# Patient Record
Sex: MALE | Race: WHITE | NOT HISPANIC OR LATINO | Employment: OTHER | URBAN - METROPOLITAN AREA
[De-identification: names, ages, dates, MRNs, and addresses within clinical notes are randomized per-mention and may not be internally consistent; named-entity substitution may affect disease eponyms.]

---

## 2017-01-09 RX ORDER — PRAVASTATIN SODIUM 40 MG/1
TABLET ORAL
Qty: 90 TABLET | Refills: 2 | Status: SHIPPED | OUTPATIENT
Start: 2017-01-09 | End: 2017-10-06 | Stop reason: SDUPTHER

## 2017-01-10 NOTE — TELEPHONE ENCOUNTER
----- Message from Rosemarie Rodriguez sent at 1/10/2017 10:53 AM CST -----  quinapril refill needed..[phone]     [facility] [employer] [id number] [place] [address] [place] - [address] [facility]  [address]  [facility] [address]  Phone: [phone] Fax: [phone]

## 2017-01-11 RX ORDER — QUINAPRIL 40 MG/1
40 TABLET ORAL DAILY
Qty: 90 TABLET | Refills: 2 | Status: SHIPPED | OUTPATIENT
Start: 2017-01-11 | End: 2017-10-05 | Stop reason: SDUPTHER

## 2017-01-30 DIAGNOSIS — M17.10 ARTHRITIS OF KNEE: ICD-10-CM

## 2017-02-01 RX ORDER — TRAMADOL HYDROCHLORIDE 50 MG/1
TABLET ORAL
Qty: 30 TABLET | Refills: 0 | Status: SHIPPED | OUTPATIENT
Start: 2017-02-01 | End: 2017-02-06 | Stop reason: SDUPTHER

## 2017-02-02 ENCOUNTER — TELEPHONE (OUTPATIENT)
Dept: INTERNAL MEDICINE | Facility: CLINIC | Age: 72
End: 2017-02-02

## 2017-02-02 DIAGNOSIS — M17.10 ARTHRITIS OF KNEE: ICD-10-CM

## 2017-02-02 NOTE — TELEPHONE ENCOUNTER
----- Message from Dayna Ross sent at 2/2/2017  3:39 PM CST -----  Contact: Pt  Pt is requesting to have a refill for Tramadol. Pt also wants to increase the strength of it. Pt uses.    [facility] [employer] [address] [place] [address] [address] [facility]  [address]  [facility] [address]  Phone: [phone] Fax: [phone]    Pls call pt back at [phone].

## 2017-02-02 NOTE — TELEPHONE ENCOUNTER
Pt requesting an increase and refill of Tramadol due to the arthritis in his knee. Pt states Dr. Davis recommend trying increase if medication was ineffective for pian . Pt was informed pending  approval prescription will be sent to pharmacy pt verbalized understanding.

## 2017-02-06 RX ORDER — TRAMADOL HYDROCHLORIDE 50 MG/1
50 TABLET ORAL EVERY 12 HOURS PRN
Qty: 60 TABLET | Refills: 0 | Status: SHIPPED | OUTPATIENT
Start: 2017-02-06 | End: 2017-05-18 | Stop reason: SDUPTHER

## 2017-03-06 ENCOUNTER — LAB VISIT (OUTPATIENT)
Dept: LAB | Facility: HOSPITAL | Age: 72
End: 2017-03-06
Attending: INTERNAL MEDICINE
Payer: MEDICARE

## 2017-03-06 DIAGNOSIS — E78.5 HYPERLIPIDEMIA LDL GOAL <100: ICD-10-CM

## 2017-03-06 DIAGNOSIS — I10 ESSENTIAL HYPERTENSION: ICD-10-CM

## 2017-03-06 LAB
ALBUMIN SERPL BCP-MCNC: 3.6 G/DL
ALP SERPL-CCNC: 74 U/L
ALT SERPL W/O P-5'-P-CCNC: 30 U/L
ANION GAP SERPL CALC-SCNC: 11 MMOL/L
AST SERPL-CCNC: 23 U/L
BILIRUB SERPL-MCNC: 0.6 MG/DL
BUN SERPL-MCNC: 21 MG/DL
CALCIUM SERPL-MCNC: 9.5 MG/DL
CHLORIDE SERPL-SCNC: 101 MMOL/L
CHOLEST/HDLC SERPL: 2.9 {RATIO}
CO2 SERPL-SCNC: 27 MMOL/L
CREAT SERPL-MCNC: 1.1 MG/DL
EST. GFR  (AFRICAN AMERICAN): >60 ML/MIN/1.73 M^2
EST. GFR  (NON AFRICAN AMERICAN): >60 ML/MIN/1.73 M^2
GLUCOSE SERPL-MCNC: 183 MG/DL
HDL/CHOLESTEROL RATIO: 34.7 %
HDLC SERPL-MCNC: 124 MG/DL
HDLC SERPL-MCNC: 43 MG/DL
LDLC SERPL CALC-MCNC: 52.2 MG/DL
NONHDLC SERPL-MCNC: 81 MG/DL
POTASSIUM SERPL-SCNC: 4.1 MMOL/L
PROT SERPL-MCNC: 7.8 G/DL
SODIUM SERPL-SCNC: 139 MMOL/L
TRIGL SERPL-MCNC: 144 MG/DL

## 2017-03-06 PROCEDURE — 80061 LIPID PANEL: CPT

## 2017-03-06 PROCEDURE — 80053 COMPREHEN METABOLIC PANEL: CPT

## 2017-03-06 PROCEDURE — 83036 HEMOGLOBIN GLYCOSYLATED A1C: CPT

## 2017-03-06 PROCEDURE — 36415 COLL VENOUS BLD VENIPUNCTURE: CPT

## 2017-03-07 LAB
ESTIMATED AVG GLUCOSE: 249 MG/DL
HBA1C MFR BLD HPLC: 10.3 %

## 2017-03-08 ENCOUNTER — OFFICE VISIT (OUTPATIENT)
Dept: INTERNAL MEDICINE | Facility: CLINIC | Age: 72
End: 2017-03-08
Payer: MEDICARE

## 2017-03-08 ENCOUNTER — LAB VISIT (OUTPATIENT)
Dept: LAB | Facility: HOSPITAL | Age: 72
End: 2017-03-08
Attending: FAMILY MEDICINE
Payer: MEDICARE

## 2017-03-08 VITALS
SYSTOLIC BLOOD PRESSURE: 100 MMHG | WEIGHT: 196.19 LBS | TEMPERATURE: 97 F | DIASTOLIC BLOOD PRESSURE: 56 MMHG | BODY MASS INDEX: 29.73 KG/M2 | HEIGHT: 68 IN | HEART RATE: 70 BPM | OXYGEN SATURATION: 97 %

## 2017-03-08 DIAGNOSIS — Z13.9 SCREENING: ICD-10-CM

## 2017-03-08 DIAGNOSIS — I48.92 ATRIAL FLUTTER WITH RAPID VENTRICULAR RESPONSE: ICD-10-CM

## 2017-03-08 DIAGNOSIS — K21.9 GASTROESOPHAGEAL REFLUX DISEASE, ESOPHAGITIS PRESENCE NOT SPECIFIED: ICD-10-CM

## 2017-03-08 DIAGNOSIS — Z23 IMMUNIZATION DUE: ICD-10-CM

## 2017-03-08 DIAGNOSIS — I10 ESSENTIAL HYPERTENSION: ICD-10-CM

## 2017-03-08 PROCEDURE — 99214 OFFICE O/P EST MOD 30 MIN: CPT | Mod: S$PBB,,, | Performed by: FAMILY MEDICINE

## 2017-03-08 PROCEDURE — 36415 COLL VENOUS BLD VENIPUNCTURE: CPT

## 2017-03-08 PROCEDURE — 86803 HEPATITIS C AB TEST: CPT

## 2017-03-08 PROCEDURE — 99999 PR PBB SHADOW E&M-EST. PATIENT-LVL III: CPT | Mod: PBBFAC,,, | Performed by: FAMILY MEDICINE

## 2017-03-08 RX ORDER — OMEPRAZOLE 20 MG/1
20 CAPSULE, DELAYED RELEASE ORAL DAILY
Qty: 90 CAPSULE | Refills: 0 | Status: SHIPPED | OUTPATIENT
Start: 2017-03-08 | End: 2017-07-06 | Stop reason: SDUPTHER

## 2017-03-08 RX ORDER — DILTIAZEM HYDROCHLORIDE 360 MG/1
360 CAPSULE, EXTENDED RELEASE ORAL DAILY
Qty: 90 CAPSULE | Refills: 0 | Status: SHIPPED | OUTPATIENT
Start: 2017-03-08 | End: 2017-07-06 | Stop reason: SDUPTHER

## 2017-03-08 NOTE — MR AVS SNAPSHOT
[facility]  [address] [facility]  [place] [address]  Phone: [phone]  Fax: [phone]                  Nico Garza Jr.   3/8/2017 9:20 AM   Office Visit    Description:  Male : 1945   Provider:  Keshia Guzman MD   Department:  [facility] - Internal Medicine           Reason for Visit     3 mth f/u     PVC?//eye Dr?     Rev labs           Diagnoses this Visit        Comments    Immunization due    -  Primary     Screening         Essential hypertension         Type 2 diabetes, uncontrolled, with neuropathy         Gastroesophageal reflux disease, esophagitis presence not specified         Atrial flutter with rapid ventricular response                To Do List           Future Appointments        Provider Department Dept Phone    3/8/2017 11:30 AM LAB, SAME DAY [facility] [phone]      Goals (5 Years of Data)     None      Follow-Up and Disposition     Return in about 3 months (around 2017).       These Medications        Disp Refills Start End    diltiaZEM (CARDIZEM CD) 360 MG 24 hr capsule 90 capsule 0 3/8/2017 3/8/2018    Take 1 capsule (360 mg total) by mouth once daily. - Oral    Pharmacy: [facility] [address] [place] [address] [address] [facility] Ph #: [phone]       apixaban (ELIQUIS) 5 mg Tab 60 tablet 0 3/8/2017     Take 1 tablet (5 mg total) by mouth 2 (two) times daily. - Oral    Pharmacy: [facility] [address] [place] [address] [place] - [address] [facility] Ph #: [phone]       omeprazole (PRILOSEC) 20 MG capsule 90 capsule 0 3/8/2017     Take 1 capsule (20 mg total) by mouth once daily. - Oral    Pharmacy: [facility] [address] [place] [address], [place] - [address] [facility] Ph #: [phone]       insulin detemir (LEVEMIR FLEXTOUCH) 100 unit/mL (3 mL) SubQ InPn pen 4 Box 0  "3/8/2017 6/6/2017    Inject 60 Units into the skin every evening. - Subcutaneous    Pharmacy: [facility] [address] [place] [address]  [address] [facility] #: [phone]       Notes to Pharmacy: IDDM 250.82  Insurance or pt choice      [facility] On Call     [facility] On Call Nurse Care Line - 24/7 Assistance  Registered nurses in the [facility] On Call Center provide clinical advisement, health education, appointment booking, and other advisory services.  Call for this free service at [phone].             Medications           CHANGE how you are taking these medications     Start Taking Instead of    apixaban (ELIQUIS) 5 mg Tab ELIQUIS 5 mg Tab    Dosage:  Take 1 tablet (5 mg total) by mouth 2 (two) times daily. Dosage:  TAKE ONE TABLET BY MOUTH TWICE DAILY    Reason for Change:  Reorder            Verify that the below list of medications is an accurate representation of the medications you are currently taking.  If none reported, the list may be blank. If incorrect, please contact your healthcare provider. Carry this list with you in case of emergency.           Current Medications     apixaban (ELIQUIS) 5 mg Tab Take 1 tablet (5 mg total) by mouth 2 (two) times daily.    b complex vitamins tablet Take 1 tablet by mouth once daily.    diltiaZEM (CARDIZEM CD) 360 MG 24 hr capsule Take 1 capsule (360 mg total) by mouth once daily.    gabapentin (NEURONTIN) 300 MG capsule Take 1 capsule (300 mg total) by mouth 2 (two) times daily.    insulin detemir (LEVEMIR FLEXTOUCH) 100 unit/mL (3 mL) SubQ InPn pen Inject 60 Units into the skin every evening.    insulin lispro (HUMALOG KWIKPEN) 100 unit/mL InPn pen Inject 17 Units into the skin 3 (three) times daily before meals.    insulin needles, disposable, 32 x 1/4 " Ndle Insulin injections four times per day.    multivitamin capsule Take 1 capsule by mouth once daily.    NEEDLES, DISPOSABLE (DISPOSABLE NEEDLES MISC) Inject " "1 each into the skin 3 (three) times daily.    omeprazole (PRILOSEC) 20 MG capsule Take 1 capsule (20 mg total) by mouth once daily.    pravastatin (PRAVACHOL) 40 MG tablet TAKE 1 TABLET DAILY    quinapril (ACCUPRIL) 40 MG tablet Take 1 tablet (40 mg total) by mouth once daily.    tramadol (ULTRAM) 50 mg tablet Take 1 tablet (50 mg total) by mouth every 12 (twelve) hours as needed for Pain.           Clinical Reference Information           Your Vitals Were     BP Pulse Temp Height Weight SpO2    100/56 (BP Location: Left arm, Patient Position: Sitting, BP Method: Manual) 70 97.3 °F (36.3 °C) (Tympanic) 5' 8" (1.727 m) 89 kg (196 lb 3.4 oz) 97%    BMI                29.83 kg/m2          Blood Pressure          Most Recent Value    BP  (!)  100/56      Allergies as of 3/8/2017     No Known Allergies      Immunizations Administered on Date of Encounter - 3/8/2017     Name Date Dose VIS Date Route    Pneumococcal Polysaccharide - 23 Valent  Incomplete 0.5 mL 4/24/2015 Intramuscular      Orders Placed During Today's Visit      Normal Orders This Visit    Pneumococcal Polysaccharide Vaccine (23 Valent) (SQ/IM)     Future Labs/Procedures Expected by Expires    Hepatitis C antibody  3/8/2017 5/7/2018      [facility] Sign-Up     Activating your [facility] account is as easy as 1-2-3!     1) Visit [email or web address], select Sign Up Now, enter this activation code and your date of birth, then select Next.  [email or web address]  Expires: 4/22/2017 10:16 AM      2) Create a username and password to use when you visit [facility] in the future and select a security question in case you lose your password and select Next.    3) Enter your e-mail address and click Sign Up!    Additional Information  If you have questions, please e-mail [email or web address] or call [phone] to talk to our [facility] staff. Remember, [facility] is NOT to be used for urgent needs. For medical emergencies, dial 911.         Language Assistance Services     " ATTENTION: Language assistance services are available, free of charge. Please call [phone].      ATENCIÓN: Si habla lydia, tiene a hernandez disposición servicios gratuitos de asistencia lingüística. Llame al [phone].     CHÚ Ý: N?u b?n nói Ti?ng Vi?t, có các d?ch v? h? tr? ngôn ng? mi?n phí dành cho b?n. G?i s? [phone].         [facility] complies with applicable Federal civil rights laws and does not discriminate on the basis of race, color, national origin, age, disability, or sex.
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
## 2017-03-09 LAB — HCV AB SERPL QL IA: NEGATIVE

## 2017-03-10 ENCOUNTER — TELEPHONE (OUTPATIENT)
Dept: INTERNAL MEDICINE | Facility: CLINIC | Age: 72
End: 2017-03-10

## 2017-03-17 ENCOUNTER — TELEPHONE (OUTPATIENT)
Dept: INTERNAL MEDICINE | Facility: CLINIC | Age: 72
End: 2017-03-17

## 2017-04-17 DIAGNOSIS — I48.92 ATRIAL FLUTTER WITH RAPID VENTRICULAR RESPONSE: ICD-10-CM

## 2017-04-17 NOTE — TELEPHONE ENCOUNTER
----- Message from Rosemarie Rodriguez sent at 4/17/2017  8:12 AM CDT -----  [facility][phone]    [facility] [employer] [address] - [place] - [address] [facility] [address]  [address]  [address]  [facility] [place] [address]  Phone: [phone] Fax: [phone]

## 2017-04-18 DIAGNOSIS — I48.92 ATRIAL FLUTTER WITH RAPID VENTRICULAR RESPONSE: ICD-10-CM

## 2017-04-18 RX ORDER — APIXABAN 5 MG/1
TABLET, FILM COATED ORAL
Qty: 60 TABLET | Refills: 0 | OUTPATIENT
Start: 2017-04-18

## 2017-05-18 DIAGNOSIS — M17.10 ARTHRITIS OF KNEE: ICD-10-CM

## 2017-05-18 RX ORDER — TRAMADOL HYDROCHLORIDE 50 MG/1
50 TABLET ORAL EVERY 12 HOURS PRN
Qty: 60 TABLET | Refills: 0 | Status: SHIPPED | OUTPATIENT
Start: 2017-05-18 | End: 2017-07-06 | Stop reason: SDUPTHER

## 2017-05-18 NOTE — TELEPHONE ENCOUNTER
----- Message from Almita Gonzalez sent at 5/18/2017  2:57 PM CDT -----  Contact: pt  Patient needs a refill on Tramadol 50mg, insulin called into [facility] pharmacy at [place].  Please call patient at [phone], if you have any questions. Thanks!

## 2017-05-23 NOTE — TELEPHONE ENCOUNTER
----- Message from Jolie Hunter sent at 5/23/2017  3:21 PM CDT -----  Contact: pt  Pt is calling nurse regarding a refill RX Humalog . Pt stated that pt has a 1/2 pen left. Pt call back [phone] to be advised thanks    [facility] [employer] [address] [facility]  [address]  [facility] [address]  Phone: [phone] Fax: [phone]

## 2017-05-24 RX ORDER — INSULIN LISPRO 100 [IU]/ML
17 INJECTION, SOLUTION INTRAVENOUS; SUBCUTANEOUS
Qty: 40.5 ML | Refills: 1 | Status: SHIPPED | OUTPATIENT
Start: 2017-05-24 | End: 2017-05-26 | Stop reason: SDUPTHER

## 2017-05-26 RX ORDER — INSULIN LISPRO 100 [IU]/ML
17 INJECTION, SOLUTION INTRAVENOUS; SUBCUTANEOUS
Qty: 40.5 ML | Refills: 0 | Status: SHIPPED | OUTPATIENT
Start: 2017-05-26 | End: 2017-07-26 | Stop reason: SDUPTHER

## 2017-05-26 NOTE — TELEPHONE ENCOUNTER
----- Message from Rosy Jaison sent at 5/22/2017  2:49 PM CDT -----  Contact: self [phone]  States that he needs refill on humalog. Pt uses     [employer] [address] - [facility] [address] [facility] [address] [facility]  [address]  [facility] [address]  Phone: [phone] Fax: [phone]    Please call back at [phone]//thank you acc

## 2017-05-30 ENCOUNTER — PATIENT OUTREACH (OUTPATIENT)
Dept: ADMINISTRATIVE | Facility: HOSPITAL | Age: 72
End: 2017-05-30

## 2017-05-30 ENCOUNTER — TELEPHONE (OUTPATIENT)
Dept: INTERNAL MEDICINE | Facility: CLINIC | Age: 72
End: 2017-05-30

## 2017-05-30 NOTE — LETTER
May 30, 2017    [facility]  [address]  [facility] [address]  Phone: [phone] May 30, 2017     Patient: Nico Garza    YOB: 1945   Date of Visit: 5/30/2017         To Whom It May Concern:    Please send most recent copy of Diabetic Eye Exam that states   Positive or Negative Retinopathy.    If you have any questions or concerns, please don't hesitate to call.     Sincerely,  MD Varsha Putnam LPN Care Coordination   [facility]  Phone [phone] ext [phone],  Fax [phone]

## 2017-06-05 ENCOUNTER — LAB VISIT (OUTPATIENT)
Dept: LAB | Facility: HOSPITAL | Age: 72
End: 2017-06-05
Attending: INTERNAL MEDICINE
Payer: MEDICARE

## 2017-06-05 PROCEDURE — 36415 COLL VENOUS BLD VENIPUNCTURE: CPT

## 2017-06-05 PROCEDURE — 83036 HEMOGLOBIN GLYCOSYLATED A1C: CPT

## 2017-06-06 LAB
ESTIMATED AVG GLUCOSE: 255 MG/DL
HBA1C MFR BLD HPLC: 10.5 %

## 2017-06-08 ENCOUNTER — LAB VISIT (OUTPATIENT)
Dept: LAB | Facility: HOSPITAL | Age: 72
End: 2017-06-08
Attending: INTERNAL MEDICINE
Payer: MEDICARE

## 2017-06-08 ENCOUNTER — OFFICE VISIT (OUTPATIENT)
Dept: INTERNAL MEDICINE | Facility: CLINIC | Age: 72
End: 2017-06-08
Payer: MEDICARE

## 2017-06-08 VITALS
OXYGEN SATURATION: 97 % | DIASTOLIC BLOOD PRESSURE: 68 MMHG | HEART RATE: 67 BPM | WEIGHT: 189.81 LBS | HEIGHT: 68 IN | BODY MASS INDEX: 28.77 KG/M2 | TEMPERATURE: 98 F | SYSTOLIC BLOOD PRESSURE: 124 MMHG

## 2017-06-08 DIAGNOSIS — M79.18 MUSCULOSKELETAL PAIN: ICD-10-CM

## 2017-06-08 DIAGNOSIS — I10 ESSENTIAL HYPERTENSION: Chronic | ICD-10-CM

## 2017-06-08 DIAGNOSIS — Z12.11 COLON CANCER SCREENING: ICD-10-CM

## 2017-06-08 DIAGNOSIS — M79.10 MYALGIA: ICD-10-CM

## 2017-06-08 DIAGNOSIS — E78.5 HYPERLIPIDEMIA LDL GOAL <70: Chronic | ICD-10-CM

## 2017-06-08 LAB
ANION GAP SERPL CALC-SCNC: 14 MMOL/L
BUN SERPL-MCNC: 21 MG/DL
CALCIUM SERPL-MCNC: 9.8 MG/DL
CHLORIDE SERPL-SCNC: 101 MMOL/L
CK SERPL-CCNC: 105 U/L
CO2 SERPL-SCNC: 23 MMOL/L
CREAT SERPL-MCNC: 1.2 MG/DL
ERYTHROCYTE [SEDIMENTATION RATE] IN BLOOD BY WESTERGREN METHOD: 9 MM/HR
EST. GFR  (AFRICAN AMERICAN): >60 ML/MIN/1.73 M^2
EST. GFR  (NON AFRICAN AMERICAN): >60 ML/MIN/1.73 M^2
GLUCOSE SERPL-MCNC: 374 MG/DL
POTASSIUM SERPL-SCNC: 4.3 MMOL/L
SODIUM SERPL-SCNC: 138 MMOL/L

## 2017-06-08 PROCEDURE — 85651 RBC SED RATE NONAUTOMATED: CPT

## 2017-06-08 PROCEDURE — 1159F MED LIST DOCD IN RCRD: CPT | Mod: ,,, | Performed by: INTERNAL MEDICINE

## 2017-06-08 PROCEDURE — 99999 PR PBB SHADOW E&M-EST. PATIENT-LVL IV: CPT | Mod: PBBFAC,,, | Performed by: INTERNAL MEDICINE

## 2017-06-08 PROCEDURE — 82550 ASSAY OF CK (CPK): CPT

## 2017-06-08 PROCEDURE — 36415 COLL VENOUS BLD VENIPUNCTURE: CPT

## 2017-06-08 PROCEDURE — 99214 OFFICE O/P EST MOD 30 MIN: CPT | Mod: S$PBB,,, | Performed by: INTERNAL MEDICINE

## 2017-06-08 PROCEDURE — 80048 BASIC METABOLIC PNL TOTAL CA: CPT

## 2017-06-08 PROCEDURE — 4010F ACE/ARB THERAPY RXD/TAKEN: CPT | Mod: ,,, | Performed by: INTERNAL MEDICINE

## 2017-06-08 PROCEDURE — 1125F AMNT PAIN NOTED PAIN PRSNT: CPT | Mod: ,,, | Performed by: INTERNAL MEDICINE

## 2017-06-08 PROCEDURE — 3046F HEMOGLOBIN A1C LEVEL >9.0%: CPT | Mod: ,,, | Performed by: INTERNAL MEDICINE

## 2017-06-08 RX ORDER — DULOXETIN HYDROCHLORIDE 30 MG/1
30 CAPSULE, DELAYED RELEASE ORAL DAILY
Qty: 30 CAPSULE | Refills: 1 | Status: SHIPPED | OUTPATIENT
Start: 2017-06-08 | End: 2017-07-06

## 2017-06-08 RX ORDER — INSULIN DETEMIR 100 [IU]/ML
60 INJECTION, SOLUTION SUBCUTANEOUS NIGHTLY
Qty: 60 ML | Refills: 0 | OUTPATIENT
Start: 2017-06-08 | End: 2017-09-06

## 2017-06-08 NOTE — PATIENT INSTRUCTIONS
Duloxetine delayed-release capsules  What is this medicine?  DULOXETINE (doo LOX e teen) is used to treat depression, anxiety, and different types of chronic pain.  How should I use this medicine?  Take this medicine by mouth with a glass of water. Follow the directions on the prescription label. Do not cut, crush or chew this medicine. You can take this medicine with or without food. Take your medicine at regular intervals. Do not take your medicine more often than directed. Do not stop taking this medicine suddenly except upon the advice of your doctor. Stopping this medicine too quickly may cause serious side effects or your condition may worsen.  A special MedGuide will be given to you by the pharmacist with each prescription and refill. Be sure to read this information carefully each time.  Talk to your pediatrician regarding the use of this medicine in children. While this drug may be prescribed for children as young as 7 years of age for selected conditions, precautions do apply.  What side effects may I notice from receiving this medicine?  Side effects that you should report to your doctor or health care professional as soon as possible:  · allergic reactions like skin rash, itching or hives, swelling of the face, lips, or tongue  · changes in blood pressure  · confusion  · dark urine  · dizziness  · fast talking and excited feelings or actions that are out of control  · fast, irregular heartbeat  · fever  · general ill feeling or flu-like symptoms  · hallucination, loss of contact with reality  · light-colored stools  · loss of balance or coordination  · redness, blistering, peeling or loosening of the skin, including inside the mouth  · right upper belly pain  · seizures  · suicidal thoughts or other mood changes  · trouble concentrating  · trouble passing urine or change in the amount of urine  · unusual bleeding or bruising  · unusually weak or tired  · yellowing of the eyes or skin  Side effects that  usually do not require medical attention (report to your doctor or health care professional if they continue or are bothersome):  · blurred vision  · change in appetite  · change in sex drive or performance  · headache  · increased sweating  · nausea  What may interact with this medicine?  Do not take this medicine with any of the following medications:  · certain diet drugs like dexfenfluramine, fenfluramine  · desvenlafaxine  · linezolid  · MAOIs like Azilect, Carbex, Eldepryl, Marplan, Nardil, and Parnate  · methylene blue (intravenous)  · milnacipran  · thioridazine  · venlafaxine  This medicine may also interact with the following medications:  · alcohol  · aspirin and aspirin-like medicines  · certain antibiotics like ciprofloxacin and enoxacin  · certain medicines for blood pressure, heart disease, irregular heart beat  · certain medicines for depression, anxiety, or psychotic disturbances  · certain medicines for migraine headache like almotriptan, eletriptan, frovatriptan, naratriptan, rizatriptan, sumatriptan, zolmitriptan  · certain medicines that treat or prevent blood clots like warfarin, enoxaparin, and dalteparin  · cimetidine  · fentanyl  · lithium  · NSAIDS, medicines for pain and inflammation, like ibuprofen or naproxen  · phentermine  · procarbazine  · sibutramine  · Bill's wort  · theophylline  · tramadol  · tryptophan  What if I miss a dose?  If you miss a dose, take it as soon as you can. If it is almost time for your next dose, take only that dose. Do not take double or extra doses.  Where should I keep my medicine?  Keep out of the reach of children.  Store at room temperature between 20 and 25 degrees C (68 to 77 degrees F). Throw away any unused medicine after the expiration date.  What should I tell my health care provider before I take this medicine?  They need to know if you have any of these conditions:  · bipolar disorder or a family history of bipolar  disorder  · glaucoma  · kidney disease  · liver disease  · suicidal thoughts or a previous suicide attempt  · taken medicines called MAOIs like Carbex, Eldepryl, Marplan, Nardil, and Parnate within 14 days  · an unusual reaction to duloxetine, other medicines, foods, dyes, or preservatives  · pregnant or trying to get pregnant  · breast-feeding  What should I watch for while using this medicine?  Tell your doctor if your symptoms do not get better or if they get worse. Visit your doctor or health care professional for regular checks on your progress. Because it may take several weeks to see the full effects of this medicine, it is important to continue your treatment as prescribed by your doctor.  Patients and their families should watch out for new or worsening thoughts of suicide or depression. Also watch out for sudden changes in feelings such as feeling anxious, agitated, panicky, irritable, hostile, aggressive, impulsive, severely restless, overly excited and hyperactive, or not being able to sleep. If this happens, especially at the beginning of treatment or after a change in dose, call your health care professional.  You may get drowsy or dizzy. Do not drive, use machinery, or do anything that needs mental alertness until you know how this medicine affects you. Do not stand or sit up quickly, especially if you are an older patient. This reduces the risk of dizzy or fainting spells. Alcohol may interfere with the effect of this medicine. Avoid alcoholic drinks.  This medicine can cause an increase in blood pressure. This medicine can also cause a sudden drop in your blood pressure, which may make you feel faint and increase the chance of a fall. These effects are most common when you first start the medicine or when the dose is increased, or during use of other medicines that can cause a sudden drop in blood pressure. Check with your doctor for instructions on monitoring your blood pressure while taking this  medicine.  Your mouth may get dry. Chewing sugarless gum or sucking hard candy, and drinking plenty of water may help. Contact your doctor if the problem does not go away or is severe.  Date Last Reviewed:   NOTE:This sheet is a summary. It may not cover all possible information. If you have questions about this medicine, talk to your doctor, pharmacist, or health care provider. Copyright© 2016 [employer]

## 2017-06-09 NOTE — PROGRESS NOTES
Subjective:       Patient ID: Nico Garza Jr. is a 71 y.o. male.    Chief Complaint: 3 month lab review    Nico Garza Jr.  71 y.o. White male    Patient presents with:  3 month lab review    HPI: Here today to follow up on chronic conditions.  Diabetes--uncontrolled. He reports compliance with Humalog and Levemir. He has not seen a diabetes specialist.                       HGBA1C                   10.5 (H)            06/05/2017            Neuropathy--he states symptoms are not controlled on gabapentin and tramadol.   HTN--stable on current management.   HLD--compliant with pravastatin.                 CHOL                     124                 03/06/2017                 HDL                      43                  03/06/2017                 LDLCALC                  52.2 (L)            03/06/2017                 TRIG                     144                 03/06/2017                 He has muscle cramps, mostly at night.   He has back and knee pain. Tramadol helps but only temporarily.     Past Medical History:  Atrial fibrillation and flutter      Comment: Dr. Bello ([facility]  COPD (chronic obstructive pulmonary disease)  Diabetes mellitus  Hyperlipidemia  Hypertension  Lung disease  Pancreatitis    Current Outpatient Prescriptions on File Prior to Visit:  apixaban (ELIQUIS) 5 mg Tab, Take 1 tablet (5 mg total) by mouth 2 (two) times daily., Disp: 60 tablet, Rfl: 3  b complex vitamins tablet, Take 1 tablet by mouth once daily., Disp: , Rfl:    diltiaZEM (CARDIZEM CD) 360 MG 24 hr capsule, Take 1 capsule (360 mg total) by mouth once daily., Disp: 90 capsule, Rfl: 0  gabapentin (NEURONTIN) 300 MG capsule, Take 1 capsule (300 mg total) by mouth 2 (two) times daily., Disp: 60 capsule, Rfl: 3  insulin detemir (LEVEMIR FLEXTOUCH) 100 unit/mL (3 mL) SubQ InPn pen, Inject 60 Units into the skin every evening., Disp: 4 Box, Rfl: 0  insulin lispro (HUMALOG KWIKPEN) 100 unit/mL InPn pen, Inject  "17 Units into the skin 3 (three) times daily before meals., Disp: 40.5 mL, Rfl: 0  multivitamin capsule, Take 1 capsule by mouth once daily., Disp: , Rfl:   omeprazole (PRILOSEC) 20 MG capsule, Take 1 capsule (20 mg total) by mouth once daily., Disp: 90 capsule, Rfl: 0  pravastatin (PRAVACHOL) 40 MG tablet, TAKE 1 TABLET DAILY, Disp: 90 tablet, Rfl: 2  quinapril (ACCUPRIL) 40 MG tablet, Take 1 tablet (40 mg total) by mouth once daily., Disp: 90 tablet, Rfl: 2  tramadol (ULTRAM) 50 mg tablet, Take 1 tablet (50 mg total) by mouth every 12 (twelve) hours as needed for Pain., Disp: 60 tablet, Rfl: 0  insulin needles, disposable, 32 x 1/4 " Ndle, Insulin injections four times per day., Disp: 400 each, Rfl: 3  NEEDLES, DISPOSABLE (DISPOSABLE NEEDLES MISC), Inject 1 each into the skin 3 (three) times daily., Disp: , Rfl:     Allergies:  Review of patient's allergies indicates:  No Known Allergies            Review of Systems   Constitutional: Negative for fever and unexpected weight change.   Respiratory: Negative for shortness of breath.    Cardiovascular: Negative for chest pain and leg swelling.   Gastrointestinal: Negative for abdominal pain, constipation and diarrhea.   Genitourinary: Negative for dysuria.   Musculoskeletal: Positive for arthralgias, back pain and myalgias.   Neurological: Positive for numbness. Negative for dizziness and headaches.       Objective:      Physical Exam   Constitutional: He is oriented to person, place, and time. He appears well-developed and well-nourished. No distress.   Eyes: No scleral icterus.   Cardiovascular: Normal rate, regular rhythm and normal heart sounds.    Pulmonary/Chest: Effort normal and breath sounds normal. No respiratory distress.   Abdominal: Soft. Bowel sounds are normal.   Neurological: He is alert and oriented to person, place, and time.   Skin: Skin is warm and dry.   Psychiatric: He has a normal mood and affect.   Vitals reviewed.      Assessment:       1. " Type 2 diabetes, uncontrolled, with neuropathy    2. Essential hypertension    3. Hyperlipidemia LDL goal <70    4. Myalgia    5. Musculoskeletal pain    6. Colon cancer screening        Plan:       Nico was seen today for 3 month lab review.    Diagnoses and all orders for this visit:    Type 2 diabetes, uncontrolled, with neuropathy  -     duloxetine (CYMBALTA) 30 MG capsule; Take 1 capsule (30 mg total) by mouth once daily. Discussed medication risks and benefits.   -     Ambulatory consult to Diabetic Education  -     Recommend diabetic eye exam     Essential hypertension  -     Continue current management    Hyperlipidemia LDL goal <70  -     Continue current management    Myalgia  -     Basic metabolic panel; Future  -     CK; Future  -     Sedimentation rate, manual; Future  -     duloxetine (CYMBALTA) 30 MG capsule; Take 1 capsule (30 mg total) by mouth once daily.    Musculoskeletal pain  -     duloxetine (CYMBALTA) 30 MG capsule; Take 1 capsule (30 mg total) by mouth once daily.    Colon cancer screening  -     Case request GI: COLONOSCOPY    F/U in 4 weeks for neuropathy

## 2017-06-16 NOTE — TELEPHONE ENCOUNTER
----- Message from Xin Juan sent at 6/16/2017 11:55 AM CDT -----  Contact: Patient   Refill request for Rx Levemir, Please call him at [phone].      [facility] [employer] [address] [facility] [address] [facility]  [address]  [facility] [address]  Phone: [phone] Fax: [phone]    Thanks  td

## 2017-06-23 ENCOUNTER — OFFICE VISIT (OUTPATIENT)
Dept: DIABETES | Facility: CLINIC | Age: 72
End: 2017-06-23
Payer: MEDICARE

## 2017-06-23 ENCOUNTER — LAB VISIT (OUTPATIENT)
Dept: LAB | Facility: HOSPITAL | Age: 72
End: 2017-06-23
Attending: NURSE PRACTITIONER
Payer: MEDICARE

## 2017-06-23 VITALS
WEIGHT: 188.06 LBS | HEIGHT: 69 IN | DIASTOLIC BLOOD PRESSURE: 62 MMHG | SYSTOLIC BLOOD PRESSURE: 110 MMHG | BODY MASS INDEX: 27.85 KG/M2

## 2017-06-23 DIAGNOSIS — I10 ESSENTIAL HYPERTENSION: Chronic | ICD-10-CM

## 2017-06-23 DIAGNOSIS — E78.5 HYPERLIPIDEMIA LDL GOAL <70: Chronic | ICD-10-CM

## 2017-06-23 LAB
CREAT UR-MCNC: 391 MG/DL
GLUCOSE SERPL-MCNC: 238 MG/DL (ref 70–110)
MICROALBUMIN UR DL<=1MG/L-MCNC: 67 UG/ML
MICROALBUMIN/CREATININE RATIO: 17.1 UG/MG

## 2017-06-23 PROCEDURE — 3046F HEMOGLOBIN A1C LEVEL >9.0%: CPT | Mod: ,,, | Performed by: NURSE PRACTITIONER

## 2017-06-23 PROCEDURE — 99214 OFFICE O/P EST MOD 30 MIN: CPT | Mod: S$PBB,,, | Performed by: NURSE PRACTITIONER

## 2017-06-23 PROCEDURE — 4010F ACE/ARB THERAPY RXD/TAKEN: CPT | Mod: ,,, | Performed by: NURSE PRACTITIONER

## 2017-06-23 PROCEDURE — 82570 ASSAY OF URINE CREATININE: CPT

## 2017-06-23 PROCEDURE — 99999 PR PBB SHADOW E&M-EST. PATIENT-LVL IV: CPT | Mod: PBBFAC,,, | Performed by: NURSE PRACTITIONER

## 2017-06-23 PROCEDURE — 1125F AMNT PAIN NOTED PAIN PRSNT: CPT | Mod: ,,, | Performed by: NURSE PRACTITIONER

## 2017-06-23 PROCEDURE — 1159F MED LIST DOCD IN RCRD: CPT | Mod: ,,, | Performed by: NURSE PRACTITIONER

## 2017-06-23 NOTE — LETTER
June 26, 2017      Tiffany Davis DO  [address] [place] [address] [address] Breezy MONTANEZ [id number]           [facility]  [address] [place] [address] [address]  [place] [address]  Phone: [phone]  Fax: [phone]          Patient: Nico Garza Jr.   MR Number: [id number]   YOB: 1945   Date of Visit: 6/23/2017       Dear Dr. Tiffany Davis:    Thank you for referring Nico Garza to me for evaluation. Attached you will find relevant portions of my assessment and plan of care.    If you have questions, please do not hesitate to call me. I look forward to following Nico Garza along with you.    Sincerely,    Gabrielle Vee, JESSENIA, CDE    Enclosure  CC:  No Recipients    If you would like to receive this communication electronically, please contact [email or web address] or [phone] to request more information on [employer] Link access.    For providers and/or their staff who would like to refer a patient to [facility], please contact us through our one-stop-shop provider referral line, [facility] , at [phone].    If you feel you have received this communication in error or would no longer like to receive these types of communications, please e-mail [email or web address]

## 2017-06-23 NOTE — PROGRESS NOTES
"PCP: Tiffany Davis DO    Subjective:     Chief Complaint: Diabetes, establish [facility]    HISTORY OF PRESENT ILLNESS: 71 year old  male presenting to establish care for diabetes.  Patient has had Type II diabetes for at least 20 years and has the following complications: peripheral neuropathy.  He also has a history of atrial flutter on Eliquis.  He  has attended diabetes education in the past.  Blood glucose testing is performed once a day.  No meter or readings today.  Per recall, fasting  - 187.  He denies any recent hospital admissions, emergency room visits, or hypoglycemia.     Height: 5' 8.5" (174 cm)  //  Weight: 85.3 kg (188 lb 0.8 oz), Body mass index is 28.18 kg/m².  Home Blood Glucose reading this AM: 187 mg/dl fasting  His blood sugar in clinic today is: 238 mg/dl at 1:24 pm     Lab Results   Component Value Date    POCGLU 238 (A) 06/23/2017     Labs Reviewed. ADA recommends A1C of less than 7 %. His most recent A1C is:     Lab Results   Component Value Date    HGBA1C 10.5 (H) 06/05/2017      DM MEDICATIONS:   Levemir 60 units at bedtime  Humalog 17 units TID ac    Review of Systems   Constitutional: Negative for appetite change, diaphoresis, fatigue and unexpected weight change.   HENT: Negative for congestion, hearing loss, rhinorrhea, sneezing and sore throat.    Eyes: Negative for visual disturbance.   Respiratory: Negative for cough, shortness of breath and wheezing.    Cardiovascular: Negative for leg swelling.   Gastrointestinal: Negative for abdominal pain, constipation, diarrhea, nausea and vomiting.   Endocrine: Negative for cold intolerance, heat intolerance, polydipsia, polyphagia and polyuria.   Genitourinary: Negative for difficulty urinating, dysuria and urgency.   Skin: Negative for color change, pallor and wound.   Neurological: Positive for numbness. Negative for dizziness, seizures, syncope and headaches.   Psychiatric/Behavioral: Negative for confusion and decreased " concentration. The patient is not nervous/anxious.        STANDARDS OF CARE:  Current Ophthalmologist / Optometrist: Referral placed.  Appointment to be scheduled.    Current Podiatrist: None.   Recommend regular exams and denies gums bleeding.    ACTIVITY LEVEL: Rarely Active  EXERCISE: none  MEAL PLANNING: Patient reports number of meals per day to be 3 and number of snacks per day to be 2. Patient is encouraged to consume 45 - 60 grams of carbohydrates in each meal, and 1800 k / lyric per day.  Per dietary recall, patient is not limiting carbohydrates, saturated fats and sodium.     BLOOD GLUCOSE TESTING: self- monitoring  SOCIAL HISTORY: . Lives alone.  Former smoker.    Objective:      Physical Exam   Constitutional: He is oriented to person, place, and time. He appears well-developed and well-nourished.   HENT:   Head: Normocephalic and atraumatic.   Eyes: EOM are normal. Pupils are equal, round, and reactive to light.   Neck: Normal range of motion. No tracheal deviation present.   Cardiovascular: Normal rate, regular rhythm and intact distal pulses.  Exam reveals no friction rub.    No murmur heard.  Pulses:       Dorsalis pedis pulses are 2+ on the right side, and 2+ on the left side.   Pulmonary/Chest: Effort normal and breath sounds normal. He has no wheezes.   Abdominal: Soft. Bowel sounds are normal. He exhibits no distension. There is no tenderness.   Musculoskeletal: Normal range of motion. He exhibits no edema or deformity.   Feet:   Right Foot:   Protective Sensation: 6 sites tested. 6 sites sensed.   Skin Integrity: Negative for ulcer, blister, skin breakdown, erythema, warmth, callus or dry skin.   Left Foot:   Protective Sensation: 6 sites tested. 6 sites sensed.   Skin Integrity: Negative for ulcer, blister, skin breakdown, erythema, warmth, callus or dry skin.   Neurological: He is alert and oriented to person, place, and time.   Skin: Skin is warm and dry. No rash noted.   Psychiatric: He  has a normal mood and affect. His behavior is normal. Judgment and thought content normal.       Assessment / Plan:     1.) Type 2 diabetes, uncontrolled, with neuropathy ( on gabapentin )  Comments:  - Patient is on a basal-bolus regimen, but does not have BG readings today.  Will do a CGM next week to assess BG patterns and make adjustments to insulin at that time.  Indications for CGMS: Elevated A1C, Postprandial hyperglycemia, Unexplained blood glucose excursions and Annual Screening.   Orders:  -     POCT glucose  -     GLUCOSE MONITORING CONTINUOUS MIN 72 HOURS; Future  -     Microalbumin/creatinine urine ratio; Future; Expected date: 06/23/2017  -     Ambulatory referral to Optometry    2.) Essential hypertension - continue Accupril, Cardizem CD    3.) Hyperlipidemia LDL goal <70 - continue Pravastatin    Additional Plan Details:    1.) Patient was instructed to monitor blood glucose 2 - 3 x daily, fasting and ac dinner or at bedtime. Discussed ADA goal for fasting blood sugar, 80 - 130 mg/dL; pp blood sugars below 180 mg/dl. Also, discussed prevention of hypoglycemia and the need to adjust goals to higher levels if persistent hypoglycemia.  Reminded to bring BG records or meter to each visit for review.  2.) Reviewed pathophysiology of diabetes, complications related to the disease, importance of annual dilated eye exam and daily foot examination.  3.) We discussed the ADA recommendations: Hemoglobin A1c below 7.0 %. All patients with diabetes should be on statins unless contraindicated.  ACE or ARB therapy if not contraindicated.    4.) Meal planning teaching: Carbohydrate definition - one serving is 15 gms. Carbohydrate spacing - carbohydrates should be spaced into approximately 3 meals with 2 snacks ( of one carbohydrate ) between meals or at bedtime. Increase vegetable intake to 2 or more cups of vegetables per day as well as 2 fruit servings. Recommended low saturated fat, low sodium diet to aid in  control of hypertension and cholesterol.  5.) Discussed activity, benefits, methods, and precautions. Recommended patient start or continue some form of exercise and increase as tolerated to 30 minutes per day to facilitate weight loss and aid in control of BGs.  6.) Return to clinic in 1 month for follow up. He was explained the above plan and given opportunity to ask questions. Advised to call clinic with any further questions or concerns.    Gabrielle Vee, PRAKASHC, CDE    A total of 60 minutes was spent in face to face time, of which over 50 % was spent in counseling patient on disease process, complications, treatment, and side effects of medications.

## 2017-06-26 ENCOUNTER — TELEPHONE (OUTPATIENT)
Dept: GASTROENTEROLOGY | Facility: CLINIC | Age: 72
End: 2017-06-26

## 2017-06-30 ENCOUNTER — CLINICAL SUPPORT (OUTPATIENT)
Dept: DIABETES | Facility: CLINIC | Age: 72
End: 2017-06-30
Payer: MEDICARE

## 2017-07-03 NOTE — PROGRESS NOTES
Patient came into clinic to receive sensor placement for Rkylin CGMS, however due to issues with CGM sensor patient will have to be rescheduled at another time. Will contact [employer] rep to assist with troubleshooting equipment.

## 2017-07-06 ENCOUNTER — OFFICE VISIT (OUTPATIENT)
Dept: OPHTHALMOLOGY | Facility: CLINIC | Age: 72
End: 2017-07-06
Payer: MEDICARE

## 2017-07-06 ENCOUNTER — OFFICE VISIT (OUTPATIENT)
Dept: INTERNAL MEDICINE | Facility: CLINIC | Age: 72
End: 2017-07-06
Payer: MEDICARE

## 2017-07-06 VITALS
SYSTOLIC BLOOD PRESSURE: 122 MMHG | BODY MASS INDEX: 27.92 KG/M2 | HEIGHT: 69 IN | RESPIRATION RATE: 16 BRPM | WEIGHT: 188.5 LBS | HEART RATE: 72 BPM | DIASTOLIC BLOOD PRESSURE: 70 MMHG | TEMPERATURE: 96 F

## 2017-07-06 DIAGNOSIS — K21.9 GASTROESOPHAGEAL REFLUX DISEASE, ESOPHAGITIS PRESENCE NOT SPECIFIED: ICD-10-CM

## 2017-07-06 DIAGNOSIS — M17.10 ARTHRITIS OF KNEE: Primary | ICD-10-CM

## 2017-07-06 DIAGNOSIS — M47.9 ARTHRITIS OF BACK: ICD-10-CM

## 2017-07-06 DIAGNOSIS — E11.9 DIABETES MELLITUS TYPE 2 WITHOUT RETINOPATHY: Primary | ICD-10-CM

## 2017-07-06 DIAGNOSIS — I10 ESSENTIAL HYPERTENSION: ICD-10-CM

## 2017-07-06 DIAGNOSIS — H52.4 BILATERAL PRESBYOPIA: ICD-10-CM

## 2017-07-06 PROCEDURE — 99214 OFFICE O/P EST MOD 30 MIN: CPT | Mod: S$PBB,,, | Performed by: INTERNAL MEDICINE

## 2017-07-06 PROCEDURE — 99999 PR PBB SHADOW E&M-EST. PATIENT-LVL I: CPT | Mod: PBBFAC,,, | Performed by: OPTOMETRIST

## 2017-07-06 PROCEDURE — 3046F HEMOGLOBIN A1C LEVEL >9.0%: CPT | Mod: ,,, | Performed by: INTERNAL MEDICINE

## 2017-07-06 PROCEDURE — 92004 COMPRE OPH EXAM NEW PT 1/>: CPT | Mod: S$PBB,,, | Performed by: OPTOMETRIST

## 2017-07-06 PROCEDURE — 92015 DETERMINE REFRACTIVE STATE: CPT | Mod: ,,, | Performed by: OPTOMETRIST

## 2017-07-06 PROCEDURE — 4010F ACE/ARB THERAPY RXD/TAKEN: CPT | Mod: ,,, | Performed by: INTERNAL MEDICINE

## 2017-07-06 PROCEDURE — 99211 OFF/OP EST MAY X REQ PHY/QHP: CPT | Mod: PBBFAC | Performed by: OPTOMETRIST

## 2017-07-06 PROCEDURE — 1159F MED LIST DOCD IN RCRD: CPT | Mod: ,,, | Performed by: INTERNAL MEDICINE

## 2017-07-06 PROCEDURE — 1125F AMNT PAIN NOTED PAIN PRSNT: CPT | Mod: ,,, | Performed by: INTERNAL MEDICINE

## 2017-07-06 PROCEDURE — 99999 PR PBB SHADOW E&M-EST. PATIENT-LVL III: CPT | Mod: PBBFAC,,, | Performed by: INTERNAL MEDICINE

## 2017-07-06 RX ORDER — TRAMADOL HYDROCHLORIDE 50 MG/1
100 TABLET ORAL DAILY PRN
Qty: 60 TABLET | Refills: 3 | Status: SHIPPED | OUTPATIENT
Start: 2017-07-06 | End: 2018-03-02 | Stop reason: SDUPTHER

## 2017-07-06 RX ORDER — OMEPRAZOLE 20 MG/1
20 CAPSULE, DELAYED RELEASE ORAL DAILY
Qty: 90 CAPSULE | Refills: 1 | Status: SHIPPED | OUTPATIENT
Start: 2017-07-06 | End: 2018-01-02 | Stop reason: SDUPTHER

## 2017-07-06 RX ORDER — DILTIAZEM HYDROCHLORIDE 360 MG/1
360 CAPSULE, EXTENDED RELEASE ORAL DAILY
Qty: 90 CAPSULE | Refills: 3 | Status: SHIPPED | OUTPATIENT
Start: 2017-07-06 | End: 2018-07-12 | Stop reason: SDUPTHER

## 2017-07-06 RX ORDER — GABAPENTIN 300 MG/1
300 CAPSULE ORAL 2 TIMES DAILY
Qty: 180 CAPSULE | Refills: 1 | Status: SHIPPED | OUTPATIENT
Start: 2017-07-06 | End: 2017-10-05 | Stop reason: SDUPTHER

## 2017-07-06 NOTE — PROGRESS NOTES
HPI      IDDM exam. Decrease near visual acuity hard to read small print on the   television screen. New patient last eye exam 3 years. Update glasses RX.   Patient was told he has cataracts in both eyes.     Last edited by Hannah Muller on 7/6/2017 10:59 AM. (History)            Assessment /Plan     For exam results, see Encounter Report.    Diabetes mellitus type 2 without retinopathy    Nuclear cataract, bilateral    Bilateral presbyopia      No Background Diabetic Retinopathy    Significant cataracts OU, pt defers the cataract evaluation consult.    Dispense Final Rx for glasses.  RTC 1 year

## 2017-07-06 NOTE — PROGRESS NOTES
"Nico Josetee Fernando.  71 y.o. White male    Chief Complaint   Patient presents with    Follow-up     HPI: Presents to the clinic for a one month follow up. He was started on Cymbalta 30 mg for pain. He states it has not helped his chronic knee or back pain. He is not interested in seeing orthopedics because he states the last time he was told he needed surgery. He takes tramadol but states it only lasts a couple of hours.   Neuropathy--stable on gabapentin.   Diabetes--he has seen diabetes management and is due to follow up later this month.   HTN--stable. He needs a refill on diltiazem.  GERD--he needs a refill on omeprazole.     Past Medical History:   Diagnosis Date    Arthritis     Atrial fibrillation and flutter     Dr. Bello ([facility]    COPD (chronic obstructive pulmonary disease)     Diabetes mellitus     DM (diabetes mellitus) 1996     am 07/06/2017 Insulin x 3 years.    Hyperlipidemia     Hypertension     Lung disease     Pancreatitis        Current Outpatient Prescriptions on File Prior to Visit   Medication Sig Dispense Refill    apixaban (ELIQUIS) 5 mg Tab Take 1 tablet (5 mg total) by mouth 2 (two) times daily. 60 tablet 3    b complex vitamins tablet Take 1 tablet by mouth once daily.      insulin detemir (LEVEMIR FLEXTOUCH) 100 unit/mL (3 mL) SubQ InPn pen Inject 60 Units into the skin every evening. 4 Box 0    insulin lispro (HUMALOG KWIKPEN) 100 unit/mL InPn pen Inject 17 Units into the skin 3 (three) times daily before meals. 40.5 mL 0    insulin needles, disposable, 32 x 1/4 " Ndle Insulin injections four times per day. 400 each 3    multivitamin capsule Take 1 capsule by mouth once daily.      NEEDLES, DISPOSABLE (DISPOSABLE NEEDLES MISC) Inject 1 each into the skin 3 (three) times daily.      pravastatin (PRAVACHOL) 40 MG tablet TAKE 1 TABLET DAILY 90 tablet 2    quinapril (ACCUPRIL) 40 MG tablet Take 1 tablet (40 mg total) by mouth once daily. 90 " tablet 2    diltiaZEM (CARDIZEM CD) 360 MG 24 hr capsule Take 1 capsule (360 mg total) by mouth once daily. 90 capsule 0    duloxetine (CYMBALTA) 30 MG capsule Take 1 capsule (30 mg total) by mouth once daily. 30 capsule 1    gabapentin (NEURONTIN) 300 MG capsule Take 1 capsule (300 mg total) by mouth 2 (two) times daily. 60 capsule 3    omeprazole (PRILOSEC) 20 MG capsule Take 1 capsule (20 mg total) by mouth once daily. 90 capsule 0    tramadol (ULTRAM) 50 mg tablet Take 1 tablet (50 mg total) by mouth every 12 (twelve) hours as needed for Pain. 60 tablet 0     No current facility-administered medications on file prior to visit.        Allergies:  Review of patient's allergies indicates:  No Known Allergies    PHYSICAL EXAM:  VITAL SIGNS: Reviewed  GENERAL: Alert and oriented, no acute distress  HEART: Regular rate and rhythm  LUNGS: Respirations unlabored    ASSESSMENT/PLAN:  Nico was seen today for follow-up.    Diagnoses and all orders for this visit:    Arthritis of knee  -     Change tramadol (ULTRAM) 50 mg tablet; Take 2 tablets (100 mg total) by mouth daily as needed for Pain.    Arthritis of back  -     Change tramadol (ULTRAM) 50 mg tablet; Take 2 tablets (100 mg total) by mouth daily as needed for Pain.    Type 2 diabetes, uncontrolled, with neuropathy  -     Continue gabapentin (NEURONTIN) 300 MG capsule; Take 1 capsule (300 mg total) by mouth 2 (two) times daily.    Essential hypertension  -     Refill diltiaZEM (CARDIZEM CD) 360 MG 24 hr capsule; Take 1 capsule (360 mg total) by mouth once daily.    Gastroesophageal reflux disease, esophagitis presence not specified  -     Refill omeprazole (PRILOSEC) 20 MG capsule; Take 1 capsule (20 mg total) by mouth once daily.    Labs and f/u in 3 months

## 2017-07-06 NOTE — LETTER
July 6, 2017      Gabrielle Vee, JESSENIA, CDE  [address] [facility] [address]           [facility]  [address] [place] [address]  Phone: [phone]  Fax: [phone]          Patient: Nico Garza Jr.   MR Number: [id number]   YOB: 1945   Date of Visit: 7/6/2017       Dear Gabrielle Vee:    Thank you for referring Nico Garza to me for evaluation. Attached you will find relevant portions of my assessment and plan of care.    If you have questions, please do not hesitate to call me. I look forward to following Nico Garza along with you.    Sincerely,    Usman Nuno, OD    Enclosure  CC:  No Recipients    If you would like to receive this communication electronically, please contact [email or web address] or [phone] to request more information on [facility] Link access.    For providers and/or their staff who would like to refer a patient to [facility], please contact us through our one-stop-shop provider referral line, [facility] , at [phone].    If you feel you have received this communication in error or would no longer like to receive these types of communications, please e-mail [email or web address]

## 2017-07-25 ENCOUNTER — CLINICAL SUPPORT (OUTPATIENT)
Dept: DIABETES | Facility: CLINIC | Age: 72
End: 2017-07-25
Payer: MEDICARE

## 2017-07-25 PROCEDURE — 95250 CONT GLUC MNTR PHYS/QHP EQP: CPT | Mod: PBBFAC | Performed by: NURSE PRACTITIONER

## 2017-07-25 NOTE — PROGRESS NOTES
Patient is here in clinic today for placement of continuous glucose monitoring system (CGMS) Freestyle [employer]nor. Site on back of patient's left upper arm was cleaned and sensor was placed and started. No further questions from patient.   Explained to patient that this is a blind procedure and that he will not actually see his BG in real time. Instructed pt he could shower and informed him of need to check blood sugars as prescribed. Patient was also informed to avoid all imaging procedures and acetaminophen during his procedure. Voiced understanding of all instructions given. Patient will come back on 8/1 to have sensor downloaded.

## 2017-07-27 RX ORDER — INSULIN LISPRO 100 [IU]/ML
INJECTION, SOLUTION INTRAVENOUS; SUBCUTANEOUS
Qty: 30 ML | Refills: 0 | Status: SHIPPED | OUTPATIENT
Start: 2017-07-27 | End: 2017-12-09 | Stop reason: SDUPTHER

## 2017-08-01 ENCOUNTER — OFFICE VISIT (OUTPATIENT)
Dept: DIABETES | Facility: CLINIC | Age: 72
End: 2017-08-01
Payer: MEDICARE

## 2017-08-01 VITALS
HEIGHT: 69 IN | SYSTOLIC BLOOD PRESSURE: 122 MMHG | DIASTOLIC BLOOD PRESSURE: 78 MMHG | BODY MASS INDEX: 27.92 KG/M2 | WEIGHT: 188.5 LBS

## 2017-08-01 DIAGNOSIS — I10 ESSENTIAL HYPERTENSION: Chronic | ICD-10-CM

## 2017-08-01 DIAGNOSIS — E78.5 HYPERLIPIDEMIA LDL GOAL <70: Chronic | ICD-10-CM

## 2017-08-01 LAB — GLUCOSE SERPL-MCNC: 109 MG/DL (ref 70–110)

## 2017-08-01 PROCEDURE — 82948 REAGENT STRIP/BLOOD GLUCOSE: CPT | Mod: PBBFAC | Performed by: NURSE PRACTITIONER

## 2017-08-01 PROCEDURE — 3046F HEMOGLOBIN A1C LEVEL >9.0%: CPT | Mod: ,,, | Performed by: NURSE PRACTITIONER

## 2017-08-01 PROCEDURE — 4010F ACE/ARB THERAPY RXD/TAKEN: CPT | Mod: ,,, | Performed by: NURSE PRACTITIONER

## 2017-08-01 PROCEDURE — 99214 OFFICE O/P EST MOD 30 MIN: CPT | Mod: S$PBB,,, | Performed by: NURSE PRACTITIONER

## 2017-08-01 PROCEDURE — 99213 OFFICE O/P EST LOW 20 MIN: CPT | Mod: PBBFAC | Performed by: NURSE PRACTITIONER

## 2017-08-01 PROCEDURE — 95251 CONT GLUC MNTR ANALYSIS I&R: CPT | Mod: ,,, | Performed by: NURSE PRACTITIONER

## 2017-08-01 PROCEDURE — 1126F AMNT PAIN NOTED NONE PRSNT: CPT | Mod: ,,, | Performed by: NURSE PRACTITIONER

## 2017-08-01 PROCEDURE — 99999 PR PBB SHADOW E&M-EST. PATIENT-LVL III: CPT | Mod: PBBFAC,,, | Performed by: NURSE PRACTITIONER

## 2017-08-01 PROCEDURE — 1159F MED LIST DOCD IN RCRD: CPT | Mod: ,,, | Performed by: NURSE PRACTITIONER

## 2017-08-01 NOTE — PROGRESS NOTES
"PCP: Tiffany Davis DO    Subjective:     Chief Complaint: Diabetes, review CGM    HISTORY OF PRESENT ILLNESS: 71 year old  male presenting for follow up of diabetes.  Patient has had Type II diabetes for at least 20 years and has the following complications: peripheral neuropathy.  He also has a history of atrial flutter on Eliquis.  He  has attended diabetes education in the past.  He is here today for CGM removal and review of report.  He denies any recent hospital admissions, emergency room visits, or hypoglycemia.     The patient CGM was downloaded and was reviewed.  The report was technically satisfactory.  He forgot his food intake diary or exercise diary.  Patient average glucose was 165 mg/dL, Sensor usage was 8 of 8 days.  He was in time above range (TAR) 49 % of the time, in time in range (TIR) 40% of the time, and in time below range (TBR) 11 % of the time.  The target range for this patient was 80 - 150 mg/dL.      Overall, he has a pattern of afternoon hyperglycemia.  Usually between noon - 12 MN, BG are persistently > 150 sometimes as high as 350.  He is usually within rang from MN - 12 noon.  He did have one day, July 26, where he was hypo most of the day from MN - 7 am and 5 pm - 9 pm.  A total of 5 low events with average duration of 135 minutes.    Height: 5' 8.5" (174 cm)  //  Weight: 85.5 kg (188 lb 7.9 oz), Body mass index is 28.24 kg/m².  Home Blood Glucose reading this AM: Not Taken  His blood sugar in clinic today is:      Lab Results   Component Value Date    POCGLU 109 08/01/2017     Labs Reviewed. ADA recommends A1C of less than 7 %. His most recent A1C is:     Lab Results   Component Value Date    HGBA1C 10.5 (H) 06/05/2017      DM MEDICATIONS:   Levemir 60 units at bedtime  Humalog 17 units TID ac    Review of Systems   Constitutional: Negative for appetite change, diaphoresis, fatigue and unexpected weight change.   HENT: Negative for congestion, hearing loss, rhinorrhea, " sneezing and sore throat.    Eyes: Negative for visual disturbance.   Respiratory: Negative for cough, shortness of breath and wheezing.    Cardiovascular: Negative for leg swelling.   Gastrointestinal: Negative for abdominal pain, constipation, diarrhea, nausea and vomiting.   Endocrine: Negative for cold intolerance, heat intolerance, polydipsia, polyphagia and polyuria.   Genitourinary: Negative for difficulty urinating, dysuria and urgency.   Skin: Negative for color change, pallor and wound.   Neurological: Positive for numbness. Negative for dizziness, seizures, syncope and headaches.   Psychiatric/Behavioral: Negative for confusion and decreased concentration. The patient is not nervous/anxious.        STANDARDS OF CARE:  Current Ophthalmologist / Optometrist: Dr. Nuno, Last exam 7 / 2017  Current Podiatrist: None.   Recommend regular exams and denies gums bleeding.    ACTIVITY LEVEL: Rarely Active  EXERCISE: none  MEAL PLANNING: Patient reports number of meals per day to be 3 and number of snacks per day to be 2. Patient is encouraged to consume 45 - 60 grams of carbohydrates in each meal, and 1800 k / lyric per day.  Per dietary recall, patient is not limiting carbohydrates, saturated fats and sodium.     BLOOD GLUCOSE TESTING: Self- monitoring  SOCIAL HISTORY: . Lives alone.  Former smoker.    Objective:      Physical Exam   Constitutional: He is oriented to person, place, and time. He appears well-developed and well-nourished.   HENT:   Head: Normocephalic and atraumatic.   Eyes: EOM are normal. Pupils are equal, round, and reactive to light.   Neck: Normal range of motion. No tracheal deviation present.   Cardiovascular: Normal rate, regular rhythm and intact distal pulses.  Exam reveals no friction rub.    No murmur heard.  Pulmonary/Chest: Effort normal and breath sounds normal. He has no wheezes.   Abdominal: Soft. Bowel sounds are normal. He exhibits no distension. There is no tenderness.    Musculoskeletal: Normal range of motion. He exhibits no edema or deformity.   Neurological: He is alert and oriented to person, place, and time.   Skin: Skin is warm and dry. No rash noted.   Psychiatric: He has a normal mood and affect. His behavior is normal. Judgment and thought content normal.       Assessment / Plan:     1.) Type 2 diabetes, uncontrolled, with neuropathy  Comments:  - Decrease Levemir to 55 units at bedtime. Continue Humalog 17 units before breakfast and lunch, but increase to 20 units before dinner.  He voiced understanding.    Orders:  -     POCT glucose    2.) Hyperlipidemia LDL goal <70 - continue Pravastatin    3.) Essential hypertension - continue Accupril, Cardizem CD    Additional Plan Details:    1.) Patient was instructed to monitor blood glucose 2 - 3 x daily, fasting and ac dinner or at bedtime. Discussed ADA goal for fasting blood sugar, 80 - 130 mg/dL; pp blood sugars below 180 mg/dl. Also, discussed prevention of hypoglycemia and the need to adjust goals to higher levels if persistent hypoglycemia.  Reminded to bring BG records or meter to each visit for review.  2.) Reviewed pathophysiology of diabetes, complications related to the disease, importance of annual dilated eye exam and daily foot examination.  3.) We discussed the ADA recommendations: Hemoglobin A1c below 7.0 %. All patients with diabetes should be on statins unless contraindicated.  ACE or ARB therapy if not contraindicated.    4.) Meal planning teaching: Carbohydrate definition - one serving is 15 gms. Carbohydrate spacing - carbohydrates should be spaced into approximately 3 meals with 2 snacks ( of one carbohydrate ) between meals or at bedtime. Increase vegetable intake to 2 or more cups of vegetables per day as well as 2 fruit servings. Recommended low saturated fat, low sodium diet to aid in control of hypertension and cholesterol.  5.) Discussed activity, benefits, methods, and precautions. Recommended  patient start or continue some form of exercise and increase as tolerated to 30 minutes per day to facilitate weight loss and aid in control of BGs.  6.) Return to clinic in 2 months for follow up. He was explained the above plan and given opportunity to ask questions. Advised to call clinic with any further questions or concerns.    Gabrielle Vee, JULIETTE-C, CDE    A total of 30 minutes was spent in face to face time, of which over 50 % was spent in counseling patient on disease process, complications, treatment, and side effects of medications.

## 2017-08-07 DIAGNOSIS — M79.18 MUSCULOSKELETAL PAIN: ICD-10-CM

## 2017-08-07 DIAGNOSIS — M79.10 MYALGIA: ICD-10-CM

## 2017-08-07 RX ORDER — DULOXETIN HYDROCHLORIDE 30 MG/1
CAPSULE, DELAYED RELEASE ORAL
Qty: 30 CAPSULE | Refills: 3 | Status: ON HOLD | OUTPATIENT
Start: 2017-08-07 | End: 2018-08-31 | Stop reason: CLARIF

## 2017-08-15 DIAGNOSIS — I48.92 ATRIAL FLUTTER WITH RAPID VENTRICULAR RESPONSE: ICD-10-CM

## 2017-08-17 DIAGNOSIS — I48.92 ATRIAL FLUTTER WITH RAPID VENTRICULAR RESPONSE: ICD-10-CM

## 2017-08-17 RX ORDER — APIXABAN 5 MG/1
TABLET, FILM COATED ORAL
Qty: 60 TABLET | Refills: 6 | Status: SHIPPED | OUTPATIENT
Start: 2017-08-17 | End: 2017-08-17 | Stop reason: SDUPTHER

## 2017-08-17 NOTE — TELEPHONE ENCOUNTER
----- Message from Edin Sebastian sent at 8/17/2017 12:05 PM CDT -----  Contact: Pt   ..1. What is the name of the medication you are requesting? eliquis  2. What is the dose? 5 mgs  3. How do you take the medication? Orally, topically, etc? orally  4. How often do you take this medication?twice a day   5. Do you need a 30 day or 90 day supply? 30 day   6. How many refills are you requesting? 1  7. What is your preferred pharmacy and location of the pharmacy?     ..  [facility] [address] [place] [address] [address] [facility]  [address]  [facility] [address]  Phone: [phone] Fax: [phone]      8. Who can we contact with further questions? Pt at..[phone] please notify when sent pt has only one pill left

## 2017-10-03 ENCOUNTER — OFFICE VISIT (OUTPATIENT)
Dept: DIABETES | Facility: CLINIC | Age: 72
End: 2017-10-03
Payer: MEDICARE

## 2017-10-03 ENCOUNTER — LAB VISIT (OUTPATIENT)
Dept: LAB | Facility: HOSPITAL | Age: 72
End: 2017-10-03
Attending: INTERNAL MEDICINE
Payer: MEDICARE

## 2017-10-03 VITALS
BODY MASS INDEX: 29.12 KG/M2 | WEIGHT: 196.63 LBS | DIASTOLIC BLOOD PRESSURE: 56 MMHG | SYSTOLIC BLOOD PRESSURE: 114 MMHG | HEIGHT: 69 IN

## 2017-10-03 DIAGNOSIS — I10 ESSENTIAL HYPERTENSION: Chronic | ICD-10-CM

## 2017-10-03 DIAGNOSIS — I10 ESSENTIAL HYPERTENSION: ICD-10-CM

## 2017-10-03 DIAGNOSIS — E78.5 HYPERLIPIDEMIA LDL GOAL <70: Chronic | ICD-10-CM

## 2017-10-03 DIAGNOSIS — E78.5 HYPERLIPIDEMIA LDL GOAL <100: ICD-10-CM

## 2017-10-03 LAB
ALBUMIN SERPL BCP-MCNC: 3.4 G/DL
ALP SERPL-CCNC: 77 U/L
ALT SERPL W/O P-5'-P-CCNC: 20 U/L
ANION GAP SERPL CALC-SCNC: 9 MMOL/L
AST SERPL-CCNC: 18 U/L
BILIRUB SERPL-MCNC: 0.4 MG/DL
BUN SERPL-MCNC: 22 MG/DL
CALCIUM SERPL-MCNC: 9.1 MG/DL
CHLORIDE SERPL-SCNC: 104 MMOL/L
CHOLEST SERPL-MCNC: 142 MG/DL
CHOLEST/HDLC SERPL: 2.5 {RATIO}
CO2 SERPL-SCNC: 29 MMOL/L
CREAT SERPL-MCNC: 1 MG/DL
EST. GFR  (AFRICAN AMERICAN): >60 ML/MIN/1.73 M^2
EST. GFR  (NON AFRICAN AMERICAN): >60 ML/MIN/1.73 M^2
ESTIMATED AVG GLUCOSE: 171 MG/DL
GLUCOSE SERPL-MCNC: 165 MG/DL
GLUCOSE SERPL-MCNC: 305 MG/DL (ref 70–110)
HBA1C MFR BLD HPLC: 7.6 %
HDLC SERPL-MCNC: 57 MG/DL
HDLC SERPL: 40.1 %
LDLC SERPL CALC-MCNC: 66.2 MG/DL
NONHDLC SERPL-MCNC: 85 MG/DL
POTASSIUM SERPL-SCNC: 4.1 MMOL/L
PROT SERPL-MCNC: 7.5 G/DL
SODIUM SERPL-SCNC: 142 MMOL/L
TRIGL SERPL-MCNC: 94 MG/DL

## 2017-10-03 PROCEDURE — 36415 COLL VENOUS BLD VENIPUNCTURE: CPT

## 2017-10-03 PROCEDURE — 82948 REAGENT STRIP/BLOOD GLUCOSE: CPT | Mod: PBBFAC | Performed by: NURSE PRACTITIONER

## 2017-10-03 PROCEDURE — 99213 OFFICE O/P EST LOW 20 MIN: CPT | Mod: PBBFAC | Performed by: NURSE PRACTITIONER

## 2017-10-03 PROCEDURE — 80061 LIPID PANEL: CPT

## 2017-10-03 PROCEDURE — 83036 HEMOGLOBIN GLYCOSYLATED A1C: CPT

## 2017-10-03 PROCEDURE — 99214 OFFICE O/P EST MOD 30 MIN: CPT | Mod: S$PBB,,, | Performed by: NURSE PRACTITIONER

## 2017-10-03 PROCEDURE — 80053 COMPREHEN METABOLIC PANEL: CPT

## 2017-10-03 PROCEDURE — 99999 PR PBB SHADOW E&M-EST. PATIENT-LVL III: CPT | Mod: PBBFAC,,, | Performed by: NURSE PRACTITIONER

## 2017-10-03 RX ORDER — INSULIN DETEMIR 100 [IU]/ML
60 INJECTION, SOLUTION SUBCUTANEOUS NIGHTLY
Qty: 60 ML | Refills: 0 | OUTPATIENT
Start: 2017-10-03 | End: 2018-01-01

## 2017-10-03 RX ORDER — INSULIN DEGLUDEC 200 U/ML
50 INJECTION, SOLUTION SUBCUTANEOUS DAILY
Qty: 9 ML | Refills: 3 | Status: SHIPPED | OUTPATIENT
Start: 2017-10-03 | End: 2018-02-19 | Stop reason: SDUPTHER

## 2017-10-03 NOTE — PROGRESS NOTES
"PCP: Tiffany Davis DO    Subjective:     Chief Complaint: Diabetes     HISTORY OF PRESENT ILLNESS: 71 year old  male presenting for follow up of diabetes.  Patient has had Type II diabetes for at least 20 years and has the following complications: peripheral neuropathy.  He also has a history of a flutter on Eliquis.  He  has attended diabetes education in the past.  He denies any recent hospital admissions, emergency room visits, or hypoglycemia.      Per records, fasting  - 185; ac lunch 91 - 156, < 205, 230 >; ac dinner 86 - 225.     Height: 5' 8.5" (174 cm)  //  Weight: 89.2 kg (196 lb 10.4 oz), Body mass index is 29.47 kg/m².  Home Blood Glucose reading this AM: Not Taken  His blood sugar in clinic today is: 305 mg/dl at 1:32 pm ( he just finished eating Ludi )    Labs Reviewed. ADA recommends A1C of less than 7 %. His most recent A1C is:     Lab Results   Component Value Date    HGBA1C 10.5 (H) 06/05/2017      DM MEDICATIONS:   Levemir 50 units at bedtime  Humalog 17 / 17 / 20 units TID ac    Review of Systems   Constitutional: Negative for appetite change, diaphoresis, fatigue and unexpected weight change.   HENT: Negative for congestion, hearing loss, rhinorrhea, sneezing and sore throat.    Eyes: Negative for visual disturbance.   Respiratory: Negative for cough, shortness of breath and wheezing.    Cardiovascular: Negative for leg swelling.   Gastrointestinal: Negative for abdominal pain, constipation, diarrhea, nausea and vomiting.   Endocrine: Negative for cold intolerance, heat intolerance, polydipsia, polyphagia and polyuria.   Genitourinary: Negative for difficulty urinating, dysuria and urgency.   Skin: Negative for color change, pallor and wound.   Neurological: Positive for numbness. Negative for dizziness, seizures, syncope and headaches.   Psychiatric/Behavioral: Negative for confusion and decreased concentration. The patient is not nervous/anxious.        STANDARDS OF " CARE:  Current Ophthalmologist / Optometrist: Dr. Nuno, Last exam 7 / 2017  Current Podiatrist: None.   Recommend regular exams and denies gums bleeding.    ACTIVITY LEVEL: Rarely Active  EXERCISE: None  MEAL PLANNING: Patient reports number of meals per day to be 3 and number of snacks per day to be 2. Patient is encouraged to consume 45 - 60 grams of carbohydrates in each meal, and 1800 k / lyric per day.  Per dietary recall, patient is not limiting carbohydrates, saturated fats and sodium.     BLOOD GLUCOSE TESTING: Self- monitoring  SOCIAL HISTORY: . Lives alone.  Former smoker.    Objective:      Physical Exam   Constitutional: He is oriented to person, place, and time. He appears well-developed and well-nourished.   HENT:   Head: Normocephalic and atraumatic.   Eyes: EOM are normal. Pupils are equal, round, and reactive to light.   Neck: Normal range of motion. No tracheal deviation present.   Cardiovascular: Normal rate, regular rhythm and intact distal pulses.  Exam reveals no friction rub.    No murmur heard.  Pulses:       Dorsalis pedis pulses are 2+ on the right side, and 2+ on the left side.   Pulmonary/Chest: Effort normal and breath sounds normal. He has no wheezes.   Abdominal: Soft. Bowel sounds are normal. He exhibits no distension. There is no tenderness.   Musculoskeletal: Normal range of motion. He exhibits no edema or deformity.   Feet:   Right Foot:   Protective Sensation: 6 sites tested. 6 sites sensed.   Skin Integrity: Negative for ulcer, blister, skin breakdown, erythema, warmth, callus or dry skin.   Left Foot:   Protective Sensation: 6 sites tested. 3 sites sensed.   Skin Integrity: Negative for ulcer, blister, skin breakdown, erythema, warmth, callus or dry skin.   Neurological: He is alert and oriented to person, place, and time.   Skin: Skin is warm and dry. No rash noted.   Psychiatric: He has a normal mood and affect. His behavior is normal. Judgment and thought content  normal.       Assessment / Plan:     1.) Type 2 diabetes, uncontrolled, with neuropathy ( on gabapentin )  Comments:  - We are going to try a more concentrated basal insulin.  Discussed MOA, onset, side effects, dosage of Tresiba.   Start Tresiba 50 units at bedtime.  Stop Levemir. Continue Humalog 17  units before breakfast and lunch, but increase to 20 units before dinner.  In 3 weeks, patient will return with BG readings.  If not at goal, will consider adding a GLP-1.  Labs are pending.    Orders:  -     POCT glucose    2.) Hyperlipidemia LDL goal <70 - continue Pravastatin    3.) Essential hypertension - continue Accupril, Cardizem CD    Additional Plan Details:    1.) Patient was instructed to monitor blood glucose 2 - 3 x daily, fasting and ac dinner or at bedtime. Discussed ADA goal for fasting blood sugar, 80 - 130 mg/dL; pp blood sugars below 180 mg/dl. Also, discussed prevention of hypoglycemia and the need to adjust goals to higher levels if persistent hypoglycemia.  Reminded to bring BG records or meter to each visit for review.  2.) Reviewed pathophysiology of diabetes, complications related to the disease, importance of annual dilated eye exam and daily foot examination.  3.) We discussed the ADA recommendations: Hemoglobin A1c below 7.0 %. All patients with diabetes should be on statins unless contraindicated.  ACE or ARB therapy if not contraindicated.    4.) Meal planning teaching: Carbohydrate definition - one serving is 15 gms. Carbohydrate spacing - carbohydrates should be spaced into approximately 3 meals with 2 snacks ( of one carbohydrate ) between meals or at bedtime. Increase vegetable intake to 2 or more cups of vegetables per day as well as 2 fruit servings. Recommended low saturated fat, low sodium diet to aid in control of hypertension and cholesterol.  5.) Discussed activity, benefits, methods, and precautions. Recommended patient start or continue some form of exercise and increase as  tolerated to 30 minutes per day to facilitate weight loss and aid in control of BGs.  6.) Return to clinic in 3 weeks for follow up. He was explained the above plan and given opportunity to ask questions. Advised to call clinic with any further questions or concerns.    Gabrielle Vee, JULIETTE-C, CDE    A total of 30 minutes was spent in face to face time, of which over 50 % was spent in counseling patient on disease process, complications, treatment, and side effects of medications.

## 2017-10-03 NOTE — PATIENT INSTRUCTIONS
Stop Levemir.  Start Tresiba 50 units at bedtime.    Continue Humalog 17 bfast / 17 units lunch  / 20 units before dinner

## 2017-10-05 ENCOUNTER — OFFICE VISIT (OUTPATIENT)
Dept: INTERNAL MEDICINE | Facility: CLINIC | Age: 72
End: 2017-10-05
Payer: MEDICARE

## 2017-10-05 ENCOUNTER — INITIAL CONSULT (OUTPATIENT)
Dept: CARDIOLOGY | Facility: CLINIC | Age: 72
End: 2017-10-05
Payer: MEDICARE

## 2017-10-05 ENCOUNTER — OFFICE VISIT (OUTPATIENT)
Dept: CARDIOLOGY | Facility: CLINIC | Age: 72
End: 2017-10-05
Payer: MEDICARE

## 2017-10-05 ENCOUNTER — HOSPITAL ENCOUNTER (OUTPATIENT)
Dept: RADIOLOGY | Facility: HOSPITAL | Age: 72
Discharge: HOME | End: 2017-10-05
Attending: INTERNAL MEDICINE
Payer: MEDICARE

## 2017-10-05 VITALS
HEART RATE: 68 BPM | WEIGHT: 196.19 LBS | HEART RATE: 68 BPM | HEIGHT: 68 IN | BODY MASS INDEX: 29.73 KG/M2 | DIASTOLIC BLOOD PRESSURE: 68 MMHG | SYSTOLIC BLOOD PRESSURE: 130 MMHG | DIASTOLIC BLOOD PRESSURE: 68 MMHG | SYSTOLIC BLOOD PRESSURE: 130 MMHG | HEIGHT: 68 IN | WEIGHT: 196.19 LBS | BODY MASS INDEX: 29.73 KG/M2

## 2017-10-05 VITALS
OXYGEN SATURATION: 94 % | SYSTOLIC BLOOD PRESSURE: 118 MMHG | HEART RATE: 68 BPM | BODY MASS INDEX: 29.57 KG/M2 | WEIGHT: 195.13 LBS | TEMPERATURE: 96 F | DIASTOLIC BLOOD PRESSURE: 80 MMHG | HEIGHT: 68 IN

## 2017-10-05 DIAGNOSIS — I10 HYPERTENSION GOAL BP (BLOOD PRESSURE) < 130/80: Chronic | ICD-10-CM

## 2017-10-05 DIAGNOSIS — I48.92 ATRIAL FLUTTER WITH RAPID VENTRICULAR RESPONSE: Primary | ICD-10-CM

## 2017-10-05 DIAGNOSIS — L98.9 LESION OF SKIN OF SCALP: ICD-10-CM

## 2017-10-05 DIAGNOSIS — E78.5 HYPERLIPIDEMIA LDL GOAL <70: Chronic | ICD-10-CM

## 2017-10-05 DIAGNOSIS — J43.9 PULMONARY EMPHYSEMA, UNSPECIFIED EMPHYSEMA TYPE: Chronic | ICD-10-CM

## 2017-10-05 DIAGNOSIS — Z87.891 HISTORY OF CIGARETTE SMOKING: Chronic | ICD-10-CM

## 2017-10-05 DIAGNOSIS — R07.81 RIB PAIN ON LEFT SIDE: ICD-10-CM

## 2017-10-05 DIAGNOSIS — I48.92 ATRIAL FIBRILLATION AND FLUTTER: ICD-10-CM

## 2017-10-05 DIAGNOSIS — I48.91 ATRIAL FIBRILLATION AND FLUTTER: ICD-10-CM

## 2017-10-05 DIAGNOSIS — R93.89 ABNORMAL CXR: ICD-10-CM

## 2017-10-05 DIAGNOSIS — I48.0 PAROXYSMAL ATRIAL FIBRILLATION: ICD-10-CM

## 2017-10-05 DIAGNOSIS — Z79.01 CHRONIC ANTICOAGULATION: ICD-10-CM

## 2017-10-05 PROCEDURE — 93010 ELECTROCARDIOGRAM REPORT: CPT | Mod: S$PBB,,, | Performed by: INTERNAL MEDICINE

## 2017-10-05 PROCEDURE — 99214 OFFICE O/P EST MOD 30 MIN: CPT | Mod: S$PBB,,, | Performed by: INTERNAL MEDICINE

## 2017-10-05 PROCEDURE — 99999 PR PBB SHADOW E&M-EST. PATIENT-LVL III: CPT | Mod: PBBFAC,,, | Performed by: INTERNAL MEDICINE

## 2017-10-05 PROCEDURE — 99213 OFFICE O/P EST LOW 20 MIN: CPT | Mod: PBBFAC,25,27 | Performed by: INTERNAL MEDICINE

## 2017-10-05 PROCEDURE — 71101 X-RAY EXAM UNILAT RIBS/CHEST: CPT | Mod: 26,,, | Performed by: RADIOLOGY

## 2017-10-05 PROCEDURE — 99214 OFFICE O/P EST MOD 30 MIN: CPT | Mod: PBBFAC,25 | Performed by: INTERNAL MEDICINE

## 2017-10-05 PROCEDURE — 71101 X-RAY EXAM UNILAT RIBS/CHEST: CPT | Mod: TC

## 2017-10-05 PROCEDURE — 93005 ELECTROCARDIOGRAM TRACING: CPT | Mod: PBBFAC | Performed by: INTERNAL MEDICINE

## 2017-10-05 PROCEDURE — 99999 PR PBB SHADOW E&M-EST. PATIENT-LVL IV: CPT | Mod: PBBFAC,,, | Performed by: INTERNAL MEDICINE

## 2017-10-05 PROCEDURE — 99205 OFFICE O/P NEW HI 60 MIN: CPT | Mod: S$PBB,,, | Performed by: INTERNAL MEDICINE

## 2017-10-05 RX ORDER — GABAPENTIN 300 MG/1
600 CAPSULE ORAL 2 TIMES DAILY
Qty: 360 CAPSULE | Refills: 1 | Status: SHIPPED | OUTPATIENT
Start: 2017-10-05 | End: 2018-03-28 | Stop reason: SDUPTHER

## 2017-10-05 RX ORDER — QUINAPRIL 40 MG/1
40 TABLET ORAL DAILY
Qty: 90 TABLET | Refills: 3 | Status: SHIPPED | OUTPATIENT
Start: 2017-10-05 | End: 2018-10-06 | Stop reason: SDUPTHER

## 2017-10-05 NOTE — MEDICAL/APP STUDENT
Subjective:       Patient ID: Nico Garza Jr. is a 71 y.o. male.    Chief Complaint: Follow-up    Patient is a 72 y/o male presenting for follow-up of his chronic conditions and recent left rib injury.   Diabetes: Pt is seen by diabetes educator, recently medication was changed from Levemir to Tresiba 50 units at bedtime, he will still continue Humalog before meals. Pt denies polyuria, polydipsia, and vision changes. He still complains about neuropathy in his hands and feet, currently he takes 600 Gabapentin once daily. Pts A1C decreased from 10.5 (4 months ago) to 7.6 today.   HTN: stable on diltiazem and quinapril. Needs refill on quinapril.   GERD: stable on omeprazole   Rib injury: Pt reports he fell into a trash can 2 weeks ago and injured his left last rib. He ranks his pain as a 6/10. Pain is constant and worse when he takes in a deep breath. It is difficult for him to sleep at night due to the pain. Pt takes Tramadol for arthritis and he says that it helps some with the pain. Pt denies chest pain and shortness of breath.       Review of Systems   Constitutional: Negative for chills, fatigue, fever and unexpected weight change.   Respiratory: Negative for cough, chest tightness, shortness of breath and wheezing.    Cardiovascular: Negative for chest pain, palpitations and leg swelling.   Musculoskeletal: Positive for arthralgias. Negative for joint swelling.        Rib pain   Skin: Negative for color change.   Neurological: Negative for dizziness, weakness and headaches.       Objective:      Physical Exam   Constitutional:   Elderly male in NAD   Cardiovascular: Normal heart sounds.    Pulmonary/Chest: Effort normal and breath sounds normal. No respiratory distress. He has no wheezes.   Tenderness to mid axillary line 12th rib   Musculoskeletal: Normal range of motion. He exhibits tenderness. He exhibits no edema or deformity.       Assessment:       1. Type 2 diabetes, uncontrolled, with neuropathy     2. Hypertension goal BP (blood pressure) < 130/80    3. Hyperlipidemia LDL goal <70        Plan:         Nico was seen today for follow-up.    Diagnoses and all orders for this visit:    1. Type 2 diabetes, uncontrolled, with neuropathy  -     gabapentin (NEURONTIN) 300 MG capsule; Take 2 capsules (600 mg total) by mouth 2 (two) times daily.    2. Hypertension goal BP (blood pressure) < 130/80  -     quinapril (ACCUPRIL) 40 MG tablet; Take 1 tablet (40 mg total) by mouth once daily.  - continue with diltiazem    3. Hyperlipidemia LDL goal <70    4. Rib pain on left side  -     X-Ray Ribs 3 Views Left; Future    5. Lesion of skin of scalp  -     Ambulatory consult to Dermatology    6. Atrial fibrillation and flutter  -     Ambulatory consult to Cardiology    7. Chronic anticoagulation

## 2017-10-05 NOTE — PROGRESS NOTES
Subjective:       Patient ID: Nico Garza Jr. is a 71 y.o. male.    Chief Complaint: Follow-up    Nico Garza Jr.  71 y.o. White male    Patient presents with:  Follow-up    HPI: Here today to follow up on chronic conditions.  Diabetes--improved. He is under the care of diabetes management. His medications were recently changed but he has not started Tresiba yet.                    HGBA1C                   7.6 (H)             10/03/2017            Neuropathy--uncontrolled. He is taking gabapentin BID (one at 2pm and one at bedtime). He denies side effects.   HTN--stable on quinapril and diltiazem. He needs a refill on quinapril.   HLD--compliant with pravastatin.                     CHOL                     142                 10/03/2017                 HDL                      57                  10/03/2017                 LDLCALC                  66.2                10/03/2017                 TRIG                     94                  10/03/2017            He fell into a trash can 2 weeks ago and hurt his left side. He takes tramadol for arthritis but it has not helped his side pain. He denies shortness of breath or a cough.   He has flakes from his scalp and saw an outside dermatologist. He was prescribed shampoo after he failed a trial of OTC dandruff shampoo. He now has bumps in his scalp and continues to have flaking. He would like to see another dermatologist.   He has a history of atrial fib/flutter. He was seeing Dr. Bello but has not seen him in the past 2 years. It was advised that he have an ablation but it was not done. He is on diltiazem and anticoagulated on apixaban. He would like to see a cardiologist here.         Past Medical History:  Arthritis  Atrial fibrillation and flutter      Comment: Dr. Bello ([facility]  COPD (chronic obstructive pulmonary disease)  Diabetes mellitus  Hyperlipidemia  Hypertension  Lung disease  Neuropathy, diabetic  Pancreatitis    Current  "Outpatient Prescriptions on File Prior to Visit:  apixaban (ELIQUIS) 5 mg Tab, TAKE 1 TABLET(5 MG) BY MOUTH TWICE DAILY, Disp: 60 tablet, Rfl: 6  b complex vitamins tablet, Take 1 tablet by mouth once daily., Disp: , Rfl:    diltiaZEM (CARDIZEM CD) 360 MG 24 hr capsule, Take 1 capsule (360 mg total) by mouth once daily., Disp: 90 capsule, Rfl: 3  duloxetine (CYMBALTA) 30 MG capsule, TAKE 1 CAPSULE(30 MG) BY MOUTH EVERY DAY, Disp: 30 capsule, Rfl: 3  HUMALOG KWIKPEN 100 unit/mL InPn pen, INJECT 17 UNITS INTO THE SKIN THREE TIMES DAILY BEFORE MEALS, Disp: 30 mL, Rfl: 0  insulin degludec (TRESIBA FLEXTOUCH U-200) 200 unit/mL (3 mL) InPn, Inject 50 Units into the skin once daily., Disp: 9 mL, Rfl: 3  insulin needles, disposable, 32 x 1/4 " Ndle, Insulin injections four times per day., Disp: 400 each, Rfl: 3  multivitamin capsule, Take 1 capsule by mouth once daily., Disp: , Rfl:   omeprazole (PRILOSEC) 20 MG capsule, Take 1 capsule (20 mg total) by mouth once daily., Disp: 90 capsule, Rfl: 1  pravastatin (PRAVACHOL) 40 MG tablet, TAKE 1 TABLET DAILY, Disp: 90 tablet, Rfl: 2  tramadol (ULTRAM) 50 mg tablet, Take 2 tablets (100 mg total) by mouth daily as needed for Pain., Disp: 60 tablet, Rfl: 3  gabapentin (NEURONTIN) 300 MG capsule, Take 1 capsule (300 mg total) by mouth 2 (two) times daily., Disp: 180 capsule, Rfl: 1  quinapril (ACCUPRIL) 40 MG tablet, Take 1 tablet (40 mg total) by mouth once daily., Disp: 90 tablet, Rfl: 2  NEEDLES, DISPOSABLE (DISPOSABLE NEEDLES MISC), Inject 1 each into the skin 3 (three) times daily., Disp: , Rfl:     Allergies:  Review of patient's allergies indicates:  No Known Allergies          Review of Systems   Constitutional: Negative for fever and unexpected weight change.   Respiratory: Negative for shortness of breath.    Cardiovascular: Negative for chest pain and leg swelling.   Gastrointestinal: Negative for abdominal pain, blood in stool, constipation and diarrhea. "   Musculoskeletal: Positive for arthralgias.   Skin: Positive for rash.   Neurological: Positive for numbness. Negative for dizziness and headaches.       Objective:      Physical Exam   Constitutional: He is oriented to person, place, and time. He appears well-developed and well-nourished. No distress.   Eyes: No scleral icterus.   Cardiovascular: Normal rate, regular rhythm and normal heart sounds.    Pulmonary/Chest: Effort normal and breath sounds normal. No respiratory distress. He has no wheezes. He has no rales. He exhibits tenderness (left lower rib border).   Abdominal: Soft. Bowel sounds are normal.   Musculoskeletal: He exhibits no edema.   Neurological: He is alert and oriented to person, place, and time.   Skin: Skin is warm and dry. There is erythema (scalp with flaking).   Psychiatric: He has a normal mood and affect.   Vitals reviewed.      Assessment:       1. Type 2 diabetes, uncontrolled, with neuropathy    2. Hypertension goal BP (blood pressure) < 130/80    3. Hyperlipidemia LDL goal <70    4. Rib pain on left side    5. Lesion of skin of scalp    6. Atrial fibrillation and flutter    7. Chronic anticoagulation        Plan:       Nico was seen today for follow-up.    Diagnoses and all orders for this visit:    Type 2 diabetes, uncontrolled, with neuropathy  -     Continue diabetes management  -     Change gabapentin (NEURONTIN) 300 MG capsule; Take 2 capsules (600 mg total) by mouth 2 (two) times daily.    Hypertension goal BP (blood pressure) < 130/80  -     Refill quinapril (ACCUPRIL) 40 MG tablet; Take 1 tablet (40 mg total) by mouth once daily.  -     Continue diltiazem     Hyperlipidemia LDL goal <70  -     Continue pravastatin    Rib pain on left side  -     X-Ray Ribs 3 Views Left; Future    Lesion of skin of scalp  -     Ambulatory consult to Dermatology    Atrial fibrillation and flutter  -     Ambulatory consult to Cardiology    Chronic anticoagulation    Needs colonoscopy, but  needs clearance from cardiology to hold apixaban.    Recommend flu and shingles vaccine--referred to outpatient pharmacy.     Labs and f/u in 3 months

## 2017-10-05 NOTE — LETTER
October 5, 2017      Tiffany Davis DO  [address] [place] [address] [address] [place] [id number]           [facility]  [address] [place] [address] [place] [address]  Phone: [phone]  Fax: [phone]          Patient: Nico Garza Jr.   MR Number: [id number]   YOB: 1945   Date of Visit: 10/5/2017       Dear Dr. Tiffany Davis:    Thank you for referring Nico Garza to me for evaluation. Attached you will find relevant portions of my assessment and plan of care.    If you have questions, please do not hesitate to call me. I look forward to following Nico Garza along with you.    Sincerely,    Kaylen Cid MD    Enclosure  CC:  No Recipients    If you would like to receive this communication electronically, please contact [email or web address] or [phone] to request more information on [employer] Link access.    For providers and/or their staff who would like to refer a patient to [facility], please contact us through our one-stop-shop provider referral line, [facility] , at [phone].    If you feel you have received this communication in error or would no longer like to receive these types of communications, please e-mail [email or web address]

## 2017-10-05 NOTE — PROGRESS NOTES
Subjective:   Patient ID:  Nico Garza Jr. is a 71 y.o. male who presents for follow up of Atrial Flutter and Atrial Fibrillation      HPI  A 72 yo male with obesity diabetes htn hlp copd aflutter fib at least 3 years old has been seeing [facility] for that  He had 2 cardioversion w/o success. He has non obs cad and abnormal cardiolite. He ahs no symptoms of chest pain or shortenss of breath he feels tired has low energy has no syncope near syncope. Has  No thyroid disorder he has suggestion of sleep apnea not been investigated.he denies chf symptoms no angina no tia claudication. Has neuropathy from diabetes .he has multiple complaints of pleuritic pain and rib fracture in addition to arthritis hip and knee pain.he needs to have colonoscopy he is taking eliquis regularily   Past Medical History:   Diagnosis Date    Arthritis     Atrial fibrillation and flutter     Dr. Bello ([facility]    Atrial flutter     COPD (chronic obstructive pulmonary disease)     Diabetes mellitus     Hyperlipidemia     Hypertension     Lung disease     Neuropathy, diabetic     Pancreatitis        Past Surgical History:   Procedure Laterality Date    BICEPS TENDON REPAIR      CARDIOVERSION      CHOLECYSTECTOMY      PATELLA SURGERY      ROTATOR CUFF REPAIR         Social History   Substance Use Topics    Smoking status: Former Smoker     Packs/day: 0.50     Types: Cigarettes     Quit date: 3/8/2015    Smokeless tobacco: Former User     Types: Snuff     Quit date: 10/7/1995    Alcohol use No       Family History   Problem Relation Age of Onset    Heart failure Brother     Heart attack Brother     Diabetes Mother     Hypertension Mother     Stroke Mother     Hypertension Father     Stroke Father     Diabetes Sister     Cancer Maternal Aunt     Diabetes Sister     Diabetes Sister     Stroke Paternal Grandmother     Stroke Paternal Grandfather        Current Outpatient Prescriptions   Medication Sig  "   apixaban (ELIQUIS) 5 mg Tab TAKE 1 TABLET(5 MG) BY MOUTH TWICE DAILY    b complex vitamins tablet Take 1 tablet by mouth once daily.    diltiaZEM (CARDIZEM CD) 360 MG 24 hr capsule Take 1 capsule (360 mg total) by mouth once daily.    duloxetine (CYMBALTA) 30 MG capsule TAKE 1 CAPSULE(30 MG) BY MOUTH EVERY DAY    gabapentin (NEURONTIN) 300 MG capsule Take 2 capsules (600 mg total) by mouth 2 (two) times daily.    HUMALOG KWIKPEN 100 unit/mL InPn pen INJECT 17 UNITS INTO THE SKIN THREE TIMES DAILY BEFORE MEALS    insulin degludec (TRESIBA FLEXTOUCH U-200) 200 unit/mL (3 mL) InPn Inject 50 Units into the skin once daily.    multivitamin capsule Take 1 capsule by mouth once daily.    omeprazole (PRILOSEC) 20 MG capsule Take 1 capsule (20 mg total) by mouth once daily.    pravastatin (PRAVACHOL) 40 MG tablet TAKE 1 TABLET DAILY    quinapril (ACCUPRIL) 40 MG tablet Take 1 tablet (40 mg total) by mouth once daily.    tramadol (ULTRAM) 50 mg tablet Take 2 tablets (100 mg total) by mouth daily as needed for Pain.    insulin needles, disposable, 32 x 1/4 " Ndle Insulin injections four times per day.    NEEDLES, DISPOSABLE (DISPOSABLE NEEDLES MISC) Inject 1 each into the skin 3 (three) times daily.     No current facility-administered medications for this visit.      Current Outpatient Prescriptions on File Prior to Visit   Medication Sig    apixaban (ELIQUIS) 5 mg Tab TAKE 1 TABLET(5 MG) BY MOUTH TWICE DAILY    b complex vitamins tablet Take 1 tablet by mouth once daily.    diltiaZEM (CARDIZEM CD) 360 MG 24 hr capsule Take 1 capsule (360 mg total) by mouth once daily.    duloxetine (CYMBALTA) 30 MG capsule TAKE 1 CAPSULE(30 MG) BY MOUTH EVERY DAY    gabapentin (NEURONTIN) 300 MG capsule Take 2 capsules (600 mg total) by mouth 2 (two) times daily.    HUMALOG KWIKPEN 100 unit/mL InPn pen INJECT 17 UNITS INTO THE SKIN THREE TIMES DAILY BEFORE MEALS    insulin degludec (TRESIBA FLEXTOUCH U-200) 200 " "unit/mL (3 mL) InPn Inject 50 Units into the skin once daily.    multivitamin capsule Take 1 capsule by mouth once daily.    omeprazole (PRILOSEC) 20 MG capsule Take 1 capsule (20 mg total) by mouth once daily.    pravastatin (PRAVACHOL) 40 MG tablet TAKE 1 TABLET DAILY    quinapril (ACCUPRIL) 40 MG tablet Take 1 tablet (40 mg total) by mouth once daily.    tramadol (ULTRAM) 50 mg tablet Take 2 tablets (100 mg total) by mouth daily as needed for Pain.    insulin needles, disposable, 32 x 1/4 " Ndle Insulin injections four times per day.    NEEDLES, DISPOSABLE (DISPOSABLE NEEDLES MISC) Inject 1 each into the skin 3 (three) times daily.    [DISCONTINUED] gabapentin (NEURONTIN) 300 MG capsule Take 1 capsule (300 mg total) by mouth 2 (two) times daily.    [DISCONTINUED] quinapril (ACCUPRIL) 40 MG tablet Take 1 tablet (40 mg total) by mouth once daily.     No current facility-administered medications on file prior to visit.      Review of patient's allergies indicates:  No Known Allergies  Review of Systems   Constitution: Positive for weakness and malaise/fatigue.   Eyes: Negative for blurred vision.   Cardiovascular: Positive for dyspnea on exertion. Negative for chest pain, claudication, cyanosis, irregular heartbeat, leg swelling, near-syncope, orthopnea, palpitations and paroxysmal nocturnal dyspnea.   Respiratory: Positive for shortness of breath and snoring. Negative for cough and hemoptysis.    Hematologic/Lymphatic: Negative for bleeding problem. Does not bruise/bleed easily.   Skin: Negative for dry skin and itching.   Musculoskeletal: Positive for arthritis, back pain and joint pain. Negative for falls, muscle weakness and myalgias.   Gastrointestinal: Negative for abdominal pain, diarrhea, heartburn, hematemesis, hematochezia and melena.   Genitourinary: Negative for flank pain and hematuria.   Neurological: Negative for dizziness, focal weakness, headaches, light-headedness, numbness, paresthesias " "and seizures.   Psychiatric/Behavioral: Negative for altered mental status and memory loss. The patient is not nervous/anxious.    Allergic/Immunologic: Negative for hives.       Objective:   Physical Exam   Constitutional: He is oriented to person, place, and time. He appears well-developed and well-nourished. No distress.   HENT:   Head: Normocephalic and atraumatic.   Eyes: EOM are normal. Pupils are equal, round, and reactive to light. Right eye exhibits no discharge. Left eye exhibits no discharge.   Neck: Neck supple. No JVD present. No thyromegaly present.   Cardiovascular: Normal rate, regular rhythm, normal heart sounds and intact distal pulses.  Exam reveals no gallop and no friction rub.    No murmur heard.  Pulmonary/Chest: Effort normal and breath sounds normal. No respiratory distress. He has no wheezes. He has no rales. He exhibits no tenderness.   Abdominal: Soft. Bowel sounds are normal. He exhibits no distension. There is no tenderness.   Obese abdomen   Musculoskeletal: Normal range of motion. He exhibits no edema.   Neurological: He is alert and oriented to person, place, and time. No cranial nerve deficit.   Skin: Skin is warm and dry. No rash noted. He is not diaphoretic. No erythema.   Psychiatric: He has a normal mood and affect. His behavior is normal.   Nursing note and vitals reviewed.    Vitals:    10/05/17 1005   BP: 130/68   Pulse: 68   Weight: 89 kg (196 lb 3.4 oz)   Height: 5' 8" (1.727 m)     Lab Results   Component Value Date    CHOL 142 10/03/2017    CHOL 124 03/06/2017    CHOL 142 11/30/2016     Lab Results   Component Value Date    HDL 57 10/03/2017    HDL 43 03/06/2017    HDL 45 11/30/2016     Lab Results   Component Value Date    LDLCALC 66.2 10/03/2017    LDLCALC 52.2 (L) 03/06/2017    LDLCALC 60.6 (L) 11/30/2016     Lab Results   Component Value Date    TRIG 94 10/03/2017    TRIG 144 03/06/2017    TRIG 182 (H) 11/30/2016     Lab Results   Component Value Date    CHOLHDL 40.1 " 10/03/2017    CHOLHDL 34.7 03/06/2017    CHOLHDL 31.7 11/30/2016       Chemistry        Component Value Date/Time     10/03/2017 0910    K 4.1 10/03/2017 0910     10/03/2017 0910    CO2 29 10/03/2017 0910    BUN 22 10/03/2017 0910    CREATININE 1.0 10/03/2017 0910     (H) 10/03/2017 0910        Component Value Date/Time    CALCIUM 9.1 10/03/2017 0910    ALKPHOS 77 10/03/2017 0910    AST 18 10/03/2017 0910    ALT 20 10/03/2017 0910    BILITOT 0.4 10/03/2017 0910    ESTGFRAFRICA >60.0 10/03/2017 0910    EGFRNONAA >60.0 10/03/2017 0910          No results found for: TSH  Lab Results   Component Value Date    INR 3.9 (H) 03/13/2015    INR 2.5 (H) 03/12/2015    INR 1.4 (H) 03/11/2015     Lab Results   Component Value Date    WBC 5.38 03/11/2015    HGB 13.9 (L) 03/11/2015    HCT 41.9 03/11/2015    MCV 82 03/11/2015     03/11/2015     BMP  Sodium   Date Value Ref Range Status   10/03/2017 142 136 - 145 mmol/L Final     Potassium   Date Value Ref Range Status   10/03/2017 4.1 3.5 - 5.1 mmol/L Final     Chloride   Date Value Ref Range Status   10/03/2017 104 95 - 110 mmol/L Final     CO2   Date Value Ref Range Status   10/03/2017 29 23 - 29 mmol/L Final     BUN, Bld   Date Value Ref Range Status   10/03/2017 22 8 - 23 mg/dL Final     Creatinine   Date Value Ref Range Status   10/03/2017 1.0 0.5 - 1.4 mg/dL Final     Calcium   Date Value Ref Range Status   10/03/2017 9.1 8.7 - 10.5 mg/dL Final     Anion Gap   Date Value Ref Range Status   10/03/2017 9 8 - 16 mmol/L Final     eGFR if    Date Value Ref Range Status   10/03/2017 >60.0 >60 mL/min/1.73 m^2 Final     eGFR if non    Date Value Ref Range Status   10/03/2017 >60.0 >60 mL/min/1.73 m^2 Final     Comment:     Calculation used to obtain the estimated glomerular filtration  rate (eGFR) is the CKD-EPI equation. Since race is unknown   in our information system, the eGFR values for   -American and  Non--American patients are given   for each creatinine result.       Estimated Creatinine Clearance: 73.4 mL/min (based on SCr of 1 mg/dL).  Lab Results   Component Value Date    HGBA1C 7.6 (H) 10/03/2017       Assessment:     1. Atrial flutter with rapid ventricular response    2. Pulmonary emphysema, unspecified emphysema type    3. History of cigarette smoking    4. Hypertension goal BP (blood pressure) < 130/80    5. Hyperlipidemia LDL goal <70    6. Type 2 diabetes, uncontrolled, with neuropathy    7. Abnormal CXR      [facility] DR BRICEÑO. HE WILL BENEFIT FROM ABLATION FOR FLUTTER AND PROBABLY MANAGE HIS AFIB WITH MEDICAL THERAPY.  LIPIDS ON TARGET  COPD NEEDS REEVALUATION  DIABETES NEEDING BETETR CONTROL HE WAS COUNSELED.  Plan:   PFT   ECHO   TSH   EP REFERRAL DR BRICEÑO

## 2017-10-05 NOTE — LETTER
October 5, 2017      Kaylen Cid MD  [address]  [place] [address]           [facility]  [id number] [place] [address] , [address]  [place] [address]  Phone: [phone]  Fax: [phone]          Patient: Nico Garza Jr.   MR Number: [id number]   YOB: 1945   Date of Visit: 10/5/2017       Dear Dr. Kaylen Cid:    Thank you for referring Nico Garza to me for evaluation. Attached you will find relevant portions of my assessment and plan of care.    If you have questions, please do not hesitate to call me. I look forward to following Nico Garza along with you.    Sincerely,    Jaret Freire MD    Enclosure  CC:  No Recipients    If you would like to receive this communication electronically, please contact [email or web address] or [phone] to request more information on [employer] Link access.    For providers and/or their staff who would like to refer a patient to [facility], please contact us through our one-stop-shop provider referral line, [facility], at [phone].    If you feel you have received this communication in error or would no longer like to receive these types of communications, please e-mail [email or web address]

## 2017-10-05 NOTE — PROGRESS NOTES
Subjective:    Patient ID:  Nico Garza Jr. is a 71 y.o. male who presents for evaluation of Atrial Flutter      71 yoM AFL, AF, severe COPD here for arrhythmia management. He saw Dr Garcia for AF/AFL management after 2 failed CVs and ablation was offered 2015. He opted not to pursue ablation at that time. He has been on flecainide in the past which was stopped due to ineffectiveness. He has been on eliquis 5 mg bid for CVA prophylaxis. He has chronic BENITO, possibly due to COPD, as well as fatigue. Last PFTs 2 years ago, followed by Dr Ibrahim. He had non-obstructive CAD on [facility] 2 years ago. All ECGs in the system since 2015 show AFL with the exception of a single AF ECG. No history of TIA/CVA, CHF, CAD, PVD. He no longer smokes. Most recent echo showed normal LV function 3/15.     Dr Cid    3/19/15 echo:  CONCLUSIONS     1 - Biatrial enlargement.     2 - Concentric remodeling.     3 - Normal left ventricular systolic function (EF 60-65%).     4 - Normal left ventricular diastolic function.     5 - Normal right ventricular systolic function .     6 - Trivial mitral regurgitation.     7 - Mild tricuspid regurgitation.     8 - Left pleural effusion.     Past Medical History:  No date: Arthritis  No date: Atrial fibrillation and flutter      Comment: Dr. Bello ([facility]  No date: Atrial flutter  No date: COPD (chronic obstructive pulmonary disease)  No date: Diabetes mellitus  No date: Hyperlipidemia  No date: Hypertension  No date: Lung disease  No date: Neuropathy, diabetic  No date: Pancreatitis    Past Surgical History:  No date: BICEPS TENDON REPAIR  No date: CARDIOVERSION  No date: CHOLECYSTECTOMY  No date: PATELLA SURGERY  No date: ROTATOR CUFF REPAIR    Social History    Marital status:              Spouse name:                       Years of education:                 Number of children: 2             Occupational History    None on file    Social History Main Topics    Smoking  status: Former Smoker                                                                Packs/day: 0.50      Years: 0.00           Types: Cigarettes       Quit date: 3/8/2015    Smokeless tobacco: Former User                          Types: Snuff       Quit date: 10/7/1995    Alcohol use: No              Drug use: No              Sexual activity: Not on file          Other Topics            Concern    None on file    Social History Narrative    None on file    Review of patient's family history indicates:    Heart failure                  Brother                   Heart attack                   Brother                   Diabetes                       Mother                    Hypertension                   Mother                    Stroke                         Mother                    Hypertension                   Father                    Stroke                         Father                    Diabetes                       Sister                    Cancer                         Maternal Aunt             Diabetes                       Sister                    Diabetes                       Sister                    Stroke                         Paternal Grandmother      Stroke                         Paternal Grandfather            Review of Systems   Constitution: Positive for malaise/fatigue.   HENT: Negative.    Eyes: Negative.    Cardiovascular: Positive for irregular heartbeat and palpitations. Negative for chest pain, dyspnea on exertion, leg swelling, near-syncope and syncope.   Respiratory: Positive for shortness of breath.    Endocrine: Negative.    Hematologic/Lymphatic: Negative.    Skin: Negative.    Musculoskeletal: Negative.    Gastrointestinal: Negative.    Genitourinary: Negative.    Neurological: Negative.  Negative for dizziness and light-headedness.   Psychiatric/Behavioral: Negative.    Allergic/Immunologic: Negative.         Objective:    Physical Exam   Constitutional: He is oriented to  person, place, and time. He appears well-developed and well-nourished. No distress.   HENT:   Head: Normocephalic and atraumatic.   Eyes: EOM are normal. Pupils are equal, round, and reactive to light.   Neck: Normal range of motion. No JVD present. No thyromegaly present.   Cardiovascular: Normal rate, regular rhythm, S1 normal, S2 normal and normal heart sounds.  PMI is not displaced.  Exam reveals no gallop and no friction rub.    No murmur heard.  Pulmonary/Chest: Effort normal and breath sounds normal. No respiratory distress. He has no wheezes. He has no rales.   Abdominal: Soft. Bowel sounds are normal. He exhibits no distension. There is no tenderness. There is no rebound and no guarding.   Musculoskeletal: Normal range of motion. He exhibits no edema or tenderness.   Neurological: He is alert and oriented to person, place, and time. No cranial nerve deficit.   Skin: Skin is warm and dry. No rash noted. No erythema.   Psychiatric: He has a normal mood and affect. His behavior is normal. Judgment and thought content normal.   Vitals reviewed.    ECG: AFL with controlled V rate        Assessment:       1. Atrial flutter with rapid ventricular response    2. Paroxysmal atrial fibrillation    3. Pulmonary emphysema, unspecified emphysema type    4. Type 2 diabetes, uncontrolled, with neuropathy         Plan:         71 yoM with AF as well as AFL here for arrhythmia management. I discussed AF and AFL as well as their basic pathophysiology, including health implications and treatment options with the patient. Specifically, I addressed the need for CVA prophylaxis as well as the goal to reduce symptomatic arrhythmic episodes by pharmacologic and/or procedural methods. He is on eliquis for CVA prophylaxis. He has symptomatic AFL as well as AF. I offered AF and AFL ablations given his history of drug refractory arrhythmias. Will reassess lung and cardiac function with PFTs and echo. If eligible, I would offer  combination PVI & CTI for definitive therapy. Limiting factors would include poor lung function. Will await studies and formulate plan accordingly.             Addendum: PFTs performed. Patient deemed high risk for pulmonary complications. Will offer CTI ablation alone and defer PVI at this time.

## 2017-10-06 ENCOUNTER — TELEPHONE (OUTPATIENT)
Dept: CARDIOLOGY | Facility: CLINIC | Age: 72
End: 2017-10-06

## 2017-10-06 RX ORDER — PRAVASTATIN SODIUM 40 MG/1
TABLET ORAL
Qty: 90 TABLET | Refills: 3 | Status: SHIPPED | OUTPATIENT
Start: 2017-10-06 | End: 2018-09-30 | Stop reason: SDUPTHER

## 2017-10-16 ENCOUNTER — TELEPHONE (OUTPATIENT)
Dept: INTERNAL MEDICINE | Facility: CLINIC | Age: 72
End: 2017-10-16

## 2017-10-16 ENCOUNTER — HOSPITAL ENCOUNTER (OUTPATIENT)
Dept: RADIOLOGY | Facility: HOSPITAL | Age: 72
Discharge: HOME | End: 2017-10-16
Attending: PHYSICIAN ASSISTANT
Payer: MEDICARE

## 2017-10-16 ENCOUNTER — OFFICE VISIT (OUTPATIENT)
Dept: INTERNAL MEDICINE | Facility: CLINIC | Age: 72
End: 2017-10-16
Payer: MEDICARE

## 2017-10-16 VITALS
HEART RATE: 69 BPM | BODY MASS INDEX: 29.97 KG/M2 | SYSTOLIC BLOOD PRESSURE: 110 MMHG | OXYGEN SATURATION: 99 % | DIASTOLIC BLOOD PRESSURE: 62 MMHG | HEIGHT: 68 IN | TEMPERATURE: 97 F | WEIGHT: 197.75 LBS

## 2017-10-16 DIAGNOSIS — R07.81 RIB PAIN ON LEFT SIDE: ICD-10-CM

## 2017-10-16 DIAGNOSIS — J43.9 PULMONARY EMPHYSEMA, UNSPECIFIED EMPHYSEMA TYPE: Chronic | ICD-10-CM

## 2017-10-16 DIAGNOSIS — R07.81 RIB PAIN ON LEFT SIDE: Primary | ICD-10-CM

## 2017-10-16 PROCEDURE — 99215 OFFICE O/P EST HI 40 MIN: CPT | Mod: PBBFAC,25 | Performed by: PHYSICIAN ASSISTANT

## 2017-10-16 PROCEDURE — 99213 OFFICE O/P EST LOW 20 MIN: CPT | Mod: S$PBB,,, | Performed by: PHYSICIAN ASSISTANT

## 2017-10-16 PROCEDURE — 71100 X-RAY EXAM RIBS UNI 2 VIEWS: CPT | Mod: TC

## 2017-10-16 PROCEDURE — 99999 PR PBB SHADOW E&M-EST. PATIENT-LVL V: CPT | Mod: PBBFAC,,, | Performed by: PHYSICIAN ASSISTANT

## 2017-10-16 PROCEDURE — 71020 XR CHEST PA AND LATERAL: CPT | Mod: 26,,, | Performed by: RADIOLOGY

## 2017-10-16 PROCEDURE — 71100 X-RAY EXAM RIBS UNI 2 VIEWS: CPT | Mod: 26,,, | Performed by: RADIOLOGY

## 2017-10-16 PROCEDURE — 71020 XR CHEST PA AND LATERAL: CPT | Mod: TC

## 2017-10-16 NOTE — TELEPHONE ENCOUNTER
----- Message from Krista Tucker sent at 10/16/2017  9:14 AM CDT -----  was told to call if he wasn't feeling better. Please call back at [phone]. thanks

## 2017-10-16 NOTE — MEDICAL/APP STUDENT
Subjective:       Patient ID: Nico Garza Jr. is a 71 y.o. male.    Chief Complaint: Follow-up (Still In Pain)    Pt is a 72 y/o male presenting for f/u of left mid-axillary rib pain. Pt reports pain has not gotten worse, but is an 8/10. Pain is worse with exertion and deep breathing. He also reports shortness of breath with exertion. Pt takes tramadol for arthritis and states this mildly helps with the pain. Pain does not radiate. Pt denies chest pain, weakness, numbness and paresthesia. Pt denies any skin discoloration or bruising over the tender area.     Pts chest x-ray on 10/5/17 was negative for rib fractures, but showed mild subsegmental atelectasis in periphery of left mid lung vs. scarring. Atelectasis may not be a new finding, CT scan in 2015 was consistent with mild atelectasis of the lingula. Pt has a past medical hx of COPD. He saw Dr. Ibrahim last in 2015. He has scheduled PFT with him in one month.       Review of Systems   Constitutional: Negative for chills, diaphoresis, fatigue and fever.   Respiratory: Positive for shortness of breath. Negative for cough, chest tightness and wheezing.    Cardiovascular: Negative for chest pain and palpitations.   Gastrointestinal: Negative for abdominal pain.   Musculoskeletal:        Left lower rib pain, mid-axillary    Skin: Negative for color change and wound.   Neurological: Negative for weakness and numbness.       Objective:      Physical Exam   Constitutional:   Elderly male in NAD   Cardiovascular: Normal rate and normal heart sounds.    Pulmonary/Chest: Effort normal and breath sounds normal. No respiratory distress. He has no wheezes.   Musculoskeletal: Normal range of motion. He exhibits no edema or deformity.        Right shoulder: He exhibits tenderness, bony tenderness and pain. He exhibits normal range of motion and no swelling.       Assessment:       1. Rib pain on left side    2. Pulmonary emphysema, unspecified emphysema type        Plan:          Rib pain on left side  -     X-Ray Chest PA And Lateral; Future; Expected date: 10/16/2017  -     X-Ray Ribs 2 View Left; Future; Expected date: 10/16/2017    Pulmonary emphysema, unspecified emphysema type  -     X-Ray Chest PA And Lateral; Future; Expected date: 10/16/2017  -     X-Ray Ribs 2 View Left; Future; Expected date: 10/16/2017  -     Ambulatory consult to Pulmonology

## 2017-10-16 NOTE — TELEPHONE ENCOUNTER
----- Message from Mohinder Gonzalez III, PA-C sent at 10/16/2017 12:27 PM CDT -----  Rib fracture seen on the rib x-ray. Continue with the current plan for now.

## 2017-10-16 NOTE — PROGRESS NOTES
Patient ID: Nico Garza Jr. is a 71 y.o. male.     Chief Complaint: Follow-up (Still In Pain)     Pt is a 72 y/o male presenting for f/u of left mid-axillary rib pain. Pt reports pain has not gotten worse, but is an 8/10. Pain is worse with exertion and deep breathing. He also reports shortness of breath with exertion. Pt takes tramadol for arthritis and states this mildly helps with the pain. Pain does not radiate. Pt denies chest pain, weakness, numbness and paresthesia. Pt denies any skin discoloration or bruising over the tender area.      Pts chest x-ray on 10/5/17 was negative for rib fractures, but showed mild subsegmental atelectasis in periphery of left mid lung vs. scarring. Atelectasis may not be a new finding, CT scan in 2015 was consistent with mild atelectasis of the lingula. Pt has a past medical hx of COPD. He saw Dr. Ibrahim last in 2015. He has scheduled PFT with him in one month.      Past Medical History:   Diagnosis Date    Arthritis     Atrial fibrillation and flutter     Dr. Bello ([facility]    Atrial flutter     COPD (chronic obstructive pulmonary disease)     Diabetes mellitus     Hyperlipidemia     Hypertension     Lung disease     Neuropathy, diabetic     Pancreatitis        Past Surgical History:   Procedure Laterality Date    BICEPS TENDON REPAIR      CARDIOVERSION      CHOLECYSTECTOMY      PATELLA SURGERY      ROTATOR CUFF REPAIR         Family History   Problem Relation Age of Onset    Heart failure Brother     Heart attack Brother     Diabetes Mother     Hypertension Mother     Stroke Mother     Hypertension Father     Stroke Father     Diabetes Sister     Cancer Maternal Aunt     Diabetes Sister     Diabetes Sister     Stroke Paternal Grandmother     Stroke Paternal Grandfather        Social History     Social History    Marital status:      Spouse name: N/A    Number of children: 2    Years of education: N/A     Occupational  "History    Not on file.     Social History Main Topics    Smoking status: Former Smoker     Packs/day: 0.50     Types: Cigarettes     Quit date: 3/8/2015    Smokeless tobacco: Former User     Types: Snuff     Quit date: 10/7/1995    Alcohol use No    Drug use: No    Sexual activity: Not on file     Other Topics Concern    Not on file     Social History Narrative    No narrative on file       Review of patient's allergies indicates:  No Known Allergies      Current Outpatient Prescriptions:     apixaban (ELIQUIS) 5 mg Tab, TAKE 1 TABLET(5 MG) BY MOUTH TWICE DAILY, Disp: 60 tablet, Rfl: 6    b complex vitamins tablet, Take 1 tablet by mouth once daily., Disp: , Rfl:     diltiaZEM (CARDIZEM CD) 360 MG 24 hr capsule, Take 1 capsule (360 mg total) by mouth once daily., Disp: 90 capsule, Rfl: 3    duloxetine (CYMBALTA) 30 MG capsule, TAKE 1 CAPSULE(30 MG) BY MOUTH EVERY DAY, Disp: 30 capsule, Rfl: 3    gabapentin (NEURONTIN) 300 MG capsule, Take 2 capsules (600 mg total) by mouth 2 (two) times daily., Disp: 360 capsule, Rfl: 1    HUMALOG KWIKPEN 100 unit/mL InPn pen, INJECT 17 UNITS INTO THE SKIN THREE TIMES DAILY BEFORE MEALS, Disp: 30 mL, Rfl: 0    insulin degludec (TRESIBA FLEXTOUCH U-200) 200 unit/mL (3 mL) InPn, Inject 50 Units into the skin once daily., Disp: 9 mL, Rfl: 3    insulin needles, disposable, 32 x 1/4 " Ndle, Insulin injections four times per day., Disp: 400 each, Rfl: 3    multivitamin capsule, Take 1 capsule by mouth once daily., Disp: , Rfl:     NEEDLES, DISPOSABLE (DISPOSABLE NEEDLES MISC), Inject 1 each into the skin 3 (three) times daily., Disp: , Rfl:     omeprazole (PRILOSEC) 20 MG capsule, Take 1 capsule (20 mg total) by mouth once daily., Disp: 90 capsule, Rfl: 1    pravastatin (PRAVACHOL) 40 MG tablet, TAKE 1 TABLET DAILY, Disp: 90 tablet, Rfl: 3    quinapril (ACCUPRIL) 40 MG tablet, Take 1 tablet (40 mg total) by mouth once daily., Disp: 90 tablet, Rfl: 3    tramadol " "(ULTRAM) 50 mg tablet, Take 2 tablets (100 mg total) by mouth daily as needed for Pain., Disp: 60 tablet, Rfl: 3    /62 (BP Location: Left arm, Patient Position: Sitting, BP Method: Medium (Manual))   Pulse 69   Temp 97 °F (36.1 °C) (Tympanic)   Ht 5' 7.99" (1.727 m)   Wt 89.7 kg (197 lb 12 oz)   SpO2 99%   BMI 30.08 kg/m²   Review of Systems   Constitutional: Negative for chills, diaphoresis, fatigue and fever.   Respiratory: Positive for shortness of breath. Negative for cough, chest tightness and wheezing.    Cardiovascular: Negative for chest pain and palpitations.   Gastrointestinal: Negative for abdominal pain.   Musculoskeletal:        Left lower rib pain, mid-axillary    Skin: Negative for color change and wound.   Neurological: Negative for weakness and numbness.       Objective:      Physical Exam   Constitutional:   Elderly male in NAD   Cardiovascular: Normal rate and normal heart sounds.    Pulmonary/Chest: Effort normal and breath sounds normal. No respiratory distress. He has no wheezes.   Musculoskeletal: Normal range of motion. He exhibits no edema or deformity.        Right shoulder: He exhibits tenderness, bony tenderness and pain. He exhibits normal range of motion and no swelling.       Assessment:       1. Rib pain on left side    2. Pulmonary emphysema, unspecified emphysema type        Plan:         Rib pain on left side  -     X-Ray Chest PA And Lateral; Future; Expected date: 10/16/2017  -     X-Ray Ribs 2 View Left; Future; Expected date: 10/16/2017     Pulmonary emphysema, unspecified emphysema type  -     X-Ray Chest PA And Lateral; Future; Expected date: 10/16/2017  -     X-Ray Ribs 2 View Left; Future; Expected date: 10/16/2017  -     Ambulatory consult to Pulmonology          "

## 2017-10-16 NOTE — TELEPHONE ENCOUNTER
Called and spoke with pt. informed pt that Dr. Davis is out for the week and she will not be back until the 25th. Pt stated that his left side was still hurting and Dr. Davis advise him to call if he was not feeling better. Offered pt another provider and told him if he was feeling that bad that he should go to the Er. Pt stated that he does not feel like he is dying and he said that he will call back and make an appt with the .

## 2017-10-16 NOTE — TELEPHONE ENCOUNTER
----- Message from Brisa Davis sent at 10/16/2017  9:39 AM CDT -----  Contact: PT   PT request nurse call back per their previous conversation. .[phone] (home)

## 2017-10-16 NOTE — PATIENT INSTRUCTIONS
Continue with Rest and use warm moist heat as needed. Follow up if not better in 2 weeks. Suggest to go to the ER if you become much worse.

## 2017-10-16 NOTE — TELEPHONE ENCOUNTER
Patient returned call and results and recommendations read to patient with verbalized understanding.

## 2017-10-24 ENCOUNTER — OFFICE VISIT (OUTPATIENT)
Dept: DIABETES | Facility: CLINIC | Age: 72
End: 2017-10-24
Payer: MEDICARE

## 2017-10-24 VITALS
DIASTOLIC BLOOD PRESSURE: 64 MMHG | HEIGHT: 69 IN | SYSTOLIC BLOOD PRESSURE: 124 MMHG | BODY MASS INDEX: 29.42 KG/M2 | WEIGHT: 198.63 LBS

## 2017-10-24 DIAGNOSIS — E78.5 HYPERLIPIDEMIA LDL GOAL <70: Chronic | ICD-10-CM

## 2017-10-24 DIAGNOSIS — I10 HYPERTENSION GOAL BP (BLOOD PRESSURE) < 130/80: Chronic | ICD-10-CM

## 2017-10-24 LAB — GLUCOSE SERPL-MCNC: 85 MG/DL (ref 70–110)

## 2017-10-24 PROCEDURE — 99213 OFFICE O/P EST LOW 20 MIN: CPT | Mod: PBBFAC | Performed by: NURSE PRACTITIONER

## 2017-10-24 PROCEDURE — 99214 OFFICE O/P EST MOD 30 MIN: CPT | Mod: S$PBB,,, | Performed by: NURSE PRACTITIONER

## 2017-10-24 PROCEDURE — 99999 PR PBB SHADOW E&M-EST. PATIENT-LVL III: CPT | Mod: PBBFAC,,, | Performed by: NURSE PRACTITIONER

## 2017-10-24 PROCEDURE — 82948 REAGENT STRIP/BLOOD GLUCOSE: CPT | Mod: PBBFAC | Performed by: NURSE PRACTITIONER

## 2017-10-24 NOTE — PROGRESS NOTES
"PCP: Tiffany Davis DO    Subjective:     Chief Complaint: Diabetes     HISTORY OF PRESENT ILLNESS: 71 year old  male presenting for follow up of diabetes.  Patient has had Type II diabetes for at least 20 years and has the following complications: peripheral neuropathy.  He also has a history of a flutter / a-fib on Eliquis.  He is possibly having an atrial fib ablation in the future.  He  has attended diabetes education in the past.  He denies any recent hospital admissions, emergency room visits, or hypoglycemia.          Height: 5' 8.5" (174 cm)  //  Weight: 90.1 kg (198 lb 10.2 oz), Body mass index is 29.76 kg/m².  Home Blood Glucose reading this AM: 124 mg/dl fasting  His blood sugar in clinic today is: 85 mg/dl at 12:55 pm     Labs Reviewed. ADA recommends A1C of less than 7 %. His most recent A1C is:     Lab Results   Component Value Date    HGBA1C 7.6 (H) 10/03/2017      DM MEDICATIONS:   Tresiba 50 units at bedtime  Humalog 17 / 17 / 20 units TID ac    Review of Systems   Constitutional: Negative for appetite change, diaphoresis, fatigue and unexpected weight change.   HENT: Negative for congestion, hearing loss, rhinorrhea, sneezing and sore throat.    Eyes: Negative for visual disturbance.   Respiratory: Negative for cough, shortness of breath and wheezing.    Cardiovascular: Negative for leg swelling.   Gastrointestinal: Negative for abdominal pain, constipation, diarrhea, nausea and vomiting.   Endocrine: Negative for cold intolerance, heat intolerance, polydipsia, polyphagia and polyuria.   Genitourinary: Negative for difficulty urinating, dysuria and urgency.   Skin: Negative for color change, pallor and wound.   Neurological: Positive for numbness. Negative for dizziness, seizures, syncope and headaches.   Psychiatric/Behavioral: Negative for confusion and decreased concentration. The patient is not nervous/anxious.        STANDARDS OF CARE:  Current Ophthalmologist / Optometrist: Dr." Nurys, Last exam 7 / 2017  Current Podiatrist: None.   Recommend regular exams and denies gums bleeding.    ACTIVITY LEVEL: Rarely Active  EXERCISE: None  MEAL PLANNING: Patient reports number of meals per day to be 3 and number of snacks per day to be 2. Patient is encouraged to consume 45 - 60 grams of carbohydrates in each meal, and 1800 k / lyric per day.  Per dietary recall, patient is not limiting carbohydrates, saturated fats and sodium.     BLOOD GLUCOSE TESTING: Self- monitoring  SOCIAL HISTORY: . Lives alone.  Former smoker.    Objective:      Physical Exam   Constitutional: He is oriented to person, place, and time. He appears well-developed and well-nourished.   HENT:   Head: Normocephalic and atraumatic.   Eyes: EOM are normal. Pupils are equal, round, and reactive to light.   Neck: Normal range of motion. No tracheal deviation present.   Cardiovascular: Normal rate, regular rhythm and intact distal pulses.  Exam reveals no friction rub.    No murmur heard.  Pulses:       Dorsalis pedis pulses are 2+ on the right side, and 2+ on the left side.   Pulmonary/Chest: Effort normal and breath sounds normal. He has no wheezes.   Abdominal: Soft. Bowel sounds are normal. He exhibits no distension. There is no tenderness.   Musculoskeletal: Normal range of motion. He exhibits no edema or deformity.   Feet:   Right Foot:   Protective Sensation: 6 sites tested. 6 sites sensed.   Skin Integrity: Negative for ulcer, blister, skin breakdown, erythema, warmth, callus or dry skin.   Left Foot:   Protective Sensation: 6 sites tested. 3 sites sensed.   Skin Integrity: Negative for ulcer, blister, skin breakdown, erythema, warmth, callus or dry skin.   Neurological: He is alert and oriented to person, place, and time.   Skin: Skin is warm and dry. No rash noted.   Psychiatric: He has a normal mood and affect. His behavior is normal. Judgment and thought content normal.       Assessment / Plan:     1.) Type 2  diabetes, uncontrolled, with neuropathy ( on gabapentin )  Comments:  - Continue Tresiba 50 units at bedtime.  Continue Humalog 17 ( bfast ) / 17 ( lunch ) / 20 ( dinner ) units.  This regimen appears to controlling blood sugars so will continue for now.  His A1C has improved to 7.6 %.  For his age and co morbidities, an A1C  <  8 % is acceptable to prevent persistent hypoglycemia.     Orders:  -     POCT glucose    2.) Hyperlipidemia LDL goal <70 - continue Pravastatin    3.) Essential hypertension - continue Accupril, Cardizem CD    Additional Plan Details:    1.) Patient was instructed to monitor blood glucose 2 - 3 x daily, fasting and ac dinner or at bedtime. Discussed ADA goal for fasting blood sugar, 80 - 130 mg/dL; pp blood sugars below 180 mg/dl. Also, discussed prevention of hypoglycemia and the need to adjust goals to higher levels if persistent hypoglycemia.  Reminded to bring BG records or meter to each visit for review.  2.) Reviewed pathophysiology of diabetes, complications related to the disease, importance of annual dilated eye exam and daily foot examination.  3.) We discussed the ADA recommendations: Hemoglobin A1c below 7.0 %. All patients with diabetes should be on statins unless contraindicated.  ACE or ARB therapy if not contraindicated.    4.) Meal planning teaching: Carbohydrate definition - one serving is 15 gms. Carbohydrate spacing - carbohydrates should be spaced into approximately 3 meals with 2 snacks ( of one carbohydrate ) between meals or at bedtime. Increase vegetable intake to 2 or more cups of vegetables per day as well as 2 fruit servings. Recommended low saturated fat, low sodium diet to aid in control of hypertension and cholesterol.  5.) Discussed activity, benefits, methods, and precautions. Recommended patient start or continue some form of exercise and increase as tolerated to 30 minutes per day to facilitate weight loss and aid in control of BGs.  6.) Return to clinic in  3 months for follow up. He was explained the above plan and given opportunity to ask questions. Advised to call clinic with any further questions or concerns.    Gabrielle Vee, PRAKASHC, CDE    A total of 30 minutes was spent in face to face time, of which over 50 % was spent in counseling patient on disease process, complications, treatment, and side effects of medications.

## 2017-11-08 ENCOUNTER — PROCEDURE VISIT (OUTPATIENT)
Dept: PULMONOLOGY | Facility: CLINIC | Age: 72
End: 2017-11-08
Payer: MEDICARE

## 2017-11-08 ENCOUNTER — CLINICAL SUPPORT (OUTPATIENT)
Dept: CARDIOLOGY | Facility: CLINIC | Age: 72
End: 2017-11-08
Attending: INTERNAL MEDICINE
Payer: MEDICARE

## 2017-11-08 DIAGNOSIS — J43.9 PULMONARY EMPHYSEMA, UNSPECIFIED EMPHYSEMA TYPE: Chronic | ICD-10-CM

## 2017-11-08 LAB
DIASTOLIC DYSFUNCTION: NO
RETIRED EF AND QEF - SEE NOTES: 55 (ref 55–65)

## 2017-11-08 PROCEDURE — 94729 DIFFUSING CAPACITY: CPT | Mod: PBBFAC

## 2017-11-08 PROCEDURE — 93306 TTE W/DOPPLER COMPLETE: CPT | Mod: PBBFAC | Performed by: INTERNAL MEDICINE

## 2017-11-08 PROCEDURE — 94060 EVALUATION OF WHEEZING: CPT | Mod: PBBFAC

## 2017-11-08 PROCEDURE — 94726 PLETHYSMOGRAPHY LUNG VOLUMES: CPT | Mod: PBBFAC

## 2017-11-08 PROCEDURE — 94726 PLETHYSMOGRAPHY LUNG VOLUMES: CPT | Mod: 26,S$PBB,, | Performed by: INTERNAL MEDICINE

## 2017-11-08 PROCEDURE — 94729 DIFFUSING CAPACITY: CPT | Mod: 26,S$PBB,, | Performed by: INTERNAL MEDICINE

## 2017-11-08 PROCEDURE — 94060 EVALUATION OF WHEEZING: CPT | Mod: 26,S$PBB,, | Performed by: INTERNAL MEDICINE

## 2017-11-09 LAB
POST FEF 25 75: 0.96 L/S (ref 1.52–3.05)
POST FET 100: 14.12 S
POST FEV1 FVC: 65 %
POST FEV1: 2.18 L (ref 2.65–3.41)
POST FIF 50: 3.17 L/S
POST FVC: 3.36 L (ref 3.69–4.59)
POST PEF: 5.06 L/S (ref 6.82–9.03)
PRE DLCO: 13.24 ML/MMHG/MIN (ref 17.72–26.01)
PRE ERV: 0.92 L
PRE FEF 25 75: 1.05 L/S (ref 1.52–3.05)
PRE FET 100: 9.26 S
PRE FEV1 FVC: 66 %
PRE FEV1: 2.07 L (ref 2.65–3.41)
PRE FIF 50: 4.11 L/S
PRE FRC PL: 3.32 L (ref 2.7–3.91)
PRE FVC: 3.13 L (ref 3.69–4.59)
PRE KROGHS K: 2.76 1/MIN
PRE PEF: 5.96 L/S (ref 6.82–9.03)
PRE RV: 2.4 L (ref 2.06–2.85)
PRE SVC: 3.34 L
PRE TLC: 5.74 L (ref 5.64–6.64)
PREDICTED DLCO: 21.86 ML/MMHG/MIN (ref 17.72–26.01)
PREDICTED FEV1 FVC: 73.4 % (ref 68.56–78.24)
PREDICTED FEV1: 3.03 L (ref 2.65–3.41)
PREDICTED FRC N2: 3.31 L (ref 2.7–3.91)
PREDICTED FRC PL: 3.31 L (ref 2.7–3.91)
PREDICTED FVC: 4.14 L (ref 3.69–4.59)
PREDICTED RV: 2.46 L (ref 2.06–2.85)
PREDICTED SVC: 3.79 L
PREDICTED TLC: 6.14 L (ref 5.64–6.64)
PROVOCATION PROTOCOL: ABNORMAL

## 2017-11-10 ENCOUNTER — IMMUNIZATION (OUTPATIENT)
Dept: INTERNAL MEDICINE | Facility: CLINIC | Age: 72
End: 2017-11-10
Payer: MEDICARE

## 2017-11-10 ENCOUNTER — OFFICE VISIT (OUTPATIENT)
Dept: PULMONOLOGY | Facility: CLINIC | Age: 72
End: 2017-11-10
Payer: MEDICARE

## 2017-11-10 VITALS
OXYGEN SATURATION: 95 % | SYSTOLIC BLOOD PRESSURE: 132 MMHG | DIASTOLIC BLOOD PRESSURE: 60 MMHG | HEIGHT: 68 IN | RESPIRATION RATE: 17 BRPM | HEART RATE: 67 BPM | WEIGHT: 198.06 LBS | BODY MASS INDEX: 30.02 KG/M2

## 2017-11-10 DIAGNOSIS — J43.2 CENTRILOBULAR EMPHYSEMA: Primary | Chronic | ICD-10-CM

## 2017-11-10 DIAGNOSIS — Z01.811 PREOPERATIVE RESPIRATORY EXAMINATION: ICD-10-CM

## 2017-11-10 PROCEDURE — 99213 OFFICE O/P EST LOW 20 MIN: CPT | Mod: PBBFAC | Performed by: INTERNAL MEDICINE

## 2017-11-10 PROCEDURE — 99212 OFFICE O/P EST SF 10 MIN: CPT | Mod: PBBFAC,27,25

## 2017-11-10 PROCEDURE — 99215 OFFICE O/P EST HI 40 MIN: CPT | Mod: 25,S$PBB,, | Performed by: INTERNAL MEDICINE

## 2017-11-10 PROCEDURE — G0008 ADMIN INFLUENZA VIRUS VAC: HCPCS | Mod: PBBFAC

## 2017-11-10 PROCEDURE — 99999 PR PBB SHADOW E&M-EST. PATIENT-LVL III: CPT | Mod: PBBFAC,,, | Performed by: INTERNAL MEDICINE

## 2017-11-10 PROCEDURE — 99999 PR PBB SHADOW E&M-EST. PATIENT-LVL II: CPT | Mod: PBBFAC,,,

## 2017-11-10 NOTE — ASSESSMENT & PLAN NOTE
COPD ROS: taking medications as instructed, no medication side effects noted, no significant ongoing wheezing or shortness of breath, using bronchodilator MDI less than twice a week.   New concerns: None.   Exam: appears well, vitals normal, no respiratory distress, acyanotic, normal RR, chest clear, no wheezing, crepitations, rhonchi, normal symmetric air entry.   Assessment:  COPD stable.   Plan: Not currently requiring any inhaler therapy. CXR in 6 months.

## 2017-11-10 NOTE — LETTER
November 10, 2017      Mohinder Gonzalez III, PA-C  [address] [place] [address] [address]ree[address] [id number]           [facility]  [address] [place] [address] [address] [id number]  Phone: [phone]  Fax: [phone]          Patient: Nico Garza Jr.   MR Number: [id number]   YOB: 1945   Date of Visit: 11/10/2017       Dear Mohinder Gonzalez III:    Thank you for referring Nico Garza to me for evaluation. Attached you will find relevant portions of my assessment and plan of care.    If you have questions, please do not hesitate to call me. I look forward to following Nico Garza along with you.    Sincerely,    Swapnil Ibrahim MD    Enclosure  CC:  No Recipients    If you would like to receive this communication electronically, please contact [email or web address] or [phone] to request more information on [employer] access.    For providers and/or their staff who would like to refer a patient to [facility], please contact us through our one-stop-shop provider referral line, [facility], at [phone].    If you feel you have received this communication in error or would no longer like to receive these types of communications, please e-mail [email or web address]

## 2017-11-10 NOTE — PATIENT INSTRUCTIONS
Lung Anatomy  Your lungs take air in to give your body oxygen, which the body needs to work. Your lungs, like all the tissues in your body, are made up of billions of tiny specialized cells. Old lung cells die and are replaced by new, identical lung cells. This natural process helps ensure healthy lungs.    Date Last Reviewed: 11/1/2016  © 8971-1654 [employer]. [address] [place] [address], [place] [address]. All rights reserved. This information is not intended as a substitute for professional medical care. Always follow your healthcare professional's instructions.

## 2017-11-10 NOTE — ASSESSMENT & PLAN NOTE
He is currently undergoing preoperative evaluation for  AFIB ABLATION    SURGEON: Dr. Turner    ANESTHESIA: general    DURATION: <  1 hour.     PRE OPERATIVE PULMONARY RISK ASSESSMENT:     Patient is a high risk for perioperative pulmonary complications due to age, general anesthesia, history of COPD.    Risks and possible pulmonary complications of surgery include risk of respiratory failure requiring mechanical ventilation, post operative pneumonia, post operative atelectasis, deep venous thrombosis, pulmonary embolism and death.     Based on my assessment , it is okay to proceed with procedure PROVIDED RISK IS ACCEPTABLE TO BOTH THE PATIENT AND THE SURGEON PERFORMING THE SURGERY.    RECOMMENDATIONS FOR RISK MITIGATION:     1. Preoperative pulmonary workup:  none.  2. Change in medication regimen before surgery: none, continue medication regimen including morning of surgery, with sip of water.  3. Prophylaxis for cardiac events with perioperative beta-blockers: should be considered, specific regimen per anesthesia.  4. Invasive hemodynamic monitoring perioperatively: should be considered.  5. Deep vein thrombosis prophylaxis postoperatively: regimen to be chosen by surgical team.  6. Surveillance for postoperative MI with ECG immediately postoperatively and on postoperative days 1 and 2 AND troponin levels 24 hours postoperatively and on day 4 or hospital discharge (whichever comes first): should be considered.  7. Begin patient education regarding lung-expansion maneuvers like deep breathing and incentive spirometry.   9. Limit duration of surgery to less than 3 hr if possible.  9. Use spinal or epidural anesthesia if possible.  10. Use deep-breathing exercises or incentive spirometry.  11. Assure perioperative thromboprophylaxis and adequate post operative analgesia.

## 2017-11-10 NOTE — PROGRESS NOTES
Subjective:      Patient ID: Nico Garza Jr. is a 71 y.o. male.    Patient Active Problem List   Diagnosis    COPD (chronic obstructive pulmonary disease) with emphysema    Atrial flutter with rapid ventricular response    History of cigarette smoking    Hypertension goal BP (blood pressure) < 130/80    Hyperlipidemia LDL goal <70    Type 2 diabetes, uncontrolled, with neuropathy    Paroxysmal atrial fibrillation    Preoperative respiratory examination     Problem list has been reviewed.    Chief Complaint: COPD    HPI    Group Spirometric Classification Exacerbations / year mMRC CAT   Group B: Low risk; more symptoms  GOLD 1- 2  < = 1 >= 2 >=10     FEV1 = 2.07L (68.5 % predicted)      Here for follow up for  COPD. The patient does not currently have symptoms / an exacerbation. He states that he is at his respiratory baseline and denies any new onset specific pulmonary complaints. Specifically he denies cough sputum, hemoptysis,  pain with breathing, wheezing, asthma. He is not currently requiring any inhalers. A full  review of systems, past , family  and social histories was performed except as mentioned in the note above, these are non contributory to the main issues discussed today.      COPD QUESTIONNAIRE  How often do you cough?: A little of the time  How often do you have phlegm (mucus) in your chest?: Some of the time  How often does your chest feel tight?: Never  When you walk up a hill or one flight of stairs, how often are you breathless?: A little of the time  How often are you limited doing any activities at home?: A little of the time  How often are you confident leaving the house despite your lung condition?: All of the time  How often do you sleep soundly?: Some of the time  How often do you have energy?: Almost never  Total score: 15     Immunization status reviewed and up to date.       Previous Report Reviewed: lab reports, office notes and radiology reports     The following portions  of the patient's history were reviewed and updated as appropriate: He  has a past medical history of Arthritis; Atrial fibrillation and flutter; Atrial flutter; COPD (chronic obstructive pulmonary disease); Diabetes mellitus; Hyperlipidemia; Hypertension; Lung disease; Neuropathy, diabetic; and Pancreatitis.  He  has a past surgical history that includes Cholecystectomy; Biceps tendon repair; Patella surgery; Rotator cuff repair; and Cardioversion.  His family history includes Cancer in his maternal aunt; Diabetes in his mother, sister, sister, and sister; Heart attack in his brother; Heart failure in his brother; Hypertension in his father and mother; Stroke in his father, mother, paternal grandfather, and paternal grandmother.  He  reports that he quit smoking about 2 years ago. His smoking use included Cigarettes. He smoked 0.50 packs per day. He quit smokeless tobacco use about 22 years ago. His smokeless tobacco use included Snuff. He reports that he does not drink alcohol or use drugs.  He has a current medication list which includes the following prescription(s): apixaban, b complex vitamins, diltiazem, duloxetine, gabapentin, humalog kwikpen, insulin degludec, pen needle, diabetic, multivitamin, needles, disposable, omeprazole, pravastatin, quinapril, and tramadol.  He has No Known Allergies..    Review of Systems   Constitutional: Positive for fatigue. Negative for fever and chills.   HENT: Positive for sinus pressure and congestion. Negative for nosebleeds.    Eyes: Negative for itching.   Respiratory: Negative for cough, shortness of breath, wheezing and dyspnea on extertion.    Cardiovascular: Negative for chest pain and palpitations.   Genitourinary: Negative for difficulty urinating and hematuria.   Endocrine: Negative for cold intolerance and heat intolerance.    Musculoskeletal: Negative for arthralgias and back pain.   Skin: Positive for rash.   Gastrointestinal: Positive for acid reflux. Negative  "for nausea, vomiting and abdominal pain.   Neurological: Positive for light-headedness. Negative for dizziness and headaches.   Hematological: No excessive bruising.   Psychiatric/Behavioral: The patient is not nervous/anxious.       Objective:   /60   Pulse 67   Resp 17   Ht 5' 8" (1.727 m)   Wt 89.8 kg (198 lb 1.3 oz)   SpO2 95%   BMI 30.12 kg/m²   Body mass index is 30.12 kg/m².    Physical Exam   Constitutional: He is oriented to person, place, and time. He appears well-developed and well-nourished.   HENT:   Head: Normocephalic and atraumatic.   Eyes: EOM are normal. Pupils are equal, round, and reactive to light.   Neck: Normal range of motion. No tracheal deviation present.   Cardiovascular: Normal rate, regular rhythm and intact distal pulses.  Exam reveals no friction rub.    No murmur heard.  Pulmonary/Chest: Effort normal and breath sounds normal. He has no wheezes. He has no rales.   Abdominal: Soft. Bowel sounds are normal. He exhibits no distension. There is no tenderness.   Musculoskeletal: Normal range of motion. He exhibits no edema or deformity.   Neurological: He is alert and oriented to person, place, and time.   Skin: Skin is warm and dry. No rash noted.   Psychiatric: He has a normal mood and affect. His behavior is normal.       Personal Diagnostic Review  Pulmonary function tests: FEV1: 2.07  (68 % predicted), FVC:  3.13 (76 % predicted), FEV1/FVC:  66, T.74 (94 % predicted), RV/TLVC: 42 (102 % predicted), DLCO: 13.2 (61 % predicted): Mild obstruction. Normal lung volumes. Diffusion capacity is mildly reduced.     Assessment:     1. Centrilobular emphysema Chronic   2. Preoperative respiratory examination Temporary     Outpatient Encounter Prescriptions as of 11/10/2017   Medication Sig Dispense Refill    apixaban (ELIQUIS) 5 mg Tab TAKE 1 TABLET(5 MG) BY MOUTH TWICE DAILY 60 tablet 6    b complex vitamins tablet Take 1 tablet by mouth once daily.      diltiaZEM (CARDIZEM " "CD) 360 MG 24 hr capsule Take 1 capsule (360 mg total) by mouth once daily. 90 capsule 3    duloxetine (CYMBALTA) 30 MG capsule TAKE 1 CAPSULE(30 MG) BY MOUTH EVERY DAY 30 capsule 3    gabapentin (NEURONTIN) 300 MG capsule Take 2 capsules (600 mg total) by mouth 2 (two) times daily. 360 capsule 1    HUMALOG KWIKPEN 100 unit/mL InPn pen INJECT 17 UNITS INTO THE SKIN THREE TIMES DAILY BEFORE MEALS 30 mL 0    insulin degludec (TRESIBA FLEXTOUCH U-200) 200 unit/mL (3 mL) InPn Inject 50 Units into the skin once daily. 9 mL 3    insulin needles, disposable, 32 x 1/4 " Ndle Insulin injections four times per day. 400 each 3    multivitamin capsule Take 1 capsule by mouth once daily.      NEEDLES, DISPOSABLE (DISPOSABLE NEEDLES MISC) Inject 1 each into the skin 3 (three) times daily.      omeprazole (PRILOSEC) 20 MG capsule Take 1 capsule (20 mg total) by mouth once daily. 90 capsule 1    pravastatin (PRAVACHOL) 40 MG tablet TAKE 1 TABLET DAILY 90 tablet 3    quinapril (ACCUPRIL) 40 MG tablet Take 1 tablet (40 mg total) by mouth once daily. 90 tablet 3    tramadol (ULTRAM) 50 mg tablet Take 2 tablets (100 mg total) by mouth daily as needed for Pain. 60 tablet 3     No facility-administered encounter medications on file as of 11/10/2017.      Orders Placed This Encounter   Procedures    X-Ray Chest PA And Lateral     Standing Status:   Future     Standing Expiration Date:   12/21/2018     Plan:     Discussed diagnosis, its evaluation, treatment and usual course. All questions answered.    COPD (chronic obstructive pulmonary disease) with emphysema  COPD ROS: taking medications as instructed, no medication side effects noted, no significant ongoing wheezing or shortness of breath, using bronchodilator MDI less than twice a week.   New concerns: None.   Exam: appears well, vitals normal, no respiratory distress, acyanotic, normal RR, chest clear, no wheezing, crepitations, rhonchi, normal symmetric air entry. "   Assessment:  COPD stable.   Plan: Not currently requiring any inhaler therapy. CXR in 6 months.     Preoperative respiratory examination  He is currently undergoing preoperative evaluation for  AFIB ABLATION    SURGEON: Dr. Turner    ANESTHESIA: general    DURATION: <  1 hour.     PRE OPERATIVE PULMONARY RISK ASSESSMENT:     Patient is a high risk for perioperative pulmonary complications due to age, general anesthesia, history of COPD.    Risks and possible pulmonary complications of surgery include risk of respiratory failure requiring mechanical ventilation, post operative pneumonia, post operative atelectasis, deep venous thrombosis, pulmonary embolism and death.     Based on my assessment , it is okay to proceed with procedure PROVIDED RISK IS ACCEPTABLE TO BOTH THE PATIENT AND THE SURGEON PERFORMING THE SURGERY.    RECOMMENDATIONS FOR RISK MITIGATION:     1. Preoperative pulmonary workup:  none.  2. Change in medication regimen before surgery: none, continue medication regimen including morning of surgery, with sip of water.  3. Prophylaxis for cardiac events with perioperative beta-blockers: should be considered, specific regimen per anesthesia.  4. Invasive hemodynamic monitoring perioperatively: should be considered.  5. Deep vein thrombosis prophylaxis postoperatively: regimen to be chosen by surgical team.  6. Surveillance for postoperative MI with ECG immediately postoperatively and on postoperative days 1 and 2 AND troponin levels 24 hours postoperatively and on day 4 or hospital discharge (whichever comes first): should be considered.  7. Begin patient education regarding lung-expansion maneuvers like deep breathing and incentive spirometry.   9. Limit duration of surgery to less than 3 hr if possible.  9. Use spinal or epidural anesthesia if possible.  10. Use deep-breathing exercises or incentive spirometry.  11. Assure perioperative thromboprophylaxis and adequate post operative analgesia.        TIME SPENT WITH PATIENT: Time spent: 40 minutes in face to face  discussion concerning diagnosis, prognosis, review of lab and test results, benefits of treatment as well as management of disease, counseling of patient and coordination of care between various health  care providers . Greater than half the time spent was used for coordination of care and counseling of patient.            Return in about 6 months (around 5/10/2018) for Emphysema.

## 2017-11-21 ENCOUNTER — TELEPHONE (OUTPATIENT)
Dept: ELECTROPHYSIOLOGY | Facility: CLINIC | Age: 72
End: 2017-11-21

## 2017-11-21 DIAGNOSIS — I49.9 CARDIAC ARRHYTHMIA, UNSPECIFIED CARDIAC ARRHYTHMIA TYPE: ICD-10-CM

## 2017-11-21 DIAGNOSIS — I48.92 ATRIAL FLUTTER WITH RAPID VENTRICULAR RESPONSE: Primary | ICD-10-CM

## 2017-11-21 NOTE — TELEPHONE ENCOUNTER
ABLATION EDUCATION CHECKLIST    11/30/17  PRE - PROCEDURE LABS HAVE BEEN ORDERED FOR YOU @ [facility]  BE SURE TO ARRIVE AT YOUR SCHEDULED TIME FOR THIS LAB WORK!  (YOU DO NOT HAVE TO FAST FOR THIS LABWORK!!!!)    12/4/17 @ 8 AM  Report to Cardiology Waiting Room on 3rd floor of the Hospital    (Do not report to clinic)  Directions for Reporting to Cardiology Waiting Area in the Hospital  If you park in the Parking Garage:  Take elevators to the 2nd floor  Walk up ramp and turn right by Gold Elevators  Take elevator to the 3rd floor  Upon exiting the elevator, turn away from the clinic areas  Walk long urban around to front of hospital to area with windows overlooking [facility]  Check in at Reception Desk  OR  If family is dropping you off:  Have them drop you off at the front of the Hospital  (Near the ER, where all the flags are hung).  Take the E elevators to the 3rd floor.  Check in at the Reception Desk in the waiting room.    Do not eat or drink anything after: 12 mn on the night before your procedure    Medications:   Hold your blood thinner: Eliquis (Apixaban) on the evening before and the morning of your procedure (LAST DOSE: AM of 12/3/17)  DO NOT TAKE any diabetes medications: HUMALOG AND TRESIBA on the morning of your procedure  You may take your other usual morning medications with a sip of water    You will be spending the night after your procedure  You will need someone to drive you home the day after your procedure.    Your pain during your procedure will be managed by the anesthesia team.     THE ABOVE INSTRUCTIONS WERE GIVEN TO THE PATIENT VERBALLY AND THEY VERBALIZED UNDERSTANDING.  THEY DO NOT REQUIRE ANY SPECIAL NEEDS AND DO NOT HAVE ANY LEARNING BARRIERS.    Any need to reschedule or cancel procedures, or any questions regarding your procedures should be addressed directly with the Arrhythmia Department Nurses at the following phone number: [phone]

## 2017-11-22 ENCOUNTER — TELEPHONE (OUTPATIENT)
Dept: UROLOGY | Facility: CLINIC | Age: 72
End: 2017-11-22

## 2017-11-22 NOTE — TELEPHONE ENCOUNTER
"Patient states he has been urinating blood for the past few days and would like to see Dr. Chavez today or Friday. Informed patient that Dr. Chavez has finished clinic for today and is off Friday. Offered patient next available appointment on Tuesday 11/28/17 at 4PM. Patient states "I guess I will survive until then". Advised patient to go to ER if he becomes febrile or weak. Patient states understanding. Appointment scheduled with Dr. Chavez on 11/28/17.  "

## 2017-11-22 NOTE — TELEPHONE ENCOUNTER
----- Message from Violeta Aguilar sent at 11/22/2017  2:43 PM CST -----  Contact: Patients daughter, Belia  Ms Belia states her father has blood in his urine and needs to be seen as soon as possible, but there are no openings until January, please call her back at [phone]

## 2017-11-27 ENCOUNTER — TELEPHONE (OUTPATIENT)
Dept: INTERNAL MEDICINE | Facility: CLINIC | Age: 72
End: 2017-11-27

## 2017-11-27 NOTE — TELEPHONE ENCOUNTER
----- Message from Rosemarie Rodriguez sent at 11/27/2017  8:53 AM CST -----  Contact: daughter  pls r/s 12/5 to 1/2 (needs order extended)

## 2017-11-28 ENCOUNTER — OFFICE VISIT (OUTPATIENT)
Dept: UROLOGY | Facility: CLINIC | Age: 72
End: 2017-11-28
Payer: MEDICARE

## 2017-11-28 VITALS — BODY MASS INDEX: 30.11 KG/M2 | WEIGHT: 198 LBS

## 2017-11-28 DIAGNOSIS — R31.9 HEMATURIA, UNSPECIFIED TYPE: Primary | ICD-10-CM

## 2017-11-28 DIAGNOSIS — N32.81 OAB (OVERACTIVE BLADDER): ICD-10-CM

## 2017-11-28 LAB
BACTERIA #/AREA URNS AUTO: ABNORMAL /HPF
BILIRUB SERPL-MCNC: NORMAL MG/DL
BLOOD URINE, POC: NORMAL
COLOR, POC UA: NORMAL
GLUCOSE UR QL STRIP: NORMAL
HYALINE CASTS UR QL AUTO: 24 /LPF
KETONES UR QL STRIP: NORMAL
LEUKOCYTE ESTERASE URINE, POC: NORMAL
MICROSCOPIC COMMENT: ABNORMAL
NITRITE, POC UA: POSITIVE
PH, POC UA: 5
POC RESIDUAL URINE VOLUME: 13 ML (ref 0–100)
PROTEIN, POC: 30
RBC #/AREA URNS AUTO: 17 /HPF (ref 0–4)
SPECIFIC GRAVITY, POC UA: 1.02
UROBILINOGEN, POC UA: NORMAL
WBC #/AREA URNS AUTO: 24 /HPF (ref 0–5)

## 2017-11-28 PROCEDURE — 99212 OFFICE O/P EST SF 10 MIN: CPT | Mod: PBBFAC | Performed by: UROLOGY

## 2017-11-28 PROCEDURE — 81001 URINALYSIS AUTO W/SCOPE: CPT

## 2017-11-28 PROCEDURE — 87088 URINE BACTERIA CULTURE: CPT

## 2017-11-28 PROCEDURE — 87086 URINE CULTURE/COLONY COUNT: CPT

## 2017-11-28 PROCEDURE — 87186 SC STD MICRODIL/AGAR DIL: CPT

## 2017-11-28 PROCEDURE — 87077 CULTURE AEROBIC IDENTIFY: CPT

## 2017-11-28 PROCEDURE — 81002 URINALYSIS NONAUTO W/O SCOPE: CPT | Mod: PBBFAC | Performed by: UROLOGY

## 2017-11-28 PROCEDURE — 51798 US URINE CAPACITY MEASURE: CPT | Mod: PBBFAC | Performed by: UROLOGY

## 2017-11-28 PROCEDURE — 99214 OFFICE O/P EST MOD 30 MIN: CPT | Mod: S$PBB,,, | Performed by: UROLOGY

## 2017-11-28 PROCEDURE — 99999 PR PBB SHADOW E&M-EST. PATIENT-LVL II: CPT | Mod: PBBFAC,,, | Performed by: UROLOGY

## 2017-11-28 RX ORDER — INSULIN DETEMIR 100 [IU]/ML
INJECTION, SOLUTION SUBCUTANEOUS
Refills: 0 | COMMUNITY
Start: 2017-08-28 | End: 2018-01-08 | Stop reason: ALTCHOICE

## 2017-11-28 NOTE — PROGRESS NOTES
Chief Complaint: Right flank pain, incontinence, hematuria    HPI:   11/28/17: 70 yo man is having right flank pain, weak stream, gross hematuria, urgency with dribbling on way to toilet.  Keeping urinal next to bed.  Not a good stream lately.  Last time he had a good stream was only a week or so ago.  No other abd/pelvic pain and no exac/rel factors.  Prior urolithiasis a very long time ago he passed it.  Is scheduled for cardioversion Monday.    11/18/15: Took the vesicare and found it expensive but it reduced nocturia to 2 from 4. New dx of afib.  11/12/14: Pt having nocturia getting up 3-4 times.  Held caffeine and the discomfort is now gone.  -UCx.  US and KUB clear of urolithiasis.   10/10/14: 69 yo man having pain at the end of his penis with a little burn with urination. Weak stream, does not empty all the way.  Nocturia x4.  Was told by a urologist in the past his meatus was small.  No hematuria.  Urolithiasis about 8 years ago no followup since then.  No abd/pelvic pain and no exac/rel factors. Not constipated - has diarrhea today.  2 cups coffee in AM then some in the afternoon.  Got very dizzy when he tool flomax in the past.     Allergies:  Patient has no known allergies.    Medications:  has a current medication list which includes the following prescription(s): apixaban, b complex vitamins, diltiazem, duloxetine, gabapentin, humalog kwikpen, insulin degludec, pen needle, diabetic, levemir flextouch, multivitamin, needles, disposable, omeprazole, pravastatin, quinapril, and tramadol.    Review of Systems:  General: No fever, chills, fatigability, or weight loss.  Skin: No rashes, itching, or changes in color or texture of skin.  Chest: Denies BENITO, cyanosis, wheezing, cough, and sputum production.  Abdomen: Appetite fine. No weight loss. Denies diarrhea, abdominal pain, hematemesis, or blood in stool.  Musculoskeletal: No joint stiffness or swelling. Denies back pain.  : As above.  All other review of  systems negative.    PMH:   has a past medical history of Arthritis; Atrial fibrillation and flutter; Atrial flutter; COPD (chronic obstructive pulmonary disease); Diabetes mellitus; Hyperlipidemia; Hypertension; Lung disease; Neuropathy, diabetic; and Pancreatitis.    PSH:   has a past surgical history that includes Cholecystectomy; Biceps tendon repair; Patella surgery; Rotator cuff repair; and Cardioversion.    FamHx: family history includes Cancer in his maternal aunt; Diabetes in his mother, sister, sister, and sister; Heart attack in his brother; Heart failure in his brother; Hypertension in his father and mother; Stroke in his father, mother, paternal grandfather, and paternal grandmother.    SocHx:  reports that he quit smoking about 2 years ago. His smoking use included Cigarettes. He smoked 0.50 packs per day. He quit smokeless tobacco use about 22 years ago. His smokeless tobacco use included Snuff. He reports that he does not drink alcohol or use drugs.      Physical Exam:  There were no vitals filed for this visit.  General: A&Ox3, no apparent distress, no deformities  Neck: No masses, normal thyroid  Lungs: normal inspiration, no use of accessory muscles  Heart: normal pulse, no arrhythmias  Abdomen: Soft, NT, ND, no masses, no hernias, no hepatosplenomegaly  Lymphatic: Neck and groin nodes negative  Skin: The skin is warm and dry. No jaundice.  Ext: No c/c/e.  : Test desc maria elena, no abnormalities of epididymus. Penis normal, with normal penile and scrotal skin. Meatus normal. Normal rectal tone, no hemorrhoids. Prost 20 gm no nodules or masses appreciated. SV not palpable. Perineum and anus normal.    Labs/Studies:   Urinalysis performed in clinic, summary: UA normal exc ++leuk, +nit, 30 prot  Bladder Scan performed in office: PVR 13 ml.  PSA    11/15: 0.83    Impression/Plan:   1. Likely UTI, and uncertain etiology of hematuria.  UA/UCx, cipro empirically, and CT Urogram/RTC Cysto.   2. Not sure how  this impacts doing his heart ablation; advised to discuss with his cardiologist.

## 2017-11-29 ENCOUNTER — TELEPHONE (OUTPATIENT)
Dept: UROLOGY | Facility: CLINIC | Age: 72
End: 2017-11-29

## 2017-11-30 ENCOUNTER — LAB VISIT (OUTPATIENT)
Dept: LAB | Facility: HOSPITAL | Age: 72
End: 2017-11-30
Attending: INTERNAL MEDICINE
Payer: MEDICARE

## 2017-11-30 DIAGNOSIS — I49.9 CARDIAC ARRHYTHMIA, UNSPECIFIED CARDIAC ARRHYTHMIA TYPE: ICD-10-CM

## 2017-11-30 DIAGNOSIS — I48.92 ATRIAL FLUTTER WITH RAPID VENTRICULAR RESPONSE: ICD-10-CM

## 2017-11-30 LAB
ANION GAP SERPL CALC-SCNC: 10 MMOL/L
BASOPHILS # BLD AUTO: 0.05 K/UL
BASOPHILS NFR BLD: 0.6 %
BUN SERPL-MCNC: 24 MG/DL
CALCIUM SERPL-MCNC: 9.1 MG/DL
CHLORIDE SERPL-SCNC: 104 MMOL/L
CO2 SERPL-SCNC: 28 MMOL/L
CREAT SERPL-MCNC: 1.1 MG/DL
DIFFERENTIAL METHOD: ABNORMAL
EOSINOPHIL # BLD AUTO: 0.2 K/UL
EOSINOPHIL NFR BLD: 2.5 %
ERYTHROCYTE [DISTWIDTH] IN BLOOD BY AUTOMATED COUNT: 13.3 %
EST. GFR  (AFRICAN AMERICAN): >60 ML/MIN/1.73 M^2
EST. GFR  (NON AFRICAN AMERICAN): >60 ML/MIN/1.73 M^2
GLUCOSE SERPL-MCNC: 149 MG/DL
HCT VFR BLD AUTO: 45.5 %
HGB BLD-MCNC: 14.2 G/DL
IMM GRANULOCYTES # BLD AUTO: 0.03 K/UL
IMM GRANULOCYTES NFR BLD AUTO: 0.4 %
LYMPHOCYTES # BLD AUTO: 2.1 K/UL
LYMPHOCYTES NFR BLD: 25.5 %
MCH RBC QN AUTO: 27.8 PG
MCHC RBC AUTO-ENTMCNC: 31.2 G/DL
MCV RBC AUTO: 89 FL
MONOCYTES # BLD AUTO: 0.6 K/UL
MONOCYTES NFR BLD: 7.7 %
NEUTROPHILS # BLD AUTO: 5.1 K/UL
NEUTROPHILS NFR BLD: 63.3 %
NRBC BLD-RTO: 0 /100 WBC
PLATELET # BLD AUTO: 392 K/UL
PMV BLD AUTO: 10.3 FL
POTASSIUM SERPL-SCNC: 4.3 MMOL/L
RBC # BLD AUTO: 5.11 M/UL
SODIUM SERPL-SCNC: 142 MMOL/L
WBC # BLD AUTO: 8.05 K/UL

## 2017-11-30 PROCEDURE — 85610 PROTHROMBIN TIME: CPT

## 2017-11-30 PROCEDURE — 85025 COMPLETE CBC W/AUTO DIFF WBC: CPT

## 2017-11-30 PROCEDURE — 85730 THROMBOPLASTIN TIME PARTIAL: CPT

## 2017-11-30 PROCEDURE — 80048 BASIC METABOLIC PNL TOTAL CA: CPT

## 2017-11-30 PROCEDURE — 36415 COLL VENOUS BLD VENIPUNCTURE: CPT

## 2017-12-01 ENCOUNTER — TELEPHONE (OUTPATIENT)
Dept: CARDIOLOGY | Facility: CLINIC | Age: 72
End: 2017-12-01

## 2017-12-01 LAB
APTT BLDCRRT: 26.5 SEC
BACTERIA UR CULT: NORMAL
INR PPP: 1.1
PROTHROMBIN TIME: 11.8 SEC

## 2017-12-01 NOTE — TELEPHONE ENCOUNTER
Patient called about DILLAN scheduled on 12/4/17. Pre-procedural questions asked.  Denies swallowing issues and esophageal issues. Denies previous sedation/anesthesia problems. States does not snore or have sleep apnea.  Denies recent trauma/surgery/radiation therapy to head/neck/airway. States is able to move neck without difficulty. DILLAN described to patient. Instructed NPO past midnight and to have a designated  to drive patient home after the procedures due to sedation being given. Instructed to report to   [facility] at 0800 am.  Verbalizes understanding. Questions answered.

## 2017-12-04 ENCOUNTER — HOSPITAL ENCOUNTER (OUTPATIENT)
Facility: HOSPITAL | Age: 72
Discharge: HOME | End: 2017-12-05
Attending: INTERNAL MEDICINE | Admitting: INTERNAL MEDICINE
Payer: MEDICARE

## 2017-12-04 ENCOUNTER — ANESTHESIA (OUTPATIENT)
Dept: MEDSURG UNIT | Facility: HOSPITAL | Age: 72
End: 2017-12-04
Payer: MEDICARE

## 2017-12-04 ENCOUNTER — HOSPITAL ENCOUNTER (OUTPATIENT)
Dept: CARDIOLOGY | Facility: CLINIC | Age: 72
Discharge: HOME | End: 2017-12-04
Attending: INTERNAL MEDICINE | Admitting: INTERNAL MEDICINE
Payer: MEDICARE

## 2017-12-04 ENCOUNTER — ANESTHESIA EVENT (OUTPATIENT)
Dept: MEDSURG UNIT | Facility: HOSPITAL | Age: 72
End: 2017-12-04
Payer: MEDICARE

## 2017-12-04 ENCOUNTER — SURGERY (OUTPATIENT)
Age: 72
End: 2017-12-04

## 2017-12-04 DIAGNOSIS — J43.9 PULMONARY EMPHYSEMA: ICD-10-CM

## 2017-12-04 DIAGNOSIS — I48.92 ATRIAL FLUTTER WITH RAPID VENTRICULAR RESPONSE: Primary | ICD-10-CM

## 2017-12-04 DIAGNOSIS — I48.92 ATRIAL FLUTTER: ICD-10-CM

## 2017-12-04 DIAGNOSIS — I48.0 PAROXYSMAL ATRIAL FIBRILLATION: ICD-10-CM

## 2017-12-04 LAB
POCT GLUCOSE: 107 MG/DL (ref 70–110)
POCT GLUCOSE: 123 MG/DL (ref 70–110)
POCT GLUCOSE: 143 MG/DL (ref 70–110)
POCT GLUCOSE: 183 MG/DL (ref 70–110)

## 2017-12-04 PROCEDURE — 93312 ECHO TRANSESOPHAGEAL: CPT | Mod: 26,,, | Performed by: INTERNAL MEDICINE

## 2017-12-04 PROCEDURE — 63600175 PHARM REV CODE 636 W HCPCS: Performed by: REGISTERED NURSE

## 2017-12-04 PROCEDURE — C1733 CATH, EP, OTHR THAN COOL-TIP: HCPCS

## 2017-12-04 PROCEDURE — 93005 ELECTROCARDIOGRAM TRACING: CPT

## 2017-12-04 PROCEDURE — 37000008 HC ANESTHESIA 1ST 15 MINUTES: Performed by: INTERNAL MEDICINE

## 2017-12-04 PROCEDURE — 93355 ECHO TRANSESOPHAGEAL (TEE): CPT

## 2017-12-04 PROCEDURE — 25000003 PHARM REV CODE 250: Performed by: REGISTERED NURSE

## 2017-12-04 PROCEDURE — 93613 INTRACARDIAC EPHYS 3D MAPG: CPT | Mod: ,,, | Performed by: INTERNAL MEDICINE

## 2017-12-04 PROCEDURE — C1732 CATH, EP, DIAG/ABL, 3D/VECT: HCPCS

## 2017-12-04 PROCEDURE — D9220A PRA ANESTHESIA: Mod: ANES,,, | Performed by: ANESTHESIOLOGY

## 2017-12-04 PROCEDURE — 25000003 PHARM REV CODE 250

## 2017-12-04 PROCEDURE — 25000003 PHARM REV CODE 250: Performed by: INTERNAL MEDICINE

## 2017-12-04 PROCEDURE — 93621 COMP EP EVL L PAC&REC C SINS: CPT

## 2017-12-04 PROCEDURE — A4216 STERILE WATER/SALINE, 10 ML: HCPCS | Performed by: REGISTERED NURSE

## 2017-12-04 PROCEDURE — 93621 COMP EP EVL L PAC&REC C SINS: CPT | Mod: 26,,, | Performed by: INTERNAL MEDICINE

## 2017-12-04 PROCEDURE — 37000009 HC ANESTHESIA EA ADD 15 MINS: Performed by: INTERNAL MEDICINE

## 2017-12-04 PROCEDURE — 93325 DOPPLER ECHO COLOR FLOW MAPG: CPT | Mod: 26,,, | Performed by: INTERNAL MEDICINE

## 2017-12-04 PROCEDURE — 93653 COMPRE EP EVAL TX SVT: CPT | Mod: ,,, | Performed by: INTERNAL MEDICINE

## 2017-12-04 PROCEDURE — 93010 ELECTROCARDIOGRAM REPORT: CPT | Mod: ,,, | Performed by: INTERNAL MEDICINE

## 2017-12-04 PROCEDURE — 93320 DOPPLER ECHO COMPLETE: CPT | Mod: 26,,, | Performed by: INTERNAL MEDICINE

## 2017-12-04 PROCEDURE — 82962 GLUCOSE BLOOD TEST: CPT | Mod: 91

## 2017-12-04 PROCEDURE — 82962 GLUCOSE BLOOD TEST: CPT | Performed by: INTERNAL MEDICINE

## 2017-12-04 PROCEDURE — D9220A PRA ANESTHESIA: Mod: CRNA,,, | Performed by: REGISTERED NURSE

## 2017-12-04 RX ORDER — PANTOPRAZOLE SODIUM 40 MG/1
40 TABLET, DELAYED RELEASE ORAL DAILY
Status: DISCONTINUED | OUTPATIENT
Start: 2017-12-05 | End: 2017-12-05 | Stop reason: HOSPADM

## 2017-12-04 RX ORDER — PRAVASTATIN SODIUM 40 MG/1
40 TABLET ORAL DAILY
Status: DISCONTINUED | OUTPATIENT
Start: 2017-12-05 | End: 2017-12-04

## 2017-12-04 RX ORDER — DILTIAZEM HYDROCHLORIDE 180 MG/1
360 CAPSULE, COATED, EXTENDED RELEASE ORAL DAILY
Status: DISCONTINUED | OUTPATIENT
Start: 2017-12-05 | End: 2017-12-05 | Stop reason: HOSPADM

## 2017-12-04 RX ORDER — IBUPROFEN 200 MG
24 TABLET ORAL
Status: DISCONTINUED | OUTPATIENT
Start: 2017-12-04 | End: 2017-12-05 | Stop reason: HOSPADM

## 2017-12-04 RX ORDER — INSULIN ASPART 100 [IU]/ML
1-10 INJECTION, SOLUTION INTRAVENOUS; SUBCUTANEOUS
Status: DISCONTINUED | OUTPATIENT
Start: 2017-12-04 | End: 2017-12-05 | Stop reason: HOSPADM

## 2017-12-04 RX ORDER — INSULIN ASPART 100 [IU]/ML
10 INJECTION, SOLUTION INTRAVENOUS; SUBCUTANEOUS
Status: DISCONTINUED | OUTPATIENT
Start: 2017-12-04 | End: 2017-12-05 | Stop reason: HOSPADM

## 2017-12-04 RX ORDER — TRAMADOL HYDROCHLORIDE 50 MG/1
50 TABLET ORAL EVERY 6 HOURS PRN
Status: DISCONTINUED | OUTPATIENT
Start: 2017-12-04 | End: 2017-12-05 | Stop reason: HOSPADM

## 2017-12-04 RX ORDER — PRAVASTATIN SODIUM 40 MG/1
40 TABLET ORAL NIGHTLY
Status: DISCONTINUED | OUTPATIENT
Start: 2017-12-04 | End: 2017-12-05 | Stop reason: HOSPADM

## 2017-12-04 RX ORDER — SODIUM CHLORIDE 0.9 % (FLUSH) 0.9 %
3 SYRINGE (ML) INJECTION
Status: DISCONTINUED | OUTPATIENT
Start: 2017-12-04 | End: 2017-12-05 | Stop reason: HOSPADM

## 2017-12-04 RX ORDER — LIDOCAINE HYDROCHLORIDE 40 MG/ML
INJECTION, SOLUTION RETROBULBAR
Status: DISCONTINUED | OUTPATIENT
Start: 2017-12-04 | End: 2017-12-04

## 2017-12-04 RX ORDER — MIDAZOLAM HYDROCHLORIDE 1 MG/ML
INJECTION, SOLUTION INTRAMUSCULAR; INTRAVENOUS
Status: DISCONTINUED | OUTPATIENT
Start: 2017-12-04 | End: 2017-12-04

## 2017-12-04 RX ORDER — QUINAPRIL 40 MG/1
40 TABLET ORAL NIGHTLY
Status: DISCONTINUED | OUTPATIENT
Start: 2017-12-04 | End: 2017-12-05 | Stop reason: HOSPADM

## 2017-12-04 RX ORDER — FENTANYL CITRATE 50 UG/ML
INJECTION, SOLUTION INTRAMUSCULAR; INTRAVENOUS
Status: DISCONTINUED | OUTPATIENT
Start: 2017-12-04 | End: 2017-12-04

## 2017-12-04 RX ORDER — DULOXETIN HYDROCHLORIDE 30 MG/1
30 CAPSULE, DELAYED RELEASE ORAL DAILY
Status: DISCONTINUED | OUTPATIENT
Start: 2017-12-05 | End: 2017-12-05 | Stop reason: HOSPADM

## 2017-12-04 RX ORDER — GABAPENTIN 300 MG/1
600 CAPSULE ORAL 2 TIMES DAILY
Status: DISCONTINUED | OUTPATIENT
Start: 2017-12-04 | End: 2017-12-05 | Stop reason: HOSPADM

## 2017-12-04 RX ORDER — GLUCAGON 1 MG
1 KIT INJECTION
Status: DISCONTINUED | OUTPATIENT
Start: 2017-12-04 | End: 2017-12-05 | Stop reason: HOSPADM

## 2017-12-04 RX ORDER — SODIUM CHLORIDE 9 MG/ML
INJECTION, SOLUTION INTRAVENOUS CONTINUOUS PRN
Status: DISCONTINUED | OUTPATIENT
Start: 2017-12-04 | End: 2017-12-04

## 2017-12-04 RX ORDER — IBUPROFEN 200 MG
16 TABLET ORAL
Status: DISCONTINUED | OUTPATIENT
Start: 2017-12-04 | End: 2017-12-05 | Stop reason: HOSPADM

## 2017-12-04 RX ORDER — KETAMINE HYDROCHLORIDE 100 MG/ML
INJECTION, SOLUTION INTRAMUSCULAR; INTRAVENOUS
Status: DISCONTINUED | OUTPATIENT
Start: 2017-12-04 | End: 2017-12-04

## 2017-12-04 RX ADMIN — EPHEDRINE SULFATE 5 MG: 50 INJECTION, SOLUTION INTRAMUSCULAR; INTRAVENOUS; SUBCUTANEOUS at 11:12

## 2017-12-04 RX ADMIN — QUINAPRIL 40 MG: 40 TABLET ORAL at 09:12

## 2017-12-04 RX ADMIN — GABAPENTIN 600 MG: 300 CAPSULE ORAL at 09:12

## 2017-12-04 RX ADMIN — TRAMADOL HYDROCHLORIDE 50 MG: 50 TABLET, FILM COATED ORAL at 09:12

## 2017-12-04 RX ADMIN — DEXMEDETOMIDINE HYDROCHLORIDE 0.5 MCG/KG/HR: 100 INJECTION, SOLUTION, CONCENTRATE INTRAVENOUS at 11:12

## 2017-12-04 RX ADMIN — LIDOCAINE HYDROCHLORIDE 5 ML: 40 INJECTION, SOLUTION RETROBULBAR; TOPICAL at 11:12

## 2017-12-04 RX ADMIN — FENTANYL CITRATE 50 MCG: 50 INJECTION, SOLUTION INTRAMUSCULAR; INTRAVENOUS at 11:12

## 2017-12-04 RX ADMIN — APIXABAN 5 MG: 2.5 TABLET, FILM COATED ORAL at 09:12

## 2017-12-04 RX ADMIN — KETAMINE HYDROCHLORIDE 20 MG: 100 INJECTION, SOLUTION, CONCENTRATE INTRAMUSCULAR; INTRAVENOUS at 12:12

## 2017-12-04 RX ADMIN — MIDAZOLAM 2 MG: 1 INJECTION INTRAMUSCULAR; INTRAVENOUS at 12:12

## 2017-12-04 RX ADMIN — FENTANYL CITRATE 25 MCG: 50 INJECTION, SOLUTION INTRAMUSCULAR; INTRAVENOUS at 11:12

## 2017-12-04 RX ADMIN — EPHEDRINE SULFATE 5 MG: 50 INJECTION, SOLUTION INTRAMUSCULAR; INTRAVENOUS; SUBCUTANEOUS at 12:12

## 2017-12-04 RX ADMIN — SODIUM CHLORIDE: 0.9 INJECTION, SOLUTION INTRAVENOUS at 11:12

## 2017-12-04 RX ADMIN — MIDAZOLAM 2 MG: 1 INJECTION INTRAMUSCULAR; INTRAVENOUS at 11:12

## 2017-12-04 RX ADMIN — KETAMINE HYDROCHLORIDE 20 MG: 100 INJECTION, SOLUTION, CONCENTRATE INTRAMUSCULAR; INTRAVENOUS at 11:12

## 2017-12-04 NOTE — PLAN OF CARE
Problem: Patient Care Overview  Goal: Plan of Care Review  Outcome: Ongoing (interventions implemented as appropriate)  Received report from IBIS Felder. Patient s/p RFA, AAOx3. VSS, no c/o pain or discomfort at this time, resp even and unlabored.  Right groin gauze/tegaderm dressing is CDI. Pulses 2+.  No active bleeding. No hematoma noted. Post procedure protocol reviewed with patient and patient's family. Understanding verbalized. Family members at bedside. Nurse call bell within reach. Will continue to monitor per post procedure protocol.

## 2017-12-04 NOTE — OP NOTE
"    Post EP Procedure Note  Referring Physician: Jaret Freire MD  Procedure: ABLATION (N/A), TRANSESOPHAGEAL ECHOCARDIOGRAM (DILLAN) (N/A)       Access: R CFV x 3    See full report for further details    Intervention:   Successful CTI RFA with bi-directional block  Closure device: Manual pressure    Post Cath Exam:   /68 (BP Location: Left arm, Patient Position: Lying)   Pulse 62   Temp 97.4 °F (36.3 °C) (Oral)   Resp 20   Ht 5' 8.5" (1.74 m)   Wt 90.7 kg (200 lb)   SpO2 (!) 94%   BMI 29.97 kg/m²   No unusual pain, hematoma, thrill or bruit at vascular access site.  Distal pulse present without signs of ischemia.    Recommendations:   - Routine post-cath care  - Bed rest x 4 hours, groin checks every 30 minutes for 4 hrs  - Restart home dose of Eliquis this PM      Signed:  Issa Lopes MD, MPH  PGY-IV  Cardiovascular Disease Fellow        "

## 2017-12-04 NOTE — HPI
71 y.o male with AF/AFL, non-obstructive CAD, severe obstructive lung disease that presents today for CTI RFA.    Last took Eliquis in AM of 12/3. Offers no complaints today. Reports compliance with Eliquis. EKG shows typical AFL with variable block.

## 2017-12-04 NOTE — NURSING TRANSFER
Nursing Transfer Note      12/4/2017     Transfer To: Room 319     Transfer via stretcher    Transfer with 2L O2 to nasal cannula    Transported by RN     Medicines sent: Insulin pens delivered     Chart send with patient: Yes    Notified: daughter    Patient reassessed at: 1615    Upon arrival to floor: cardiac monitor applied, call bell in reach and bed in lowest position

## 2017-12-04 NOTE — ANESTHESIA PREPROCEDURE EVALUATION
12/04/2017  Nico Garza Jr. is a 71 y.o., male.    Anesthesia Evaluation    I have reviewed the Patient Summary Reports.     I have reviewed the Medications.     Review of Systems  Anesthesia Hx:  History of prior surgery of interest to airway management or planning:  Denies Personal Hx of Anesthesia complications.   Social:  Former Smoker    Cardiovascular:   Hypertension Dysrhythmias atrial fibrillation hyperlipidemia    Pulmonary:   COPD    Musculoskeletal:   Arthritis     Neurological:   Peripheral Neuropathy    Endocrine:   Diabetes        Physical Exam  General:  Well nourished    Airway/Jaw/Neck:  Airway Findings: Mouth Opening: Normal Tongue: Normal  General Airway Assessment: Adult  Mallampati: III  Improves to II with phonation.  TM Distance: Normal, at least 6 cm  Jaw/Neck Findings:  Neck ROM: Normal ROM      Dental:  Dental Findings:    Chest/Lungs:  Chest/Lungs Findings: Normal Respiratory Rate, Expiratory Wheezes, Mild     Heart/Vascular:  Heart Findings: Rate: Normal        Mental Status:  Mental Status Findings:  Alert and Oriented         Anesthesia Plan  Type of Anesthesia, risks & benefits discussed:  Anesthesia Type:  MAC  Patient's Preference: Mod to Deep propofol sedation (not General)  Intra-op Monitoring Plan: standard ASA monitors  Intra-op Monitoring Plan Comments:   Post Op Pain Control Plan: IV/PO Opioids PRN  Post Op Pain Control Plan Comments:   Induction:   IV  Beta Blocker:  Patient is not currently on a Beta-Blocker (No further documentation required).       Informed Consent: Patient understands risks and agrees with Anesthesia plan.  Questions answered. Anesthesia consent signed with patient.  ASA Score: 3     Day of Surgery Review of History & Physical:    H&P update referred to the surgeon.     Anesthesia Plan Notes: Breathing comfortably this AM.  NPO confirmed.  No  history of anesthesia problems.   Plan propofol based moderate to deep sedation (not general).          Ready For Surgery From Anesthesia Perspective.

## 2017-12-04 NOTE — PROGRESS NOTES
Patient admitted to recovery see [facility] for complete assessment pacu bcg's maintained safety measures verified patient instructed on pain scale. ekg done and in chart also called patient's daughter and updated on patient location with the permission of patient.

## 2017-12-04 NOTE — ASSESSMENT & PLAN NOTE
Plan  - DILLAN to r/o JORDIN thrombus  - Proceed with CTI RFA if DILLAN shows no evidence of thrombus  - Consider re-initiating Flec following restoration of NSR

## 2017-12-04 NOTE — PROGRESS NOTES
Dr. Freire at bedside. Speaking with patient. Made aware of SBP range: 82-89. Verbalized that he's okay with SBP in 80s. Pt denies pain. Reviewed home insulin regimen. Pt verbalized that he takes detemir at night and wants to take it then. Message sent to pharmacy to change dosing to 10pm tonight. Tolerated turkey sandwich. Will continue to monitor.

## 2017-12-04 NOTE — TRANSFER OF CARE
"Anesthesia Transfer of Care Note    Patient: Nico Garza Jr.    Procedure(s) Performed: Procedure(s) (LRB):  ABLATION (N/A)  TRANSESOPHAGEAL ECHOCARDIOGRAM (DILLAN) (N/A)    Patient location: PACU    Anesthesia Type: general    Transport from OR: Transported from OR on 2-3 L/min O2 by NC with adequate spontaneous ventilation    Post pain: adequate analgesia    Post assessment: no apparent anesthetic complications    Post vital signs: stable    Level of consciousness: sedated    Nausea/Vomiting: no nausea/vomiting    Complications: none    Transfer of care protocol was followed      Last vitals:   Visit Vitals  /68 (BP Location: Left arm, Patient Position: Lying)   Pulse 62   Temp 36.3 °C (97.4 °F) (Oral)   Resp 20   Ht 5' 8.5" (1.74 m)   Wt 90.7 kg (200 lb)   SpO2 (!) 94%   BMI 29.97 kg/m²     "

## 2017-12-04 NOTE — ANESTHESIA POSTPROCEDURE EVALUATION
"Anesthesia Post Evaluation    Patient: Nico Garza Jr.    Procedure(s) Performed: Procedure(s) (LRB):  ABLATION (N/A)  TRANSESOPHAGEAL ECHOCARDIOGRAM (DILLAN) (N/A)    Final Anesthesia Type: general  Patient location during evaluation: PACU  Patient participation: Yes- Able to Participate  Level of consciousness: awake and alert  Post-procedure vital signs: reviewed and stable  Pain management: adequate  Airway patency: patent  PONV status at discharge: No PONV  Anesthetic complications: no      Cardiovascular status: blood pressure returned to baseline  Respiratory status: unassisted  Hydration status: euvolemic  Follow-up not needed.        Visit Vitals  BP (!) 92/53   Pulse 66   Temp 36.3 °C (97.4 °F)   Resp 18   Ht 5' 8.5" (1.74 m)   Wt 90.7 kg (200 lb)   SpO2 95%   BMI 29.97 kg/m²       Pain/Arnaldo Score: Pain Assessment Performed: Yes (12/4/2017  4:15 PM)  Presence of Pain: denies (12/4/2017  4:15 PM)  Arnaldo Score: 9 (12/4/2017  4:00 PM)      "

## 2017-12-04 NOTE — SUBJECTIVE & OBJECTIVE
"Past Medical History:   Diagnosis Date    Arthritis     Atrial fibrillation and flutter     Dr. Bello ([facility]    Atrial flutter     COPD (chronic obstructive pulmonary disease)     Diabetes mellitus     Hyperlipidemia     Hypertension     Lung disease     Neuropathy, diabetic     Pancreatitis        Past Surgical History:   Procedure Laterality Date    BICEPS TENDON REPAIR      CARDIOVERSION      CHOLECYSTECTOMY      PATELLA SURGERY      ROTATOR CUFF REPAIR         Review of patient's allergies indicates:  No Known Allergies    No current facility-administered medications on file prior to encounter.      Current Outpatient Prescriptions on File Prior to Encounter   Medication Sig    apixaban (ELIQUIS) 5 mg Tab TAKE 1 TABLET(5 MG) BY MOUTH TWICE DAILY    b complex vitamins tablet Take 1 tablet by mouth once daily.    diltiaZEM (CARDIZEM CD) 360 MG 24 hr capsule Take 1 capsule (360 mg total) by mouth once daily.    duloxetine (CYMBALTA) 30 MG capsule TAKE 1 CAPSULE(30 MG) BY MOUTH EVERY DAY    gabapentin (NEURONTIN) 300 MG capsule Take 2 capsules (600 mg total) by mouth 2 (two) times daily.    insulin degludec (TRESIBA FLEXTOUCH U-200) 200 unit/mL (3 mL) InPn Inject 50 Units into the skin once daily.    multivitamin capsule Take 1 capsule by mouth once daily.    omeprazole (PRILOSEC) 20 MG capsule Take 1 capsule (20 mg total) by mouth once daily.    pravastatin (PRAVACHOL) 40 MG tablet TAKE 1 TABLET DAILY (Patient taking differently: TAKE 1 TABLET HS)    quinapril (ACCUPRIL) 40 MG tablet Take 1 tablet (40 mg total) by mouth once daily. (Patient taking differently: Take 40 mg by mouth nightly. )    HUMALOG KWIKPEN 100 unit/mL InPn pen INJECT 17 UNITS INTO THE SKIN THREE TIMES DAILY BEFORE MEALS    insulin needles, disposable, 32 x 1/4 " Ndle Insulin injections four times per day.    NEEDLES, DISPOSABLE (DISPOSABLE NEEDLES MISC) Inject 1 each into the skin 3 (three) times " daily.    tramadol (ULTRAM) 50 mg tablet Take 2 tablets (100 mg total) by mouth daily as needed for Pain.     Family History     Problem Relation (Age of Onset)    Cancer Maternal Aunt    Diabetes Mother, Sister, Sister, Sister    Heart attack Brother    Heart failure Brother    Hypertension Mother, Father    Stroke Mother, Father, Paternal Grandmother, Paternal Grandfather        Social History Main Topics    Smoking status: Former Smoker     Packs/day: 0.50     Types: Cigarettes     Quit date: 3/8/2015    Smokeless tobacco: Former User     Types: Snuff     Quit date: 10/7/1995    Alcohol use No    Drug use: No    Sexual activity: Not on file     Review of Systems   Constitution: Negative for chills and fever.   HENT: Negative for hoarse voice and sore throat.    Cardiovascular: Negative for chest pain, claudication, cyanosis, dyspnea on exertion, irregular heartbeat, leg swelling, near-syncope, orthopnea, palpitations, paroxysmal nocturnal dyspnea and syncope.   Respiratory: Negative for cough, hemoptysis and shortness of breath.    Musculoskeletal: Negative for back pain, joint pain and joint swelling.   Gastrointestinal: Negative for abdominal pain, constipation, diarrhea, hematochezia, melena, nausea and vomiting.   Genitourinary: Negative for dysuria, hematuria and incomplete emptying.   Neurological: Negative for dizziness, headaches and light-headedness.   Psychiatric/Behavioral: Negative for altered mental status, depression and suicidal ideas. The patient is not nervous/anxious.      Objective:     Vital Signs (Most Recent):  Temp: 97.4 °F (36.3 °C) (12/04/17 0830)  Pulse: 62 (12/04/17 0830)  Resp: 20 (12/04/17 0830)  BP: 138/68 (12/04/17 0830)  SpO2: (!) 94 % (12/04/17 0830) Vital Signs (24h Range):  Temp:  [97.4 °F (36.3 °C)] 97.4 °F (36.3 °C)  Pulse:  [62] 62  Resp:  [20] 20  SpO2:  [94 %] 94 %  BP: (133-138)/(63-68) 138/68     Weight: 90.7 kg (200 lb)  Body mass index is 29.97 kg/m².    SpO2: (!)  94 %  O2 Device (Oxygen Therapy): room air    Physical Exam   Constitutional: He is oriented to person, place, and time. He appears well-developed and well-nourished. No distress.   HENT:   Head: Normocephalic and atraumatic.   Mouth/Throat: Oropharynx is clear and moist. No oropharyngeal exudate.   Eyes: Conjunctivae and EOM are normal. Pupils are equal, round, and reactive to light. Right eye exhibits no discharge. Left eye exhibits no discharge. No scleral icterus.   Neck: Normal range of motion. Neck supple. No JVD present. No tracheal deviation present. No thyromegaly present.   Cardiovascular: Normal rate, regular rhythm, normal heart sounds and intact distal pulses.  Exam reveals no gallop and no friction rub.    No murmur heard.  Pulmonary/Chest: Effort normal and breath sounds normal. No respiratory distress. He has no wheezes (End expiratory). He has no rales.   Abdominal: Soft. Bowel sounds are normal. He exhibits no distension and no mass. There is no tenderness. There is no rebound and no guarding.   Musculoskeletal: Normal range of motion.   Lymphadenopathy:     He has no cervical adenopathy.   Neurological: He is alert and oriented to person, place, and time. No cranial nerve deficit.   Skin: Skin is warm and dry. He is not diaphoretic.   Psychiatric: He has a normal mood and affect. His behavior is normal. Judgment and thought content normal.   Nursing note and vitals reviewed.      Significant Labs: CMP: No results for input(s): NA, K, CL, CO2, GLU, BUN, CREATININE, CALCIUM, PROT, ALBUMIN, BILITOT, ALKPHOS, AST, ALT, ANIONGAP, ESTGFRAFRICA, EGFRNONAA in the last 48 hours., CBC: No results for input(s): WBC, HGB, HCT, PLT in the last 48 hours. and INR: No results for input(s): INR, PROTIME in the last 48 hours.    Significant Imaging: ECG as above

## 2017-12-04 NOTE — PROGRESS NOTES
Notified dr. ordonez of patient b/p and no symptoms noted per patient dr. ordonez stated to continue to monitor.

## 2017-12-04 NOTE — H&P
[facility]  Cardiac Electrophysiology  History and Physical     Admission Date: 12/4/2017  Code Status: Prior   Attending Provider: Jaret Freire MD   Principal Problem:<principal problem not specified>    Subjective:     Chief Complaint:  AFL RFA     HPI:  71 y.o male with AF/AFL, non-obstructive CAD, severe obstructive lung disease that presents today for CTI RFA.    Last took Eliquis in AM of 12/3. Offers no complaints today. Reports compliance with Eliquis. EKG shows typical AFL with variable block.     Past Medical History:   Diagnosis Date    Arthritis     Atrial fibrillation and flutter     Dr. Bello ([facility]    Atrial flutter     COPD (chronic obstructive pulmonary disease)     Diabetes mellitus     Hyperlipidemia     Hypertension     Lung disease     Neuropathy, diabetic     Pancreatitis        Past Surgical History:   Procedure Laterality Date    BICEPS TENDON REPAIR      CARDIOVERSION      CHOLECYSTECTOMY      PATELLA SURGERY      ROTATOR CUFF REPAIR         Review of patient's allergies indicates:  No Known Allergies    No current facility-administered medications on file prior to encounter.      Current Outpatient Prescriptions on File Prior to Encounter   Medication Sig    apixaban (ELIQUIS) 5 mg Tab TAKE 1 TABLET(5 MG) BY MOUTH TWICE DAILY    b complex vitamins tablet Take 1 tablet by mouth once daily.    diltiaZEM (CARDIZEM CD) 360 MG 24 hr capsule Take 1 capsule (360 mg total) by mouth once daily.    duloxetine (CYMBALTA) 30 MG capsule TAKE 1 CAPSULE(30 MG) BY MOUTH EVERY DAY    gabapentin (NEURONTIN) 300 MG capsule Take 2 capsules (600 mg total) by mouth 2 (two) times daily.    insulin degludec (TRESIBA FLEXTOUCH U-200) 200 unit/mL (3 mL) InPn Inject 50 Units into the skin once daily.    multivitamin capsule Take 1 capsule by mouth once daily.    omeprazole (PRILOSEC) 20 MG capsule Take 1 capsule (20 mg total) by mouth once daily.  "   pravastatin (PRAVACHOL) 40 MG tablet TAKE 1 TABLET DAILY (Patient taking differently: TAKE 1 TABLET HS)    quinapril (ACCUPRIL) 40 MG tablet Take 1 tablet (40 mg total) by mouth once daily. (Patient taking differently: Take 40 mg by mouth nightly. )    HUMALOG KWIKPEN 100 unit/mL InPn pen INJECT 17 UNITS INTO THE SKIN THREE TIMES DAILY BEFORE MEALS    insulin needles, disposable, 32 x 1/4 " Ndle Insulin injections four times per day.    NEEDLES, DISPOSABLE (DISPOSABLE NEEDLES MISC) Inject 1 each into the skin 3 (three) times daily.    tramadol (ULTRAM) 50 mg tablet Take 2 tablets (100 mg total) by mouth daily as needed for Pain.     Family History     Problem Relation (Age of Onset)    Cancer Maternal Aunt    Diabetes Mother, Sister, Sister, Sister    Heart attack Brother    Heart failure Brother    Hypertension Mother, Father    Stroke Mother, Father, Paternal Grandmother, Paternal Grandfather        Social History Main Topics    Smoking status: Former Smoker     Packs/day: 0.50     Types: Cigarettes     Quit date: 3/8/2015    Smokeless tobacco: Former User     Types: Snuff     Quit date: 10/7/1995    Alcohol use No    Drug use: No    Sexual activity: Not on file     Review of Systems   Constitution: Negative for chills and fever.   HENT: Negative for hoarse voice and sore throat.    Cardiovascular: Negative for chest pain, claudication, cyanosis, dyspnea on exertion, irregular heartbeat, leg swelling, near-syncope, orthopnea, palpitations, paroxysmal nocturnal dyspnea and syncope.   Respiratory: Negative for cough, hemoptysis and shortness of breath.    Musculoskeletal: Negative for back pain, joint pain and joint swelling.   Gastrointestinal: Negative for abdominal pain, constipation, diarrhea, hematochezia, melena, nausea and vomiting.   Genitourinary: Negative for dysuria, hematuria and incomplete emptying.   Neurological: Negative for dizziness, headaches and light-headedness. "   Psychiatric/Behavioral: Negative for altered mental status, depression and suicidal ideas. The patient is not nervous/anxious.      Objective:     Vital Signs (Most Recent):  Temp: 97.4 °F (36.3 °C) (12/04/17 0830)  Pulse: 62 (12/04/17 0830)  Resp: 20 (12/04/17 0830)  BP: 138/68 (12/04/17 0830)  SpO2: (!) 94 % (12/04/17 0830) Vital Signs (24h Range):  Temp:  [97.4 °F (36.3 °C)] 97.4 °F (36.3 °C)  Pulse:  [62] 62  Resp:  [20] 20  SpO2:  [94 %] 94 %  BP: (133-138)/(63-68) 138/68     Weight: 90.7 kg (200 lb)  Body mass index is 29.97 kg/m².    SpO2: (!) 94 %  O2 Device (Oxygen Therapy): room air    Physical Exam   Constitutional: He is oriented to person, place, and time. He appears well-developed and well-nourished. No distress.   HENT:   Head: Normocephalic and atraumatic.   Mouth/Throat: Oropharynx is clear and moist. No oropharyngeal exudate.   Eyes: Conjunctivae and EOM are normal. Pupils are equal, round, and reactive to light. Right eye exhibits no discharge. Left eye exhibits no discharge. No scleral icterus.   Neck: Normal range of motion. Neck supple. No JVD present. No tracheal deviation present. No thyromegaly present.   Cardiovascular: Normal rate, regular rhythm, normal heart sounds and intact distal pulses.  Exam reveals no gallop and no friction rub.    No murmur heard.  Pulmonary/Chest: Effort normal and breath sounds normal. No respiratory distress. He has no wheezes (End expiratory). He has no rales.   Abdominal: Soft. Bowel sounds are normal. He exhibits no distension and no mass. There is no tenderness. There is no rebound and no guarding.   Musculoskeletal: Normal range of motion.   Lymphadenopathy:     He has no cervical adenopathy.   Neurological: He is alert and oriented to person, place, and time. No cranial nerve deficit.   Skin: Skin is warm and dry. He is not diaphoretic.   Psychiatric: He has a normal mood and affect. His behavior is normal. Judgment and thought content normal.   Nursing  note and vitals reviewed.      Significant Labs: CMP: No results for input(s): NA, K, CL, CO2, GLU, BUN, CREATININE, CALCIUM, PROT, ALBUMIN, BILITOT, ALKPHOS, AST, ALT, ANIONGAP, ESTGFRAFRICA, EGFRNONAA in the last 48 hours., CBC: No results for input(s): WBC, HGB, HCT, PLT in the last 48 hours. and INR: No results for input(s): INR, PROTIME in the last 48 hours.    Significant Imaging: ECG as above    Assessment and Plan:     Atrial flutter    Plan  - DILLAN to r/o JORDIN thrombus  - Proceed with CTI RFA if DILLAN shows no evidence of thrombus  - Consider re-initiating Flec following restoration of NSR            Issa Lopes MD  Cardiac Electrophysiology  [facility]

## 2017-12-04 NOTE — PROGRESS NOTES
TRANSESOPHAGEAL ECHOCARDIOGRAPHY   PRE-PROCEDURE NOTE    12/04/2017    HPI:     Nico Garza Jr. is a 71 y.o.  With AFL, AF, severe COPD had 2 failed DCCVs in 2015. He opted not to pursue ablation at that time. He has been on flecainide in the past which was stopped due to ineffectiveness. And now reconsidered for RFA.    Dysphagia or odynophagia:  no  Liver Disease, esophageal disease, or known varices:  no  Upper GI Bleeding: no  Snoring:  yes  Sleep Apnea:  Not diagnosed (recommended to have a sleep study done by multiple physicians)   Prior neck surgery or radiation:  no  Able to move neck in all directions:  yes  History of anesthetic difficulties:  no  Family history of anesthetic difficulties:  no  Last oral intake:  6 PM last night    Mallampati Class:  3  ASA Score:  2      Meds:     Scheduled Meds:  No current facility-administered medications on file prior to encounter.      Current Outpatient Prescriptions on File Prior to Encounter   Medication Sig Dispense Refill    apixaban (ELIQUIS) 5 mg Tab TAKE 1 TABLET(5 MG) BY MOUTH TWICE DAILY 60 tablet 6    b complex vitamins tablet Take 1 tablet by mouth once daily.      diltiaZEM (CARDIZEM CD) 360 MG 24 hr capsule Take 1 capsule (360 mg total) by mouth once daily. 90 capsule 3    duloxetine (CYMBALTA) 30 MG capsule TAKE 1 CAPSULE(30 MG) BY MOUTH EVERY DAY 30 capsule 3    gabapentin (NEURONTIN) 300 MG capsule Take 2 capsules (600 mg total) by mouth 2 (two) times daily. 360 capsule 1    insulin degludec (TRESIBA FLEXTOUCH U-200) 200 unit/mL (3 mL) InPn Inject 50 Units into the skin once daily. 9 mL 3    multivitamin capsule Take 1 capsule by mouth once daily.      omeprazole (PRILOSEC) 20 MG capsule Take 1 capsule (20 mg total) by mouth once daily. 90 capsule 1    pravastatin (PRAVACHOL) 40 MG tablet TAKE 1 TABLET DAILY (Patient taking differently: TAKE 1 TABLET HS) 90 tablet 3    quinapril (ACCUPRIL) 40 MG tablet Take 1 tablet (40 mg total) by  "mouth once daily. (Patient taking differently: Take 40 mg by mouth nightly. ) 90 tablet 3    HUMALOG KWIKPEN 100 unit/mL InPn pen INJECT 17 UNITS INTO THE SKIN THREE TIMES DAILY BEFORE MEALS 30 mL 0    insulin needles, disposable, 32 x 1/4 " Ndle Insulin injections four times per day. 400 each 3    NEEDLES, DISPOSABLE (DISPOSABLE NEEDLES MISC) Inject 1 each into the skin 3 (three) times daily.      tramadol (ULTRAM) 50 mg tablet Take 2 tablets (100 mg total) by mouth daily as needed for Pain. 60 tablet 3     PRN Meds:  Continuous Infusions:    Physical Exam:     Vitals:  Temp:  [97.4 °F (36.3 °C)]   Pulse:  [62]   Resp:  [20]   BP: (133-138)/(63-68)   SpO2:  [94 %]        Constitutional:  NAD, conversant  HEENT:  Sclera anicteric, Uvula midline, EOMI, OP clear  Neck:   No JVD, moves to all direction without any limitations  CV:   irregular, no murmurs / rubs / gallops, normal S1/S2  Pulm:   CTAB, no wheezes, rales, or ronchi  GI:   Abdomen soft, NTND, +BS  Extremities:  No LE edema, warm with palpable pulses  Neuro:  AAOX3, no focal motor deficits      Labs:     Recent Results (from the past 336 hour(s))   CBC auto differential    Collection Time: 17 11:20 AM   Result Value Ref Range    WBC 8.05 3.90 - 12.70 K/uL    Hemoglobin 14.2 14.0 - 18.0 g/dL    Hematocrit 45.5 40.0 - 54.0 %    Platelets 392 (H) 150 - 350 K/uL       Recent Results (from the past 336 hour(s))   Basic metabolic panel    Collection Time: 17 11:20 AM   Result Value Ref Range    Sodium 142 136 - 145 mmol/L    Potassium 4.3 3.5 - 5.1 mmol/L    Chloride 104 95 - 110 mmol/L    CO2 28 23 - 29 mmol/L    BUN, Bld 24 (H) 8 - 23 mg/dL    Creatinine 1.1 0.5 - 1.4 mg/dL    Calcium 9.1 8.7 - 10.5 mg/dL    Anion Gap 10 8 - 16 mmol/L       Estimated Creatinine Clearance: 68 mL/min (based on SCr of 1.1 mg/dL).    INR: 1.1 on 17    Imagin17 TTE     CONCLUSIONS     1 - Mild left atrial enlargement.     2 - Concentric remodeling.     3 " - No wall motion abnormalities.     4 - Normal left ventricular systolic function (EF 55-60%).     5 - Normal left ventricular diastolic function.     6 - Normal right ventricular systolic function .     EKG: AFL with variable block  Date: 12/4/17      Assessment & Plan:       PLAN:  1. DILLAN for evaluation of LA/JORDIN thrombus    -The risks, benefits & alternatives of the procedure were explained to the patient.    -The risks of transesophageal echo include but are not limited to:  Dental trauma, esophageal trauma/perforation, bleeding, laryngospasm/brochospasm, aspiration, sore throat/hoarseness, & dislodgement of the endotracheal tube/nasogastric tube (where applicable).    -The risks of moderate sedation include hypotension, respiratory depression, arrhythmias, bronchospasm, & death.    -Informed consent was obtained & the patient is agreeable to proceed with the procedure.    I will discuss with the attending physician. Attending addendum is to follow.    Signed:  Brandon Smith MD  Cardiology Fellow,  Pager: [phone]  12/4/2017 8:39 AM    Attending Addendum:

## 2017-12-05 VITALS
TEMPERATURE: 97 F | WEIGHT: 200 LBS | HEART RATE: 79 BPM | SYSTOLIC BLOOD PRESSURE: 134 MMHG | HEIGHT: 69 IN | RESPIRATION RATE: 18 BRPM | DIASTOLIC BLOOD PRESSURE: 64 MMHG | OXYGEN SATURATION: 94 % | BODY MASS INDEX: 29.62 KG/M2

## 2017-12-05 LAB
AORTIC ATHEROMA: YES
MITRAL VALVE MOBILITY: NORMAL
MITRAL VALVE REGURGITATION: ABNORMAL
POCT GLUCOSE: 113 MG/DL (ref 70–110)

## 2017-12-05 PROCEDURE — 93005 ELECTROCARDIOGRAM TRACING: CPT

## 2017-12-05 PROCEDURE — 25000003 PHARM REV CODE 250: Performed by: INTERNAL MEDICINE

## 2017-12-05 PROCEDURE — 82962 GLUCOSE BLOOD TEST: CPT

## 2017-12-05 PROCEDURE — 93010 ELECTROCARDIOGRAM REPORT: CPT | Mod: ,,, | Performed by: INTERNAL MEDICINE

## 2017-12-05 RX ORDER — FLECAINIDE ACETATE 100 MG/1
100 TABLET ORAL EVERY 12 HOURS
Qty: 60 TABLET | Refills: 11 | Status: SHIPPED | OUTPATIENT
Start: 2017-12-05 | End: 2018-12-05

## 2017-12-05 RX ADMIN — TRAMADOL HYDROCHLORIDE 50 MG: 50 TABLET, FILM COATED ORAL at 04:12

## 2017-12-05 NOTE — SUBJECTIVE & OBJECTIVE
Interval History: No acute events overnight. Patient did well following procedure, remained in NSR on tele.     Review of Systems   Constitution: Negative for chills and fever.   HENT: Negative for hoarse voice and sore throat.    Cardiovascular: Negative for chest pain, claudication, cyanosis, dyspnea on exertion, irregular heartbeat, leg swelling, near-syncope, orthopnea, palpitations, paroxysmal nocturnal dyspnea and syncope.   Respiratory: Negative for cough, hemoptysis and shortness of breath.    Musculoskeletal: Negative for back pain, joint pain and joint swelling.   Gastrointestinal: Negative for abdominal pain, constipation, diarrhea, hematochezia, melena, nausea and vomiting.   Genitourinary: Negative for dysuria, hematuria and incomplete emptying.   Neurological: Negative for dizziness, headaches and light-headedness.   Psychiatric/Behavioral: Negative for altered mental status, depression and suicidal ideas. The patient is not nervous/anxious.      Objective:     Vital Signs (Most Recent):  Temp: 98.2 °F (36.8 °C) (12/05/17 0440)  Pulse: 79 (12/05/17 0700)  Resp: 18 (12/05/17 0440)  BP: (!) 142/69 (12/05/17 0440)  SpO2: 98 % (12/05/17 0440) Vital Signs (24h Range):  Temp:  [97.4 °F (36.3 °C)-98.2 °F (36.8 °C)] 98.2 °F (36.8 °C)  Pulse:  [60-83] 79  Resp:  [14-21] 18  SpO2:  [90 %-98 %] 98 %  BP: ()/(50-69) 142/69     Weight: 90.7 kg (200 lb)  Body mass index is 29.97 kg/m².     SpO2: 98 %  O2 Device (Oxygen Therapy): room air    Physical Exam   Constitutional: He is oriented to person, place, and time. He appears well-developed and well-nourished. No distress.   HENT:   Head: Normocephalic and atraumatic.   Mouth/Throat: Oropharynx is clear and moist. No oropharyngeal exudate.   Eyes: Conjunctivae and EOM are normal. Pupils are equal, round, and reactive to light. Right eye exhibits no discharge. Left eye exhibits no discharge. No scleral icterus.   Neck: Normal range of motion. Neck supple. No JVD  present. No tracheal deviation present. No thyromegaly present.   Cardiovascular: Normal rate, regular rhythm, normal heart sounds and intact distal pulses.  Exam reveals no gallop and no friction rub.    No murmur heard.  Pulmonary/Chest: Effort normal and breath sounds normal. No respiratory distress. He has no wheezes. He has no rales.   Abdominal: Soft. Bowel sounds are normal. He exhibits no distension and no mass. There is no tenderness. There is no rebound and no guarding.   Musculoskeletal: Normal range of motion.   Lymphadenopathy:     He has no cervical adenopathy.   Neurological: He is alert and oriented to person, place, and time. No cranial nerve deficit.   Skin: Skin is warm and dry. He is not diaphoretic.   R CFV site c/d/i. No hematoma, no erythema     Psychiatric: He has a normal mood and affect. His behavior is normal. Judgment and thought content normal.   Nursing note and vitals reviewed.      Significant Labs: CMP: No results for input(s): NA, K, CL, CO2, GLU, BUN, CREATININE, CALCIUM, PROT, ALBUMIN, BILITOT, ALKPHOS, AST, ALT, ANIONGAP, ESTGFRAFRICA, EGFRNONAA in the last 48 hours., CBC: No results for input(s): WBC, HGB, HCT, PLT in the last 48 hours. and INR: No results for input(s): INR, PROTIME in the last 48 hours.    Significant Imaging: EKG: NSR with 1st degree AVB and NSR overnight

## 2017-12-05 NOTE — DISCHARGE INSTRUCTIONS
1. Do not strain or lift anything greater than 5 lb for 1 week.   2. Do not drive or operate any dangerous machinery for 24 hours.   4. After 24 hours, a shower is allowed.   5. Clean the area with mild soap and water.   6. Once the skin has healed (1 week), bathing in a tub or swimming is allowed.   7. Inspect the groin site daily and report to the physician any signs of infection at the site: redness, pain, fever >100.4, unusual pain at the   access site or affected extremity, unusual swelling at the access site, or any yellow, white or green drainage.  Call 911 if you have:   Bleeding from the puncture site that you cannot stop by doing the following:   Relax and lie down right away. Keep your leg flat and apply firm pressure to the site using your fingers and a gauze pad. Keep the pressure on for 10 minutes. Continue this until the bleeding stops. This may take awhile. When bleeding stops, cover the site with a sterile bandage and keep your leg still as much as possible.    Can discontinue Eliquis in 2 weeks (12/19)

## 2017-12-05 NOTE — DISCHARGE SUMMARY
[facility]  Cardiac Electrophysiology  Discharge Summary      Patient Name: Nico Garza Jr.  MRN: [id number]  Admission Date: 12/4/2017  Hospital Length of Stay: 0 days  Discharge Date and Time:  12/05/2017 8:14 AM  Attending Physician: Jaret Freire MD    Discharging Provider: Issa Lopes MD  Primary Care Physician: Tiffany Davis DO    HPI:   71 y.o male with AF/AFL, non-obstructive CAD, severe obstructive lung disease that presents today for CTI RFA.    Last took Eliquis in AM of 12/3. Offers no complaints today. Reports compliance with Eliquis. EKG shows typical AFL with variable block.     Procedure(s) (LRB):  ABLATION (N/A)  TRANSESOPHAGEAL ECHOCARDIOGRAM (DILLAN) (N/A)     Indwelling Lines/Drains at time of discharge:  Lines/Drains/Airways          No matching active lines, drains, or airways          Hospital Course:  Patient underwent RFA for CW typical AFL which was successful. Patient did well and was dischaged on 12/5. Will initiate Flecainide 100 mg BID to maintain NSR for AF. Can D/C Eliquis in 2 weeks due to hematuria and F/U with his urologist. Once source of hematuria is determined, can decide on resuming AC at that time.     Consults:     Significant Diagnostic Studies: ECG: NSR with 1st degree AVB    Pending Diagnostic Studies:     None          Final Active Diagnoses:    Diagnosis Date Noted POA    PRINCIPAL PROBLEM:  Atrial flutter [I48.92] 12/04/2017 Yes      Problems Resolved During this Admission:    Diagnosis Date Noted Date Resolved POA     No new Assessment & Plan notes have been filed under this hospital service since the last note was generated.  Service: Arrhythmia      Discharged Condition: good    Disposition: Home or Self Care    Follow Up:  Follow-up Information     Jaret Freire MD In 4 weeks.    Specialties:  Electrophysiology, Cardiovascular Disease  Contact information:  [address] [place] [address] [id number] [phone]                  Patient Instructions:     Diet general     Activity as tolerated     Call MD for:  temperature >100.4     Call MD for:  persistent nausea and vomiting or diarrhea     Call MD for:  severe uncontrolled pain     Call MD for:  redness, tenderness, or signs of infection (pain, swelling, redness, odor or green/yellow discharge around incision site)     Call MD for:  difficulty breathing or increased cough     Call MD for:  severe persistent headache     Call MD for:  worsening rash     Call MD for:  persistent dizziness, light-headedness, or visual disturbances     Call MD for:  increased confusion or weakness       Medications:  Reconciled Home Medications:   Current Discharge Medication List      START taking these medications    Details   flecainide (TAMBOCOR) 100 MG Tab Take 1 tablet (100 mg total) by mouth every 12 (twelve) hours.  Qty: 60 tablet, Refills: 11         CONTINUE these medications which have CHANGED    Details   apixaban (ELIQUIS) 5 mg Tab TAKE 1 TABLET(5 MG) BY MOUTH TWICE DAILY  Qty: 60 tablet, Refills: 6    Associated Diagnoses: Atrial flutter with rapid ventricular response         CONTINUE these medications which have NOT CHANGED    Details   b complex vitamins tablet Take 1 tablet by mouth once daily.      diltiaZEM (CARDIZEM CD) 360 MG 24 hr capsule Take 1 capsule (360 mg total) by mouth once daily.  Qty: 90 capsule, Refills: 3    Associated Diagnoses: Essential hypertension      duloxetine (CYMBALTA) 30 MG capsule TAKE 1 CAPSULE(30 MG) BY MOUTH EVERY DAY  Qty: 30 capsule, Refills: 3    Associated Diagnoses: Type 2 diabetes, uncontrolled, with neuropathy; Myalgia; Musculoskeletal pain      gabapentin (NEURONTIN) 300 MG capsule Take 2 capsules (600 mg total) by mouth 2 (two) times daily.  Qty: 360 capsule, Refills: 1    Associated Diagnoses: Type 2 diabetes, uncontrolled, with neuropathy      insulin degludec (TRESIBA FLEXTOUCH U-200) 200 unit/mL (3 mL) InPn Inject 50 Units into the skin once  "daily.  Qty: 9 mL, Refills: 3      multivitamin capsule Take 1 capsule by mouth once daily.      omeprazole (PRILOSEC) 20 MG capsule Take 1 capsule (20 mg total) by mouth once daily.  Qty: 90 capsule, Refills: 1    Associated Diagnoses: Gastroesophageal reflux disease, esophagitis presence not specified      pravastatin (PRAVACHOL) 40 MG tablet TAKE 1 TABLET DAILY  Qty: 90 tablet, Refills: 3      quinapril (ACCUPRIL) 40 MG tablet Take 1 tablet (40 mg total) by mouth once daily.  Qty: 90 tablet, Refills: 3    Associated Diagnoses: Hypertension goal BP (blood pressure) < 130/80      HUMALOG KWIKPEN 100 unit/mL InPn pen INJECT 17 UNITS INTO THE SKIN THREE TIMES DAILY BEFORE MEALS  Qty: 30 mL, Refills: 0    Associated Diagnoses: Type 2 diabetes, uncontrolled, with neuropathy      insulin needles, disposable, 32 x 1/4 " Ndle Insulin injections four times per day.  Qty: 400 each, Refills: 3    Comments: IDDM 250.82  Insurance or pt choice  Associated Diagnoses: Type II diabetes mellitus, uncontrolled      LEVEMIR FLEXTOUCH 100 unit/mL (3 mL) InPn pen INJECT 60 UNITS INTO THE SKIN QPM  Refills: 0      NEEDLES, DISPOSABLE (DISPOSABLE NEEDLES MISC) Inject 1 each into the skin 3 (three) times daily.      tramadol (ULTRAM) 50 mg tablet Take 2 tablets (100 mg total) by mouth daily as needed for Pain.  Qty: 60 tablet, Refills: 3    Associated Diagnoses: Arthritis of knee; Arthritis of back             Time spent on the discharge of patient: 15 minutes    Issa Lopes MD  Cardiac Electrophysiology  [facility]  "

## 2017-12-05 NOTE — ASSESSMENT & PLAN NOTE
-Successful CTI RFA for CW CTI dependant AFL  - Continue Eliquis for minimum of 2 weeks, continue Cardizem  - Will consider initiating Flecainide to maintain NSR for AF  - D/C home today

## 2017-12-05 NOTE — NURSING
Notified Dr Man of patient's complaints of chronic back pain and mid chest pain which gets worse when he takes a deep breath.  New orders rec'd.

## 2017-12-05 NOTE — HOSPITAL COURSE
Patient underwent RFA for CW typical AFL which was successful. Patient did well and was dischaged on 12/5. Will initiate Flecainide 100 mg BID to maintain NSR for AF. Can D/C Eliquis in 2 weeks due to hematuria and F/U with his urologist. Once source of hematuria is determined, can decide on resuming AC at that time.

## 2017-12-05 NOTE — PLAN OF CARE
Patient discharged per MD orders. Instructions given on medications, wound care, activity, signs of infection, when to call MD, and follow up appointments. Pt verbalized understanding. Telemetry and PIV removed. Patient and family used transport off unit.

## 2017-12-05 NOTE — PROGRESS NOTES
[facility]  Cardiac Electrophysiology  Progress Note    Admission Date: 12/4/2017  Code Status: Prior   Attending Physician: Jaret Freire MD   Expected Discharge Date:   Principal Problem:<principal problem not specified>    Subjective:     Interval History: No acute events overnight. Patient did well following procedure, remained in NSR on tele.     Review of Systems   Constitution: Negative for chills and fever.   HENT: Negative for hoarse voice and sore throat.    Cardiovascular: Negative for chest pain, claudication, cyanosis, dyspnea on exertion, irregular heartbeat, leg swelling, near-syncope, orthopnea, palpitations, paroxysmal nocturnal dyspnea and syncope.   Respiratory: Negative for cough, hemoptysis and shortness of breath.    Musculoskeletal: Negative for back pain, joint pain and joint swelling.   Gastrointestinal: Negative for abdominal pain, constipation, diarrhea, hematochezia, melena, nausea and vomiting.   Genitourinary: Negative for dysuria, hematuria and incomplete emptying.   Neurological: Negative for dizziness, headaches and light-headedness.   Psychiatric/Behavioral: Negative for altered mental status, depression and suicidal ideas. The patient is not nervous/anxious.      Objective:     Vital Signs (Most Recent):  Temp: 98.2 °F (36.8 °C) (12/05/17 0440)  Pulse: 79 (12/05/17 0700)  Resp: 18 (12/05/17 0440)  BP: (!) 142/69 (12/05/17 0440)  SpO2: 98 % (12/05/17 0440) Vital Signs (24h Range):  Temp:  [97.4 °F (36.3 °C)-98.2 °F (36.8 °C)] 98.2 °F (36.8 °C)  Pulse:  [60-83] 79  Resp:  [14-21] 18  SpO2:  [90 %-98 %] 98 %  BP: ()/(50-69) 142/69     Weight: 90.7 kg (200 lb)  Body mass index is 29.97 kg/m².     SpO2: 98 %  O2 Device (Oxygen Therapy): room air    Physical Exam   Constitutional: He is oriented to person, place, and time. He appears well-developed and well-nourished. No distress.   HENT:   Head: Normocephalic and atraumatic.   Mouth/Throat: Oropharynx is  clear and moist. No oropharyngeal exudate.   Eyes: Conjunctivae and EOM are normal. Pupils are equal, round, and reactive to light. Right eye exhibits no discharge. Left eye exhibits no discharge. No scleral icterus.   Neck: Normal range of motion. Neck supple. No JVD present. No tracheal deviation present. No thyromegaly present.   Cardiovascular: Normal rate, regular rhythm, normal heart sounds and intact distal pulses.  Exam reveals no gallop and no friction rub.    No murmur heard.  Pulmonary/Chest: Effort normal and breath sounds normal. No respiratory distress. He has no wheezes. He has no rales.   Abdominal: Soft. Bowel sounds are normal. He exhibits no distension and no mass. There is no tenderness. There is no rebound and no guarding.   Musculoskeletal: Normal range of motion.   Lymphadenopathy:     He has no cervical adenopathy.   Neurological: He is alert and oriented to person, place, and time. No cranial nerve deficit.   Skin: Skin is warm and dry. He is not diaphoretic.   R CFV site c/d/i. No hematoma, no erythema     Psychiatric: He has a normal mood and affect. His behavior is normal. Judgment and thought content normal.   Nursing note and vitals reviewed.      Significant Labs: CMP: No results for input(s): NA, K, CL, CO2, GLU, BUN, CREATININE, CALCIUM, PROT, ALBUMIN, BILITOT, ALKPHOS, AST, ALT, ANIONGAP, ESTGFRAFRICA, EGFRNONAA in the last 48 hours., CBC: No results for input(s): WBC, HGB, HCT, PLT in the last 48 hours. and INR: No results for input(s): INR, PROTIME in the last 48 hours.    Significant Imaging: EKG: NSR with 1st degree AVB and NSR overnight    Assessment and Plan:     Atrial flutter    -Successful CTI RFA for CW CTI dependant AFL  - Continue Eliquis for minimum of 2 weeks, continue Cardizem  - Will consider initiating Flecainide to maintain NSR for AF  - D/C home today            Issa Lopes MD  Cardiac Electrophysiology  [facility]

## 2017-12-09 RX ORDER — INSULIN LISPRO 100 [IU]/ML
INJECTION, SOLUTION INTRAVENOUS; SUBCUTANEOUS
Qty: 45 ML | Refills: 0 | Status: SHIPPED | OUTPATIENT
Start: 2017-12-09 | End: 2017-12-11 | Stop reason: SDUPTHER

## 2017-12-12 RX ORDER — INSULIN LISPRO 100 [IU]/ML
INJECTION, SOLUTION INTRAVENOUS; SUBCUTANEOUS
Qty: 45 ML | Refills: 0 | Status: SHIPPED | OUTPATIENT
Start: 2017-12-12

## 2017-12-15 ENCOUNTER — HOSPITAL ENCOUNTER (OUTPATIENT)
Dept: RADIOLOGY | Facility: HOSPITAL | Age: 72
Discharge: HOME | End: 2017-12-15
Attending: UROLOGY
Payer: MEDICARE

## 2017-12-15 DIAGNOSIS — N32.81 OAB (OVERACTIVE BLADDER): ICD-10-CM

## 2017-12-15 DIAGNOSIS — R31.9 HEMATURIA, UNSPECIFIED TYPE: ICD-10-CM

## 2017-12-15 PROCEDURE — 25500020 PHARM REV CODE 255: Performed by: UROLOGY

## 2017-12-15 PROCEDURE — 74178 CT ABD&PLV WO CNTR FLWD CNTR: CPT | Mod: TC

## 2017-12-15 RX ADMIN — IOHEXOL 75 ML: 350 INJECTION, SOLUTION INTRAVENOUS at 09:12

## 2017-12-22 ENCOUNTER — TELEPHONE (OUTPATIENT)
Dept: UROLOGY | Facility: CLINIC | Age: 72
End: 2017-12-22

## 2017-12-22 NOTE — TELEPHONE ENCOUNTER
Patient states he missed his cystoscopy with Dr. Chavez. Procedure rescheduled. Patient also complains of burning with urination, frequency, and gross hematuria. Patient had urine culture performed at last office visit on 11/28/17 which was positive for Enterococcus Faecalis. UTI was not treated. Do you want patient to have repeat urine culture. Please advise.

## 2017-12-22 NOTE — TELEPHONE ENCOUNTER
----- Message from Evelin Bobby sent at 12/22/2017 12:01 PM CST -----  Contact: PT  States he needs to reschedule his procedure. Please call pt at [phone]. Thank you

## 2017-12-27 ENCOUNTER — LAB VISIT (OUTPATIENT)
Dept: LAB | Facility: HOSPITAL | Age: 72
End: 2017-12-27
Attending: UROLOGY
Payer: MEDICARE

## 2017-12-27 ENCOUNTER — TELEPHONE (OUTPATIENT)
Dept: UROLOGY | Facility: CLINIC | Age: 72
End: 2017-12-27

## 2017-12-27 DIAGNOSIS — N39.0 RECURRENT UTI: Primary | ICD-10-CM

## 2017-12-27 DIAGNOSIS — N32.81 OAB (OVERACTIVE BLADDER): ICD-10-CM

## 2017-12-27 DIAGNOSIS — N39.0 RECURRENT UTI: ICD-10-CM

## 2017-12-27 PROCEDURE — 87086 URINE CULTURE/COLONY COUNT: CPT

## 2017-12-27 NOTE — TELEPHONE ENCOUNTER
----- Message from Violeta Aguilar sent at 12/27/2017 12:16 PM CST -----  Contact: Patient  Patient is returning a call, please call them back at [phone]. Thank you

## 2017-12-27 NOTE — TELEPHONE ENCOUNTER
Informed patient that Dr. Chavez would like him to repeat urine culture. Patient states understanding. Appointment scheduled.

## 2017-12-29 LAB — BACTERIA UR CULT: NO GROWTH

## 2017-12-31 DIAGNOSIS — K21.9 GASTROESOPHAGEAL REFLUX DISEASE, ESOPHAGITIS PRESENCE NOT SPECIFIED: ICD-10-CM

## 2017-12-31 RX ORDER — OMEPRAZOLE 20 MG/1
CAPSULE, DELAYED RELEASE ORAL
Qty: 90 CAPSULE | Refills: 0 | Status: SHIPPED | OUTPATIENT
Start: 2017-12-31 | End: 2018-04-01 | Stop reason: SDUPTHER

## 2018-01-02 ENCOUNTER — LAB VISIT (OUTPATIENT)
Dept: LAB | Facility: HOSPITAL | Age: 73
End: 2018-01-02
Attending: INTERNAL MEDICINE
Payer: MEDICARE

## 2018-01-02 DIAGNOSIS — I10 ESSENTIAL HYPERTENSION: ICD-10-CM

## 2018-01-02 DIAGNOSIS — K21.9 GASTROESOPHAGEAL REFLUX DISEASE, ESOPHAGITIS PRESENCE NOT SPECIFIED: ICD-10-CM

## 2018-01-02 DIAGNOSIS — E78.5 HYPERLIPIDEMIA LDL GOAL <100: ICD-10-CM

## 2018-01-02 LAB
ALBUMIN SERPL BCP-MCNC: 3.3 G/DL
ALP SERPL-CCNC: 97 U/L
ALT SERPL W/O P-5'-P-CCNC: 59 U/L
ANION GAP SERPL CALC-SCNC: 11 MMOL/L
AST SERPL-CCNC: 24 U/L
BILIRUB SERPL-MCNC: 0.4 MG/DL
BUN SERPL-MCNC: 16 MG/DL
CALCIUM SERPL-MCNC: 9 MG/DL
CHLORIDE SERPL-SCNC: 102 MMOL/L
CHOLEST SERPL-MCNC: 115 MG/DL
CHOLEST/HDLC SERPL: 2.1 {RATIO}
CO2 SERPL-SCNC: 28 MMOL/L
CREAT SERPL-MCNC: 0.9 MG/DL
EST. GFR  (AFRICAN AMERICAN): >60 ML/MIN/1.73 M^2
EST. GFR  (NON AFRICAN AMERICAN): >60 ML/MIN/1.73 M^2
ESTIMATED AVG GLUCOSE: 169 MG/DL
GLUCOSE SERPL-MCNC: 145 MG/DL
HBA1C MFR BLD HPLC: 7.5 %
HDLC SERPL-MCNC: 54 MG/DL
HDLC SERPL: 47 %
LDLC SERPL CALC-MCNC: 41 MG/DL
NONHDLC SERPL-MCNC: 61 MG/DL
POTASSIUM SERPL-SCNC: 3.9 MMOL/L
PROT SERPL-MCNC: 7.7 G/DL
SODIUM SERPL-SCNC: 141 MMOL/L
TRIGL SERPL-MCNC: 100 MG/DL

## 2018-01-02 PROCEDURE — 80053 COMPREHEN METABOLIC PANEL: CPT

## 2018-01-02 PROCEDURE — 80061 LIPID PANEL: CPT

## 2018-01-02 PROCEDURE — 83036 HEMOGLOBIN GLYCOSYLATED A1C: CPT

## 2018-01-02 PROCEDURE — 36415 COLL VENOUS BLD VENIPUNCTURE: CPT

## 2018-01-02 RX ORDER — OMEPRAZOLE 20 MG/1
CAPSULE, DELAYED RELEASE ORAL
Qty: 90 CAPSULE | Refills: 0 | Status: SHIPPED | OUTPATIENT
Start: 2018-01-02 | End: 2018-01-08 | Stop reason: SDUPTHER

## 2018-01-08 ENCOUNTER — OFFICE VISIT (OUTPATIENT)
Dept: INTERNAL MEDICINE | Facility: CLINIC | Age: 73
End: 2018-01-08
Payer: MEDICARE

## 2018-01-08 VITALS
WEIGHT: 201.94 LBS | SYSTOLIC BLOOD PRESSURE: 110 MMHG | HEIGHT: 69 IN | DIASTOLIC BLOOD PRESSURE: 60 MMHG | TEMPERATURE: 96 F | HEART RATE: 75 BPM | OXYGEN SATURATION: 96 % | BODY MASS INDEX: 29.91 KG/M2

## 2018-01-08 DIAGNOSIS — E78.5 HYPERLIPIDEMIA LDL GOAL <70: Chronic | ICD-10-CM

## 2018-01-08 DIAGNOSIS — E66.9 OBESITY (BMI 30.0-34.9): ICD-10-CM

## 2018-01-08 DIAGNOSIS — I10 HYPERTENSION GOAL BP (BLOOD PRESSURE) < 130/80: Chronic | ICD-10-CM

## 2018-01-08 LAB — GLUCOSE SERPL-MCNC: 87 MG/DL (ref 70–110)

## 2018-01-08 PROCEDURE — 99213 OFFICE O/P EST LOW 20 MIN: CPT | Mod: PBBFAC | Performed by: INTERNAL MEDICINE

## 2018-01-08 PROCEDURE — 82948 REAGENT STRIP/BLOOD GLUCOSE: CPT | Mod: PBBFAC | Performed by: INTERNAL MEDICINE

## 2018-01-08 PROCEDURE — 99999 PR PBB SHADOW E&M-EST. PATIENT-LVL III: CPT | Mod: PBBFAC,,, | Performed by: INTERNAL MEDICINE

## 2018-01-08 PROCEDURE — 99214 OFFICE O/P EST MOD 30 MIN: CPT | Mod: S$PBB,,, | Performed by: INTERNAL MEDICINE

## 2018-01-08 RX ORDER — CHOLECALCIFEROL (VITAMIN D3) 25 MCG
1000 TABLET ORAL DAILY
COMMUNITY

## 2018-01-11 NOTE — PROGRESS NOTES
Subjective:       Patient ID: Nico Garza Jr. is a 72 y.o. male.    Chief Complaint: Follow-up    Nico Garza Jr.  72 y.o. White male    Patient presents with:  Follow-up    HPI: Here today to follow up on chronic conditions.  Diabetes--uncontrolled. He reports compliance with medication. He is being managed by diabetes management. He has an upcoming appointment.                      HGBA1C                   7.5 (H)             01/02/2018            Neuropathy--stable on gabapentin.   HTN--stable on diltiazem and quinapril. He denies symptoms.   HLD--compliant with pravastatin.                      CHOL                     115 (L)             01/02/2018                 HDL                      54                  01/02/2018                 LDLCALC                  41.0 (L)            01/02/2018                 TRIG                     100                 01/02/2018                  Past Medical History:  Arthritis  Atrial fibrillation and flutter  Atrial flutter  COPD (chronic obstructive pulmonary disease)  Diabetes mellitus  Hyperlipidemia  Hypertension  Lung disease  Neuropathy, diabetic  Pancreatitis    Current Outpatient Prescriptions on File Prior to Visit:  b complex vitamins tablet, Take 1 tablet by mouth once daily., Disp: , Rfl:    diltiaZEM (CARDIZEM CD) 360 MG 24 hr capsule, Take 1 capsule (360 mg total) by mouth once daily., Disp: 90 capsule, Rfl: 3  duloxetine (CYMBALTA) 30 MG capsule, TAKE 1 CAPSULE(30 MG) BY MOUTH EVERY DAY, Disp: 30 capsule, Rfl: 3  flecainide (TAMBOCOR) 100 MG Tab, Take 1 tablet (100 mg total) by mouth every 12 (twelve) hours., Disp: 60 tablet, Rfl: 11  gabapentin (NEURONTIN) 300 MG capsule, Take 2 capsules (600 mg total) by mouth 2 (two) times daily., Disp: 360 capsule, Rfl: 1  insulin degludec (TRESIBA FLEXTOUCH U-200) 200 unit/mL (3 mL) InPn, Inject 50 Units into the skin once daily., Disp: 9 mL, Rfl: 3  insulin lispro (HUMALOG KWIKPEN) 100 unit/mL InPn pen, ADMINISTER  "17 UNITS UNDER THE SKIN THREE TIMES DAILY BEFORE MEALS, Disp: 45 mL, Rfl: 0  insulin needles, disposable, 32 x 1/4 " Ndle, Insulin injections four times per day., Disp: 400 each, Rfl: 3  multivitamin capsule, Take 1 capsule by mouth once daily., Disp: , Rfl:   NEEDLES, DISPOSABLE (DISPOSABLE NEEDLES MISC), Inject 1 each into the skin 3 (three) times daily., Disp: , Rfl:   omeprazole (PRILOSEC) 20 MG capsule, TAKE 1 CAPSULE( 20 MG TOTAL) BY MOUTH ONCE DAILY, Disp: 90 capsule, Rfl: 0  pravastatin (PRAVACHOL) 40 MG tablet, TAKE 1 TABLET DAILY (Patient taking differently: TAKE 1 TABLET HS), Disp: 90 tablet, Rfl: 3  quinapril (ACCUPRIL) 40 MG tablet, Take 1 tablet (40 mg total) by mouth once daily. (Patient taking differently: Take 40 mg by mouth nightly. ), Disp: 90 tablet, Rfl: 3  tramadol (ULTRAM) 50 mg tablet, Take 2 tablets (100 mg total) by mouth daily as needed for Pain., Disp: 60 tablet, Rfl: 3    Allergies:  Review of patient's allergies indicates:  No Known Allergies        Review of Systems   Constitutional: Negative for fever and unexpected weight change.   Respiratory: Negative for shortness of breath.    Cardiovascular: Negative for chest pain.   Gastrointestinal: Negative for abdominal pain.   Genitourinary: Negative for difficulty urinating.   Musculoskeletal: Negative for gait problem.   Neurological: Positive for numbness. Negative for dizziness and headaches.       Objective:      Physical Exam   Constitutional: He is oriented to person, place, and time. He appears well-developed and well-nourished. No distress.   Eyes: No scleral icterus.   Cardiovascular: Normal rate, regular rhythm and normal heart sounds.    Pulmonary/Chest: Effort normal and breath sounds normal. No respiratory distress.   Abdominal: Soft. Bowel sounds are normal.   Neurological: He is alert and oriented to person, place, and time.   Skin: Skin is warm and dry.   Psychiatric: He has a normal mood and affect.   Vitals reviewed.    "   Assessment:       1. Type 2 diabetes, uncontrolled, with neuropathy    2. Hypertension goal BP (blood pressure) < 130/80    3. Hyperlipidemia LDL goal <70    4. Obesity (BMI 30.0-34.9)        Plan:       Nico was seen today for follow-up.    Diagnoses and all orders for this visit:    Type 2 diabetes, uncontrolled, with neuropathy  -     POCT glucose--normal  -     Recommend f/u with diabetes management  -     Continue gabapentin for neuropathy     Hypertension goal BP (blood pressure) < 130/80  -     Continue current management    Hyperlipidemia LDL goal <70  -     Continue current management    Obesity (BMI 30.0-34.9)  -     Lifestyle modifications discussed     Labs and F/U in 3 months and as needed.

## 2018-01-12 ENCOUNTER — PATIENT OUTREACH (OUTPATIENT)
Dept: ADMINISTRATIVE | Facility: HOSPITAL | Age: 73
End: 2018-01-12

## 2018-01-24 ENCOUNTER — OFFICE VISIT (OUTPATIENT)
Dept: UROLOGY | Facility: CLINIC | Age: 73
End: 2018-01-24
Payer: MEDICARE

## 2018-01-24 VITALS
HEIGHT: 68 IN | HEART RATE: 75 BPM | WEIGHT: 201.25 LBS | DIASTOLIC BLOOD PRESSURE: 58 MMHG | SYSTOLIC BLOOD PRESSURE: 112 MMHG | BODY MASS INDEX: 30.5 KG/M2

## 2018-01-24 DIAGNOSIS — N40.0 BENIGN PROSTATIC HYPERPLASIA, UNSPECIFIED WHETHER LOWER URINARY TRACT SYMPTOMS PRESENT: Primary | ICD-10-CM

## 2018-01-24 DIAGNOSIS — I48.92 ATRIAL FLUTTER, UNSPECIFIED TYPE: Primary | ICD-10-CM

## 2018-01-24 DIAGNOSIS — N35.9 URETHRAL STRICTURE, UNSPECIFIED STRICTURE TYPE: ICD-10-CM

## 2018-01-24 LAB
BILIRUB SERPL-MCNC: NORMAL MG/DL
BLOOD URINE, POC: NORMAL
COLOR, POC UA: YELLOW
GLUCOSE UR QL STRIP: NORMAL
KETONES UR QL STRIP: NORMAL
LEUKOCYTE ESTERASE URINE, POC: NORMAL
NITRITE, POC UA: NORMAL
PH, POC UA: 5
POC RESIDUAL URINE VOLUME: 32 ML (ref 0–100)
PROTEIN, POC: 100
SPECIFIC GRAVITY, POC UA: 1.02
UROBILINOGEN, POC UA: NORMAL

## 2018-01-24 PROCEDURE — 99999 PR PBB SHADOW E&M-EST. PATIENT-LVL II: CPT | Mod: PBBFAC,,, | Performed by: UROLOGY

## 2018-01-24 PROCEDURE — 99499 UNLISTED E&M SERVICE: CPT | Mod: S$PBB,,, | Performed by: UROLOGY

## 2018-01-24 PROCEDURE — 51798 US URINE CAPACITY MEASURE: CPT | Mod: PBBFAC | Performed by: UROLOGY

## 2018-01-24 PROCEDURE — 99212 OFFICE O/P EST SF 10 MIN: CPT | Mod: PBBFAC,25 | Performed by: UROLOGY

## 2018-01-24 PROCEDURE — 81002 URINALYSIS NONAUTO W/O SCOPE: CPT | Mod: PBBFAC | Performed by: UROLOGY

## 2018-01-24 PROCEDURE — 52000 CYSTOURETHROSCOPY: CPT | Mod: PBBFAC | Performed by: UROLOGY

## 2018-01-24 PROCEDURE — 52000 CYSTOURETHROSCOPY: CPT | Mod: S$PBB,,, | Performed by: UROLOGY

## 2018-01-24 PROCEDURE — 51741 ELECTRO-UROFLOWMETRY FIRST: CPT | Mod: 26,51,S$PBB, | Performed by: UROLOGY

## 2018-01-24 RX ORDER — TAMSULOSIN HYDROCHLORIDE 0.4 MG/1
0.4 CAPSULE ORAL DAILY
Qty: 30 CAPSULE | Refills: 11 | Status: SHIPPED | OUTPATIENT
Start: 2018-01-24 | End: 2018-02-15

## 2018-01-24 NOTE — PROGRESS NOTES
Chief Complaint: Right flank pain, incontinence, hematuria    HPI:   1/24/18: CT Urogram shows no significant upper tract findings; small stones/cysts of no concern. Thickened/assymetric bladder.  UTI found and treated. Cysto shows a slight stricture of fossa navicularis gently widened, along with some mildly obstructive BPH.  Uroflow shows a slow flow rate.  11/28/17: 72 yo man is having right flank pain, weak stream, gross hematuria, urgency with dribbling on way to toilet.  Keeping urinal next to bed.  Not a good stream lately.  Last time he had a good stream was only a week or so ago.  No other abd/pelvic pain and no exac/rel factors.  Prior urolithiasis a very long time ago he passed it.  Is scheduled for cardioversion Monday.    11/18/15: Took the vesicare and found it expensive but it reduced nocturia to 2 from 4. New dx of afib.  11/12/14: Pt having nocturia getting up 3-4 times.  Held caffeine and the discomfort is now gone.  -UCx.  US and KUB clear of urolithiasis.   10/10/14: 69 yo man having pain at the end of his penis with a little burn with urination. Weak stream, does not empty all the way.  Nocturia x4.  Was told by a urologist in the past his meatus was small.  No hematuria.  Urolithiasis about 8 years ago no followup since then.  No abd/pelvic pain and no exac/rel factors. Not constipated - has diarrhea today.  2 cups coffee in AM then some in the afternoon.  Got very dizzy when he tool flomax in the past.     Allergies:  Patient has no known allergies.    Medications:  has a current medication list which includes the following prescription(s): b complex vitamins, diltiazem, duloxetine, flecainide, gabapentin, insulin degludec, insulin lispro, pen needle, diabetic, multivitamin, needles, disposable, omeprazole, pravastatin, quinapril, tramadol, and vitamin d.    Review of Systems:  General: No fever, chills, fatigability, or weight loss.  Skin: No rashes, itching, or changes in color or texture of  skin.  Chest: Denies BENITO, cyanosis, wheezing, cough, and sputum production.  Abdomen: Appetite fine. No weight loss. Denies diarrhea, abdominal pain, hematemesis, or blood in stool.  Musculoskeletal: No joint stiffness or swelling. Denies back pain.  : As above.  All other review of systems negative.    PMH:   has a past medical history of Arthritis; Atrial fibrillation and flutter; Atrial flutter; COPD (chronic obstructive pulmonary disease); Diabetes mellitus; Hyperlipidemia; Hypertension; Lung disease; Neuropathy, diabetic; and Pancreatitis.    PSH:   has a past surgical history that includes Cholecystectomy; Biceps tendon repair; Patella surgery; Rotator cuff repair; and Cardioversion.    FamHx: family history includes Cancer in his maternal aunt; Diabetes in his mother, sister, sister, and sister; Heart attack in his brother; Heart failure in his brother; Hypertension in his father and mother; Stroke in his father, mother, paternal grandfather, and paternal grandmother.    SocHx:  reports that he quit smoking about 2 years ago. His smoking use included Cigarettes. He smoked 0.50 packs per day. He quit smokeless tobacco use about 22 years ago. His smokeless tobacco use included Snuff. He reports that he does not drink alcohol or use drugs.      Physical Exam:  Vitals:    01/24/18 1305   BP: (!) 112/58   Pulse: 75     General: A&Ox3, no apparent distress, no deformities  Neck: No masses, normal thyroid  Lungs: normal inspiration, no use of accessory muscles  Heart: normal pulse, no arrhythmias  Abdomen: Soft, NT, ND  Skin: The skin is warm and dry. No jaundice.  Ext: No c/c/e.  :   11/17: Test desc maria elena, no abnormalities of epididymus. Penis normal, with normal penile and scrotal skin. Meatus normal. Normal rectal tone, no hemorrhoids. Prost 20 gm no nodules or masses appreciated. SV not palpable. Perineum and anus normal.    Labs/Studies:   Urinalysis performed in clinic, summary:     11/17: UA normal exc  ++leuk, +nit, 30 prot  Bladder Scan performed in office:     11/17: PVR 13 ml.  PSA    11/15: 0.83  Calibrated Uroflow:    1/24/18: voided 188 ml with Qmax 7 ml/sec and prolonged emptying    Procedure: Diagnostic Cystoscopy    Procedure in Detail: After proper consents were obtained, the patient was prepped and draped in normal sterile fashion for diagnostic cystoscopy. 5 ml of lidocaine jelly was instilled in the urethra. The flexible cystoscope was then introduced into the urethra, but would not pass the fossa navicularis.  A stat was used to gently stretch the urethra and the scope was advanced into the bladder under direct vision. The urethral mucosa appeared normal, and no strictures were noted. The sphincter appeared to be normal, and the veru montanum was unremarkable. The prostatic mucosa and the lateral lobes of the prostate were midly opposed. The bladder neck was normal. Inspection of the interior of the bladder was then carried out. The trigone was unremarkable, with no mucosal lesions. The ureteral orifices were normal in position and configuration. Systematic inspection of the mucosa of the bladder it was then carried out, rotating the cystoscope so that all areas of the left and right lateral walls, the dome of the bladder, and the posterior wall were all visualized. The cystoscope was then advanced further into the bladder, and maximum deflection of the scope was performed so that the bladder neck could be inspected. No mucosal lesions were noted there. The cystoscope was then removed, and the procedure terminated.     Findings: stricture of fossa navicularis dilated, mild BPH    Impression/Plan:   1. Mild stricture dilated, PVR normal.  Uroflow shows reduced stream.  Flomax and RTC 1 mo

## 2018-01-26 ENCOUNTER — OFFICE VISIT (OUTPATIENT)
Dept: DIABETES | Facility: CLINIC | Age: 73
End: 2018-01-26
Payer: MEDICARE

## 2018-01-26 VITALS
SYSTOLIC BLOOD PRESSURE: 108 MMHG | BODY MASS INDEX: 30.78 KG/M2 | WEIGHT: 203.06 LBS | DIASTOLIC BLOOD PRESSURE: 74 MMHG | HEIGHT: 68 IN

## 2018-01-26 DIAGNOSIS — E78.5 HYPERLIPIDEMIA LDL GOAL <70: Chronic | ICD-10-CM

## 2018-01-26 DIAGNOSIS — I10 HYPERTENSION GOAL BP (BLOOD PRESSURE) < 130/80: Chronic | ICD-10-CM

## 2018-01-26 LAB — GLUCOSE SERPL-MCNC: 201 MG/DL (ref 70–110)

## 2018-01-26 PROCEDURE — 99214 OFFICE O/P EST MOD 30 MIN: CPT | Mod: PBBFAC | Performed by: NURSE PRACTITIONER

## 2018-01-26 PROCEDURE — 99999 PR PBB SHADOW E&M-EST. PATIENT-LVL IV: CPT | Mod: PBBFAC,,, | Performed by: NURSE PRACTITIONER

## 2018-01-26 PROCEDURE — 82948 REAGENT STRIP/BLOOD GLUCOSE: CPT | Mod: PBBFAC | Performed by: NURSE PRACTITIONER

## 2018-01-26 PROCEDURE — 99214 OFFICE O/P EST MOD 30 MIN: CPT | Mod: S$PBB,,, | Performed by: NURSE PRACTITIONER

## 2018-01-26 NOTE — PROGRESS NOTES
"PCP: Tiffany Davis DO    Subjective:     Chief Complaint: Type II Diabetes     HISTORY OF PRESENT ILLNESS: 72 year old  male presenting for follow up of diabetes.  Patient has had Type II diabetes for at least 20 years and has the following complications: peripheral neuropathy.  He also has a history of a flutter / a-fib on Eliquis.  He had an a fib ablation last month.  He  has attended diabetes education in the past.       Per records, fasting BG, 90, 122; ac lunch 104, 121, 210; ac dinner 139, 181, 247, 228.  He denies any recent hospital admissions, emergency room visits, or hypoglycemia.           Height: 5' 7.5" (171.5 cm)  //  Weight: 92.1 kg (203 lb 0.7 oz), Body mass index is 31.33 kg/m².  Home Blood Glucose reading this AM: 120 mg/dl fasting  His blood sugar in clinic today is: 201 mg/dl at 1:04 pm     Labs Reviewed. ADA recommends A1C of less than 7 %. His most recent A1C is:     Lab Results   Component Value Date    HGBA1C 7.5 (H) 01/02/2018      DM MEDICATIONS:   Tresiba 50 units at bedtime  Humalog 17 / 17 / 20 units TID ac    Review of Systems   Constitutional: Negative for appetite change, diaphoresis, fatigue and unexpected weight change.   HENT: Negative for congestion, hearing loss, rhinorrhea, sneezing and sore throat.    Eyes: Negative for visual disturbance.   Respiratory: Negative for cough, shortness of breath and wheezing.    Cardiovascular: Negative for leg swelling.   Gastrointestinal: Negative for abdominal pain, constipation, diarrhea, nausea and vomiting.   Endocrine: Negative for cold intolerance, heat intolerance, polydipsia, polyphagia and polyuria.   Genitourinary: Negative for difficulty urinating, dysuria and urgency.   Skin: Negative for color change, pallor and wound.   Neurological: Positive for numbness. Negative for dizziness, seizures, syncope and headaches.   Psychiatric/Behavioral: Negative for confusion and decreased concentration. The patient is not " nervous/anxious.        STANDARDS OF CARE:  Current Ophthalmologist / Optometrist: Dr. Nuno, Last exam 7 / 2017  Current Podiatrist: None.   Recommend regular exams and denies gums bleeding.    ACTIVITY LEVEL: Rarely Active  EXERCISE: None  MEAL PLANNING: Patient reports number of meals per day to be 3 and number of snacks per day to be 2. Patient is encouraged to consume 45 - 60 grams of carbohydrates in each meal, and 1800 k / lyric per day.  Per dietary recall, patient is not limiting carbohydrates, saturated fats and sodium.     BLOOD GLUCOSE TESTING: Self- monitoring  SOCIAL HISTORY: . Lives alone.  Former smoker.    Objective:      Physical Exam   Constitutional: He is oriented to person, place, and time. He appears well-developed and well-nourished.   HENT:   Head: Normocephalic and atraumatic.   Eyes: EOM are normal. Pupils are equal, round, and reactive to light.   Neck: Normal range of motion. No tracheal deviation present.   Cardiovascular: Normal rate, regular rhythm and intact distal pulses.  Exam reveals no friction rub.    No murmur heard.  Pulses:       Dorsalis pedis pulses are 2+ on the right side, and 2+ on the left side.   Pulmonary/Chest: Effort normal and breath sounds normal. He has no wheezes.   Abdominal: Soft. Bowel sounds are normal. He exhibits no distension. There is no tenderness.   Musculoskeletal: Normal range of motion. He exhibits no edema or deformity.   Feet:   Right Foot:   Protective Sensation: 6 sites tested. 6 sites sensed.   Skin Integrity: Negative for ulcer, blister, skin breakdown, erythema, warmth, callus or dry skin.   Left Foot:   Protective Sensation: 6 sites tested. 3 sites sensed.   Skin Integrity: Negative for ulcer, blister, skin breakdown, erythema, warmth, callus or dry skin.   Neurological: He is alert and oriented to person, place, and time.   Skin: Skin is warm and dry. No rash noted.   Psychiatric: He has a normal mood and affect. His behavior is  normal. Judgment and thought content normal.       Assessment / Plan:     1.) Type 2 diabetes, uncontrolled, with neuropathy ( on gabapentin )  Comments:  - Continue Tresiba 50 units at bedtime.  Blood sugars before dinner are elevated, so will adjust Humalog as the following:  Continue Humalog 17 ( bfast ) / increase 20 ( lunch ) / 20 ( dinner ) units.  His A1C has improved to  7.5 %. For his age and co morbidities, an A1C  < 8 % is acceptable to prevent persistent hypoglycemia.     Orders:  -     POCT glucose  - Future Labs scheduled ( in April ): C peptide, FUNMILAYO, insulin anitbody    2.) Hyperlipidemia LDL goal <70 - continue Pravastatin    3.) Essential hypertension - continue Accupril, Cardizem CD    Additional Plan Details:    1.) Patient was instructed to monitor blood glucose 2 - 3 x daily, fasting and ac dinner or at bedtime. Discussed ADA goal for fasting blood sugar, 80 - 130 mg/dL; pp blood sugars below 180 mg/dl. Also, discussed prevention of hypoglycemia and the need to adjust goals to higher levels if persistent hypoglycemia.  Reminded to bring BG records or meter to each visit for review.  2.) Reviewed pathophysiology of diabetes, complications related to the disease, importance of annual dilated eye exam and daily foot examination.  3.) We discussed the ADA recommendations: Hemoglobin A1c below 7.0 %. All patients with diabetes should be on statins unless contraindicated.  ACE or ARB therapy if not contraindicated.    4.) Meal planning teaching: Carbohydrate definition - one serving is 15 gms. Carbohydrate spacing - carbohydrates should be spaced into approximately 3 meals with 2 snacks ( of one carbohydrate ) between meals or at bedtime. Increase vegetable intake to 2 or more cups of vegetables per day as well as 2 fruit servings. Recommended low saturated fat, low sodium diet to aid in control of hypertension and cholesterol.  5.) Discussed activity, benefits, methods, and precautions. Recommended  patient start or continue some form of exercise and increase as tolerated to 30 minutes per day to facilitate weight loss and aid in control of BGs.  6.) Return to clinic in 3 months for follow up. He was explained the above plan and given opportunity to ask questions. Advised to call clinic with any further questions or concerns.    Gabrielle Vee, JULIETTE-C, CDE    A total of 30 minutes was spent in face to face time, of which over 50 % was spent in counseling patient on disease process, complications, treatment, and side effects of medications.

## 2018-02-15 ENCOUNTER — OFFICE VISIT (OUTPATIENT)
Dept: CARDIOLOGY | Facility: CLINIC | Age: 73
End: 2018-02-15
Payer: MEDICARE

## 2018-02-15 ENCOUNTER — CLINICAL SUPPORT (OUTPATIENT)
Dept: CARDIOLOGY | Facility: CLINIC | Age: 73
End: 2018-02-15
Payer: MEDICARE

## 2018-02-15 VITALS
HEART RATE: 72 BPM | BODY MASS INDEX: 31.04 KG/M2 | HEIGHT: 68 IN | DIASTOLIC BLOOD PRESSURE: 60 MMHG | WEIGHT: 204.81 LBS | SYSTOLIC BLOOD PRESSURE: 104 MMHG

## 2018-02-15 DIAGNOSIS — I48.0 PAROXYSMAL ATRIAL FIBRILLATION: ICD-10-CM

## 2018-02-15 DIAGNOSIS — I10 HYPERTENSION GOAL BP (BLOOD PRESSURE) < 130/80: Chronic | ICD-10-CM

## 2018-02-15 DIAGNOSIS — I48.92 ATRIAL FLUTTER, UNSPECIFIED TYPE: ICD-10-CM

## 2018-02-15 DIAGNOSIS — I48.3 TYPICAL ATRIAL FLUTTER: Primary | ICD-10-CM

## 2018-02-15 PROCEDURE — 93005 ELECTROCARDIOGRAM TRACING: CPT | Mod: PBBFAC | Performed by: INTERNAL MEDICINE

## 2018-02-15 PROCEDURE — 93010 ELECTROCARDIOGRAM REPORT: CPT | Mod: S$PBB,,, | Performed by: INTERNAL MEDICINE

## 2018-02-15 PROCEDURE — 99212 OFFICE O/P EST SF 10 MIN: CPT | Mod: PBBFAC | Performed by: INTERNAL MEDICINE

## 2018-02-15 PROCEDURE — 1159F MED LIST DOCD IN RCRD: CPT | Mod: ,,, | Performed by: INTERNAL MEDICINE

## 2018-02-15 PROCEDURE — 99999 PR PBB SHADOW E&M-EST. PATIENT-LVL II: CPT | Mod: PBBFAC,,, | Performed by: INTERNAL MEDICINE

## 2018-02-15 PROCEDURE — 99214 OFFICE O/P EST MOD 30 MIN: CPT | Mod: S$PBB,,, | Performed by: INTERNAL MEDICINE

## 2018-02-15 NOTE — PROGRESS NOTES
Subjective:    Patient ID:  Nico Garza Jr. is a 72 y.o. male who presents for follow-up of Atrial Flutter      72 yoM AFL, AF, severe COPD here for arrhythmia management. He saw Dr Garcia for AF/AFL management after 2 failed CVs and ablation was offered 2015. He opted not to pursue ablation at that time. He has been on flecainide in the past which was stopped due to ineffectiveness. He has been on eliquis 5 mg bid for CVA prophylaxis. He has chronic BENITO, possibly due to COPD, as well as fatigue. Last PFTs 2 years ago, followed by Dr Ibrahim. He had non-obstructive CAD on [facility] 2 years ago. All ECGs in the system since 2015 show AFL with the exception of a single AF ECG. No history of TIA/CVA, CHF, CAD, PVD. He no longer smokes. Most recent echo showed normal LV function 3/15.     Interval history: He underwent AFL ablation 12/4/17 without complication. He was started on flecainide 100 mg bid. He takes eliquis 5 mg bid. He has had no recurrence of arrhythmia. He does not feel a major change in his overall symptoms.     Dr Cid  3/19/15 echo:  CONCLUSIONS     1 - Biatrial enlargement.     2 - Concentric remodeling.     3 - Normal left ventricular systolic function (EF 60-65%).     4 - Normal left ventricular diastolic function.     5 - Normal right ventricular systolic function .     6 - Trivial mitral regurgitation.     7 - Mild tricuspid regurgitation.     8 - Left pleural effusion.     Past Medical History:  No date: Arthritis  No date: Atrial fibrillation and flutter  No date: Atrial flutter  No date: COPD (chronic obstructive pulmonary disease)  No date: Diabetes mellitus  No date: Hyperlipidemia  No date: Hypertension  No date: Lung disease  No date: Neuropathy, diabetic  No date: Pancreatitis    Past Surgical History:  No date: BICEPS TENDON REPAIR  No date: CARDIOVERSION  No date: CHOLECYSTECTOMY  No date: PATELLA SURGERY  No date: RADIOFREQUENCY ABLATION  No date: ROTATOR CUFF REPAIR    Social History     Marital status:              Spouse name:                       Years of education:                 Number of children: 2             Occupational History    None on file    Social History Main Topics    Smoking status: Former Smoker                                                                Packs/day: 0.50      Years: 0.00           Types: Cigarettes       Quit date: 3/8/2015    Smokeless tobacco: Former User                          Types: Snuff       Quit date: 10/7/1995    Alcohol use: No              Drug use: No              Sexual activity: Not on file          Other Topics            Concern    None on file    Social History Narrative    None on file    Review of patient's family history indicates:    Heart failure                  Brother                   Heart attack                   Brother                   Diabetes                       Mother                    Hypertension                   Mother                    Stroke                         Mother                    Hypertension                   Father                    Stroke                         Father                    Diabetes                       Sister                    Cancer                         Maternal Aunt             Diabetes                       Sister                    Diabetes                       Sister                    Stroke                         Paternal Grandmother      Stroke                         Paternal Grandfather            Review of Systems   Constitution: Positive for malaise/fatigue.   HENT: Negative.    Eyes: Negative.    Cardiovascular: Negative for chest pain, dyspnea on exertion, irregular heartbeat, leg swelling, near-syncope, palpitations and syncope.   Respiratory: Positive for shortness of breath.    Endocrine: Negative.    Hematologic/Lymphatic: Negative.    Skin: Negative.    Musculoskeletal: Negative.    Gastrointestinal: Negative.    Genitourinary: Negative.     Neurological: Negative.  Negative for dizziness and light-headedness.   Psychiatric/Behavioral: Negative.    Allergic/Immunologic: Negative.         Objective:    Physical Exam   Constitutional: He is oriented to person, place, and time. He appears well-developed and well-nourished. No distress.   HENT:   Head: Normocephalic and atraumatic.   Eyes: EOM are normal. Pupils are equal, round, and reactive to light.   Neck: Normal range of motion. No JVD present. No thyromegaly present.   Cardiovascular: Normal rate, regular rhythm, S1 normal, S2 normal and normal heart sounds.  PMI is not displaced.  Exam reveals no gallop and no friction rub.    No murmur heard.  Pulmonary/Chest: Effort normal and breath sounds normal. No respiratory distress. He has no wheezes. He has no rales.   Abdominal: Soft. Bowel sounds are normal. He exhibits no distension. There is no tenderness. There is no rebound and no guarding.   Musculoskeletal: Normal range of motion. He exhibits no edema or tenderness.   Neurological: He is alert and oriented to person, place, and time. No cranial nerve deficit.   Skin: Skin is warm and dry. No rash noted. No erythema.   Psychiatric: He has a normal mood and affect. His behavior is normal. Judgment and thought content normal.   Vitals reviewed.    ECG: NSR  ms nl QRS, QTc        Assessment:       1. Typical atrial flutter    2. Hypertension goal BP (blood pressure) < 130/80    3. Paroxysmal atrial fibrillation    4. Type 2 diabetes, uncontrolled, with neuropathy         Plan:       72 yoM AFL s/p RFA, AF here for post ablation visit. He has maintained normal rhythm by symptoms as well as by ECG. He remains on flecainide and eliquis for pAF. Will continue this therapy. RTC 6m

## 2018-02-19 RX ORDER — INSULIN DEGLUDEC 200 U/ML
50 INJECTION, SOLUTION SUBCUTANEOUS DAILY
Qty: 9 ML | Refills: 3 | Status: SHIPPED | OUTPATIENT
Start: 2018-02-19 | End: 2018-07-17 | Stop reason: SDUPTHER

## 2018-02-26 ENCOUNTER — OFFICE VISIT (OUTPATIENT)
Dept: UROLOGY | Facility: CLINIC | Age: 73
End: 2018-02-26
Payer: MEDICARE

## 2018-02-26 VITALS
HEIGHT: 68 IN | WEIGHT: 201.25 LBS | BODY MASS INDEX: 30.5 KG/M2 | HEART RATE: 75 BPM | DIASTOLIC BLOOD PRESSURE: 56 MMHG | SYSTOLIC BLOOD PRESSURE: 115 MMHG

## 2018-02-26 DIAGNOSIS — N35.9 URETHRAL STRICTURE, UNSPECIFIED STRICTURE TYPE: Primary | ICD-10-CM

## 2018-02-26 DIAGNOSIS — M47.9 ARTHRITIS OF BACK: ICD-10-CM

## 2018-02-26 DIAGNOSIS — M17.10 ARTHRITIS OF KNEE: ICD-10-CM

## 2018-02-26 LAB
BILIRUB SERPL-MCNC: NORMAL MG/DL
BLOOD URINE, POC: NORMAL
COLOR, POC UA: YELLOW
GLUCOSE UR QL STRIP: NORMAL
KETONES UR QL STRIP: NORMAL
LEUKOCYTE ESTERASE URINE, POC: NORMAL
NITRITE, POC UA: NORMAL
PH, POC UA: 5
PROTEIN, POC: NORMAL
SPECIFIC GRAVITY, POC UA: 1.02
UROBILINOGEN, POC UA: NORMAL

## 2018-02-26 PROCEDURE — 99214 OFFICE O/P EST MOD 30 MIN: CPT | Mod: S$PBB,,, | Performed by: UROLOGY

## 2018-02-26 PROCEDURE — 81002 URINALYSIS NONAUTO W/O SCOPE: CPT | Mod: PBBFAC | Performed by: UROLOGY

## 2018-02-26 PROCEDURE — 99999 PR PBB SHADOW E&M-EST. PATIENT-LVL II: CPT | Mod: PBBFAC,,, | Performed by: UROLOGY

## 2018-02-26 PROCEDURE — 99212 OFFICE O/P EST SF 10 MIN: CPT | Mod: PBBFAC | Performed by: UROLOGY

## 2018-02-26 PROCEDURE — 1159F MED LIST DOCD IN RCRD: CPT | Mod: ,,, | Performed by: UROLOGY

## 2018-02-26 PROCEDURE — 1125F AMNT PAIN NOTED PAIN PRSNT: CPT | Mod: ,,, | Performed by: UROLOGY

## 2018-02-26 NOTE — TELEPHONE ENCOUNTER
----- Message from Virginia Ugalde sent at 2/26/2018 10:27 AM CST -----  Contact: pt  1. What is the name of the medication you are requesting? Tramadol  2. What is the dose? 50 mg  3. How do you take the medication? Orally, topically, etc? mouth  4. How often do you take this medication? Two tablets by mouth once daily   5. Do you need a 30 day or 90 day supply? 60  6. How many refills are you requesting? 2  7. What is your preferred pharmacy and location of the pharmacy? [facility] in [place]  8. Who can we contact with further questions? [phone] (opqq)

## 2018-02-26 NOTE — PROGRESS NOTES
Chief Complaint: Right flank pain, incontinence, hematuria    HPI:   2/26/18: Flomax made him dizzy when he got up in the morning and couldn't tolerate it.  Can't say that it helped him urinate better.  Discussed things in detail again.  1/24/18: CT Urogram shows no significant upper tract findings; small stones/cysts of no concern. Thickened/assymetric bladder.  UTI found and treated. Cysto shows a slight stricture of fossa navicularis gently widened, along with some mildly obstructive BPH.  Uroflow shows a slow flow rate.  11/28/17: 72 yo man is having right flank pain, weak stream, gross hematuria, urgency with dribbling on way to toilet.  Keeping urinal next to bed.  Not a good stream lately.  Last time he had a good stream was only a week or so ago.  No other abd/pelvic pain and no exac/rel factors.  Prior urolithiasis a very long time ago he passed it.  Is scheduled for cardioversion Monday.    11/18/15: Took the vesicare and found it expensive but it reduced nocturia to 2 from 4. New dx of afib.  11/12/14: Pt having nocturia getting up 3-4 times.  Held caffeine and the discomfort is now gone.  -UCx.  US and KUB clear of urolithiasis.   10/10/14: 67 yo man having pain at the end of his penis with a little burn with urination. Weak stream, does not empty all the way.  Nocturia x4.  Was told by a urologist in the past his meatus was small.  No hematuria.  Urolithiasis about 8 years ago no followup since then.  No abd/pelvic pain and no exac/rel factors. Not constipated - has diarrhea today.  2 cups coffee in AM then some in the afternoon.  Got very dizzy when he tool flomax in the past.     Allergies:  Flomax [tamsulosin]    Medications:  has a current medication list which includes the following prescription(s): b complex vitamins, diltiazem, duloxetine, flecainide, gabapentin, insulin degludec, insulin lispro, pen needle, diabetic, multivitamin, needles, disposable, omeprazole, pravastatin, quinapril,  tramadol, and vitamin d.    Review of Systems:  General: No fever, chills, fatigability, or weight loss.  Skin: No rashes, itching, or changes in color or texture of skin.  Chest: Denies BENITO, cyanosis, wheezing, cough, and sputum production.  Abdomen: Appetite fine. No weight loss. Denies diarrhea, abdominal pain, hematemesis, or blood in stool.  Musculoskeletal: No joint stiffness or swelling. Denies back pain.  : As above.  All other review of systems negative.    PMH:   has a past medical history of Arthritis; Atrial fibrillation and flutter; Atrial flutter; COPD (chronic obstructive pulmonary disease); Diabetes mellitus; Hyperlipidemia; Hypertension; Lung disease; Neuropathy, diabetic; and Pancreatitis.    PSH:   has a past surgical history that includes Cholecystectomy; Biceps tendon repair; Patella surgery; Rotator cuff repair; Cardioversion; and Radiofrequency ablation.    FamHx: family history includes Cancer in his maternal aunt; Diabetes in his mother, sister, sister, and sister; Heart attack in his brother; Heart failure in his brother; Hypertension in his father and mother; Stroke in his father, mother, paternal grandfather, and paternal grandmother.    SocHx:  reports that he quit smoking about 2 years ago. His smoking use included Cigarettes. He smoked 0.50 packs per day. He quit smokeless tobacco use about 22 years ago. His smokeless tobacco use included Snuff. He reports that he does not drink alcohol or use drugs.      Physical Exam:  Vitals:    02/26/18 1301   BP: (!) 115/56   Pulse: 75     General: A&Ox3, no apparent distress, no deformities  Neck: No masses, normal thyroid  Lungs: normal inspiration, no use of accessory muscles  Heart: normal pulse, no arrhythmias  Abdomen: Soft, NT, ND  Skin: The skin is warm and dry. No jaundice.  Ext: No c/c/e.  :   11/17: Test desc maria elena, no abnormalities of epididymus. Penis normal, with normal penile and scrotal skin. Meatus normal. Normal rectal tone, no  hemorrhoids. Prost 20 gm no nodules or masses appreciated. SV not palpable. Perineum and anus normal.    Labs/Studies:   Urinalysis performed in clinic, summary:     11/17: UA normal exc ++leuk, +nit, 30 prot  Bladder Scan performed in office:     11/17: PVR 13 ml.  PSA    11/15: 0.83  Calibrated Uroflow:    1/24/18: voided 188 ml with Qmax 7 ml/sec and prolonged emptying    Impression/Plan:   1. Mild stricture dilated, PVR normal.  Uroflow shows reduced stream.  Flomax not tolerated well.  He is okay with how he is emptying now.  Recheck in 6 mo.

## 2018-03-01 ENCOUNTER — TELEPHONE (OUTPATIENT)
Dept: INTERNAL MEDICINE | Facility: CLINIC | Age: 73
End: 2018-03-01

## 2018-03-01 NOTE — TELEPHONE ENCOUNTER
----- Message from Rosy Leonmillerruth sent at 3/1/2018 12:14 PM CST -----  Contact: self [phone]  States that he is calling to check status on refill request for tramadol 50mg. Pt uses     [facility] [address] [place] [address] [address] [facility]  [address]  [facility] ([place], [place]) [address]  Phone: [phone] Fax: [phone]    Please call back at [phone]//thank you acc

## 2018-03-02 DIAGNOSIS — M17.10 ARTHRITIS OF KNEE: ICD-10-CM

## 2018-03-02 DIAGNOSIS — M47.9 ARTHRITIS OF BACK: ICD-10-CM

## 2018-03-02 NOTE — TELEPHONE ENCOUNTER
Called and notified patient that a refill request was sent to dr. Davis and someone will contact him once medication is approved    Patient verbalized understanding

## 2018-03-02 NOTE — TELEPHONE ENCOUNTER
----- Message from Zoila Lares sent at 3/2/2018  2:18 PM CST -----  Contact: self [phone]  Would like to consult with nurse regarding medication, pharmacy states that have not received medication.    Please call back at [phone].  Md Aramis

## 2018-03-03 RX ORDER — TRAMADOL HYDROCHLORIDE 50 MG/1
TABLET ORAL
Qty: 60 TABLET | Refills: 2 | Status: SHIPPED | OUTPATIENT
Start: 2018-03-03 | End: 2018-07-05 | Stop reason: SDUPTHER

## 2018-03-03 RX ORDER — TRAMADOL HYDROCHLORIDE 50 MG/1
100 TABLET ORAL DAILY PRN
Qty: 60 TABLET | Refills: 3 | OUTPATIENT
Start: 2018-03-03

## 2018-03-03 NOTE — TELEPHONE ENCOUNTER
[facility] has been reviewed. There are no irregularities noted.   Tramadol refilled.

## 2018-03-29 RX ORDER — GABAPENTIN 300 MG/1
CAPSULE ORAL
Qty: 360 CAPSULE | Refills: 0 | Status: SHIPPED | OUTPATIENT
Start: 2018-03-29 | End: 2018-09-14 | Stop reason: SDUPTHER

## 2018-04-01 DIAGNOSIS — K21.9 GASTROESOPHAGEAL REFLUX DISEASE, ESOPHAGITIS PRESENCE NOT SPECIFIED: ICD-10-CM

## 2018-04-02 RX ORDER — OMEPRAZOLE 20 MG/1
CAPSULE, DELAYED RELEASE ORAL
Qty: 90 CAPSULE | Refills: 0 | Status: SHIPPED | OUTPATIENT
Start: 2018-04-02 | End: 2018-07-03 | Stop reason: SDUPTHER

## 2018-04-09 ENCOUNTER — LAB VISIT (OUTPATIENT)
Dept: LAB | Facility: HOSPITAL | Age: 73
End: 2018-04-09
Attending: INTERNAL MEDICINE
Payer: MEDICARE

## 2018-04-09 DIAGNOSIS — E78.5 HYPERLIPIDEMIA LDL GOAL <100: ICD-10-CM

## 2018-04-09 DIAGNOSIS — I10 ESSENTIAL HYPERTENSION: ICD-10-CM

## 2018-04-09 LAB
ALBUMIN SERPL BCP-MCNC: 3.8 G/DL
ALP SERPL-CCNC: 71 U/L
ALT SERPL W/O P-5'-P-CCNC: 20 U/L
ANION GAP SERPL CALC-SCNC: 13 MMOL/L
AST SERPL-CCNC: 16 U/L
BILIRUB SERPL-MCNC: 0.4 MG/DL
BUN SERPL-MCNC: 21 MG/DL
C PEPTIDE SERPL-MCNC: 0.74 NG/ML
CALCIUM SERPL-MCNC: 9.9 MG/DL
CHLORIDE SERPL-SCNC: 103 MMOL/L
CHOLEST SERPL-MCNC: 112 MG/DL
CHOLEST/HDLC SERPL: 2.5 {RATIO}
CO2 SERPL-SCNC: 27 MMOL/L
CREAT SERPL-MCNC: 0.9 MG/DL
EST. GFR  (AFRICAN AMERICAN): >60 ML/MIN/1.73 M^2
EST. GFR  (NON AFRICAN AMERICAN): >60 ML/MIN/1.73 M^2
ESTIMATED AVG GLUCOSE: 192 MG/DL
GLUCOSE SERPL-MCNC: 116 MG/DL
HBA1C MFR BLD HPLC: 8.3 %
HDLC SERPL-MCNC: 44 MG/DL
HDLC SERPL: 39.3 %
LDLC SERPL CALC-MCNC: 45.8 MG/DL
NONHDLC SERPL-MCNC: 68 MG/DL
POTASSIUM SERPL-SCNC: 4.1 MMOL/L
PROT SERPL-MCNC: 7.9 G/DL
SODIUM SERPL-SCNC: 143 MMOL/L
TRIGL SERPL-MCNC: 111 MG/DL

## 2018-04-09 PROCEDURE — 80061 LIPID PANEL: CPT

## 2018-04-09 PROCEDURE — 84681 ASSAY OF C-PEPTIDE: CPT

## 2018-04-09 PROCEDURE — 80053 COMPREHEN METABOLIC PANEL: CPT

## 2018-04-09 PROCEDURE — 83036 HEMOGLOBIN GLYCOSYLATED A1C: CPT

## 2018-04-12 ENCOUNTER — OFFICE VISIT (OUTPATIENT)
Dept: INTERNAL MEDICINE | Facility: CLINIC | Age: 73
End: 2018-04-12
Payer: MEDICARE

## 2018-04-12 VITALS
BODY MASS INDEX: 29.19 KG/M2 | SYSTOLIC BLOOD PRESSURE: 120 MMHG | DIASTOLIC BLOOD PRESSURE: 52 MMHG | TEMPERATURE: 96 F | HEART RATE: 86 BPM | OXYGEN SATURATION: 98 % | WEIGHT: 189.13 LBS

## 2018-04-12 DIAGNOSIS — E78.5 HYPERLIPIDEMIA LDL GOAL <70: Chronic | ICD-10-CM

## 2018-04-12 DIAGNOSIS — M54.41 ACUTE RIGHT-SIDED LOW BACK PAIN WITH RIGHT-SIDED SCIATICA: ICD-10-CM

## 2018-04-12 DIAGNOSIS — Z79.01 CHRONIC ANTICOAGULATION: ICD-10-CM

## 2018-04-12 DIAGNOSIS — I10 HYPERTENSION GOAL BP (BLOOD PRESSURE) < 130/80: Primary | Chronic | ICD-10-CM

## 2018-04-12 DIAGNOSIS — J44.9 CHRONIC OBSTRUCTIVE PULMONARY DISEASE, UNSPECIFIED COPD TYPE: ICD-10-CM

## 2018-04-12 LAB
GAD65 AB SER-SCNC: 0 NMOL/L
INSULIN AB SER-SCNC: 0 NMOL/L (ref 0–0.02)

## 2018-04-12 PROCEDURE — 99214 OFFICE O/P EST MOD 30 MIN: CPT | Mod: S$PBB,,, | Performed by: INTERNAL MEDICINE

## 2018-04-12 PROCEDURE — 99999 PR PBB SHADOW E&M-EST. PATIENT-LVL III: CPT | Mod: PBBFAC,,, | Performed by: INTERNAL MEDICINE

## 2018-04-12 PROCEDURE — 99213 OFFICE O/P EST LOW 20 MIN: CPT | Mod: PBBFAC | Performed by: INTERNAL MEDICINE

## 2018-04-12 RX ORDER — CYCLOBENZAPRINE HCL 5 MG
5 TABLET ORAL 3 TIMES DAILY PRN
Qty: 30 TABLET | Refills: 0 | Status: SHIPPED | OUTPATIENT
Start: 2018-04-12 | End: 2018-04-22

## 2018-04-12 NOTE — PROGRESS NOTES
Subjective:       Patient ID: Nico Garza Jr. is a 72 y.o. male.    Chief Complaint: Follow-up    Nico Garza Jr.  72 y.o. White male    Patient presents with:  Follow-up    HPI: Here today to follow up on chronic conditions.  HTN--stable on quinapril and diltiazem. He denies symptoms.   HLD--compliant with pravastatin.                CHOL                     112 (L)             04/09/2018                 HDL                      44                  04/09/2018                 LDLCALC                  45.8 (L)            04/09/2018                 TRIG                     111                 04/09/2018            Diabetes--uncontrolled. He has an upcoming appointment with diabetes management.                   HGBA1C                   8.3 (H)             04/09/2018            Neuropathy--stable on gabapentin.   COPD--stable. He denies recent flares. He is managed by pulmonology.   He is having right lower back pain. He was doing a lot of yard work 3 days ago prior to onset. He denies radiation of pain. He denies weakness in his extremities. He has chronic low back pain but this pain is different. He is taking tramadol and using a heating pad but it is not helping.    He is on chronic anticoagulation (apixiban). He cannot take NSAID's.     Past Medical History:  Arthritis  Atrial fibrillation and flutter  Atrial flutter  COPD (chronic obstructive pulmonary disease)  Diabetes mellitus  Hyperlipidemia  Hypertension  Lung disease  Neuropathy, diabetic  Pancreatitis    Current Outpatient Prescriptions on File Prior to Visit:  b complex vitamins tablet, Take 1 tablet by mouth once daily., Disp: , Rfl:    diltiaZEM (CARDIZEM CD) 360 MG 24 hr capsule, Take 1 capsule (360 mg total) by mouth once daily., Disp: 90 capsule, Rfl: 3  duloxetine (CYMBALTA) 30 MG capsule, TAKE 1 CAPSULE(30 MG) BY MOUTH EVERY DAY, Disp: 30 capsule, Rfl: 3  flecainide (TAMBOCOR) 100 MG Tab, Take 1 tablet (100 mg total) by mouth every 12  "(twelve) hours., Disp: 60 tablet, Rfl: 11  gabapentin (NEURONTIN) 300 MG capsule, TAKE 2 CAPSULES BY MOUTH TWICE DAILY, Disp: 360 capsule, Rfl: 0  insulin degludec (TRESIBA FLEXTOUCH U-200) 200 unit/mL (3 mL) InPn, Inject 50 Units into the skin once daily., Disp: 9 mL, Rfl: 3  insulin lispro (HUMALOG KWIKPEN) 100 unit/mL InPn pen, ADMINISTER 17 UNITS UNDER THE SKIN THREE TIMES DAILY BEFORE MEALS (Patient taking differently: ADMINISTER 17 UNITS UNDER THE SKIN IN MORNING AND 20 UNITS NOON AND NIGHT), Disp: 45 mL, Rfl: 0  insulin needles, disposable, 32 x 1/4 " Ndle, Insulin injections four times per day., Disp: 400 each, Rfl: 3  multivitamin capsule, Take 1 capsule by mouth once daily., Disp: , Rfl:   NEEDLES, DISPOSABLE (DISPOSABLE NEEDLES MISC), Inject 1 each into the skin 3 (three) times daily., Disp: , Rfl:   omeprazole (PRILOSEC) 20 MG capsule, TAKE 1 CAPSULE( 20 MG TOTAL) BY MOUTH ONCE DAILY, Disp: 90 capsule, Rfl: 0  pravastatin (PRAVACHOL) 40 MG tablet, TAKE 1 TABLET DAILY (Patient taking differently: TAKE 1 TABLET HS), Disp: 90 tablet, Rfl: 3  quinapril (ACCUPRIL) 40 MG tablet, Take 1 tablet (40 mg total) by mouth once daily. (Patient taking differently: Take 40 mg by mouth nightly. ), Disp: 90 tablet, Rfl: 3  traMADol (ULTRAM) 50 mg tablet, TAKE 2 TABLETS BY MOUTH ONCE DAILY AS NEEDED FOR PAIN, Disp: 60 tablet, Rfl: 2  vitamin D 1000 units Tab, Take 1,000 Units by mouth once daily., Disp: , Rfl:     Allergies:  Review of patient's allergies indicates:   -- Flomax (tamsulosin)     --  Dizzy            Review of Systems   Constitutional: Negative for fever and unexpected weight change.   Respiratory: Negative for shortness of breath.    Cardiovascular: Negative for chest pain.   Gastrointestinal: Negative for abdominal pain.   Genitourinary: Negative for difficulty urinating.   Musculoskeletal: Positive for back pain.   Neurological: Negative for dizziness and headaches.       Objective:      Physical Exam "   Constitutional: He is oriented to person, place, and time. He appears well-developed and well-nourished. No distress.   Cardiovascular: Normal rate, regular rhythm and normal heart sounds.    Pulmonary/Chest: Effort normal and breath sounds normal. No respiratory distress.   Abdominal: Soft. Bowel sounds are normal.   Musculoskeletal:        Lumbar back: He exhibits decreased range of motion, tenderness and pain. He exhibits no bony tenderness, no swelling, no edema, no deformity and no laceration.   Neurological: He is alert and oriented to person, place, and time.   Skin: Skin is warm and dry.   Psychiatric: He has a normal mood and affect.   Vitals reviewed.      Assessment:       1. Hypertension goal BP (blood pressure) < 130/80    2. Hyperlipidemia LDL goal <70    3. Type 2 diabetes, uncontrolled, with neuropathy    4. Chronic obstructive pulmonary disease, unspecified COPD type    5. Acute right-sided low back pain with right-sided sciatica    6. Chronic anticoagulation        Plan:       Nico was seen today for follow-up.    Diagnoses and all orders for this visit:    Hypertension goal BP (blood pressure) < 130/80  -     Continue current management    Hyperlipidemia LDL goal <70  -     Continue pravastatin    Type 2 diabetes, uncontrolled, with neuropathy  -     F/U with diabetes management    Chronic obstructive pulmonary disease, unspecified COPD type  -     F/U with pulmonology    Acute right-sided low back pain with right-sided sciatica  -     cyclobenzaprine (FLEXERIL) 5 MG tablet; Take 1 tablet (5 mg total) by mouth 3 (three) times daily as needed for Muscle spasms.  -     Recommend continued use of heating pad  -     Handout provided     Chronic anticoagulation    Labs and F/U in 3 months and as needed.

## 2018-04-12 NOTE — PATIENT INSTRUCTIONS
Back Pain (Acute or Chronic)    Back pain is one of the most common problems. The good news is that most people feel better in 1 to 2 weeks, and most of the rest in 1 to 2 months. Most people can remain active.  People experience and describe pain differently; not everyone is the same.  · The pain can be sharp, stabbing, shooting, aching, cramping or burning.  · Movement, standing, bending, lifting, sitting, or walking may worsen pain.  · It can be localized to one spot or area, or it can be more generalized.  · It can spread or radiate upwards, to the front, or go down your arms or legs (sciatica).  · It can cause muscle spasm.  Most of the time, mechanical problems with the muscles or spine cause the pain. Mechanical problems are usually caused by an injury to the muscles or ligaments. While illness can cause back pain, it is usually not caused by a serious illness. Mechanical problems include:   · Physical activity such as sports, exercise, work, or normal activity  · Overexertion, lifting, pushing, pulling incorrectly or too aggressively  · Sudden twisting, bending, or stretching from an accident, or accidental movement  · Poor posture  · Stretching or moving wrong, without noticing pain at the time  · Poor coordination, lack of regular exercise (check with your doctor about this)  · Spinal disc disease or arthritis  · Stress  Pain can also be related to pregnancy, or illness like appendicitis, bladder or kidney infections, pelvic infections, and many other things.  Acute back pain usually gets better in 1 to 2 weeks. Back pain related to disk disease, arthritis in the spinal joints or spinal stenosis (narrowing of the spinal canal) can become chronic and last for months or years.  Unless you had a physical injury (for example, a car accident or fall) X-rays are usually not needed for the initial evaluation of back pain. If pain continues and does not respond to medical treatment, X-rays and other tests may be  needed.  Home care  Try these home care recommendations:  · When in bed, try to find a position of comfort. A firm mattress is best. Try lying flat on your back with pillows under your knees. You can also try lying on your side with your knees bent up towards your chest and a pillow between your knees.  · At first, do not try to stretch out the sore spots. If there is a strain, it is not like the good soreness you get after exercising without an injury. In this case, stretching may make it worse.  · Avoid prolong sitting, long car rides, or travel. This puts more stress on the lower back than standing or walking.  · During the first 24 to 72 hours after an acute injury or flare up of chronic back pain, apply an ice pack to the painful area for 20 minutes and then remove it for 20 minutes. Do this over a period of 60 to 90 minutes or several times a day. This will reduce swelling and pain. Wrap the ice pack in a thin towel or plastic to protect your skin.  · You can start with ice, then switch to heat. Heat (hot shower, hot bath, or heating pad) reduces pain and works well for muscle spasms. Heat can be applied to the painful area for 20 minutes then remove it for 20 minutes. Do this over a period of 60 to 90 minutes or several times a day. Do not sleep on a heating pad. It can lead to skin burns or tissue damage.  · You can alternate ice and heat therapy. Talk with your doctor about the best treatment for your back pain.  · Therapeutic massage can help relax the back muscles without stretching them.  · Be aware of safe lifting methods and do not lift anything without stretching first.  Medicines  Talk to your doctor before using medicine, especially if you have other medical problems or are taking other medicines.  · You may use over-the-counter medicine as directed on the bottle to control pain, unless another pain medicine was prescribed. If you have chronic conditions like diabetes, liver or kidney disease,  stomach ulcers, or gastrointestinal bleeding, or are taking blood thinners, talk to your doctor before taking any medicine.  · Be careful if you are given a prescription medicines, narcotics, or medicine for muscle spasms. They can cause drowsiness, affect your coordination, reflexes, and judgement. Do not drive or operate heavy machinery.  Follow-up care  Follow up with your healthcare provider, or as advised.   A radiologist will review any X-rays that were taken. Your provide will notify you of any new findings that may affect your care.  Call 911  Call emergency services if any of the following occur:  · Trouble breathing  · Confusion  · Very drowsy or trouble awakening  · Fainting or loss of consciousness  · Rapid or very slow heart rate  · Loss of bowel or bladder control  When to seek medical advice  Call your healthcare provider right away if any of these occur:   · Pain becomes worse or spreads to your legs  · Weakness or numbness in one or both legs  · Numbness in the groin or genital area  Date Last Reviewed: 7/1/2016  © 5988-3843 [employer], [employer]. [address] [place] [address], [place] [address]. All rights reserved. This information is not intended as a substitute for professional medical care. Always follow your healthcare professional's instructions.

## 2018-04-27 ENCOUNTER — OFFICE VISIT (OUTPATIENT)
Dept: DIABETES | Facility: CLINIC | Age: 73
End: 2018-04-27
Payer: MEDICARE

## 2018-04-27 VITALS
SYSTOLIC BLOOD PRESSURE: 110 MMHG | DIASTOLIC BLOOD PRESSURE: 60 MMHG | HEIGHT: 69 IN | BODY MASS INDEX: 29.22 KG/M2 | WEIGHT: 197.31 LBS

## 2018-04-27 DIAGNOSIS — E78.5 HYPERLIPIDEMIA LDL GOAL <70: Chronic | ICD-10-CM

## 2018-04-27 DIAGNOSIS — I10 HYPERTENSION GOAL BP (BLOOD PRESSURE) < 130/80: Chronic | ICD-10-CM

## 2018-04-27 DIAGNOSIS — E10.40 TYPE 1 DIABETES MELLITUS WITH DIABETIC NEUROPATHY: Primary | ICD-10-CM

## 2018-04-27 PROCEDURE — 99999 PR PBB SHADOW E&M-EST. PATIENT-LVL III: CPT | Mod: PBBFAC,,, | Performed by: NURSE PRACTITIONER

## 2018-04-27 PROCEDURE — 99213 OFFICE O/P EST LOW 20 MIN: CPT | Mod: PBBFAC | Performed by: NURSE PRACTITIONER

## 2018-04-27 PROCEDURE — 99214 OFFICE O/P EST MOD 30 MIN: CPT | Mod: S$PBB,,, | Performed by: NURSE PRACTITIONER

## 2018-04-27 RX ORDER — INSULIN PUMP SYRINGE, 3 ML
EACH MISCELLANEOUS
Qty: 1 EACH | Refills: 0 | Status: SHIPPED | OUTPATIENT
Start: 2018-04-27

## 2018-04-27 NOTE — PROGRESS NOTES
"PCP: Tiffany Davis DO    Subjective:     Chief Complaint: Type I Diabetes     HISTORY OF PRESENT ILLNESS: 72 year old  male presenting for follow up of diabetes.  Patient has had diabetes for at least 20 years and has the following complications: peripheral neuropathy. Recent labs confirm that patient has Type I ( C peptide 0.74, no antibodies ). He also has a history of a flutter / a-fib on Eliquis.  He had an a fib ablation in December 2017.  He has attended diabetes education in the past.       He denies any recent hospital admissions, emergency room visits, or hypoglycemia.        Height: 5' 8.5" (174 cm)  //  Weight: 89.5 kg (197 lb 5 oz), Body mass index is 29.56 kg/m².  Home Blood Glucose reading this AM: Not Taken  His blood sugar in clinic today is: 122 mg/dl at 12:39 pm     Labs Reviewed. ADA recommends A1C of less than 7 %. His most recent A1C is:     Lab Results   Component Value Date    HGBA1C 8.3 (H) 04/09/2018      DM MEDICATIONS:   Tresiba 50 units at bedtime  Humalog 17 / 17 / 20 units TID ac    Review of Systems   Constitutional: Negative for appetite change, diaphoresis, fatigue and unexpected weight change.   HENT: Negative for congestion, hearing loss, rhinorrhea, sneezing and sore throat.    Eyes: Negative for visual disturbance.   Respiratory: Negative for cough, shortness of breath and wheezing.    Cardiovascular: Negative for leg swelling.   Gastrointestinal: Negative for abdominal pain, constipation, diarrhea, nausea and vomiting.   Endocrine: Negative for cold intolerance, heat intolerance, polydipsia, polyphagia and polyuria.   Genitourinary: Negative for difficulty urinating, dysuria and urgency.   Skin: Negative for color change, pallor and wound.   Neurological: Positive for numbness. Negative for dizziness, seizures, syncope and headaches.   Psychiatric/Behavioral: Negative for confusion and decreased concentration. The patient is not nervous/anxious.        STANDARDS OF " CARE:  Current Ophthalmologist / Optometrist: Dr. Nuno, Last exam 7 / 2017  Current Podiatrist: None.   Recommend regular exams and denies gums bleeding.    ACTIVITY LEVEL: Rarely Active  EXERCISE: None  MEAL PLANNING: Patient reports number of meals per day to be 3 and number of snacks per day to be 2. Patient is encouraged to consume 45 - 60 grams of carbohydrates in each meal, and 1800 k / lyric per day.  Per dietary recall, patient is not limiting carbohydrates, saturated fats and sodium.     BLOOD GLUCOSE TESTING: Self- monitoring  SOCIAL HISTORY: . Lives alone.  Former smoker.    Objective:      Physical Exam   Constitutional: He is oriented to person, place, and time. He appears well-developed and well-nourished.   HENT:   Head: Normocephalic and atraumatic.   Eyes: EOM are normal. Pupils are equal, round, and reactive to light.   Neck: Normal range of motion. No tracheal deviation present.   Cardiovascular: Normal rate, regular rhythm and intact distal pulses.  Exam reveals no friction rub.    No murmur heard.  Pulmonary/Chest: Effort normal and breath sounds normal. He has no wheezes.   Abdominal: Soft. Bowel sounds are normal. He exhibits no distension. There is no tenderness.   Musculoskeletal: Normal range of motion. He exhibits no edema or deformity.   Neurological: He is alert and oriented to person, place, and time.   Skin: Skin is warm and dry. No rash noted.   Psychiatric: He has a normal mood and affect. His behavior is normal. Judgment and thought content normal.       Assessment / Plan:     1.) Type 1 diabetes, uncontrolled, with neuropathy ( on gabapentin )  Comments:  - Increase Tresiba 55 units at bedtime.  Blood sugars before dinner are elevated, so will adjust Humalog as the following:  Continue Humalog 17 ( bfast ) / increase 25 ( lunch ) / increase  25 ( dinner ) units.  A1C has increased to 8.3 %.  For his age and co morbidities, an A1C  < 8 % is acceptable to prevent persistent  hypoglycemia.     - For quite some time, patient has not been monitoring blood sugars because he is no longer receiving meter, test supplies from [facility].  We attempted to establish supplies with  [employer] but medicare is not contracted with them.  Advised patient to call insurance company to see what medical supply company they contract with.  Otherwise, will send meter and  testing supplies to local pharmacy.  He voiced understanding.    Orders:  -     POCT glucose    2.) Hyperlipidemia LDL goal <70 - continue Pravastatin    3.) Essential hypertension - continue Accupril, Cardizem CD    Additional Plan Details:    1.) Patient was instructed to monitor blood glucose 2 x daily, fasting and ac dinner or at bedtime. Discussed ADA goal for fasting blood sugar, 80 - 130 mg/dL; pp blood sugars below 180 mg/dl. Also, discussed prevention of hypoglycemia and the need to adjust goals to higher levels if persistent hypoglycemia.  Reminded to bring BG records or meter to each visit for review.  2.) Reviewed pathophysiology of diabetes, complications related to the disease, importance of annual dilated eye exam and daily foot examination.  3.) We discussed the ADA recommendations: Hemoglobin A1c below 7.0 %. All patients with diabetes should be on statins unless contraindicated.  ACE or ARB therapy if not contraindicated.    4.) Meal planning teaching: Carbohydrate definition - one serving is 15 gms. Carbohydrate spacing - carbohydrates should be spaced into approximately 3 meals with 2 snacks ( of one carbohydrate ) between meals or at bedtime. Increase vegetable intake to 2 or more cups of vegetables per day as well as 2 fruit servings. Recommended low saturated fat, low sodium diet to aid in control of hypertension and cholesterol.  5.) Discussed activity, benefits, methods, and precautions. Recommended patient start or continue some form of exercise and increase as tolerated to 30 minutes per day to facilitate weight loss  and aid in control of BGs.  6.) Return to clinic in 3 months for follow up. He was explained the above plan and given opportunity to ask questions. Advised to call clinic with any further questions or concerns.    Gabrielle Vee, PRAKASHC, CDE    A total of 30 minutes was spent in face to face time, of which over 50 % was spent in counseling patient on disease process, complications, treatment, and side effects of medications.

## 2018-05-10 ENCOUNTER — HOSPITAL ENCOUNTER (OUTPATIENT)
Dept: RADIOLOGY | Facility: HOSPITAL | Age: 73
Discharge: HOME | End: 2018-05-10
Attending: INTERNAL MEDICINE
Payer: MEDICARE

## 2018-05-10 ENCOUNTER — OFFICE VISIT (OUTPATIENT)
Dept: PULMONOLOGY | Facility: CLINIC | Age: 73
End: 2018-05-10
Payer: MEDICARE

## 2018-05-10 VITALS
SYSTOLIC BLOOD PRESSURE: 100 MMHG | DIASTOLIC BLOOD PRESSURE: 62 MMHG | OXYGEN SATURATION: 93 % | HEART RATE: 91 BPM | RESPIRATION RATE: 18 BRPM | HEIGHT: 69 IN | BODY MASS INDEX: 29.57 KG/M2 | WEIGHT: 199.63 LBS

## 2018-05-10 DIAGNOSIS — J43.2 CENTRILOBULAR EMPHYSEMA: Chronic | ICD-10-CM

## 2018-05-10 DIAGNOSIS — J43.2 CENTRILOBULAR EMPHYSEMA: Primary | Chronic | ICD-10-CM

## 2018-05-10 PROBLEM — Z01.811 PREOPERATIVE RESPIRATORY EXAMINATION: Status: RESOLVED | Noted: 2017-11-10 | Resolved: 2018-05-10

## 2018-05-10 PROCEDURE — 99999 PR PBB SHADOW E&M-EST. PATIENT-LVL III: CPT | Mod: PBBFAC,,, | Performed by: INTERNAL MEDICINE

## 2018-05-10 PROCEDURE — 71046 X-RAY EXAM CHEST 2 VIEWS: CPT | Mod: 26,,, | Performed by: RADIOLOGY

## 2018-05-10 PROCEDURE — 99214 OFFICE O/P EST MOD 30 MIN: CPT | Mod: S$PBB,,, | Performed by: INTERNAL MEDICINE

## 2018-05-10 PROCEDURE — 71046 X-RAY EXAM CHEST 2 VIEWS: CPT | Mod: TC

## 2018-05-10 PROCEDURE — 99213 OFFICE O/P EST LOW 20 MIN: CPT | Mod: PBBFAC,25 | Performed by: INTERNAL MEDICINE

## 2018-05-10 RX ORDER — TAMSULOSIN HYDROCHLORIDE 0.4 MG/1
0.4 CAPSULE ORAL NIGHTLY
Refills: 11 | COMMUNITY
Start: 2018-04-21 | End: 2018-08-27

## 2018-05-10 RX ORDER — APIXABAN 5 MG/1
5 TABLET, FILM COATED ORAL 2 TIMES DAILY
Refills: 6 | COMMUNITY
Start: 2018-04-27 | End: 2018-08-27 | Stop reason: SDUPTHER

## 2018-05-10 NOTE — PATIENT INSTRUCTIONS
Umeclidinium; Vilanterol inhalation powder  What is this medicine?  UMECLIDINIUM; VILANTEROL (ue MEK li DIN ee um; vye DAVIE ter ol) inhalation is a combination of two medicines that decrease inflammation and help to open up the airways of your lungs. It is for chronic obstructive pulmonary disease (COPD), including chronic bronchitis or emphysema. Do NOT use for asthma or an acute asthma attack. Do NOT use for a COPD attack.  How should I use this medicine?  This medicine is inhaled through the mouth. It is used once per day. Follow the directions on the prescription label. Do not use a spacer device with this inhaler. Take your medicine at regular intervals. Do not take your medicine more often than directed. Do not stop taking except on your doctor's advice. Make sure that you are using your inhaler correctly. Ask you doctor or health care provider if you have any questions.  A special MedGuide will be given to you by the pharmacist with each prescription and refill. Be sure to read this information carefully each time.  Talk to your pediatrician regarding the use of this medicine in children. Special care may be needed.  What side effects may I notice from receiving this medicine?  Side effects that you should report to your doctor or health care professional as soon as possible:  · allergic reactions like skin rash or hives, swelling of the face, lips, or tongue  · breathing problems right after inhaling your medicine  · changes in vision  · chest pain  · fast, irregular heartbeat  · feeling faint or lightheaded, falls  · fever or chills  · nausea, vomiting  · trouble passing urine or change in the amount of urine  Side effects that usually do not require medical attention (Report these to your doctor or health care professional if they continue or are bothersome.):  · constipation  · cough  · diarrhea  · headache  · muscle cramps  · nervousness  · sore throat  · tremor  What may interact with this  medicine?  Do not take this medicine with any of the following medications:  · cisapride  · dofetilide  · dronedarone  · MAOIs like Carbex, Eldepryl, Marplan, Nardil, and Parnate  · pimozide  · thioridazine  · ziprasidone  This medicine may also interact with the following medications:  · antihistamines for allergy  · antiviral medicines for HIV or AIDS  · atropine  · beta-blockers like metoprolol and propranolol  · certain medicines for bladder problems like oxybutynin, tolterodine  · certain medicines for depression, anxiety, or psychotic disturbances  · certain medicines for Parkinson's disease like benztropine, trihexyphenidyl  · certain medicines for stomach problems like dicyclomine, hyoscyamine  · certain medicines for travel sickness like scopolamine  · diuretics  · ipratropium  · medicines for colds  · medicines for fungal infections like ketoconazole and itraconazole  · other medicines for breathing problems  · other medicines that prolong the QT interval (cause an abnormal heart rhythm)  · tiotropium  What if I miss a dose?  If you miss a dose, use it as soon as you can. If it is almost time for your next dose, use only that dose and continue with your regular schedule. Do not use double or extra doses.  Where should I keep my medicine?  Keep out of the reach of children.  Store at room temperature between 15 and 30 degrees C (59 and 86 degrees F). Store in a dry place away from direct heat or sunlight. Throw away 6 weeks after you remove the inhaler from the foil tray, or after the dose indicator reads 0, whichever comes first. Throw away any unopened packages after the expiration date.  What should I tell my health care provider before I take this medicine?  They need to know if you have any of these conditions:  · bladder problems or difficulty passing urine  · diabetes  · glaucoma  · heart disease or irregular heartbeat  · high blood pressure  · kidney disease  · pheochromocytoma  · prostate  disease  · seizures  · thyroid disease  · an unusual or allergic reaction to umeclidinium, vilanterol, lactose, milk proteins, other medicines, foods, dyes, or preservatives  · pregnant or trying to get pregnant  · breast-feeding  What should I watch for while using this medicine?  Visit your doctor or health care professional for regular checkups. Tell your doctor or health care professional if your symptoms do not get better.  If your symptoms get worse or if you need your short-acting inhalers more often, call your doctor right away. Do not use this medicine more than once every 24 hours.  NOTE:This sheet is a summary. It may not cover all possible information. If you have questions about this medicine, talk to your doctor, pharmacist, or health care provider. Copyright© 2017 [employer]

## 2018-05-10 NOTE — PROGRESS NOTES
Subjective:      Patient ID: Nico Garza Jr. is a 72 y.o. male.  Patient Active Problem List   Diagnosis    COPD (chronic obstructive pulmonary disease) with emphysema    Atrial flutter with rapid ventricular response    History of cigarette smoking    Hypertension goal BP (blood pressure) < 130/80    Hyperlipidemia LDL goal <70    Paroxysmal atrial fibrillation    Atrial flutter    Type 1 diabetes mellitus with diabetic neuropathy     Problem list has been reviewed.    Chief Complaint: COPD      Group Spirometric Classification Exacerbations / year mMRC CAT   Group B: Low risk; more symptoms  GOLD 1- 2  < = 1 >= 2 >=10     FEV1 => 2.07 L ( 68 % predicted)         HPI      Patients reports NYHA II dyspnea    The patient does not have currently have symptoms / an exacerbation.       No recent change in breathing.       COPD QUESTIONNAIRE  How often do you cough?: (P) Some of the time  How often do you have phlegm (mucus) in your chest?: (P) A little of the time  How often does your chest feel tight?: (P) Almost never  When you walk up a hill or one flight of stairs, how often are you breathless?: (P) Almost never  How often are you limited doing any activities at home?: (P) Almost never  How often are you confident leaving the house despite your lung condition?: (P) All of the time  How often do you sleep soundly?: (P) Almost never  How often do you have energy?: (P) Almost never  Total score: (P) 16       He is currently not on any inhaler.     A full  review of systems, past , family  and social histories was performed except as mentioned in the note above, these are non contributory to the main issues discussed today.     Previous Report Reviewed: office notes and radiology reports     The following portions of the patient's history were reviewed and updated as appropriate: He  has a past medical history of Arthritis; Atrial fibrillation and flutter; Atrial flutter; COPD (chronic obstructive pulmonary  disease); Diabetes mellitus; Hyperlipidemia; Hypertension; Lung disease; Neuropathy, diabetic; and Pancreatitis.  He  has a past surgical history that includes Cholecystectomy; Biceps tendon repair; Patella surgery; Rotator cuff repair; Cardioversion; and Radiofrequency ablation.  His family history includes Cancer in his maternal aunt; Diabetes in his mother, sister, sister, and sister; Heart attack in his brother; Heart failure in his brother; Hypertension in his father and mother; Stroke in his father, mother, paternal grandfather, and paternal grandmother.  He  reports that he quit smoking about 3 years ago. His smoking use included Cigarettes. He smoked 0.50 packs per day. He quit smokeless tobacco use about 22 years ago. His smokeless tobacco use included Snuff. He reports that he does not drink alcohol or use drugs.  He has a current medication list which includes the following prescription(s): b complex vitamins, blood sugar diagnostic, blood-glucose meter, diltiazem, duloxetine, eliquis, flecainide, gabapentin, insulin degludec, insulin lispro, pen needle, diabetic, multivitamin, needles, disposable, omeprazole, pravastatin, quinapril, tamsulosin, tramadol, vitamin d, and umeclidinium-vilanterol.  He is allergic to flomax [tamsulosin]..    Review of Systems   Constitutional: Positive for fatigue. Negative for fever and chills.   HENT: Positive for sinus pressure and congestion. Negative for nosebleeds.    Eyes: Negative for itching.   Respiratory: Negative for cough, shortness of breath, wheezing and dyspnea on extertion.    Cardiovascular: Negative for chest pain and palpitations.   Genitourinary: Negative for difficulty urinating and hematuria.   Endocrine: Negative for cold intolerance and heat intolerance.    Musculoskeletal: Negative for arthralgias and back pain.   Skin: Positive for rash.   Gastrointestinal: Positive for acid reflux. Negative for nausea, vomiting and abdominal pain.   Neurological:  "Positive for light-headedness. Negative for dizziness and headaches.   Hematological: No excessive bruising.   Psychiatric/Behavioral: The patient is not nervous/anxious.         Objective:   /62   Pulse 91   Resp 18   Ht 5' 8.5" (1.74 m)   Wt 90.5 kg (199 lb 10 oz)   SpO2 (!) 93%   BMI 29.91 kg/m²   Body mass index is 29.91 kg/m².    Physical Exam   Constitutional: He is oriented to person, place, and time. He appears well-developed and well-nourished.   HENT:   Head: Normocephalic and atraumatic.   Eyes: EOM are normal. Pupils are equal, round, and reactive to light.   Neck: Normal range of motion. No tracheal deviation present.   Cardiovascular: Normal rate, regular rhythm and intact distal pulses.  Exam reveals no friction rub.    No murmur heard.  Pulmonary/Chest: Effort normal and breath sounds normal. He has no wheezes. He has no rales.   Abdominal: Soft. Bowel sounds are normal. He exhibits no distension. There is no tenderness.   Musculoskeletal: Normal range of motion. He exhibits no edema or deformity.   Neurological: He is alert and oriented to person, place, and time.   Skin: Skin is warm and dry. No rash noted.   Psychiatric: He has a normal mood and affect. His behavior is normal.       Personal Diagnostic Review  Chest x-ray: The lungs are clear and free of infiltrate.  No pleural effusion or pneumothorax. The heart is not enlarged. There is some stable minimal scarring noted within the left midlung zone.  Chronic increased peribronchial markings noted throughout the chest.    Assessment / plan:         Discussed diagnosis, its evaluation, treatment and usual course. All questions answered.      Problem List Items Addressed This Visit        Pulmonary    COPD (chronic obstructive pulmonary disease) with emphysema - Primary (Chronic)    Current Assessment & Plan     COPD ROS: taking medications as instructed, no medication side effects noted, no significant ongoing wheezing or shortness of " breath, using bronchodilator MDI less than twice a week.   New concerns: None.   Exam: appears well, vitals normal, no respiratory distress, acyanotic, normal RR, chest clear, no wheezing, crepitations, rhonchi, normal symmetric air entry.   Assessment:  COPD stable.   Plan: Start ANORO. Orders in EMR. PFT in 6 months.            Relevant Medications    umeclidinium-vilanterol (ANORO ELLIPTA) 62.5-25 mcg/actuation DsDv    Other Relevant Orders    Complete PFT with bronchodilator          TIME SPENT WITH PATIENT: Time spent: 35 minutes in face to face  discussion concerning diagnosis, prognosis, review of lab and test results, benefits of treatment as well as management of disease, counseling of patient and coordination of care between various health  care providers . Greater than half the time spent was used for coordination of care and counseling of patient.     Follow-up in about 6 months (around 11/10/2018) for COPD.

## 2018-05-10 NOTE — ASSESSMENT & PLAN NOTE
COPD ROS: taking medications as instructed, no medication side effects noted, no significant ongoing wheezing or shortness of breath, using bronchodilator MDI less than twice a week.   New concerns: None.   Exam: appears well, vitals normal, no respiratory distress, acyanotic, normal RR, chest clear, no wheezing, crepitations, rhonchi, normal symmetric air entry.   Assessment:  COPD stable.   Plan: Start ANORO. Orders in EMR. PFT in 6 months.

## 2018-05-22 ENCOUNTER — TELEPHONE (OUTPATIENT)
Dept: INTERNAL MEDICINE | Facility: CLINIC | Age: 73
End: 2018-05-22

## 2018-05-22 NOTE — TELEPHONE ENCOUNTER
----- Message from Shagufta Liu sent at 5/22/2018  9:28 AM CDT -----  Contact: self/[phone]  Would like to consult with nurse regarding a referral for pain management, please call back at [phone]. Thanks/ar

## 2018-06-08 ENCOUNTER — HOSPITAL ENCOUNTER (EMERGENCY)
Facility: HOSPITAL | Age: 73
Discharge: HOME | End: 2018-06-08
Attending: EMERGENCY MEDICINE
Payer: MEDICARE

## 2018-06-08 VITALS
RESPIRATION RATE: 24 BRPM | SYSTOLIC BLOOD PRESSURE: 125 MMHG | BODY MASS INDEX: 30.41 KG/M2 | HEIGHT: 68 IN | HEART RATE: 76 BPM | OXYGEN SATURATION: 97 % | TEMPERATURE: 98 F | WEIGHT: 200.63 LBS | DIASTOLIC BLOOD PRESSURE: 58 MMHG

## 2018-06-08 DIAGNOSIS — R07.9 CHEST PAIN: ICD-10-CM

## 2018-06-08 LAB
ALBUMIN SERPL BCP-MCNC: 3.6 G/DL
ALP SERPL-CCNC: 79 U/L
ALT SERPL W/O P-5'-P-CCNC: 19 U/L
ANION GAP SERPL CALC-SCNC: 14 MMOL/L
AST SERPL-CCNC: 15 U/L
BASOPHILS # BLD AUTO: 0.01 K/UL
BASOPHILS NFR BLD: 0.1 %
BILIRUB SERPL-MCNC: 0.4 MG/DL
BNP SERPL-MCNC: 32 PG/ML
BUN SERPL-MCNC: 22 MG/DL
CALCIUM SERPL-MCNC: 9.8 MG/DL
CHLORIDE SERPL-SCNC: 105 MMOL/L
CO2 SERPL-SCNC: 24 MMOL/L
CREAT SERPL-MCNC: 1.1 MG/DL
DIFFERENTIAL METHOD: ABNORMAL
EOSINOPHIL # BLD AUTO: 0.2 K/UL
EOSINOPHIL NFR BLD: 1.4 %
ERYTHROCYTE [DISTWIDTH] IN BLOOD BY AUTOMATED COUNT: 14.4 %
EST. GFR  (AFRICAN AMERICAN): >60 ML/MIN/1.73 M^2
EST. GFR  (NON AFRICAN AMERICAN): >60 ML/MIN/1.73 M^2
GLUCOSE SERPL-MCNC: 151 MG/DL
HCT VFR BLD AUTO: 42.4 %
HGB BLD-MCNC: 14.1 G/DL
LYMPHOCYTES # BLD AUTO: 1.8 K/UL
LYMPHOCYTES NFR BLD: 15.4 %
MCH RBC QN AUTO: 29.8 PG
MCHC RBC AUTO-ENTMCNC: 33.3 G/DL
MCV RBC AUTO: 90 FL
MONOCYTES # BLD AUTO: 0.8 K/UL
MONOCYTES NFR BLD: 6.9 %
NEUTROPHILS # BLD AUTO: 9 K/UL
NEUTROPHILS NFR BLD: 76.2 %
PLATELET # BLD AUTO: 278 K/UL
PMV BLD AUTO: 9.5 FL
POTASSIUM SERPL-SCNC: 3.9 MMOL/L
PROT SERPL-MCNC: 7.7 G/DL
RBC # BLD AUTO: 4.73 M/UL
SODIUM SERPL-SCNC: 143 MMOL/L
TROPONIN I SERPL DL<=0.01 NG/ML-MCNC: <0.006 NG/ML
TROPONIN I SERPL DL<=0.01 NG/ML-MCNC: <0.006 NG/ML
WBC # BLD AUTO: 11.82 K/UL

## 2018-06-08 PROCEDURE — 99284 EMERGENCY DEPT VISIT MOD MDM: CPT | Mod: 25

## 2018-06-08 PROCEDURE — 25000003 PHARM REV CODE 250: Performed by: EMERGENCY MEDICINE

## 2018-06-08 PROCEDURE — 85025 COMPLETE CBC W/AUTO DIFF WBC: CPT

## 2018-06-08 PROCEDURE — 84484 ASSAY OF TROPONIN QUANT: CPT

## 2018-06-08 PROCEDURE — 63600175 PHARM REV CODE 636 W HCPCS: Performed by: EMERGENCY MEDICINE

## 2018-06-08 PROCEDURE — 36415 COLL VENOUS BLD VENIPUNCTURE: CPT

## 2018-06-08 PROCEDURE — 96374 THER/PROPH/DIAG INJ IV PUSH: CPT

## 2018-06-08 PROCEDURE — 80053 COMPREHEN METABOLIC PANEL: CPT

## 2018-06-08 PROCEDURE — 83880 ASSAY OF NATRIURETIC PEPTIDE: CPT

## 2018-06-08 PROCEDURE — 93010 ELECTROCARDIOGRAM REPORT: CPT | Mod: ,,, | Performed by: INTERNAL MEDICINE

## 2018-06-08 PROCEDURE — 93005 ELECTROCARDIOGRAM TRACING: CPT

## 2018-06-08 RX ORDER — NITROGLYCERIN 0.4 MG/1
0.4 TABLET SUBLINGUAL
Status: COMPLETED | OUTPATIENT
Start: 2018-06-08 | End: 2018-06-08

## 2018-06-08 RX ORDER — ASPIRIN 325 MG
325 TABLET ORAL
Status: COMPLETED | OUTPATIENT
Start: 2018-06-08 | End: 2018-06-08

## 2018-06-08 RX ORDER — ACETAMINOPHEN 10 MG/ML
1000 INJECTION, SOLUTION INTRAVENOUS ONCE
Status: COMPLETED | OUTPATIENT
Start: 2018-06-08 | End: 2018-06-08

## 2018-06-08 RX ADMIN — NITROGLYCERIN 0.4 MG: 0.4 TABLET SUBLINGUAL at 02:06

## 2018-06-08 RX ADMIN — ASPIRIN 325 MG ORAL TABLET 325 MG: 325 PILL ORAL at 01:06

## 2018-06-08 RX ADMIN — ACETAMINOPHEN 1000 MG: 10 INJECTION, SOLUTION INTRAVENOUS at 02:06

## 2018-06-08 NOTE — ED NOTES
Pt updated on his plan of care. Pt AOOX3, lying on hospital stretcher. NADN. Call light within reach. Will continue to monitor pt.

## 2018-06-08 NOTE — ED PROVIDER NOTES
"SCRIBE #1 NOTE: I, Nimisha Guerrero, am scribing for, and in the presence of, Kee Ford MD. I have scribed the entire note.      History      Chief Complaint   Patient presents with    Chest Pain     Midsternal, starting this PM.       Review of patient's allergies indicates:   Allergen Reactions    Flomax [tamsulosin]      Dizzy          HPI   HPI    6/8/2018, 1:40 AM   History obtained from the patient      History of Present Illness: Nico Garza Jr. is a 72 y.o. male patient with PMHx of Afib, DM, and HTN who presents to the Emergency Department for midsteral CP which onset gradually at 9pm. Patient states pain onset while getting ready for bed. Patient states he has been remodeling his house. Symptoms are constant and moderate in severity. The patient describes the sxs as "pressure". Patient rates pain 8/10 in severity. No mitigating factors reported. Sxs exacerbated when lying on left side. Associated sxs include R shoulder pain. Patient denies any SOB, diaphoresis, palpitations, extremity weakness/numbness, leg swelling, dizziness, cough, N/V, recent travel, long car rides, and all other sxs at this time. PSHx includes ablation. Patient reports being on Eliquis and Flecainide for afib. No further complaints or concerns at this time.       Arrival mode: Personal vehicle      PCP: Tiffany Davis DO       Past Medical History:  Past Medical History:   Diagnosis Date    Arthritis     Atrial fibrillation and flutter     Atrial flutter     COPD (chronic obstructive pulmonary disease)     Diabetes mellitus     Hyperlipidemia     Hypertension     Lung disease     Neuropathy, diabetic     Pancreatitis        Past Surgical History:  Past Surgical History:   Procedure Laterality Date    BICEPS TENDON REPAIR      CARDIOVERSION      CHOLECYSTECTOMY      PATELLA SURGERY      RADIOFREQUENCY ABLATION      ROTATOR CUFF REPAIR           Family History:  Family History   Problem Relation Age of Onset    Heart " failure Brother     Heart attack Brother     Diabetes Mother     Hypertension Mother     Stroke Mother     Hypertension Father     Stroke Father     Diabetes Sister     Cancer Maternal Aunt     Diabetes Sister     Diabetes Sister     Stroke Paternal Grandmother     Stroke Paternal Grandfather        Social History:  Social History     Social History Main Topics    Smoking status: Former Smoker     Packs/day: 0.50     Types: Cigarettes     Quit date: 3/8/2015    Smokeless tobacco: Former User     Types: Snuff     Quit date: 10/7/1995    Alcohol use No    Drug use: No    Sexual activity: Unknown       ROS   Review of Systems   Constitutional: Negative for diaphoresis and fever.   HENT: Negative for sore throat.    Respiratory: Negative for cough and shortness of breath.    Cardiovascular: Positive for chest pain (midsternal). Negative for palpitations and leg swelling.   Gastrointestinal: Negative for nausea and vomiting.   Genitourinary: Negative for dysuria.   Musculoskeletal: Negative for back pain.   Skin: Negative for rash.   Neurological: Negative for dizziness, weakness and numbness.   Hematological: Does not bruise/bleed easily.   All other systems reviewed and are negative.      Physical Exam      Initial Vitals [06/08/18 0121]   BP Pulse Resp Temp SpO2   (!) 170/82 85 20 97.8 °F (36.6 °C) (!) 94 %      MAP       111.33          Physical Exam  Nursing Notes and Vital Signs Reviewed.  Constitutional: Patient is in no acute distress. Well-developed and well-nourished.  Head: Atraumatic. Normocephalic.  Eyes: PERRL. EOM intact. Conjunctivae are not pale. No scleral icterus.  ENT: Mucous membranes are moist. Oropharynx is clear and symmetric.    Neck: Supple. Full ROM. No lymphadenopathy.  Cardiovascular: Regular rate. Regular rhythm. No murmurs, rubs, or gallops. Distal pulses are 2+ and symmetric.  Pulmonary/Chest: No respiratory distress. Clear to auscultation bilaterally. No wheezing or  "rales.  Abdominal: Soft and non-distended.  There is no tenderness.  No rebound, guarding, or rigidity. Good bowel sounds.  Genitourinary: No CVA tenderness  Musculoskeletal: Moves all extremities. No obvious deformities. No edema. No calf tenderness.  Skin: Warm and dry.  Neurological:  Alert, awake, and appropriate.  Normal speech.  No acute focal neurological deficits are appreciated.  Psychiatric: Normal affect. Good eye contact. Appropriate in content.    ED Course    Procedures  ED Vital Signs:  Vitals:    06/08/18 0121 06/08/18 0147 06/08/18 0332 06/08/18 0401   BP: (!) 170/82 135/67 (!) 141/71 135/67   Pulse: 85 74 71 69   Resp: 20 (!) 25 (!) 26 20   Temp: 97.8 °F (36.6 °C)      TempSrc: Oral      SpO2: (!) 94% (!) 93% 100% (!) 94%   Weight: 91 kg (200 lb 9.9 oz)      Height: 5' 8" (1.727 m)       06/08/18 0431 06/08/18 0546   BP: (!) 144/66 (!) 125/58   Pulse: 80 76   Resp: (!) 25 (!) 24   Temp:     TempSrc:     SpO2: 98% 97%   Weight:     Height:         Abnormal Lab Results:  Labs Reviewed   CBC W/ AUTO DIFFERENTIAL - Abnormal; Notable for the following:        Result Value    Gran # (ANC) 9.0 (*)     Gran% 76.2 (*)     Lymph% 15.4 (*)     All other components within normal limits   COMPREHENSIVE METABOLIC PANEL - Abnormal; Notable for the following:     Glucose 151 (*)     All other components within normal limits   TROPONIN I   TROPONIN I   B-TYPE NATRIURETIC PEPTIDE        All Lab Results:  Results for orders placed or performed during the hospital encounter of 06/08/18   CBC auto differential   Result Value Ref Range    WBC 11.82 3.90 - 12.70 K/uL    RBC 4.73 4.60 - 6.20 M/uL    Hemoglobin 14.1 14.0 - 18.0 g/dL    Hematocrit 42.4 40.0 - 54.0 %    MCV 90 82 - 98 fL    MCH 29.8 27.0 - 31.0 pg    MCHC 33.3 32.0 - 36.0 g/dL    RDW 14.4 11.5 - 14.5 %    Platelets 278 150 - 350 K/uL    MPV 9.5 9.2 - 12.9 fL    Gran # (ANC) 9.0 (H) 1.8 - 7.7 K/uL    Lymph # 1.8 1.0 - 4.8 K/uL    Mono # 0.8 0.3 - 1.0 K/uL    " Eos # 0.2 0.0 - 0.5 K/uL    Baso # 0.01 0.00 - 0.20 K/uL    Gran% 76.2 (H) 38.0 - 73.0 %    Lymph% 15.4 (L) 18.0 - 48.0 %    Mono% 6.9 4.0 - 15.0 %    Eosinophil% 1.4 0.0 - 8.0 %    Basophil% 0.1 0.0 - 1.9 %    Differential Method Automated    Comprehensive metabolic panel   Result Value Ref Range    Sodium 143 136 - 145 mmol/L    Potassium 3.9 3.5 - 5.1 mmol/L    Chloride 105 95 - 110 mmol/L    CO2 24 23 - 29 mmol/L    Glucose 151 (H) 70 - 110 mg/dL    BUN, Bld 22 8 - 23 mg/dL    Creatinine 1.1 0.5 - 1.4 mg/dL    Calcium 9.8 8.7 - 10.5 mg/dL    Total Protein 7.7 6.0 - 8.4 g/dL    Albumin 3.6 3.5 - 5.2 g/dL    Total Bilirubin 0.4 0.1 - 1.0 mg/dL    Alkaline Phosphatase 79 55 - 135 U/L    AST 15 10 - 40 U/L    ALT 19 10 - 44 U/L    Anion Gap 14 8 - 16 mmol/L    eGFR if African American >60 >60 mL/min/1.73 m^2    eGFR if non African American >60 >60 mL/min/1.73 m^2   Troponin I #1   Result Value Ref Range    Troponin I <0.006 0.000 - 0.026 ng/mL   Troponin I #2   Result Value Ref Range    Troponin I <0.006 0.000 - 0.026 ng/mL   B-Type natriuretic peptide (BNP)   Result Value Ref Range    BNP 32 0 - 99 pg/mL       Imaging Results:  Imaging Results          X-Ray Chest AP Portable (Final result)  Result time 06/08/18 07:39:12    Final result by Jos Brito MD (06/08/18 07:39:12)                 Impression:      No acute process seen.      Electronically signed by: Jos Brito MD  Date:    06/08/2018  Time:    07:39             Narrative:    EXAMINATION:  XR CHEST AP PORTABLE    CLINICAL HISTORY:  Chest Pain;    FINDINGS:  Single view of the chest.    Cardiac silhouette is normal.  The lungs demonstrate no evidence of active disease.  No evidence of pleural effusion or pneumothorax.  Bones appear intact.                               Ordered, reviewed, and independently interpreted by the ED provider.  Study: X-ray Chest  Findings: NAF    The EKG was ordered, reviewed, and independently interpreted by the ED  provider.  Interpretation time: 0130  Rate: 80 BPM  Rhythm: Sinus rhythm with 1st degree AV block  Interpretation: RBBB. Abnormal ECG. No STEMI.    The EKG was ordered, reviewed, and independently interpreted by the ED provider.  Interpretation time: 0228  Rate: 73 BPM  Rhythm: Sinus rhythm with 1st degree AV block  Interpretation: RBBB. Abnormal ECG. No STEMI.         The Emergency Provider reviewed the vital signs and test results, which are outlined above.    ED Discussion     2:25 AM: Patient called to the nurse's station stating CP has returned.    5:59 AM: Reassessed pt at this time. Discussed with pt all pertinent ED information and results. Discussed pt dx and plan of tx. Gave pt all f/u and return to the ED instructions. All questions and concerns were addressed at this time. Pt expresses understanding of information and instructions, and is comfortable with plan to discharge. Pt is stable for discharge.    I discussed with patient and/or family/caretaker that evaluation in the ED does not suggest any emergent or life threatening medical conditions requiring immediate intervention beyond what was provided in the ED, and I believe patient is safe for discharge.  Regardless, an unremarkable evaluation in the ED does not preclude the development or presence of a serious of life threatening condition. As such, patient was instructed to return immediately for any worsening or change in current symptoms.      ED Medication(s):  Medications   aspirin tablet 325 mg (325 mg Oral Given 6/8/18 0148)   nitroGLYCERIN SL tablet 0.4 mg (0.4 mg Sublingual Given 6/8/18 0230)   acetaminophen (10 mg/mL) injection 1,000 mg (0 mg Intravenous Stopped 6/8/18 0311)       Discharge Medication List as of 6/8/2018  6:06 AM          Follow-up Information     Tiffany Davis DO In 1 day.    Specialty:  Internal Medicine  Contact information:  [address] [place] [address] DR Breezy MONTANEZ [id number] [phone]             [facility]  - [place].    Specialty:  Emergency Medicine  Why:  As needed, If symptoms worsen  Contact information:  [address] [facility] [address] [phone]                   Medical Decision Making    Medical Decision Making:   Clinical Tests:   Lab Tests: Ordered and Reviewed  Radiological Study: Ordered and Reviewed  Medical Tests: Ordered and Reviewed           Scribe Attestation:   Scribe #1: I performed the above scribed service and the documentation accurately describes the services I performed. I attest to the accuracy of the note.    Attending:   Physician Attestation Statement for Scribe #1: I, Kee Ford MD, personally performed the services described in this documentation, as scribed by Nimisha Guerrero, in my presence, and it is both accurate and complete.          Clinical Impression       ICD-10-CM ICD-9-CM   1. Chest pain R07.9 786.50       Disposition:   Disposition: Discharged  Condition: Stable         Kee Ford MD  06/09/18 1924

## 2018-06-29 ENCOUNTER — PATIENT OUTREACH (OUTPATIENT)
Dept: ADMINISTRATIVE | Facility: HOSPITAL | Age: 73
End: 2018-06-29

## 2018-07-03 DIAGNOSIS — K21.9 GASTROESOPHAGEAL REFLUX DISEASE, ESOPHAGITIS PRESENCE NOT SPECIFIED: ICD-10-CM

## 2018-07-03 RX ORDER — OMEPRAZOLE 20 MG/1
CAPSULE, DELAYED RELEASE ORAL
Qty: 90 CAPSULE | Refills: 0 | Status: SHIPPED | OUTPATIENT
Start: 2018-07-03 | End: 2018-07-07 | Stop reason: SDUPTHER

## 2018-07-03 NOTE — TELEPHONE ENCOUNTER
----- Message from Violeta Gaytan sent at 7/3/2018  8:20 AM CDT -----  Contact: Patient  1. What is the name of the medication you are requesting? Omeprozole  2. What is the dose? 20mg  3. How do you take the medication? Orally, topically, etc? orally  4. How often do you take this medication? Once daily  5. Do you need a 30 day or 90 day supply? 90  6. How many refills are you requesting?   7. What is your preferred pharmacy and location of the pharmacy? [facility]  8. Who can we contact with further questions? Patient    1. What is the name of the medication you are requesting? Tramadol  2. What is the dose? 50mg  3. How do you take the medication? Orally, topically, etc? orally  4. How often do you take this medication? Twice daily  5. Do you need a 30 day or 90 day supply?   6. How many refills are you requesting?   7. What is your preferred pharmacy and location of the pharmacy? [facility]  8. Who can we contact with further questions? Patient

## 2018-07-05 DIAGNOSIS — M17.10 ARTHRITIS OF KNEE: ICD-10-CM

## 2018-07-05 DIAGNOSIS — M47.9 ARTHRITIS OF BACK: ICD-10-CM

## 2018-07-06 DIAGNOSIS — K21.9 GASTROESOPHAGEAL REFLUX DISEASE, ESOPHAGITIS PRESENCE NOT SPECIFIED: ICD-10-CM

## 2018-07-06 RX ORDER — TRAMADOL HYDROCHLORIDE 50 MG/1
TABLET ORAL
Qty: 60 TABLET | Refills: 3 | Status: SHIPPED | OUTPATIENT
Start: 2018-07-06 | End: 2018-10-23

## 2018-07-06 NOTE — TELEPHONE ENCOUNTER
The [facility] has been reviewed. There are no irregularities noted.   Tramadol refilled.

## 2018-07-07 RX ORDER — OMEPRAZOLE 20 MG/1
CAPSULE, DELAYED RELEASE ORAL
Qty: 90 CAPSULE | Refills: 2 | Status: SHIPPED | OUTPATIENT
Start: 2018-07-07 | End: 2018-09-14 | Stop reason: SDUPTHER

## 2018-07-12 ENCOUNTER — OFFICE VISIT (OUTPATIENT)
Dept: OPHTHALMOLOGY | Facility: CLINIC | Age: 73
End: 2018-07-12
Payer: MEDICARE

## 2018-07-12 ENCOUNTER — LAB VISIT (OUTPATIENT)
Dept: LAB | Facility: HOSPITAL | Age: 73
End: 2018-07-12
Payer: MEDICARE

## 2018-07-12 DIAGNOSIS — H25.13 CATARACT, NUCLEAR SCLEROTIC SENILE, BILATERAL: ICD-10-CM

## 2018-07-12 DIAGNOSIS — H52.4 BILATERAL PRESBYOPIA: ICD-10-CM

## 2018-07-12 DIAGNOSIS — E78.5 HYPERLIPIDEMIA LDL GOAL <100: ICD-10-CM

## 2018-07-12 DIAGNOSIS — I10 ESSENTIAL HYPERTENSION: ICD-10-CM

## 2018-07-12 DIAGNOSIS — E11.9 DIABETES MELLITUS TYPE 2 WITHOUT RETINOPATHY: Primary | ICD-10-CM

## 2018-07-12 LAB
ALBUMIN SERPL BCP-MCNC: 3.7 G/DL
ALP SERPL-CCNC: 77 U/L
ALT SERPL W/O P-5'-P-CCNC: 21 U/L
ANION GAP SERPL CALC-SCNC: 12 MMOL/L
AST SERPL-CCNC: 17 U/L
BILIRUB SERPL-MCNC: 0.4 MG/DL
BUN SERPL-MCNC: 23 MG/DL
CALCIUM SERPL-MCNC: 9.6 MG/DL
CHLORIDE SERPL-SCNC: 102 MMOL/L
CHOLEST SERPL-MCNC: 98 MG/DL
CHOLEST/HDLC SERPL: 1.9 {RATIO}
CO2 SERPL-SCNC: 29 MMOL/L
CREAT SERPL-MCNC: 0.9 MG/DL
EST. GFR  (AFRICAN AMERICAN): >60 ML/MIN/1.73 M^2
EST. GFR  (NON AFRICAN AMERICAN): >60 ML/MIN/1.73 M^2
ESTIMATED AVG GLUCOSE: 194 MG/DL
GLUCOSE SERPL-MCNC: 99 MG/DL
HBA1C MFR BLD HPLC: 8.4 %
HDLC SERPL-MCNC: 52 MG/DL
HDLC SERPL: 53.1 %
LDLC SERPL CALC-MCNC: 27.2 MG/DL
NONHDLC SERPL-MCNC: 46 MG/DL
POTASSIUM SERPL-SCNC: 4 MMOL/L
PROT SERPL-MCNC: 8 G/DL
SODIUM SERPL-SCNC: 143 MMOL/L
TRIGL SERPL-MCNC: 94 MG/DL

## 2018-07-12 PROCEDURE — 80061 LIPID PANEL: CPT

## 2018-07-12 PROCEDURE — 99212 OFFICE O/P EST SF 10 MIN: CPT | Mod: PBBFAC | Performed by: OPTOMETRIST

## 2018-07-12 PROCEDURE — 92014 COMPRE OPH EXAM EST PT 1/>: CPT | Mod: S$PBB,,, | Performed by: OPTOMETRIST

## 2018-07-12 PROCEDURE — 99999 PR PBB SHADOW E&M-EST. PATIENT-LVL II: CPT | Mod: PBBFAC,,, | Performed by: OPTOMETRIST

## 2018-07-12 PROCEDURE — 36415 COLL VENOUS BLD VENIPUNCTURE: CPT

## 2018-07-12 PROCEDURE — 83036 HEMOGLOBIN GLYCOSYLATED A1C: CPT

## 2018-07-12 PROCEDURE — 92015 DETERMINE REFRACTIVE STATE: CPT | Mod: ,,, | Performed by: OPTOMETRIST

## 2018-07-12 PROCEDURE — 80053 COMPREHEN METABOLIC PANEL: CPT

## 2018-07-12 RX ORDER — DILTIAZEM HYDROCHLORIDE 360 MG/1
CAPSULE, EXTENDED RELEASE ORAL
Qty: 90 CAPSULE | Refills: 0 | Status: ON HOLD | OUTPATIENT
Start: 2018-07-12 | End: 2018-10-29 | Stop reason: SDUPTHER

## 2018-07-12 NOTE — PATIENT INSTRUCTIONS
Diabetes mellitus type 2 without retinopathy    Cataract, nuclear sclerotic senile, bilateral    Bilateral presbyopia      No Background Diabetic Retinopathy    Significant cataracts OU, pt defers the cataract evaluation consult.    Dispense Final Rx for glasses.  RTC 1 year, sooner if decreased vision  Discussed above and answered questions.

## 2018-07-12 NOTE — PROGRESS NOTES
HPI     NIDDM exam.  Hard to see small print on the television screen with glasses.  Last eye exam 07/06/2017 TRF.  Update glasses RX.    Last edited by Hannah Muller on 7/12/2018 10:12 AM. (History)            Assessment /Plan     For exam results, see Encounter Report.    Diabetes mellitus type 2 without retinopathy    Cataract, nuclear sclerotic senile, bilateral    Bilateral presbyopia      No Background Diabetic Retinopathy    Significant cataracts OU, pt defers the cataract evaluation consult.    Dispense Final Rx for glasses.  RTC 1 year, sooner if decreased vision  Discussed above and answered questions.

## 2018-07-13 ENCOUNTER — OFFICE VISIT (OUTPATIENT)
Dept: INTERNAL MEDICINE | Facility: CLINIC | Age: 73
End: 2018-07-13
Payer: MEDICARE

## 2018-07-13 VITALS
SYSTOLIC BLOOD PRESSURE: 120 MMHG | HEIGHT: 68 IN | BODY MASS INDEX: 29.44 KG/M2 | TEMPERATURE: 98 F | DIASTOLIC BLOOD PRESSURE: 64 MMHG | OXYGEN SATURATION: 93 % | WEIGHT: 194.25 LBS | HEART RATE: 78 BPM

## 2018-07-13 DIAGNOSIS — J44.9 CHRONIC OBSTRUCTIVE PULMONARY DISEASE, UNSPECIFIED COPD TYPE: ICD-10-CM

## 2018-07-13 DIAGNOSIS — I10 HYPERTENSION GOAL BP (BLOOD PRESSURE) < 130/80: Primary | ICD-10-CM

## 2018-07-13 DIAGNOSIS — I48.0 PAROXYSMAL ATRIAL FIBRILLATION: ICD-10-CM

## 2018-07-13 DIAGNOSIS — M25.50 ARTHRALGIA OF MULTIPLE SITES: ICD-10-CM

## 2018-07-13 PROCEDURE — 99999 PR PBB SHADOW E&M-EST. PATIENT-LVL III: CPT | Mod: PBBFAC,,, | Performed by: INTERNAL MEDICINE

## 2018-07-13 PROCEDURE — 99213 OFFICE O/P EST LOW 20 MIN: CPT | Mod: PBBFAC | Performed by: INTERNAL MEDICINE

## 2018-07-13 PROCEDURE — 99214 OFFICE O/P EST MOD 30 MIN: CPT | Mod: S$PBB,,, | Performed by: INTERNAL MEDICINE

## 2018-07-13 NOTE — PROGRESS NOTES
Subjective:       Patient ID: Nico Garza Jr. is a 72 y.o. male.    Chief Complaint: Follow-up    Nico Garza Jr.  72 y.o. White male    Patient presents with:  Follow-up    HPI: Here today to follow up on chronic conditions.  HTN--stable on quinapril and diltiazem.                       LDLCALC                  27.2 (L)            07/12/2018            Diabetes--uncontrolled. He has not followed up with diabetes management and does not have an appointment.                     HGBA1C                   8.4 (H)             07/12/2018            Neuropathy--stable on gabapentin.   COPD--he has occasional shortness of breath. He is taking Anoro. He is managed by pulmonology.   Atrial fibrillation--paroxsymal. He is managed by cardiology. Recent EKG showed SR.   Joint pain--multiple joints. Primarily in the knees and back. He is taking tramadol but continues to have symptoms. He is considering pain management.  He will talk with his daughter and let me know. He has seen them in the past and had back injections.   He states he had a colonoscopy at an outside center approximately 6 years ago. The results are not in his records.     Past Medical History:  Arthritis  Atrial fibrillation and flutter  Atrial flutter  COPD (chronic obstructive pulmonary disease)  Diabetes mellitus  Hyperlipidemia  Hypertension  Lung disease  Neuropathy, diabetic  Pancreatitis    Current Outpatient Prescriptions on File Prior to Visit:  b complex vitamins tablet, Take 1 tablet by mouth once daily., Disp: , Rfl:    blood sugar diagnostic Strp, 1 each by Misc.(Non-Drug; Combo Route) route 3 (three) times daily. Dispense preferred on insurance, Disp: 100 strip, Rfl: 11  blood-glucose meter kit, Use as instructed - preferred on insurance, Disp: 1 each, Rfl: 0  diltiaZEM (CARDIZEM CD) 360 MG 24 hr capsule, TAKE 1 CAPSULE(360 MG) BY MOUTH EVERY DAY, Disp: 90 capsule, Rfl: 0  duloxetine (CYMBALTA) 30 MG capsule, TAKE 1 CAPSULE(30 MG) BY MOUTH  "EVERY DAY, Disp: 30 capsule, Rfl: 3  ELIQUIS 5 mg Tab, 5 mg 2 (two) times daily. , Disp: , Rfl: 6  flecainide (TAMBOCOR) 100 MG Tab, Take 1 tablet (100 mg total) by mouth every 12 (twelve) hours., Disp: 60 tablet, Rfl: 11  gabapentin (NEURONTIN) 300 MG capsule, TAKE 2 CAPSULES BY MOUTH TWICE DAILY, Disp: 360 capsule, Rfl: 0  insulin lispro (HUMALOG KWIKPEN) 100 unit/mL InPn pen, ADMINISTER 17 UNITS UNDER THE SKIN THREE TIMES DAILY BEFORE MEALS (Patient taking differently: ADMINISTER 17 UNITS UNDER THE SKIN IN MORNING AND 25 UNITS NOON AND NIGHT), Disp: 45 mL, Rfl: 0  insulin needles, disposable, 32 x 1/4 " Ndle, Insulin injections four times per day., Disp: 400 each, Rfl: 3  multivitamin capsule, Take 1 capsule by mouth once daily., Disp: , Rfl:   NEEDLES, DISPOSABLE (DISPOSABLE NEEDLES MISC), Inject 1 each into the skin 3 (three) times daily., Disp: , Rfl:   omeprazole (PRILOSEC) 20 MG capsule, TAKE 1 CAPSULE BY MOUTH EVERY DAY, Disp: 90 capsule, Rfl: 2  pravastatin (PRAVACHOL) 40 MG tablet, TAKE 1 TABLET DAILY (Patient taking differently: TAKE 1 TABLET HS), Disp: 90 tablet, Rfl: 3  quinapril (ACCUPRIL) 40 MG tablet, Take 1 tablet (40 mg total) by mouth once daily. (Patient taking differently: Take 40 mg by mouth nightly. ), Disp: 90 tablet, Rfl: 3  traMADol (ULTRAM) 50 mg tablet, TAKE 2 TABLETS BY MOUTH ONCE DAILY AS NEEDED FOR PAIN, Disp: 60 tablet, Rfl: 3  vitamin D 1000 units Tab, Take 1,000 Units by mouth once daily., Disp: , Rfl:   insulin degludec (TRESIBA FLEXTOUCH U-200) 200 unit/mL (3 mL) InPn, Inject 50 Units into the skin once daily., Disp: 9 mL, Rfl: 3  tamsulosin (FLOMAX) 0.4 mg Cp24, 0.4 mg every evening. , Disp: , Rfl: 11  umeclidinium-vilanterol (ANORO ELLIPTA) 62.5-25 mcg/actuation DsDv, Inhale 1 puff into the lungs once daily., Disp: 30 each, Rfl: 11    Allergies:  Review of patient's allergies indicates:   -- Flomax (tamsulosin)     --  Dizzy          Review of Systems   Constitutional: Negative " for fever and unexpected weight change.   Respiratory: Positive for shortness of breath (on occasion). Negative for cough.    Cardiovascular: Negative for chest pain.   Gastrointestinal: Negative for abdominal pain, blood in stool, constipation and diarrhea.   Genitourinary: Negative for difficulty urinating.   Musculoskeletal: Positive for arthralgias and back pain. Negative for gait problem.   Neurological: Negative for dizziness, numbness and headaches.       Objective:      Physical Exam   Constitutional: He is oriented to person, place, and time. He appears well-developed and well-nourished. No distress.   Eyes: No scleral icterus.   Cardiovascular: Normal rate, regular rhythm and normal heart sounds.    Pulmonary/Chest: Effort normal. No respiratory distress. He has decreased breath sounds. He has no wheezes. He has no rales.   Abdominal: Soft. Bowel sounds are normal.   Neurological: He is alert and oriented to person, place, and time.   Skin: Skin is warm and dry.   Psychiatric: He has a normal mood and affect.   Vitals reviewed.      Assessment:       1. Hypertension goal BP (blood pressure) < 130/80    2. Type 2 diabetes, uncontrolled, with neuropathy    3. Chronic obstructive pulmonary disease, unspecified COPD type    4. Paroxysmal atrial fibrillation    5. Arthralgia of multiple sites        Plan:       Nico was seen today for follow-up.    Diagnoses and all orders for this visit:    Hypertension goal BP (blood pressure) < 130/80  -     Continue current management    Type 2 diabetes, uncontrolled, with neuropathy  -     Continue current management  -     Lifestyle modifications discussed  -     F/U with diabetes management    Chronic obstructive pulmonary disease, unspecified COPD type  -     F/U with pulmonology    Paroxysmal atrial fibrillation  -     F/U with cardiology    Arthralgia of multiple sites  -     Continue tramadol for now    Obtain colonoscopy report.     Labs and F/U with Mohinder  DAVIDA Gonzalez in 3 months.

## 2018-07-17 RX ORDER — INSULIN DEGLUDEC 200 U/ML
50 INJECTION, SOLUTION SUBCUTANEOUS DAILY
Qty: 9 ML | Refills: 3 | Status: SHIPPED | OUTPATIENT
Start: 2018-07-17

## 2018-07-25 ENCOUNTER — PATIENT OUTREACH (OUTPATIENT)
Dept: ADMINISTRATIVE | Facility: HOSPITAL | Age: 73
End: 2018-07-25

## 2018-07-30 ENCOUNTER — TELEPHONE (OUTPATIENT)
Dept: INTERNAL MEDICINE | Facility: CLINIC | Age: 73
End: 2018-07-30

## 2018-07-30 DIAGNOSIS — Z12.11 COLON CANCER SCREENING: Primary | ICD-10-CM

## 2018-07-30 NOTE — TELEPHONE ENCOUNTER
I notified patient he is overdue for a colonoscopy, Dr. Davis stated have you discuss getting of your Eliquis with his cardiologist and she also stated if it's okay she will order the test. Patient stated he just got a tooth pulled, the dentist told him to leave it up to the doctor who will do the surgery and yes you can order the test.

## 2018-07-30 NOTE — TELEPHONE ENCOUNTER
I don't see the orders on the above patient. I need orders for Colonoscopy so I can schedule his appointment. Thank you!!

## 2018-07-31 ENCOUNTER — DOCUMENTATION ONLY (OUTPATIENT)
Dept: ENDOSCOPY | Facility: HOSPITAL | Age: 73
End: 2018-07-31

## 2018-07-31 NOTE — TELEPHONE ENCOUNTER
7/31/2018 Pt's last two HgbA1c's elevated.   Pt states he does not check CBG's daily.  GI office visit scheduled 8/13/2018 for consultation prior to colonoscopy due to uncontrolled CBG's.

## 2018-08-08 ENCOUNTER — DOCUMENTATION ONLY (OUTPATIENT)
Dept: ENDOSCOPY | Facility: HOSPITAL | Age: 73
End: 2018-08-08

## 2018-08-13 ENCOUNTER — TELEPHONE (OUTPATIENT)
Dept: GASTROENTEROLOGY | Facility: CLINIC | Age: 73
End: 2018-08-13

## 2018-08-13 ENCOUNTER — INITIAL CONSULT (OUTPATIENT)
Dept: GASTROENTEROLOGY | Facility: CLINIC | Age: 73
End: 2018-08-13
Payer: MEDICARE

## 2018-08-13 VITALS
HEIGHT: 71 IN | HEART RATE: 76 BPM | WEIGHT: 205.5 LBS | DIASTOLIC BLOOD PRESSURE: 62 MMHG | SYSTOLIC BLOOD PRESSURE: 120 MMHG | BODY MASS INDEX: 28.77 KG/M2

## 2018-08-13 DIAGNOSIS — Z12.11 COLON CANCER SCREENING: ICD-10-CM

## 2018-08-13 DIAGNOSIS — K59.04 CHRONIC IDIOPATHIC CONSTIPATION: Primary | ICD-10-CM

## 2018-08-13 DIAGNOSIS — Z87.11 HISTORY OF GASTRIC ULCER: ICD-10-CM

## 2018-08-13 PROCEDURE — 99999 PR PBB SHADOW E&M-EST. PATIENT-LVL IV: CPT | Mod: PBBFAC,,, | Performed by: NURSE PRACTITIONER

## 2018-08-13 PROCEDURE — 99214 OFFICE O/P EST MOD 30 MIN: CPT | Mod: PBBFAC | Performed by: NURSE PRACTITIONER

## 2018-08-13 PROCEDURE — 99214 OFFICE O/P EST MOD 30 MIN: CPT | Mod: S$PBB,,, | Performed by: NURSE PRACTITIONER

## 2018-08-13 RX ORDER — SODIUM, POTASSIUM,MAG SULFATES 17.5-3.13G
SOLUTION, RECONSTITUTED, ORAL ORAL
Qty: 354 ML | Refills: 0 | Status: SHIPPED | OUTPATIENT
Start: 2018-08-13 | End: 2018-09-21

## 2018-08-13 NOTE — PROGRESS NOTES
Clinic Consult:  [facility] Consultation Note    Reason for Consult:  The primary encounter diagnosis was Chronic idiopathic constipation. Diagnoses of Colon cancer screening and History of gastric ulcer were also pertinent to this visit.    PCP: Tiffany Davis   No address on file    HPI:  This is a 72 y.o. male here for evaluation of the above. He is due for repeat colonoscopy for screening purposes. He was scheduled a follow up visit because of uncontrolled blood sugars. Reviewed past EGD and colonoscopy reports scanned in Epic. EGD and colonoscopy in 2011. Indication for colonoscopy was abnormal CT scan. Colonoscopy was unrevealing. Was told to repeat in 5 years. Discussed with patient history. He does not think he has had polyps before but is unsure. No family history of colon cancer. EGD showed multiple gastric ulcers. There was no follow up EGD to check for healing. He is on pantoprazole 20 mg daily and is asymptomatic. He has chronic constipation. Does not take anything for it. No other GI complaints. No abdominal pain, hematochezia, melena, nausea, vomiting, or weight loss.      Review of Systems   Gastrointestinal: Negative for abdominal pain, blood in stool, constipation, diarrhea, heartburn, melena, nausea and vomiting.       Medical History:  has a past medical history of Arthritis, Atrial fibrillation and flutter, Atrial flutter, COPD (chronic obstructive pulmonary disease), DM (diabetes mellitus) (1996), Hyperlipidemia, Hypertension, Lung disease, Neuropathy, diabetic, and Pancreatitis.    Surgical History:  has a past surgical history that includes Cholecystectomy; Biceps tendon repair; Patella surgery; Rotator cuff repair; Cardioversion; Radiofrequency ablation; ABLATION (N/A, 12/4/2017); and TRANSESOPHAGEAL ECHOCARDIOGRAM (DILLAN) (N/A, 12/4/2017).    Family History: family history includes Cancer in his maternal aunt; Cataracts in his sister; Diabetes in his mother, sister, sister, and  "sister; Heart attack in his brother; Heart failure in his brother; Hypertension in his father and mother; Stroke in his father, mother, paternal grandfather, and paternal grandmother..     Social History:  reports that he quit smoking about 3 years ago. His smoking use included cigarettes. He smoked 0.50 packs per day. He quit smokeless tobacco use about 22 years ago. His smokeless tobacco use included snuff. He reports that he does not drink alcohol or use drugs.    Allergies: Reviewed    Home Medications:   Current Outpatient Medications on File Prior to Visit   Medication Sig Dispense Refill    b complex vitamins tablet Take 1 tablet by mouth once daily.      blood sugar diagnostic Strp 1 each by Misc.(Non-Drug; Combo Route) route 3 (three) times daily. Dispense preferred on insurance 100 strip 11    blood-glucose meter kit Use as instructed - preferred on insurance 1 each 0    diltiaZEM (CARDIZEM CD) 360 MG 24 hr capsule TAKE 1 CAPSULE(360 MG) BY MOUTH EVERY DAY 90 capsule 0    duloxetine (CYMBALTA) 30 MG capsule TAKE 1 CAPSULE(30 MG) BY MOUTH EVERY DAY 30 capsule 3    ELIQUIS 5 mg Tab 5 mg 2 (two) times daily.   6    flecainide (TAMBOCOR) 100 MG Tab Take 1 tablet (100 mg total) by mouth every 12 (twelve) hours. 60 tablet 11    gabapentin (NEURONTIN) 300 MG capsule TAKE 2 CAPSULES BY MOUTH TWICE DAILY 360 capsule 0    insulin degludec (TRESIBA FLEXTOUCH U-200) 200 unit/mL (3 mL) InPn Inject 50 Units into the skin once daily. 9 mL 3    insulin lispro (HUMALOG KWIKPEN) 100 unit/mL InPn pen ADMINISTER 17 UNITS UNDER THE SKIN THREE TIMES DAILY BEFORE MEALS (Patient taking differently: ADMINISTER 17 UNITS UNDER THE SKIN IN MORNING AND 25 UNITS NOON AND NIGHT) 45 mL 0    insulin needles, disposable, 32 x 1/4 " Ndle Insulin injections four times per day. 400 each 3    multivitamin capsule Take 1 capsule by mouth once daily.      NEEDLES, DISPOSABLE (DISPOSABLE NEEDLES MISC) Inject 1 each into the skin 3 " "(three) times daily.      omeprazole (PRILOSEC) 20 MG capsule TAKE 1 CAPSULE BY MOUTH EVERY DAY 90 capsule 2    pravastatin (PRAVACHOL) 40 MG tablet TAKE 1 TABLET DAILY (Patient taking differently: TAKE 1 TABLET HS) 90 tablet 3    quinapril (ACCUPRIL) 40 MG tablet Take 1 tablet (40 mg total) by mouth once daily. (Patient taking differently: Take 40 mg by mouth nightly. ) 90 tablet 3    traMADol (ULTRAM) 50 mg tablet TAKE 2 TABLETS BY MOUTH ONCE DAILY AS NEEDED FOR PAIN 60 tablet 3    vitamin D 1000 units Tab Take 1,000 Units by mouth once daily.      tamsulosin (FLOMAX) 0.4 mg Cp24 0.4 mg every evening.   11    umeclidinium-vilanterol (ANORO ELLIPTA) 62.5-25 mcg/actuation DsDv Inhale 1 puff into the lungs once daily. 30 each 11     No current facility-administered medications on file prior to visit.        Physical Exam:  /62   Pulse 76   Ht 5' 11" (1.803 m)   Wt 93.2 kg (205 lb 7.5 oz)   BMI 28.66 kg/m²   Body mass index is 28.66 kg/m².  Physical Exam   Constitutional: He is oriented to person, place, and time and well-developed, well-nourished, and in no distress. No distress.   HENT:   Head: Normocephalic.   Eyes: Conjunctivae are normal. Pupils are equal, round, and reactive to light.   Cardiovascular: Normal rate, regular rhythm and normal heart sounds.   Pulmonary/Chest: Effort normal and breath sounds normal. No respiratory distress.   Abdominal: Soft. Bowel sounds are normal. He exhibits no distension. There is no tenderness.   Neurological: He is alert and oriented to person, place, and time. No cranial nerve deficit.   Skin: Skin is warm and dry. No rash noted.   Psychiatric: Mood and affect normal.       Labs: Pertinent labs reviewed.    Assessment:  1. Chronic idiopathic constipation    2. Colon cancer screening    3. History of gastric ulcer         Recommendations:  Chronic idiopathic constipation  - start Miralax daily    Colon cancer screening   - per endoscopy report scanned into " chart, patient due for repeat screening colonoscopy  - on Eliquis. Needs clearance from cardiologist to hold medication.     History of gastric ulcer  - needs repeat EGD following multiple gastric ulcers as there is a relationship between gastric ulcers and gastric malignancies. Will do at the same time  - no current symptoms    -     Case request GI: ESOPHAGOGASTRODUODENOSCOPY (EGD)    Other orders  -     SUPREP BOWEL PREP KIT 17.5-3.13-1.6 gram SolR; Use as directed  Dispense: 354 mL; Refill: 0    Follow up to be determined after procedure.    Thank you so much for allowing me to participate in the care of GUSTAVO Watts Jr.

## 2018-08-14 NOTE — TELEPHONE ENCOUNTER
Pt advised to stop Eliquis 2 days prior to procedure per Dr. Jaret Freire. Pt verbalized understanding.

## 2018-08-27 ENCOUNTER — OFFICE VISIT (OUTPATIENT)
Dept: UROLOGY | Facility: CLINIC | Age: 73
End: 2018-08-27
Payer: MEDICARE

## 2018-08-27 ENCOUNTER — LAB VISIT (OUTPATIENT)
Dept: LAB | Facility: HOSPITAL | Age: 73
End: 2018-08-27
Attending: UROLOGY
Payer: MEDICARE

## 2018-08-27 VITALS
HEIGHT: 69 IN | DIASTOLIC BLOOD PRESSURE: 54 MMHG | BODY MASS INDEX: 30.44 KG/M2 | HEART RATE: 99 BPM | WEIGHT: 205.5 LBS | SYSTOLIC BLOOD PRESSURE: 102 MMHG

## 2018-08-27 DIAGNOSIS — N35.9 URETHRAL STRICTURE, UNSPECIFIED STRICTURE TYPE: ICD-10-CM

## 2018-08-27 DIAGNOSIS — Z12.5 PROSTATE CANCER SCREENING: ICD-10-CM

## 2018-08-27 DIAGNOSIS — I48.92 ATRIAL FLUTTER WITH RAPID VENTRICULAR RESPONSE: ICD-10-CM

## 2018-08-27 DIAGNOSIS — N35.9 URETHRAL STRICTURE, UNSPECIFIED STRICTURE TYPE: Primary | ICD-10-CM

## 2018-08-27 LAB
COMPLEXED PSA SERPL-MCNC: 1.1 NG/ML
POC RESIDUAL URINE VOLUME: 122 ML (ref 0–100)

## 2018-08-27 PROCEDURE — 99213 OFFICE O/P EST LOW 20 MIN: CPT | Mod: PBBFAC | Performed by: UROLOGY

## 2018-08-27 PROCEDURE — 36415 COLL VENOUS BLD VENIPUNCTURE: CPT

## 2018-08-27 PROCEDURE — 51798 US URINE CAPACITY MEASURE: CPT | Mod: PBBFAC | Performed by: UROLOGY

## 2018-08-27 PROCEDURE — 84153 ASSAY OF PSA TOTAL: CPT

## 2018-08-27 PROCEDURE — 99214 OFFICE O/P EST MOD 30 MIN: CPT | Mod: S$PBB,,, | Performed by: UROLOGY

## 2018-08-27 PROCEDURE — 99999 PR PBB SHADOW E&M-EST. PATIENT-LVL III: CPT | Mod: PBBFAC,,, | Performed by: UROLOGY

## 2018-08-27 RX ORDER — APIXABAN 5 MG/1
TABLET, FILM COATED ORAL
Qty: 60 TABLET | Refills: 6 | Status: SHIPPED | OUTPATIENT
Start: 2018-08-27

## 2018-08-27 NOTE — PROGRESS NOTES
As per Dr. Chavez's request, I scheduled an appt with internal medicine this afternoon for pts c/o chest pain.  Pt refused; instructed pt to go to ER if chest pains continued.

## 2018-08-27 NOTE — PROGRESS NOTES
Chief Complaint: Right flank pain, incontinence, hematuria    HPI:   8/27/18: Stream okay he says.  Not taking the flomax, dizzy no matter when he takes it.  Having chest pain today and hypotensive.  2/26/18: Flomax made him dizzy when he got up in the morning and couldn't tolerate it.  Can't say that it helped him urinate better.  Discussed things in detail again.  1/24/18: CT Urogram shows no significant upper tract findings; small stones/cysts of no concern. Thickened/assymetric bladder.  UTI found and treated. Cysto shows a slight stricture of fossa navicularis gently widened, along with some mildly obstructive BPH.  Uroflow shows a slow flow rate.  11/28/17: 70 yo man is having right flank pain, weak stream, gross hematuria, urgency with dribbling on way to toilet.  Keeping urinal next to bed.  Not a good stream lately.  Last time he had a good stream was only a week or so ago.  No other abd/pelvic pain and no exac/rel factors.  Prior urolithiasis a very long time ago he passed it.  Is scheduled for cardioversion Monday.    11/18/15: Took the vesicare and found it expensive but it reduced nocturia to 2 from 4. New dx of afib.  11/12/14: Pt having nocturia getting up 3-4 times.  Held caffeine and the discomfort is now gone.  -UCx.  US and KUB clear of urolithiasis.   10/10/14: 67 yo man having pain at the end of his penis with a little burn with urination. Weak stream, does not empty all the way.  Nocturia x4.  Was told by a urologist in the past his meatus was small.  No hematuria.  Urolithiasis about 8 years ago no followup since then.  No abd/pelvic pain and no exac/rel factors. Not constipated - has diarrhea today.  2 cups coffee in AM then some in the afternoon.  Got very dizzy when he tool flomax in the past.     Allergies:  Flomax [tamsulosin]    Medications:  has a current medication list which includes the following prescription(s): b complex vitamins, blood sugar diagnostic, blood-glucose meter,  diltiazem, duloxetine, eliquis, flecainide, gabapentin, insulin degludec, insulin lispro, pen needle, diabetic, multivitamin, needles, disposable, omeprazole, pravastatin, quinapril, suprep bowel prep kit, tamsulosin, tramadol, umeclidinium-vilanterol, and vitamin d.    Review of Systems:  General: No fever, chills, fatigability, or weight loss.  Skin: No rashes, itching, or changes in color or texture of skin.  Chest: Denies BENITO, cyanosis, wheezing, cough, and sputum production.  Abdomen: Appetite fine. No weight loss. Denies diarrhea, abdominal pain, hematemesis, or blood in stool.  Musculoskeletal: No joint stiffness or swelling. Denies back pain.  : As above.  All other review of systems negative.    PMH:   has a past medical history of Arthritis, Atrial fibrillation and flutter, Atrial flutter, COPD (chronic obstructive pulmonary disease), DM (diabetes mellitus) (1996), Hyperlipidemia, Hypertension, Lung disease, Neuropathy, diabetic, and Pancreatitis.    PSH:   has a past surgical history that includes Cholecystectomy; Biceps tendon repair; Patella surgery; Rotator cuff repair; Cardioversion; Radiofrequency ablation; ABLATION (N/A, 12/4/2017); and TRANSESOPHAGEAL ECHOCARDIOGRAM (DILLAN) (N/A, 12/4/2017).    FamHx: family history includes Cancer in his maternal aunt; Cataracts in his sister; Diabetes in his mother, sister, sister, and sister; Heart attack in his brother; Heart failure in his brother; Hypertension in his father and mother; Stroke in his father, mother, paternal grandfather, and paternal grandmother.    SocHx:  reports that he quit smoking about 3 years ago. His smoking use included cigarettes. He smoked 0.50 packs per day. He quit smokeless tobacco use about 22 years ago. His smokeless tobacco use included snuff. He reports that he does not drink alcohol or use drugs.      Physical Exam:  Vitals:    08/27/18 1134   BP: (!) 102/54   Pulse: 99     General: A&Ox3, no apparent distress, no  deformities  Neck: No masses, normal thyroid  Lungs: normal inspiration, no use of accessory muscles  Heart: normal pulse, no arrhythmias  Abdomen: Soft, NT, ND  Skin: The skin is warm and dry. No jaundice.  Ext: No c/c/e.  :   11/17: Test desc maria elena, no abnormalities of epididymus. Penis normal, with normal penile and scrotal skin. Meatus normal. Normal rectal tone, no hemorrhoids. Prost 20 gm no nodules or masses appreciated. SV not palpable. Perineum and anus normal.    Labs/Studies:   Urinalysis performed in clinic, summary:     11/17: UA normal exc ++leuk, +nit, 30 prot  Bladder Scan performed in office:     11/17: PVR 13 ml.  PSA    11/15: 0.83  Calibrated Uroflow:    1/24/18: voided 188 ml with Qmax 7 ml/sec and prolonged emptying    Impression/Plan:   1. Mild stricture dilated last year, PVR normal.  Uroflow shows reduced stream. He is okay with how he is emptying now.  Recheck in 12 mo.  2. PSA today and in a year  3. To PCP, urgent care or ER for chest pain.  He declines despite my firm recommendation.

## 2018-08-30 ENCOUNTER — HOSPITAL ENCOUNTER (INPATIENT)
Facility: HOSPITAL | Age: 73
LOS: 3 days | Discharge: HOME HEALTH | DRG: 180 | End: 2018-09-03
Attending: EMERGENCY MEDICINE | Admitting: EMERGENCY MEDICINE
Payer: MEDICARE

## 2018-08-30 DIAGNOSIS — R91.8 MASS OF LOWER LOBE OF LEFT LUNG: ICD-10-CM

## 2018-08-30 DIAGNOSIS — C34.92 METASTATIC LUNG CANCER (METASTASIS FROM LUNG TO OTHER SITE), LEFT: ICD-10-CM

## 2018-08-30 DIAGNOSIS — R91.8 MASS OF LEFT LUNG: ICD-10-CM

## 2018-08-30 DIAGNOSIS — J43.1 PANLOBULAR EMPHYSEMA: Chronic | ICD-10-CM

## 2018-08-30 DIAGNOSIS — R07.9 CHEST PAIN: ICD-10-CM

## 2018-08-30 DIAGNOSIS — J43.9 PULMONARY EMPHYSEMA, UNSPECIFIED EMPHYSEMA TYPE: Primary | Chronic | ICD-10-CM

## 2018-08-30 DIAGNOSIS — J98.6 DIAPHRAGMATIC PARALYSIS: ICD-10-CM

## 2018-08-30 DIAGNOSIS — R09.02 HYPOXIA: ICD-10-CM

## 2018-08-30 DIAGNOSIS — R59.0 MEDIASTINAL LYMPHADENOPATHY: ICD-10-CM

## 2018-08-30 LAB
ALBUMIN SERPL BCP-MCNC: 3.2 G/DL
ALP SERPL-CCNC: 114 U/L
ALT SERPL W/O P-5'-P-CCNC: 18 U/L
AMYLASE SERPL-CCNC: 24 U/L
ANION GAP SERPL CALC-SCNC: 15 MMOL/L
APTT BLDCRRT: 30.3 SEC
AST SERPL-CCNC: 26 U/L
BASOPHILS # BLD AUTO: 0.01 K/UL
BASOPHILS NFR BLD: 0.1 %
BILIRUB SERPL-MCNC: 0.4 MG/DL
BNP SERPL-MCNC: 22 PG/ML
BUN SERPL-MCNC: 19 MG/DL
CALCIUM SERPL-MCNC: 9.5 MG/DL
CHLORIDE SERPL-SCNC: 100 MMOL/L
CO2 SERPL-SCNC: 25 MMOL/L
CREAT SERPL-MCNC: 1 MG/DL
D DIMER PPP IA.FEU-MCNC: 0.58 MG/L FEU
DIFFERENTIAL METHOD: ABNORMAL
EOSINOPHIL # BLD AUTO: 0.1 K/UL
EOSINOPHIL NFR BLD: 0.6 %
ERYTHROCYTE [DISTWIDTH] IN BLOOD BY AUTOMATED COUNT: 13.6 %
EST. GFR  (AFRICAN AMERICAN): >60 ML/MIN/1.73 M^2
EST. GFR  (NON AFRICAN AMERICAN): >60 ML/MIN/1.73 M^2
GLUCOSE SERPL-MCNC: 178 MG/DL
HCT VFR BLD AUTO: 43.2 %
HGB BLD-MCNC: 14.5 G/DL
INR PPP: 1.1
LIPASE SERPL-CCNC: 16 U/L
LYMPHOCYTES # BLD AUTO: 1.1 K/UL
LYMPHOCYTES NFR BLD: 11.8 %
MCH RBC QN AUTO: 29.2 PG
MCHC RBC AUTO-ENTMCNC: 33.6 G/DL
MCV RBC AUTO: 87 FL
MONOCYTES # BLD AUTO: 0.7 K/UL
MONOCYTES NFR BLD: 7.3 %
NEUTROPHILS # BLD AUTO: 7.3 K/UL
NEUTROPHILS NFR BLD: 80.2 %
PLATELET # BLD AUTO: 301 K/UL
PMV BLD AUTO: 9.3 FL
POTASSIUM SERPL-SCNC: 4.2 MMOL/L
PROT SERPL-MCNC: 8.2 G/DL
PROTHROMBIN TIME: 11.5 SEC
RBC # BLD AUTO: 4.96 M/UL
SODIUM SERPL-SCNC: 140 MMOL/L
TROPONIN I SERPL DL<=0.01 NG/ML-MCNC: <0.006 NG/ML
WBC # BLD AUTO: 9.05 K/UL

## 2018-08-30 PROCEDURE — 80053 COMPREHEN METABOLIC PANEL: CPT

## 2018-08-30 PROCEDURE — 85379 FIBRIN DEGRADATION QUANT: CPT

## 2018-08-30 PROCEDURE — 85025 COMPLETE CBC W/AUTO DIFF WBC: CPT

## 2018-08-30 PROCEDURE — 83880 ASSAY OF NATRIURETIC PEPTIDE: CPT

## 2018-08-30 PROCEDURE — 25500020 PHARM REV CODE 255: Performed by: EMERGENCY MEDICINE

## 2018-08-30 PROCEDURE — 85610 PROTHROMBIN TIME: CPT

## 2018-08-30 PROCEDURE — 99285 EMERGENCY DEPT VISIT HI MDM: CPT | Mod: 25

## 2018-08-30 PROCEDURE — 25000003 PHARM REV CODE 250: Performed by: EMERGENCY MEDICINE

## 2018-08-30 PROCEDURE — 82150 ASSAY OF AMYLASE: CPT

## 2018-08-30 PROCEDURE — 84484 ASSAY OF TROPONIN QUANT: CPT

## 2018-08-30 PROCEDURE — 83690 ASSAY OF LIPASE: CPT

## 2018-08-30 PROCEDURE — 63600175 PHARM REV CODE 636 W HCPCS: Performed by: EMERGENCY MEDICINE

## 2018-08-30 PROCEDURE — 85730 THROMBOPLASTIN TIME PARTIAL: CPT

## 2018-08-30 PROCEDURE — 96374 THER/PROPH/DIAG INJ IV PUSH: CPT

## 2018-08-30 PROCEDURE — 93010 ELECTROCARDIOGRAM REPORT: CPT | Mod: ,,, | Performed by: INTERNAL MEDICINE

## 2018-08-30 RX ORDER — ASPIRIN 325 MG
325 TABLET ORAL
Status: DISCONTINUED | OUTPATIENT
Start: 2018-08-30 | End: 2018-08-31

## 2018-08-30 RX ORDER — FUROSEMIDE 10 MG/ML
60 INJECTION INTRAMUSCULAR; INTRAVENOUS
Status: COMPLETED | OUTPATIENT
Start: 2018-08-30 | End: 2018-08-30

## 2018-08-30 RX ORDER — FAMOTIDINE 20 MG/1
20 TABLET, FILM COATED ORAL
Status: COMPLETED | OUTPATIENT
Start: 2018-08-30 | End: 2018-08-30

## 2018-08-30 RX ADMIN — NITROGLYCERIN 0.5 INCH: 20 OINTMENT TOPICAL at 09:08

## 2018-08-30 RX ADMIN — IOHEXOL 100 ML: 350 INJECTION, SOLUTION INTRAVENOUS at 11:08

## 2018-08-30 RX ADMIN — FUROSEMIDE 60 MG: 10 INJECTION, SOLUTION INTRAMUSCULAR; INTRAVENOUS at 09:08

## 2018-08-30 RX ADMIN — FAMOTIDINE 20 MG: 20 TABLET ORAL at 09:08

## 2018-08-30 RX ADMIN — DICYCLOMINE HYDROCHLORIDE 50 ML: 10 SOLUTION ORAL at 09:08

## 2018-08-31 PROBLEM — J96.01 ACUTE RESPIRATORY FAILURE WITH HYPOXIA: Status: ACTIVE | Noted: 2018-08-31

## 2018-08-31 PROBLEM — R91.8 MASS OF LEFT LUNG: Status: ACTIVE | Noted: 2018-08-31

## 2018-08-31 PROBLEM — R09.02 HYPOXEMIA: Status: ACTIVE | Noted: 2018-08-31

## 2018-08-31 PROBLEM — R91.8 MASS OF LOWER LOBE OF LEFT LUNG: Status: ACTIVE | Noted: 2018-08-31

## 2018-08-31 PROBLEM — K59.00 CONSTIPATION: Status: ACTIVE | Noted: 2018-08-31

## 2018-08-31 PROBLEM — J98.6 ELEVATED DIAPHRAGM: Status: ACTIVE | Noted: 2018-08-31

## 2018-08-31 PROBLEM — C34.92 METASTATIC LUNG CANCER (METASTASIS FROM LUNG TO OTHER SITE), LEFT: Status: ACTIVE | Noted: 2018-08-31

## 2018-08-31 PROBLEM — E44.0 MODERATE MALNUTRITION: Status: ACTIVE | Noted: 2018-08-31

## 2018-08-31 LAB
ALBUMIN SERPL BCP-MCNC: 3.2 G/DL
ALP SERPL-CCNC: 117 U/L
ALT SERPL W/O P-5'-P-CCNC: 16 U/L
ANION GAP SERPL CALC-SCNC: 16 MMOL/L
AST SERPL-CCNC: 17 U/L
BASOPHILS # BLD AUTO: 0.01 K/UL
BASOPHILS NFR BLD: 0.1 %
BILIRUB SERPL-MCNC: 0.4 MG/DL
BUN SERPL-MCNC: 20 MG/DL
CALCIUM SERPL-MCNC: 9.7 MG/DL
CHLORIDE SERPL-SCNC: 99 MMOL/L
CO2 SERPL-SCNC: 25 MMOL/L
CREAT SERPL-MCNC: 1 MG/DL
DIFFERENTIAL METHOD: ABNORMAL
EOSINOPHIL # BLD AUTO: 0 K/UL
EOSINOPHIL NFR BLD: 0.5 %
ERYTHROCYTE [DISTWIDTH] IN BLOOD BY AUTOMATED COUNT: 13.7 %
EST. GFR  (AFRICAN AMERICAN): >60 ML/MIN/1.73 M^2
EST. GFR  (NON AFRICAN AMERICAN): >60 ML/MIN/1.73 M^2
GLUCOSE SERPL-MCNC: 177 MG/DL
HCT VFR BLD AUTO: 43.4 %
HGB BLD-MCNC: 14.5 G/DL
LYMPHOCYTES # BLD AUTO: 1.1 K/UL
LYMPHOCYTES NFR BLD: 13.4 %
MCH RBC QN AUTO: 29.1 PG
MCHC RBC AUTO-ENTMCNC: 33.4 G/DL
MCV RBC AUTO: 87 FL
MONOCYTES # BLD AUTO: 0.7 K/UL
MONOCYTES NFR BLD: 7.9 %
NEUTROPHILS # BLD AUTO: 6.6 K/UL
NEUTROPHILS NFR BLD: 78.1 %
PLATELET # BLD AUTO: 307 K/UL
PMV BLD AUTO: 9.3 FL
POCT GLUCOSE: 165 MG/DL (ref 70–110)
POCT GLUCOSE: 182 MG/DL (ref 70–110)
POCT GLUCOSE: 186 MG/DL (ref 70–110)
POCT GLUCOSE: 207 MG/DL (ref 70–110)
POTASSIUM SERPL-SCNC: 3.8 MMOL/L
PROT SERPL-MCNC: 7.9 G/DL
RBC # BLD AUTO: 4.98 M/UL
SODIUM SERPL-SCNC: 140 MMOL/L
TROPONIN I SERPL DL<=0.01 NG/ML-MCNC: <0.006 NG/ML
WBC # BLD AUTO: 8.46 K/UL

## 2018-08-31 PROCEDURE — 25000242 PHARM REV CODE 250 ALT 637 W/ HCPCS: Performed by: INTERNAL MEDICINE

## 2018-08-31 PROCEDURE — 25000003 PHARM REV CODE 250: Performed by: EMERGENCY MEDICINE

## 2018-08-31 PROCEDURE — 96375 TX/PRO/DX INJ NEW DRUG ADDON: CPT

## 2018-08-31 PROCEDURE — 97802 MEDICAL NUTRITION INDIV IN: CPT

## 2018-08-31 PROCEDURE — 96361 HYDRATE IV INFUSION ADD-ON: CPT

## 2018-08-31 PROCEDURE — 80053 COMPREHEN METABOLIC PANEL: CPT

## 2018-08-31 PROCEDURE — 27000221 HC OXYGEN, UP TO 24 HOURS

## 2018-08-31 PROCEDURE — 25000003 PHARM REV CODE 250: Performed by: NURSE PRACTITIONER

## 2018-08-31 PROCEDURE — 94640 AIRWAY INHALATION TREATMENT: CPT

## 2018-08-31 PROCEDURE — 85025 COMPLETE CBC W/AUTO DIFF WBC: CPT

## 2018-08-31 PROCEDURE — 84484 ASSAY OF TROPONIN QUANT: CPT | Mod: 91

## 2018-08-31 PROCEDURE — 99223 1ST HOSP IP/OBS HIGH 75: CPT | Mod: ,,, | Performed by: INTERNAL MEDICINE

## 2018-08-31 PROCEDURE — 21400001 HC TELEMETRY ROOM

## 2018-08-31 PROCEDURE — 36415 COLL VENOUS BLD VENIPUNCTURE: CPT

## 2018-08-31 PROCEDURE — 25000003 PHARM REV CODE 250: Performed by: INTERNAL MEDICINE

## 2018-08-31 PROCEDURE — 63600175 PHARM REV CODE 636 W HCPCS: Performed by: INTERNAL MEDICINE

## 2018-08-31 PROCEDURE — 96372 THER/PROPH/DIAG INJ SC/IM: CPT

## 2018-08-31 RX ORDER — QUINAPRIL 20 MG/1
40 TABLET ORAL DAILY
Status: DISCONTINUED | OUTPATIENT
Start: 2018-08-31 | End: 2018-09-01

## 2018-08-31 RX ORDER — DILTIAZEM HYDROCHLORIDE 180 MG/1
360 CAPSULE, COATED, EXTENDED RELEASE ORAL DAILY
Status: DISCONTINUED | OUTPATIENT
Start: 2018-08-31 | End: 2018-09-03 | Stop reason: HOSPADM

## 2018-08-31 RX ORDER — GLUCAGON 1 MG
1 KIT INJECTION
Status: DISCONTINUED | OUTPATIENT
Start: 2018-08-31 | End: 2018-09-03 | Stop reason: HOSPADM

## 2018-08-31 RX ORDER — INSULIN ASPART 100 [IU]/ML
0-5 INJECTION, SOLUTION INTRAVENOUS; SUBCUTANEOUS EVERY 6 HOURS PRN
Status: DISCONTINUED | OUTPATIENT
Start: 2018-08-31 | End: 2018-09-03 | Stop reason: HOSPADM

## 2018-08-31 RX ORDER — FAMOTIDINE 20 MG/1
20 TABLET, FILM COATED ORAL 2 TIMES DAILY
Status: DISCONTINUED | OUTPATIENT
Start: 2018-08-31 | End: 2018-08-31

## 2018-08-31 RX ORDER — IPRATROPIUM BROMIDE AND ALBUTEROL SULFATE 2.5; .5 MG/3ML; MG/3ML
3 SOLUTION RESPIRATORY (INHALATION) EVERY 4 HOURS PRN
Status: DISCONTINUED | OUTPATIENT
Start: 2018-08-31 | End: 2018-08-31

## 2018-08-31 RX ORDER — MORPHINE SULFATE 4 MG/ML
4 INJECTION, SOLUTION INTRAMUSCULAR; INTRAVENOUS EVERY 4 HOURS PRN
Status: DISCONTINUED | OUTPATIENT
Start: 2018-08-31 | End: 2018-09-03 | Stop reason: HOSPADM

## 2018-08-31 RX ORDER — MAG HYDROX/ALUMINUM HYD/SIMETH 200-200-20
30 SUSPENSION, ORAL (FINAL DOSE FORM) ORAL EVERY 6 HOURS PRN
Status: DISCONTINUED | OUTPATIENT
Start: 2018-08-31 | End: 2018-09-03 | Stop reason: HOSPADM

## 2018-08-31 RX ORDER — HYDROCODONE BITARTRATE AND ACETAMINOPHEN 5; 325 MG/1; MG/1
1 TABLET ORAL EVERY 4 HOURS PRN
Status: DISCONTINUED | OUTPATIENT
Start: 2018-08-31 | End: 2018-09-03 | Stop reason: HOSPADM

## 2018-08-31 RX ORDER — ZOLPIDEM TARTRATE 5 MG/1
5 TABLET ORAL NIGHTLY PRN
Status: DISCONTINUED | OUTPATIENT
Start: 2018-08-31 | End: 2018-09-03 | Stop reason: HOSPADM

## 2018-08-31 RX ORDER — PANTOPRAZOLE SODIUM 40 MG/1
40 TABLET, DELAYED RELEASE ORAL DAILY
Status: DISCONTINUED | OUTPATIENT
Start: 2018-08-31 | End: 2018-09-03 | Stop reason: HOSPADM

## 2018-08-31 RX ORDER — PRAVASTATIN SODIUM 20 MG/1
40 TABLET ORAL DAILY
Status: DISCONTINUED | OUTPATIENT
Start: 2018-08-31 | End: 2018-09-01

## 2018-08-31 RX ORDER — SODIUM CHLORIDE 9 MG/ML
INJECTION, SOLUTION INTRAVENOUS CONTINUOUS
Status: DISCONTINUED | OUTPATIENT
Start: 2018-08-31 | End: 2018-08-31

## 2018-08-31 RX ORDER — ACETAMINOPHEN 325 MG/1
650 TABLET ORAL EVERY 6 HOURS PRN
Status: DISCONTINUED | OUTPATIENT
Start: 2018-08-31 | End: 2018-09-03 | Stop reason: HOSPADM

## 2018-08-31 RX ORDER — GUAIFENESIN 100 MG/5ML
200 SOLUTION ORAL EVERY 4 HOURS PRN
Status: DISCONTINUED | OUTPATIENT
Start: 2018-08-31 | End: 2018-09-03 | Stop reason: HOSPADM

## 2018-08-31 RX ORDER — SYRING-NEEDL,DISP,INSUL,0.3 ML 29 G X1/2"
296 SYRINGE, EMPTY DISPOSABLE MISCELLANEOUS ONCE
Status: COMPLETED | OUTPATIENT
Start: 2018-08-31 | End: 2018-08-31

## 2018-08-31 RX ORDER — INSULIN ASPART 100 [IU]/ML
15 INJECTION, SOLUTION INTRAVENOUS; SUBCUTANEOUS 2 TIMES DAILY WITH MEALS
Status: DISCONTINUED | OUTPATIENT
Start: 2018-08-31 | End: 2018-09-01

## 2018-08-31 RX ORDER — IPRATROPIUM BROMIDE AND ALBUTEROL SULFATE 2.5; .5 MG/3ML; MG/3ML
3 SOLUTION RESPIRATORY (INHALATION) EVERY 6 HOURS
Status: DISCONTINUED | OUTPATIENT
Start: 2018-08-31 | End: 2018-09-03 | Stop reason: HOSPADM

## 2018-08-31 RX ADMIN — HYDROCODONE BITARTRATE AND ACETAMINOPHEN 1 TABLET: 5; 325 TABLET ORAL at 03:08

## 2018-08-31 RX ADMIN — PRAVASTATIN SODIUM 40 MG: 20 TABLET ORAL at 09:08

## 2018-08-31 RX ADMIN — HYDROCODONE BITARTRATE AND ACETAMINOPHEN 1 TABLET: 5; 325 TABLET ORAL at 11:08

## 2018-08-31 RX ADMIN — METHYLPREDNISOLONE SODIUM SUCCINATE 80 MG: 40 INJECTION, POWDER, FOR SOLUTION INTRAMUSCULAR; INTRAVENOUS at 09:08

## 2018-08-31 RX ADMIN — DILTIAZEM HYDROCHLORIDE 360 MG: 180 CAPSULE, COATED, EXTENDED RELEASE ORAL at 09:08

## 2018-08-31 RX ADMIN — QUINAPRIL 40 MG: 20 TABLET ORAL at 09:08

## 2018-08-31 RX ADMIN — INSULIN ASPART 15 UNITS: 100 INJECTION, SOLUTION INTRAVENOUS; SUBCUTANEOUS at 04:08

## 2018-08-31 RX ADMIN — HYDROCODONE BITARTRATE AND ACETAMINOPHEN 1 TABLET: 5; 325 TABLET ORAL at 06:08

## 2018-08-31 RX ADMIN — SODIUM CHLORIDE: 0.9 INJECTION, SOLUTION INTRAVENOUS at 02:08

## 2018-08-31 RX ADMIN — HYDROCODONE BITARTRATE AND ACETAMINOPHEN 1 TABLET: 5; 325 TABLET ORAL at 02:08

## 2018-08-31 RX ADMIN — IPRATROPIUM BROMIDE AND ALBUTEROL SULFATE 3 ML: .5; 3 SOLUTION RESPIRATORY (INHALATION) at 07:08

## 2018-08-31 RX ADMIN — PANTOPRAZOLE SODIUM 40 MG: 40 TABLET, DELAYED RELEASE ORAL at 09:08

## 2018-08-31 RX ADMIN — Medication 296 ML: at 01:08

## 2018-08-31 NOTE — HPI
Mr. Garza is a 72-year-old  male with PMH significant for COPD, atrial fibrillation on Eliquis, insulin-dependent diabetes, hypertension, hyperlipidemia, presents to the ED complaining of pleuritic type chest discomfort associated with shortness of breath for the past 2-3 days.  In addition he also reports unintentional weight loss of 15 lb over the last one month, decreased appetite.  Initial chest x-ray shows no evidence of infiltrates, but elevated left diaphragm.  Patient was persistently hypoxemic in the ED with oxygen saturations dropping into the 80s without supplemental oxygen.  Patient does not use oxygen at home.  D-dimer was mildly elevated at 0.5.  Hence CTA chest was done, came back negative for pulmonary embolism.  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  In addition there is a tiny nodule on the left adrenal gland, and lytic lesion on the right L1 vertebral body.  Suspected metastatic lung carcinoma.  Patient admitted as observation basis for hypoxemia, and Pulmonary consult for possible bronchoscopy guided lung mass biopsy.

## 2018-08-31 NOTE — ASSESSMENT & PLAN NOTE
New diagnosis of metastatic left lung mass, likely primary.  Consult Pulmonary for possible bronchoscopy guided lung mass biopsy.  Unintentional weight loss of 15 lb over one month, with decreased appetite.  Patient is a former smoker.  Hold Eliquis for now.  Will need Oncology follow-up eventually.  Continue supplemental oxygen to maintain saturations greater than 90%.

## 2018-08-31 NOTE — ASSESSMENT & PLAN NOTE
New diagnosis of metastatic left lung mass, likely primary  Discussed care with IR who recommended bronchoscopy.  Await pulmonology evaluation   Hold Eliquis   Consult heme/Onc- likely aggressive cancer such as SCLC  \Continue supplemental oxygen to maintain saturations greater than 90%.

## 2018-08-31 NOTE — HOSPITAL COURSE
The pt presented to ED with SOB, pleuritic chest pain, and back pain was placed in observation for acute hypoxemic respiratory failure, large left lung mass close to the mediastinum with extensive mediastinal lymphadenopathy, and lytic lesions in the spine. Eliquis for Afib was held. Last dose yesterday at 10 am  Discussed care with Dr Frye with IR- He recommended bronchoscopy for biopsy   Pt reports mild improvement in SOB- remains BENITO  9/1  Plan for bronchoscopy tomorrow . Patient denies of any new complains . Plan for mri of head . Cut down steroid added long acting home dose insulin   9/2  Patient had bronchoscopy done today after procedure patient hypoxic on vent mask . High oxygen requirements will monitor over night inpatient . Resume eliquis for afib  9/3  Patient was seen and examined today . Patient is requiring 3 litres at rest but with activity he requiring 4 to 5 to keep goal 88 to 90. Patient has severe copd ,left diaphragm paralysis ,highly suspected metastatic lung cancer waiting on pathology results . Continue with steroid taper,nebulizer and home oxygen was arranged and also home health arranged .

## 2018-08-31 NOTE — ASSESSMENT & PLAN NOTE
Left lower lung mass with extensive mediastinal lymphadenopathy, left adrenal nodule, and L1 lytic lesion consistent with metastatic lung cancer.  Pulmonary is planning bronchoscopy on Sunday for tissue diagnosis.  Treatment will be determined by tissue diagnosis and final staging.  He will need a PET scan and MRI of the brain as an outpatient

## 2018-08-31 NOTE — ASSESSMENT & PLAN NOTE
Continue bronchodilators as needed.  Patient currently not in any respiratory distress.  Patient followed by Dr. Ibrahim in the pulmonary clinic as outpatient.

## 2018-08-31 NOTE — SUBJECTIVE & OBJECTIVE
"Past Medical History:   Diagnosis Date    Arthritis     Atrial fibrillation and flutter     Atrial flutter     COPD (chronic obstructive pulmonary disease)     DM (diabetes mellitus) 1996    Hyperlipidemia     Hypertension     Lung disease     Neuropathy, diabetic     Pancreatitis        Past Surgical History:   Procedure Laterality Date    BICEPS TENDON REPAIR      CARDIOVERSION      CHOLECYSTECTOMY      PATELLA SURGERY      RADIOFREQUENCY ABLATION      ROTATOR CUFF REPAIR         Review of patient's allergies indicates:   Allergen Reactions    Flomax [tamsulosin]      Dizzy         No current facility-administered medications on file prior to encounter.      Current Outpatient Medications on File Prior to Encounter   Medication Sig    b complex vitamins tablet Take 1 tablet by mouth once daily.    blood sugar diagnostic Strp 1 each by Misc.(Non-Drug; Combo Route) route 3 (three) times daily. Dispense preferred on insurance    blood-glucose meter kit Use as instructed - preferred on insurance    diltiaZEM (CARDIZEM CD) 360 MG 24 hr capsule TAKE 1 CAPSULE(360 MG) BY MOUTH EVERY DAY    ELIQUIS 5 mg Tab TAKE 1 TABLET(5 MG) BY MOUTH TWICE DAILY    flecainide (TAMBOCOR) 100 MG Tab Take 1 tablet (100 mg total) by mouth every 12 (twelve) hours.    gabapentin (NEURONTIN) 300 MG capsule TAKE 2 CAPSULES BY MOUTH TWICE DAILY    insulin degludec (TRESIBA FLEXTOUCH U-200) 200 unit/mL (3 mL) InPn Inject 50 Units into the skin once daily.    insulin lispro (HUMALOG KWIKPEN) 100 unit/mL InPn pen ADMINISTER 17 UNITS UNDER THE SKIN THREE TIMES DAILY BEFORE MEALS (Patient taking differently: ADMINISTER 17 UNITS UNDER THE SKIN IN MORNING AND 25 UNITS NOON AND NIGHT)    insulin needles, disposable, 32 x 1/4 " Ndle Insulin injections four times per day.    multivitamin capsule Take 1 capsule by mouth once daily.    NEEDLES, DISPOSABLE (DISPOSABLE NEEDLES MISC) Inject 1 each into the skin 3 (three) times " daily.    omeprazole (PRILOSEC) 20 MG capsule TAKE 1 CAPSULE BY MOUTH EVERY DAY    pravastatin (PRAVACHOL) 40 MG tablet TAKE 1 TABLET DAILY (Patient taking differently: TAKE 1 TABLET HS)    quinapril (ACCUPRIL) 40 MG tablet Take 1 tablet (40 mg total) by mouth once daily. (Patient taking differently: Take 40 mg by mouth nightly. )    traMADol (ULTRAM) 50 mg tablet TAKE 2 TABLETS BY MOUTH ONCE DAILY AS NEEDED FOR PAIN    vitamin D 1000 units Tab Take 1,000 Units by mouth once daily.    SUPREP BOWEL PREP KIT 17.5-3.13-1.6 gram SolR Use as directed    [DISCONTINUED] duloxetine (CYMBALTA) 30 MG capsule TAKE 1 CAPSULE(30 MG) BY MOUTH EVERY DAY    [DISCONTINUED] umeclidinium-vilanterol (ANORO ELLIPTA) 62.5-25 mcg/actuation DsDv Inhale 1 puff into the lungs once daily.     Family History     Problem Relation (Age of Onset)    Cancer Maternal Aunt    Cataracts Sister    Diabetes Mother, Sister, Sister, Sister    Heart attack Brother    Heart failure Brother    Hypertension Mother, Father    Stroke Mother, Father, Paternal Grandmother, Paternal Grandfather        Tobacco Use    Smoking status: Former Smoker     Packs/day: 0.50     Types: Cigarettes     Last attempt to quit: 3/8/2015     Years since quitting: 3.4    Smokeless tobacco: Former User     Types: Snuff     Quit date: 10/7/1995   Substance and Sexual Activity    Alcohol use: No     Alcohol/week: 0.0 oz    Drug use: No    Sexual activity: Not on file     Review of Systems   Constitutional: Positive for appetite change and unexpected weight change (15 lb weight loss unintentionally within one month). Negative for chills, fatigue and fever.   HENT: Negative for congestion, nosebleeds and sore throat.    Eyes: Negative for visual disturbance.   Respiratory: Positive for shortness of breath. Negative for cough and wheezing.    Cardiovascular: Positive for chest pain (Left-sided pleuritic chest pain). Negative for palpitations.   Gastrointestinal: Negative  for abdominal pain, diarrhea and nausea.   Endocrine: Positive for polyuria.   Genitourinary: Negative for dysuria, flank pain and hematuria.   Musculoskeletal: Negative for back pain and neck pain.   Skin: Negative for color change and rash.   Allergic/Immunologic: Negative for immunocompromised state.   Neurological: Negative for dizziness, tremors, numbness and headaches.   Hematological: Does not bruise/bleed easily.   Psychiatric/Behavioral: Negative for confusion and sleep disturbance. The patient is not nervous/anxious.    All other systems reviewed and are negative.    Objective:     Vital Signs (Most Recent):  Temp: 98.4 °F (36.9 °C) (08/31/18 0435)  Pulse: 82 (08/31/18 0521)  Resp: 18 (08/31/18 0435)  BP: 134/77 (08/31/18 0435)  SpO2: (!) 92 % (08/31/18 0435) Vital Signs (24h Range):  Temp:  [97.6 °F (36.4 °C)-98.4 °F (36.9 °C)] 98.4 °F (36.9 °C)  Pulse:  [82-97] 82  Resp:  [16-22] 18  SpO2:  [88 %-94 %] 92 %  BP: (116-134)/(64-77) 134/77     Weight: 84.5 kg (186 lb 6.4 oz)  Body mass index is 28.34 kg/m².    Physical Exam   Constitutional: He is oriented to person, place, and time. No distress.   HENT:   Head: Normocephalic and atraumatic.   Eyes: Conjunctivae and EOM are normal. No scleral icterus.   Neck: Normal range of motion. Neck supple. No tracheal deviation present.   Cardiovascular: Normal rate, regular rhythm and intact distal pulses.   No murmur heard.  Pulmonary/Chest: Effort normal. No accessory muscle usage. No tachypnea. No respiratory distress. He has wheezes (Mild). He has rhonchi.   Abdominal: Soft. Bowel sounds are normal. He exhibits no mass. There is no tenderness.   Musculoskeletal: Normal range of motion. He exhibits no edema or tenderness.   Neurological: He is alert and oriented to person, place, and time. No cranial nerve deficit. Coordination normal.   Skin: Skin is warm. Capillary refill takes 2 to 3 seconds. He is not diaphoretic. No erythema.   Psychiatric: He has a normal  mood and affect. His behavior is normal. Thought content normal.   Nursing note and vitals reviewed.        CRANIAL NERVES     CN III, IV, VI   Extraocular motions are normal.        Significant Labs:   ABGs: No results for input(s): PH, PCO2, HCO3, POCSATURATED, BE, TOTALHB, COHB, METHB, O2HB, POCFIO2 in the last 48 hours.  BMP:   Recent Labs   Lab  08/31/18   0452   GLU  177*   NA  140   K  3.8   CL  99   CO2  25   BUN  20   CREATININE  1.0   CALCIUM  9.7     CBC:   Recent Labs   Lab  08/30/18 2111 08/31/18 0452   WBC  9.05  8.46   HGB  14.5  14.5   HCT  43.2  43.4   PLT  301  307     CMP:   Recent Labs   Lab  08/30/18 2111 08/31/18 0452   NA  140  140   K  4.2  3.8   CL  100  99   CO2  25  25   GLU  178*  177*   BUN  19  20   CREATININE  1.0  1.0   CALCIUM  9.5  9.7   PROT  8.2  7.9   ALBUMIN  3.2*  3.2*   BILITOT  0.4  0.4   ALKPHOS  114  117   AST  26  17   ALT  18  16   ANIONGAP  15  16   EGFRNONAA  >60  >60     Lactic Acid: No results for input(s): LACTATE in the last 48 hours.  Troponin:   Recent Labs   Lab  08/30/18 2111 08/31/18   0154   TROPONINI  <0.006  <0.006     Urine Studies: No results for input(s): COLORU, APPEARANCEUA, PHUR, SPECGRAV, PROTEINUA, GLUCUA, KETONESU, BILIRUBINUA, OCCULTUA, NITRITE, UROBILINOGEN, LEUKOCYTESUR, RBCUA, WBCUA, BACTERIA, SQUAMEPITHEL, HYALINECASTS in the last 48 hours.    Invalid input(s): WRIGHTSUR  All pertinent labs within the past 24 hours have been reviewed.    Significant Imaging: I have reviewed and interpreted all pertinent imaging results/findings within the past 24 hours.     Imaging Results          CTA Chest Non-Coronary - PE Study (Final result)  Result time 08/30/18 23:55:17    Final result by Mohinder Davis MD (08/30/18 23:55:17)                 Impression:      No evidence of pulmonary embolus.    Extensive mediastinal lymphadenopathy with a large dominant mass on the left aspect of the mediastinum.  Elevation left hemidiaphragm which may be  from diaphragmatic paralysis secondary to the adenopathy.  Compressive atelectasis at the left lung base.    Tiny nodule left adrenal gland.  Lytic lesion right L1 vertebral body.  Metastatic disease suspected - consider CT PET for further evaluation.  The appearance of this central left lung infiltrating adenopathy is suspicious for a central small cell lung cancer.    All CT scans at this facility are performed  using dose modulation techniques as appropriate to performed exam including the following:  automated exposure control; adjustment of mA and/or kV according to the patients size (this includes techniques or standardized protocols for targeted exams where dose is matched to indication/reason for exam: i.e. extremities or head);  iterative reconstruction technique.      Electronically signed by: Mohinder Davis MD  Date:    08/30/2018  Time:    23:55             Narrative:    EXAMINATION:  CTA CHEST NON CORONARY    CLINICAL HISTORY:  Chest pain, acute, PE suspected, intermed prob, positive D-dimer;    TECHNIQUE:  CT angiogram through the chest following IV contrast administration.  Multiplanar MIP reformations performed.    COMPARISON:  Earlier chest x-ray    FINDINGS  Heart: Significant coronary arterial calcifications.  Lipomatosis hypertrophy inter atrial septum.  No pericardial effusions.    Mediastinum: Extensive mediastinal adenopathy.  There is a large dominant area of infiltrating adenopathy around the central left perihilar lung measuring 6.8 by 3.4 cm which infiltrates and abuts the distal left mainstem bronchus and left upper lobe bronchus.  Mass-effect upon superior left pulmonary vein with partial encasement.  Additional enlarged lymph nodes throughout the mediastinum though not as large with a representative aortopulmonary window lymph node measuring 2.1 x 1.4 cm.    Vasculature: No acute pulmonary emboli.  Mild aortic atherosclerosis.    Lungs: There is elevation of the left hemidiaphragm with  left lung base compressive atelectasis.  Significant emphysematous changes in the lungs.  No pleural effusions.    Esophagus: Unremarkable.    Upper Abdomen: Normal right adrenal.  Minimal nodularity the left adrenal gland measuring 12 mm.  Multiple small renal cyst    Bones: There there is a large lytic lesion involving the right L1 vertebral body..  Moderate degenerative change.                               X-Ray Chest PA And Lateral (Final result)  Result time 08/30/18 21:50:34    Final result by Jos Brito MD (08/30/18 21:50:34)                 Impression:      See above      Electronically signed by: Jos Brito MD  Date:    08/30/2018  Time:    21:50             Narrative:    EXAMINATION:  XR CHEST PA AND LATERAL    CLINICAL HISTORY:  Chest pain, unspecified    COMPARISON:  06/08/2018    FINDINGS:  Cardiac silhouette is enlarged.  Aorta demonstrates atherosclerotic disease. Elevation left hemidiaphragm with left lower lobe atelectasis or airspace disease.  Chronic interstitial changes are seen within the right lung base.  No pneumothorax..  Trace left pleural effusion is suspected..  Bones demonstrate mild spondylosis.                                I have independently reviewed and interpreted the EKG.     I have independently reviewed all pertinent labs within the past 24 hours.    I have independently reviewed, visualized and interpreted all pertinent imaging results within the past 24 hours and discussed the findings with the ED physician, Dr. Henriquez.

## 2018-08-31 NOTE — HOSPITAL COURSE
8/31/2018 Breathing is improved  9/1 breathing improved  9/2 bronchoscopy with transbronchial biopsy, post procedure hypoxemia

## 2018-08-31 NOTE — HPI
72-year-old gentleman with significant history for smoking/COPD, AFib on Eliquis, diabetes mellitus, hypertension, dyslipidemia, who presented to the emergency room with progressive chest pain/shortness of breath.  He underwent CTA to evaluate for pulmonary embolism which demonstrated large left lung mass with extensive mediastinal lymphadenopathy and left adrenal nodule and lytic lesion of L1.    Pulmonary has been consulted and plans to proceed with bronchoscopy on Sunday 09/02/2018.

## 2018-08-31 NOTE — SUBJECTIVE & OBJECTIVE
"    Review of Systems   Constitutional: Positive for appetite change and unexpected weight change. Negative for chills, diaphoresis, fatigue and fever.   HENT: Negative for congestion, nosebleeds, sore throat and trouble swallowing.    Eyes: Negative for pain, discharge and visual disturbance.   Respiratory: Positive for shortness of breath. Negative for apnea, cough, chest tightness, wheezing and stridor.    Cardiovascular: Positive for chest pain (with deep breathing ). Negative for palpitations and leg swelling.   Gastrointestinal: Positive for abdominal distention (mild), constipation and nausea (with eating ). Negative for abdominal pain, blood in stool, diarrhea and vomiting.        Feeling of "fullness" with eating    Endocrine: Negative for cold intolerance and heat intolerance.   Genitourinary: Negative for difficulty urinating, dysuria, flank pain, frequency and urgency.   Musculoskeletal: Positive for back pain. Negative for arthralgias, joint swelling, myalgias, neck pain and neck stiffness.   Skin: Negative for rash and wound.   Allergic/Immunologic: Negative for food allergies and immunocompromised state.   Neurological: Negative for dizziness, seizures, syncope, facial asymmetry, weakness, light-headedness and headaches.   Hematological: Negative for adenopathy.   Psychiatric/Behavioral: Negative for agitation, behavioral problems and confusion. The patient is not nervous/anxious.      Objective:     Vital Signs (Most Recent):  Temp: 97.6 °F (36.4 °C) (08/31/18 1122)  Pulse: 93 (08/31/18 1123)  Resp: 18 (08/31/18 1122)  BP: 137/81 (08/31/18 1122)  SpO2: (!) 94 % (08/31/18 1123) Vital Signs (24h Range):  Temp:  [97.6 °F (36.4 °C)-98.6 °F (37 °C)] 97.6 °F (36.4 °C)  Pulse:  [82-97] 93  Resp:  [16-22] 18  SpO2:  [88 %-94 %] 94 %  BP: (116-137)/(64-81) 137/81     Weight: 84.5 kg (186 lb 6.4 oz)  Body mass index is 28.34 kg/m².    Intake/Output Summary (Last 24 hours) at 8/31/2018 1243  Last data filed at " 8/31/2018 0605  Gross per 24 hour   Intake 271.25 ml   Output --   Net 271.25 ml      Physical Exam   Constitutional: He is oriented to person, place, and time. No distress.   Frail, elderly    HENT:   Head: Normocephalic and atraumatic.   Nose: Nose normal.   Mouth/Throat: Oropharynx is clear and moist.   Eyes: Conjunctivae and EOM are normal. No scleral icterus.   Neck: Normal range of motion. Neck supple.   Cardiovascular: Normal rate, regular rhythm, normal heart sounds and intact distal pulses. Exam reveals no gallop and no friction rub.   No murmur heard.  Pulmonary/Chest: Effort normal. No stridor. No respiratory distress. He has no wheezes. He has no rales. He exhibits no tenderness.   Diminished bilaterally   conversational dyspnea    Abdominal: Soft. Bowel sounds are normal. He exhibits no distension. There is no tenderness. There is no rebound and no guarding.   Musculoskeletal: Normal range of motion. He exhibits no edema, tenderness or deformity.   Neurological: He is alert and oriented to person, place, and time. He has normal reflexes. No cranial nerve deficit. He exhibits normal muscle tone. Coordination normal.   Skin: Skin is warm and dry. No rash noted. He is not diaphoretic. No erythema. No pallor.   Psychiatric: He has a normal mood and affect. His behavior is normal. Thought content normal.   Nursing note and vitals reviewed.      Significant Labs:   BMP:   Recent Labs   Lab  08/31/18 0452   GLU  177*   NA  140   K  3.8   CL  99   CO2  25   BUN  20   CREATININE  1.0   CALCIUM  9.7     CBC:   Recent Labs   Lab  08/30/18 2111 08/31/18 0452   WBC  9.05  8.46   HGB  14.5  14.5   HCT  43.2  43.4   PLT  301  307     CMP:   Recent Labs   Lab  08/30/18 2111 08/31/18 0452   NA  140  140   K  4.2  3.8   CL  100  99   CO2  25  25   GLU  178*  177*   BUN  19  20   CREATININE  1.0  1.0   CALCIUM  9.5  9.7   PROT  8.2  7.9   ALBUMIN  3.2*  3.2*   BILITOT  0.4  0.4   ALKPHOS  114  117   AST  26   17   ALT  18  16   ANIONGAP  15  16   EGFRNONAA  >60  >60     All pertinent labs within the past 24 hours have been reviewed.    Significant Imaging:   Imaging Results          CTA Chest Non-Coronary - PE Study (Final result)  Result time 08/30/18 23:55:17    Final result by Mohinder Davis MD (08/30/18 23:55:17)                 Impression:      No evidence of pulmonary embolus.    Extensive mediastinal lymphadenopathy with a large dominant mass on the left aspect of the mediastinum.  Elevation left hemidiaphragm which may be from diaphragmatic paralysis secondary to the adenopathy.  Compressive atelectasis at the left lung base.    Tiny nodule left adrenal gland.  Lytic lesion right L1 vertebral body.  Metastatic disease suspected - consider CT PET for further evaluation.  The appearance of this central left lung infiltrating adenopathy is suspicious for a central small cell lung cancer.    All CT scans at this facility are performed  using dose modulation techniques as appropriate to performed exam including the following:  automated exposure control; adjustment of mA and/or kV according to the patients size (this includes techniques or standardized protocols for targeted exams where dose is matched to indication/reason for exam: i.e. extremities or head);  iterative reconstruction technique.      Electronically signed by: Mohinder Davis MD  Date:    08/30/2018  Time:    23:55             Narrative:    EXAMINATION:  CTA CHEST NON CORONARY    CLINICAL HISTORY:  Chest pain, acute, PE suspected, intermed prob, positive D-dimer;    TECHNIQUE:  CT angiogram through the chest following IV contrast administration.  Multiplanar MIP reformations performed.    COMPARISON:  Earlier chest x-ray    FINDINGS  Heart: Significant coronary arterial calcifications.  Lipomatosis hypertrophy inter atrial septum.  No pericardial effusions.    Mediastinum: Extensive mediastinal adenopathy.  There is a large dominant area of infiltrating  adenopathy around the central left perihilar lung measuring 6.8 by 3.4 cm which infiltrates and abuts the distal left mainstem bronchus and left upper lobe bronchus.  Mass-effect upon superior left pulmonary vein with partial encasement.  Additional enlarged lymph nodes throughout the mediastinum though not as large with a representative aortopulmonary window lymph node measuring 2.1 x 1.4 cm.    Vasculature: No acute pulmonary emboli.  Mild aortic atherosclerosis.    Lungs: There is elevation of the left hemidiaphragm with left lung base compressive atelectasis.  Significant emphysematous changes in the lungs.  No pleural effusions.    Esophagus: Unremarkable.    Upper Abdomen: Normal right adrenal.  Minimal nodularity the left adrenal gland measuring 12 mm.  Multiple small renal cyst    Bones: There there is a large lytic lesion involving the right L1 vertebral body..  Moderate degenerative change.                               X-Ray Chest PA And Lateral (Final result)  Result time 08/30/18 21:50:34    Final result by Jos Brito MD (08/30/18 21:50:34)                 Impression:      See above      Electronically signed by: Jos Brito MD  Date:    08/30/2018  Time:    21:50             Narrative:    EXAMINATION:  XR CHEST PA AND LATERAL    CLINICAL HISTORY:  Chest pain, unspecified    COMPARISON:  06/08/2018    FINDINGS:  Cardiac silhouette is enlarged.  Aorta demonstrates atherosclerotic disease. Elevation left hemidiaphragm with left lower lobe atelectasis or airspace disease.  Chronic interstitial changes are seen within the right lung base.  No pneumothorax..  Trace left pleural effusion is suspected..  Bones demonstrate mild spondylosis.

## 2018-08-31 NOTE — PLAN OF CARE
KAL spoke to [facility] with [facility].  Orders for home oxygen received.  [facility] will process order.    [id number] [facility] with [facility] has made a referral for home oxygen to [facility].  [facility] will deliver portable oxygen to bedside today.

## 2018-08-31 NOTE — H&P
[facility]  History & Physical    Patient Name: Nico Garza Jr.  MRN: [id number]  Admission Date: 8/30/2018  Attending Physician: James Hu MD  Primary Care Provider: Tiffany Davis DO         Patient information was obtained from patient, relative(s), past medical records and ER records.     Subjective:     Principal Problem:Metastatic lung cancer (metastasis from lung to other site), left    Chief Complaint:   Chief Complaint   Patient presents with    Chest Pain     shortness of breath, back pain        HPI: Mr. Garza is a 72-year-old  male with PMH significant for COPD, atrial fibrillation on Eliquis, insulin-dependent diabetes, hypertension, hyperlipidemia, presents to the ED complaining of pleuritic type chest discomfort associated with shortness of breath for the past 2-3 days.  In addition he also reports unintentional weight loss of 15 lb over the last one month, decreased appetite.  Initial chest x-ray shows no evidence of infiltrates, but elevated left diaphragm.  Patient was persistently hypoxemic in the ED with oxygen saturations dropping into the 80s without supplemental oxygen.  Patient does not use oxygen at home.  D-dimer was mildly elevated at 0.5.  Hence CTA chest was done, came back negative for pulmonary embolism.  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  In addition there is a tiny nodule on the left adrenal gland, and lytic lesion on the right L1 vertebral body.  Suspected metastatic lung carcinoma.  Patient admitted as observation basis for hypoxemia, and Pulmonary consult for possible bronchoscopy guided lung mass biopsy.    Past Medical History:   Diagnosis Date    Arthritis     Atrial fibrillation and flutter     Atrial flutter     COPD (chronic obstructive pulmonary disease)     DM  "(diabetes mellitus) 1996    Hyperlipidemia     Hypertension     Lung disease     Neuropathy, diabetic     Pancreatitis        Past Surgical History:   Procedure Laterality Date    BICEPS TENDON REPAIR      CARDIOVERSION      CHOLECYSTECTOMY      PATELLA SURGERY      RADIOFREQUENCY ABLATION      ROTATOR CUFF REPAIR         Review of patient's allergies indicates:   Allergen Reactions    Flomax [tamsulosin]      Dizzy         No current facility-administered medications on file prior to encounter.      Current Outpatient Medications on File Prior to Encounter   Medication Sig    b complex vitamins tablet Take 1 tablet by mouth once daily.    blood sugar diagnostic Strp 1 each by Misc.(Non-Drug; Combo Route) route 3 (three) times daily. Dispense preferred on insurance    blood-glucose meter kit Use as instructed - preferred on insurance    diltiaZEM (CARDIZEM CD) 360 MG 24 hr capsule TAKE 1 CAPSULE(360 MG) BY MOUTH EVERY DAY    ELIQUIS 5 mg Tab TAKE 1 TABLET(5 MG) BY MOUTH TWICE DAILY    flecainide (TAMBOCOR) 100 MG Tab Take 1 tablet (100 mg total) by mouth every 12 (twelve) hours.    gabapentin (NEURONTIN) 300 MG capsule TAKE 2 CAPSULES BY MOUTH TWICE DAILY    insulin degludec (TRESIBA FLEXTOUCH U-200) 200 unit/mL (3 mL) InPn Inject 50 Units into the skin once daily.    insulin lispro (HUMALOG KWIKPEN) 100 unit/mL InPn pen ADMINISTER 17 UNITS UNDER THE SKIN THREE TIMES DAILY BEFORE MEALS (Patient taking differently: ADMINISTER 17 UNITS UNDER THE SKIN IN MORNING AND 25 UNITS NOON AND NIGHT)    insulin needles, disposable, 32 x 1/4 " Ndle Insulin injections four times per day.    multivitamin capsule Take 1 capsule by mouth once daily.    NEEDLES, DISPOSABLE (DISPOSABLE NEEDLES MISC) Inject 1 each into the skin 3 (three) times daily.    omeprazole (PRILOSEC) 20 MG capsule TAKE 1 CAPSULE BY MOUTH EVERY DAY    pravastatin (PRAVACHOL) 40 MG tablet TAKE 1 TABLET DAILY (Patient taking differently: " TAKE 1 TABLET HS)    quinapril (ACCUPRIL) 40 MG tablet Take 1 tablet (40 mg total) by mouth once daily. (Patient taking differently: Take 40 mg by mouth nightly. )    traMADol (ULTRAM) 50 mg tablet TAKE 2 TABLETS BY MOUTH ONCE DAILY AS NEEDED FOR PAIN    vitamin D 1000 units Tab Take 1,000 Units by mouth once daily.    SUPREP BOWEL PREP KIT 17.5-3.13-1.6 gram SolR Use as directed    [DISCONTINUED] duloxetine (CYMBALTA) 30 MG capsule TAKE 1 CAPSULE(30 MG) BY MOUTH EVERY DAY    [DISCONTINUED] umeclidinium-vilanterol (ANORO ELLIPTA) 62.5-25 mcg/actuation DsDv Inhale 1 puff into the lungs once daily.     Family History     Problem Relation (Age of Onset)    Cancer Maternal Aunt    Cataracts Sister    Diabetes Mother, Sister, Sister, Sister    Heart attack Brother    Heart failure Brother    Hypertension Mother, Father    Stroke Mother, Father, Paternal Grandmother, Paternal Grandfather        Tobacco Use    Smoking status: Former Smoker     Packs/day: 0.50     Types: Cigarettes     Last attempt to quit: 3/8/2015     Years since quitting: 3.4    Smokeless tobacco: Former User     Types: Snuff     Quit date: 10/7/1995   Substance and Sexual Activity    Alcohol use: No     Alcohol/week: 0.0 oz    Drug use: No    Sexual activity: Not on file     Review of Systems   Constitutional: Positive for appetite change and unexpected weight change (15 lb weight loss unintentionally within one month). Negative for chills, fatigue and fever.   HENT: Negative for congestion, nosebleeds and sore throat.    Eyes: Negative for visual disturbance.   Respiratory: Positive for shortness of breath. Negative for cough and wheezing.    Cardiovascular: Positive for chest pain (Left-sided pleuritic chest pain). Negative for palpitations.   Gastrointestinal: Negative for abdominal pain, diarrhea and nausea.   Endocrine: Positive for polyuria.   Genitourinary: Negative for dysuria, flank pain and hematuria.   Musculoskeletal: Negative for  back pain and neck pain.   Skin: Negative for color change and rash.   Allergic/Immunologic: Negative for immunocompromised state.   Neurological: Negative for dizziness, tremors, numbness and headaches.   Hematological: Does not bruise/bleed easily.   Psychiatric/Behavioral: Negative for confusion and sleep disturbance. The patient is not nervous/anxious.    All other systems reviewed and are negative.    Objective:     Vital Signs (Most Recent):  Temp: 98.4 °F (36.9 °C) (08/31/18 0435)  Pulse: 82 (08/31/18 0521)  Resp: 18 (08/31/18 0435)  BP: 134/77 (08/31/18 0435)  SpO2: (!) 92 % (08/31/18 0435) Vital Signs (24h Range):  Temp:  [97.6 °F (36.4 °C)-98.4 °F (36.9 °C)] 98.4 °F (36.9 °C)  Pulse:  [82-97] 82  Resp:  [16-22] 18  SpO2:  [88 %-94 %] 92 %  BP: (116-134)/(64-77) 134/77     Weight: 84.5 kg (186 lb 6.4 oz)  Body mass index is 28.34 kg/m².    Physical Exam   Constitutional: He is oriented to person, place, and time. No distress.   HENT:   Head: Normocephalic and atraumatic.   Eyes: Conjunctivae and EOM are normal. No scleral icterus.   Neck: Normal range of motion. Neck supple. No tracheal deviation present.   Cardiovascular: Normal rate, regular rhythm and intact distal pulses.   No murmur heard.  Pulmonary/Chest: Effort normal. No accessory muscle usage. No tachypnea. No respiratory distress. He has wheezes (Mild). He has rhonchi.   Abdominal: Soft. Bowel sounds are normal. He exhibits no mass. There is no tenderness.   Musculoskeletal: Normal range of motion. He exhibits no edema or tenderness.   Neurological: He is alert and oriented to person, place, and time. No cranial nerve deficit. Coordination normal.   Skin: Skin is warm. Capillary refill takes 2 to 3 seconds. He is not diaphoretic. No erythema.   Psychiatric: He has a normal mood and affect. His behavior is normal. Thought content normal.   Nursing note and vitals reviewed.        CRANIAL NERVES     CN III, IV, VI   Extraocular motions are normal.         Significant Labs:   ABGs: No results for input(s): PH, PCO2, HCO3, POCSATURATED, BE, TOTALHB, COHB, METHB, O2HB, POCFIO2 in the last 48 hours.  BMP:   Recent Labs   Lab  08/31/18   0452   GLU  177*   NA  140   K  3.8   CL  99   CO2  25   BUN  20   CREATININE  1.0   CALCIUM  9.7     CBC:   Recent Labs   Lab  08/30/18 2111 08/31/18   0452   WBC  9.05  8.46   HGB  14.5  14.5   HCT  43.2  43.4   PLT  301  307     CMP:   Recent Labs   Lab  08/30/18 2111 08/31/18   0452   NA  140  140   K  4.2  3.8   CL  100  99   CO2  25  25   GLU  178*  177*   BUN  19  20   CREATININE  1.0  1.0   CALCIUM  9.5  9.7   PROT  8.2  7.9   ALBUMIN  3.2*  3.2*   BILITOT  0.4  0.4   ALKPHOS  114  117   AST  26  17   ALT  18  16   ANIONGAP  15  16   EGFRNONAA  >60  >60     Lactic Acid: No results for input(s): LACTATE in the last 48 hours.  Troponin:   Recent Labs   Lab  08/30/18 2111 08/31/18   0154   TROPONINI  <0.006  <0.006     Urine Studies: No results for input(s): COLORU, APPEARANCEUA, PHUR, SPECGRAV, PROTEINUA, GLUCUA, KETONESU, BILIRUBINUA, OCCULTUA, NITRITE, UROBILINOGEN, LEUKOCYTESUR, RBCUA, WBCUA, BACTERIA, SQUAMEPITHEL, HYALINECASTS in the last 48 hours.    Invalid input(s): WRIGHTSUR  All pertinent labs within the past 24 hours have been reviewed.    Significant Imaging: I have reviewed and interpreted all pertinent imaging results/findings within the past 24 hours.     Imaging Results          CTA Chest Non-Coronary - PE Study (Final result)  Result time 08/30/18 23:55:17    Final result by Mohinder Davis MD (08/30/18 23:55:17)                 Impression:      No evidence of pulmonary embolus.    Extensive mediastinal lymphadenopathy with a large dominant mass on the left aspect of the mediastinum.  Elevation left hemidiaphragm which may be from diaphragmatic paralysis secondary to the adenopathy.  Compressive atelectasis at the left lung base.    Tiny nodule left adrenal gland.  Lytic lesion right L1 vertebral  body.  Metastatic disease suspected - consider CT PET for further evaluation.  The appearance of this central left lung infiltrating adenopathy is suspicious for a central small cell lung cancer.    All CT scans at this facility are performed  using dose modulation techniques as appropriate to performed exam including the following:  automated exposure control; adjustment of mA and/or kV according to the patients size (this includes techniques or standardized protocols for targeted exams where dose is matched to indication/reason for exam: i.e. extremities or head);  iterative reconstruction technique.      Electronically signed by: Mohinder Davis MD  Date:    08/30/2018  Time:    23:55             Narrative:    EXAMINATION:  CTA CHEST NON CORONARY    CLINICAL HISTORY:  Chest pain, acute, PE suspected, intermed prob, positive D-dimer;    TECHNIQUE:  CT angiogram through the chest following IV contrast administration.  Multiplanar MIP reformations performed.    COMPARISON:  Earlier chest x-ray    FINDINGS  Heart: Significant coronary arterial calcifications.  Lipomatosis hypertrophy inter atrial septum.  No pericardial effusions.    Mediastinum: Extensive mediastinal adenopathy.  There is a large dominant area of infiltrating adenopathy around the central left perihilar lung measuring 6.8 by 3.4 cm which infiltrates and abuts the distal left mainstem bronchus and left upper lobe bronchus.  Mass-effect upon superior left pulmonary vein with partial encasement.  Additional enlarged lymph nodes throughout the mediastinum though not as large with a representative aortopulmonary window lymph node measuring 2.1 x 1.4 cm.    Vasculature: No acute pulmonary emboli.  Mild aortic atherosclerosis.    Lungs: There is elevation of the left hemidiaphragm with left lung base compressive atelectasis.  Significant emphysematous changes in the lungs.  No pleural effusions.    Esophagus: Unremarkable.    Upper Abdomen: Normal right  adrenal.  Minimal nodularity the left adrenal gland measuring 12 mm.  Multiple small renal cyst    Bones: There there is a large lytic lesion involving the right L1 vertebral body..  Moderate degenerative change.                               X-Ray Chest PA And Lateral (Final result)  Result time 08/30/18 21:50:34    Final result by Jos Brito MD (08/30/18 21:50:34)                 Impression:      See above      Electronically signed by: Jos Brito MD  Date:    08/30/2018  Time:    21:50             Narrative:    EXAMINATION:  XR CHEST PA AND LATERAL    CLINICAL HISTORY:  Chest pain, unspecified    COMPARISON:  06/08/2018    FINDINGS:  Cardiac silhouette is enlarged.  Aorta demonstrates atherosclerotic disease. Elevation left hemidiaphragm with left lower lobe atelectasis or airspace disease.  Chronic interstitial changes are seen within the right lung base.  No pneumothorax..  Trace left pleural effusion is suspected..  Bones demonstrate mild spondylosis.                                I have independently reviewed and interpreted the EKG.     I have independently reviewed all pertinent labs within the past 24 hours.    I have independently reviewed, visualized and interpreted all pertinent imaging results within the past 24 hours and discussed the findings with the ED physician, Dr. Henriquez.            Assessment/Plan:     * Metastatic lung cancer (metastasis from lung to other site), left    New diagnosis of metastatic left lung mass, likely primary.  Consult Pulmonary for possible bronchoscopy guided lung mass biopsy.  Unintentional weight loss of 15 lb over one month, with decreased appetite.  Patient is a former smoker.  Hold Eliquis for now.  Will need Oncology follow-up eventually.  Continue supplemental oxygen to maintain saturations greater than 90%.          Hypoxemia    Oxygen saturations in the low 90s on 2 L oxygen nasal cannula.  Evaluated for home oxygen needs.        COPD (chronic  obstructive pulmonary disease) with emphysema    Continue bronchodilators as needed.  Patient currently not in any respiratory distress.  Patient followed by Dr. Ibrahim in the pulmonary clinic as outpatient.          Paroxysmal atrial fibrillation    Currently rate controlled.  Continue diltiazem, home medication.  Hold Eliquis, patient reports last dose 08/30/2018.          Hypertension goal BP (blood pressure) < 130/80    Continue ACEI, home dose.  Monitor closely and make adjustments as needed.        Hyperlipidemia LDL goal <70    Continue home dose statin.            VTE Risk Mitigation (From admission, onward)        Ordered     Place sequential compression device  Until discontinued      08/31/18 0622             James Hu MD  Department of Hospital Medicine   [facility]

## 2018-08-31 NOTE — ED PROVIDER NOTES
SCRIBE #1 NOTE: I, Cristina Segundo, am scribing for, and in the presence of, Deyvi Henriquez Jr., MD. I have scribed the entire note.      History      Chief Complaint   Patient presents with    Chest Pain     shortness of breath, back pain       Review of patient's allergies indicates:   Allergen Reactions    Flomax [tamsulosin]      Dizzy          HPI   HPI    8/30/2018, 8:38 PM   History obtained from the patient and daughter      History of Present Illness: Nico Garza Jr. is a 72 y.o. male patient with a PMHx including A Fib (s/p cardiac ablation), A flutter, COPD, pancreatitis, DM, HTN, and hyperlipidemia, s/p cholecystectomy, who presents to the Emergency Department for midsternal chest pain that radiates into his lower b/l chest with deep breathing. Although sx have been constant over the past 2-3 days, pt notes that he has been experiencing these sx intermittently for a few months. CP is rated as a 9/10 in severity and is exacerbated with deep breathing. Pain is also worse at night when laying in bed, though he denies waking with a foul taste in his mouth. Associated sx include moderate SOB, and pt notes that SpO2 was 85% on RA upon arrival to the ED. Pt denies any N/V, diaphoresis, cough, wheezing, palpitations, leg pain, dizziness, fever, trauma, and all other sxs at this time. No further complaints or concerns at this time. Pt denies smoking.     Arrival mode: Personal vehicle     PCP: Tiffany Davis DO     Past Medical History:  Past Medical History:   Diagnosis Date    Arthritis     Atrial fibrillation and flutter     Atrial flutter     COPD (chronic obstructive pulmonary disease)     DM (diabetes mellitus) 1996    Hyperlipidemia     Hypertension     Lung disease     Neuropathy, diabetic     Pancreatitis        Past Surgical History:  Past Surgical History:   Procedure Laterality Date    BICEPS TENDON REPAIR      CARDIOVERSION      CHOLECYSTECTOMY      PATELLA SURGERY       RADIOFREQUENCY ABLATION      ROTATOR CUFF REPAIR           Family History:  Family History   Problem Relation Age of Onset    Heart failure Brother     Heart attack Brother     Diabetes Mother     Hypertension Mother     Stroke Mother     Hypertension Father     Stroke Father     Diabetes Sister     Cataracts Sister     Cancer Maternal Aunt     Diabetes Sister     Diabetes Sister     Stroke Paternal Grandmother     Stroke Paternal Grandfather        Social History:  Social History     Tobacco Use    Smoking status: Former Smoker     Packs/day: 0.50     Types: Cigarettes     Last attempt to quit: 3/8/2015     Years since quitting: 3.4    Smokeless tobacco: Former User     Types: Snuff     Quit date: 10/7/1995   Substance and Sexual Activity    Alcohol use: No     Alcohol/week: 0.0 oz    Drug use: No    Sexual activity: Not on file       ROS   Review of Systems   Constitutional: Negative for chills, diaphoresis, fever and unexpected weight change.   HENT: Negative for congestion, rhinorrhea, sneezing and sore throat.    Respiratory: Positive for shortness of breath. Negative for cough and wheezing.    Cardiovascular: Positive for chest pain. Negative for palpitations. Leg swelling: baseline.   Gastrointestinal: Negative for diarrhea, nausea and vomiting.   Genitourinary: Negative for dysuria.   Musculoskeletal: Negative for joint swelling and myalgias.   Skin: Negative for rash and wound.   Neurological: Negative for dizziness, syncope, weakness and light-headedness.   Hematological: Does not bruise/bleed easily.     Physical Exam      Initial Vitals [08/30/18 2032]   BP Pulse Resp Temp SpO2   131/69 96 20 97.7 °F (36.5 °C) (!) 88 %      MAP       --          Physical Exam  Nursing Notes and Vital Signs Reviewed.  Constitutional: Patient is in no acute distress. Well-developed and well-nourished.  Head: Atraumatic. Normocephalic.  Eyes: PERRL. EOM intact. Conjunctivae are not pale. No scleral  "icterus.  ENT: Mucous membranes are moist.   Neck: Supple. Full ROM. No lymphadenopathy.  Cardiovascular: Regular rate. Regular rhythm. No murmurs, rubs, or gallops.   Pulmonary/Chest: No respiratory distress. Clear to auscultation bilaterally. No wheezing or rales.  Abdominal: Soft and non-distended.  There is no tenderness.  No rebound, guarding, or rigidity. Good bowel sounds.  Genitourinary: No CVA tenderness  Musculoskeletal: Moves all extremities. No obvious deformities.   Skin: Warm and dry.  Neurological:  Alert, awake, and appropriate.  Normal speech.  No acute focal neurological deficits are appreciated.  Psychiatric: Normal affect. Good eye contact. Appropriate in content.    ED Course    Procedures  ED Vital Signs:  Vitals:    08/30/18 2032 08/30/18 2101 08/30/18 2106 08/30/18 2112   BP: 131/69   133/64   Pulse: 96 88 87 87   Resp: 20   16   Temp: 97.7 °F (36.5 °C)      TempSrc: Oral      SpO2: (!) 88%   (!) 92%   Weight: 84.5 kg (186 lb 6.4 oz)      Height: 5' 8" (1.727 m)       08/30/18 2300   BP:    Pulse: 85   Resp: 18   Temp:    TempSrc:    SpO2: (!) 91%   Weight:    Height:        Abnormal Lab Results:  Labs Reviewed   CBC W/ AUTO DIFFERENTIAL - Abnormal; Notable for the following components:       Result Value    Gran% 80.2 (*)     Lymph% 11.8 (*)     All other components within normal limits   COMPREHENSIVE METABOLIC PANEL - Abnormal; Notable for the following components:    Glucose 178 (*)     Albumin 3.2 (*)     All other components within normal limits   D DIMER, QUANTITATIVE - Abnormal; Notable for the following components:    D-Dimer 0.58 (*)     All other components within normal limits   LIPASE   AMYLASE   APTT   PROTIME-INR   TROPONIN I   B-TYPE NATRIURETIC PEPTIDE        All Lab Results:  Results for orders placed or performed during the hospital encounter of 08/30/18   CBC auto differential   Result Value Ref Range    WBC 9.05 3.90 - 12.70 K/uL    RBC 4.96 4.60 - 6.20 M/uL    Hemoglobin " 14.5 14.0 - 18.0 g/dL    Hematocrit 43.2 40.0 - 54.0 %    MCV 87 82 - 98 fL    MCH 29.2 27.0 - 31.0 pg    MCHC 33.6 32.0 - 36.0 g/dL    RDW 13.6 11.5 - 14.5 %    Platelets 301 150 - 350 K/uL    MPV 9.3 9.2 - 12.9 fL    Gran # (ANC) 7.3 1.8 - 7.7 K/uL    Lymph # 1.1 1.0 - 4.8 K/uL    Mono # 0.7 0.3 - 1.0 K/uL    Eos # 0.1 0.0 - 0.5 K/uL    Baso # 0.01 0.00 - 0.20 K/uL    Gran% 80.2 (H) 38.0 - 73.0 %    Lymph% 11.8 (L) 18.0 - 48.0 %    Mono% 7.3 4.0 - 15.0 %    Eosinophil% 0.6 0.0 - 8.0 %    Basophil% 0.1 0.0 - 1.9 %    Differential Method Automated    Comprehensive metabolic panel   Result Value Ref Range    Sodium 140 136 - 145 mmol/L    Potassium 4.2 3.5 - 5.1 mmol/L    Chloride 100 95 - 110 mmol/L    CO2 25 23 - 29 mmol/L    Glucose 178 (H) 70 - 110 mg/dL    BUN, Bld 19 8 - 23 mg/dL    Creatinine 1.0 0.5 - 1.4 mg/dL    Calcium 9.5 8.7 - 10.5 mg/dL    Total Protein 8.2 6.0 - 8.4 g/dL    Albumin 3.2 (L) 3.5 - 5.2 g/dL    Total Bilirubin 0.4 0.1 - 1.0 mg/dL    Alkaline Phosphatase 114 55 - 135 U/L    AST 26 10 - 40 U/L    ALT 18 10 - 44 U/L    Anion Gap 15 8 - 16 mmol/L    eGFR if African American >60 >60 mL/min/1.73 m^2    eGFR if non African American >60 >60 mL/min/1.73 m^2   D dimer, quantitative   Result Value Ref Range    D-Dimer 0.58 (H) <0.50 mg/L FEU   Lipase   Result Value Ref Range    Lipase 16 4 - 60 U/L   Amylase   Result Value Ref Range    Amylase 24 20 - 110 U/L   APTT   Result Value Ref Range    aPTT 30.3 21.0 - 32.0 sec   Protime-INR   Result Value Ref Range    Prothrombin Time 11.5 9.0 - 12.5 sec    INR 1.1 0.8 - 1.2   Troponin I   Result Value Ref Range    Troponin I <0.006 0.000 - 0.026 ng/mL   Brain natriuretic peptide   Result Value Ref Range    BNP 22 0 - 99 pg/mL     Imaging Results:  Imaging Results          CTA Chest Non-Coronary - PE Study (Final result)  Result time 08/30/18 23:55:17    Final result by Mohinder Davis MD (08/30/18 23:55:17)                 Impression:      No evidence of  pulmonary embolus.    Extensive mediastinal lymphadenopathy with a large dominant mass on the left aspect of the mediastinum.  Elevation left hemidiaphragm which may be from diaphragmatic paralysis secondary to the adenopathy.  Compressive atelectasis at the left lung base.    Tiny nodule left adrenal gland.  Lytic lesion right L1 vertebral body.  Metastatic disease suspected - consider CT PET for further evaluation.  The appearance of this central left lung infiltrating adenopathy is suspicious for a central small cell lung cancer.    All CT scans at this facility are performed  using dose modulation techniques as appropriate to performed exam including the following:  automated exposure control; adjustment of mA and/or kV according to the patients size (this includes techniques or standardized protocols for targeted exams where dose is matched to indication/reason for exam: i.e. extremities or head);  iterative reconstruction technique.      Electronically signed by: Mohinder Davis MD  Date:    08/30/2018  Time:    23:55             Narrative:    EXAMINATION:  CTA CHEST NON CORONARY    CLINICAL HISTORY:  Chest pain, acute, PE suspected, intermed prob, positive D-dimer;    TECHNIQUE:  CT angiogram through the chest following IV contrast administration.  Multiplanar MIP reformations performed.    COMPARISON:  Earlier chest x-ray    FINDINGS  Heart: Significant coronary arterial calcifications.  Lipomatosis hypertrophy inter atrial septum.  No pericardial effusions.    Mediastinum: Extensive mediastinal adenopathy.  There is a large dominant area of infiltrating adenopathy around the central left perihilar lung measuring 6.8 by 3.4 cm which infiltrates and abuts the distal left mainstem bronchus and left upper lobe bronchus.  Mass-effect upon superior left pulmonary vein with partial encasement.  Additional enlarged lymph nodes throughout the mediastinum though not as large with a representative aortopulmonary window  lymph node measuring 2.1 x 1.4 cm.    Vasculature: No acute pulmonary emboli.  Mild aortic atherosclerosis.    Lungs: There is elevation of the left hemidiaphragm with left lung base compressive atelectasis.  Significant emphysematous changes in the lungs.  No pleural effusions.    Esophagus: Unremarkable.    Upper Abdomen: Normal right adrenal.  Minimal nodularity the left adrenal gland measuring 12 mm.  Multiple small renal cyst    Bones: There there is a large lytic lesion involving the right L1 vertebral body..  Moderate degenerative change.                               X-Ray Chest PA And Lateral (Final result)  Result time 08/30/18 21:50:34    Final result by Jos Brito MD (08/30/18 21:50:34)                 Impression:      See above      Electronically signed by: Jos Brito MD  Date:    08/30/2018  Time:    21:50             Narrative:    EXAMINATION:  XR CHEST PA AND LATERAL    CLINICAL HISTORY:  Chest pain, unspecified    COMPARISON:  06/08/2018    FINDINGS:  Cardiac silhouette is enlarged.  Aorta demonstrates atherosclerotic disease. Elevation left hemidiaphragm with left lower lobe atelectasis or airspace disease.  Chronic interstitial changes are seen within the right lung base.  No pneumothorax..  Trace left pleural effusion is suspected..  Bones demonstrate mild spondylosis.                               The EKG was ordered, reviewed, and independently interpreted by the ED provider.  Interpretation time: 2107  Rate: 88 BPM  Rhythm: Sinus rhythm with first degree AV block  Interpretation: Incomplete RBBB. Cannot rule out anteroseptal infarct, age undetermined.           The Emergency Provider reviewed the vital signs and test results, which are outlined above.    ED Discussion     8/30/2018, 9:18 PM: Pt refuses ASA.     8/30/2018, 10:01 PM: Reevaluation. Discussed resulted studies with pt and his daughter, including CXR showing elevation left hemidiaphragm with left lower lobe atelectasis or  airspace disease and mildly elevated D-Dimer (0.58). Order placed for CTA Chest.     8/31/2018, 12:10 AM: Reevaluation. Pt desaturated to 88% when transitioned to RA. SpO2 improved to 92% when returned to 2L NC. Updated pt and daughter on all resulted studies, including CTA Chest. CTA chest is without evidence of PE, but reveals extensive mediastinal lymphadenopathy with a large dominant mass on the left aspect of the mediastinum. Per radiologist, the elevation of the left hemidiaphragm may be from diaphragmatic paralysis secondary to the adenopathy. Compressive atelectasis at the left lung base. Additional CTA chest findings detailed above. Metastatic disease suspected. Discussed plan of care to include hospital admission for hypoxia and left lung mass. All questions asked answered appropriately.    8/31/2018, 12:17 AM: Discussed case with Fritz ([facility]). Dr. Hu agrees with current care and management of pt and accepts admission.   Admitting Service: [facility]   Admitting Physician: Dr. Hu  Admit to: Observation    12:30 AM  The patient's large left-sided lung mass full lymphadenopathy.  Slightly cancer however requires tissue diagnosis in order to confirm diagnosis as well as staging determine further treatment if this is cancer.  I discussed with the patient at length.  He is aware of his diagnosis as well as plan of care going forward.  Other with outpatient evaluation of this is feasible, upon attempting to discharge the patient was noted that he remained hypoxic off of oxygen.  Sats were in the mid upper 80s off of oxygen which was abnormal for him.  Light compressive nature of the left-sided lung mass and a question of paralysis left hemidiaphragm, this is some concern.  Will admit for oxygen tonight pulmonary consultation in the morning for final plan of care moving forward.  Patient verbalized understanding agreement with all.    ED Medication(s):  Medications   aspirin tablet  325 mg (325 mg Oral Not Given 8/30/18 2115)   GI cocktail (mylanta 30 mL, lidocaine 2 % viscous 10 mL, dicyclomine 10 mL) 50 mL (50 mLs Oral Given 8/30/18 2118)   famotidine tablet 20 mg (20 mg Oral Given 8/30/18 2118)   nitroGLYCERIN 2% TD oint ointment 0.5 inch (0.5 inches Topical (Top) Given 8/30/18 2117)   furosemide injection 60 mg (60 mg Intravenous Given 8/30/18 2117)   omnipaque 350 iohexol 100 mL (100 mLs Intravenous Given 8/30/18 2315)       New Prescriptions    No medications on file       Medical Decision Making    Medical Decision Making:   Clinical Tests:   Lab Tests: Ordered and Reviewed  Radiological Study: Ordered and Reviewed  Medical Tests: Ordered and Reviewed             Scribe Attestation:   Scribe #1: I performed the above scribed service and the documentation accurately describes the services I performed. I attest to the accuracy of the note.    Attending:   Physician Attestation Statement for Scribe #1: I, Deyvi Henriquez Jr., MD, personally performed the services described in this documentation, as scribed by Cristina Segundo, in my presence, and it is both accurate and complete.          Clinical Impression       ICD-10-CM ICD-9-CM   1. Mass of left lung R91.8 786.6   2. Chest pain R07.9 786.50   3. Hypoxia R09.02 799.02       Disposition:   Disposition: Admitted  Condition: Fair         Deyvi Henriquez Jr., MD  08/31/18 0031

## 2018-08-31 NOTE — ASSESSMENT & PLAN NOTE
Malnutrition in the context of Chronic Illness/Injury    Related to (etiology):  Inadequate energy intake     Signs and Symptoms (as evidenced by):  Energy Intake: <75% of estimated energy requirement for 1 month   Body Fat Depletion: mild depletion of orbitals, triceps and thoracic and lumbar region   Muscle Mass Depletion: mild depletion of temples, clavicle region and scapular region   Weight Loss: 7 % within the last month     Interventions/Recommendations (treatment strategy):  See above     Nutrition Diagnosis Status:  New

## 2018-08-31 NOTE — PLAN OF CARE
Problem: Patient Care Overview  Goal: Plan of Care Review  Outcome: Ongoing (interventions implemented as appropriate)  Recommendations     Recommendation/Intervention: 1.When medically able, ADAT to Cardiac Diabetic. 2. Add boost glucose control TID. 3. If unable to advance diet within 72 hours consider alternate nutrition support. 4. Will continue to monitor.   Goals: Diet advancement within 72 hrs  Nutrition Goal Status: new  Communication of RD Recs: (POC, sticky note)

## 2018-08-31 NOTE — ASSESSMENT & PLAN NOTE
Significanr emphysematous changes with blebs on CT  Cont Neb txs  Patient followed by Dr. Ibrahim in the pulmonary clinic as outpatient.

## 2018-08-31 NOTE — SUBJECTIVE & OBJECTIVE
Oncology Treatment Plan:   [No treatment plan]    Medications:  Continuous Infusions:  Scheduled Meds:   albuterol-ipratropium  3 mL Nebulization Q6H    diltiaZEM  360 mg Oral Daily    insulin aspart U-100  15 Units Subcutaneous BIDWM    methylPREDNISolone sodium succinate  80 mg Intravenous Q8H    pantoprazole  40 mg Oral Daily    pravastatin  40 mg Oral Daily    quinapril  40 mg Oral Daily     PRN Meds:acetaminophen, aluminum-magnesium hydroxide-simethicone, dextrose 50%, glucagon (human recombinant), guaifenesin 100 mg/5 ml, HYDROcodone-acetaminophen, insulin aspart U-100, morphine, zolpidem     Review of patient's allergies indicates:   Allergen Reactions    Flomax [tamsulosin]      Dizzy          Past Medical History:   Diagnosis Date    Arthritis     Atrial fibrillation and flutter     Atrial flutter     COPD (chronic obstructive pulmonary disease)     DM (diabetes mellitus) 1996    Hyperlipidemia     Hypertension     Lung disease     Neuropathy, diabetic     Pancreatitis      Past Surgical History:   Procedure Laterality Date    BICEPS TENDON REPAIR      CARDIOVERSION      CHOLECYSTECTOMY      PATELLA SURGERY      RADIOFREQUENCY ABLATION      ROTATOR CUFF REPAIR       Family History     Problem Relation (Age of Onset)    Cancer Maternal Aunt    Cataracts Sister    Diabetes Mother, Sister, Sister, Sister    Heart attack Brother    Heart failure Brother    Hypertension Mother, Father    Stroke Mother, Father, Paternal Grandmother, Paternal Grandfather        Tobacco Use    Smoking status: Former Smoker     Packs/day: 0.50     Types: Cigarettes     Start date: 1/1/1960     Last attempt to quit: 3/8/2015     Years since quitting: 3.4    Smokeless tobacco: Former User     Types: Snuff     Quit date: 10/7/1995   Substance and Sexual Activity    Alcohol use: No     Alcohol/week: 0.0 oz    Drug use: No    Sexual activity: Not on file       Review of Systems   Constitutional: Positive for  unexpected weight change (Weight loss). Negative for activity change, appetite change, chills, diaphoresis, fatigue and fever.   HENT: Negative for congestion, nosebleeds, sore throat and trouble swallowing.    Eyes: Negative for pain, discharge and visual disturbance.   Respiratory: Positive for shortness of breath. Negative for apnea, cough, choking, chest tightness, wheezing and stridor.    Cardiovascular: Positive for chest pain. Negative for palpitations and leg swelling.   Gastrointestinal: Positive for nausea. Negative for abdominal distention, abdominal pain, anal bleeding, blood in stool, constipation, diarrhea, rectal pain and vomiting.   Endocrine: Negative for cold intolerance and heat intolerance.   Genitourinary: Negative for difficulty urinating, dysuria, flank pain, frequency and urgency.   Musculoskeletal: Positive for back pain. Negative for arthralgias, gait problem, joint swelling, myalgias, neck pain and neck stiffness.   Skin: Negative for rash and wound.   Allergic/Immunologic: Negative for food allergies and immunocompromised state.   Neurological: Negative for dizziness, seizures, syncope, facial asymmetry, weakness, light-headedness and headaches.   Hematological: Negative for adenopathy.   Psychiatric/Behavioral: Negative for agitation, behavioral problems and confusion. The patient is not nervous/anxious.      Objective:     Vital Signs (Most Recent):  Temp: 97 °F (36.1 °C) (08/31/18 1526)  Pulse: 80 (08/31/18 1526)  Resp: 18 (08/31/18 1526)  BP: 124/60 (08/31/18 1526)  SpO2: (!) 90 % (08/31/18 1526) Vital Signs (24h Range):  Temp:  [97 °F (36.1 °C)-98.6 °F (37 °C)] 97 °F (36.1 °C)  Pulse:  [80-97] 80  Resp:  [16-22] 18  SpO2:  [88 %-94 %] 90 %  BP: (116-137)/(60-81) 124/60     Weight: 84.5 kg (186 lb 6.4 oz)  Body mass index is 28.34 kg/m².  Body surface area is 2.01 meters squared.      Intake/Output Summary (Last 24 hours) at 8/31/2018 1628  Last data filed at 8/31/2018 0605  Gross per  24 hour   Intake 271.25 ml   Output --   Net 271.25 ml       Physical Exam   Constitutional: He is oriented to person, place, and time. He appears well-developed and well-nourished.   HENT:   Head: Normocephalic and atraumatic.   Mouth/Throat: No oropharyngeal exudate.   Eyes: Conjunctivae are normal. Pupils are equal, round, and reactive to light. Right eye exhibits no discharge. Left eye exhibits no discharge.   Neck: Neck supple. No JVD present. No tracheal deviation present. No thyromegaly present.   Cardiovascular: Normal rate, regular rhythm and normal heart sounds.   No murmur heard.  Pulmonary/Chest: Effort normal. No stridor. No respiratory distress. He has decreased breath sounds. He has wheezes in the right lower field and the left lower field. He has no rhonchi. He has no rales.   Abdominal: Soft. Bowel sounds are normal.   Musculoskeletal: Normal range of motion. He exhibits no edema or tenderness.   Lymphadenopathy:     He has no cervical adenopathy.   Neurological: He is alert and oriented to person, place, and time.   Skin: Skin is warm, dry and intact. Capillary refill takes less than 2 seconds. No abrasion, no bruising, no ecchymosis and no rash noted. No cyanosis. Nails show no clubbing.   Nursing note and vitals reviewed.      Significant Labs:   CBC:   Recent Labs   Lab  08/30/18 2111 08/31/18   0452   WBC  9.05  8.46   HGB  14.5  14.5   HCT  43.2  43.4   PLT  301  307   , CMP:   Recent Labs   Lab  08/30/18 2111 08/31/18   0452   NA  140  140   K  4.2  3.8   CL  100  99   CO2  25  25   GLU  178*  177*   BUN  19  20   CREATININE  1.0  1.0   CALCIUM  9.5  9.7   PROT  8.2  7.9   ALBUMIN  3.2*  3.2*   BILITOT  0.4  0.4   ALKPHOS  114  117   AST  26  17   ALT  18  16   ANIONGAP  15  16   EGFRNONAA  >60  >60   , Coagulation:   Recent Labs   Lab  08/30/18 2111   INR  1.1   APTT  30.3   , Haptoglobin: No results for input(s): HAPTOGLOBIN in the last 48 hours., Immunology: No results for  input(s): SPEP, JANAE, CARMEN, FREELAMBDALI in the last 48 hours., LDH: No results for input(s): LDHCSF, BFSOURCE in the last 48 hours. and LFTs:   Recent Labs   Lab  08/30/18   2111  08/31/18   0452   ALT  18  16   AST  26  17   ALKPHOS  114  117   BILITOT  0.4  0.4   PROT  8.2  7.9   ALBUMIN  3.2*  3.2*       Diagnostic Results:  I have reviewed all pertinent imaging results/findings within the past 24 hours.

## 2018-08-31 NOTE — PROGRESS NOTES
[facility] Hospital Medicine  Progress Note    Patient Name: Nico Garza Jr.  MRN: [id number]  Patient Class: OP- Observation   Admission Date: 8/30/2018  Length of Stay: 0 days  Attending Physician: Uzair Topete MD  Primary Care Provider: Tiffany Davis DO        Subjective:     Principal Problem:Metastatic lung cancer (metastasis from lung to other site), left    HPI:  Mr. Garza is a 72-year-old  male with PMH significant for COPD, atrial fibrillation on Eliquis, insulin-dependent diabetes, hypertension, hyperlipidemia, presents to the ED complaining of pleuritic type chest discomfort associated with shortness of breath for the past 2-3 days.  In addition he also reports unintentional weight loss of 15 lb over the last one month, decreased appetite.  Initial chest x-ray shows no evidence of infiltrates, but elevated left diaphragm.  Patient was persistently hypoxemic in the ED with oxygen saturations dropping into the 80s without supplemental oxygen.  Patient does not use oxygen at home.  D-dimer was mildly elevated at 0.5.  Hence CTA chest was done, came back negative for pulmonary embolism.  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  In addition there is a tiny nodule on the left adrenal gland, and lytic lesion on the right L1 vertebral body.  Suspected metastatic lung carcinoma.  Patient admitted as observation basis for hypoxemia, and Pulmonary consult for possible bronchoscopy guided lung mass biopsy.    Hospital Course:  The pt presented to ED with SOB, pleuritic chest pain, and back pain was placed in observation for acute hypoxemic respiratory failure, large left lung mass close to the mediastinum with extensive mediastinal lymphadenopathy, and lytic lesions in the spine. Eliquis for Afib was held. Last dose yesterday at 10  "am  Discussed care with Dr Frye with IR- He recommended bronchoscopy for biopsy   Pt reports mild improvement in SOB- remains BENITO            Review of Systems   Constitutional: Positive for appetite change and unexpected weight change. Negative for chills, diaphoresis, fatigue and fever.   HENT: Negative for congestion, nosebleeds, sore throat and trouble swallowing.    Eyes: Negative for pain, discharge and visual disturbance.   Respiratory: Positive for shortness of breath. Negative for apnea, cough, chest tightness, wheezing and stridor.    Cardiovascular: Positive for chest pain (with deep breathing ). Negative for palpitations and leg swelling.   Gastrointestinal: Positive for abdominal distention (mild), constipation and nausea (with eating ). Negative for abdominal pain, blood in stool, diarrhea and vomiting.        Feeling of "fullness" with eating    Endocrine: Negative for cold intolerance and heat intolerance.   Genitourinary: Negative for difficulty urinating, dysuria, flank pain, frequency and urgency.   Musculoskeletal: Positive for back pain. Negative for arthralgias, joint swelling, myalgias, neck pain and neck stiffness.   Skin: Negative for rash and wound.   Allergic/Immunologic: Negative for food allergies and immunocompromised state.   Neurological: Negative for dizziness, seizures, syncope, facial asymmetry, weakness, light-headedness and headaches.   Hematological: Negative for adenopathy.   Psychiatric/Behavioral: Negative for agitation, behavioral problems and confusion. The patient is not nervous/anxious.      Objective:     Vital Signs (Most Recent):  Temp: 97.6 °F (36.4 °C) (08/31/18 1122)  Pulse: 93 (08/31/18 1123)  Resp: 18 (08/31/18 1122)  BP: 137/81 (08/31/18 1122)  SpO2: (!) 94 % (08/31/18 1123) Vital Signs (24h Range):  Temp:  [97.6 °F (36.4 °C)-98.6 °F (37 °C)] 97.6 °F (36.4 °C)  Pulse:  [82-97] 93  Resp:  [16-22] 18  SpO2:  [88 %-94 %] 94 %  BP: (116-137)/(64-81) 137/81 "     Weight: 84.5 kg (186 lb 6.4 oz)  Body mass index is 28.34 kg/m².    Intake/Output Summary (Last 24 hours) at 8/31/2018 1418  Last data filed at 8/31/2018 0605  Gross per 24 hour   Intake 271.25 ml   Output --   Net 271.25 ml      Physical Exam   Constitutional: He is oriented to person, place, and time. No distress.   Frail, elderly    HENT:   Head: Normocephalic and atraumatic.   Nose: Nose normal.   Mouth/Throat: Oropharynx is clear and moist.   Eyes: Conjunctivae and EOM are normal. No scleral icterus.   Neck: Normal range of motion. Neck supple.   Cardiovascular: Normal rate, regular rhythm, normal heart sounds and intact distal pulses. Exam reveals no gallop and no friction rub.   No murmur heard.  Pulmonary/Chest: Effort normal. No stridor. No respiratory distress. He has no wheezes. He has no rales. He exhibits no tenderness.   Diminished bilaterally   conversational dyspnea    Abdominal: Soft. Bowel sounds are normal. He exhibits no distension. There is no tenderness. There is no rebound and no guarding.   Musculoskeletal: Normal range of motion. He exhibits no edema, tenderness or deformity.   Neurological: He is alert and oriented to person, place, and time. He has normal reflexes. No cranial nerve deficit. He exhibits normal muscle tone. Coordination normal.   Skin: Skin is warm and dry. No rash noted. He is not diaphoretic. No erythema. No pallor.   Psychiatric: He has a normal mood and affect. His behavior is normal. Thought content normal.   Nursing note and vitals reviewed.      Significant Labs:   BMP:   Recent Labs   Lab  08/31/18   0452   GLU  177*   NA  140   K  3.8   CL  99   CO2  25   BUN  20   CREATININE  1.0   CALCIUM  9.7     CBC:   Recent Labs   Lab  08/30/18 2111 08/31/18 0452   WBC  9.05  8.46   HGB  14.5  14.5   HCT  43.2  43.4   PLT  301  307     CMP:   Recent Labs   Lab  08/30/18 2111 08/31/18 0452   NA  140  140   K  4.2  3.8   CL  100  99   CO2  25  25   GLU  178*  177*    BUN  19  20   CREATININE  1.0  1.0   CALCIUM  9.5  9.7   PROT  8.2  7.9   ALBUMIN  3.2*  3.2*   BILITOT  0.4  0.4   ALKPHOS  114  117   AST  26  17   ALT  18  16   ANIONGAP  15  16   EGFRNONAA  >60  >60     All pertinent labs within the past 24 hours have been reviewed.    Significant Imaging:   Imaging Results          CTA Chest Non-Coronary - PE Study (Final result)  Result time 08/30/18 23:55:17    Final result by Mohinder Davis MD (08/30/18 23:55:17)                 Impression:      No evidence of pulmonary embolus.    Extensive mediastinal lymphadenopathy with a large dominant mass on the left aspect of the mediastinum.  Elevation left hemidiaphragm which may be from diaphragmatic paralysis secondary to the adenopathy.  Compressive atelectasis at the left lung base.    Tiny nodule left adrenal gland.  Lytic lesion right L1 vertebral body.  Metastatic disease suspected - consider CT PET for further evaluation.  The appearance of this central left lung infiltrating adenopathy is suspicious for a central small cell lung cancer.    All CT scans at this facility are performed  using dose modulation techniques as appropriate to performed exam including the following:  automated exposure control; adjustment of mA and/or kV according to the patients size (this includes techniques or standardized protocols for targeted exams where dose is matched to indication/reason for exam: i.e. extremities or head);  iterative reconstruction technique.      Electronically signed by: Mohinder Davis MD  Date:    08/30/2018  Time:    23:55             Narrative:    EXAMINATION:  CTA CHEST NON CORONARY    CLINICAL HISTORY:  Chest pain, acute, PE suspected, intermed prob, positive D-dimer;    TECHNIQUE:  CT angiogram through the chest following IV contrast administration.  Multiplanar MIP reformations performed.    COMPARISON:  Earlier chest x-ray    FINDINGS  Heart: Significant coronary arterial calcifications.  Lipomatosis hypertrophy  inter atrial septum.  No pericardial effusions.    Mediastinum: Extensive mediastinal adenopathy.  There is a large dominant area of infiltrating adenopathy around the central left perihilar lung measuring 6.8 by 3.4 cm which infiltrates and abuts the distal left mainstem bronchus and left upper lobe bronchus.  Mass-effect upon superior left pulmonary vein with partial encasement.  Additional enlarged lymph nodes throughout the mediastinum though not as large with a representative aortopulmonary window lymph node measuring 2.1 x 1.4 cm.    Vasculature: No acute pulmonary emboli.  Mild aortic atherosclerosis.    Lungs: There is elevation of the left hemidiaphragm with left lung base compressive atelectasis.  Significant emphysematous changes in the lungs.  No pleural effusions.    Esophagus: Unremarkable.    Upper Abdomen: Normal right adrenal.  Minimal nodularity the left adrenal gland measuring 12 mm.  Multiple small renal cyst    Bones: There there is a large lytic lesion involving the right L1 vertebral body..  Moderate degenerative change.                               X-Ray Chest PA And Lateral (Final result)  Result time 08/30/18 21:50:34    Final result by Jos Brito MD (08/30/18 21:50:34)                 Impression:      See above      Electronically signed by: Jos Brito MD  Date:    08/30/2018  Time:    21:50             Narrative:    EXAMINATION:  XR CHEST PA AND LATERAL    CLINICAL HISTORY:  Chest pain, unspecified    COMPARISON:  06/08/2018    FINDINGS:  Cardiac silhouette is enlarged.  Aorta demonstrates atherosclerotic disease. Elevation left hemidiaphragm with left lower lobe atelectasis or airspace disease.  Chronic interstitial changes are seen within the right lung base.  No pneumothorax..  Trace left pleural effusion is suspected..  Bones demonstrate mild spondylosis.                              Assessment/Plan:      * Metastatic lung cancer (metastasis from lung to other site), left     New diagnosis of metastatic left lung mass, likely primary  Discussed care with IR who recommended bronchoscopy.  Await pulmonology evaluation   Hold Eliquis   Consult heme/Onc- likely aggressive cancer such as SCLC  \Continue supplemental oxygen to maintain saturations greater than 90%.          Moderate malnutrition    Supplements when diet ordered           Acute respiratory failure with hypoxia    Home oxygen ordered   Cont O2        Paroxysmal atrial fibrillation    Currently rate controlled.  Continue diltiazem, home medication.  Hold Eliquis, patient reports last dose 08/30/2018.          Hyperlipidemia LDL goal <70    Continue home dose statin.          Hypertension goal BP (blood pressure) < 130/80    Continue ACEI, home dose.  Monitor closely and make adjustments as needed.        COPD (chronic obstructive pulmonary disease) with emphysema    Significanr emphysematous changes with blebs on CT  Cont Neb txs  Patient followed by Dr. Ibrahim in the pulmonary clinic as outpatient.          Constipation  Mag citrate       VTE Risk Mitigation (From admission, onward)        Ordered     Place sequential compression device  Until discontinued      08/31/18 8701              Sonia Lai NP  Department of Hospital Medicine   [facility]

## 2018-08-31 NOTE — PLAN OF CARE
CM contacted primary nurse, Jos, regarding home oxygen eval.  Jos will contact respiratory to ensure the eval is completed

## 2018-08-31 NOTE — HPI
72-year-old  male with PMH significant for COPD, atrial fibrillation on Eliquis, insulin-dependent diabetes, hypertension, hyperlipidemia, presents to the Emergency Room with 3 day history of pleurisy and progressive shortness of breath over 7- 10 days prior to admission.Followed by Dr. Ibrahim in Pulmonary clinic with history of smoking and asbestos exposure. Noted to have normal Chest X Ray 5/10/2018 but now with left lower lobe mass, mediastinal lymphadenopathy and elevated Left diaphragm.

## 2018-08-31 NOTE — SUBJECTIVE & OBJECTIVE
Past Medical History:   Diagnosis Date    Arthritis     Atrial fibrillation and flutter     Atrial flutter     COPD (chronic obstructive pulmonary disease)     DM (diabetes mellitus) 1996    Hyperlipidemia     Hypertension     Lung disease     Neuropathy, diabetic     Pancreatitis        Past Surgical History:   Procedure Laterality Date    BICEPS TENDON REPAIR      CARDIOVERSION      CHOLECYSTECTOMY      PATELLA SURGERY      RADIOFREQUENCY ABLATION      ROTATOR CUFF REPAIR         Review of patient's allergies indicates:   Allergen Reactions    Flomax [tamsulosin]      Dizzy         Family History     Problem Relation (Age of Onset)    Cancer Maternal Aunt    Cataracts Sister    Diabetes Mother, Sister, Sister, Sister    Heart attack Brother    Heart failure Brother    Hypertension Mother, Father    Stroke Mother, Father, Paternal Grandmother, Paternal Grandfather        Tobacco Use    Smoking status: Former Smoker     Packs/day: 0.50     Types: Cigarettes     Start date: 1/1/1960     Last attempt to quit: 3/8/2015     Years since quitting: 3.4    Smokeless tobacco: Former User     Types: Snuff     Quit date: 10/7/1995   Substance and Sexual Activity    Alcohol use: No     Alcohol/week: 0.0 oz    Drug use: No    Sexual activity: Not on file         Review of Systems   Constitutional: Positive for diaphoresis.   HENT: Positive for postnasal drip and rhinorrhea.    Respiratory: Positive for cough, shortness of breath and wheezing.    Gastrointestinal: Negative.    Endocrine: Negative.    Genitourinary: Negative.    Musculoskeletal: Negative.    Skin: Negative.    Allergic/Immunologic: Negative.    Neurological: Positive for weakness.   Hematological: Negative.      Objective:     Vital Signs (Most Recent):  Temp: 97 °F (36.1 °C) (08/31/18 1526)  Pulse: 80 (08/31/18 1526)  Resp: 18 (08/31/18 1526)  BP: 124/60 (08/31/18 1526)  SpO2: (!) 90 % (08/31/18 1526) Vital Signs (24h Range):  Temp:  [97 °F  (36.1 °C)-98.6 °F (37 °C)] 97 °F (36.1 °C)  Pulse:  [80-97] 80  Resp:  [16-22] 18  SpO2:  [88 %-94 %] 90 %  BP: (116-137)/(60-81) 124/60     Weight: 84.5 kg (186 lb 6.4 oz)  Body mass index is 28.34 kg/m².      Intake/Output Summary (Last 24 hours) at 8/31/2018 1553  Last data filed at 8/31/2018 0605  Gross per 24 hour   Intake 271.25 ml   Output --   Net 271.25 ml       Physical Exam   Constitutional: He is oriented to person, place, and time. He appears well-developed and well-nourished.   HENT:   Head: Normocephalic and atraumatic.   Mouth/Throat: Oropharyngeal exudate present.   Eyes: Conjunctivae are normal. Pupils are equal, round, and reactive to light.   Neck: Neck supple. No JVD present. No tracheal deviation present. No thyromegaly present.   Cardiovascular: Normal rate, regular rhythm and normal heart sounds.   No murmur heard.  Pulmonary/Chest: Effort normal. He has decreased breath sounds. He has wheezes in the right lower field and the left lower field. He has no rhonchi. He has no rales.   Abdominal: Soft. Bowel sounds are normal.   LUQ tenderness   Musculoskeletal: Normal range of motion. He exhibits no edema or tenderness.   Lymphadenopathy:     He has no cervical adenopathy.   Neurological: He is alert and oriented to person, place, and time.   Skin: Skin is warm and dry.   Nursing note and vitals reviewed.      Vents:  Oxygen Concentration (%): 32 (08/31/18 1429)    Lines/Drains/Airways     Peripheral Intravenous Line                 Peripheral IV - Single Lumen 08/30/18 2111 Left Antecubital less than 1 day                Significant Labs:    CBC/Anemia Profile:  Recent Labs   Lab  08/30/18 2111 08/31/18   0452   WBC  9.05  8.46   HGB  14.5  14.5   HCT  43.2  43.4   PLT  301  307   MCV  87  87   RDW  13.6  13.7        Chemistries:  Recent Labs   Lab  08/30/18 2111 08/31/18   0452   NA  140  140   K  4.2  3.8   CL  100  99   CO2  25  25   BUN  19  20   CREATININE  1.0  1.0   CALCIUM  9.5   9.7   ALBUMIN  3.2*  3.2*   PROT  8.2  7.9   BILITOT  0.4  0.4   ALKPHOS  114  117   ALT  18  16   AST  26  17       BMP:   Recent Labs   Lab  08/31/18   0452   GLU  177*   NA  140   K  3.8   CL  99   CO2  25   BUN  20   CREATININE  1.0   CALCIUM  9.7     CMP:   Recent Labs   Lab  08/30/18   2111  08/31/18   0452   NA  140  140   K  4.2  3.8   CL  100  99   CO2  25  25   GLU  178*  177*   BUN  19  20   CREATININE  1.0  1.0   CALCIUM  9.5  9.7   PROT  8.2  7.9   ALBUMIN  3.2*  3.2*   BILITOT  0.4  0.4   ALKPHOS  114  117   AST  26  17   ALT  18  16   ANIONGAP  15  16   EGFRNONAA  >60  >60     All pertinent labs within the past 24 hours have been reviewed.    Significant Imaging:   I have reviewed all pertinent imaging results/findings within the past 24 hours.  I have reviewed and interpreted all pertinent imaging results/findings within the past 24 hours.

## 2018-08-31 NOTE — CONSULTS
[facility]   Pulmonology  Consult Note    Patient Name: Nico Garza Jr.  MRN: [id number]  Admission Date: 8/30/2018  Hospital Length of Stay: 0 days  Code Status: Full Code  Attending Physician: Uzair Topete MD  Primary Care Provider: Tiffany Davis DO   Principal Problem: Metastatic lung cancer (metastasis from lung to other site), left      Subjective:     HPI:  72-year-old  male with PMH significant for COPD, atrial fibrillation on Eliquis, insulin-dependent diabetes, hypertension, hyperlipidemia, presents to the Emergency Room with 3 day history of pleurisy and progressive shortness of breath over 7- 10 days prior to admission.Followed by Dr. Ibrahim in Pulmonary clinic with history of smoking and asbestos exposure. Noted to have normal Chest X Ray 5/10/2018 but now with left lower lobe mass, mediastinal lymphadenopathy and elevated Left diaphragm.      Past Medical History:   Diagnosis Date    Arthritis     Atrial fibrillation and flutter     Atrial flutter     COPD (chronic obstructive pulmonary disease)     DM (diabetes mellitus) 1996    Hyperlipidemia     Hypertension     Lung disease     Neuropathy, diabetic     Pancreatitis        Past Surgical History:   Procedure Laterality Date    BICEPS TENDON REPAIR      CARDIOVERSION      CHOLECYSTECTOMY      PATELLA SURGERY      RADIOFREQUENCY ABLATION      ROTATOR CUFF REPAIR         Review of patient's allergies indicates:   Allergen Reactions    Flomax [tamsulosin]      Dizzy         Family History     Problem Relation (Age of Onset)    Cancer Maternal Aunt    Cataracts Sister    Diabetes Mother, Sister, Sister, Sister    Heart attack Brother    Heart failure Brother    Hypertension Mother, Father    Stroke Mother, Father, Paternal Grandmother, Paternal Grandfather        Tobacco Use    Smoking status: Former Smoker     Packs/day: 0.50     Types: Cigarettes     Start date: 1/1/1960     Last attempt to quit: 3/8/2015      Years since quitting: 3.4    Smokeless tobacco: Former User     Types: Snuff     Quit date: 10/7/1995   Substance and Sexual Activity    Alcohol use: No     Alcohol/week: 0.0 oz    Drug use: No    Sexual activity: Not on file         Review of Systems   Constitutional: Positive for diaphoresis.   HENT: Positive for postnasal drip and rhinorrhea.    Respiratory: Positive for cough, shortness of breath and wheezing.    Gastrointestinal: Negative.    Endocrine: Negative.    Genitourinary: Negative.    Musculoskeletal: Negative.    Skin: Negative.    Allergic/Immunologic: Negative.    Neurological: Positive for weakness.   Hematological: Negative.      Objective:     Vital Signs (Most Recent):  Temp: 97 °F (36.1 °C) (08/31/18 1526)  Pulse: 80 (08/31/18 1526)  Resp: 18 (08/31/18 1526)  BP: 124/60 (08/31/18 1526)  SpO2: (!) 90 % (08/31/18 1526) Vital Signs (24h Range):  Temp:  [97 °F (36.1 °C)-98.6 °F (37 °C)] 97 °F (36.1 °C)  Pulse:  [80-97] 80  Resp:  [16-22] 18  SpO2:  [88 %-94 %] 90 %  BP: (116-137)/(60-81) 124/60     Weight: 84.5 kg (186 lb 6.4 oz)  Body mass index is 28.34 kg/m².      Intake/Output Summary (Last 24 hours) at 8/31/2018 1553  Last data filed at 8/31/2018 0605  Gross per 24 hour   Intake 271.25 ml   Output --   Net 271.25 ml       Physical Exam   Constitutional: He is oriented to person, place, and time. He appears well-developed and well-nourished.   HENT:   Head: Normocephalic and atraumatic.   Mouth/Throat: Oropharyngeal exudate present.   Eyes: Conjunctivae are normal. Pupils are equal, round, and reactive to light.   Neck: Neck supple. No JVD present. No tracheal deviation present. No thyromegaly present.   Cardiovascular: Normal rate, regular rhythm and normal heart sounds.   No murmur heard.  Pulmonary/Chest: Effort normal. He has decreased breath sounds. He has wheezes in the right lower field and the left lower field. He has no rhonchi. He has no rales.   Abdominal: Soft. Bowel sounds are  normal.   LUQ tenderness   Musculoskeletal: Normal range of motion. He exhibits no edema or tenderness.   Lymphadenopathy:     He has no cervical adenopathy.   Neurological: He is alert and oriented to person, place, and time.   Skin: Skin is warm and dry.   Nursing note and vitals reviewed.      Vents:  Oxygen Concentration (%): 32 (08/31/18 1429)    Lines/Drains/Airways     Peripheral Intravenous Line                 Peripheral IV - Single Lumen 08/30/18 2111 Left Antecubital less than 1 day                Significant Labs:    CBC/Anemia Profile:  Recent Labs   Lab  08/30/18 2111 08/31/18   0452   WBC  9.05  8.46   HGB  14.5  14.5   HCT  43.2  43.4   PLT  301  307   MCV  87  87   RDW  13.6  13.7        Chemistries:  Recent Labs   Lab  08/30/18 2111 08/31/18   0452   NA  140  140   K  4.2  3.8   CL  100  99   CO2  25  25   BUN  19  20   CREATININE  1.0  1.0   CALCIUM  9.5  9.7   ALBUMIN  3.2*  3.2*   PROT  8.2  7.9   BILITOT  0.4  0.4   ALKPHOS  114  117   ALT  18  16   AST  26  17       BMP:   Recent Labs   Lab  08/31/18   0452   GLU  177*   NA  140   K  3.8   CL  99   CO2  25   BUN  20   CREATININE  1.0   CALCIUM  9.7     CMP:   Recent Labs   Lab  08/30/18 2111 08/31/18   0452   NA  140  140   K  4.2  3.8   CL  100  99   CO2  25  25   GLU  178*  177*   BUN  19  20   CREATININE  1.0  1.0   CALCIUM  9.5  9.7   PROT  8.2  7.9   ALBUMIN  3.2*  3.2*   BILITOT  0.4  0.4   ALKPHOS  114  117   AST  26  17   ALT  18  16   ANIONGAP  15  16   EGFRNONAA  >60  >60     All pertinent labs within the past 24 hours have been reviewed.    Significant Imaging:   I have reviewed all pertinent imaging results/findings within the past 24 hours.  I have reviewed and interpreted all pertinent imaging results/findings within the past 24 hours.    Assessment/Plan:     Mass of lower lobe of left lung    8/31 Bronchoscopy scheduled for 9/2        Elevated diaphragm - left    8/31 sniff test        Acute respiratory failure with  hypoxia    8/31 supplemental oxygen , jet nebs, iv steroids        Paroxysmal atrial fibrillation    8/31 hold eliquis for brocnhoscopy        COPD (chronic obstructive pulmonary disease) with emphysema    8/31 Jet nebs, iv steroids, oxygen            Risks, benefits of bronchoscopy discussed in detail with patient. Possible complications of procedure including but not limited to collapse of lung, bleeding into lung, respiratory failure and side effects of anesthesia discussed in detail. Alternatives to procedure discussed. Questions asked and answered.  Consent form given to patient to be signed on the day of the procedure.    Medical Decision Making: Moderate to High complexity.  Overall, the multiple problems listed are of moderate to high severity that may impact quality of life and activities of daily living. Side effects of medications, treatment plan as well as options and alternatives reviewed and discussed with patient. Orders for bronchoscopy initiated.There was counseling of patient concerning these issues. Time spent 60 minutes      Thank you for your consult. I will follow-up with patient. Please contact us if you have any additional questions.     Aldair Deleon MD  Pulmonology  [facility]

## 2018-08-31 NOTE — PLAN OF CARE
Problem: Patient Care Overview  Goal: Plan of Care Review  Outcome: Ongoing (interventions implemented as appropriate)  Patient doing well, resting comfortable in bed with no signs of distress. VSS, daughter at bedside. POC discussed.

## 2018-08-31 NOTE — ASSESSMENT & PLAN NOTE
Currently rate controlled.  Continue diltiazem, home medication.  Hold Eliquis, patient reports last dose 08/30/2018.

## 2018-08-31 NOTE — PROGRESS NOTES
"  [facility] -   Adult Nutrition  Progress Note    SUMMARY     Recommendations    Recommendation/Intervention: 1.When medically able, ADAT to Cardiac Diabetic. 2. Add boost glucose control TID. 3. If unable to advance diet within 72 hours consider alternate nutrition support. 4. Will continue to monitor.   Goals: Diet advancement within 72 hrs  Nutrition Goal Status: new  Communication of RD Recs: (POC, sticky note)    Reason for Assessment    Reason for Assessment: identified at risk by screening criteria(MST score)  Dx:  1. Mass of left lung    2. Chest pain    3. Hypoxia      Past Medical History:   Diagnosis Date    Arthritis     Atrial fibrillation and flutter     Atrial flutter     COPD (chronic obstructive pulmonary disease)     DM (diabetes mellitus)     Hyperlipidemia     Hypertension     Lung disease     Neuropathy, diabetic     Pancreatitis        General Information Comments: Pt currently NPO x biopsy. Pt w/ suspected metastatic lung cancer. Pt reports weight loss of 14 lbs within the last month due to decreased appetite and intake (PO intake ~ 50 % x 1 month). Per physical assessment, mild subcutaneous fat and muscle loss.   Nutrition Discharge Planning: Diabetic Cardiac diet + ONS    Nutrition Risk Screen    Nutrition Risk Screen: no indicators present    Nutrition/Diet History    Do you have any cultural, spiritual, Nondenominational conflicts, given your current situation?: none    Anthropometrics    Temp: 97.6 °F (36.4 °C)  Height Method: Stated  Height: 5' 8" (172.7 cm)  Height (inches): 68 in  Weight Method: Standard Scale  Weight: 84.5 kg (186 lb 6.4 oz)  Weight (lb): 186.4 lb  Ideal Body Weight (IBW), Male: 154 lb  % Ideal Body Weight, Male (lb): 121.04 lb  BMI (Calculated): 28.4  BMI Grade: 25 - 29.9 - overweight  Usual Body Weight (UBW), k.9 kg  % Usual Body Weight: 93.21  % Weight Change From Usual Weight: -6.99 %       Lab/Procedures/Meds    Pertinent Labs Reviewed: " reviewed  BMP  Lab Results   Component Value Date     08/31/2018    K 3.8 08/31/2018    CL 99 08/31/2018    CO2 25 08/31/2018    BUN 20 08/31/2018    CREATININE 1.0 08/31/2018    CALCIUM 9.7 08/31/2018    ANIONGAP 16 08/31/2018    ESTGFRAFRICA >60 08/31/2018    EGFRNONAA >60 08/31/2018     Lab Results   Component Value Date    CALCIUM 9.7 08/31/2018    PHOS 3.5 03/08/2015     Lab Results   Component Value Date    ALBUMIN 3.2 (L) 08/31/2018     ,  Recent Labs   Lab  08/31/18   1253   POCTGLUCOSE  182*       Pertinent Medications Reviewed: reviewed    Physical Findings/Assessment    Overall Physical Appearance: (mild subcutaneous fat and muscle loss)  Oral/Mouth Cavity: (WDL)  Skin: (Thais score 21)    Estimated/Assessed Needs    Weight Used For Calorie Calculations: 84.5 kg (186 lb 4.6 oz)  Energy Calorie Requirements (kcal): 2535  Energy Need Method: Kcal/kg  Protein Requirements: 101 g   Weight Used For Protein Calculations: 84.5 kg (186 lb 4.6 oz)     Fluid Need Method: RDA Method(or per MD)  RDA Method (mL): 2535  CHO Requirement: 50 % EEN      Nutrition Prescription Ordered    Current Diet Order: NPO    Evaluation of Received Nutrient/Fluid Intake          Intake/Output Summary (Last 24 hours) at 8/31/2018 1345  Last data filed at 8/31/2018 0605  Gross per 24 hour   Intake 271.25 ml   Output --   Net 271.25 ml       % Intake of Estimated Energy Needs: 0 - 25 %  % Meal Intake: NPO    Nutrition Risk    Level of Risk/Frequency of Follow-up: (2xweekly)     Assessment and Plan    Moderate malnutrition      Malnutrition in the context of Chronic Illness/Injury    Related to (etiology):  Inadequate energy intake     Signs and Symptoms (as evidenced by):  Energy Intake: <75% of estimated energy requirement for 1 month   Body Fat Depletion: mild depletion of orbitals, triceps and thoracic and lumbar region   Muscle Mass Depletion: mild depletion of temples, clavicle region and scapular region   Weight Loss: 7 %  within the last month     Interventions/Recommendations (treatment strategy):  See above     Nutrition Diagnosis Status:  New             Monitor and Evaluation    Food and Nutrient Intake: energy intake, food and beverage intake  Food and Nutrient Adminstration: diet order  Anthropometric Measurements: weight  Biochemical Data, Medical Tests and Procedures: electrolyte and renal panel, glucose/endocrine profile  Nutrition-Focused Physical Findings: overall appearance     Nutrition Follow-Up    RD Follow-up?: Yes

## 2018-08-31 NOTE — H&P (VIEW-ONLY)
[facility] -   Pulmonology  Consult Note    Patient Name: Nico Garza Jr.  MRN: [id number]  Admission Date: 8/30/2018  Hospital Length of Stay: 0 days  Code Status: Full Code  Attending Physician: Uzair Topete MD  Primary Care Provider: Tiffany Davis DO   Principal Problem: Metastatic lung cancer (metastasis from lung to other site), left      Subjective:     HPI:  72-year-old  male with PMH significant for COPD, atrial fibrillation on Eliquis, insulin-dependent diabetes, hypertension, hyperlipidemia, presents to the Emergency Room with 3 day history of pleurisy and progressive shortness of breath over 7- 10 days prior to admission.Followed by Dr. Ibrhaim in Pulmonary clinic with history of smoking and asbestos exposure. Noted to have normal Chest X Ray 5/10/2018 but now with left lower lobe mass, mediastinal lymphadenopathy and elevated Left diaphragm.      Past Medical History:   Diagnosis Date    Arthritis     Atrial fibrillation and flutter     Atrial flutter     COPD (chronic obstructive pulmonary disease)     DM (diabetes mellitus) 1996    Hyperlipidemia     Hypertension     Lung disease     Neuropathy, diabetic     Pancreatitis        Past Surgical History:   Procedure Laterality Date    BICEPS TENDON REPAIR      CARDIOVERSION      CHOLECYSTECTOMY      PATELLA SURGERY      RADIOFREQUENCY ABLATION      ROTATOR CUFF REPAIR         Review of patient's allergies indicates:   Allergen Reactions    Flomax [tamsulosin]      Dizzy         Family History     Problem Relation (Age of Onset)    Cancer Maternal Aunt    Cataracts Sister    Diabetes Mother, Sister, Sister, Sister    Heart attack Brother    Heart failure Brother    Hypertension Mother, Father    Stroke Mother, Father, Paternal Grandmother, Paternal Grandfather        Tobacco Use    Smoking status: Former Smoker     Packs/day: 0.50     Types: Cigarettes     Start date: 1/1/1960     Last attempt to quit: 3/8/2015      Years since quitting: 3.4    Smokeless tobacco: Former User     Types: Snuff     Quit date: 10/7/1995   Substance and Sexual Activity    Alcohol use: No     Alcohol/week: 0.0 oz    Drug use: No    Sexual activity: Not on file         Review of Systems   Constitutional: Positive for diaphoresis.   HENT: Positive for postnasal drip and rhinorrhea.    Respiratory: Positive for cough, shortness of breath and wheezing.    Gastrointestinal: Negative.    Endocrine: Negative.    Genitourinary: Negative.    Musculoskeletal: Negative.    Skin: Negative.    Allergic/Immunologic: Negative.    Neurological: Positive for weakness.   Hematological: Negative.      Objective:     Vital Signs (Most Recent):  Temp: 97 °F (36.1 °C) (08/31/18 1526)  Pulse: 80 (08/31/18 1526)  Resp: 18 (08/31/18 1526)  BP: 124/60 (08/31/18 1526)  SpO2: (!) 90 % (08/31/18 1526) Vital Signs (24h Range):  Temp:  [97 °F (36.1 °C)-98.6 °F (37 °C)] 97 °F (36.1 °C)  Pulse:  [80-97] 80  Resp:  [16-22] 18  SpO2:  [88 %-94 %] 90 %  BP: (116-137)/(60-81) 124/60     Weight: 84.5 kg (186 lb 6.4 oz)  Body mass index is 28.34 kg/m².      Intake/Output Summary (Last 24 hours) at 8/31/2018 1553  Last data filed at 8/31/2018 0605  Gross per 24 hour   Intake 271.25 ml   Output --   Net 271.25 ml       Physical Exam   Constitutional: He is oriented to person, place, and time. He appears well-developed and well-nourished.   HENT:   Head: Normocephalic and atraumatic.   Mouth/Throat: Oropharyngeal exudate present.   Eyes: Conjunctivae are normal. Pupils are equal, round, and reactive to light.   Neck: Neck supple. No JVD present. No tracheal deviation present. No thyromegaly present.   Cardiovascular: Normal rate, regular rhythm and normal heart sounds.   No murmur heard.  Pulmonary/Chest: Effort normal. He has decreased breath sounds. He has wheezes in the right lower field and the left lower field. He has no rhonchi. He has no rales.   Abdominal: Soft. Bowel sounds are  normal.   LUQ tenderness   Musculoskeletal: Normal range of motion. He exhibits no edema or tenderness.   Lymphadenopathy:     He has no cervical adenopathy.   Neurological: He is alert and oriented to person, place, and time.   Skin: Skin is warm and dry.   Nursing note and vitals reviewed.      Vents:  Oxygen Concentration (%): 32 (08/31/18 1429)    Lines/Drains/Airways     Peripheral Intravenous Line                 Peripheral IV - Single Lumen 08/30/18 2111 Left Antecubital less than 1 day                Significant Labs:    CBC/Anemia Profile:  Recent Labs   Lab  08/30/18 2111 08/31/18   0452   WBC  9.05  8.46   HGB  14.5  14.5   HCT  43.2  43.4   PLT  301  307   MCV  87  87   RDW  13.6  13.7        Chemistries:  Recent Labs   Lab  08/30/18 2111 08/31/18   0452   NA  140  140   K  4.2  3.8   CL  100  99   CO2  25  25   BUN  19  20   CREATININE  1.0  1.0   CALCIUM  9.5  9.7   ALBUMIN  3.2*  3.2*   PROT  8.2  7.9   BILITOT  0.4  0.4   ALKPHOS  114  117   ALT  18  16   AST  26  17       BMP:   Recent Labs   Lab  08/31/18   0452   GLU  177*   NA  140   K  3.8   CL  99   CO2  25   BUN  20   CREATININE  1.0   CALCIUM  9.7     CMP:   Recent Labs   Lab  08/30/18 2111 08/31/18   0452   NA  140  140   K  4.2  3.8   CL  100  99   CO2  25  25   GLU  178*  177*   BUN  19  20   CREATININE  1.0  1.0   CALCIUM  9.5  9.7   PROT  8.2  7.9   ALBUMIN  3.2*  3.2*   BILITOT  0.4  0.4   ALKPHOS  114  117   AST  26  17   ALT  18  16   ANIONGAP  15  16   EGFRNONAA  >60  >60     All pertinent labs within the past 24 hours have been reviewed.    Significant Imaging:   I have reviewed all pertinent imaging results/findings within the past 24 hours.  I have reviewed and interpreted all pertinent imaging results/findings within the past 24 hours.    Assessment/Plan:     Mass of lower lobe of left lung    8/31 Bronchoscopy scheduled for 9/2        Elevated diaphragm - left    8/31 sniff test        Acute respiratory failure with  hypoxia    8/31 supplemental oxygen , jet nebs, iv steroids        Paroxysmal atrial fibrillation    8/31 hold eliquis for brocnhoscopy        COPD (chronic obstructive pulmonary disease) with emphysema    8/31 Jet nebs, iv steroids, oxygen            Risks, benefits of bronchoscopy discussed in detail with patient. Possible complications of procedure including but not limited to collapse of lung, bleeding into lung, respiratory failure and side effects of anesthesia discussed in detail. Alternatives to procedure discussed. Questions asked and answered.  Consent form given to patient to be signed on the day of the procedure.    Medical Decision Making: Moderate to High complexity.  Overall, the multiple problems listed are of moderate to high severity that may impact quality of life and activities of daily living. Side effects of medications, treatment plan as well as options and alternatives reviewed and discussed with patient. Orders for bronchoscopy initiated.There was counseling of patient concerning these issues. Time spent 60 minutes      Thank you for your consult. I will follow-up with patient. Please contact us if you have any additional questions.     Aldair Deleon MD  Pulmonology  [facility]

## 2018-08-31 NOTE — NURSING
Chart audited for diabetic educational needs.  Pt is not able to educate due to recent Dx. of  lung mass.  Consult Diabetic educational team if needs do arise.

## 2018-08-31 NOTE — PROGRESS NOTES
Home Oxygen Evaluation    Date Performed: 2018    1) Patient's Home O2 Sat on room air, while at rest: 88%        If O2 sats on room air at rest are 88% or below, patient qualifies. No additional testing needed. Document N/A in steps 2 and 3. If 89% or above, complete steps 2.      2) Patient's O2 Sat on room air while exercisin%        If O2 sats on room air while exercising remain 89% or above patient does not qualify, no further testing needed Document N/A in step 3. If O2 sats on room air while exercising are 88% or below, continue to step 3.      3) Patient's O2 Sat while exercising on O2: at LPM         (Must show improvement from #2 for patients to qualify)    If O2 sats improve on oxygen, patient qualifies for portable oxygen. If not, the patient does not qualify.

## 2018-09-01 LAB
ALBUMIN SERPL BCP-MCNC: 3.1 G/DL
ALP SERPL-CCNC: 121 U/L
ALT SERPL W/O P-5'-P-CCNC: 16 U/L
ANION GAP SERPL CALC-SCNC: 17 MMOL/L
AST SERPL-CCNC: 13 U/L
BASOPHILS # BLD AUTO: 0 K/UL
BASOPHILS NFR BLD: 0 %
BILIRUB SERPL-MCNC: 0.5 MG/DL
BUN SERPL-MCNC: 26 MG/DL
CALCIUM SERPL-MCNC: 9.7 MG/DL
CHLORIDE SERPL-SCNC: 97 MMOL/L
CO2 SERPL-SCNC: 27 MMOL/L
CREAT SERPL-MCNC: 1.2 MG/DL
DIFFERENTIAL METHOD: ABNORMAL
EOSINOPHIL # BLD AUTO: 0 K/UL
EOSINOPHIL NFR BLD: 0 %
ERYTHROCYTE [DISTWIDTH] IN BLOOD BY AUTOMATED COUNT: 13.7 %
EST. GFR  (AFRICAN AMERICAN): >60 ML/MIN/1.73 M^2
EST. GFR  (NON AFRICAN AMERICAN): >60 ML/MIN/1.73 M^2
GLUCOSE SERPL-MCNC: 307 MG/DL
HCT VFR BLD AUTO: 44.7 %
HGB BLD-MCNC: 14.7 G/DL
INR PPP: 1.1
LYMPHOCYTES # BLD AUTO: 0.4 K/UL
LYMPHOCYTES NFR BLD: 5.3 %
MCH RBC QN AUTO: 28.9 PG
MCHC RBC AUTO-ENTMCNC: 32.9 G/DL
MCV RBC AUTO: 88 FL
MONOCYTES # BLD AUTO: 0.1 K/UL
MONOCYTES NFR BLD: 0.7 %
NEUTROPHILS # BLD AUTO: 6.6 K/UL
NEUTROPHILS NFR BLD: 94 %
PLATELET # BLD AUTO: 328 K/UL
PMV BLD AUTO: 9.7 FL
POCT GLUCOSE: 301 MG/DL (ref 70–110)
POCT GLUCOSE: 305 MG/DL (ref 70–110)
POCT GLUCOSE: 358 MG/DL (ref 70–110)
POCT GLUCOSE: 367 MG/DL (ref 70–110)
POTASSIUM SERPL-SCNC: 4.3 MMOL/L
PROT SERPL-MCNC: 8 G/DL
PROTHROMBIN TIME: 11.6 SEC
RBC # BLD AUTO: 5.08 M/UL
SODIUM SERPL-SCNC: 141 MMOL/L
WBC # BLD AUTO: 7.03 K/UL

## 2018-09-01 PROCEDURE — 27000221 HC OXYGEN, UP TO 24 HOURS

## 2018-09-01 PROCEDURE — 63600175 PHARM REV CODE 636 W HCPCS: Performed by: INTERNAL MEDICINE

## 2018-09-01 PROCEDURE — 25500020 PHARM REV CODE 255: Performed by: INTERNAL MEDICINE

## 2018-09-01 PROCEDURE — 85610 PROTHROMBIN TIME: CPT

## 2018-09-01 PROCEDURE — 99233 SBSQ HOSP IP/OBS HIGH 50: CPT | Mod: ,,, | Performed by: INTERNAL MEDICINE

## 2018-09-01 PROCEDURE — 94640 AIRWAY INHALATION TREATMENT: CPT

## 2018-09-01 PROCEDURE — 80053 COMPREHEN METABOLIC PANEL: CPT

## 2018-09-01 PROCEDURE — 36415 COLL VENOUS BLD VENIPUNCTURE: CPT

## 2018-09-01 PROCEDURE — A9585 GADOBUTROL INJECTION: HCPCS | Performed by: INTERNAL MEDICINE

## 2018-09-01 PROCEDURE — 25000003 PHARM REV CODE 250: Performed by: INTERNAL MEDICINE

## 2018-09-01 PROCEDURE — 25000242 PHARM REV CODE 250 ALT 637 W/ HCPCS: Performed by: INTERNAL MEDICINE

## 2018-09-01 PROCEDURE — 99232 SBSQ HOSP IP/OBS MODERATE 35: CPT | Mod: ,,, | Performed by: INTERNAL MEDICINE

## 2018-09-01 PROCEDURE — 94761 N-INVAS EAR/PLS OXIMETRY MLT: CPT

## 2018-09-01 PROCEDURE — 25000003 PHARM REV CODE 250: Performed by: EMERGENCY MEDICINE

## 2018-09-01 PROCEDURE — 85025 COMPLETE CBC W/AUTO DIFF WBC: CPT

## 2018-09-01 PROCEDURE — 21400001 HC TELEMETRY ROOM

## 2018-09-01 RX ORDER — SODIUM CHLORIDE, SODIUM LACTATE, POTASSIUM CHLORIDE, CALCIUM CHLORIDE 600; 310; 30; 20 MG/100ML; MG/100ML; MG/100ML; MG/100ML
INJECTION, SOLUTION INTRAVENOUS CONTINUOUS
Status: DISCONTINUED | OUTPATIENT
Start: 2018-09-01 | End: 2018-09-02

## 2018-09-01 RX ORDER — LIDOCAINE HYDROCHLORIDE 40 MG/ML
4 SOLUTION TOPICAL ONCE
Status: COMPLETED | OUTPATIENT
Start: 2018-09-01 | End: 2018-09-02

## 2018-09-01 RX ORDER — GADOBUTROL 604.72 MG/ML
8 INJECTION INTRAVENOUS
Status: COMPLETED | OUTPATIENT
Start: 2018-09-01 | End: 2018-09-01

## 2018-09-01 RX ORDER — QUINAPRIL 20 MG/1
40 TABLET ORAL NIGHTLY
Status: DISCONTINUED | OUTPATIENT
Start: 2018-09-02 | End: 2018-09-03 | Stop reason: HOSPADM

## 2018-09-01 RX ORDER — PRAVASTATIN SODIUM 20 MG/1
40 TABLET ORAL NIGHTLY
Status: DISCONTINUED | OUTPATIENT
Start: 2018-09-02 | End: 2018-09-03 | Stop reason: HOSPADM

## 2018-09-01 RX ADMIN — IPRATROPIUM BROMIDE AND ALBUTEROL SULFATE 3 ML: .5; 3 SOLUTION RESPIRATORY (INHALATION) at 07:09

## 2018-09-01 RX ADMIN — SODIUM CHLORIDE, SODIUM LACTATE, POTASSIUM CHLORIDE, AND CALCIUM CHLORIDE: .6; .31; .03; .02 INJECTION, SOLUTION INTRAVENOUS at 10:09

## 2018-09-01 RX ADMIN — ZOLPIDEM TARTRATE 5 MG: 5 TABLET, FILM COATED ORAL at 09:09

## 2018-09-01 RX ADMIN — INSULIN ASPART 3 UNITS: 100 INJECTION, SOLUTION INTRAVENOUS; SUBCUTANEOUS at 09:09

## 2018-09-01 RX ADMIN — INSULIN DETEMIR 25 UNITS: 100 INJECTION, SOLUTION SUBCUTANEOUS at 11:09

## 2018-09-01 RX ADMIN — METHYLPREDNISOLONE SODIUM SUCCINATE 40 MG: 40 INJECTION, POWDER, FOR SOLUTION INTRAMUSCULAR; INTRAVENOUS at 09:09

## 2018-09-01 RX ADMIN — HYDROCODONE BITARTRATE AND ACETAMINOPHEN 1 TABLET: 5; 325 TABLET ORAL at 08:09

## 2018-09-01 RX ADMIN — IPRATROPIUM BROMIDE AND ALBUTEROL SULFATE 3 ML: .5; 3 SOLUTION RESPIRATORY (INHALATION) at 01:09

## 2018-09-01 RX ADMIN — INSULIN DETEMIR 25 UNITS: 100 INJECTION, SOLUTION SUBCUTANEOUS at 09:09

## 2018-09-01 RX ADMIN — IPRATROPIUM BROMIDE AND ALBUTEROL SULFATE 3 ML: .5; 3 SOLUTION RESPIRATORY (INHALATION) at 12:09

## 2018-09-01 RX ADMIN — PRAVASTATIN SODIUM 40 MG: 20 TABLET ORAL at 08:09

## 2018-09-01 RX ADMIN — HYDROCODONE BITARTRATE AND ACETAMINOPHEN 1 TABLET: 5; 325 TABLET ORAL at 05:09

## 2018-09-01 RX ADMIN — QUINAPRIL 40 MG: 20 TABLET ORAL at 08:09

## 2018-09-01 RX ADMIN — INSULIN ASPART 5 UNITS: 100 INJECTION, SOLUTION INTRAVENOUS; SUBCUTANEOUS at 11:09

## 2018-09-01 RX ADMIN — INSULIN ASPART 15 UNITS: 100 INJECTION, SOLUTION INTRAVENOUS; SUBCUTANEOUS at 08:09

## 2018-09-01 RX ADMIN — GADOBUTROL 8 ML: 604.72 INJECTION INTRAVENOUS at 01:09

## 2018-09-01 RX ADMIN — INSULIN ASPART 4 UNITS: 100 INJECTION, SOLUTION INTRAVENOUS; SUBCUTANEOUS at 05:09

## 2018-09-01 RX ADMIN — HYDROCODONE BITARTRATE AND ACETAMINOPHEN 1 TABLET: 5; 325 TABLET ORAL at 02:09

## 2018-09-01 RX ADMIN — PANTOPRAZOLE SODIUM 40 MG: 40 TABLET, DELAYED RELEASE ORAL at 08:09

## 2018-09-01 RX ADMIN — METHYLPREDNISOLONE SODIUM SUCCINATE 80 MG: 40 INJECTION, POWDER, FOR SOLUTION INTRAMUSCULAR; INTRAVENOUS at 05:09

## 2018-09-01 RX ADMIN — DILTIAZEM HYDROCHLORIDE 360 MG: 180 CAPSULE, COATED, EXTENDED RELEASE ORAL at 08:09

## 2018-09-01 RX ADMIN — METHYLPREDNISOLONE SODIUM SUCCINATE 40 MG: 40 INJECTION, POWDER, FOR SOLUTION INTRAMUSCULAR; INTRAVENOUS at 02:09

## 2018-09-01 NOTE — ASSESSMENT & PLAN NOTE
Left lower lung mass with extensive mediastinal lymphadenopathy, left adrenal nodule, and L1 lytic lesion consistent with metastatic lung cancer.  Pulmonary is planning bronchoscopy on Sunday for tissue diagnosis.  Treatment will be determined by tissue diagnosis and final staging.  --will order MRI of the brain is he waits for bronchoscopy tomorrow  --bronchoscopy planned for tomorrow morning  -- PET scan as an outpatient

## 2018-09-01 NOTE — PLAN OF CARE
CM met with patient at the bedside to assess for discharge needs.  Patient lives at home alone and is independent with all needs.  Patient will discharge with home oxygen and has a portable oxygen tank at the bedside.  CM confirmed that patient has contact information for [employer] to call when leaving the hospital.  Patient's daughter will provide transportation home after discharge.  Patient has no other needs at this time.   CM provided a transitional care folder, information on advanced directives, information on pharmacy bedside delivery, and discharge planning begins on admission with contact information for any needs/questions.    D/C Plan:  Home with home oxygen    Follow-up Information     [facility].    Specialty:  DME Provider  Why:  home oxygen  Contact information:  [address] [facility] [address]  [phone]                   [place] [address] [facility] [address] [facility]  [address]  [facility] [address]  Phone: [phone] Fax: [phone]    [employer] [facility]. [place], [place] - [address]  [address] [facility] [address]  Phone: [phone] Fax: [phone]    Tiffany Davis DO  Payor: MEDICARE / Plan: MEDICARE PART A & B / Product Type: [facility] /       09/01/18 1427   Discharge Assessment   Assessment Type Discharge Planning Assessment   Confirmed/corrected address and phone number on facesheet? Yes   Assessment information obtained from? Patient;Caregiver;Medical Record   Expected Length of Stay (days) (TBD)   Communicated expected length of stay with patient/caregiver yes   Prior to hospitilization cognitive status: Alert/Oriented   Prior to hospitalization functional status: Independent   Current cognitive status: Alert/Oriented   Current Functional Status: Independent   Facility Arrived From: home   Lives With alone   Able to Return to Prior  Arrangements yes   Is patient able to care for self after discharge? Yes   Who are your caregiver(s) and their phone number(s)? Patient is independent.  Belia Garza, daughter [phone]   Patient's perception of discharge disposition home or selfcare   Readmission Within The Last 30 Days no previous admission in last 30 days   Patient currently being followed by outpatient case management? No   Patient currently receives any other outside agency services? No   Equipment Currently Used at Home none  (Has a walker but does not use it)   Do you have any problems affording any of your prescribed medications? No   Is the patient taking medications as prescribed? yes   Does the patient have transportation home? Yes   Transportation Available family or friend will provide   Dialysis Name and Scheduled days NA   Does the patient receive services at the Coumadin Clinic? (NA)   Discharge Plan A Home   Discharge Plan B Home   Patient/Family In Agreement With Plan yes

## 2018-09-01 NOTE — PROGRESS NOTES
[facility] -   Pulmonology  Progress Note    Patient Name: Nico Garza Jr.  MRN: [id number]  Admission Date: 8/30/2018  Hospital Length of Stay: 1 days  Code Status: Full Code  Attending Provider: Satnam Barboza MD  Primary Care Provider: Tiffany Davis DO   Principal Problem: Metastatic lung cancer (metastasis from lung to other site), left    Subjective:still dyspnic     Interval History: no new complaints, still SOB    Objective:     Vital Signs (Most Recent):  Temp: 97.2 °F (36.2 °C) (09/01/18 1607)  Pulse: 85 (09/01/18 1700)  Resp: 18 (09/01/18 1607)  BP: 123/66 (09/01/18 1607)  SpO2: (!) 92 % (09/01/18 1607) Vital Signs (24h Range):  Temp:  [97.2 °F (36.2 °C)-98.3 °F (36.8 °C)] 97.2 °F (36.2 °C)  Pulse:  [] 85  Resp:  [16-20] 18  SpO2:  [89 %-93 %] 92 %  BP: (100-133)/(57-71) 123/66     Weight: 81.6 kg (179 lb 14.3 oz)  Body mass index is 27.35 kg/m².      Intake/Output Summary (Last 24 hours) at 9/1/2018 1821  Last data filed at 9/1/2018 0800  Gross per 24 hour   Intake 400 ml   Output --   Net 400 ml       Physical Exam   Constitutional: He is oriented to person, place, and time. He appears well-developed and well-nourished.   HENT:   Head: Normocephalic and atraumatic.   Mouth/Throat: Oropharyngeal exudate present.   Eyes: Conjunctivae are normal. Pupils are equal, round, and reactive to light.   Neck: Neck supple. No JVD present. No tracheal deviation present. No thyromegaly present.   Cardiovascular: Normal rate and normal heart sounds. An irregularly irregular rhythm present.   No murmur heard.  Pulmonary/Chest: Effort normal. He has decreased breath sounds. He has wheezes in the right lower field and the left lower field. He has no rhonchi. He has no rales.   Abdominal: Soft. Bowel sounds are normal.   Musculoskeletal: Normal range of motion. He exhibits no edema or tenderness.   Lymphadenopathy:     He has no cervical adenopathy.   Neurological: He is alert and oriented to  person, place, and time.   Skin: Skin is warm and dry.   Nursing note and vitals reviewed.      Vents:  Oxygen Concentration (%): 36 (09/01/18 1353)    Lines/Drains/Airways     Peripheral Intravenous Line                 Peripheral IV - Single Lumen 08/30/18 2111 Left Antecubital 1 day                Significant Labs:    CBC/Anemia Profile:  Recent Labs   Lab  08/30/18 2111 08/31/18 0452  09/01/18   0443   WBC  9.05  8.46  7.03   HGB  14.5  14.5  14.7   HCT  43.2  43.4  44.7   PLT  301  307  328   MCV  87  87  88   RDW  13.6  13.7  13.7        Chemistries:  Recent Labs   Lab  08/30/18 2111 08/31/18 0452  09/01/18   0443   NA  140  140  141   K  4.2  3.8  4.3   CL  100  99  97   CO2  25  25  27   BUN  19  20  26*   CREATININE  1.0  1.0  1.2   CALCIUM  9.5  9.7  9.7   ALBUMIN  3.2*  3.2*  3.1*   PROT  8.2  7.9  8.0   BILITOT  0.4  0.4  0.5   ALKPHOS  114  117  121   ALT  18  16  16   AST  26  17  13       BMP:   Recent Labs   Lab  09/01/18   0443   GLU  307*   NA  141   K  4.3   CL  97   CO2  27   BUN  26*   CREATININE  1.2   CALCIUM  9.7     CMP:   Recent Labs   Lab  08/30/18 2111 08/31/18 0452  09/01/18   0443   NA  140  140  141   K  4.2  3.8  4.3   CL  100  99  97   CO2  25  25  27   GLU  178*  177*  307*   BUN  19  20  26*   CREATININE  1.0  1.0  1.2   CALCIUM  9.5  9.7  9.7   PROT  8.2  7.9  8.0   ALBUMIN  3.2*  3.2*  3.1*   BILITOT  0.4  0.4  0.5   ALKPHOS  114  117  121   AST  26  17  13   ALT  18  16  16   ANIONGAP  15  16  17*   EGFRNONAA  >60  >60  >60     All pertinent labs within the past 24 hours have been reviewed.    Significant Imaging:  I have reviewed all pertinent imaging results/findings within the past 24 hours.  I have reviewed and interpreted all pertinent imaging results/findings within the past 24 hours.    Assessment/Plan:     Mass of lower lobe of left lung    8/31 Bronchoscopy scheduled for 9/2          Risks, benefits of bronchoscopy discussed in detail with patient.  Possible complications of procedure including but not limited to collapse of lung, bleeding into lung, respiratory failure and side effects of anesthesia discussed in detail. Alternatives to procedure discussed. Questions asked and answered.             Aldair Deleon MD  Pulmonology  [facility]

## 2018-09-01 NOTE — PLAN OF CARE
Problem: Patient Care Overview  Goal: Plan of Care Review  Outcome: Ongoing (interventions implemented as appropriate)   08/31/18 1941   Coping/Psychosocial   Plan Of Care Reviewed With patient     Cardiac, vital signs, and lab monitoring. Increase activity as tolerated. Watch for falls.  Accuchecks per order. Breathing treatments and adjust oxygen as needed. Pain control; NPO after midnight on 9/1/18 for Bronchoscopy on Sunday.

## 2018-09-01 NOTE — SUBJECTIVE & OBJECTIVE
Interval History:   No acute events overnight    Oncology Treatment Plan:   [No treatment plan]    Medications:  Continuous Infusions:   lactated ringers       Scheduled Meds:   albuterol-ipratropium  3 mL Nebulization Q6H    diltiaZEM  360 mg Oral Daily    insulin aspart U-100  15 Units Subcutaneous BIDWM    lidocaine HCL 4%  4 mL Nebulization Once    methylPREDNISolone sodium succinate  80 mg Intravenous Q8H    pantoprazole  40 mg Oral Daily    [START ON 9/2/2018] pravastatin  40 mg Oral QHS    [START ON 9/2/2018] quinapril  40 mg Oral QHS     PRN Meds:acetaminophen, aluminum-magnesium hydroxide-simethicone, dextrose 50%, glucagon (human recombinant), guaifenesin 100 mg/5 ml, HYDROcodone-acetaminophen, insulin aspart U-100, morphine, zolpidem     Review of Systems   Constitutional: Positive for unexpected weight change (Weight loss). Negative for activity change, appetite change, chills, diaphoresis, fatigue and fever.   HENT: Negative for congestion, nosebleeds, sore throat and trouble swallowing.    Eyes: Negative for pain, discharge and visual disturbance.   Respiratory: Positive for shortness of breath. Negative for apnea, cough, choking, chest tightness, wheezing and stridor.    Cardiovascular: Positive for chest pain. Negative for palpitations and leg swelling.   Gastrointestinal: Positive for nausea. Negative for abdominal distention, abdominal pain, anal bleeding, blood in stool, constipation, diarrhea, rectal pain and vomiting.   Endocrine: Negative for cold intolerance and heat intolerance.   Genitourinary: Negative for difficulty urinating, dysuria, flank pain, frequency and urgency.   Musculoskeletal: Positive for back pain. Negative for arthralgias, gait problem, joint swelling, myalgias, neck pain and neck stiffness.   Skin: Negative for rash and wound.   Allergic/Immunologic: Negative for food allergies and immunocompromised state.   Neurological: Negative for dizziness, seizures, syncope,  facial asymmetry, weakness, light-headedness and headaches.   Hematological: Negative for adenopathy.   Psychiatric/Behavioral: Negative for agitation, behavioral problems and confusion. The patient is not nervous/anxious.      Objective:     Vital Signs (Most Recent):  Temp: 98.3 °F (36.8 °C) (09/01/18 0751)  Pulse: 90 (09/01/18 0755)  Resp: 18 (09/01/18 0755)  BP: 127/63 (09/01/18 0751)  SpO2: (!) 89 % (09/01/18 0755) Vital Signs (24h Range):  Temp:  [97 °F (36.1 °C)-98.3 °F (36.8 °C)] 98.3 °F (36.8 °C)  Pulse:  [80-97] 90  Resp:  [16-20] 18  SpO2:  [88 %-94 %] 89 %  BP: (100-137)/(57-81) 127/63     Weight: 81.6 kg (179 lb 14.3 oz)  Body mass index is 27.35 kg/m².  Body surface area is 1.98 meters squared.      Intake/Output Summary (Last 24 hours) at 9/1/2018 1000  Last data filed at 8/31/2018 1500  Gross per 24 hour   Intake 240 ml   Output --   Net 240 ml       Physical Exam   Constitutional: He is oriented to person, place, and time. He appears well-developed and well-nourished.   HENT:   Head: Normocephalic and atraumatic.   Mouth/Throat: No oropharyngeal exudate.   Eyes: Conjunctivae are normal. Pupils are equal, round, and reactive to light. Right eye exhibits no discharge. Left eye exhibits no discharge.   Neck: Neck supple. No JVD present. No tracheal deviation present. No thyromegaly present.   Cardiovascular: Normal rate, regular rhythm and normal heart sounds.   No murmur heard.  Pulmonary/Chest: Effort normal. No stridor. No respiratory distress. He has decreased breath sounds. He has wheezes in the right lower field and the left lower field. He has no rhonchi. He has no rales.   Abdominal: Soft. Bowel sounds are normal.   Musculoskeletal: Normal range of motion. He exhibits no edema or tenderness.   Lymphadenopathy:     He has no cervical adenopathy.   Neurological: He is alert and oriented to person, place, and time.   Skin: Skin is warm, dry and intact. Capillary refill takes less than 2 seconds.  No abrasion, no bruising, no ecchymosis and no rash noted. No cyanosis. Nails show no clubbing.   Nursing note and vitals reviewed.      Significant Labs:   CBC:   Recent Labs   Lab  08/30/18 2111 08/31/18 0452 09/01/18 0443   WBC  9.05  8.46  7.03   HGB  14.5  14.5  14.7   HCT  43.2  43.4  44.7   PLT  301  307  328   , CMP:   Recent Labs   Lab  08/30/18 2111 08/31/18 0452 09/01/18 0443   NA  140  140  141   K  4.2  3.8  4.3   CL  100  99  97   CO2  25  25  27   GLU  178*  177*  307*   BUN  19  20  26*   CREATININE  1.0  1.0  1.2   CALCIUM  9.5  9.7  9.7   PROT  8.2  7.9  8.0   ALBUMIN  3.2*  3.2*  3.1*   BILITOT  0.4  0.4  0.5   ALKPHOS  114  117  121   AST  26  17  13   ALT  18  16  16   ANIONGAP  15  16  17*   EGFRNONAA  >60  >60  >60   , Coagulation:   Recent Labs   Lab  08/30/18 2111 09/01/18 0444   INR  1.1  1.1   APTT  30.3   --    , Haptoglobin: No results for input(s): HAPTOGLOBIN in the last 48 hours., Immunology: No results for input(s): SPEP, JANAE, CARMEN, FREELAMBDALI in the last 48 hours., LDH: No results for input(s): LDHCSF, BFSOURCE in the last 48 hours. and LFTs:   Recent Labs   Lab  08/30/18 2111 08/31/18 0452 09/01/18 0443   ALT  18  16  16   AST  26  17  13   ALKPHOS  114  117  121   BILITOT  0.4  0.4  0.5   PROT  8.2  7.9  8.0   ALBUMIN  3.2*  3.2*  3.1*       Diagnostic Results:  I have reviewed all pertinent imaging results/findings within the past 24 hours.

## 2018-09-01 NOTE — PROGRESS NOTES
[facility] Hospital Medicine  Progress Note    Patient Name: Nico Garza Jr.  MRN: [id number]  Patient Class: IP- Inpatient   Admission Date: 8/30/2018  Length of Stay: 1 days  Attending Physician: Satnam Barboza MD  Primary Care Provider: Tiffany Davis DO        Subjective:     Principal Problem:Metastatic lung cancer (metastasis from lung to other site), left    HPI:  Mr. Garza is a 72-year-old  male with PMH significant for COPD, atrial fibrillation on Eliquis, insulin-dependent diabetes, hypertension, hyperlipidemia, presents to the ED complaining of pleuritic type chest discomfort associated with shortness of breath for the past 2-3 days.  In addition he also reports unintentional weight loss of 15 lb over the last one month, decreased appetite.  Initial chest x-ray shows no evidence of infiltrates, but elevated left diaphragm.  Patient was persistently hypoxemic in the ED with oxygen saturations dropping into the 80s without supplemental oxygen.  Patient does not use oxygen at home.  D-dimer was mildly elevated at 0.5.  Hence CTA chest was done, came back negative for pulmonary embolism.  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  In addition there is a tiny nodule on the left adrenal gland, and lytic lesion on the right L1 vertebral body.  Suspected metastatic lung carcinoma.  Patient admitted as observation basis for hypoxemia, and Pulmonary consult for possible bronchoscopy guided lung mass biopsy.    Hospital Course:  The pt presented to ED with SOB, pleuritic chest pain, and back pain was placed in observation for acute hypoxemic respiratory failure, large left lung mass close to the mediastinum with extensive mediastinal lymphadenopathy, and lytic lesions in the spine. Eliquis for Afib was held. Last dose yesterday at 10  am  Discussed care with Dr Frye with IR- He recommended bronchoscopy for biopsy   Pt reports mild improvement in SOB- remains BENITO  9/1  Plan for bronchoscopy tomorrow . Patient denies of any new complains . Plan for mri of head . Cut down steroid added long acting home dose insulin         Review of Systems   Constitutional: Positive for unexpected weight change (Weight loss). Negative for activity change, appetite change, chills, diaphoresis, fatigue and fever.   HENT: Negative for congestion, nosebleeds, sore throat and trouble swallowing.    Eyes: Negative for pain, discharge and visual disturbance.   Respiratory: Positive for shortness of breath. Negative for apnea, cough, choking, chest tightness, wheezing and stridor.    Cardiovascular: Positive for chest pain. Negative for palpitations and leg swelling.   Gastrointestinal: Positive for nausea. Negative for abdominal distention, abdominal pain, anal bleeding, blood in stool, constipation, diarrhea, rectal pain and vomiting.   Endocrine: Negative for cold intolerance and heat intolerance.   Genitourinary: Negative for difficulty urinating, dysuria, flank pain, frequency and urgency.   Musculoskeletal: Positive for back pain. Negative for arthralgias, gait problem, joint swelling, myalgias, neck pain and neck stiffness.   Skin: Negative for rash and wound.   Allergic/Immunologic: Negative for food allergies and immunocompromised state.   Neurological: Negative for dizziness, seizures, syncope, facial asymmetry, weakness, light-headedness and headaches.   Hematological: Negative for adenopathy.   Psychiatric/Behavioral: Negative for agitation, behavioral problems and confusion. The patient is not nervous/anxious.      Objective:     Vital Signs (Most Recent):  Temp: 97.2 °F (36.2 °C) (09/01/18 1220)  Pulse: 97 (09/01/18 1353)  Resp: 18 (09/01/18 1353)  BP: 133/71 (09/01/18 1220)  SpO2: (!) 90 % (09/01/18 1353) Vital Signs (24h Range):  Temp:  [97 °F (36.1  °C)-98.3 °F (36.8 °C)] 97.2 °F (36.2 °C)  Pulse:  [] 97  Resp:  [16-20] 18  SpO2:  [89 %-93 %] 90 %  BP: (100-133)/(57-71) 133/71     Weight: 81.6 kg (179 lb 14.3 oz)  Body mass index is 27.35 kg/m².    Intake/Output Summary (Last 24 hours) at 9/1/2018 1454  Last data filed at 9/1/2018 0800  Gross per 24 hour   Intake 640 ml   Output --   Net 640 ml      Physical Exam   Constitutional: He is oriented to person, place, and time. He appears well-developed and well-nourished.   HENT:   Head: Normocephalic and atraumatic.   Mouth/Throat: No oropharyngeal exudate.   Eyes: Conjunctivae are normal. Pupils are equal, round, and reactive to light. Right eye exhibits no discharge. Left eye exhibits no discharge.   Neck: Neck supple. No JVD present. No tracheal deviation present. No thyromegaly present.   Cardiovascular: Normal rate, regular rhythm and normal heart sounds.   No murmur heard.  Pulmonary/Chest: Effort normal. No stridor. No respiratory distress. He has decreased breath sounds. He has no wheezes. He has no rhonchi. He has no rales.   Abdominal: Soft. Bowel sounds are normal.   Musculoskeletal: Normal range of motion. He exhibits no edema or tenderness.   Lymphadenopathy:     He has no cervical adenopathy.   Neurological: He is alert and oriented to person, place, and time.   Skin: Skin is warm, dry and intact. Capillary refill takes less than 2 seconds. No abrasion, no bruising, no ecchymosis and no rash noted. No cyanosis. Nails show no clubbing.   Nursing note and vitals reviewed.      Significant Labs: All pertinent labs within the past 24 hours have been reviewed.    Significant Imaging: I have reviewed all pertinent imaging results/findings within the past 24 hours.    Assessment/Plan:      * Metastatic lung cancer (metastasis from lung to other site), left    New diagnosis of metastatic left lung mass, likely primary  Discussed care with IR who recommended bronchoscopy.  Await pulmonology evaluation    Hold Eliquis   Consult heme/Onc- likely aggressive cancer such as SCLC  \Continue supplemental oxygen to maintain saturations greater than 90%.          Moderate malnutrition    Supplements when diet ordered           Acute respiratory failure with hypoxia    Home oxygen ordered   Cont O2        Paroxysmal atrial fibrillation    Currently rate controlled.  Continue diltiazem, home medication.  Hold Eliquis, patient reports last dose 08/30/2018.          Hyperlipidemia LDL goal <70    Continue home dose statin.          Hypertension goal BP (blood pressure) < 130/80    Continue ACEI, home dose.  Monitor closely and make adjustments as needed.        COPD (chronic obstructive pulmonary disease) with emphysema    Significanr emphysematous changes with blebs on CT  Cont Neb txs  Patient followed by Dr. Ibrahim in the pulmonary clinic as outpatient.            VTE Risk Mitigation (From admission, onward)        Ordered     Place sequential compression device  Until discontinued      08/31/18 0622              Satnam Barboza MD  Department of Hospital Medicine   [facility]

## 2018-09-01 NOTE — PROGRESS NOTES
[facility]   Hematology/Oncology  Progress Note    Patient Name: Nico Garza Jr.  Admission Date: 8/30/2018  Hospital Length of Stay: 1 days  Code Status: Full Code     Subjective:     HPI:  72-year-old gentleman with significant history for smoking/COPD, AFib on Eliquis, diabetes mellitus, hypertension, dyslipidemia, who presented to the emergency room with progressive chest pain/shortness of breath.  He underwent CTA to evaluate for pulmonary embolism which demonstrated large left lung mass with extensive mediastinal lymphadenopathy and left adrenal nodule and lytic lesion of L1.    Pulmonary has been consulted and plans to proceed with bronchoscopy on Sunday 09/02/2018.      Interval History:   No acute events overnight    Oncology Treatment Plan:   [No treatment plan]    Medications:  Continuous Infusions:   lactated ringers       Scheduled Meds:   albuterol-ipratropium  3 mL Nebulization Q6H    diltiaZEM  360 mg Oral Daily    insulin aspart U-100  15 Units Subcutaneous BIDWM    lidocaine HCL 4%  4 mL Nebulization Once    methylPREDNISolone sodium succinate  80 mg Intravenous Q8H    pantoprazole  40 mg Oral Daily    [START ON 9/2/2018] pravastatin  40 mg Oral QHS    [START ON 9/2/2018] quinapril  40 mg Oral QHS     PRN Meds:acetaminophen, aluminum-magnesium hydroxide-simethicone, dextrose 50%, glucagon (human recombinant), guaifenesin 100 mg/5 ml, HYDROcodone-acetaminophen, insulin aspart U-100, morphine, zolpidem     Review of Systems   Constitutional: Positive for unexpected weight change (Weight loss). Negative for activity change, appetite change, chills, diaphoresis, fatigue and fever.   HENT: Negative for congestion, nosebleeds, sore throat and trouble swallowing.    Eyes: Negative for pain, discharge and visual disturbance.   Respiratory: Positive for shortness of breath. Negative for apnea, cough, choking, chest tightness, wheezing and stridor.    Cardiovascular: Positive for  chest pain. Negative for palpitations and leg swelling.   Gastrointestinal: Positive for nausea. Negative for abdominal distention, abdominal pain, anal bleeding, blood in stool, constipation, diarrhea, rectal pain and vomiting.   Endocrine: Negative for cold intolerance and heat intolerance.   Genitourinary: Negative for difficulty urinating, dysuria, flank pain, frequency and urgency.   Musculoskeletal: Positive for back pain. Negative for arthralgias, gait problem, joint swelling, myalgias, neck pain and neck stiffness.   Skin: Negative for rash and wound.   Allergic/Immunologic: Negative for food allergies and immunocompromised state.   Neurological: Negative for dizziness, seizures, syncope, facial asymmetry, weakness, light-headedness and headaches.   Hematological: Negative for adenopathy.   Psychiatric/Behavioral: Negative for agitation, behavioral problems and confusion. The patient is not nervous/anxious.      Objective:     Vital Signs (Most Recent):  Temp: 98.3 °F (36.8 °C) (09/01/18 0751)  Pulse: 90 (09/01/18 0755)  Resp: 18 (09/01/18 0755)  BP: 127/63 (09/01/18 0751)  SpO2: (!) 89 % (09/01/18 0755) Vital Signs (24h Range):  Temp:  [97 °F (36.1 °C)-98.3 °F (36.8 °C)] 98.3 °F (36.8 °C)  Pulse:  [80-97] 90  Resp:  [16-20] 18  SpO2:  [88 %-94 %] 89 %  BP: (100-137)/(57-81) 127/63     Weight: 81.6 kg (179 lb 14.3 oz)  Body mass index is 27.35 kg/m².  Body surface area is 1.98 meters squared.      Intake/Output Summary (Last 24 hours) at 9/1/2018 1000  Last data filed at 8/31/2018 1500  Gross per 24 hour   Intake 240 ml   Output --   Net 240 ml       Physical Exam   Constitutional: He is oriented to person, place, and time. He appears well-developed and well-nourished.   HENT:   Head: Normocephalic and atraumatic.   Mouth/Throat: No oropharyngeal exudate.   Eyes: Conjunctivae are normal. Pupils are equal, round, and reactive to light. Right eye exhibits no discharge. Left eye exhibits no discharge.   Neck:  Neck supple. No JVD present. No tracheal deviation present. No thyromegaly present.   Cardiovascular: Normal rate, regular rhythm and normal heart sounds.   No murmur heard.  Pulmonary/Chest: Effort normal. No stridor. No respiratory distress. He has decreased breath sounds. He has wheezes in the right lower field and the left lower field. He has no rhonchi. He has no rales.   Abdominal: Soft. Bowel sounds are normal.   Musculoskeletal: Normal range of motion. He exhibits no edema or tenderness.   Lymphadenopathy:     He has no cervical adenopathy.   Neurological: He is alert and oriented to person, place, and time.   Skin: Skin is warm, dry and intact. Capillary refill takes less than 2 seconds. No abrasion, no bruising, no ecchymosis and no rash noted. No cyanosis. Nails show no clubbing.   Nursing note and vitals reviewed.      Significant Labs:   CBC:   Recent Labs   Lab  08/30/18 2111 08/31/18 0452 09/01/18   0443   WBC  9.05  8.46  7.03   HGB  14.5  14.5  14.7   HCT  43.2  43.4  44.7   PLT  301  307  328   , CMP:   Recent Labs   Lab  08/30/18 2111 08/31/18 0452  09/01/18   0443   NA  140  140  141   K  4.2  3.8  4.3   CL  100  99  97   CO2  25  25  27   GLU  178*  177*  307*   BUN  19  20  26*   CREATININE  1.0  1.0  1.2   CALCIUM  9.5  9.7  9.7   PROT  8.2  7.9  8.0   ALBUMIN  3.2*  3.2*  3.1*   BILITOT  0.4  0.4  0.5   ALKPHOS  114  117  121   AST  26  17  13   ALT  18  16  16   ANIONGAP  15  16  17*   EGFRNONAA  >60  >60  >60   , Coagulation:   Recent Labs   Lab  08/30/18 2111 09/01/18   0444   INR  1.1  1.1   APTT  30.3   --    , Haptoglobin: No results for input(s): HAPTOGLOBIN in the last 48 hours., Immunology: No results for input(s): SPEP, JANAE, CARMEN, FREELAMBDALI in the last 48 hours., LDH: No results for input(s): LDHCSF, BFSOURCE in the last 48 hours. and LFTs:   Recent Labs   Lab  08/30/18   2111  08/31/18   0452  09/01/18   0443   ALT  18  16  16   AST  26  17  13   ALKPHOS  114  117   121   BILITOT  0.4  0.4  0.5   PROT  8.2  7.9  8.0   ALBUMIN  3.2*  3.2*  3.1*       Diagnostic Results:  I have reviewed all pertinent imaging results/findings within the past 24 hours.    Assessment/Plan:     Mass of lower lobe of left lung    Left lower lung mass with extensive mediastinal lymphadenopathy, left adrenal nodule, and L1 lytic lesion consistent with metastatic lung cancer.  Pulmonary is planning bronchoscopy on Sunday for tissue diagnosis.  Treatment will be determined by tissue diagnosis and final staging.  --will order MRI of the brain is he waits for bronchoscopy tomorrow  --bronchoscopy planned for tomorrow morning  -- PET scan as an outpatient            Thank you for your consult. I will follow-up with patient. Please contact us if you have any additional questions.     Issa Lagos Jr, MD  Hematology/Oncology  [facility]

## 2018-09-01 NOTE — SUBJECTIVE & OBJECTIVE
Review of Systems   Constitutional: Positive for unexpected weight change (Weight loss). Negative for activity change, appetite change, chills, diaphoresis, fatigue and fever.   HENT: Negative for congestion, nosebleeds, sore throat and trouble swallowing.    Eyes: Negative for pain, discharge and visual disturbance.   Respiratory: Positive for shortness of breath. Negative for apnea, cough, choking, chest tightness, wheezing and stridor.    Cardiovascular: Positive for chest pain. Negative for palpitations and leg swelling.   Gastrointestinal: Positive for nausea. Negative for abdominal distention, abdominal pain, anal bleeding, blood in stool, constipation, diarrhea, rectal pain and vomiting.   Endocrine: Negative for cold intolerance and heat intolerance.   Genitourinary: Negative for difficulty urinating, dysuria, flank pain, frequency and urgency.   Musculoskeletal: Positive for back pain. Negative for arthralgias, gait problem, joint swelling, myalgias, neck pain and neck stiffness.   Skin: Negative for rash and wound.   Allergic/Immunologic: Negative for food allergies and immunocompromised state.   Neurological: Negative for dizziness, seizures, syncope, facial asymmetry, weakness, light-headedness and headaches.   Hematological: Negative for adenopathy.   Psychiatric/Behavioral: Negative for agitation, behavioral problems and confusion. The patient is not nervous/anxious.      Objective:     Vital Signs (Most Recent):  Temp: 97.2 °F (36.2 °C) (09/01/18 1220)  Pulse: 97 (09/01/18 1353)  Resp: 18 (09/01/18 1353)  BP: 133/71 (09/01/18 1220)  SpO2: (!) 90 % (09/01/18 1353) Vital Signs (24h Range):  Temp:  [97 °F (36.1 °C)-98.3 °F (36.8 °C)] 97.2 °F (36.2 °C)  Pulse:  [] 97  Resp:  [16-20] 18  SpO2:  [89 %-93 %] 90 %  BP: (100-133)/(57-71) 133/71     Weight: 81.6 kg (179 lb 14.3 oz)  Body mass index is 27.35 kg/m².    Intake/Output Summary (Last 24 hours) at 9/1/2018 4218  Last data filed at 9/1/2018  0800  Gross per 24 hour   Intake 640 ml   Output --   Net 640 ml      Physical Exam   Constitutional: He is oriented to person, place, and time. He appears well-developed and well-nourished.   HENT:   Head: Normocephalic and atraumatic.   Mouth/Throat: No oropharyngeal exudate.   Eyes: Conjunctivae are normal. Pupils are equal, round, and reactive to light. Right eye exhibits no discharge. Left eye exhibits no discharge.   Neck: Neck supple. No JVD present. No tracheal deviation present. No thyromegaly present.   Cardiovascular: Normal rate, regular rhythm and normal heart sounds.   No murmur heard.  Pulmonary/Chest: Effort normal. No stridor. No respiratory distress. He has decreased breath sounds. He has no wheezes. He has no rhonchi. He has no rales.   Abdominal: Soft. Bowel sounds are normal.   Musculoskeletal: Normal range of motion. He exhibits no edema or tenderness.   Lymphadenopathy:     He has no cervical adenopathy.   Neurological: He is alert and oriented to person, place, and time.   Skin: Skin is warm, dry and intact. Capillary refill takes less than 2 seconds. No abrasion, no bruising, no ecchymosis and no rash noted. No cyanosis. Nails show no clubbing.   Nursing note and vitals reviewed.      Significant Labs: All pertinent labs within the past 24 hours have been reviewed.    Significant Imaging: I have reviewed all pertinent imaging results/findings within the past 24 hours.

## 2018-09-01 NOTE — PLAN OF CARE
Problem: Patient Care Overview  Goal: Plan of Care Review  Outcome: Ongoing (interventions implemented as appropriate)  Plan of care discussed with patient, and patient verbalized understanding.  Patient remained AOx4, 4L NC, and NSR to ST on the monitor.  Glucose monitored and coverage provided. Patient will be NPO after midnight for a procedure in the AM.  Patient remained free of falls, accidents, and trama during the day shift.  Bed is in the lowest position and call light is within reach - Continue to monitor.

## 2018-09-01 NOTE — SUBJECTIVE & OBJECTIVE
Interval History: no new complaints, still SOB    Objective:     Vital Signs (Most Recent):  Temp: 97.2 °F (36.2 °C) (09/01/18 1607)  Pulse: 85 (09/01/18 1700)  Resp: 18 (09/01/18 1607)  BP: 123/66 (09/01/18 1607)  SpO2: (!) 92 % (09/01/18 1607) Vital Signs (24h Range):  Temp:  [97.2 °F (36.2 °C)-98.3 °F (36.8 °C)] 97.2 °F (36.2 °C)  Pulse:  [] 85  Resp:  [16-20] 18  SpO2:  [89 %-93 %] 92 %  BP: (100-133)/(57-71) 123/66     Weight: 81.6 kg (179 lb 14.3 oz)  Body mass index is 27.35 kg/m².      Intake/Output Summary (Last 24 hours) at 9/1/2018 1821  Last data filed at 9/1/2018 0800  Gross per 24 hour   Intake 400 ml   Output --   Net 400 ml       Physical Exam   Constitutional: He is oriented to person, place, and time. He appears well-developed and well-nourished.   HENT:   Head: Normocephalic and atraumatic.   Mouth/Throat: Oropharyngeal exudate present.   Eyes: Conjunctivae are normal. Pupils are equal, round, and reactive to light.   Neck: Neck supple. No JVD present. No tracheal deviation present. No thyromegaly present.   Cardiovascular: Normal rate and normal heart sounds. An irregularly irregular rhythm present.   No murmur heard.  Pulmonary/Chest: Effort normal. He has decreased breath sounds. He has wheezes in the right lower field and the left lower field. He has no rhonchi. He has no rales.   Abdominal: Soft. Bowel sounds are normal.   Musculoskeletal: Normal range of motion. He exhibits no edema or tenderness.   Lymphadenopathy:     He has no cervical adenopathy.   Neurological: He is alert and oriented to person, place, and time.   Skin: Skin is warm and dry.   Nursing note and vitals reviewed.      Vents:  Oxygen Concentration (%): 36 (09/01/18 1353)    Lines/Drains/Airways     Peripheral Intravenous Line                 Peripheral IV - Single Lumen 08/30/18 2111 Left Antecubital 1 day                Significant Labs:    CBC/Anemia Profile:  Recent Labs   Lab  08/30/18 2111 08/31/18   2114   09/01/18   0443   WBC  9.05  8.46  7.03   HGB  14.5  14.5  14.7   HCT  43.2  43.4  44.7   PLT  301  307  328   MCV  87  87  88   RDW  13.6  13.7  13.7        Chemistries:  Recent Labs   Lab  08/30/18 2111 08/31/18   0452  09/01/18   0443   NA  140  140  141   K  4.2  3.8  4.3   CL  100  99  97   CO2  25  25  27   BUN  19  20  26*   CREATININE  1.0  1.0  1.2   CALCIUM  9.5  9.7  9.7   ALBUMIN  3.2*  3.2*  3.1*   PROT  8.2  7.9  8.0   BILITOT  0.4  0.4  0.5   ALKPHOS  114  117  121   ALT  18  16  16   AST  26  17  13       BMP:   Recent Labs   Lab  09/01/18   0443   GLU  307*   NA  141   K  4.3   CL  97   CO2  27   BUN  26*   CREATININE  1.2   CALCIUM  9.7     CMP:   Recent Labs   Lab  08/30/18 2111 08/31/18   0452  09/01/18   0443   NA  140  140  141   K  4.2  3.8  4.3   CL  100  99  97   CO2  25  25  27   GLU  178*  177*  307*   BUN  19  20  26*   CREATININE  1.0  1.0  1.2   CALCIUM  9.5  9.7  9.7   PROT  8.2  7.9  8.0   ALBUMIN  3.2*  3.2*  3.1*   BILITOT  0.4  0.4  0.5   ALKPHOS  114  117  121   AST  26  17  13   ALT  18  16  16   ANIONGAP  15  16  17*   EGFRNONAA  >60  >60  >60     All pertinent labs within the past 24 hours have been reviewed.    Significant Imaging:  I have reviewed all pertinent imaging results/findings within the past 24 hours.  I have reviewed and interpreted all pertinent imaging results/findings within the past 24 hours.

## 2018-09-02 ENCOUNTER — ANESTHESIA (OUTPATIENT)
Dept: ENDOSCOPY | Facility: HOSPITAL | Age: 73
DRG: 180 | End: 2018-09-02
Payer: MEDICARE

## 2018-09-02 ENCOUNTER — ANESTHESIA EVENT (OUTPATIENT)
Dept: ENDOSCOPY | Facility: HOSPITAL | Age: 73
DRG: 180 | End: 2018-09-02
Payer: MEDICARE

## 2018-09-02 PROBLEM — R59.0 MEDIASTINAL LYMPHADENOPATHY: Status: ACTIVE | Noted: 2018-09-02

## 2018-09-02 LAB
ALBUMIN SERPL BCP-MCNC: 3.2 G/DL
ALP SERPL-CCNC: 102 U/L
ALT SERPL W/O P-5'-P-CCNC: 16 U/L
ANION GAP SERPL CALC-SCNC: 13 MMOL/L
AST SERPL-CCNC: 12 U/L
BASOPHILS # BLD AUTO: 0 K/UL
BASOPHILS NFR BLD: 0 %
BILIRUB SERPL-MCNC: 0.3 MG/DL
BUN SERPL-MCNC: 27 MG/DL
CALCIUM SERPL-MCNC: 9.6 MG/DL
CHLORIDE SERPL-SCNC: 98 MMOL/L
CO2 SERPL-SCNC: 27 MMOL/L
CREAT SERPL-MCNC: 1.1 MG/DL
DIFFERENTIAL METHOD: ABNORMAL
EOSINOPHIL # BLD AUTO: 0 K/UL
EOSINOPHIL NFR BLD: 0 %
ERYTHROCYTE [DISTWIDTH] IN BLOOD BY AUTOMATED COUNT: 13.8 %
EST. GFR  (AFRICAN AMERICAN): >60 ML/MIN/1.73 M^2
EST. GFR  (NON AFRICAN AMERICAN): >60 ML/MIN/1.73 M^2
GLUCOSE SERPL-MCNC: 328 MG/DL
HCT VFR BLD AUTO: 42.2 %
HGB BLD-MCNC: 14 G/DL
KOH PREP SPEC: NORMAL
LYMPHOCYTES # BLD AUTO: 0.6 K/UL
LYMPHOCYTES NFR BLD: 3.7 %
MCH RBC QN AUTO: 29 PG
MCHC RBC AUTO-ENTMCNC: 33.2 G/DL
MCV RBC AUTO: 88 FL
MONOCYTES # BLD AUTO: 0.6 K/UL
MONOCYTES NFR BLD: 3.4 %
NEUTROPHILS # BLD AUTO: 15.7 K/UL
NEUTROPHILS NFR BLD: 92.9 %
PLATELET # BLD AUTO: 364 K/UL
PMV BLD AUTO: 9.5 FL
POCT GLUCOSE: 289 MG/DL (ref 70–110)
POCT GLUCOSE: 310 MG/DL (ref 70–110)
POCT GLUCOSE: 405 MG/DL (ref 70–110)
POTASSIUM SERPL-SCNC: 4 MMOL/L
PROT SERPL-MCNC: 7.6 G/DL
RBC # BLD AUTO: 4.82 M/UL
SODIUM SERPL-SCNC: 138 MMOL/L
WBC # BLD AUTO: 16.92 K/UL

## 2018-09-02 PROCEDURE — 27000221 HC OXYGEN, UP TO 24 HOURS

## 2018-09-02 PROCEDURE — 25000003 PHARM REV CODE 250: Performed by: INTERNAL MEDICINE

## 2018-09-02 PROCEDURE — 87116 MYCOBACTERIA CULTURE: CPT

## 2018-09-02 PROCEDURE — 87102 FUNGUS ISOLATION CULTURE: CPT

## 2018-09-02 PROCEDURE — 87107 FUNGI IDENTIFICATION MOLD: CPT

## 2018-09-02 PROCEDURE — 85025 COMPLETE CBC W/AUTO DIFF WBC: CPT

## 2018-09-02 PROCEDURE — 80053 COMPREHEN METABOLIC PANEL: CPT

## 2018-09-02 PROCEDURE — 94640 AIRWAY INHALATION TREATMENT: CPT

## 2018-09-02 PROCEDURE — 36415 COLL VENOUS BLD VENIPUNCTURE: CPT

## 2018-09-02 PROCEDURE — 99233 SBSQ HOSP IP/OBS HIGH 50: CPT | Mod: 25,,, | Performed by: INTERNAL MEDICINE

## 2018-09-02 PROCEDURE — 07D78ZX EXTRACTION OF THORAX LYMPHATIC, VIA NATURAL OR ARTIFICIAL OPENING ENDOSCOPIC, DIAGNOSTIC: ICD-10-PCS | Performed by: INTERNAL MEDICINE

## 2018-09-02 PROCEDURE — 31628 BRONCHOSCOPY/LUNG BX EACH: CPT | Mod: 59,LT,, | Performed by: INTERNAL MEDICINE

## 2018-09-02 PROCEDURE — 87206 SMEAR FLUORESCENT/ACID STAI: CPT

## 2018-09-02 PROCEDURE — 25000003 PHARM REV CODE 250: Performed by: EMERGENCY MEDICINE

## 2018-09-02 PROCEDURE — 87015 SPECIMEN INFECT AGNT CONCNTJ: CPT

## 2018-09-02 PROCEDURE — 0BDB8ZX EXTRACTION OF LEFT LOWER LOBE BRONCHUS, VIA NATURAL OR ARTIFICIAL OPENING ENDOSCOPIC, DIAGNOSTIC: ICD-10-PCS | Performed by: INTERNAL MEDICINE

## 2018-09-02 PROCEDURE — 31625 BRONCHOSCOPY W/BIOPSY(S): CPT | Performed by: INTERNAL MEDICINE

## 2018-09-02 PROCEDURE — 21400001 HC TELEMETRY ROOM

## 2018-09-02 PROCEDURE — 31623 DX BRONCHOSCOPE/BRUSH: CPT | Performed by: INTERNAL MEDICINE

## 2018-09-02 PROCEDURE — 31623 DX BRONCHOSCOPE/BRUSH: CPT | Mod: 51,LT,, | Performed by: INTERNAL MEDICINE

## 2018-09-02 PROCEDURE — 94761 N-INVAS EAR/PLS OXIMETRY MLT: CPT

## 2018-09-02 PROCEDURE — 31629 BRONCHOSCOPY/NEEDLE BX EACH: CPT | Performed by: INTERNAL MEDICINE

## 2018-09-02 PROCEDURE — 88104 CYTOPATH FL NONGYN SMEARS: CPT | Mod: 26,59,, | Performed by: PATHOLOGY

## 2018-09-02 PROCEDURE — 63600175 PHARM REV CODE 636 W HCPCS: Performed by: NURSE ANESTHETIST, CERTIFIED REGISTERED

## 2018-09-02 PROCEDURE — 87205 SMEAR GRAM STAIN: CPT

## 2018-09-02 PROCEDURE — 88305 TISSUE EXAM BY PATHOLOGIST: CPT | Performed by: PATHOLOGY

## 2018-09-02 PROCEDURE — 88173 CYTOPATH EVAL FNA REPORT: CPT | Performed by: PATHOLOGY

## 2018-09-02 PROCEDURE — 88112 CYTOPATH CELL ENHANCE TECH: CPT | Mod: 26,59,, | Performed by: PATHOLOGY

## 2018-09-02 PROCEDURE — 25000242 PHARM REV CODE 250 ALT 637 W/ HCPCS: Performed by: INTERNAL MEDICINE

## 2018-09-02 PROCEDURE — 87210 SMEAR WET MOUNT SALINE/INK: CPT

## 2018-09-02 PROCEDURE — 88305 TISSUE EXAM BY PATHOLOGIST: CPT | Mod: 26,XS,, | Performed by: PATHOLOGY

## 2018-09-02 PROCEDURE — 37000009 HC ANESTHESIA EA ADD 15 MINS: Performed by: INTERNAL MEDICINE

## 2018-09-02 PROCEDURE — 31629 BRONCHOSCOPY/NEEDLE BX EACH: CPT | Mod: ,,, | Performed by: INTERNAL MEDICINE

## 2018-09-02 PROCEDURE — 63600175 PHARM REV CODE 636 W HCPCS: Performed by: INTERNAL MEDICINE

## 2018-09-02 PROCEDURE — 87070 CULTURE OTHR SPECIMN AEROBIC: CPT

## 2018-09-02 PROCEDURE — 88305 TISSUE EXAM BY PATHOLOGIST: CPT | Mod: 26,,, | Performed by: PATHOLOGY

## 2018-09-02 PROCEDURE — 25000003 PHARM REV CODE 250: Performed by: NURSE ANESTHETIST, CERTIFIED REGISTERED

## 2018-09-02 PROCEDURE — 37000008 HC ANESTHESIA 1ST 15 MINUTES: Performed by: INTERNAL MEDICINE

## 2018-09-02 PROCEDURE — 88173 CYTOPATH EVAL FNA REPORT: CPT | Mod: 26,59,, | Performed by: PATHOLOGY

## 2018-09-02 RX ORDER — MIDAZOLAM HYDROCHLORIDE 1 MG/ML
INJECTION, SOLUTION INTRAMUSCULAR; INTRAVENOUS
Status: DISCONTINUED | OUTPATIENT
Start: 2018-09-02 | End: 2018-09-02

## 2018-09-02 RX ORDER — FENTANYL CITRATE 50 UG/ML
INJECTION, SOLUTION INTRAMUSCULAR; INTRAVENOUS
Status: DISCONTINUED | OUTPATIENT
Start: 2018-09-02 | End: 2018-09-02

## 2018-09-02 RX ORDER — FLECAINIDE ACETATE 50 MG/1
100 TABLET ORAL EVERY 12 HOURS
Status: DISCONTINUED | OUTPATIENT
Start: 2018-09-02 | End: 2018-09-03 | Stop reason: HOSPADM

## 2018-09-02 RX ORDER — IPRATROPIUM BROMIDE AND ALBUTEROL SULFATE 2.5; .5 MG/3ML; MG/3ML
3 SOLUTION RESPIRATORY (INHALATION) ONCE
Status: COMPLETED | OUTPATIENT
Start: 2018-09-02 | End: 2018-09-02

## 2018-09-02 RX ORDER — SODIUM CHLORIDE, SODIUM LACTATE, POTASSIUM CHLORIDE, CALCIUM CHLORIDE 600; 310; 30; 20 MG/100ML; MG/100ML; MG/100ML; MG/100ML
INJECTION, SOLUTION INTRAVENOUS CONTINUOUS PRN
Status: DISCONTINUED | OUTPATIENT
Start: 2018-09-02 | End: 2018-09-02

## 2018-09-02 RX ADMIN — FENTANYL CITRATE 50 MCG: 50 INJECTION, SOLUTION INTRAMUSCULAR; INTRAVENOUS at 08:09

## 2018-09-02 RX ADMIN — PANTOPRAZOLE SODIUM 40 MG: 40 TABLET, DELAYED RELEASE ORAL at 10:09

## 2018-09-02 RX ADMIN — HYDROCODONE BITARTRATE AND ACETAMINOPHEN 1 TABLET: 5; 325 TABLET ORAL at 12:09

## 2018-09-02 RX ADMIN — APIXABAN 5 MG: 2.5 TABLET, FILM COATED ORAL at 08:09

## 2018-09-02 RX ADMIN — INSULIN ASPART 5 UNITS: 100 INJECTION, SOLUTION INTRAVENOUS; SUBCUTANEOUS at 05:09

## 2018-09-02 RX ADMIN — METHYLPREDNISOLONE SODIUM SUCCINATE 40 MG: 40 INJECTION, POWDER, FOR SOLUTION INTRAMUSCULAR; INTRAVENOUS at 05:09

## 2018-09-02 RX ADMIN — IPRATROPIUM BROMIDE AND ALBUTEROL SULFATE 3 ML: .5; 2.5 SOLUTION RESPIRATORY (INHALATION) at 09:09

## 2018-09-02 RX ADMIN — QUINAPRIL 40 MG: 20 TABLET ORAL at 08:09

## 2018-09-02 RX ADMIN — METHYLPREDNISOLONE SODIUM SUCCINATE 40 MG: 40 INJECTION, POWDER, FOR SOLUTION INTRAMUSCULAR; INTRAVENOUS at 09:09

## 2018-09-02 RX ADMIN — DILTIAZEM HYDROCHLORIDE 360 MG: 180 CAPSULE, COATED, EXTENDED RELEASE ORAL at 10:09

## 2018-09-02 RX ADMIN — MIDAZOLAM HYDROCHLORIDE 2 MG: 1 INJECTION, SOLUTION INTRAMUSCULAR; INTRAVENOUS at 08:09

## 2018-09-02 RX ADMIN — LIDOCAINE HYDROCHLORIDE 4 ML: 40 SOLUTION TOPICAL at 07:09

## 2018-09-02 RX ADMIN — HYDROCODONE BITARTRATE AND ACETAMINOPHEN 1 TABLET: 5; 325 TABLET ORAL at 09:09

## 2018-09-02 RX ADMIN — INSULIN DETEMIR 25 UNITS: 100 INJECTION, SOLUTION SUBCUTANEOUS at 10:09

## 2018-09-02 RX ADMIN — MIDAZOLAM HYDROCHLORIDE 3 MG: 1 INJECTION, SOLUTION INTRAMUSCULAR; INTRAVENOUS at 08:09

## 2018-09-02 RX ADMIN — IPRATROPIUM BROMIDE AND ALBUTEROL SULFATE 3 ML: .5; 3 SOLUTION RESPIRATORY (INHALATION) at 12:09

## 2018-09-02 RX ADMIN — APIXABAN 5 MG: 2.5 TABLET, FILM COATED ORAL at 12:09

## 2018-09-02 RX ADMIN — PRAVASTATIN SODIUM 40 MG: 20 TABLET ORAL at 08:09

## 2018-09-02 RX ADMIN — INSULIN ASPART 3 UNITS: 100 INJECTION, SOLUTION INTRAVENOUS; SUBCUTANEOUS at 11:09

## 2018-09-02 RX ADMIN — IPRATROPIUM BROMIDE AND ALBUTEROL SULFATE 3 ML: .5; 3 SOLUTION RESPIRATORY (INHALATION) at 02:09

## 2018-09-02 RX ADMIN — SODIUM CHLORIDE, SODIUM LACTATE, POTASSIUM CHLORIDE, AND CALCIUM CHLORIDE: 600; 310; 30; 20 INJECTION, SOLUTION INTRAVENOUS at 08:09

## 2018-09-02 RX ADMIN — IPRATROPIUM BROMIDE AND ALBUTEROL SULFATE 3 ML: .5; 3 SOLUTION RESPIRATORY (INHALATION) at 07:09

## 2018-09-02 RX ADMIN — INSULIN DETEMIR 25 UNITS: 100 INJECTION, SOLUTION SUBCUTANEOUS at 08:09

## 2018-09-02 RX ADMIN — INSULIN ASPART 4 UNITS: 100 INJECTION, SOLUTION INTRAVENOUS; SUBCUTANEOUS at 05:09

## 2018-09-02 RX ADMIN — FLECAINIDE ACETATE 100 MG: 50 TABLET ORAL at 04:09

## 2018-09-02 RX ADMIN — HYDROCODONE BITARTRATE AND ACETAMINOPHEN 1 TABLET: 5; 325 TABLET ORAL at 04:09

## 2018-09-02 RX ADMIN — SODIUM CHLORIDE, SODIUM LACTATE, POTASSIUM CHLORIDE, AND CALCIUM CHLORIDE: .6; .31; .03; .02 INJECTION, SOLUTION INTRAVENOUS at 07:09

## 2018-09-02 NOTE — PROGRESS NOTES
[facility] Hospital Medicine  Progress Note    Patient Name: Nico Garza Jr.  MRN: [id number]  Patient Class: IP- Inpatient   Admission Date: 8/30/2018  Length of Stay: 2 days  Attending Physician: Satnam Barboza MD  Primary Care Provider: Tiffany Davis DO        Subjective:     Principal Problem:Metastatic lung cancer (metastasis from lung to other site), left    HPI:  Mr. Garza is a 72-year-old  male with PMH significant for COPD, atrial fibrillation on Eliquis, insulin-dependent diabetes, hypertension, hyperlipidemia, presents to the ED complaining of pleuritic type chest discomfort associated with shortness of breath for the past 2-3 days.  In addition he also reports unintentional weight loss of 15 lb over the last one month, decreased appetite.  Initial chest x-ray shows no evidence of infiltrates, but elevated left diaphragm.  Patient was persistently hypoxemic in the ED with oxygen saturations dropping into the 80s without supplemental oxygen.  Patient does not use oxygen at home.  D-dimer was mildly elevated at 0.5.  Hence CTA chest was done, came back negative for pulmonary embolism.  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  In addition there is a tiny nodule on the left adrenal gland, and lytic lesion on the right L1 vertebral body.  Suspected metastatic lung carcinoma.  Patient admitted as observation basis for hypoxemia, and Pulmonary consult for possible bronchoscopy guided lung mass biopsy.    Hospital Course:  The pt presented to ED with SOB, pleuritic chest pain, and back pain was placed in observation for acute hypoxemic respiratory failure, large left lung mass close to the mediastinum with extensive mediastinal lymphadenopathy, and lytic lesions in the spine. Eliquis for Afib was held. Last dose yesterday at 10  am  Discussed care with Dr Frye with IR- He recommended bronchoscopy for biopsy   Pt reports mild improvement in SOB- remains BENITO  9/1  Plan for bronchoscopy tomorrow . Patient denies of any new complains . Plan for mri of head . Cut down steroid added long acting home dose insulin   9/2  Patient had bronchoscopy done today after procedure patient hypoxic on vent mask . High oxygen requirements will monitor over night inpatient . Resume eliquis for afib        Review of Systems   Constitutional: Positive for unexpected weight change (Weight loss). Negative for activity change, appetite change, chills, diaphoresis, fatigue and fever.   HENT: Negative for congestion, nosebleeds, sore throat and trouble swallowing.    Eyes: Negative for pain, discharge and visual disturbance.   Respiratory: Positive for shortness of breath. Negative for apnea, cough, choking, chest tightness, wheezing and stridor.    Cardiovascular: Positive for chest pain. Negative for palpitations and leg swelling.   Gastrointestinal: Positive for nausea. Negative for abdominal distention, abdominal pain, anal bleeding, blood in stool, constipation, diarrhea, rectal pain and vomiting.   Endocrine: Negative for cold intolerance and heat intolerance.   Genitourinary: Negative for difficulty urinating, dysuria, flank pain, frequency and urgency.   Musculoskeletal: Positive for back pain. Negative for arthralgias, gait problem, joint swelling, myalgias, neck pain and neck stiffness.   Skin: Negative for rash and wound.   Allergic/Immunologic: Negative for food allergies and immunocompromised state.   Neurological: Negative for dizziness, seizures, syncope, facial asymmetry, weakness, light-headedness and headaches.   Hematological: Negative for adenopathy.   Psychiatric/Behavioral: Negative for agitation, behavioral problems and confusion. The patient is not nervous/anxious.      Objective:     Vital Signs (Most Recent):  Temp: 97.5 °F (36.4 °C)  (09/02/18 1114)  Pulse: 95 (09/02/18 1114)  Resp: 18 (09/02/18 1114)  BP: 99/66 (09/02/18 1114)  SpO2: (!) 93 % (09/02/18 1114) Vital Signs (24h Range):  Temp:  [97.2 °F (36.2 °C)-98.5 °F (36.9 °C)] 97.5 °F (36.4 °C)  Pulse:  [84-98] 95  Resp:  [17-20] 18  SpO2:  [90 %-95 %] 93 %  BP: ()/(52-73) 99/66     Weight: 82 kg (180 lb 12.4 oz)  Body mass index is 27.49 kg/m².    Intake/Output Summary (Last 24 hours) at 9/2/2018 1248  Last data filed at 9/2/2018 0900  Gross per 24 hour   Intake 1758.33 ml   Output 150 ml   Net 1608.33 ml      Physical Exam   Constitutional: He is oriented to person, place, and time. He appears well-developed and well-nourished.   HENT:   Head: Normocephalic and atraumatic.   Mouth/Throat: Oropharyngeal exudate present.   Eyes: Conjunctivae are normal. Pupils are equal, round, and reactive to light.   Neck: Neck supple. No JVD present. No tracheal deviation present. No thyromegaly present.   Cardiovascular: Normal rate, regular rhythm and normal heart sounds.   No murmur heard.  Pulmonary/Chest: Effort normal. He has decreased breath sounds. He has wheezes in the right lower field and the left lower field. He has no rhonchi. He has no rales.   Abdominal: Soft. Bowel sounds are normal.   Musculoskeletal: Normal range of motion. He exhibits no edema or tenderness.   Lymphadenopathy:     He has no cervical adenopathy.   Neurological: He is alert and oriented to person, place, and time.   Skin: Skin is warm and dry.   Nursing note and vitals reviewed.      Significant Labs: All pertinent labs within the past 24 hours have been reviewed.    Significant Imaging: I have reviewed all pertinent imaging results/findings within the past 24 hours.    Assessment/Plan:      * Metastatic lung cancer (metastasis from lung to other site), left    New diagnosis of metastatic left lung mass, likely primary  Discussed care with IR who recommended bronchoscopy.  Await pulmonology evaluation   Hold Shilpa    Consult heme/Onc- likely aggressive cancer such as SCLC  \Continue supplemental oxygen to maintain saturations greater than 90%.          Moderate malnutrition    Supplements when diet ordered           Acute respiratory failure with hypoxia    Home oxygen ordered   Cont O2  9/2  Increase oxygen requirements monitor over night         Paroxysmal atrial fibrillation    Currently rate controlled.  Continue diltiazem, home medication.  Hold Eliquis, patient reports last dose 08/30/2018.  9/2  RESUME ELIQUIS         Hyperlipidemia LDL goal <70    Continue home dose statin.          Hypertension goal BP (blood pressure) < 130/80    Continue ACEI, home dose.  Monitor closely and make adjustments as needed.        COPD (chronic obstructive pulmonary disease) with emphysema    Significanr emphysematous changes with blebs on CT  Cont Neb txs  Patient followed by Dr. Ibrahim in the pulmonary clinic as outpatient.  -continue with steroid taper and breATHING TREATMENTS           VTE Risk Mitigation (From admission, onward)        Ordered     apixaban tablet 5 mg  2 times daily      09/02/18 1214     Place sequential compression device  Until discontinued      08/31/18 0622              Satnam Barboza MD  Department of Hospital Medicine   [facility]

## 2018-09-02 NOTE — PROGRESS NOTES
[facility]   Pulmonology  Progress Note    Patient Name: Nico Garza Jr.  MRN: [id number]  Admission Date: 8/30/2018  Hospital Length of Stay: 2 days  Code Status: Full Code  Attending Provider: Satnam Barboza MD  Primary Care Provider: Tiffany Davis DO   Principal Problem: Metastatic lung cancer (metastasis from lung to other site), left    Subjective: still dyspnic     Interval History:     Objective:     Vital Signs (Most Recent):  Temp: 97.7 °F (36.5 °C) (09/02/18 0752)  Pulse: 98 (09/02/18 0910)  Resp: 18 (09/02/18 0910)  BP: 134/65 (09/02/18 0752)  SpO2: (!) 92 % (09/02/18 0910) Vital Signs (24h Range):  Temp:  [97.2 °F (36.2 °C)-98.5 °F (36.9 °C)] 97.7 °F (36.5 °C)  Pulse:  [] 98  Resp:  [18-20] 18  SpO2:  [90 %-95 %] 92 %  BP: (111-134)/(53-71) 134/65     Weight: 82 kg (180 lb 12.4 oz)  Body mass index is 27.49 kg/m².      Intake/Output Summary (Last 24 hours) at 9/2/2018 0914  Last data filed at 9/2/2018 0900  Gross per 24 hour   Intake 1758.33 ml   Output 150 ml   Net 1608.33 ml       Physical Exam   Constitutional: He is oriented to person, place, and time. He appears well-developed and well-nourished.   HENT:   Head: Normocephalic and atraumatic.   Mouth/Throat: Oropharyngeal exudate present.   Eyes: Conjunctivae are normal. Pupils are equal, round, and reactive to light.   Neck: Neck supple. No JVD present. No tracheal deviation present. No thyromegaly present.   Cardiovascular: Normal rate, regular rhythm and normal heart sounds.   No murmur heard.  Pulmonary/Chest: Effort normal. He has decreased breath sounds. He has wheezes in the right lower field and the left lower field. He has no rhonchi. He has no rales.   Abdominal: Soft. Bowel sounds are normal.   Musculoskeletal: Normal range of motion. He exhibits no edema or tenderness.   Lymphadenopathy:     He has no cervical adenopathy.   Neurological: He is alert and oriented to person, place, and time.   Skin: Skin is warm  and dry.   Nursing note and vitals reviewed.      Vents:  Oxygen Concentration (%): 36 (09/01/18 2100)    Lines/Drains/Airways     Peripheral Intravenous Line                 Peripheral IV - Single Lumen 09/02/18 0600 Anterior;Distal;Right Forearm less than 1 day                Significant Labs:    CBC/Anemia Profile:  Recent Labs   Lab  09/01/18   0443  09/02/18   0502   WBC  7.03  16.92*   HGB  14.7  14.0   HCT  44.7  42.2   PLT  328  364*   MCV  88  88   RDW  13.7  13.8        Chemistries:  Recent Labs   Lab  09/01/18   0443  09/02/18   0502   NA  141  138   K  4.3  4.0   CL  97  98   CO2  27  27   BUN  26*  27*   CREATININE  1.2  1.1   CALCIUM  9.7  9.6   ALBUMIN  3.1*  3.2*   PROT  8.0  7.6   BILITOT  0.5  0.3   ALKPHOS  121  102   ALT  16  16   AST  13  12       ABGs: No results for input(s): PH, PCO2, HCO3, POCSATURATED, BE in the last 48 hours.  BMP:   Recent Labs   Lab  09/02/18   0502   GLU  328*   NA  138   K  4.0   CL  98   CO2  27   BUN  27*   CREATININE  1.1   CALCIUM  9.6     CMP:   Recent Labs   Lab  09/01/18   0443  09/02/18   0502   NA  141  138   K  4.3  4.0   CL  97  98   CO2  27  27   GLU  307*  328*   BUN  26*  27*   CREATININE  1.2  1.1   CALCIUM  9.7  9.6   PROT  8.0  7.6   ALBUMIN  3.1*  3.2*   BILITOT  0.5  0.3   ALKPHOS  121  102   AST  13  12   ALT  16  16   ANIONGAP  17*  13   EGFRNONAA  >60  >60     All pertinent labs within the past 24 hours have been reviewed.    Significant Imaging:  CXR: I have reviewed all pertinent results/findings within the past 24 hours and my personal findings are:  elevated diapragm , no pneumothorax    Assessment/Plan:     Mass of left lung    9/2 review bronchoscopy results as outpatient        Left diaphragmatic paralysis    8/31 sniff test  9/2 Paralyzed diaphragm - needs to go home with nebulizer and oxygen        Acute respiratory failure with hypoxia    8/31 supplemental oxygen , jet nebs, iv steroids  9/2 needs oxygen for home use          Follow up  in pulmonary office in 5 days:  Suggested discharge regime:  Nebulizer for home use  Albuterol/Iprartroprium (duoneb) jet nebulizer treatment QID  Budesonide (Pulmicort) jet nebulizer BID  Prednisone taper: Prednisone 60 mg/ day for 3 days, 40 mg/day for 3 days,20 mg/ day for 3 days, 10 mg a day for 3 days.  Supplemental oxygen  Antibiotic: levofloxin    Thanks    Aldair Deleon MD  Pulmonary / Critical Care Medicine             Aldair Deleon MD  Pulmonology  [facility]

## 2018-09-02 NOTE — PLAN OF CARE
Chest x ray post procedure was viewed by Dr TERESA Deleon.  No pneumothorax was noted by Dr Deleon.

## 2018-09-02 NOTE — PROCEDURES
"Nico Garza Jr. is a 72 y.o. male patient.    Temp: 97.7 °F (36.5 °C) (09/02/18 0752)  Pulse: 95 (09/02/18 0752)  Resp: 18 (09/02/18 0752)  BP: 134/65 (09/02/18 0752)  SpO2: (!) 94 % (09/02/18 0752)  Weight: 82 kg (180 lb 12.4 oz) (09/02/18 0752)  Height: 5' 8" (172.7 cm) (09/02/18 0752)  Mallampati Scale: Class II  ASA Classification: Class 2    Procedures      [facility]   Bronchoscopy   Procedure Note    SUMMARY     Date of Procedure: 9/2/2018    Procedure: Flexible fiberoptic bronchoscopy, diagnostic Collected washings, transbronchial fluoroscopically guided brushings, transbronchial biopsy of left lower lobe  Transbronchial carinal lymph node aspiration.      Provider: Aldair Deleon    Assisting Provider: none    Pre-Procedure Diagnosis: Paralyzed left diaphragm. left lower lobe mass, mediastinal lymphadenopathy    Post-Procedure Diagnosis: no endobronchial lesions, acute and chronic bronchitis, atelectasis left lower lobe     Anesthesia: Monitored Local Anesthesia with Sedation    Technical Procedures Used: versed and fentanyl    Indications and History:    The patient is a 72 y.o. male with paralyzed left diaphragm, left lower lobe mass and mediastinal lymphadenopathy.  The risks, benefits, complications, treatment options and expected outcomes were discussed with the patient.  The possibilities of reaction to medication, pulmonary aspiration, perforation of a viscus, bleeding, failure to diagnose a condition and creating a complication requiring transfusion or operation were discussed with the patient who freely signed the consent.      Description of the Findings of the Procedure:    The patient was seen in the Holding Room and the site of surgery properly noted/marked.  The patient was taken to endoscopy, identified as Nico Garza Jr. and the procedure verified as Flexible Fiberoptic Bronchoscopy.  A Time Out was held and the above information confirmed.     After the induction of " topical nasopharyngeal anesthesia, the patient was positioned supine and the bronchoscope was passed through the oral cavity bite block . The vocal cords were visualized and  1% plain lidocaine 2 ml was topically placed onto the cords. The cords were normal. The scope was then passed into the trachea.  1% plain lidocaine 2 ml was used topically on the su.  Careful inspection of the tracheal lumen was accomplished. The scope was sequentially passed into the left main and then left upper and lower bronchi and segmental bronchi. Diagnostic Collected washings, transbronchial fluoroscopically guided brushings of left lower lobe , transbronchial biopsy of the left lower lobe were performed.    Transbronchial carinal lymph node aspiration ( sent for cytology) was also obtained ( two passes).    The scope was then withdrawn and advanced into the right main bronchus and then into the RUL, RML, and RLL bronchi and segmental bronchi.       Endobronchial findings: acute and chronic bronchitis  Trachea: Normal mucosa  Su: Normal mucosa  Right main bronchus: Normal mucosa  Right upper lobe bronchus: Normal mucosa  Right middle lobe bronchus: Normal mucosa  Right lower lobe bronchus: Normal mucosa  Left main bronchus: Normal mucosa  Left upper lobe bronchus: Normal mucosa  Left lower lobe bronchus: Edematous mucosa and Collapse with mucus plugging    The Patient was taken to the Endoscopy Recovery area in satisfactory condition.    Significant Surgical Tasks Conducted by the Assistant(s), if Applicable: aspiration of carinal lymph node via HERNANDEZ needle    Complications: none    Estimated Blood Loss (EBL): Minimal    Drains: na    Implants: na    Specimens: diagnostic Collected washings, transbronchial fluoroscopically guided brushings for cytology, transbronchial biopsy for histopathology. Transbronchial carinal lymph node aspiration sent for cytology.        Condition: Good     Disposition: endoscopy  recovery    Attestation: I performed the procedure.    Aldair Deleon  9/2/2018

## 2018-09-02 NOTE — PROGRESS NOTES
Post Procedure Progress Note:  Vital signs stable but  O2 sat is down requiring rebreather mask  Duoneb given in recovery  Personal review of post procedure Chest X Ray:  No evidence of pneumothorax  Radiologist report pending.    Patient stable.  Continue with post procedure monitoring and discharge/transfer plans.    Aldair Deleon MD  Pulmonary / Critical Care Medicine

## 2018-09-02 NOTE — PROGRESS NOTES
I was unable to see the patient this morning because he was undergoing bronchoscopy.  We will arrange follow-up in Hematology/Oncology Clinic to discuss final pathology and treatment plan if the patient is discharged later today.  MRI demonstrated no evidence of brain metastasis in we will arrange PET scan as an outpatient.

## 2018-09-02 NOTE — ANESTHESIA RELEASE NOTE
"Anesthesia Release from PACU Note    Patient: Nico Garza Jr.    Procedure(s) Performed: Procedure(s) (LRB):  BRONCHOSCOPY- insert lighted tube into airway to obtain biopsy of lung (Bilateral)    Anesthesia type: MAC    Post pain: Adequate analgesia    Post assessment: no apparent anesthetic complications, tolerated procedure well and no evidence of recall    Last Vitals:   Visit Vitals  /65 (BP Location: Left arm, Patient Position: Lying)   Pulse 98   Temp 36.5 °C (97.7 °F) (Axillary)   Resp 18   Ht 5' 8" (1.727 m)   Wt 82 kg (180 lb 12.4 oz)   SpO2 (!) 92%   BMI 27.49 kg/m²       Post vital signs: stable    Level of consciousness: awake, alert  and oriented    Nausea/Vomiting: no nausea/no vomiting    Complications: none    Airway Patency: patent    Respiratory: unassisted, spontaneous ventilation    Cardiovascular: stable and blood pressure at baseline    Hydration: euvolemic  "

## 2018-09-02 NOTE — PROGRESS NOTES

## 2018-09-02 NOTE — ANESTHESIA POSTPROCEDURE EVALUATION
"Anesthesia Post Evaluation    Patient: Nico Garza Jr.    Procedure(s) Performed: Procedure(s) (LRB):  BRONCHOSCOPY- insert lighted tube into airway to obtain biopsy of lung (Bilateral)    Final Anesthesia Type: MAC  Patient location during evaluation: GI PACU  Patient participation: Yes- Able to Participate  Level of consciousness: awake and alert  Post-procedure vital signs: reviewed and stable  Pain management: adequate  Airway patency: patent  PONV status at discharge: No PONV  Anesthetic complications: no      Cardiovascular status: blood pressure returned to baseline  Respiratory status: unassisted and spontaneous ventilation  Hydration status: euvolemic  Follow-up not needed.        Visit Vitals  /65 (BP Location: Left arm, Patient Position: Lying)   Pulse 98   Temp 36.5 °C (97.7 °F) (Axillary)   Resp 18   Ht 5' 8" (1.727 m)   Wt 82 kg (180 lb 12.4 oz)   SpO2 (!) 92%   BMI 27.49 kg/m²       Pain/Arnaldo Score: Pain Assessment Performed: Yes (9/2/2018  5:00 AM)  Presence of Pain: denies (9/2/2018  5:00 AM)  Pain Rating Prior to Med Admin: 6 (9/1/2018  8:23 PM)  Pain Rating Post Med Admin: 0 (9/1/2018  9:23 PM)        "

## 2018-09-02 NOTE — ASSESSMENT & PLAN NOTE
Currently rate controlled.  Continue diltiazem, home medication.  Hold Eliquis, patient reports last dose 08/30/2018.  9/2  RESUME ELIQUIS

## 2018-09-02 NOTE — PROGRESS NOTES
Mr. Garza returned to tele room 251.  Cardiac monitoring continued.  VS taken.  Continue to monitor.

## 2018-09-02 NOTE — TRANSFER OF CARE
"Anesthesia Transfer of Care Note    Patient: Nico Garza Jr.    Procedure(s) Performed: Procedure(s) (LRB):  BRONCHOSCOPY- insert lighted tube into airway to obtain biopsy of lung (Bilateral)    Patient location: GI    Anesthesia Type: MAC    Transport from OR: Transported from OR on 6-10 L/min O2 by face mask with adequate spontaneous ventilation    Post pain: adequate analgesia    Post assessment: no apparent anesthetic complications    Post vital signs: stable    Level of consciousness: awake, alert and oriented    Nausea/Vomiting: no nausea/vomiting    Complications: none    Transfer of care protocol was followed      Last vitals:   Visit Vitals  /65 (BP Location: Left arm, Patient Position: Lying)   Pulse 98   Temp 36.5 °C (97.7 °F) (Axillary)   Resp 18   Ht 5' 8" (1.727 m)   Wt 82 kg (180 lb 12.4 oz)   SpO2 (!) 92%   BMI 27.49 kg/m²     "

## 2018-09-02 NOTE — ASSESSMENT & PLAN NOTE
Significanr emphysematous changes with blebs on CT  Cont Neb txs  Patient followed by Dr. Ibrahim in the pulmonary clinic as outpatient.  -continue with steroid taper and breATHING TREATMENTS

## 2018-09-02 NOTE — SUBJECTIVE & OBJECTIVE
Interval History:     Objective:     Vital Signs (Most Recent):  Temp: 97.7 °F (36.5 °C) (09/02/18 0752)  Pulse: 98 (09/02/18 0910)  Resp: 18 (09/02/18 0910)  BP: 134/65 (09/02/18 0752)  SpO2: (!) 92 % (09/02/18 0910) Vital Signs (24h Range):  Temp:  [97.2 °F (36.2 °C)-98.5 °F (36.9 °C)] 97.7 °F (36.5 °C)  Pulse:  [] 98  Resp:  [18-20] 18  SpO2:  [90 %-95 %] 92 %  BP: (111-134)/(53-71) 134/65     Weight: 82 kg (180 lb 12.4 oz)  Body mass index is 27.49 kg/m².      Intake/Output Summary (Last 24 hours) at 9/2/2018 0914  Last data filed at 9/2/2018 0900  Gross per 24 hour   Intake 1758.33 ml   Output 150 ml   Net 1608.33 ml       Physical Exam   Constitutional: He is oriented to person, place, and time. He appears well-developed and well-nourished.   HENT:   Head: Normocephalic and atraumatic.   Mouth/Throat: Oropharyngeal exudate present.   Eyes: Conjunctivae are normal. Pupils are equal, round, and reactive to light.   Neck: Neck supple. No JVD present. No tracheal deviation present. No thyromegaly present.   Cardiovascular: Normal rate, regular rhythm and normal heart sounds.   No murmur heard.  Pulmonary/Chest: Effort normal. He has decreased breath sounds. He has wheezes in the right lower field and the left lower field. He has no rhonchi. He has no rales.   Abdominal: Soft. Bowel sounds are normal.   Musculoskeletal: Normal range of motion. He exhibits no edema or tenderness.   Lymphadenopathy:     He has no cervical adenopathy.   Neurological: He is alert and oriented to person, place, and time.   Skin: Skin is warm and dry.   Nursing note and vitals reviewed.      Vents:  Oxygen Concentration (%): 36 (09/01/18 2100)    Lines/Drains/Airways     Peripheral Intravenous Line                 Peripheral IV - Single Lumen 09/02/18 0600 Anterior;Distal;Right Forearm less than 1 day                Significant Labs:    CBC/Anemia Profile:  Recent Labs   Lab  09/01/18   0443  09/02/18   0502   WBC  7.03  16.92*    HGB  14.7  14.0   HCT  44.7  42.2   PLT  328  364*   MCV  88  88   RDW  13.7  13.8        Chemistries:  Recent Labs   Lab  09/01/18   0443  09/02/18   0502   NA  141  138   K  4.3  4.0   CL  97  98   CO2  27  27   BUN  26*  27*   CREATININE  1.2  1.1   CALCIUM  9.7  9.6   ALBUMIN  3.1*  3.2*   PROT  8.0  7.6   BILITOT  0.5  0.3   ALKPHOS  121  102   ALT  16  16   AST  13  12       ABGs: No results for input(s): PH, PCO2, HCO3, POCSATURATED, BE in the last 48 hours.  BMP:   Recent Labs   Lab  09/02/18   0502   GLU  328*   NA  138   K  4.0   CL  98   CO2  27   BUN  27*   CREATININE  1.1   CALCIUM  9.6     CMP:   Recent Labs   Lab  09/01/18   0443  09/02/18   0502   NA  141  138   K  4.3  4.0   CL  97  98   CO2  27  27   GLU  307*  328*   BUN  26*  27*   CREATININE  1.2  1.1   CALCIUM  9.7  9.6   PROT  8.0  7.6   ALBUMIN  3.1*  3.2*   BILITOT  0.5  0.3   ALKPHOS  121  102   AST  13  12   ALT  16  16   ANIONGAP  17*  13   EGFRNONAA  >60  >60     All pertinent labs within the past 24 hours have been reviewed.    Significant Imaging:  CXR: I have reviewed all pertinent results/findings within the past 24 hours and my personal findings are:  elevated diapragm , no pneumothorax

## 2018-09-02 NOTE — PLAN OF CARE
Problem: Patient Care Overview  Goal: Plan of Care Review  Outcome: Ongoing (interventions implemented as appropriate)  Remains free from harm, pain controled via PO pain meds, NPO for AM procedure, no acute distress noted. 24 hour chart check complete.

## 2018-09-02 NOTE — PROGRESS NOTES
Patient given a sip of water - no problems swallowing.  Give a tablespoon of water - no problem swallowing.   Patient took morning medicine without any problems.    Continue to monitor.

## 2018-09-02 NOTE — SUBJECTIVE & OBJECTIVE
Review of Systems   Constitutional: Positive for unexpected weight change (Weight loss). Negative for activity change, appetite change, chills, diaphoresis, fatigue and fever.   HENT: Negative for congestion, nosebleeds, sore throat and trouble swallowing.    Eyes: Negative for pain, discharge and visual disturbance.   Respiratory: Positive for shortness of breath. Negative for apnea, cough, choking, chest tightness, wheezing and stridor.    Cardiovascular: Positive for chest pain. Negative for palpitations and leg swelling.   Gastrointestinal: Positive for nausea. Negative for abdominal distention, abdominal pain, anal bleeding, blood in stool, constipation, diarrhea, rectal pain and vomiting.   Endocrine: Negative for cold intolerance and heat intolerance.   Genitourinary: Negative for difficulty urinating, dysuria, flank pain, frequency and urgency.   Musculoskeletal: Positive for back pain. Negative for arthralgias, gait problem, joint swelling, myalgias, neck pain and neck stiffness.   Skin: Negative for rash and wound.   Allergic/Immunologic: Negative for food allergies and immunocompromised state.   Neurological: Negative for dizziness, seizures, syncope, facial asymmetry, weakness, light-headedness and headaches.   Hematological: Negative for adenopathy.   Psychiatric/Behavioral: Negative for agitation, behavioral problems and confusion. The patient is not nervous/anxious.      Objective:     Vital Signs (Most Recent):  Temp: 97.5 °F (36.4 °C) (09/02/18 1114)  Pulse: 95 (09/02/18 1114)  Resp: 18 (09/02/18 1114)  BP: 99/66 (09/02/18 1114)  SpO2: (!) 93 % (09/02/18 1114) Vital Signs (24h Range):  Temp:  [97.2 °F (36.2 °C)-98.5 °F (36.9 °C)] 97.5 °F (36.4 °C)  Pulse:  [84-98] 95  Resp:  [17-20] 18  SpO2:  [90 %-95 %] 93 %  BP: ()/(52-73) 99/66     Weight: 82 kg (180 lb 12.4 oz)  Body mass index is 27.49 kg/m².    Intake/Output Summary (Last 24 hours) at 9/2/2018 8558  Last data filed at 9/2/2018  0900  Gross per 24 hour   Intake 1758.33 ml   Output 150 ml   Net 1608.33 ml      Physical Exam   Constitutional: He is oriented to person, place, and time. He appears well-developed and well-nourished.   HENT:   Head: Normocephalic and atraumatic.   Mouth/Throat: Oropharyngeal exudate present.   Eyes: Conjunctivae are normal. Pupils are equal, round, and reactive to light.   Neck: Neck supple. No JVD present. No tracheal deviation present. No thyromegaly present.   Cardiovascular: Normal rate, regular rhythm and normal heart sounds.   No murmur heard.  Pulmonary/Chest: Effort normal. He has decreased breath sounds. He has wheezes in the right lower field and the left lower field. He has no rhonchi. He has no rales.   Abdominal: Soft. Bowel sounds are normal.   Musculoskeletal: Normal range of motion. He exhibits no edema or tenderness.   Lymphadenopathy:     He has no cervical adenopathy.   Neurological: He is alert and oriented to person, place, and time.   Skin: Skin is warm and dry.   Nursing note and vitals reviewed.      Significant Labs: All pertinent labs within the past 24 hours have been reviewed.    Significant Imaging: I have reviewed all pertinent imaging results/findings within the past 24 hours.

## 2018-09-03 VITALS
RESPIRATION RATE: 18 BRPM | OXYGEN SATURATION: 89 % | WEIGHT: 185.19 LBS | SYSTOLIC BLOOD PRESSURE: 123 MMHG | BODY MASS INDEX: 28.07 KG/M2 | TEMPERATURE: 98 F | HEIGHT: 68 IN | DIASTOLIC BLOOD PRESSURE: 70 MMHG | HEART RATE: 83 BPM

## 2018-09-03 LAB
ALBUMIN SERPL BCP-MCNC: 3.2 G/DL
ALP SERPL-CCNC: 95 U/L
ALT SERPL W/O P-5'-P-CCNC: 20 U/L
ANION GAP SERPL CALC-SCNC: 13 MMOL/L
AST SERPL-CCNC: 12 U/L
BASOPHILS # BLD AUTO: 0.01 K/UL
BASOPHILS NFR BLD: 0.1 %
BILIRUB SERPL-MCNC: 0.3 MG/DL
BUN SERPL-MCNC: 31 MG/DL
CALCIUM SERPL-MCNC: 9.3 MG/DL
CHLORIDE SERPL-SCNC: 98 MMOL/L
CO2 SERPL-SCNC: 26 MMOL/L
CREAT SERPL-MCNC: 1.1 MG/DL
DIFFERENTIAL METHOD: ABNORMAL
EOSINOPHIL # BLD AUTO: 0 K/UL
EOSINOPHIL NFR BLD: 0 %
ERYTHROCYTE [DISTWIDTH] IN BLOOD BY AUTOMATED COUNT: 13.8 %
EST. GFR  (AFRICAN AMERICAN): >60 ML/MIN/1.73 M^2
EST. GFR  (NON AFRICAN AMERICAN): >60 ML/MIN/1.73 M^2
GLUCOSE SERPL-MCNC: 344 MG/DL
HCT VFR BLD AUTO: 41.9 %
HGB BLD-MCNC: 13.5 G/DL
LYMPHOCYTES # BLD AUTO: 0.4 K/UL
LYMPHOCYTES NFR BLD: 2.5 %
MCH RBC QN AUTO: 28.7 PG
MCHC RBC AUTO-ENTMCNC: 32.2 G/DL
MCV RBC AUTO: 89 FL
MONOCYTES # BLD AUTO: 0.5 K/UL
MONOCYTES NFR BLD: 3.3 %
NEUTROPHILS # BLD AUTO: 14.5 K/UL
NEUTROPHILS NFR BLD: 94.1 %
PLATELET # BLD AUTO: 348 K/UL
PMV BLD AUTO: 9.5 FL
POCT GLUCOSE: 280 MG/DL (ref 70–110)
POCT GLUCOSE: 357 MG/DL (ref 70–110)
POCT GLUCOSE: 365 MG/DL (ref 70–110)
POTASSIUM SERPL-SCNC: 4.6 MMOL/L
PROT SERPL-MCNC: 7.2 G/DL
RBC # BLD AUTO: 4.7 M/UL
SODIUM SERPL-SCNC: 137 MMOL/L
WBC # BLD AUTO: 15.38 K/UL

## 2018-09-03 PROCEDURE — 99233 SBSQ HOSP IP/OBS HIGH 50: CPT | Mod: ,,, | Performed by: INTERNAL MEDICINE

## 2018-09-03 PROCEDURE — 25000003 PHARM REV CODE 250: Performed by: EMERGENCY MEDICINE

## 2018-09-03 PROCEDURE — 36415 COLL VENOUS BLD VENIPUNCTURE: CPT

## 2018-09-03 PROCEDURE — 85025 COMPLETE CBC W/AUTO DIFF WBC: CPT

## 2018-09-03 PROCEDURE — 25000242 PHARM REV CODE 250 ALT 637 W/ HCPCS: Performed by: INTERNAL MEDICINE

## 2018-09-03 PROCEDURE — 94640 AIRWAY INHALATION TREATMENT: CPT

## 2018-09-03 PROCEDURE — 94761 N-INVAS EAR/PLS OXIMETRY MLT: CPT

## 2018-09-03 PROCEDURE — 80053 COMPREHEN METABOLIC PANEL: CPT

## 2018-09-03 PROCEDURE — 25000003 PHARM REV CODE 250: Performed by: INTERNAL MEDICINE

## 2018-09-03 PROCEDURE — 63600175 PHARM REV CODE 636 W HCPCS: Performed by: INTERNAL MEDICINE

## 2018-09-03 PROCEDURE — 27000221 HC OXYGEN, UP TO 24 HOURS

## 2018-09-03 RX ORDER — PREDNISONE 20 MG/1
20 TABLET ORAL DAILY
Qty: 21 TABLET | Refills: 0 | Status: ON HOLD | OUTPATIENT
Start: 2018-09-03 | End: 2018-09-25 | Stop reason: HOSPADM

## 2018-09-03 RX ORDER — ALBUTEROL SULFATE 1.25 MG/3ML
1.25 SOLUTION RESPIRATORY (INHALATION) EVERY 6 HOURS PRN
Qty: 1 BOX | Refills: 0 | Status: SHIPPED | OUTPATIENT
Start: 2018-09-03 | End: 2018-09-03 | Stop reason: HOSPADM

## 2018-09-03 RX ORDER — IPRATROPIUM BROMIDE AND ALBUTEROL SULFATE 2.5; .5 MG/3ML; MG/3ML
3 SOLUTION RESPIRATORY (INHALATION) EVERY 6 HOURS
Qty: 360 ML | Refills: 1 | Status: SHIPPED | OUTPATIENT
Start: 2018-09-03 | End: 2018-11-02

## 2018-09-03 RX ORDER — HYDROCODONE BITARTRATE AND ACETAMINOPHEN 5; 325 MG/1; MG/1
1 TABLET ORAL EVERY 8 HOURS PRN
Qty: 42 TABLET | Refills: 0 | Status: SHIPPED | OUTPATIENT
Start: 2018-09-03 | End: 2018-09-17

## 2018-09-03 RX ADMIN — IPRATROPIUM BROMIDE AND ALBUTEROL SULFATE 3 ML: .5; 3 SOLUTION RESPIRATORY (INHALATION) at 12:09

## 2018-09-03 RX ADMIN — INSULIN ASPART 5 UNITS: 100 INJECTION, SOLUTION INTRAVENOUS; SUBCUTANEOUS at 12:09

## 2018-09-03 RX ADMIN — APIXABAN 5 MG: 2.5 TABLET, FILM COATED ORAL at 08:09

## 2018-09-03 RX ADMIN — IPRATROPIUM BROMIDE AND ALBUTEROL SULFATE 3 ML: .5; 3 SOLUTION RESPIRATORY (INHALATION) at 07:09

## 2018-09-03 RX ADMIN — HYDROCODONE BITARTRATE AND ACETAMINOPHEN 1 TABLET: 5; 325 TABLET ORAL at 01:09

## 2018-09-03 RX ADMIN — DILTIAZEM HYDROCHLORIDE 360 MG: 180 CAPSULE, COATED, EXTENDED RELEASE ORAL at 08:09

## 2018-09-03 RX ADMIN — INSULIN ASPART 3 UNITS: 100 INJECTION, SOLUTION INTRAVENOUS; SUBCUTANEOUS at 08:09

## 2018-09-03 RX ADMIN — INSULIN DETEMIR 25 UNITS: 100 INJECTION, SOLUTION SUBCUTANEOUS at 08:09

## 2018-09-03 RX ADMIN — METHYLPREDNISOLONE SODIUM SUCCINATE 40 MG: 40 INJECTION, POWDER, FOR SOLUTION INTRAMUSCULAR; INTRAVENOUS at 08:09

## 2018-09-03 RX ADMIN — INSULIN ASPART 3 UNITS: 100 INJECTION, SOLUTION INTRAVENOUS; SUBCUTANEOUS at 01:09

## 2018-09-03 RX ADMIN — FLECAINIDE ACETATE 100 MG: 50 TABLET ORAL at 08:09

## 2018-09-03 RX ADMIN — PANTOPRAZOLE SODIUM 40 MG: 40 TABLET, DELAYED RELEASE ORAL at 08:09

## 2018-09-03 NOTE — ASSESSMENT & PLAN NOTE
Left lower lung mass with extensive mediastinal lymphadenopathy, left adrenal nodule, and L1 lytic lesion consistent with metastatic lung cancer.     patient is status post bronchoscopy on 09/02/2018 with final pathology pending.   The patient will follow up as an outpatient to review final tissue diagnosis and plan to proceed with PET scan to complete staging.      --  Follow-up as an outpatient to discuss final pathology  -- will need PET scan as an outpatient to complete staging

## 2018-09-03 NOTE — DISCHARGE INSTRUCTIONS
Take medications as directed by your physician.  Make sure you keep all of you follow up appointments.

## 2018-09-03 NOTE — PROGRESS NOTES
"  [facility] -   Adult Nutrition  Progress Note    SUMMARY     Recommendations    Recommendation/Intervention:   1.Continue current diet order  2. Will continue to monitor.     Goals: 1) Diet advancement within 72 hrs   2) Meal intake >50% at all meals  Nutrition Goal Status: 1) goal met   2) new  Communication of RD Recs: POC review    Reason for Assessment    Reason for Assessment: RD f/u  Dx:  1. Pulmonary emphysema, unspecified emphysema type    2. Chest pain    3. Mass of left lung    4. Hypoxia    5. Metastatic lung cancer (metastasis from lung to other site), left    6. Mediastinal lymphadenopathy    7. Mass of lower lobe of left lung    8. Panlobular emphysema    9. Diaphragmatic paralysis      Past Medical History:   Diagnosis Date    Arthritis     Atrial fibrillation and flutter     Atrial flutter     COPD (chronic obstructive pulmonary disease)     DM (diabetes mellitus) 1996    Hyperlipidemia     Hypertension     Lung disease     Neuropathy, diabetic     Pancreatitis        General Information Comments: Pt currently NPO x biopsy. Pt w/ suspected metastatic lung cancer. Pt reports weight loss of 14 lbs within the last month due to decreased appetite and intake (PO intake ~ 50 % x 1 month). Per physical assessment, mild subcutaneous fat and muscle loss.     9.3.18- Pt now on ADA 2000 diet, tolerating well. No reports of GI complaints. Per MD note, suspected metastatic lung carcinoma.     Nutrition Discharge Planning: Diabetic diet    Nutrition Risk Screen    Nutrition Risk Screen: no indicators present    Nutrition/Diet History    Do you have any cultural, spiritual, Cheondoism conflicts, given your current situation?: none    Anthropometrics    Temp: 97.9 °F (36.6 °C)  Height Method: Stated  Height: 5' 8" (172.7 cm)  Height (inches): 68 in  Weight Method: Bed Scale  Weight: 84 kg (185 lb 3 oz)  Weight (lb): 185.19 lb  Ideal Body Weight (IBW), Male: 154 lb  % Ideal Body Weight, Male " (lb): 120.25 lb  BMI (Calculated): 28.2  BMI Grade: 25 - 29.9 - overweight  Usual Body Weight (UBW), k.9 kg  % Usual Body Weight: 93.21  % Weight Change From Usual Weight: -6.99 %       Lab/Procedures/Meds    Pertinent Labs Reviewed: reviewed  BMP  Lab Results   Component Value Date     2018    K 4.6 2018    CL 98 2018    CO2 26 2018    BUN 31 (H) 2018    CREATININE 1.1 2018    CALCIUM 9.3 2018    ANIONGAP 13 2018    ESTGFRAFRICA >60 2018    EGFRNONAA >60 2018     Lab Results   Component Value Date    CALCIUM 9.3 2018    PHOS 3.5 2015     Lab Results   Component Value Date    ALBUMIN 3.2 (L) 2018     ,  Recent Labs   Lab  18   0717   POCTGLUCOSE  280*       Pertinent Medications Reviewed: reviewed    Physical Findings/Assessment    Overall Physical Appearance: (mild subcutaneous fat and muscle loss)  Oral/Mouth Cavity: (WDL)  Skin: (Thais score 21)    Estimated/Assessed Needs    Weight Used For Calorie Calculations: 84.5 kg (186 lb 4.6 oz)  Energy Calorie Requirements (kcal): 2535  Energy Need Method: Kcal/kg  Protein Requirements: 101 g   Weight Used For Protein Calculations: 84.5 kg (186 lb 4.6 oz)     Fluid Need Method: RDA Method(or per MD)  RDA Method (mL): 2535  CHO Requirement: 50 % EEN      Nutrition Prescription Ordered    Current Diet Order: ADA     Evaluation of Received Nutrient/Fluid Intake        No intake or output data in the 24 hours ending 18 1012    % Intake of Estimated Energy Needs: 50 - 75 %  % Meal Intake: 50 - 75 %    Nutrition Risk    Level of Risk/Frequency of Follow-up: (2xweekly)     Assessment and Plan    Moderate malnutrition      Malnutrition in the context of Chronic Illness/Injury    Related to (etiology):  Inadequate energy intake     Signs and Symptoms (as evidenced by):  Energy Intake: <75% of estimated energy requirement for 1 month   Body Fat Depletion: mild depletion of  orbitals, triceps and thoracic and lumbar region   Muscle Mass Depletion: mild depletion of temples, clavicle region and scapular region   Weight Loss: 7 % within the last month     Interventions/Recommendations (treatment strategy):  See above     Nutrition Diagnosis Status:  Continues             Monitor and Evaluation    Food and Nutrient Intake: energy intake, food and beverage intake  Food and Nutrient Adminstration: diet order  Anthropometric Measurements: weight  Biochemical Data, Medical Tests and Procedures: electrolyte and renal panel, glucose/endocrine profile  Nutrition-Focused Physical Findings: overall appearance     Nutrition Follow-Up    RD Follow-up?: Yes

## 2018-09-03 NOTE — PROGRESS NOTES
[facility] -   Hematology/Oncology  Progress Note    Patient Name: Nico Garza Jr.  Admission Date: 8/30/2018  Hospital Length of Stay: 3 days  Code Status: Full Code     Subjective:     HPI:  72-year-old gentleman with significant history for smoking/COPD, AFib on Eliquis, diabetes mellitus, hypertension, dyslipidemia, who presented to the emergency room with progressive chest pain/shortness of breath.  He underwent CTA to evaluate for pulmonary embolism which demonstrated large left lung mass with extensive mediastinal lymphadenopathy and left adrenal nodule and lytic lesion of L1.    Pulmonary has been consulted and plans to proceed with bronchoscopy on Sunday 09/02/2018.      Interval History:    patient is status post bronchoscopy on 09/02/2018    Oncology Treatment Plan:   [No treatment plan]    Medications:  Continuous Infusions:  Scheduled Meds:   albuterol-ipratropium  3 mL Nebulization Q6H    apixaban  5 mg Oral BID    diltiaZEM  360 mg Oral Daily    flecainide  100 mg Oral Q12H    insulin detemir U-100  30 Units Subcutaneous BID    methylPREDNISolone sodium succinate  40 mg Intravenous BID    pantoprazole  40 mg Oral Daily    pravastatin  40 mg Oral QHS    quinapril  40 mg Oral QHS     PRN Meds:acetaminophen, aluminum-magnesium hydroxide-simethicone, dextrose 50%, glucagon (human recombinant), guaifenesin 100 mg/5 ml, HYDROcodone-acetaminophen, insulin aspart U-100, morphine, zolpidem     Review of Systems   Constitutional: Positive for unexpected weight change (Weight loss). Negative for activity change, appetite change, chills, diaphoresis, fatigue and fever.   HENT: Negative for congestion, dental problem, drooling, ear discharge, nosebleeds, sore throat and trouble swallowing.    Eyes: Negative.    Respiratory: Positive for shortness of breath. Negative for apnea, cough, choking, chest tightness, wheezing and stridor.    Cardiovascular: Positive for chest pain. Negative for  palpitations and leg swelling.   Gastrointestinal: Positive for nausea. Negative for abdominal distention, abdominal pain, anal bleeding, blood in stool, constipation, diarrhea, rectal pain and vomiting.   Endocrine: Negative.    Genitourinary: Negative for difficulty urinating, dysuria, flank pain, frequency and urgency.   Musculoskeletal: Positive for back pain. Negative for arthralgias, gait problem, joint swelling, myalgias, neck pain and neck stiffness.   Skin: Negative for color change, pallor and rash.   Allergic/Immunologic: Negative.    Neurological: Negative for dizziness, facial asymmetry, light-headedness, numbness and headaches.   Hematological: Negative for adenopathy.   Psychiatric/Behavioral: Negative for agitation, behavioral problems and confusion. The patient is not nervous/anxious.      Objective:     Vital Signs (Most Recent):  Temp: 97.9 °F (36.6 °C) (09/03/18 0810)  Pulse: 79 (09/03/18 0810)  Resp: 18 (09/03/18 0810)  BP: (!) 120/57 (09/03/18 0810)  SpO2: (!) 91 % (09/03/18 0810) Vital Signs (24h Range):  Temp:  [97.5 °F (36.4 °C)-98.6 °F (37 °C)] 97.9 °F (36.6 °C)  Pulse:  [] 79  Resp:  [16-22] 18  SpO2:  [87 %-93 %] 91 %  BP: ()/(52-73) 120/57     Weight: 84 kg (185 lb 3 oz)  Body mass index is 28.16 kg/m².  Body surface area is 2.01 meters squared.    No intake or output data in the 24 hours ending 09/03/18 0917    Physical Exam   Constitutional: He is oriented to person, place, and time. He appears well-developed and well-nourished. No distress.   HENT:   Head: Normocephalic and atraumatic.   Nose: Nose normal.   Mouth/Throat: Oropharynx is clear and moist. No oropharyngeal exudate.   Eyes: Conjunctivae and EOM are normal. Pupils are equal, round, and reactive to light. Right eye exhibits no discharge. Left eye exhibits no discharge. No scleral icterus.   Neck: Normal range of motion. Neck supple. No JVD present. No tracheal deviation present. No thyromegaly present.    Cardiovascular: Normal rate and regular rhythm. Exam reveals no gallop and no friction rub.   No murmur heard.  Pulmonary/Chest: Effort normal. No stridor. No respiratory distress. He has decreased breath sounds. He has wheezes. He has rales. He exhibits no tenderness.   Abdominal: Soft. Bowel sounds are normal. He exhibits no distension and no mass. There is no tenderness. There is no rebound.   Musculoskeletal: Normal range of motion. He exhibits no edema.   Lymphadenopathy:     He has no cervical adenopathy.   Neurological: He is alert and oriented to person, place, and time. No cranial nerve deficit. Coordination normal.   Skin: Skin is warm and dry. No rash noted. He is not diaphoretic.   Psychiatric: He has a normal mood and affect. Thought content normal.   Vitals reviewed.      Significant Labs:   CBC:   Recent Labs   Lab  09/02/18   0502  09/03/18   0435   WBC  16.92*  15.38*   HGB  14.0  13.5*   HCT  42.2  41.9   PLT  364*  348   , CMP:   Recent Labs   Lab  09/02/18   0502 09/03/18   0435   NA  138  137   K  4.0  4.6   CL  98  98   CO2  27  26   GLU  328*  344*   BUN  27*  31*   CREATININE  1.1  1.1   CALCIUM  9.6  9.3   PROT  7.6  7.2   ALBUMIN  3.2*  3.2*   BILITOT  0.3  0.3   ALKPHOS  102  95   AST  12  12   ALT  16  20   ANIONGAP  13  13   EGFRNONAA  >60  >60   , Coagulation: No results for input(s): PT, INR, APTT in the last 48 hours., Haptoglobin: No results for input(s): HAPTOGLOBIN in the last 48 hours., Immunology: No results for input(s): SPEP, JANAE, CARMEN, FREELAMBDALI in the last 48 hours., LDH: No results for input(s): LDHCSF, BFSOURCE in the last 48 hours. and LFTs:   Recent Labs   Lab  09/02/18   0502  09/03/18   0435   ALT  16  20   AST  12  12   ALKPHOS  102  95   BILITOT  0.3  0.3   PROT  7.6  7.2   ALBUMIN  3.2*  3.2*       Diagnostic Results:  I have reviewed all pertinent imaging results/findings within the past 24 hours.    Assessment/Plan:     Mass of lower lobe of left lung    Left  lower lung mass with extensive mediastinal lymphadenopathy, left adrenal nodule, and L1 lytic lesion consistent with metastatic lung cancer.     patient is status post bronchoscopy on 09/02/2018 with final pathology pending.   The patient will follow up as an outpatient to review final tissue diagnosis and plan to proceed with PET scan to complete staging.      --  Follow-up as an outpatient to discuss final pathology  -- will need PET scan as an outpatient to complete staging              Thank you for your consult. I will follow-up with patient. Please contact us if you have any additional questions.     Issa Lagos Jr, MD  Hematology/Oncology  [facility]

## 2018-09-03 NOTE — PROGRESS NOTES
09/02/2018@2354-Nurse Aid states that on 6LPM, patient's O2 is ranging from 80%-85%. Upon entering room observed patient alert and ambulating to and from bathroom without assist. Observed steady, even paced gait. Assisted patient back to bed. Nurse teachings were completed educating patient that NC is best between 1-5LPM and no further, venti mask would best fit his oxygenation needs.    09/03/2018@0000-RT contacted    09/03/2018@0008-RT arrived and placed patient on Venti mask.     09/03/2018@0108- Observed that patient is back to nasal cannula with humidifier. Patient states he changed out the venti mask back to the NC with humidifier at 6LPM. Nurse teaching completed again on oxygenation. Patient refused venti mask. SATs:90%

## 2018-09-03 NOTE — DISCHARGE SUMMARY
[facility] Hospital Medicine  Discharge Summary      Patient Name: Nico Garza Jr.  MRN: [id number]  Admission Date: 8/30/2018  Hospital Length of Stay: 3 days  Discharge Date and Time:  09/03/2018 12:16 PM  Attending Physician: Satnam Barboza MD   Discharging Provider: Satnam Barboza MD  Primary Care Provider: Tiffany Davis DO      HPI:   Mr. Garza is a 72-year-old  male with PMH significant for COPD, atrial fibrillation on Eliquis, insulin-dependent diabetes, hypertension, hyperlipidemia, presents to the ED complaining of pleuritic type chest discomfort associated with shortness of breath for the past 2-3 days.  In addition he also reports unintentional weight loss of 15 lb over the last one month, decreased appetite.  Initial chest x-ray shows no evidence of infiltrates, but elevated left diaphragm.  Patient was persistently hypoxemic in the ED with oxygen saturations dropping into the 80s without supplemental oxygen.  Patient does not use oxygen at home.  D-dimer was mildly elevated at 0.5.  Hence CTA chest was done, came back negative for pulmonary embolism.  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  In addition there is a tiny nodule on the left adrenal gland, and lytic lesion on the right L1 vertebral body.  Suspected metastatic lung carcinoma.  Patient admitted as observation basis for hypoxemia, and Pulmonary consult for possible bronchoscopy guided lung mass biopsy.    Procedure(s) (LRB):  BRONCHOSCOPY- insert lighted tube into airway to obtain biopsy of lung (Bilateral)      Hospital Course:   The pt presented to ED with SOB, pleuritic chest pain, and back pain was placed in observation for acute hypoxemic respiratory failure, large left lung mass close to the mediastinum with extensive mediastinal lymphadenopathy, and  lytic lesions in the spine. Eliquis for Afib was held. Last dose yesterday at 10 am  Discussed care with Dr Frye with IR- He recommended bronchoscopy for biopsy   Pt reports mild improvement in SOB- remains BENITO  9/1  Plan for bronchoscopy tomorrow . Patient denies of any new complains . Plan for mri of head . Cut down steroid added long acting home dose insulin   9/2  Patient had bronchoscopy done today after procedure patient hypoxic on vent mask . High oxygen requirements will monitor over night inpatient . Resume eliquis for afib  9/3  Patient was seen and examined today . Patient is requiring 3 litres at rest but with activity he requiring 4 to 5 to keep goal 88 to 90. Patient has severe copd ,left diaphragm paralysis ,highly suspected metastatic lung cancer waiting on pathology results . Continue with steroid taper,nebulizer and home oxygen was arranged and also home health arranged .      Consults:   Consults (From admission, onward)        Status Ordering Provider     Inpatient consult to Hematology/Oncology  Once     Provider:  Issa Lagos Jr., MD    Acknowledged RAZIA MICHAUD     Inpatient consult to Pulmonology  Once     Provider:  Aldair Deleon MD    Completed TIARA KILLIAN JR     Inpatient consult to Social Work  Once     Provider:  (Not yet assigned)    Completed NIKITA CANO     Inpatient consult to Social Work  Once     Provider:  (Not yet assigned)    Completed LENORA AGGARWAL new Assessment & Plan notes have been filed under this hospital service since the last note was generated.  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] 08/31/2018 Yes    Mass of lower lobe of left lung [R91.8] 08/31/2018 Yes    Mass of left lung [R91.8] 08/31/2018 Yes    Paroxysmal atrial fibrillation [I48.0] 10/05/2017 Yes    Hyperlipidemia LDL goal <70 [E78.5] 01/15/2016 Yes     Chronic    Hypertension goal BP (blood pressure) < 130/80 [I10] 06/30/2015 Yes     Chronic    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    Specialty:  DME Provider  Why:  home oxygen  Contact information:  [address]  [place] [address]  [phone]             Tiffany Davis DO In 1 week.    Specialty:  Internal Medicine  Contact information:  [id number] [place] [address] [address] [place] [address]  [phone]             Issa Lagos Jr, MD In 1 week.    Specialty:  Hematology and Oncology  Contact information:  [address] [facility] [address]  [phone]             Swapnil Ibrahim MD In 1 week.    Specialty:  Pulmonary Disease  Contact information:  [address]  [place] [address]  [phone]                 Patient Instructions:      NEBULIZER FOR HOME USE     Order Specific Question Answer Comments   Height: 5' 8" (1.727 m)    Weight: 84 kg (185 lb 3 oz)    Does patient have medical equipment at home? none Has a walker but does not use it   Length of need (1-99 months): 99      NEBULIZER KIT (SUPPLIES) FOR HOME USE     Order Specific Question Answer Comments   Height: 5' 8" (1.727 m)    Weight: 84 kg (185 lb 3 oz)    Does patient have medical equipment at home? none Has a walker but does not use it   Length of need (1-99 months): 99    Mask or Mouthpiece? Mouthpiece      OXYGEN FOR HOME USE     Order Specific Question Answer " "Comments   Liter Flow 3    Duration Continuous    Qualifying SpO2: 88    Testing done at: Rest    Route nasal cannula    Device home concentrator with portable unit    Patient condition with qualifying saturation COPD    Height: 5' 8" (1.727 m)    Weight: 84.5 kg (186 lb 6.4 oz)    Does patient have medical equipment at home? none Has a walker but does not use it   Alternative treatment measures have been tried or considered and deemed clinically ineffective. Yes        Significant Diagnostic Studies: Labs:   BMP:   Recent Labs   Lab  09/02/18   0502  09/03/18   0435   GLU  328*  344*   NA  138  137   K  4.0  4.6   CL  98  98   CO2  27  26   BUN  27*  31*   CREATININE  1.1  1.1   CALCIUM  9.6  9.3       Pending Diagnostic Studies:     Procedure Component Value Units Date/Time    Cytology Specimen- Pulmonary Medical Cytology [[id number]] Collected:  09/02/18 0945    Order Status:  Sent Lab Status:  No result     Specimen:  Bronchial Brush     Cytology Specimen- Pulmonary Medical Cytology [[id number]] Collected:  09/02/18 0941    Order Status:  Sent Lab Status:  No result     Specimen:  Bronchial Brush     Cytology Specimen- Pulmonary Medical Cytology [[id number]] Collected:  09/02/18 0936    Order Status:  Sent Lab Status:  No result     Specimen:  Other specimen location (comment)     Cytology Specimen- Pulmonary Medical Cytology [[id number]] Collected:  09/02/18 0930    Order Status:  Sent Lab Status:  No result     Specimen:  Other specimen location (comment)     Cytology Specimen- Pulmonary Medical Cytology [[id number]] Collected:  09/02/18 0914    Order Status:  Sent Lab Status:  No result     Specimen:  Bronchial Wash from Bronch wash          Medications:  Reconciled Home Medications:      Medication List      START taking these medications    albuterol-ipratropium 2.5 mg-0.5 mg/3 mL nebulizer solution  Commonly known as:  DUO-NEB  Take 3 mLs by nebulization every 6 (six) hours. Rescue     HYDROcodone-acetaminophen " "5-325 mg per tablet  Commonly known as:  NORCO  Take 1 tablet by mouth every 8 (eight) hours as needed for Pain.     predniSONE 20 MG tablet  Commonly known as:  DELTASONE  Take 1 tablet (20 mg total) by mouth once daily. 60 mg for 4 days,40 mg for 3 days,20 mg for 2 days,10 mg for 2 days. for 21 doses        CHANGE how you take these medications    insulin lispro 100 unit/mL Inpn pen  Commonly known as:  HumaLOG KwikPen Insulin  ADMINISTER 17 UNITS UNDER THE SKIN THREE TIMES DAILY BEFORE MEALS  What changed:  additional instructions     pravastatin 40 MG tablet  Commonly known as:  PRAVACHOL  TAKE 1 TABLET DAILY  What changed:  See the new instructions.     quinapril 40 MG tablet  Commonly known as:  ACCUPRIL  Take 1 tablet (40 mg total) by mouth once daily.  What changed:  when to take this        CONTINUE taking these medications    b complex vitamins tablet  Take 1 tablet by mouth once daily.     blood sugar diagnostic Strp  1 each by Misc.(Non-Drug; Combo Route) route 3 (three) times daily. Dispense preferred on insurance     blood-glucose meter kit  Use as instructed - preferred on insurance     diltiaZEM 360 MG 24 hr capsule  Commonly known as:  CARDIZEM CD  TAKE 1 CAPSULE(360 MG) BY MOUTH EVERY DAY     DISPOSABLE NEEDLES MISC  Inject 1 each into the skin 3 (three) times daily.     ELIQUIS 5 mg Tab  Generic drug:  apixaban  TAKE 1 TABLET(5 MG) BY MOUTH TWICE DAILY     flecainide 100 MG Tab  Commonly known as:  TAMBOCOR  Take 1 tablet (100 mg total) by mouth every 12 (twelve) hours.     gabapentin 300 MG capsule  Commonly known as:  NEURONTIN  TAKE 2 CAPSULES BY MOUTH TWICE DAILY     insulin degludec 200 unit/mL (3 mL) Inpn  Commonly known as:  TRESIBA FLEXTOUCH U-200  Inject 50 Units into the skin once daily.     multivitamin capsule  Take 1 capsule by mouth once daily.     omeprazole 20 MG capsule  Commonly known as:  PRILOSEC  TAKE 1 CAPSULE BY MOUTH EVERY DAY     pen needle, diabetic 32 gauge x 1/4" " Ndle  Insulin injections four times per day.     SUPREP BOWEL PREP KIT 17.5-3.13-1.6 gram Solr  Generic drug:  sodium,potassium,mag sulfates  Use as directed     traMADol 50 mg tablet  Commonly known as:  ULTRAM  TAKE 2 TABLETS BY MOUTH ONCE DAILY AS NEEDED FOR PAIN     vitamin D 1000 units Tab  Take 1,000 Units by mouth once daily.            Indwelling Lines/Drains at time of discharge:   Lines/Drains/Airways          None          Time spent on the discharge of patient: 40 minutes  Patient was seen and examined on the date of discharge and determined to be suitable for discharge.         Satnam Barboza MD  Department of Hospital Medicine  [facility]

## 2018-09-03 NOTE — SUBJECTIVE & OBJECTIVE
Interval History:    patient is status post bronchoscopy on 09/02/2018    Oncology Treatment Plan:   [No treatment plan]    Medications:  Continuous Infusions:  Scheduled Meds:   albuterol-ipratropium  3 mL Nebulization Q6H    apixaban  5 mg Oral BID    diltiaZEM  360 mg Oral Daily    flecainide  100 mg Oral Q12H    insulin detemir U-100  30 Units Subcutaneous BID    methylPREDNISolone sodium succinate  40 mg Intravenous BID    pantoprazole  40 mg Oral Daily    pravastatin  40 mg Oral QHS    quinapril  40 mg Oral QHS     PRN Meds:acetaminophen, aluminum-magnesium hydroxide-simethicone, dextrose 50%, glucagon (human recombinant), guaifenesin 100 mg/5 ml, HYDROcodone-acetaminophen, insulin aspart U-100, morphine, zolpidem     Review of Systems   Constitutional: Positive for unexpected weight change (Weight loss). Negative for activity change, appetite change, chills, diaphoresis, fatigue and fever.   HENT: Negative for congestion, dental problem, drooling, ear discharge, nosebleeds, sore throat and trouble swallowing.    Eyes: Negative.    Respiratory: Positive for shortness of breath. Negative for apnea, cough, choking, chest tightness, wheezing and stridor.    Cardiovascular: Positive for chest pain. Negative for palpitations and leg swelling.   Gastrointestinal: Positive for nausea. Negative for abdominal distention, abdominal pain, anal bleeding, blood in stool, constipation, diarrhea, rectal pain and vomiting.   Endocrine: Negative.    Genitourinary: Negative for difficulty urinating, dysuria, flank pain, frequency and urgency.   Musculoskeletal: Positive for back pain. Negative for arthralgias, gait problem, joint swelling, myalgias, neck pain and neck stiffness.   Skin: Negative for color change, pallor and rash.   Allergic/Immunologic: Negative.    Neurological: Negative for dizziness, facial asymmetry, light-headedness, numbness and headaches.   Hematological: Negative for adenopathy.    Psychiatric/Behavioral: Negative for agitation, behavioral problems and confusion. The patient is not nervous/anxious.      Objective:     Vital Signs (Most Recent):  Temp: 97.9 °F (36.6 °C) (09/03/18 0810)  Pulse: 79 (09/03/18 0810)  Resp: 18 (09/03/18 0810)  BP: (!) 120/57 (09/03/18 0810)  SpO2: (!) 91 % (09/03/18 0810) Vital Signs (24h Range):  Temp:  [97.5 °F (36.4 °C)-98.6 °F (37 °C)] 97.9 °F (36.6 °C)  Pulse:  [] 79  Resp:  [16-22] 18  SpO2:  [87 %-93 %] 91 %  BP: ()/(52-73) 120/57     Weight: 84 kg (185 lb 3 oz)  Body mass index is 28.16 kg/m².  Body surface area is 2.01 meters squared.    No intake or output data in the 24 hours ending 09/03/18 0917    Physical Exam   Constitutional: He is oriented to person, place, and time. He appears well-developed and well-nourished. No distress.   HENT:   Head: Normocephalic and atraumatic.   Nose: Nose normal.   Mouth/Throat: Oropharynx is clear and moist. No oropharyngeal exudate.   Eyes: Conjunctivae and EOM are normal. Pupils are equal, round, and reactive to light. Right eye exhibits no discharge. Left eye exhibits no discharge. No scleral icterus.   Neck: Normal range of motion. Neck supple. No JVD present. No tracheal deviation present. No thyromegaly present.   Cardiovascular: Normal rate and regular rhythm. Exam reveals no gallop and no friction rub.   No murmur heard.  Pulmonary/Chest: Effort normal. No stridor. No respiratory distress. He has decreased breath sounds. He has wheezes. He has rales. He exhibits no tenderness.   Abdominal: Soft. Bowel sounds are normal. He exhibits no distension and no mass. There is no tenderness. There is no rebound.   Musculoskeletal: Normal range of motion. He exhibits no edema.   Lymphadenopathy:     He has no cervical adenopathy.   Neurological: He is alert and oriented to person, place, and time. No cranial nerve deficit. Coordination normal.   Skin: Skin is warm and dry. No rash noted. He is not diaphoretic.    Psychiatric: He has a normal mood and affect. Thought content normal.   Vitals reviewed.      Significant Labs:   CBC:   Recent Labs   Lab  09/02/18   0502  09/03/18   0435   WBC  16.92*  15.38*   HGB  14.0  13.5*   HCT  42.2  41.9   PLT  364*  348   , CMP:   Recent Labs   Lab  09/02/18   0502  09/03/18   0435   NA  138  137   K  4.0  4.6   CL  98  98   CO2  27  26   GLU  328*  344*   BUN  27*  31*   CREATININE  1.1  1.1   CALCIUM  9.6  9.3   PROT  7.6  7.2   ALBUMIN  3.2*  3.2*   BILITOT  0.3  0.3   ALKPHOS  102  95   AST  12  12   ALT  16  20   ANIONGAP  13  13   EGFRNONAA  >60  >60   , Coagulation: No results for input(s): PT, INR, APTT in the last 48 hours., Haptoglobin: No results for input(s): HAPTOGLOBIN in the last 48 hours., Immunology: No results for input(s): SPEP, JANAE, CARMEN, FREELAMBDALI in the last 48 hours., LDH: No results for input(s): LDHCSF, BFSOURCE in the last 48 hours. and LFTs:   Recent Labs   Lab  09/02/18   0502  09/03/18   0435   ALT  16  20   AST  12  12   ALKPHOS  102  95   BILITOT  0.3  0.3   PROT  7.6  7.2   ALBUMIN  3.2*  3.2*       Diagnostic Results:  I have reviewed all pertinent imaging results/findings within the past 24 hours.

## 2018-09-03 NOTE — PLAN OF CARE
Problem: Patient Care Overview  Goal: Plan of Care Review  Outcome: Ongoing (interventions implemented as appropriate)  Plan of care discussed with patient, and patient verbalized understanding.  Patient remained AOx4, 6L NC, and Afib - NSR on the monitor.  Chronic pain mildly controlled with medication.  Elevated glucose - coverage given.  Patient is a possible discharge 9/3/18.  Patient remained free of falls, accidents, and trama during the day shift.  Bed is in the lowest position and call light is within reach - Continue to monitor.

## 2018-09-03 NOTE — PROGRESS NOTES
Home Oxygen Evaluation     1) Patient's O2 Sat on room air while at rest: 82  If at rest Sat is 89% or above, continue.     2) Patient's O2 Sat on room air while exercising:N/A  If exercise Sat is 88% or below, continue.     3) Patient's O2 Sat while exercising on O2: N/A at N/A LPM  (Must show improvement from #2 for patients to qualify)     If exercise Sat on O2 shows improvement from #2, this patient qualifies for portable Oxygen. If not, the patient does not qualify.

## 2018-09-03 NOTE — PLAN OF CARE
Problem: Patient Care Overview  Goal: Plan of Care Review  Outcome: Ongoing (interventions implemented as appropriate)    Recommendations    Recommendation/Intervention:   1.Continue current diet order  2. Will continue to monitor.     Goals: 1) Diet advancement within 72 hrs   2) Meal intake >50% at all meals  Nutrition Goal Status: 1) goal met   2) new  Communication of RD Recs: POC review

## 2018-09-03 NOTE — PLAN OF CARE
Pt AAOx4, glasses on patient. POC discussed w/patient, verbalized understanding. NSR to A-fib on monitor#8626. VSS. RR: shallow and  Diminished in lower left quad. Fingertip clubbing observed. O2 goal 88% -92% met. Patient Home O2 eval scheduled for 9/3 in a.m. For continuous oxygen. Oxygen tank in patient's room closet. Voids per BRP via independent. SKIN: generalized bruising on maria elena. Upper arms noted, scars to right inner elbow and right anterior knee. Patient turns independently in bed. Fall precautions in place, bed alarm REFUSED, family at bedside, bed in lowest, call light and personal items within reach.

## 2018-09-03 NOTE — PROGRESS NOTES
Patient states his regimen to control his diabetes does not involve finger stick blood glucose monitoring but rather a preventative/offensive approach. levemir BID, morning and evening and self administering novolog prior to each meal, regardless of what his sugar may be. States his normal DM instructor told him to follow this regimen.

## 2018-09-03 NOTE — CONSULTS
Consult received for home health and nebulizer. Met with patient and spouse Discussed options for receiving home health and a home nebulizer. Laminated home health list reviewed with patient and spouse. Preference letter obtained for [facility]. Discussed nebulizer being delivered from  Homecare later this week. ( [facility] due to Medicare Competitive Bid ) Patient and spouse verbalized understanding.Update to Daisha primary nurse.    Referrals faxed to both [facility] and  Homecare via [employer].

## 2018-09-04 PROBLEM — E10.40 TYPE 1 DIABETES MELLITUS WITH DIABETIC NEUROPATHY: Status: RESOLVED | Noted: 2018-04-27 | Resolved: 2018-09-04

## 2018-09-04 LAB
BACTERIA SPEC AEROBE CULT: NORMAL
GRAM STN SPEC: NORMAL

## 2018-09-04 RX ORDER — IPRATROPIUM BROMIDE AND ALBUTEROL SULFATE 2.5; .5 MG/3ML; MG/3ML
SOLUTION RESPIRATORY (INHALATION)
Qty: 1,080 ML | Refills: 1 | OUTPATIENT
Start: 2018-09-04

## 2018-09-04 NOTE — PLAN OF CARE
09/04/18 0723   Final Note   Assessment Type Final Discharge Note   Discharge Disposition Home-Health   Right Care Referral Info   Post Acute Recommendation Home-care   Facility Name [facility], State [place], [place]

## 2018-09-05 ENCOUNTER — PATIENT OUTREACH (OUTPATIENT)
Dept: ADMINISTRATIVE | Facility: CLINIC | Age: 73
End: 2018-09-05

## 2018-09-05 ENCOUNTER — TELEPHONE (OUTPATIENT)
Dept: GYNECOLOGIC ONCOLOGY | Facility: CLINIC | Age: 73
End: 2018-09-05

## 2018-09-05 NOTE — PROGRESS NOTES
Cait Prasad RN attempted to contact patient. No answer. The following message was left for the patient to return the call:  Good morning,  I am a nurse calling on behalf of [facility] from the [facility].  This is a Transitional Care Call for Nico Garza Jr.. When you have a moment please contact us at [phone] or [phone] Monday through Friday, between the hours of 8 am to 4 pm. We look forward to speaking with you. On behalf of [facility] have a nice day.    The patient has a scheduled HOSFU appointment with Tiffany Davis DO on 9/14/18 @ [id number]. Message sent to Physician staff.

## 2018-09-05 NOTE — TELEPHONE ENCOUNTER
----- Message from Antonio Roberts sent at 9/5/2018 10:17 AM CDT -----  Contact: Belia [phone]  Patient needs an appt for next week for a hospital follow-up. I scheduled pt for the first available which is the 17th.

## 2018-09-10 ENCOUNTER — OFFICE VISIT (OUTPATIENT)
Dept: HEMATOLOGY/ONCOLOGY | Facility: CLINIC | Age: 73
End: 2018-09-10
Payer: MEDICARE

## 2018-09-10 VITALS
HEIGHT: 69 IN | OXYGEN SATURATION: 92 % | WEIGHT: 183.44 LBS | DIASTOLIC BLOOD PRESSURE: 66 MMHG | HEART RATE: 82 BPM | BODY MASS INDEX: 27.17 KG/M2 | SYSTOLIC BLOOD PRESSURE: 115 MMHG | TEMPERATURE: 98 F

## 2018-09-10 DIAGNOSIS — R91.8 MASS OF LOWER LOBE OF LEFT LUNG: Primary | ICD-10-CM

## 2018-09-10 PROCEDURE — 99215 OFFICE O/P EST HI 40 MIN: CPT | Mod: S$PBB,,, | Performed by: INTERNAL MEDICINE

## 2018-09-10 PROCEDURE — 99213 OFFICE O/P EST LOW 20 MIN: CPT | Mod: PBBFAC | Performed by: INTERNAL MEDICINE

## 2018-09-10 PROCEDURE — 99999 PR PBB SHADOW E&M-EST. PATIENT-LVL III: CPT | Mod: PBBFAC,,, | Performed by: INTERNAL MEDICINE

## 2018-09-10 NOTE — PROGRESS NOTES
Hematology/Oncology Office Note    Reason for referral:  Lung mass/mediastinal lymphadenopathy    CC:  Lung mass    Referred by:  No ref. provider found    Diagnosis:  Lung mass/mediastinal lymphadenopathy, left adrenal nodule and lytic lesion of L1    History of present illness:  72-year-old gentleman status post recent hospitalization for left lower lung mass with extensive mediastinal lymphadenopathy, adrenal nodule, and lytic lesion of L1.  He underwent nondiagnostic bronchoscopy/biopsy on 09/02/2018.  Patient reports stable shortness of breath without hemoptysis, chest pain, dizziness, or palpitations.      Past Medical History:   Diagnosis Date    Arthritis     Atrial fibrillation and flutter     Atrial flutter     COPD (chronic obstructive pulmonary disease)     DM (diabetes mellitus) 1996    Hyperlipidemia     Hypertension     Lung disease     Neuropathy, diabetic     Pancreatitis     Type 1 diabetes mellitus with diabetic neuropathy 4/27/2018         Social History:      Family History: family history includes Cancer in his maternal aunt; Cataracts in his sister; Diabetes in his mother, sister, sister, and sister; Heart attack in his brother; Heart failure in his brother; Hypertension in his father and mother; Stroke in his father, mother, paternal grandfather, and paternal grandmother.        HPI  Review of Systems   Constitutional: Positive for activity change, fatigue and unexpected weight change. Negative for appetite change and fever.   HENT: Negative for congestion, facial swelling, nosebleeds, rhinorrhea, sinus pressure, sneezing, sore throat, trouble swallowing and voice change.    Eyes: Negative for photophobia, pain, discharge, itching and visual disturbance.   Respiratory: Positive for shortness of breath. Negative for apnea, cough, choking and chest tightness.    Cardiovascular: Negative for chest pain, palpitations and leg swelling.   Gastrointestinal: Negative for abdominal  "distention, abdominal pain, anal bleeding, blood in stool, constipation, diarrhea, nausea and vomiting.   Endocrine: Negative for cold intolerance, heat intolerance, polydipsia, polyphagia and polyuria.   Genitourinary: Negative for decreased urine volume, difficulty urinating, frequency, genital sores, hematuria, testicular pain and urgency.   Musculoskeletal: Negative for arthralgias, back pain, gait problem, joint swelling, myalgias, neck pain and neck stiffness.   Skin: Negative for color change, pallor, rash and wound.   Allergic/Immunologic: Negative for environmental allergies, food allergies and immunocompromised state.   Neurological: Negative for dizziness, tremors, syncope, speech difficulty, weakness, light-headedness, numbness and headaches.   Hematological: Negative for adenopathy. Does not bruise/bleed easily.   Psychiatric/Behavioral: Negative for agitation, confusion and hallucinations. The patient is not nervous/anxious and is not hyperactive.        Objective:       Vitals:    09/10/18 1317   BP: 115/66   Pulse: 82   Temp: 97.7 °F (36.5 °C)   SpO2: (!) 92%   Weight: 83.2 kg (183 lb 6.8 oz)   Height: 5' 8.5" (1.74 m)     Physical Exam   Constitutional: He is oriented to person, place, and time. He appears well-developed and well-nourished. No distress.   HENT:   Head: Normocephalic and atraumatic.   Nose: Nose normal.   Mouth/Throat: Oropharynx is clear and moist. No oropharyngeal exudate.   Eyes: Conjunctivae and EOM are normal. Pupils are equal, round, and reactive to light. Right eye exhibits no discharge. Left eye exhibits no discharge. No scleral icterus.   Neck: Normal range of motion. Neck supple. No JVD present. No tracheal deviation present. No thyromegaly present.   Cardiovascular: Normal rate and regular rhythm. Exam reveals no gallop and no friction rub.   No murmur heard.  Pulmonary/Chest: Effort normal and breath sounds normal. No stridor. No respiratory distress. He has no wheezes. " He has no rales. He exhibits no tenderness.   Abdominal: Soft. Bowel sounds are normal. He exhibits no distension and no mass. There is no tenderness. There is no rebound.   Musculoskeletal: Normal range of motion. He exhibits no edema or tenderness.   Lymphadenopathy:     He has no cervical adenopathy.   Neurological: He is alert and oriented to person, place, and time. No cranial nerve deficit. Coordination normal.   Skin: Skin is warm and dry. No rash noted. He is not diaphoretic.   Psychiatric: He has a normal mood and affect. Thought content normal.   Vitals reviewed.        Lab Results   Component Value Date    WBC 15.38 (H) 09/03/2018    HGB 13.5 (L) 09/03/2018    HCT 41.9 09/03/2018    MCV 89 09/03/2018     09/03/2018     Lab Results   Component Value Date    CREATININE 1.1 09/03/2018    BUN 31 (H) 09/03/2018     09/03/2018    K 4.6 09/03/2018    CL 98 09/03/2018    CO2 26 09/03/2018     Lab Results   Component Value Date    ALT 20 09/03/2018    AST 12 09/03/2018    ALKPHOS 95 09/03/2018    BILITOT 0.3 09/03/2018         Assessment:       72-year-old gentleman status post recent hospitalization where he was noted to have left lower lung mass with extensive mediastinal lymphadenopathy, left adrenal nodule, an L1 lytic lesion consistent with metastatic lung cancer.  He underwent bronchoscopy/biopsy which was nondiagnostic on 09/02/2018.  We still need to obtain a tissue diagnosis prior to developing treatment plan.  We will review PET scan prior to determining biopsy approach.      Left lower lung mass with extensive mediastinal lymphadenopathy, left adrenal, and L1 lytic lesions:  --PET scan  --follow up after PET to discuss plan for tissue acquisition  --repeat bronchoscopy versus CT-guided biopsy      Distress Screening Results: Psychosocial Distress screening score of Distress Score: 8 noted and reviewed. A referral to -- Oncology Social Worker initiated.

## 2018-09-12 ENCOUNTER — OFFICE VISIT (OUTPATIENT)
Dept: PULMONOLOGY | Facility: CLINIC | Age: 73
End: 2018-09-12
Payer: MEDICARE

## 2018-09-12 VITALS
WEIGHT: 183.88 LBS | HEART RATE: 88 BPM | SYSTOLIC BLOOD PRESSURE: 110 MMHG | OXYGEN SATURATION: 92 % | DIASTOLIC BLOOD PRESSURE: 50 MMHG | BODY MASS INDEX: 27.24 KG/M2 | HEIGHT: 69 IN | RESPIRATION RATE: 18 BRPM

## 2018-09-12 DIAGNOSIS — R91.8 MASS OF LOWER LOBE OF LEFT LUNG: Primary | Chronic | ICD-10-CM

## 2018-09-12 DIAGNOSIS — J43.2 CENTRILOBULAR EMPHYSEMA: Chronic | ICD-10-CM

## 2018-09-12 DIAGNOSIS — J96.11 CHRONIC RESPIRATORY FAILURE WITH HYPOXIA: ICD-10-CM

## 2018-09-12 DIAGNOSIS — F17.211 CIGARETTE NICOTINE DEPENDENCE IN REMISSION: Chronic | ICD-10-CM

## 2018-09-12 DIAGNOSIS — J98.6 DIAPHRAGMATIC PARALYSIS: Chronic | ICD-10-CM

## 2018-09-12 PROCEDURE — 99213 OFFICE O/P EST LOW 20 MIN: CPT | Mod: PBBFAC | Performed by: INTERNAL MEDICINE

## 2018-09-12 PROCEDURE — 99999 PR PBB SHADOW E&M-EST. PATIENT-LVL III: CPT | Mod: PBBFAC,,, | Performed by: INTERNAL MEDICINE

## 2018-09-12 PROCEDURE — 99215 OFFICE O/P EST HI 40 MIN: CPT | Mod: S$PBB,,, | Performed by: INTERNAL MEDICINE

## 2018-09-12 RX ORDER — AMOXICILLIN AND CLAVULANATE POTASSIUM 875; 125 MG/1; MG/1
1 TABLET, FILM COATED ORAL 2 TIMES DAILY
Qty: 20 TABLET | Refills: 0 | Status: ON HOLD | OUTPATIENT
Start: 2018-09-12 | End: 2018-09-25 | Stop reason: HOSPADM

## 2018-09-12 NOTE — ASSESSMENT & PLAN NOTE
Metastatic lung cancer suspected. Lesion looks infiltrative / infectious. ? JAVIER.  Diagnostic Bronchoscopy 09/02/18: Non diagnostic  PET scan scheduled.  Re - BIOPSY is needed.     Will re evaluate after review of PET scan.  Trial of AUGMENTIN prescribed.

## 2018-09-12 NOTE — PATIENT INSTRUCTIONS
Diagnosing and Staging Lung Cancer     A bronchoscope provides a direct view of the windpipe and bronchial tubes.     If your healthcare provider thinks you may have lung cancer, he or she will most likely order a number of tests. These tests can diagnose lung cancer and reveal the type of cancer, where its located, and if, or how much, it has spread. Test results may also help your healthcare provider plan treatment.  What do the tests show?  Each test result for lung cancer offers a new piece of information. Taken as a whole, the results reveal precise details about your cancer and how advanced it is. For instance, do you have non-small-cell or small-cell lung cancer? How large is the tumor? Where is the tumor located? Are the lymph nodes involved? Has the cancer spread? With these details, you and your healthcare provider can start to plan treatment.  Biopsy  In a biopsy, a small sample of tissue is removed. It can be taken from a tumor in the lung or from other parts of your body. That sample is then studied under a microscope to learn more about your cancer. The following tests can be done to obtain a biopsy.  · During bronchoscopy, a thin, lighted, flexible tube (bronchoscope) goes into the nose and down the windpipe. The healthcare provider then has a direct view of the windpipe and bronchial tubes. Tissue samples may be taken. The walls of the windpipe and bronchial tubes may also be brushed and rinsed. This loosens cells. The cells can then be removed and studied in a lab.  · For an endobronchial ultrasound (EBUS), a bronchoscope is used with ultrasound (image made from sound waves) to look at the lymph nodes and other structures between the lungs. In a similar test known as endoscopic esophageal ultrasound, a scope is passed down the esophagus to look at these structures.   · In a fine-needle aspiration (FNA), a very thin needle is used to remove cells from a tumor. This test is done under local  anesthesia to help prevent pain. During the FNA, you may have a CT scan. This helps the healthcare provider position the needle exactly where it needs to be.  Imaging tests  Pictures from different imaging tests can provide details about your lungs and any tumors they may have. These pictures can also show a tumors location and size. A chest X-ray is one of the most common imaging tests.  Other imaging tests will likely be done. A CT scan is a computer-enhanced X-ray image. A PET scan (positron emission tomography) uses a slightly radioactive liquid (tracer) to find areas where cancer cells are in the body. A PET scan is often done along with a CT scan. It can detect a tumor that may not appear on a chest X-ray. Less often, an MRI may be done. This test uses strong magnets and computers to help form a highly detailed image. You may also have a bone scan or other imaging tests to learn whether the cancer has spread. Your healthcare provider will tell you how to prepare for any tests.  Staging of lung cancer  Staging is a process to measure how advanced the cancer is. Stage 1 is the least advanced. Stage 4 is the most advanced. The following three factors are considered:  · The tumor. How large is it? Has it reached other nearby structures?  · The nearby lymph nodes. Have they been affected? If so, which lymph nodes--the lymph nodes near the tumor, or the lymph nodes in the center of the chest (mediastinal lymph nodes)? If the mediastinal lymph nodes are affected, are both sides affected, or are they only affected on the same side as the tumor?   · Metastasis. Has the cancer spread to other parts of the body?  When planning treatment for small cell lung cancer, healthcare providers are usually more concerned about whether radiation therapy can be used to treat the cancer. These cancers are typically just divided into limited stage disease (which can be treated with radiation) and extensive stage disease (which has  spread too far to be treated with radiation).    Date Last Reviewed: 1/3/2016  © 8358-1908 [employer]. [address] [place] [address], [place] [address]. All rights reserved. This information is not intended as a substitute for professional medical care. Always follow your healthcare professional's instructions.

## 2018-09-12 NOTE — PROGRESS NOTES
Post hospital discharge pulmonary follow up note      Nico Garza Jr. is a 72 y.o. male    ADMIT DATE: 8/30/18    DISCHARGE DATE: 9/3/18     ADMISSION DIAGNOSIS: Acute respiratory failure with hypoxemia,  Lung mass/mediastinal lymphadenopathy, left adrenal nodule and lytic lesion of L1    DISCHARGE DIAGNOSIS: Same    Procedures Performed During Admission: Diagnostic Bronchoscopy     Treatment at Discharge: DUONEB, SUPPLEMENTAL OXYGENATION .       Medication List           Accurate as of 9/12/18  5:37 PM. If you have any questions, ask your nurse or doctor.               START taking these medications    amoxicillin-clavulanate 875-125mg 875-125 mg per tablet  Commonly known as:  AUGMENTIN  Take 1 tablet by mouth 2 (two) times daily. for 10 days  Started by:  Swapnil Ibrahim MD        CHANGE how you take these medications    insulin lispro 100 unit/mL Inpn pen  Commonly known as:  HumaLOG KwikPen Insulin  ADMINISTER 17 UNITS UNDER THE SKIN THREE TIMES DAILY BEFORE MEALS  What changed:  additional instructions     pravastatin 40 MG tablet  Commonly known as:  PRAVACHOL  TAKE 1 TABLET DAILY  What changed:  See the new instructions.     quinapril 40 MG tablet  Commonly known as:  ACCUPRIL  Take 1 tablet (40 mg total) by mouth once daily.  What changed:  when to take this        CONTINUE taking these medications    albuterol-ipratropium 2.5 mg-0.5 mg/3 mL nebulizer solution  Commonly known as:  DUO-NEB  Take 3 mLs by nebulization every 6 (six) hours. Rescue     b complex vitamins tablet     blood sugar diagnostic Strp  1 each by Misc.(Non-Drug; Combo Route) route 3 (three) times daily. Dispense preferred on insurance     blood-glucose meter kit  Use as instructed - preferred on insurance     diltiaZEM 360 MG 24 hr capsule  Commonly known as:  CARDIZEM CD  TAKE 1 CAPSULE(360 MG) BY MOUTH EVERY DAY     DISPOSABLE NEEDLES MISC     ELIQUIS 5 mg Tab  Generic drug:  apixaban  TAKE 1 TABLET(5 MG) BY MOUTH TWICE DAILY    "  flecainide 100 MG Tab  Commonly known as:  TAMBOCOR  Take 1 tablet (100 mg total) by mouth every 12 (twelve) hours.     gabapentin 300 MG capsule  Commonly known as:  NEURONTIN  TAKE 2 CAPSULES BY MOUTH TWICE DAILY     HYDROcodone-acetaminophen 5-325 mg per tablet  Commonly known as:  NORCO  Take 1 tablet by mouth every 8 (eight) hours as needed for Pain.     insulin degludec 200 unit/mL (3 mL) Inpn  Commonly known as:  TRESIBA FLEXTOUCH U-200  Inject 50 Units into the skin once daily.     multivitamin capsule     omeprazole 20 MG capsule  Commonly known as:  PRILOSEC  TAKE 1 CAPSULE BY MOUTH EVERY DAY     pen needle, diabetic 32 gauge x 1/4" Ndle  Insulin injections four times per day.     predniSONE 20 MG tablet  Commonly known as:  DELTASONE  Take 1 tablet (20 mg total) by mouth once daily. 60 mg for 4 days,40 mg for 3 days,20 mg for 2 days,10 mg for 2 days. for 21 doses     SUPREP BOWEL PREP KIT 17.5-3.13-1.6 gram Solr  Generic drug:  sodium,potassium,mag sulfates  Use as directed     traMADol 50 mg tablet  Commonly known as:  ULTRAM  TAKE 2 TABLETS BY MOUTH ONCE DAILY AS NEEDED FOR PAIN     vitamin D 1000 units Tab  Commonly known as:  VITAMIN D3           Where to Get Your Medications      These medications were sent to [facility] [address] [place], [place] - [address] AT [facility]  [address], [facility] [address]    Phone:  [phone]   · amoxicillin-clavulanate 875-125mg 875-125 mg per tablet          Problem list     Patient Active Problem List   Diagnosis    COPD (chronic obstructive pulmonary disease) with emphysema    Atrial flutter with rapid ventricular response    Hypertension goal BP (blood pressure) < 130/80    Hyperlipidemia LDL goal <70    Paroxysmal atrial fibrillation    Atrial flutter    Type 1 diabetes mellitus with diabetic neuropathy    Metastatic lung cancer (metastasis from lung to other site), left    Chronic " respiratory failure with hypoxia    Moderate malnutrition    Constipation    Left diaphragmatic paralysis    Mass of lower lobe of left lung    Mass of left lung    Mediastinal lymphadenopathy    Cigarette nicotine dependence in remission     Today he reports that he is 50 - 60% improved.       Problem list has been reviewed.    Subjective:       HPI    Patients reports NYHA II dyspnea    The patient does not have currently have symptoms / an exacerbation.       He is currently on supplemental oxygen at 3 L / min ( Pulse dose)     His current respiratory regimen: DUONEBS: He does use medications compliantly.       COPD QUESTIONNAIRE  How often do you cough?: (P) Some of the time  How often do you have phlegm (mucus) in your chest?: (P) A little of the time  How often does your chest feel tight?: (P) All of the time  When you walk up a hill or one flight of stairs, how often are you breathless?: (P) All of the time  How often are you limited doing any activities at home?: (P) All of the time  How often are you confident leaving the house despite your lung condition?: (P) All of the time  How often do you sleep soundly?: (P) Some of the time  How often do you have energy?: (P) Almost never  Total score: (P) 26   A full  review of systems, past , family  and social histories was performed except as mentioned in the note above, these are non contributory to the main issues discussed today.      Review of Systems   Constitutional: Negative for chills, malaise/fatigue and weight loss.   HENT: Positive for nosebleeds and sore throat.    Eyes: Negative for blurred vision and double vision.   Respiratory: Negative for cough and hemoptysis.    Cardiovascular: Positive for palpitations. Negative for chest pain.   Gastrointestinal: Negative for abdominal pain, blood in stool, constipation, diarrhea, heartburn, melena, nausea and vomiting.   Musculoskeletal: Positive for back pain.   Neurological: Negative for dizziness  "and headaches.   Endo/Heme/Allergies: Negative for environmental allergies.   Psychiatric/Behavioral: Negative for depression and suicidal ideas.   All other systems reviewed and are negative.      Objective:      Vitals:    09/12/18 1215   BP: (!) 110/50   Pulse: 88   Resp: 18   SpO2: (!) 92%   Weight: 83.4 kg (183 lb 13.8 oz)   Height: 5' 8.5" (1.74 m)     Body mass index is 27.55 kg/m².    Physical Exam   Constitutional: He is oriented to person, place, and time. He appears well-developed and well-nourished.   HENT:   Head: Normocephalic and atraumatic.   Eyes: EOM are normal. Pupils are equal, round, and reactive to light.   Neck: Normal range of motion. No tracheal deviation present.   Cardiovascular: Normal rate, regular rhythm and intact distal pulses. Exam reveals no friction rub.   No murmur heard.  Pulmonary/Chest: Effort normal and breath sounds normal. He has no wheezes. He has no rales.   Abdominal: Soft. Bowel sounds are normal. He exhibits no distension. There is no tenderness.   Musculoskeletal: Normal range of motion. He exhibits no edema or deformity.   Neurological: He is alert and oriented to person, place, and time.   Skin: Skin is warm and dry. No rash noted.   Psychiatric: He has a normal mood and affect. His behavior is normal.            Lab Review   Lab Results   Component Value Date     09/03/2018    K 4.6 09/03/2018    CL 98 09/03/2018    CO2 26 09/03/2018    BUN 31 (H) 09/03/2018    CREATININE 1.1 09/03/2018    CALCIUM 9.3 09/03/2018     Lab Results   Component Value Date    CKMB 1.5 03/14/2013    TROPONINI <0.006 08/31/2018    TROPONINI <0.04 03/14/2013     Lab Results   Component Value Date    WBC 15.38 (H) 09/03/2018    HGB 13.5 (L) 09/03/2018    HCT 41.9 09/03/2018    MCV 89 09/03/2018     09/03/2018     Lab Results   Component Value Date    CHOL 98 (L) 07/12/2018    TRIG 94 07/12/2018    HDL 52 07/12/2018        CXR: There is persistent moderate elevation of the left " hemidiaphragm.  There is a mild amount of haziness in the base of the left lung.  There is no focal pulmonary infiltrate visualized in the right lung.  There is no pneumothorax.  The costophrenic angles are sharp.      CT of chest performed on 08/30/18: PE protocol:    Heart: Significant coronary arterial calcifications.  Lipomatosis hypertrophy inter atrial septum.  No pericardial effusions.    Mediastinum: Extensive mediastinal adenopathy.  There is a large dominant area of infiltrating adenopathy around the central left perihilar lung measuring 6.8 by 3.4 cm which infiltrates and abuts the distal left mainstem bronchus and left upper lobe bronchus.  Mass-effect upon superior left pulmonary vein with partial encasement.  Additional enlarged lymph nodes throughout the mediastinum though not as large with a representative aortopulmonary window lymph node measuring 2.1 x 1.4 cm.    Vasculature: No acute pulmonary emboli.  Mild aortic atherosclerosis.    Lungs: There is elevation of the left hemidiaphragm with left lung base compressive atelectasis.  Significant emphysematous changes in the lungs.  No pleural effusions.    Esophagus: Unremarkable.    Upper Abdomen: Normal right adrenal.  Minimal nodularity the left adrenal gland measuring 12 mm.  Multiple small renal cyst    Bones: There there is a large lytic lesion involving the right L1 vertebral body..  Moderate degenerative change.    BRONCHOSCOPY:    Bronchial Brush #1:  Bronchial epithelial cells;  Mucus;  Macrophages;  Negative for malignant cells.       Bronchial Brush #2:  Bronchial epithelial cells;  Negative for malignant cells.    FNA Carinal Lymph Node:  Normal bronchial epithelial cells;  Macrophages;  Acute inflammatory infiltrate;  Squamous epithelial cells;  Negative for malignant cells.    Bronchial Wash:   Lymphocytes;  Sparse acute inflammation;  Negative for malignant cells.    Left lower lobe biopsy:  Cartilage, bronchial mucosa, and lung  parenchyma with mild lymphocytic inflammation, septal thickening, and  anthracotic pigmentation;  No evidence of neoplasia      Assessment / Plan :       Problem List Items Addressed This Visit        Pulmonary    COPD (chronic obstructive pulmonary disease) with emphysema (Chronic)    Current Assessment & Plan     EMPHYSEMA ROS: taking medications as instructed, no medication side effects noted, no significant ongoing wheezing or shortness of breath, using bronchodilator MDI less than twice a week.   New concerns: None.   Exam: appears well, vitals normal, no respiratory distress, acyanotic, normal RR, chest clear, no wheezing, crepitations, rhonchi, normal symmetric air entry.   Assessment:  EMPHYSEMA stable.   Plan: Continue DUONEB.             Chronic respiratory failure with hypoxia    Current Assessment & Plan     Oxygen supplementation 3 L /min ( pulse dose)          Left diaphragmatic paralysis    Current Assessment & Plan     Stable and controlled. Radiologic monitoring.          Mass of lower lobe of left lung - Primary    Overview     Chest X Ray 6/2018 was unremarkable / normal except for emphysema.     CT of chest performed on 08/30/18: PE protocol:Extensive mediastinal adenopathy.  There is a large dominant area of infiltrating adenopathy around the central left perihilar lung measuring 6.8 by 3.4 cm which infiltrates and abuts the distal left mainstem bronchus and left upper lobe bronchus.  Mass-effect upon superior left pulmonary vein with partial encasement.  Additional enlarged lymph nodes throughout the mediastinum though not as large with a representative aortopulmonary window lymph node measuring 2.1 x 1.4 cm.           Current Assessment & Plan     Metastatic lung cancer suspected. Lesion looks infiltrative / infectious. ? JAVIER.  Diagnostic Bronchoscopy 09/02/18: Non diagnostic  PET scan scheduled.  Re - BIOPSY is needed.     Will re evaluate after review of PET scan.  Trial of AUGMENTIN  prescribed.            Relevant Medications    amoxicillin-clavulanate 875-125mg (AUGMENTIN) 875-125 mg per tablet       Other    Cigarette nicotine dependence in remission (Chronic)    Current Assessment & Plan     Quit smoking approximately 1 month ago: Patient commended on quitting smoking.                  TIME SPENT WITH PATIENT: Time spent: 45 minutes in face to face  discussion concerning diagnosis, prognosis, review of lab and test results, benefits of treatment as well as management of disease, counseling of patient and coordination of care between various health  care providers . Greater than half the time spent was used for coordination of care and counseling of patient.     Follow-up in about 2 weeks (around 9/26/2018) for Chronic Respiratory Failure, Lung Mass, Tobacco use disorder, Emphysema.

## 2018-09-12 NOTE — ASSESSMENT & PLAN NOTE
EMPHYSEMA ROS: taking medications as instructed, no medication side effects noted, no significant ongoing wheezing or shortness of breath, using bronchodilator MDI less than twice a week.   New concerns: None.   Exam: appears well, vitals normal, no respiratory distress, acyanotic, normal RR, chest clear, no wheezing, crepitations, rhonchi, normal symmetric air entry.   Assessment:  EMPHYSEMA stable.   Plan: Continue DUONEB.

## 2018-09-13 ENCOUNTER — TELEPHONE (OUTPATIENT)
Dept: RADIOLOGY | Facility: HOSPITAL | Age: 73
End: 2018-09-13

## 2018-09-14 ENCOUNTER — OFFICE VISIT (OUTPATIENT)
Dept: INTERNAL MEDICINE | Facility: CLINIC | Age: 73
End: 2018-09-14
Payer: MEDICARE

## 2018-09-14 VITALS
SYSTOLIC BLOOD PRESSURE: 110 MMHG | OXYGEN SATURATION: 96 % | TEMPERATURE: 97 F | BODY MASS INDEX: 28.05 KG/M2 | WEIGHT: 187.19 LBS | HEART RATE: 100 BPM | DIASTOLIC BLOOD PRESSURE: 54 MMHG

## 2018-09-14 DIAGNOSIS — R91.8 MASS OF LEFT LUNG: Primary | ICD-10-CM

## 2018-09-14 DIAGNOSIS — B37.0 ORAL CANDIDIASIS: ICD-10-CM

## 2018-09-14 DIAGNOSIS — R09.02 HYPOXEMIA: ICD-10-CM

## 2018-09-14 DIAGNOSIS — K21.9 CHRONIC GERD: ICD-10-CM

## 2018-09-14 DIAGNOSIS — J44.9 COPD, MODERATE: ICD-10-CM

## 2018-09-14 DIAGNOSIS — Z99.81 REQUIRES SUPPLEMENTAL OXYGEN: ICD-10-CM

## 2018-09-14 PROCEDURE — 99213 OFFICE O/P EST LOW 20 MIN: CPT | Mod: PBBFAC | Performed by: INTERNAL MEDICINE

## 2018-09-14 PROCEDURE — 99496 TRANSJ CARE MGMT HIGH F2F 7D: CPT | Mod: S$PBB,,, | Performed by: INTERNAL MEDICINE

## 2018-09-14 PROCEDURE — 90662 IIV NO PRSV INCREASED AG IM: CPT | Mod: PBBFAC

## 2018-09-14 PROCEDURE — 99999 PR PBB SHADOW E&M-EST. PATIENT-LVL III: CPT | Mod: PBBFAC,,, | Performed by: INTERNAL MEDICINE

## 2018-09-14 PROCEDURE — 99496 TRANSJ CARE MGMT HIGH F2F 7D: CPT | Mod: PBBFAC,25 | Performed by: INTERNAL MEDICINE

## 2018-09-14 RX ORDER — OMEPRAZOLE 20 MG/1
20 CAPSULE, DELAYED RELEASE ORAL DAILY
Qty: 90 CAPSULE | Refills: 1 | Status: SHIPPED | OUTPATIENT
Start: 2018-09-14 | End: 2019-03-15 | Stop reason: SDUPTHER

## 2018-09-14 RX ORDER — GABAPENTIN 300 MG/1
600 CAPSULE ORAL 2 TIMES DAILY
Qty: 360 CAPSULE | Refills: 1 | Status: SHIPPED | OUTPATIENT
Start: 2018-09-14

## 2018-09-14 RX ORDER — NYSTATIN 100000 [USP'U]/ML
5 SUSPENSION ORAL 4 TIMES DAILY
Qty: 200 ML | Refills: 0 | Status: ON HOLD | OUTPATIENT
Start: 2018-09-14 | End: 2018-09-25

## 2018-09-14 NOTE — PROGRESS NOTES
Transitional Care Note  Subjective:       Patient ID: Nico Garza Jr. is a 72 y.o. male.  Chief Complaint: Transitional Care    Family and/or Caretaker present at visit?  Yes.  Diagnostic tests reviewed/disposition: I have reviewed all completed as well as pending diagnostic tests at the time of discharge.  Disease/illness education: done  Home health/community services discussion/referrals: Patient has home health established at [place].   Establishment or re-establishment of referral orders for community resources: No other necessary community resources.   Discussion with other health care providers: No discussion with other health care providers necessary.   HPI: Hospitalized from 8/31 to 9/3. He presented with chest pain and shortness of breath. He was hypoxic and CTA showed extensive mediastinal lymphadenopathy with a large dominant mass on the left aspect of the mediastinum, elevation left hemidiaphragmand compressive atelectasis at the left lung base. He had a bronchoscopy which pathology did not show malignancy. He will be having a PET scan soon.   He was placed on oxygen, which he has been wearing at 3 L/min. He has a history of COPD.   He recently saw his pulmonologist, who placed him on antibiotics just in case this is an infectious process.   He has been having tongue discomfort since discharge. He has white spots on his tongue and has not been able to get them off with his toothbrush.   He needs refills on gabapentin and omeprazole.     Past Medical History:  Arthritis  Atrial fibrillation and flutter  Atrial flutter  COPD (chronic obstructive pulmonary disease)  11/18/2013: COPD (chronic obstructive pulmonary disease) with   emphysema  1996: DM (diabetes mellitus)  Hyperlipidemia  Hypertension  Lung disease  Neuropathy, diabetic  Pancreatitis  4/27/2018: Type 1 diabetes mellitus with diabetic neuropathy    Current Outpatient Medications on File Prior to Visit:  albuterol-ipratropium (DUO-NEB) 2.5  "mg-0.5 mg/3 mL nebulizer solution, Take 3 mLs by nebulization every 6 (six) hours. Rescue, Disp: 360 mL, Rfl: 1  amoxicillin-clavulanate 875-125mg (AUGMENTIN) 875-125 mg per tablet, Take 1 tablet by mouth 2 (two) times daily. for 10 days, Disp: 20 tablet, Rfl: 0  b complex vitamins tablet, Take 1 tablet by mouth once daily., Disp: , Rfl:    blood sugar diagnostic Strp, 1 each by Misc.(Non-Drug; Combo Route) route 3 (three) times daily. Dispense preferred on insurance, Disp: 100 strip, Rfl: 11  blood-glucose meter kit, Use as instructed - preferred on insurance, Disp: 1 each, Rfl: 0  diltiaZEM (CARDIZEM CD) 360 MG 24 hr capsule, TAKE 1 CAPSULE(360 MG) BY MOUTH EVERY DAY, Disp: 90 capsule, Rfl: 0  ELIQUIS 5 mg Tab, TAKE 1 TABLET(5 MG) BY MOUTH TWICE DAILY, Disp: 60 tablet, Rfl: 6  flecainide (TAMBOCOR) 100 MG Tab, Take 1 tablet (100 mg total) by mouth every 12 (twelve) hours., Disp: 60 tablet, Rfl: 11  HYDROcodone-acetaminophen (NORCO) 5-325 mg per tablet, Take 1 tablet by mouth every 8 (eight) hours as needed for Pain., Disp: 42 tablet, Rfl: 0  insulin degludec (TRESIBA FLEXTOUCH U-200) 200 unit/mL (3 mL) InPn, Inject 50 Units into the skin once daily., Disp: 9 mL, Rfl: 3  insulin lispro (HUMALOG KWIKPEN) 100 unit/mL InPn pen, ADMINISTER 17 UNITS UNDER THE SKIN THREE TIMES DAILY BEFORE MEALS (Patient taking differently: ADMINISTER 17 UNITS UNDER THE SKIN IN MORNING AND 25 UNITS NOON AND NIGHT), Disp: 45 mL, Rfl: 0  insulin needles, disposable, 32 x 1/4 " Ndle, Insulin injections four times per day., Disp: 400 each, Rfl: 3  multivitamin capsule, Take 1 capsule by mouth once daily., Disp: , Rfl:   NEEDLES, DISPOSABLE (DISPOSABLE NEEDLES MISC), Inject 1 each into the skin 3 (three) times daily., Disp: , Rfl:   pravastatin (PRAVACHOL) 40 MG tablet, TAKE 1 TABLET DAILY (Patient taking differently: TAKE 1 TABLET HS), Disp: 90 tablet, Rfl: 3  predniSONE (DELTASONE) 20 MG tablet, Take 1 tablet (20 mg total) by mouth once " daily. 60 mg for 4 days,40 mg for 3 days,20 mg for 2 days,10 mg for 2 days. for 21 doses, Disp: 21 tablet, Rfl: 0  quinapril (ACCUPRIL) 40 MG tablet, Take 1 tablet (40 mg total) by mouth once daily. (Patient taking differently: Take 40 mg by mouth nightly. ), Disp: 90 tablet, Rfl: 3  SUPREP BOWEL PREP KIT 17.5-3.13-1.6 gram SolR, Use as directed, Disp: 354 mL, Rfl: 0  traMADol (ULTRAM) 50 mg tablet, TAKE 2 TABLETS BY MOUTH ONCE DAILY AS NEEDED FOR PAIN, Disp: 60 tablet, Rfl: 3  vitamin D 1000 units Tab, Take 1,000 Units by mouth once daily., Disp: , Rfl:   gabapentin (NEURONTIN) 300 MG capsule, TAKE 2 CAPSULES BY MOUTH TWICE DAILY, Disp: 360 capsule, Rfl: 0  omeprazole (PRILOSEC) 20 MG capsule, TAKE 1 CAPSULE BY MOUTH EVERY DAY, Disp: 90 capsule, Rfl: 2    Allergies:  Review of patient's allergies indicates:   -- Flomax (tamsulosin)     --  Dizzy          Review of Systems   Constitutional: Negative for fever and unexpected weight change.   HENT: Positive for mouth sores.    Respiratory: Positive for shortness of breath. Negative for cough and wheezing.    Cardiovascular: Negative for chest pain.   Gastrointestinal: Negative for abdominal pain.   Neurological: Negative for dizziness and headaches.       Objective:      Physical Exam   Constitutional: He is oriented to person, place, and time. He appears well-developed and well-nourished. No distress.   HENT:   White patches on tongue.    Eyes: No scleral icterus.   Cardiovascular: Normal rate, regular rhythm and normal heart sounds.   Pulmonary/Chest: Effort normal. He has decreased breath sounds.   Neurological: He is alert and oriented to person, place, and time.   Skin: Skin is warm and dry.   Psychiatric: He has a normal mood and affect.   Vitals reviewed.      Assessment:       1. Mass of left lung    2. COPD, moderate    3. Hypoxemia    4. Requires supplemental oxygen    5. Oral candidiasis    6. Type 2 diabetes, uncontrolled, with neuropathy    7. Chronic  GERD        Plan:       Nico was seen today for transitional care.    Diagnoses and all orders for this visit:    Mass of left lung  -     Advised to complete antibiotics as prescribed  -     F/U with pulmonology    COPD, moderate  -     Continue current management    Hypoxemia  -     Continue oxygen    Requires supplemental oxygen    Oral candidiasis  -     nystatin (MYCOSTATIN) 100,000 unit/mL suspension; Take 5 mLs (500,000 Units total) by mouth 4 (four) times daily. for 10 days    Type 2 diabetes, uncontrolled, with neuropathy  -     gabapentin (NEURONTIN) 300 MG capsule; Take 2 capsules (600 mg total) by mouth 2 (two) times daily.    Chronic GERD  -     omeprazole (PRILOSEC) 20 MG capsule; Take 1 capsule (20 mg total) by mouth once daily.    Other orders  -     Influenza - High Dose (65+) (PF) (IM)    RTC in 1 month as previously scheduled.

## 2018-09-17 ENCOUNTER — TELEPHONE (OUTPATIENT)
Dept: ADMINISTRATIVE | Facility: CLINIC | Age: 73
End: 2018-09-17

## 2018-09-17 ENCOUNTER — HOSPITAL ENCOUNTER (OUTPATIENT)
Dept: RADIOLOGY | Facility: HOSPITAL | Age: 73
Discharge: HOME | End: 2018-09-17
Attending: INTERNAL MEDICINE
Payer: MEDICARE

## 2018-09-17 DIAGNOSIS — R91.8 MASS OF LOWER LOBE OF LEFT LUNG: ICD-10-CM

## 2018-09-17 PROCEDURE — 78815 PET IMAGE W/CT SKULL-THIGH: CPT | Mod: PI,TC

## 2018-09-17 PROCEDURE — 78815 PET IMAGE W/CT SKULL-THIGH: CPT | Mod: 26,PI,, | Performed by: RADIOLOGY

## 2018-09-17 PROCEDURE — A9552 F18 FDG: HCPCS

## 2018-09-18 ENCOUNTER — OFFICE VISIT (OUTPATIENT)
Dept: HEMATOLOGY/ONCOLOGY | Facility: CLINIC | Age: 73
End: 2018-09-18
Payer: MEDICARE

## 2018-09-18 VITALS
WEIGHT: 181.88 LBS | HEIGHT: 69 IN | DIASTOLIC BLOOD PRESSURE: 64 MMHG | SYSTOLIC BLOOD PRESSURE: 120 MMHG | RESPIRATION RATE: 18 BRPM | TEMPERATURE: 98 F | OXYGEN SATURATION: 94 % | BODY MASS INDEX: 26.94 KG/M2 | HEART RATE: 97 BPM

## 2018-09-18 DIAGNOSIS — C79.9 MULTIPLE LESIONS OF METASTATIC MALIGNANCY: ICD-10-CM

## 2018-09-18 DIAGNOSIS — G89.3 PAIN OF METASTATIC MALIGNANCY: ICD-10-CM

## 2018-09-18 DIAGNOSIS — C78.7 LIVER METASTASES: ICD-10-CM

## 2018-09-18 DIAGNOSIS — R91.8 MASS OF LOWER LOBE OF LEFT LUNG: Primary | ICD-10-CM

## 2018-09-18 PROCEDURE — 99213 OFFICE O/P EST LOW 20 MIN: CPT | Mod: PBBFAC,PO | Performed by: INTERNAL MEDICINE

## 2018-09-18 PROCEDURE — 99214 OFFICE O/P EST MOD 30 MIN: CPT | Mod: S$PBB,,, | Performed by: INTERNAL MEDICINE

## 2018-09-18 PROCEDURE — 99999 PR PBB SHADOW E&M-EST. PATIENT-LVL III: CPT | Mod: PBBFAC,,, | Performed by: INTERNAL MEDICINE

## 2018-09-18 NOTE — PROGRESS NOTES
Hematology/Oncology Office Note    Reason for referral:  Lung mass/mediastinal lymphadenopathy    CC:  Lung mass    Referred by:  No ref. provider found    Diagnosis:  Lung mass/mediastinal lymphadenopathy, left adrenal nodule and lytic lesion of L1    History of present illness:  72-year-old gentleman status post recent hospitalization for left lower lung mass with extensive mediastinal lymphadenopathy, adrenal nodule, and lytic lesion of L1.  He underwent nondiagnostic bronchoscopy/biopsy on 09/02/2018.  Patient reports stable shortness of breath without hemoptysis, chest pain, dizziness, or palpitations.      I have reviewed and updated the HPI, ROS, PMHx, Social Hx, Family Hx and treatment history.    Today's visit:  Patient presents today to discuss results of PET scan.    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    DM (diabetes mellitus) 1996    Hyperlipidemia     Hypertension     Lung disease     Neuropathy, diabetic     Pancreatitis     Type 1 diabetes mellitus with diabetic neuropathy 4/27/2018         Social History:      Family History: family history includes Cancer in his maternal aunt; Cataracts in his sister; Diabetes in his mother, sister, sister, and sister; Heart attack in his brother; Heart failure in his brother; Hypertension in his father and mother; Stroke in his father, mother, paternal grandfather, and paternal grandmother.        HPI    Review of Systems   Constitutional: Positive for activity change, fatigue and unexpected weight change. Negative for appetite change, chills, diaphoresis and fever.   HENT: Negative for congestion, dental problem, drooling, ear discharge, ear pain, facial swelling, nosebleeds, rhinorrhea and sinus pressure.    Eyes: Negative for photophobia, pain, discharge,  "itching and visual disturbance.   Respiratory: Positive for shortness of breath. Negative for apnea, cough, choking and chest tightness.    Cardiovascular: Negative for chest pain, palpitations and leg swelling.   Gastrointestinal: Negative for abdominal distention, abdominal pain, anal bleeding, blood in stool, constipation, diarrhea, nausea and vomiting.   Endocrine: Negative for cold intolerance, heat intolerance, polydipsia, polyphagia and polyuria.   Genitourinary: Negative for difficulty urinating, dysuria, enuresis, flank pain, frequency, genital sores and hematuria.   Musculoskeletal: Negative for arthralgias, back pain, gait problem, joint swelling, myalgias, neck pain and neck stiffness.   Skin: Negative for color change, pallor, rash and wound.   Allergic/Immunologic: Negative for environmental allergies, food allergies and immunocompromised state.   Neurological: Negative for dizziness, seizures, facial asymmetry, speech difficulty, light-headedness, numbness and headaches.   Hematological: Negative for adenopathy. Does not bruise/bleed easily.   Psychiatric/Behavioral: Negative for agitation, confusion and hallucinations. The patient is not nervous/anxious and is not hyperactive.        Objective:       Vitals:    09/18/18 1317   BP: 120/64   Pulse: 97   Resp: 18   Temp: 97.9 °F (36.6 °C)   TempSrc: Oral   SpO2: (!) 94%   Weight: 82.5 kg (181 lb 14.1 oz)   Height: 5' 8.5" (1.74 m)     Physical Exam   Constitutional: He is oriented to person, place, and time. He appears well-developed and well-nourished. No distress.   HENT:   Head: Normocephalic and atraumatic.   Nose: Nose normal.   Mouth/Throat: Oropharynx is clear and moist. No oropharyngeal exudate.   Eyes: Conjunctivae and EOM are normal. Pupils are equal, round, and reactive to light. Right eye exhibits no discharge. Left eye exhibits no discharge. No scleral icterus.   Neck: Normal range of motion. Neck supple. No JVD present. No tracheal " deviation present. No thyromegaly present.   Cardiovascular: Normal rate and regular rhythm. Exam reveals no gallop and no friction rub.   No murmur heard.  Pulmonary/Chest: Effort normal and breath sounds normal. No stridor. No respiratory distress. He has no wheezes.   Abdominal: Soft. Bowel sounds are normal. He exhibits no distension and no mass. There is no tenderness. There is no rebound.   Musculoskeletal: Normal range of motion. He exhibits no edema, tenderness or deformity.   Lymphadenopathy:     He has no cervical adenopathy.   Neurological: He is alert and oriented to person, place, and time. No cranial nerve deficit. Coordination normal.   Skin: Skin is warm and dry. No bruising and no rash noted. He is not diaphoretic. No cyanosis. No pallor. Nails show no clubbing.   Psychiatric: He has a normal mood and affect. Thought content normal.   Vitals reviewed.        Lab Results   Component Value Date    WBC 15.38 (H) 09/03/2018    HGB 13.5 (L) 09/03/2018    HCT 41.9 09/03/2018    MCV 89 09/03/2018     09/03/2018     Lab Results   Component Value Date    CREATININE 1.1 09/03/2018    BUN 31 (H) 09/03/2018     09/03/2018    K 4.6 09/03/2018    CL 98 09/03/2018    CO2 26 09/03/2018     Lab Results   Component Value Date    ALT 20 09/03/2018    AST 12 09/03/2018    ALKPHOS 95 09/03/2018    BILITOT 0.3 09/03/2018         Assessment:       72-year-old gentleman status post recent hospitalization where he was noted to have left lower lung mass with extensive mediastinal lymphadenopathy, left adrenal nodule, an L1 lytic lesion consistent with metastatic lung cancer.  He underwent bronchoscopy/biopsy which was nondiagnostic on 09/02/2018.  PET scan performed on 09/17/2018 demonstrated extensive FDG avid mediastinal lymphadenopathy, single small subcentimeter pulmonary nodule within the right lung, metastatic lesion within the dome of the liver, and extensive osseous metastatic disease noted throughout  the vertebral column.  We will discuss the possibility of EBUS for tissue diagnosis versus CT-guided liver biopsy.  Plan diagnostic approach after pulmonary use PET scan    Metastatic malignancy involving mediastinum, liver, and extensive bone Mets:  --Pulmonary to review for EBUS  -- If EBUS is not feasible then will consider liver or vertebral bx

## 2018-09-19 ENCOUNTER — TELEPHONE (OUTPATIENT)
Dept: GYNECOLOGIC ONCOLOGY | Facility: CLINIC | Age: 73
End: 2018-09-19

## 2018-09-19 NOTE — TELEPHONE ENCOUNTER
----- Message from Winter Rushing sent at 9/19/2018 10:11 AM CDT -----  Contact: Belia (pt's daughter)   Belia called and stated that the pt needs a refill:    1. What is the name of the medication you are requesting? Norco   2. What is the dose? 5-325  3. How do you take the medication? Orally, topically, etc? Orally   4. How often do you take this medication? One every 8 hours   5. Do you need a 30 day or 90 day supply? 30 day   6. How many refills are you requesting?  7. What is your preferred pharmacy and location of the pharmacy?   [facility] [address] [place] [address] [address] AT [facility] [address] [facility]  [address]  [facility] [address]  Phone: [phone] Fax: [phone]  8. Who can we contact with further questions? She can be reached at [phone].    Thanks,  TF

## 2018-09-20 DIAGNOSIS — C34.92 METASTATIC LUNG CANCER (METASTASIS FROM LUNG TO OTHER SITE), LEFT: Primary | ICD-10-CM

## 2018-09-20 RX ORDER — HYDROCODONE BITARTRATE AND ACETAMINOPHEN 5; 325 MG/1; MG/1
1 TABLET ORAL EVERY 6 HOURS PRN
Qty: 60 TABLET | Refills: 0 | Status: SHIPPED | OUTPATIENT
Start: 2018-09-20 | End: 2018-10-12 | Stop reason: SDUPTHER

## 2018-09-21 ENCOUNTER — NURSE TRIAGE (OUTPATIENT)
Dept: ADMINISTRATIVE | Facility: CLINIC | Age: 73
End: 2018-09-21

## 2018-09-21 ENCOUNTER — HOSPITAL ENCOUNTER (INPATIENT)
Facility: HOSPITAL | Age: 73
LOS: 3 days | Discharge: HOME HEALTH | DRG: 988 | End: 2018-09-25
Attending: EMERGENCY MEDICINE | Admitting: INTERNAL MEDICINE
Payer: MEDICARE

## 2018-09-21 DIAGNOSIS — J96.21 ACUTE ON CHRONIC RESPIRATORY FAILURE WITH HYPOXIA: Primary | ICD-10-CM

## 2018-09-21 DIAGNOSIS — R06.02 SOB (SHORTNESS OF BREATH): ICD-10-CM

## 2018-09-21 DIAGNOSIS — J98.59 MASS OF MEDIASTINUM: ICD-10-CM

## 2018-09-21 DIAGNOSIS — B37.0 ORAL CANDIDIASIS: ICD-10-CM

## 2018-09-21 LAB
ALBUMIN SERPL BCP-MCNC: 3 G/DL
ALLENS TEST: ABNORMAL
ALP SERPL-CCNC: 152 U/L
ALT SERPL W/O P-5'-P-CCNC: 23 U/L
ANION GAP SERPL CALC-SCNC: 13 MMOL/L
APTT BLDCRRT: 29.9 SEC
AST SERPL-CCNC: 34 U/L
BASOPHILS # BLD AUTO: 0.01 K/UL
BASOPHILS NFR BLD: 0.1 %
BILIRUB SERPL-MCNC: 0.3 MG/DL
BILIRUB UR QL STRIP: NEGATIVE
BNP SERPL-MCNC: 25 PG/ML
BUN SERPL-MCNC: 16 MG/DL
CALCIUM SERPL-MCNC: 9.5 MG/DL
CHLORIDE SERPL-SCNC: 101 MMOL/L
CLARITY UR: CLEAR
CO2 SERPL-SCNC: 29 MMOL/L
COLOR UR: YELLOW
CREAT SERPL-MCNC: 0.8 MG/DL
DELSYS: ABNORMAL
DIFFERENTIAL METHOD: ABNORMAL
EOSINOPHIL # BLD AUTO: 0.1 K/UL
EOSINOPHIL NFR BLD: 0.9 %
ERYTHROCYTE [DISTWIDTH] IN BLOOD BY AUTOMATED COUNT: 14.9 %
EST. GFR  (AFRICAN AMERICAN): >60 ML/MIN/1.73 M^2
EST. GFR  (NON AFRICAN AMERICAN): >60 ML/MIN/1.73 M^2
FIO2: 36
FLOW: 3
GLUCOSE SERPL-MCNC: 145 MG/DL
GLUCOSE UR QL STRIP: NEGATIVE
HCO3 UR-SCNC: 31.2 MMOL/L (ref 24–28)
HCT VFR BLD AUTO: 41.7 %
HGB BLD-MCNC: 13.6 G/DL
HGB UR QL STRIP: NEGATIVE
INR PPP: 1
KETONES UR QL STRIP: NEGATIVE
LEUKOCYTE ESTERASE UR QL STRIP: NEGATIVE
LIPASE SERPL-CCNC: 23 U/L
LYMPHOCYTES # BLD AUTO: 1.2 K/UL
LYMPHOCYTES NFR BLD: 13.5 %
MCH RBC QN AUTO: 29.1 PG
MCHC RBC AUTO-ENTMCNC: 32.6 G/DL
MCV RBC AUTO: 89 FL
MODE: ABNORMAL
MONOCYTES # BLD AUTO: 0.5 K/UL
MONOCYTES NFR BLD: 5.8 %
NEUTROPHILS # BLD AUTO: 7.1 K/UL
NEUTROPHILS NFR BLD: 79.7 %
NITRITE UR QL STRIP: NEGATIVE
PCO2 BLDA: 44.4 MMHG (ref 35–45)
PH SMN: 7.46 [PH] (ref 7.35–7.45)
PH UR STRIP: 6 [PH] (ref 5–8)
PLATELET # BLD AUTO: 243 K/UL
PMV BLD AUTO: 9.3 FL
PO2 BLDA: 55 MMHG (ref 80–100)
POC BE: 7 MMOL/L
POC SATURATED O2: 89 % (ref 95–100)
POTASSIUM SERPL-SCNC: 4.2 MMOL/L
PROT SERPL-MCNC: 7.3 G/DL
PROT UR QL STRIP: NEGATIVE
PROTHROMBIN TIME: 10.7 SEC
RBC # BLD AUTO: 4.68 M/UL
SAMPLE: ABNORMAL
SITE: ABNORMAL
SODIUM SERPL-SCNC: 143 MMOL/L
SP GR UR STRIP: 1.02 (ref 1–1.03)
TROPONIN I SERPL DL<=0.01 NG/ML-MCNC: 0.01 NG/ML
URN SPEC COLLECT METH UR: NORMAL
UROBILINOGEN UR STRIP-ACNC: 1 EU/DL
WBC # BLD AUTO: 8.84 K/UL

## 2018-09-21 PROCEDURE — 83690 ASSAY OF LIPASE: CPT

## 2018-09-21 PROCEDURE — 93005 ELECTROCARDIOGRAM TRACING: CPT

## 2018-09-21 PROCEDURE — 36600 WITHDRAWAL OF ARTERIAL BLOOD: CPT

## 2018-09-21 PROCEDURE — 81003 URINALYSIS AUTO W/O SCOPE: CPT

## 2018-09-21 PROCEDURE — 85730 THROMBOPLASTIN TIME PARTIAL: CPT

## 2018-09-21 PROCEDURE — 99900035 HC TECH TIME PER 15 MIN (STAT)

## 2018-09-21 PROCEDURE — 85025 COMPLETE CBC W/AUTO DIFF WBC: CPT

## 2018-09-21 PROCEDURE — 96374 THER/PROPH/DIAG INJ IV PUSH: CPT

## 2018-09-21 PROCEDURE — 83880 ASSAY OF NATRIURETIC PEPTIDE: CPT

## 2018-09-21 PROCEDURE — 82550 ASSAY OF CK (CPK): CPT

## 2018-09-21 PROCEDURE — 80053 COMPREHEN METABOLIC PANEL: CPT

## 2018-09-21 PROCEDURE — 84484 ASSAY OF TROPONIN QUANT: CPT

## 2018-09-21 PROCEDURE — 99291 CRITICAL CARE FIRST HOUR: CPT | Mod: 25

## 2018-09-21 PROCEDURE — 93010 ELECTROCARDIOGRAM REPORT: CPT | Mod: ,,, | Performed by: INTERNAL MEDICINE

## 2018-09-21 PROCEDURE — 85610 PROTHROMBIN TIME: CPT

## 2018-09-21 PROCEDURE — 82553 CREATINE MB FRACTION: CPT

## 2018-09-21 PROCEDURE — 82803 BLOOD GASES ANY COMBINATION: CPT

## 2018-09-21 PROCEDURE — 83735 ASSAY OF MAGNESIUM: CPT

## 2018-09-21 PROCEDURE — 25500020 PHARM REV CODE 255: Performed by: EMERGENCY MEDICINE

## 2018-09-21 RX ORDER — METHYLPREDNISOLONE SOD SUCC 125 MG
80 VIAL (EA) INJECTION
Status: COMPLETED | OUTPATIENT
Start: 2018-09-22 | End: 2018-09-21

## 2018-09-21 RX ADMIN — IOHEXOL 100 ML: 350 INJECTION, SOLUTION INTRAVENOUS at 10:09

## 2018-09-21 RX ADMIN — METHYLPREDNISOLONE SODIUM SUCCINATE 80 MG: 125 INJECTION, POWDER, FOR SOLUTION INTRAMUSCULAR; INTRAVENOUS at 11:09

## 2018-09-22 PROBLEM — I48.92 ATRIAL FLUTTER: Chronic | Status: ACTIVE | Noted: 2017-12-04

## 2018-09-22 PROBLEM — E10.40 TYPE 1 DIABETES MELLITUS WITH DIABETIC NEUROPATHY: Chronic | Status: ACTIVE | Noted: 2018-04-27

## 2018-09-22 PROBLEM — J96.21 ACUTE ON CHRONIC RESPIRATORY FAILURE WITH HYPOXIA: Status: ACTIVE | Noted: 2018-09-22

## 2018-09-22 LAB
ANION GAP SERPL CALC-SCNC: 14 MMOL/L
BASOPHILS # BLD AUTO: 0.01 K/UL
BASOPHILS NFR BLD: 0.1 %
BUN SERPL-MCNC: 15 MG/DL
CALCIUM SERPL-MCNC: 9.3 MG/DL
CHLORIDE SERPL-SCNC: 98 MMOL/L
CHOLEST SERPL-MCNC: 121 MG/DL
CHOLEST/HDLC SERPL: 2.2 {RATIO}
CK MB SERPL-MCNC: 2 NG/ML
CK MB SERPL-RTO: 1.4 %
CK SERPL-CCNC: 148 U/L
CO2 SERPL-SCNC: 24 MMOL/L
CREAT SERPL-MCNC: 0.9 MG/DL
DIFFERENTIAL METHOD: ABNORMAL
EOSINOPHIL # BLD AUTO: 0 K/UL
EOSINOPHIL NFR BLD: 0.1 %
ERYTHROCYTE [DISTWIDTH] IN BLOOD BY AUTOMATED COUNT: 14.7 %
EST. GFR  (AFRICAN AMERICAN): >60 ML/MIN/1.73 M^2
EST. GFR  (NON AFRICAN AMERICAN): >60 ML/MIN/1.73 M^2
ESTIMATED AVG GLUCOSE: 197 MG/DL
GLUCOSE SERPL-MCNC: 359 MG/DL
HBA1C MFR BLD HPLC: 8.5 %
HCT VFR BLD AUTO: 39.6 %
HDLC SERPL-MCNC: 54 MG/DL
HDLC SERPL: 44.6 %
HGB BLD-MCNC: 13 G/DL
LDLC SERPL CALC-MCNC: 54.4 MG/DL
LYMPHOCYTES # BLD AUTO: 0.5 K/UL
LYMPHOCYTES NFR BLD: 5.9 %
MAGNESIUM SERPL-MCNC: 1.7 MG/DL
MCH RBC QN AUTO: 29 PG
MCHC RBC AUTO-ENTMCNC: 32.8 G/DL
MCV RBC AUTO: 88 FL
MONOCYTES # BLD AUTO: 0.1 K/UL
MONOCYTES NFR BLD: 1.1 %
NEUTROPHILS # BLD AUTO: 7 K/UL
NEUTROPHILS NFR BLD: 92.8 %
NONHDLC SERPL-MCNC: 67 MG/DL
PLATELET # BLD AUTO: 252 K/UL
PMV BLD AUTO: 9.7 FL
POCT GLUCOSE: 342 MG/DL (ref 70–110)
POCT GLUCOSE: 346 MG/DL (ref 70–110)
POCT GLUCOSE: 433 MG/DL (ref 70–110)
POCT GLUCOSE: 473 MG/DL (ref 70–110)
POTASSIUM SERPL-SCNC: 4.4 MMOL/L
RBC # BLD AUTO: 4.49 M/UL
SODIUM SERPL-SCNC: 136 MMOL/L
TRIGL SERPL-MCNC: 63 MG/DL
TROPONIN I SERPL DL<=0.01 NG/ML-MCNC: <0.006 NG/ML
TROPONIN I SERPL DL<=0.01 NG/ML-MCNC: <0.006 NG/ML
WBC # BLD AUTO: 7.59 K/UL

## 2018-09-22 PROCEDURE — 25000003 PHARM REV CODE 250: Performed by: NURSE PRACTITIONER

## 2018-09-22 PROCEDURE — 25000003 PHARM REV CODE 250: Performed by: EMERGENCY MEDICINE

## 2018-09-22 PROCEDURE — 94640 AIRWAY INHALATION TREATMENT: CPT

## 2018-09-22 PROCEDURE — 21400001 HC TELEMETRY ROOM

## 2018-09-22 PROCEDURE — 63600175 PHARM REV CODE 636 W HCPCS: Performed by: EMERGENCY MEDICINE

## 2018-09-22 PROCEDURE — 25000242 PHARM REV CODE 250 ALT 637 W/ HCPCS: Performed by: EMERGENCY MEDICINE

## 2018-09-22 PROCEDURE — 84484 ASSAY OF TROPONIN QUANT: CPT | Mod: 91

## 2018-09-22 PROCEDURE — 99900035 HC TECH TIME PER 15 MIN (STAT)

## 2018-09-22 PROCEDURE — 63600175 PHARM REV CODE 636 W HCPCS: Performed by: NURSE PRACTITIONER

## 2018-09-22 PROCEDURE — 80048 BASIC METABOLIC PNL TOTAL CA: CPT

## 2018-09-22 PROCEDURE — 27000190 HC CPAP FULL FACE MASK W/VALVE

## 2018-09-22 PROCEDURE — 25000003 PHARM REV CODE 250: Performed by: INTERNAL MEDICINE

## 2018-09-22 PROCEDURE — 25000242 PHARM REV CODE 250 ALT 637 W/ HCPCS: Performed by: INTERNAL MEDICINE

## 2018-09-22 PROCEDURE — 36415 COLL VENOUS BLD VENIPUNCTURE: CPT

## 2018-09-22 PROCEDURE — 94660 CPAP INITIATION&MGMT: CPT

## 2018-09-22 PROCEDURE — 27000221 HC OXYGEN, UP TO 24 HOURS

## 2018-09-22 PROCEDURE — 99223 1ST HOSP IP/OBS HIGH 75: CPT | Mod: ,,, | Performed by: INTERNAL MEDICINE

## 2018-09-22 PROCEDURE — 83036 HEMOGLOBIN GLYCOSYLATED A1C: CPT

## 2018-09-22 PROCEDURE — 85025 COMPLETE CBC W/AUTO DIFF WBC: CPT

## 2018-09-22 PROCEDURE — 25000242 PHARM REV CODE 250 ALT 637 W/ HCPCS: Performed by: NURSE PRACTITIONER

## 2018-09-22 PROCEDURE — 63600175 PHARM REV CODE 636 W HCPCS: Performed by: INTERNAL MEDICINE

## 2018-09-22 PROCEDURE — 80061 LIPID PANEL: CPT

## 2018-09-22 RX ORDER — FLECAINIDE ACETATE 50 MG/1
100 TABLET ORAL EVERY 12 HOURS
Status: DISCONTINUED | OUTPATIENT
Start: 2018-09-22 | End: 2018-09-25 | Stop reason: HOSPADM

## 2018-09-22 RX ORDER — GABAPENTIN 300 MG/1
600 CAPSULE ORAL 2 TIMES DAILY
Status: DISCONTINUED | OUTPATIENT
Start: 2018-09-22 | End: 2018-09-25 | Stop reason: HOSPADM

## 2018-09-22 RX ORDER — IBUPROFEN 200 MG
16 TABLET ORAL
Status: DISCONTINUED | OUTPATIENT
Start: 2018-09-22 | End: 2018-09-25 | Stop reason: HOSPADM

## 2018-09-22 RX ORDER — PANTOPRAZOLE SODIUM 40 MG/1
40 TABLET, DELAYED RELEASE ORAL DAILY
Status: DISCONTINUED | OUTPATIENT
Start: 2018-09-22 | End: 2018-09-25 | Stop reason: HOSPADM

## 2018-09-22 RX ORDER — GLUCAGON 1 MG
1 KIT INJECTION
Status: DISCONTINUED | OUTPATIENT
Start: 2018-09-22 | End: 2018-09-25 | Stop reason: HOSPADM

## 2018-09-22 RX ORDER — HYDROCODONE BITARTRATE AND ACETAMINOPHEN 5; 325 MG/1; MG/1
1 TABLET ORAL EVERY 6 HOURS PRN
Status: DISCONTINUED | OUTPATIENT
Start: 2018-09-22 | End: 2018-09-22 | Stop reason: SDUPTHER

## 2018-09-22 RX ORDER — IPRATROPIUM BROMIDE AND ALBUTEROL SULFATE 2.5; .5 MG/3ML; MG/3ML
3 SOLUTION RESPIRATORY (INHALATION) EVERY 6 HOURS
Status: DISCONTINUED | OUTPATIENT
Start: 2018-09-22 | End: 2018-09-25 | Stop reason: HOSPADM

## 2018-09-22 RX ORDER — BUDESONIDE 0.5 MG/2ML
0.5 INHALANT ORAL EVERY 12 HOURS
Status: DISCONTINUED | OUTPATIENT
Start: 2018-09-22 | End: 2018-09-25 | Stop reason: HOSPADM

## 2018-09-22 RX ORDER — DILTIAZEM HYDROCHLORIDE 180 MG/1
360 CAPSULE, COATED, EXTENDED RELEASE ORAL DAILY
Status: DISCONTINUED | OUTPATIENT
Start: 2018-09-22 | End: 2018-09-25 | Stop reason: HOSPADM

## 2018-09-22 RX ORDER — HYDROCODONE BITARTRATE AND ACETAMINOPHEN 5; 325 MG/1; MG/1
1 TABLET ORAL EVERY 6 HOURS PRN
Status: DISCONTINUED | OUTPATIENT
Start: 2018-09-22 | End: 2018-09-25 | Stop reason: HOSPADM

## 2018-09-22 RX ORDER — QUINAPRIL 20 MG/1
40 TABLET ORAL NIGHTLY
Status: DISCONTINUED | OUTPATIENT
Start: 2018-09-22 | End: 2018-09-23

## 2018-09-22 RX ORDER — HYDROCODONE BITARTRATE AND ACETAMINOPHEN 10; 325 MG/1; MG/1
1 TABLET ORAL EVERY 6 HOURS PRN
Status: DISCONTINUED | OUTPATIENT
Start: 2018-09-22 | End: 2018-09-25 | Stop reason: HOSPADM

## 2018-09-22 RX ORDER — NYSTATIN 100000 [USP'U]/ML
5 SUSPENSION ORAL 4 TIMES DAILY
Status: DISCONTINUED | OUTPATIENT
Start: 2018-09-22 | End: 2018-09-25 | Stop reason: HOSPADM

## 2018-09-22 RX ORDER — FLECAINIDE ACETATE 50 MG/1
100 TABLET ORAL EVERY 12 HOURS
Status: DISCONTINUED | OUTPATIENT
Start: 2018-09-22 | End: 2018-09-22

## 2018-09-22 RX ORDER — INSULIN ASPART 100 [IU]/ML
1-10 INJECTION, SOLUTION INTRAVENOUS; SUBCUTANEOUS
Status: DISCONTINUED | OUTPATIENT
Start: 2018-09-22 | End: 2018-09-25 | Stop reason: HOSPADM

## 2018-09-22 RX ORDER — IPRATROPIUM BROMIDE AND ALBUTEROL SULFATE 2.5; .5 MG/3ML; MG/3ML
3 SOLUTION RESPIRATORY (INHALATION)
Status: DISPENSED | OUTPATIENT
Start: 2018-09-22 | End: 2018-09-22

## 2018-09-22 RX ORDER — PRAVASTATIN SODIUM 20 MG/1
40 TABLET ORAL DAILY
Status: DISCONTINUED | OUTPATIENT
Start: 2018-09-22 | End: 2018-09-25 | Stop reason: HOSPADM

## 2018-09-22 RX ORDER — IBUPROFEN 200 MG
24 TABLET ORAL
Status: DISCONTINUED | OUTPATIENT
Start: 2018-09-22 | End: 2018-09-25 | Stop reason: HOSPADM

## 2018-09-22 RX ORDER — TRAMADOL HYDROCHLORIDE 50 MG/1
100 TABLET ORAL EVERY 12 HOURS PRN
Status: DISCONTINUED | OUTPATIENT
Start: 2018-09-22 | End: 2018-09-25 | Stop reason: HOSPADM

## 2018-09-22 RX ORDER — HYDROCODONE BITARTRATE AND ACETAMINOPHEN 5; 325 MG/1; MG/1
1 TABLET ORAL
Status: COMPLETED | OUTPATIENT
Start: 2018-09-22 | End: 2018-09-22

## 2018-09-22 RX ADMIN — FLECAINIDE ACETATE 100 MG: 50 TABLET ORAL at 05:09

## 2018-09-22 RX ADMIN — GABAPENTIN 600 MG: 300 CAPSULE ORAL at 09:09

## 2018-09-22 RX ADMIN — PANTOPRAZOLE SODIUM 40 MG: 40 TABLET, DELAYED RELEASE ORAL at 09:09

## 2018-09-22 RX ADMIN — NYSTATIN 500000 UNITS: 100000 SUSPENSION ORAL at 01:09

## 2018-09-22 RX ADMIN — NYSTATIN 500000 UNITS: 100000 SUSPENSION ORAL at 08:09

## 2018-09-22 RX ADMIN — NYSTATIN 500000 UNITS: 100000 SUSPENSION ORAL at 05:09

## 2018-09-22 RX ADMIN — QUINAPRIL 40 MG: 20 TABLET ORAL at 02:09

## 2018-09-22 RX ADMIN — INSULIN ASPART 8 UNITS: 100 INJECTION, SOLUTION INTRAVENOUS; SUBCUTANEOUS at 06:09

## 2018-09-22 RX ADMIN — IPRATROPIUM BROMIDE AND ALBUTEROL SULFATE 3 ML: .5; 3 SOLUTION RESPIRATORY (INHALATION) at 01:09

## 2018-09-22 RX ADMIN — NYSTATIN 500000 UNITS: 100000 SUSPENSION ORAL at 09:09

## 2018-09-22 RX ADMIN — INSULIN ASPART 10 UNITS: 100 INJECTION, SOLUTION INTRAVENOUS; SUBCUTANEOUS at 05:09

## 2018-09-22 RX ADMIN — GABAPENTIN 600 MG: 300 CAPSULE ORAL at 08:09

## 2018-09-22 RX ADMIN — BUDESONIDE 0.5 MG: 0.5 SUSPENSION RESPIRATORY (INHALATION) at 07:09

## 2018-09-22 RX ADMIN — INSULIN ASPART 10 UNITS: 100 INJECTION, SOLUTION INTRAVENOUS; SUBCUTANEOUS at 11:09

## 2018-09-22 RX ADMIN — INSULIN DETEMIR 50 UNITS: 100 INJECTION, SOLUTION SUBCUTANEOUS at 09:09

## 2018-09-22 RX ADMIN — DILTIAZEM HYDROCHLORIDE 360 MG: 180 CAPSULE, COATED, EXTENDED RELEASE ORAL at 09:09

## 2018-09-22 RX ADMIN — IPRATROPIUM BROMIDE AND ALBUTEROL SULFATE 3 ML: .5; 3 SOLUTION RESPIRATORY (INHALATION) at 12:09

## 2018-09-22 RX ADMIN — HYDROCODONE BITARTRATE AND ACETAMINOPHEN 1 TABLET: 10; 325 TABLET ORAL at 10:09

## 2018-09-22 RX ADMIN — IPRATROPIUM BROMIDE AND ALBUTEROL SULFATE 3 ML: .5; 3 SOLUTION RESPIRATORY (INHALATION) at 07:09

## 2018-09-22 RX ADMIN — INSULIN DETEMIR 20 UNITS: 100 INJECTION, SOLUTION SUBCUTANEOUS at 08:09

## 2018-09-22 RX ADMIN — HYDROCODONE BITARTRATE AND ACETAMINOPHEN 1 TABLET: 10; 325 TABLET ORAL at 03:09

## 2018-09-22 RX ADMIN — FLECAINIDE ACETATE 100 MG: 50 TABLET ORAL at 08:09

## 2018-09-22 RX ADMIN — INSULIN ASPART 4 UNITS: 100 INJECTION, SOLUTION INTRAVENOUS; SUBCUTANEOUS at 08:09

## 2018-09-22 RX ADMIN — HYDROCODONE BITARTRATE AND ACETAMINOPHEN 1 TABLET: 10; 325 TABLET ORAL at 11:09

## 2018-09-22 RX ADMIN — QUINAPRIL 40 MG: 20 TABLET ORAL at 08:09

## 2018-09-22 RX ADMIN — HYDROCODONE BITARTRATE AND ACETAMINOPHEN 1 TABLET: 5; 325 TABLET ORAL at 12:09

## 2018-09-22 RX ADMIN — PRAVASTATIN SODIUM 40 MG: 20 TABLET ORAL at 09:09

## 2018-09-22 NOTE — H&P
[facility] Hospital Medicine  History & Physical    Patient Name: Nico Garza Jr.  MRN: [id number]  Admission Date: 9/22/2018  Attending Physician: Vick Maldonado, *   Primary Care Provider: Tiffany Davis DO         Patient information was obtained from patient, relative(s), past medical records and ER records.     Subjective:     Principal Problem:Acute on chronic respiratory failure with hypoxia    Chief Complaint:   Chief Complaint   Patient presents with    Shortness of Breath     pt reports shortness of breath that causes SpO2 to drop into 70s; pt also c/o L sided abd        HPI: Mr. Garza is a 73yo male with a PMHx of newly diagnosed metastatic left lung CA s/p nondiagnostic bronchoscopy/biopsy on 9/2/18, COPD on chronic home O2, PAF/FL, HTN, HLD, Afib, COPD, DM, HTN, and DM 1, who presented to the ED with c/o SOB that has progressively worsened since ~5PM this evening.  Associated LUQ ABD pain.  Aggravated by lying flat, no alleviating factors.  Denies any CP, palpitations, cough, hemoptysis, PND, edema, ABD distention, N/V/D, dysuria, hematuria, weakness, back pain, HA, AMS, lightheadedness/dizziness, syncope, focal deficits, fever, or chills.  Patient's daughter reports his O2 sat has remained between 75-79% on his home 3L NC since 5PM tonight, and is unable to get oxygen sat above 79% with supplemental O2 or neb treatments.  Upon arrival to ED, patient's O2 sat was 87% on 3L NC.  ABGs resulted pH 7.455, pCO2 44.4, pO2 55, HCO3 31.2, SaO2 89% on 3L NC.  CTA of the chest showed no evidence of PE with no significant changes from recent PET scan on 9/17.  Patient received IV Solumedrol 80mg x 1 dose, and Duonebs x 3 with improvement in respiratory status.  Hospital Medicine was called for admission. Currently, patient appears comfortable in NAD.  O2 sat stable at 93% on 3L NC.  Patient recently had a PET scan on 9/17 that showed extensive FDG avid mediastinal lymphadenopathy,  single small subcentimeter pulmonary nodule within the right lung, metastatic lesion within the dome of the liver, and extensive osseous metastatic disease noted throughout the vertebral column.  He is followed by Dr. Lagos, and is scheduled for repeat CT guided biopsy on 9/25 with possible EBUS tissue diagnosis if needed.        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    DM (diabetes mellitus) 1996    Hyperlipidemia     Hypertension     Lung disease     Neuropathy, diabetic     Pancreatitis     Type 1 diabetes mellitus with diabetic neuropathy 4/27/2018       Past Surgical History:   Procedure Laterality Date    ABLATION N/A 12/4/2017    Performed by J[facility] Freire MD at [facility] CATH LAB    BICEPS TENDON REPAIR      CARDIOVERSION      CHOLECYSTECTOMY      PATELLA SURGERY      RADIOFREQUENCY ABLATION      ROTATOR CUFF REPAIR      TRANSESOPHAGEAL ECHOCARDIOGRAM (DILLAN) N/A 12/4/2017    Performed by Jaret Freire MD at [facility] CATH LAB       Review of patient's allergies indicates:   Allergen Reactions    Anoro ellipta [umeclidinium-vilanterol] Shortness Of Breath    Flomax [tamsulosin]      Dizzy         No current facility-administered medications on file prior to encounter.      Current Outpatient Medications on File Prior to Encounter   Medication Sig    albuterol-ipratropium (DUO-NEB) 2.5 mg-0.5 mg/3 mL nebulizer solution Take 3 mLs by nebulization every 6 (six) hours. Rescue    amoxicillin-clavulanate 875-125mg (AUGMENTIN) 875-125 mg per tablet Take 1 tablet by mouth 2 (two) times daily. for 10 days    b complex vitamins tablet Take 1 tablet by mouth once daily.    blood sugar diagnostic Strp 1 each by Misc.(Non-Drug; Combo Route) route 3 (three) times daily. Dispense preferred on insurance     "blood-glucose meter kit Use as instructed - preferred on insurance    diltiaZEM (CARDIZEM CD) 360 MG 24 hr capsule TAKE 1 CAPSULE(360 MG) BY MOUTH EVERY DAY    ELIQUIS 5 mg Tab TAKE 1 TABLET(5 MG) BY MOUTH TWICE DAILY    flecainide (TAMBOCOR) 100 MG Tab Take 1 tablet (100 mg total) by mouth every 12 (twelve) hours.    gabapentin (NEURONTIN) 300 MG capsule Take 2 capsules (600 mg total) by mouth 2 (two) times daily.    HYDROcodone-acetaminophen (NORCO) 5-325 mg per tablet Take 1 tablet by mouth every 6 (six) hours as needed for Pain.    insulin degludec (TRESIBA FLEXTOUCH U-200) 200 unit/mL (3 mL) InPn Inject 50 Units into the skin once daily.    insulin lispro (HUMALOG KWIKPEN) 100 unit/mL InPn pen ADMINISTER 17 UNITS UNDER THE SKIN THREE TIMES DAILY BEFORE MEALS (Patient taking differently: ADMINISTER 17 UNITS UNDER THE SKIN IN MORNING AND 25 UNITS NOON AND NIGHT)    insulin needles, disposable, 32 x 1/4 " Ndle Insulin injections four times per day.    multivitamin capsule Take 1 capsule by mouth once daily.    NEEDLES, DISPOSABLE (DISPOSABLE NEEDLES MISC) Inject 1 each into the skin 3 (three) times daily.    nystatin (MYCOSTATIN) 100,000 unit/mL suspension Take 5 mLs (500,000 Units total) by mouth 4 (four) times daily. for 10 days    omeprazole (PRILOSEC) 20 MG capsule Take 1 capsule (20 mg total) by mouth once daily.    pravastatin (PRAVACHOL) 40 MG tablet TAKE 1 TABLET DAILY (Patient taking differently: TAKE 1 TABLET HS)    predniSONE (DELTASONE) 20 MG tablet Take 1 tablet (20 mg total) by mouth once daily. 60 mg for 4 days,40 mg for 3 days,20 mg for 2 days,10 mg for 2 days. for 21 doses    quinapril (ACCUPRIL) 40 MG tablet Take 1 tablet (40 mg total) by mouth once daily. (Patient taking differently: Take 40 mg by mouth nightly. )    traMADol (ULTRAM) 50 mg tablet TAKE 2 TABLETS BY MOUTH ONCE DAILY AS NEEDED FOR PAIN    vitamin D 1000 units Tab Take 1,000 Units by mouth once daily.     Family " History     Problem Relation (Age of Onset)    Cancer Maternal Aunt    Cataracts Sister    Diabetes Mother, Sister, Sister, Sister    Heart attack Brother    Heart failure Brother    Hypertension Mother, Father    Stroke Mother, Father, Paternal Grandmother, Paternal Grandfather        Tobacco Use    Smoking status: Light Tobacco Smoker     Packs/day: 0.50     Types: Cigarettes     Start date: 1/1/1960     Last attempt to quit: 3/8/2015     Years since quitting: 3.5    Smokeless tobacco: Former User     Types: Snuff     Quit date: 10/7/1995   Substance and Sexual Activity    Alcohol use: No     Alcohol/week: 0.0 oz    Drug use: No    Sexual activity: Not on file     Review of Systems   Constitutional: Negative for chills, diaphoresis, fatigue, fever and unexpected weight change.   HENT: Negative for congestion, postnasal drip, rhinorrhea, sinus pressure, sore throat and trouble swallowing.    Eyes: Negative for visual disturbance.   Respiratory: Positive for shortness of breath. Negative for cough and wheezing.    Cardiovascular: Negative for chest pain, palpitations and leg swelling.   Gastrointestinal: Negative for abdominal distention, abdominal pain (LUQ), blood in stool, constipation, diarrhea, nausea and vomiting.   Genitourinary: Negative for decreased urine volume, difficulty urinating, dysuria, frequency, hematuria and urgency.   Musculoskeletal: Negative for arthralgias, back pain and myalgias.   Skin: Negative for pallor, rash and wound.   Neurological: Negative for dizziness, syncope, weakness, light-headedness, numbness and headaches.   Psychiatric/Behavioral: Negative for confusion. The patient is not nervous/anxious.    All other systems reviewed and are negative.    Objective:     Vital Signs (Most Recent):  Temp: 99.7 °F (37.6 °C) (09/21/18 2246)  Pulse: 98 (09/22/18 0018)  Resp: (!) 22 (09/22/18 0018)  BP: 126/68 (09/21/18 2258)  SpO2: (!) 91 % (09/22/18 0018) Vital Signs (24h Range):  Temp:   [99.5 °F (37.5 °C)-99.7 °F (37.6 °C)] 99.7 °F (37.6 °C)  Pulse:  [] 98  Resp:  [12-35] 22  SpO2:  [87 %-94 %] 91 %  BP: (126-139)/(66-68) 126/68     Weight: 84.5 kg (186 lb 4.6 oz)  Body mass index is 27.91 kg/m².    Physical Exam   Constitutional: He is oriented to person, place, and time. He appears well-developed and well-nourished. No distress.   HENT:   Head: Normocephalic and atraumatic.   Eyes: Conjunctivae are normal.   PERRL; EOM intact.   Neck: Normal range of motion. Neck supple. No JVD present.   Cardiovascular: Normal rate, regular rhythm, S1 normal, S2 normal and intact distal pulses.  No extrasystoles are present. Exam reveals no gallop.   No murmur heard.  Pulses:       Radial pulses are 2+ on the right side, and 2+ on the left side.        Dorsalis pedis pulses are 2+ on the right side, and 2+ on the left side.        Posterior tibial pulses are 2+ on the right side, and 2+ on the left side.   Pulmonary/Chest: Effort normal. No accessory muscle usage. Tachypnea noted. No respiratory distress. He has decreased breath sounds in the left lower field. He has no wheezes. He has no rhonchi. He has no rales.   Abdominal: Soft. Bowel sounds are normal. He exhibits no distension. There is tenderness (mild TTP) in the left upper quadrant. There is no rebound, no guarding and no CVA tenderness.   Musculoskeletal: Normal range of motion. He exhibits no edema, tenderness or deformity.   Neurological: He is alert and oriented to person, place, and time. No cranial nerve deficit or sensory deficit.   Skin: Skin is warm, dry and intact. Capillary refill takes less than 2 seconds. No rash noted. He is not diaphoretic. No cyanosis or erythema.   Psychiatric: He has a normal mood and affect. His speech is normal and behavior is normal. Cognition and memory are normal.   Nursing note and vitals reviewed.          Significant Labs:   Results for orders placed or performed during the hospital encounter of 09/21/18    CBC auto differential   Result Value Ref Range    WBC 8.84 3.90 - 12.70 K/uL    RBC 4.68 4.60 - 6.20 M/uL    Hemoglobin 13.6 (L) 14.0 - 18.0 g/dL    Hematocrit 41.7 40.0 - 54.0 %    MCV 89 82 - 98 fL    MCH 29.1 27.0 - 31.0 pg    MCHC 32.6 32.0 - 36.0 g/dL    RDW 14.9 (H) 11.5 - 14.5 %    Platelets 243 150 - 350 K/uL    MPV 9.3 9.2 - 12.9 fL    Gran # (ANC) 7.1 1.8 - 7.7 K/uL    Lymph # 1.2 1.0 - 4.8 K/uL    Mono # 0.5 0.3 - 1.0 K/uL    Eos # 0.1 0.0 - 0.5 K/uL    Baso # 0.01 0.00 - 0.20 K/uL    Gran% 79.7 (H) 38.0 - 73.0 %    Lymph% 13.5 (L) 18.0 - 48.0 %    Mono% 5.8 4.0 - 15.0 %    Eosinophil% 0.9 0.0 - 8.0 %    Basophil% 0.1 0.0 - 1.9 %    Differential Method Automated    Comprehensive metabolic panel   Result Value Ref Range    Sodium 143 136 - 145 mmol/L    Potassium 4.2 3.5 - 5.1 mmol/L    Chloride 101 95 - 110 mmol/L    CO2 29 23 - 29 mmol/L    Glucose 145 (H) 70 - 110 mg/dL    BUN, Bld 16 8 - 23 mg/dL    Creatinine 0.8 0.5 - 1.4 mg/dL    Calcium 9.5 8.7 - 10.5 mg/dL    Total Protein 7.3 6.0 - 8.4 g/dL    Albumin 3.0 (L) 3.5 - 5.2 g/dL    Total Bilirubin 0.3 0.1 - 1.0 mg/dL    Alkaline Phosphatase 152 (H) 55 - 135 U/L    AST 34 10 - 40 U/L    ALT 23 10 - 44 U/L    Anion Gap 13 8 - 16 mmol/L    eGFR if African American >60 >60 mL/min/1.73 m^2    eGFR if non African American >60 >60 mL/min/1.73 m^2   Protime-INR   Result Value Ref Range    Prothrombin Time 10.7 9.0 - 12.5 sec    INR 1.0 0.8 - 1.2   APTT   Result Value Ref Range    aPTT 29.9 21.0 - 32.0 sec   Brain natriuretic peptide   Result Value Ref Range    BNP 25 0 - 99 pg/mL   Troponin I   Result Value Ref Range    Troponin I 0.011 0.000 - 0.026 ng/mL   Urinalysis   Result Value Ref Range    Specimen UA Urine, Clean Catch     Color, UA Yellow Yellow, Straw, Norma    Appearance, UA Clear Clear    pH, UA 6.0 5.0 - 8.0    Specific Gravity, UA 1.025 1.005 - 1.030    Protein, UA Negative Negative    Glucose, UA Negative Negative    Ketones, UA Negative  Negative    Bilirubin (UA) Negative Negative    Occult Blood UA Negative Negative    Nitrite, UA Negative Negative    Urobilinogen, UA 1.0 <2.0 EU/dL    Leukocytes, UA Negative Negative   Lipase   Result Value Ref Range    Lipase 23 4 - 60 U/L   ISTAT PROCEDURE   Result Value Ref Range    POC PH 7.455 (H) 7.35 - 7.45    POC PCO2 44.4 35 - 45 mmHg    POC PO2 55 (LL) 80 - 100 mmHg    POC HCO3 31.2 (H) 24 - 28 mmol/L    POC BE 7 -2 to 2 mmol/L    POC SATURATED O2 89 (L) 95 - 100 %    Sample ARTERIAL     Site RB     Allens Test Pass     DelSys Nasal Can     Mode SPONT     Flow 3     FiO2 36       All pertinent labs within the past 24 hours have been reviewed.    Significant Imaging:   Imaging Results          CTA Chest Non-Coronary (PE Study) (Final result)  Result time 09/21/18 23:06:44    Final result by Elias Martinez MD (09/21/18 23:06:44)                 Impression:      No pulmonary embolus.    Left mediastinal mass encases the lingular pulmonary arteries and veins without occlusion.  No significant change compared to the previous PET-CT examination.  See the details above.    All CT scans at this facility are performed  using dose modulation techniques as appropriate to performed exam including the following:  automated exposure control; adjustment of mA and/or kV according to the patients size (this includes techniques or standardized protocols for targeted exams where dose is matched to indication/reason for exam: i.e. extremities or head);  iterative reconstruction technique.      Electronically signed by: Elias Martinez MD  Date:    09/21/2018  Time:    23:06             Narrative:    EXAMINATION:  CTA CHEST NON CORONARY    CLINICAL HISTORY:  Dyspnea, cardiac origin suspected; lung cancer.    TECHNIQUE:  Axial CTA images performed through the chest after the administration of 100 cc intravenous contrast. Maximum intensity projections were performed and interpreted.    COMPARISON:  PET-CT performed  09/17/2018    FINDINGS:  Stable left anterior and middle mediastinal mass corresponding to known lung cancer that measures up to 8.2 cm in greatest dimension on today's study.  The mass encases the lingular pulmonary arteries and veins.  No pulmonary embolus identified.  Right paratracheal, subcarinal and right hilar lymphadenopathy is stable.  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  There is persistent compressive left basilar atelectasis that is unchanged from the previous PET CT.  Stable right upper lobe pulmonary nodule.  Right basilar subsegmental atelectasis.  Severe emphysema.    Aortic atherosclerosis and coronary artery calcifications.  The heart size is within normal limits.    No effusions.  No pneumothorax.    Benign left upper pole renal cyst.    Known osseous metastatic disease including a destructive L1 vertebral body lesion.                               X-Ray Chest AP Portable (Final result)  Result time 09/21/18 21:41:16    Final result by Vincent Fried MD (Timothy) (09/21/18 21:41:16)                 Impression:      Stable chest with chronic elevation of the right hemidiaphragm and adjacent discoid atelectasis.      Electronically signed by: Vincent Fried MD  Date:    09/21/2018  Time:    21:41             Narrative:    EXAMINATION:  XR CHEST AP PORTABLE    CLINICAL HISTORY:  Shortness of breath    TECHNIQUE:  Single frontal view of the chest was performed.    COMPARISON:  09/02/2018.    FINDINGS:  Stable heart size.  Stable chronic elevation left hemidiaphragm with adjacent discoid atelectasis.  Right lung remains relatively clear.                               I have reviewed all pertinent imaging results/findings within the past 24 hours.     EKG: (personally reviewed)  Sinus rhythm with first-degree AV block.  No significant changes from previous tracings.          Assessment/Plan:     * Acute on chronic respiratory failure with hypoxia    - Currently, O2 sat stable  on 3L NC.  Continue oxygen supplementation.  Duonebs Q6 hours.  IV steroids given in ED.  - Pulmonology consult in AM.        Metastatic left lung cancer (metastasis from lung to other site) with mediastinal lymphadenopathy    - CTA chest showed no evidence of pulmonary embolus, left mediastinal mass encases the lingular pulmonary arteries and veins without occlusion, no significant change compared to the previous PET-CT examination.   - s/p nondiagnostic bronchoscopy/biopsy on 09/02/2018.  PET scan performed on 09/17/2018 demonstrated extensive FDG avid mediastinal lymphadenopathy, single small subcentimeter pulmonary nodule within the right lung, metastatic lesion within the dome of the liver, and extensive osseous metastatic disease noted throughout the vertebral column.  - Plans for repeat CT guided biopsy on 9/25 with possible EBUS tissue diagnosis if needed.  - Oncology and Pulmonology consult in AM.         Type 1 diabetes mellitus with diabetic neuropathy    - Continue basal insulin 50 units daily.  - Accuchecks with moderate SSI.  - Diabetic diet.  - Check HbA1c.        Paroxysmal atrial flutter    - Sinus rhythm at present, monitor telemetry.  - Continue home diltiazem and flecainide for HR/rhythm suppression.  - Eliquis has been on hold for planned procedure/biopsy, will continue to hold.        Hypertension goal BP (blood pressure) < 130/80    - BP stable.  - Continue home CCB and ACEi.        COPD (chronic obstructive pulmonary disease) with emphysema    - See plan above.          VTE Risk Mitigation (From admission, onward)        Ordered     Place sequential compression device  Until discontinued      09/22/18 0033             Chelsey Farah NP  Department of Hospital Medicine   [facility]

## 2018-09-22 NOTE — ASSESSMENT & PLAN NOTE
- Sinus rhythm at present, monitor telemetry.  - Continue home diltiazem and flecainide for HR/rhythm suppression.  - Eliquis has been on hold for planned procedure/biopsy, will continue to hold.

## 2018-09-22 NOTE — SUBJECTIVE & OBJECTIVE
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    DM (diabetes mellitus) 1996    Hyperlipidemia     Hypertension     Lung disease     Neuropathy, diabetic     Pancreatitis     Type 1 diabetes mellitus with diabetic neuropathy 4/27/2018       Past Surgical History:   Procedure Laterality Date    ABLATION N/A 12/4/2017    Performed by J[facility] Freire MD at [facility] CATH LAB    BICEPS TENDON REPAIR      CARDIOVERSION      CHOLECYSTECTOMY      PATELLA SURGERY      RADIOFREQUENCY ABLATION      ROTATOR CUFF REPAIR      TRANSESOPHAGEAL ECHOCARDIOGRAM (DILLAN) N/A 12/4/2017    Performed by Jaret Freire MD at [facility] CATH LAB       Review of patient's allergies indicates:   Allergen Reactions    Anoro ellipta [umeclidinium-vilanterol] Shortness Of Breath    Flomax [tamsulosin]      Dizzy         No current facility-administered medications on file prior to encounter.      Current Outpatient Medications on File Prior to Encounter   Medication Sig    albuterol-ipratropium (DUO-NEB) 2.5 mg-0.5 mg/3 mL nebulizer solution Take 3 mLs by nebulization every 6 (six) hours. Rescue    amoxicillin-clavulanate 875-125mg (AUGMENTIN) 875-125 mg per tablet Take 1 tablet by mouth 2 (two) times daily. for 10 days    b complex vitamins tablet Take 1 tablet by mouth once daily.    blood sugar diagnostic Strp 1 each by Misc.(Non-Drug; Combo Route) route 3 (three) times daily. Dispense preferred on insurance    blood-glucose meter kit Use as instructed - preferred on insurance    diltiaZEM (CARDIZEM CD) 360 MG 24 hr capsule TAKE 1 CAPSULE(360 MG) BY MOUTH EVERY DAY    ELIQUIS 5 mg Tab TAKE 1 TABLET(5 MG) BY MOUTH TWICE DAILY    flecainide (TAMBOCOR) 100 MG Tab Take 1 tablet (100 mg total) by mouth every 12 (twelve) hours.     "gabapentin (NEURONTIN) 300 MG capsule Take 2 capsules (600 mg total) by mouth 2 (two) times daily.    HYDROcodone-acetaminophen (NORCO) 5-325 mg per tablet Take 1 tablet by mouth every 6 (six) hours as needed for Pain.    insulin degludec (TRESIBA FLEXTOUCH U-200) 200 unit/mL (3 mL) InPn Inject 50 Units into the skin once daily.    insulin lispro (HUMALOG KWIKPEN) 100 unit/mL InPn pen ADMINISTER 17 UNITS UNDER THE SKIN THREE TIMES DAILY BEFORE MEALS (Patient taking differently: ADMINISTER 17 UNITS UNDER THE SKIN IN MORNING AND 25 UNITS NOON AND NIGHT)    insulin needles, disposable, 32 x 1/4 " Ndle Insulin injections four times per day.    multivitamin capsule Take 1 capsule by mouth once daily.    NEEDLES, DISPOSABLE (DISPOSABLE NEEDLES MISC) Inject 1 each into the skin 3 (three) times daily.    nystatin (MYCOSTATIN) 100,000 unit/mL suspension Take 5 mLs (500,000 Units total) by mouth 4 (four) times daily. for 10 days    omeprazole (PRILOSEC) 20 MG capsule Take 1 capsule (20 mg total) by mouth once daily.    pravastatin (PRAVACHOL) 40 MG tablet TAKE 1 TABLET DAILY (Patient taking differently: TAKE 1 TABLET HS)    predniSONE (DELTASONE) 20 MG tablet Take 1 tablet (20 mg total) by mouth once daily. 60 mg for 4 days,40 mg for 3 days,20 mg for 2 days,10 mg for 2 days. for 21 doses    quinapril (ACCUPRIL) 40 MG tablet Take 1 tablet (40 mg total) by mouth once daily. (Patient taking differently: Take 40 mg by mouth nightly. )    traMADol (ULTRAM) 50 mg tablet TAKE 2 TABLETS BY MOUTH ONCE DAILY AS NEEDED FOR PAIN    vitamin D 1000 units Tab Take 1,000 Units by mouth once daily.     Family History     Problem Relation (Age of Onset)    Cancer Maternal Aunt    Cataracts Sister    Diabetes Mother, Sister, Sister, Sister    Heart attack Brother    Heart failure Brother    Hypertension Mother, Father    Stroke Mother, Father, Paternal Grandmother, Paternal Grandfather        Tobacco Use    Smoking status: Light " Tobacco Smoker     Packs/day: 0.50     Types: Cigarettes     Start date: 1/1/1960     Last attempt to quit: 3/8/2015     Years since quitting: 3.5    Smokeless tobacco: Former User     Types: Snuff     Quit date: 10/7/1995   Substance and Sexual Activity    Alcohol use: No     Alcohol/week: 0.0 oz    Drug use: No    Sexual activity: Not on file     Review of Systems   Constitutional: Negative for chills, diaphoresis, fatigue, fever and unexpected weight change.   HENT: Negative for congestion, postnasal drip, rhinorrhea, sinus pressure, sore throat and trouble swallowing.    Eyes: Negative for visual disturbance.   Respiratory: Positive for shortness of breath. Negative for cough and wheezing.    Cardiovascular: Negative for chest pain, palpitations and leg swelling.   Gastrointestinal: Negative for abdominal distention, abdominal pain (LUQ), blood in stool, constipation, diarrhea, nausea and vomiting.   Genitourinary: Negative for decreased urine volume, difficulty urinating, dysuria, frequency, hematuria and urgency.   Musculoskeletal: Negative for arthralgias, back pain and myalgias.   Skin: Negative for pallor, rash and wound.   Neurological: Negative for dizziness, syncope, weakness, light-headedness, numbness and headaches.   Psychiatric/Behavioral: Negative for confusion. The patient is not nervous/anxious.    All other systems reviewed and are negative.    Objective:     Vital Signs (Most Recent):  Temp: 99.7 °F (37.6 °C) (09/21/18 2246)  Pulse: 98 (09/22/18 0018)  Resp: (!) 22 (09/22/18 0018)  BP: 126/68 (09/21/18 2258)  SpO2: (!) 91 % (09/22/18 0018) Vital Signs (24h Range):  Temp:  [99.5 °F (37.5 °C)-99.7 °F (37.6 °C)] 99.7 °F (37.6 °C)  Pulse:  [] 98  Resp:  [12-35] 22  SpO2:  [87 %-94 %] 91 %  BP: (126-139)/(66-68) 126/68     Weight: 84.5 kg (186 lb 4.6 oz)  Body mass index is 27.91 kg/m².    Physical Exam   Constitutional: He is oriented to person, place, and time. He appears well-developed  and well-nourished. No distress.   HENT:   Head: Normocephalic and atraumatic.   Eyes: Conjunctivae are normal.   PERRL; EOM intact.   Neck: Normal range of motion. Neck supple. No JVD present.   Cardiovascular: Normal rate, regular rhythm, S1 normal, S2 normal and intact distal pulses.  No extrasystoles are present. Exam reveals no gallop.   No murmur heard.  Pulses:       Radial pulses are 2+ on the right side, and 2+ on the left side.        Dorsalis pedis pulses are 2+ on the right side, and 2+ on the left side.        Posterior tibial pulses are 2+ on the right side, and 2+ on the left side.   Pulmonary/Chest: Effort normal. No accessory muscle usage. Tachypnea noted. No respiratory distress. He has decreased breath sounds in the left lower field. He has no wheezes. He has no rhonchi. He has no rales.   Abdominal: Soft. Bowel sounds are normal. He exhibits no distension. There is tenderness (mild TTP) in the left upper quadrant. There is no rebound, no guarding and no CVA tenderness.   Musculoskeletal: Normal range of motion. He exhibits no edema, tenderness or deformity.   Neurological: He is alert and oriented to person, place, and time. No cranial nerve deficit or sensory deficit.   Skin: Skin is warm, dry and intact. Capillary refill takes less than 2 seconds. No rash noted. He is not diaphoretic. No cyanosis or erythema.   Psychiatric: He has a normal mood and affect. His speech is normal and behavior is normal. Cognition and memory are normal.   Nursing note and vitals reviewed.          Significant Labs:   Results for orders placed or performed during the hospital encounter of 09/21/18   CBC auto differential   Result Value Ref Range    WBC 8.84 3.90 - 12.70 K/uL    RBC 4.68 4.60 - 6.20 M/uL    Hemoglobin 13.6 (L) 14.0 - 18.0 g/dL    Hematocrit 41.7 40.0 - 54.0 %    MCV 89 82 - 98 fL    MCH 29.1 27.0 - 31.0 pg    MCHC 32.6 32.0 - 36.0 g/dL    RDW 14.9 (H) 11.5 - 14.5 %    Platelets 243 150 - 350 K/uL     MPV 9.3 9.2 - 12.9 fL    Gran # (ANC) 7.1 1.8 - 7.7 K/uL    Lymph # 1.2 1.0 - 4.8 K/uL    Mono # 0.5 0.3 - 1.0 K/uL    Eos # 0.1 0.0 - 0.5 K/uL    Baso # 0.01 0.00 - 0.20 K/uL    Gran% 79.7 (H) 38.0 - 73.0 %    Lymph% 13.5 (L) 18.0 - 48.0 %    Mono% 5.8 4.0 - 15.0 %    Eosinophil% 0.9 0.0 - 8.0 %    Basophil% 0.1 0.0 - 1.9 %    Differential Method Automated    Comprehensive metabolic panel   Result Value Ref Range    Sodium 143 136 - 145 mmol/L    Potassium 4.2 3.5 - 5.1 mmol/L    Chloride 101 95 - 110 mmol/L    CO2 29 23 - 29 mmol/L    Glucose 145 (H) 70 - 110 mg/dL    BUN, Bld 16 8 - 23 mg/dL    Creatinine 0.8 0.5 - 1.4 mg/dL    Calcium 9.5 8.7 - 10.5 mg/dL    Total Protein 7.3 6.0 - 8.4 g/dL    Albumin 3.0 (L) 3.5 - 5.2 g/dL    Total Bilirubin 0.3 0.1 - 1.0 mg/dL    Alkaline Phosphatase 152 (H) 55 - 135 U/L    AST 34 10 - 40 U/L    ALT 23 10 - 44 U/L    Anion Gap 13 8 - 16 mmol/L    eGFR if African American >60 >60 mL/min/1.73 m^2    eGFR if non African American >60 >60 mL/min/1.73 m^2   Protime-INR   Result Value Ref Range    Prothrombin Time 10.7 9.0 - 12.5 sec    INR 1.0 0.8 - 1.2   APTT   Result Value Ref Range    aPTT 29.9 21.0 - 32.0 sec   Brain natriuretic peptide   Result Value Ref Range    BNP 25 0 - 99 pg/mL   Troponin I   Result Value Ref Range    Troponin I 0.011 0.000 - 0.026 ng/mL   Urinalysis   Result Value Ref Range    Specimen UA Urine, Clean Catch     Color, UA Yellow Yellow, Straw, Norma    Appearance, UA Clear Clear    pH, UA 6.0 5.0 - 8.0    Specific Gravity, UA 1.025 1.005 - 1.030    Protein, UA Negative Negative    Glucose, UA Negative Negative    Ketones, UA Negative Negative    Bilirubin (UA) Negative Negative    Occult Blood UA Negative Negative    Nitrite, UA Negative Negative    Urobilinogen, UA 1.0 <2.0 EU/dL    Leukocytes, UA Negative Negative   Lipase   Result Value Ref Range    Lipase 23 4 - 60 U/L   ISTAT PROCEDURE   Result Value Ref Range    POC PH 7.455 (H) 7.35 - 7.45    POC  PCO2 44.4 35 - 45 mmHg    POC PO2 55 (LL) 80 - 100 mmHg    POC HCO3 31.2 (H) 24 - 28 mmol/L    POC BE 7 -2 to 2 mmol/L    POC SATURATED O2 89 (L) 95 - 100 %    Sample ARTERIAL     Site RB     Allens Test Pass     DelSys Nasal Can     Mode SPONT     Flow 3     FiO2 36       All pertinent labs within the past 24 hours have been reviewed.    Significant Imaging:   Imaging Results          CTA Chest Non-Coronary (PE Study) (Final result)  Result time 09/21/18 23:06:44    Final result by Elias Martinez MD (09/21/18 23:06:44)                 Impression:      No pulmonary embolus.    Left mediastinal mass encases the lingular pulmonary arteries and veins without occlusion.  No significant change compared to the previous PET-CT examination.  See the details above.    All CT scans at this facility are performed  using dose modulation techniques as appropriate to performed exam including the following:  automated exposure control; adjustment of mA and/or kV according to the patients size (this includes techniques or standardized protocols for targeted exams where dose is matched to indication/reason for exam: i.e. extremities or head);  iterative reconstruction technique.      Electronically signed by: Elias Martinez MD  Date:    09/21/2018  Time:    23:06             Narrative:    EXAMINATION:  CTA CHEST NON CORONARY    CLINICAL HISTORY:  Dyspnea, cardiac origin suspected; lung cancer.    TECHNIQUE:  Axial CTA images performed through the chest after the administration of 100 cc intravenous contrast. Maximum intensity projections were performed and interpreted.    COMPARISON:  PET-CT performed 09/17/2018    FINDINGS:  Stable left anterior and middle mediastinal mass corresponding to known lung cancer that measures up to 8.2 cm in greatest dimension on today's study.  The mass encases the lingular pulmonary arteries and veins.  No pulmonary embolus identified.  Right paratracheal, subcarinal and right hilar lymphadenopathy is  stable.  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  There is persistent compressive left basilar atelectasis that is unchanged from the previous PET CT.  Stable right upper lobe pulmonary nodule.  Right basilar subsegmental atelectasis.  Severe emphysema.    Aortic atherosclerosis and coronary artery calcifications.  The heart size is within normal limits.    No effusions.  No pneumothorax.    Benign left upper pole renal cyst.    Known osseous metastatic disease including a destructive L1 vertebral body lesion.                               X-Ray Chest AP Portable (Final result)  Result time 09/21/18 21:41:16    Final result by Vincent Fried MD (Timothy) (09/21/18 21:41:16)                 Impression:      Stable chest with chronic elevation of the right hemidiaphragm and adjacent discoid atelectasis.      Electronically signed by: Vincent Fried MD  Date:    09/21/2018  Time:    21:41             Narrative:    EXAMINATION:  XR CHEST AP PORTABLE    CLINICAL HISTORY:  Shortness of breath    TECHNIQUE:  Single frontal view of the chest was performed.    COMPARISON:  09/02/2018.    FINDINGS:  Stable heart size.  Stable chronic elevation left hemidiaphragm with adjacent discoid atelectasis.  Right lung remains relatively clear.                               I have reviewed all pertinent imaging results/findings within the past 24 hours.     EKG: (personally reviewed)  Sinus rhythm with first-degree AV block.  No significant changes from previous tracings.

## 2018-09-22 NOTE — SUBJECTIVE & OBJECTIVE
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    DM (diabetes mellitus) 1996    Hyperlipidemia     Hypertension     Lung disease     Neuropathy, diabetic     Pancreatitis     Type 1 diabetes mellitus with diabetic neuropathy 4/27/2018       Past Surgical History:   Procedure Laterality Date    ABLATION N/A 12/4/2017    Performed by [facility]ire MD at [facility] CATH LAB    BICEPS TENDON REPAIR      CARDIOVERSION      CHOLECYSTECTOMY      PATELLA SURGERY      RADIOFREQUENCY ABLATION      ROTATOR CUFF REPAIR      TRANSESOPHAGEAL ECHOCARDIOGRAM (DILLAN) N/A 12/4/2017    Performed by Jaret Freire MD at [facility] CATH LAB       Review of patient's allergies indicates:   Allergen Reactions    Anoro ellipta [umeclidinium-vilanterol] Shortness Of Breath    Flomax [tamsulosin]      Dizzy       Scheduled Meds:   albuterol-ipratropium  3 mL Nebulization Q6H    budesonide  0.5 mg Nebulization Q12H    diltiaZEM  360 mg Oral Daily    flecainide  100 mg Oral Q12H    gabapentin  600 mg Oral BID    insulin detemir U-100  50 Units Subcutaneous Daily    nystatin  5 mL Oral QID    pantoprazole  40 mg Oral Daily    pravastatin  40 mg Oral Daily    quinapril  40 mg Oral Nightly     Continuous Infusions:  PRN Meds:.dextrose 50%, dextrose 50%, glucagon (human recombinant), glucose, glucose, HYDROcodone-acetaminophen, HYDROcodone-acetaminophen, insulin aspart U-100, traMADol    Family History     Problem Relation (Age of Onset)    Cancer Maternal Aunt    Cataracts Sister    Diabetes Mother, Sister, Sister, Sister    Heart attack Brother    Heart failure Brother    Hypertension Mother, Father    Stroke Mother, Father, Paternal Grandmother, Paternal Grandfather        Tobacco Use    Smoking status: Light Tobacco Smoker      Packs/day: 0.50     Types: Cigarettes     Start date: 1/1/1960     Last attempt to quit: 3/8/2015     Years since quitting: 3.5    Smokeless tobacco: Former User     Types: Snuff     Quit date: 10/7/1995   Substance and Sexual Activity    Alcohol use: No     Alcohol/week: 0.0 oz    Drug use: No    Sexual activity: Not on file         Review of Systems   Constitutional: Negative for chills, diaphoresis, fatigue, fever and unexpected weight change.   HENT: Negative for congestion, postnasal drip, rhinorrhea, sinus pressure, sore throat and trouble swallowing.    Eyes: Negative for visual disturbance.   Respiratory: Positive for shortness of breath and wheezing. Negative for apnea, cough and chest tightness.    Cardiovascular: Negative for chest pain, palpitations and leg swelling.   Gastrointestinal: Negative for abdominal distention, abdominal pain (LUQ), blood in stool, constipation, diarrhea, nausea and vomiting.   Genitourinary: Negative for decreased urine volume, difficulty urinating, dysuria, frequency, hematuria and urgency.   Musculoskeletal: Positive for back pain. Negative for arthralgias, gait problem, joint swelling and myalgias.   Skin: Negative for pallor, rash and wound.   Neurological: Negative for dizziness, syncope, weakness, light-headedness, numbness and headaches.   Psychiatric/Behavioral: Negative for confusion. The patient is not nervous/anxious.    All other systems reviewed and are negative.    Objective:     Vital Signs (Most Recent):  Temp: 97.9 °F (36.6 °C) (09/22/18 1522)  Pulse: 90 (09/22/18 1522)  Resp: 18 (09/22/18 1522)  BP: (!) 109/51 (09/22/18 1522)  SpO2: (!) 92 % (09/22/18 1522) Vital Signs (24h Range):  Temp:  [96.5 °F (35.8 °C)-99.7 °F (37.6 °C)] 97.9 °F (36.6 °C)  Pulse:  [] 90  Resp:  [12-35] 18  SpO2:  [87 %-94 %] 92 %  BP: (103-139)/(51-72) 109/51     Weight: 84.5 kg (186 lb 4.6 oz)  Body mass index is 27.91 kg/m².      Intake/Output Summary (Last 24 hours) at  9/22/2018 1630  Last data filed at 9/22/2018 0400  Gross per 24 hour   Intake 240 ml   Output --   Net 240 ml       Physical Exam   Constitutional: He is oriented to person, place, and time. He appears well-developed and well-nourished. No distress.   HENT:   Head: Normocephalic and atraumatic.   Right Ear: External ear normal.   Nose: Nose normal.   Mouth/Throat: Oropharynx is clear and moist. No oropharyngeal exudate.   Eyes: Conjunctivae and EOM are normal. Pupils are equal, round, and reactive to light. Right eye exhibits no discharge. Left eye exhibits no discharge. No scleral icterus.   Neck: Normal range of motion. Neck supple. No JVD present. No tracheal deviation present. No thyromegaly present.   Cardiovascular: Normal rate and regular rhythm. Exam reveals no gallop and no friction rub.   No murmur heard.  Pulmonary/Chest: Effort normal. No stridor. No respiratory distress. He has wheezes. He has rales. He exhibits no tenderness.   Abdominal: Soft. Bowel sounds are normal. He exhibits no distension and no mass. There is no tenderness. There is no rebound.   Musculoskeletal: Normal range of motion. He exhibits no edema.   Lymphadenopathy:     He has no cervical adenopathy.   Neurological: He is alert and oriented to person, place, and time. No cranial nerve deficit. Coordination normal.   Skin: Skin is warm and dry. No rash noted. He is not diaphoretic. No erythema. No pallor.   Psychiatric: He has a normal mood and affect. Thought content normal.   Vitals reviewed.      Vents:  Oxygen Concentration (%): 36 (09/22/18 1300)    Lines/Drains/Airways     Peripheral Intravenous Line                 Peripheral IV - Single Lumen 09/21/18 2125 Left Antecubital less than 1 day                Significant Labs:    CBC/Anemia Profile:  Recent Labs   Lab  09/21/18   2117  09/22/18   0527   WBC  8.84  7.59   HGB  13.6*  13.0*   HCT  41.7  39.6*   PLT  243  252   MCV  89  88   RDW  14.9*  14.7*        Chemistries:  Recent  Labs   Lab  09/21/18 2117 09/22/18   0527   NA  143  136   K  4.2  4.4   CL  101  98   CO2  29  24   BUN  16  15   CREATININE  0.8  0.9   CALCIUM  9.5  9.3   ALBUMIN  3.0*   --    PROT  7.3   --    BILITOT  0.3   --    ALKPHOS  152*   --    ALT  23   --    AST  34   --    MG  1.7   --        ABGs:   Recent Labs   Lab  09/21/18   2140   PH  7.455*   PCO2  44.4   HCO3  31.2*   POCSATURATED  89*   BE  7     Bilirubin:   Recent Labs   Lab  08/31/18   0452  09/01/18   0443  09/02/18   0502  09/03/18   0435  09/21/18 2117   BILITOT  0.4  0.5  0.3  0.3  0.3     Coagulation:   Recent Labs   Lab  09/21/18 2117   INR  1.0   APTT  29.9       Pathology Results  (Last 10 years)    None        POCT Glucose:   Recent Labs   Lab  09/22/18   0607  09/22/18   1113   POCTGLUCOSE  342*  433*   Troponin:   Recent Labs   Lab  09/21/18 2117 09/22/18   0526  09/22/18 0918   TROPONINI  0.011  <0.006  <0.006     Urine Studies:   Recent Labs   Lab  09/21/18 2117   COLORU  Yellow   APPEARANCEUA  Clear   PHUR  6.0   SPECGRAV  1.025   PROTEINUA  Negative   GLUCUA  Negative   KETONESU  Negative   BILIRUBINUA  Negative   OCCULTUA  Negative   NITRITE  Negative   UROBILINOGEN  1.0   LEUKOCYTESUR  Negative       Significant Imaging:     NM PET CT Routine Skull to Mid Thigh ( 09/10/18)    Head/neck: There is normal physiologic uptake noted within the visualized brain parenchyma. No FDG avid adenopathy within the neck.    Chest: There is a single FDG avid pulmonary nodule within the right upper lung zone posteriorly that is subpleural in location and measures approximately 7 mm and demonstrates a SUV max of 2.3. there are numerous FDG avid mediastinal lymph nodes including the right paratracheal, subcarinal and prevascular region.  The largest conglomerate of mass/adenopathy is in the prevascular region.  There also some FDG avid lymph nodes seen adjacent to the superior vena cava/ascending aorta.These lymph nodes demonstrate an SUV max  ranging from 7.0-14.3. small left-sided pleural effusion noted.  Minimal consolidation also noted in the region of the left lung base.  No significant FDG uptake.    Abdomen/Pelvis: There is an FDG avid lesion within the dome of the liver right hepatic lobe segment 7 which demonstrates a SUV max of 5.0.The adrenals are unremarkable in appearance.  There is diverticulosis of the sigmoid colon.    Skeletal: There are numerous osseous metastatic lesions involving numerous levels within the thoracic and lumbar spine at and C7 level and multiple sites in the bony pelvis, left femoral neck and left femoral shaft.  Lesion involving the manubrium to the right of midline there are a couple of metastatic lesions also seen involving the scapula on the left.  There are a few lesions involving multiple ribs.  There is also at least 1 additional lesion involving the sternum more inferiorly.  Simple upper pole left renal cyst noted.  There also appear to be a couple of simple cysts associated with the upper pole to interpolar region of the right kidney.

## 2018-09-22 NOTE — ASSESSMENT & PLAN NOTE
Symptoms have significantly improved after steroids and respiratory treatments    --continue supportive care

## 2018-09-22 NOTE — ASSESSMENT & PLAN NOTE
Oxygen supplementation. Keep SAO2 > 92 %                              Continue with LABA, JAMMIE and ICS.        Continue IV glucocorticoids

## 2018-09-22 NOTE — ASSESSMENT & PLAN NOTE
- CTA chest showed no evidence of pulmonary embolus, left mediastinal mass encases the lingular pulmonary arteries and veins without occlusion, no significant change compared to the previous PET-CT examination.   - s/p nondiagnostic bronchoscopy/biopsy on 09/02/2018.  PET scan performed on 09/17/2018 demonstrated extensive FDG avid mediastinal lymphadenopathy, single small subcentimeter pulmonary nodule within the right lung, metastatic lesion within the dome of the liver, and extensive osseous metastatic disease noted throughout the vertebral column.  - Plans for repeat CT guided biopsy on 9/25 with possible EBUS tissue diagnosis if needed.  - Oncology and Pulmonology consult in AM.

## 2018-09-22 NOTE — ASSESSMENT & PLAN NOTE
- Continue basal insulin 50 units daily.  - Accuchecks with moderate SSI.  - Diabetic diet.  - Check HbA1c.

## 2018-09-22 NOTE — TELEPHONE ENCOUNTER
"Admit 8/30  pt's O2 level and dropped to  78-82...    Reason for Disposition   Patient sounds very sick or weak to the triager    Answer Assessment - Initial Assessment Questions  1. MAIN CONCERN OR SYMPTOM : "What's your main concern?" (e.g., low oxygen level, breathing difficulty) "What question do you have?"    No SOB  2. ONSET: "When did the  ________  start?"      Goes up to 93%, neb at 4pm - 94% after   3. OXYGEN THERAPY:     - "Do you currently use home oxygen?" (e.g., yes, no).     - If yes, "What is your oxygen source?" (e.g., O2 tank, O2 concentrator).     - If yes, "How do you get the oxygen?" (e.g., nasal prongs, face mask).     - If yes, "How much oxygen are you supposed to use?" (e.g., 1-2 L NC)    Yes, with 3L   4. PULSE OXIMETER:     - "Do you have a pulse oximeter (pulse ox)?"  (e.g., yes, no)     - If yes, "Where do you place the probe?" (e.g., fingertip, ear lobe)     80%  5. O2 MONITORING: "What is the oxygen level (pulse ox reading)?" (e.g., %)  6: VSS MONITORING "Do you monitor/measure your oxygen level or vital signs (e.g., yes, no, measurements are automatically sent to call center). Document CURRENT and NORMAL BASELINE values if available.      -  O2 SAT: "What is the oxygen level (pulse ox reading)?" (e.g., %)    -  P: "What is your pulse rate per minute?"    -  RR: "What is your respiratory rate per minute?"     No complaints - got up for meals   7. BREATHING DIFFICULTY: "Are you having any difficulty breathing?" If so, ask "How bad is it?"  (e.g., none, mild, moderate, severe)    - MILD: No SOB at rest, mild SOB with walking, speaks normally in sentences, able to lie down, no retractions, pulse < 100.    - MODERATE: SOB at rest, SOB with minimal exertion and prefers to sit, cannot lie down flat, speaks in phrases, mild retractions, audible wheezing, pulse 100-120.    - SEVERE: Very SOB at rest, speaks in single words, struggling to breathe, sitting hunched forward, " "retractions, pulse > 120      SOB with exertion   8. OTHER SYMPTOMS: "Do you have any other symptoms?" (e.g., fever, change in sputum)     No   9. SMOKING: "Do you smoke currently?" (Note: smoking around oxygen is dangerous!)    No    Protocols used:  COPD OXYGEN MONITORING AND [id number]  rec ED due to low O2 sat. Call back with questions.     "

## 2018-09-22 NOTE — ASSESSMENT & PLAN NOTE
Supplement oxygenation. Keep SAO2 >= 92%. Start BIPAP 10/5 QHS and PRN. Bronchodilators per orders

## 2018-09-22 NOTE — ED PROVIDER NOTES
SCRIBE #1 NOTE: I, Nimisha Guerrero, am scribing for, and in the presence of, Kee Ford MD. I have scribed the entire note.      History      Chief Complaint   Patient presents with    Shortness of Breath     pt reports shortness of breath that causes SpO2 to drop into 70s; pt also c/o L sided abd       Review of patient's allergies indicates:   Allergen Reactions    Anoro ellipta [umeclidinium-vilanterol] Shortness Of Breath    Flomax [tamsulosin]      Dizzy          HPI   HPI    9/21/2018, 8:57 PM   History obtained from the daughter and patient      History of Present Illness: Nico Garza Jr. is a 72 y.o. male patient with PMHx of Afib, COPD, DM, HTN, and lung cancer who presents to the Emergency Department for SOB which onset gradually at 5pm. Daughter reports patient is on 3L O2 at home. Daughter reports patient's SpO2 has been between 75-79% since 5pm. Symptoms are constant and moderate in severity. No mitigating factors reported. Sxs exacerbated when lying flat. Associated sxs include CP. Patient denies any fever, chills, leg swelling, cough, dizziness, N/V, extremity weakness/numbness, recent travel, long car rides, and all other sxs at this time. No further complaints or concerns at this time.     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    DM (diabetes mellitus) 1996    Hyperlipidemia     Hypertension     Lung disease     Neuropathy, diabetic     Pancreatitis     Type 1 diabetes mellitus with diabetic neuropathy 4/27/2018       Past Surgical History:  Past Surgical History:   Procedure Laterality Date    ABLATION N/A 12/4/2017    Performed by [facility] at [facility]    BICEPS TENDON REPAIR       CARDIOVERSION      CHOLECYSTECTOMY      PATELLA SURGERY      RADIOFREQUENCY ABLATION      ROTATOR CUFF REPAIR      TRANSESOPHAGEAL ECHOCARDIOGRAM (DILLAN) N/A 12/4/2017    Performed by [facility] at [facility] CATH LAB         Family History:  Family History   Problem Relation Age of Onset    Heart failure Brother     Heart attack Brother     Diabetes Mother     Hypertension Mother     Stroke Mother     Hypertension Father     Stroke Father     Diabetes Sister     Cataracts Sister     Cancer Maternal Aunt     Diabetes Sister     Diabetes Sister     Stroke Paternal Grandmother     Stroke Paternal Grandfather        Social History:  Social History     Tobacco Use    Smoking status: Light Tobacco Smoker     Packs/day: 0.50     Types: Cigarettes     Start date: 1/1/1960     Last attempt to quit: 3/8/2015     Years since quitting: 3.5    Smokeless tobacco: Former User     Types: Snuff     Quit date: 10/7/1995   Substance and Sexual Activity    Alcohol use: No     Alcohol/week: 0.0 oz    Drug use: No    Sexual activity: Unknown       ROS   Review of Systems   Constitutional: Negative for chills and fever.   HENT: Negative for sore throat.    Respiratory: Positive for shortness of breath. Negative for cough.    Cardiovascular: Positive for chest pain. Negative for leg swelling.   Gastrointestinal: Negative for nausea and vomiting.   Genitourinary: Negative for dysuria.   Musculoskeletal: Negative for back pain.   Skin: Negative for rash.   Neurological: Negative for dizziness, weakness and numbness.   Hematological: Does not bruise/bleed easily.   All other systems reviewed and are negative.      Physical Exam      Initial Vitals [09/21/18 2020]   BP Pulse Resp Temp SpO2   139/66 92 (!) 25 99.5 °F (37.5 °C) (!) 87 %      MAP       --          Physical Exam  Nursing Notes and Vital Signs Reviewed.  Constitutional: Patient is in no acute distress. Well-developed and well-nourished.  Head:  Atraumatic. Normocephalic.  Eyes: PERRL. EOM intact. Conjunctivae are not pale. No scleral icterus.  ENT: Mucous membranes are moist. Oropharynx is clear and symmetric.    Neck: Supple. Full ROM. No lymphadenopathy.  Cardiovascular: Regular rate. Regular rhythm. No murmurs, rubs, or gallops. Distal pulses are 2+ and symmetric. No JVD.  Pulmonary/Chest: No respiratory distress. Clear to auscultation bilaterally. No wheezing or rales.  Abdominal: Soft and non-distended.  LUQ TTP.  No rebound, guarding, or rigidity. Good bowel sounds.  Genitourinary: No CVA tenderness  Musculoskeletal: Moves all extremities. No obvious deformities. No edema. No calf tenderness.  Skin: Warm and dry.  Neurological:  Alert, awake, and appropriate.  Normal speech.  No acute focal neurological deficits are appreciated.  Psychiatric: Normal affect. Good eye contact. Appropriate in content.    ED Course    Critical Care  Date/Time: 9/21/2018 11:53 PM  Performed by: Kee Ford MD  Authorized by: Kee Ford MD   Direct patient critical care time: 8 minutes  Additional history critical care time: 8 minutes  Ordering / reviewing critical care time: 8 minutes  Documentation critical care time: 8 minutes  Consulting other physicians critical care time: 8 minutes  Total critical care time (exclusive of procedural time) : 40 minutes  Critical care time was exclusive of separately billable procedures and treating other patients.  Critical care was necessary to treat or prevent imminent or life-threatening deterioration of the following conditions: respiratory failure (hypoxia).  Critical care was time spent personally by me on the following activities: blood draw for specimens, development of treatment plan with patient or surrogate, discussions with consultants, interpretation of cardiac output measurements, evaluation of patient's response to treatment, examination of patient, obtaining history from patient or surrogate, ordering and  "performing treatments and interventions, ordering and review of laboratory studies, ordering and review of radiographic studies, re-evaluation of patient's condition and review of old charts.        ED Vital Signs:  Vitals:    09/21/18 2020 09/21/18 2100 09/21/18 2110 09/21/18 2140   BP: 139/66      Pulse: 92  89 84   Resp: (!) 25   (!) 35   Temp: 99.5 °F (37.5 °C)      TempSrc: Oral      SpO2: (!) 87% (!) 90%  (!) 90%   Weight: 84.5 kg (186 lb 4.6 oz)      Height: 5' 8.5" (1.74 m)       09/21/18 2153 09/21/18 2246 09/21/18 2258 09/21/18 2340   BP:   126/68    Pulse: 84  94 95   Resp: (!) 26  12 (!) 24   Temp:  99.7 °F (37.6 °C)     TempSrc:  Oral     SpO2: (!) 91%  (!) 90% (!) 94%   Weight:       Height:        09/22/18 0008 09/22/18 0011 09/22/18 0018 09/22/18 0121   BP: 122/72   (!) 122/58   Pulse: 100 101 98 103   Resp: (!) 22 (!) 22 (!) 22 (!) 21   Temp:       TempSrc:       SpO2: (!) 93% (!) 93% (!) 91% (!) 91%   Weight:       Height:        09/22/18 0201 09/22/18 0221 09/22/18 0301   BP: 110/68 119/72 (!) 104/57   Pulse: 102 105 101   Resp: (!) 23 (!) 24 (!) 22   Temp:   98 °F (36.7 °C)   TempSrc:   Oral   SpO2: (!) 91% (!) 91% (!) 91%   Weight:      Height:          Abnormal Lab Results:  Labs Reviewed   CBC W/ AUTO DIFFERENTIAL - Abnormal; Notable for the following components:       Result Value    Hemoglobin 13.6 (*)     RDW 14.9 (*)     Gran% 79.7 (*)     Lymph% 13.5 (*)     All other components within normal limits   COMPREHENSIVE METABOLIC PANEL - Abnormal; Notable for the following components:    Glucose 145 (*)     Albumin 3.0 (*)     Alkaline Phosphatase 152 (*)     All other components within normal limits   ISTAT PROCEDURE - Abnormal; Notable for the following components:    POC PH 7.455 (*)     POC PO2 55 (*)     POC HCO3 31.2 (*)     POC SATURATED O2 89 (*)     All other components within normal limits   CULTURE, RESPIRATORY   PROTIME-INR   APTT   B-TYPE NATRIURETIC PEPTIDE   TROPONIN I "   URINALYSIS   LIPASE   LIPASE   MAGNESIUM   CK   CK-MB   CK-MB   MAGNESIUM   CBC W/ AUTO DIFFERENTIAL   BASIC METABOLIC PANEL   HEMOGLOBIN A1C   TROPONIN I   LIPID PANEL   POCT GLUCOSE MONITORING CONTINUOUS        All Lab Results:  Results for orders placed or performed during the hospital encounter of 09/21/18   CBC auto differential   Result Value Ref Range    WBC 8.84 3.90 - 12.70 K/uL    RBC 4.68 4.60 - 6.20 M/uL    Hemoglobin 13.6 (L) 14.0 - 18.0 g/dL    Hematocrit 41.7 40.0 - 54.0 %    MCV 89 82 - 98 fL    MCH 29.1 27.0 - 31.0 pg    MCHC 32.6 32.0 - 36.0 g/dL    RDW 14.9 (H) 11.5 - 14.5 %    Platelets 243 150 - 350 K/uL    MPV 9.3 9.2 - 12.9 fL    Gran # (ANC) 7.1 1.8 - 7.7 K/uL    Lymph # 1.2 1.0 - 4.8 K/uL    Mono # 0.5 0.3 - 1.0 K/uL    Eos # 0.1 0.0 - 0.5 K/uL    Baso # 0.01 0.00 - 0.20 K/uL    Gran% 79.7 (H) 38.0 - 73.0 %    Lymph% 13.5 (L) 18.0 - 48.0 %    Mono% 5.8 4.0 - 15.0 %    Eosinophil% 0.9 0.0 - 8.0 %    Basophil% 0.1 0.0 - 1.9 %    Differential Method Automated    Comprehensive metabolic panel   Result Value Ref Range    Sodium 143 136 - 145 mmol/L    Potassium 4.2 3.5 - 5.1 mmol/L    Chloride 101 95 - 110 mmol/L    CO2 29 23 - 29 mmol/L    Glucose 145 (H) 70 - 110 mg/dL    BUN, Bld 16 8 - 23 mg/dL    Creatinine 0.8 0.5 - 1.4 mg/dL    Calcium 9.5 8.7 - 10.5 mg/dL    Total Protein 7.3 6.0 - 8.4 g/dL    Albumin 3.0 (L) 3.5 - 5.2 g/dL    Total Bilirubin 0.3 0.1 - 1.0 mg/dL    Alkaline Phosphatase 152 (H) 55 - 135 U/L    AST 34 10 - 40 U/L    ALT 23 10 - 44 U/L    Anion Gap 13 8 - 16 mmol/L    eGFR if African American >60 >60 mL/min/1.73 m^2    eGFR if non African American >60 >60 mL/min/1.73 m^2   Protime-INR   Result Value Ref Range    Prothrombin Time 10.7 9.0 - 12.5 sec    INR 1.0 0.8 - 1.2   APTT   Result Value Ref Range    aPTT 29.9 21.0 - 32.0 sec   Brain natriuretic peptide   Result Value Ref Range    BNP 25 0 - 99 pg/mL   Troponin I   Result Value Ref Range    Troponin I 0.011 0.000 -  0.026 ng/mL   Urinalysis   Result Value Ref Range    Specimen UA Urine, Clean Catch     Color, UA Yellow Yellow, Straw, Norma    Appearance, UA Clear Clear    pH, UA 6.0 5.0 - 8.0    Specific Gravity, UA 1.025 1.005 - 1.030    Protein, UA Negative Negative    Glucose, UA Negative Negative    Ketones, UA Negative Negative    Bilirubin (UA) Negative Negative    Occult Blood UA Negative Negative    Nitrite, UA Negative Negative    Urobilinogen, UA 1.0 <2.0 EU/dL    Leukocytes, UA Negative Negative   Lipase   Result Value Ref Range    Lipase 23 4 - 60 U/L   CK-MB   Result Value Ref Range     20 - 200 U/L    CPK MB 2.0 0.1 - 6.5 ng/mL    MB% 1.4 0.0 - 5.0 %   Magnesium   Result Value Ref Range    Magnesium 1.7 1.6 - 2.6 mg/dL   ISTAT PROCEDURE   Result Value Ref Range    POC PH 7.455 (H) 7.35 - 7.45    POC PCO2 44.4 35 - 45 mmHg    POC PO2 55 (LL) 80 - 100 mmHg    POC HCO3 31.2 (H) 24 - 28 mmol/L    POC BE 7 -2 to 2 mmol/L    POC SATURATED O2 89 (L) 95 - 100 %    Sample ARTERIAL     Site RB     Allens Test Pass     DelSys Nasal Can     Mode SPONT     Flow 3     FiO2 36        Imaging Results:  Imaging Results          CTA Chest Non-Coronary (PE Study) (Final result)  Result time 09/21/18 23:06:44    Final result by Elias Martinez MD (09/21/18 23:06:44)                 Impression:      No pulmonary embolus.    Left mediastinal mass encases the lingular pulmonary arteries and veins without occlusion.  No significant change compared to the previous PET-CT examination.  See the details above.    All CT scans at this facility are performed  using dose modulation techniques as appropriate to performed exam including the following:  automated exposure control; adjustment of mA and/or kV according to the patients size (this includes techniques or standardized protocols for targeted exams where dose is matched to indication/reason for exam: i.e. extremities or head);  iterative reconstruction technique.      Electronically  signed by: Elias Martinez MD  Date:    09/21/2018  Time:    23:06             Narrative:    EXAMINATION:  CTA CHEST NON CORONARY    CLINICAL HISTORY:  Dyspnea, cardiac origin suspected; lung cancer.    TECHNIQUE:  Axial CTA images performed through the chest after the administration of 100 cc intravenous contrast. Maximum intensity projections were performed and interpreted.    COMPARISON:  PET-CT performed 09/17/2018    FINDINGS:  Stable left anterior and middle mediastinal mass corresponding to known lung cancer that measures up to 8.2 cm in greatest dimension on today's study.  The mass encases the lingular pulmonary arteries and veins.  No pulmonary embolus identified.  Right paratracheal, subcarinal and right hilar lymphadenopathy is stable.  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  There is persistent compressive left basilar atelectasis that is unchanged from the previous PET CT.  Stable right upper lobe pulmonary nodule.  Right basilar subsegmental atelectasis.  Severe emphysema.    Aortic atherosclerosis and coronary artery calcifications.  The heart size is within normal limits.    No effusions.  No pneumothorax.    Benign left upper pole renal cyst.    Known osseous metastatic disease including a destructive L1 vertebral body lesion.                               X-Ray Chest AP Portable (Final result)  Result time 09/21/18 21:41:16    Final result by Vincent Fried MD (Timothy) (09/21/18 21:41:16)                 Impression:      Stable chest with chronic elevation of the right hemidiaphragm and adjacent discoid atelectasis.      Electronically signed by: Vincent Fried MD  Date:    09/21/2018  Time:    21:41             Narrative:    EXAMINATION:  XR CHEST AP PORTABLE    CLINICAL HISTORY:  Shortness of breath    TECHNIQUE:  Single frontal view of the chest was performed.    COMPARISON:  09/02/2018.    FINDINGS:  Stable heart size.  Stable chronic elevation left hemidiaphragm with  adjacent discoid atelectasis.  Right lung remains relatively clear.                                      The EKG was ordered, reviewed, and independently interpreted by the ED provider.  Interpretation time: 2123  Rate: 85 BPM  Rhythm: Sinus Rhythm with 1st degree AV block.  Interpretation: No acute ST and T wave abnormalites. No STEMI.       The Emergency Provider reviewed the vital signs and test results, which are outlined above.    ED Discussion     11:45 PM: Re-evaluated pt. I have discussed test results, shared treatment plan, and the need for admission with patient and family at bedside. Pt and family express understanding at this time and agree with all information. All questions answered. Pt and family have no further questions or concerns at this time. Pt is ready for admit.    12:06 AM: Discussed case with Dr. Hu ([facility]) who agrees with current care and management of pt and accepts admission.   Admitting Service: Hospital medicine   Admitting Physician: Dr. Hu  Admit to: Inpatient      ED Medication(s):  Medications   albuterol-ipratropium 2.5 mg-0.5 mg/3 mL nebulizer solution 3 mL ( Nebulization Canceled Entry 9/22/18 0025)   albuterol-ipratropium 2.5 mg-0.5 mg/3 mL nebulizer solution 3 mL (not administered)   diltiaZEM 24 hr capsule 360 mg (not administered)   gabapentin capsule 600 mg (not administered)   insulin detemir U-100 pen 50 Units (not administered)   nystatin 100,000 unit/mL suspension 500,000 Units (not administered)   pantoprazole EC tablet 40 mg (not administered)   pravastatin tablet 40 mg (not administered)   quinapril tablet 40 mg (40 mg Oral Given 9/22/18 0245)   glucose chewable tablet 16 g (not administered)   glucose chewable tablet 24 g (not administered)   dextrose 50% injection 12.5 g (not administered)   dextrose 50% injection 25 g (not administered)   glucagon (human recombinant) injection 1 mg (not administered)   insulin aspart U-100 pen 1-10 Units (not  administered)   HYDROcodone-acetaminophen 5-325 mg per tablet 1 tablet (not administered)   traMADol tablet 100 mg (not administered)   HYDROcodone-acetaminophen  mg per tablet 1 tablet (not administered)   flecainide tablet 100 mg (100 mg Oral Given 9/22/18 0500)   omnipaque 350 iohexol 100 mL (100 mLs Intravenous Given 9/21/18 2253)   methylPREDNISolone sodium succinate injection 80 mg (80 mg Intravenous Given 9/21/18 6308)   HYDROcodone-acetaminophen 5-325 mg per tablet 1 tablet (1 tablet Oral Given 9/22/18 0004)             Medical Decision Making    Medical Decision Making:   Clinical Tests:   Lab Tests: Ordered and Reviewed  Radiological Study: Ordered and Reviewed  Medical Tests: Ordered and Reviewed           Scribe Attestation:   Scribe #1: I performed the above scribed service and the documentation accurately describes the services I performed. I attest to the accuracy of the note.    Attending:   Physician Attestation Statement for Scribe #1: I, Kee Ford MD, personally performed the services described in this documentation, as scribed by Nimisha Guerrero, in my presence, and it is both accurate and complete.          Clinical Impression       ICD-10-CM ICD-9-CM   1. Acute on chronic respiratory failure with hypoxia J96.21 518.84     799.02   2. SOB (shortness of breath) R06.02 786.05   3. Mass of mediastinum J98.59 786.6       Disposition:   Disposition: Admitted  Condition: Fair         Kee Ford MD  09/22/18 1652

## 2018-09-22 NOTE — HOSPITAL COURSE
09/22: Admitted to Telemetry. Oxygen supplementation. Oncology consulted.   09/23: Doing well. Tolerated BIPAP overnight. Bone biopsy awaited.  9/24 bone biopsy cancelled - reasons not clear

## 2018-09-22 NOTE — SUBJECTIVE & OBJECTIVE
Oncology Treatment Plan:   [No treatment plan]    Medications:  Continuous Infusions:  Scheduled Meds:   albuterol-ipratropium  3 mL Nebulization Q6H    budesonide  0.5 mg Nebulization Q12H    diltiaZEM  360 mg Oral Daily    flecainide  100 mg Oral Q12H    gabapentin  600 mg Oral BID    insulin detemir U-100  50 Units Subcutaneous Daily    nystatin  5 mL Oral QID    pantoprazole  40 mg Oral Daily    pravastatin  40 mg Oral Daily    quinapril  40 mg Oral Nightly     PRN Meds:dextrose 50%, dextrose 50%, glucagon (human recombinant), glucose, glucose, HYDROcodone-acetaminophen, HYDROcodone-acetaminophen, insulin aspart U-100, traMADol     Review of Systems   Constitutional: Negative for chills, diaphoresis, fatigue, fever and unexpected weight change.   HENT: Negative for congestion, postnasal drip, rhinorrhea, sinus pressure, sore throat and trouble swallowing.    Eyes: Negative for visual disturbance.   Respiratory: Positive for shortness of breath and wheezing. Negative for apnea, cough and chest tightness.    Cardiovascular: Negative for chest pain, palpitations and leg swelling.   Gastrointestinal: Negative for abdominal distention, abdominal pain (LUQ), blood in stool, constipation, diarrhea, nausea and vomiting.   Genitourinary: Negative for decreased urine volume, difficulty urinating, dysuria, frequency, hematuria and urgency.   Musculoskeletal: Positive for back pain. Negative for arthralgias, gait problem, joint swelling and myalgias.   Skin: Negative for pallor, rash and wound.   Neurological: Negative for dizziness, syncope, weakness, light-headedness, numbness and headaches.   Psychiatric/Behavioral: Negative for confusion. The patient is not nervous/anxious.    All other systems reviewed and are negative.    Objective:     Vital Signs (Most Recent):  Temp: 96.5 °F (35.8 °C) (09/22/18 0739)  Pulse: 91 (09/22/18 0956)  Resp: 18 (09/22/18 0739)  BP: (!) 105/55 (09/22/18 0956)  SpO2: (!) 89 %  (09/22/18 5349) Vital Signs (24h Range):  Temp:  [96.5 °F (35.8 °C)-99.7 °F (37.6 °C)] 96.5 °F (35.8 °C)  Pulse:  [] 91  Resp:  [12-35] 18  SpO2:  [87 %-94 %] 89 %  BP: (103-139)/(55-72) 105/55     Weight: 84.5 kg (186 lb 4.6 oz)  Body mass index is 27.91 kg/m².  Body surface area is 2.02 meters squared.      Intake/Output Summary (Last 24 hours) at 9/22/2018 1037  Last data filed at 9/22/2018 0400  Gross per 24 hour   Intake 240 ml   Output --   Net 240 ml       Physical Exam   Constitutional: He is oriented to person, place, and time. He appears well-developed and well-nourished. No distress.   HENT:   Head: Normocephalic and atraumatic.   Right Ear: External ear normal.   Nose: Nose normal.   Mouth/Throat: Oropharynx is clear and moist. No oropharyngeal exudate.   Eyes: Conjunctivae and EOM are normal. Pupils are equal, round, and reactive to light. Right eye exhibits no discharge. Left eye exhibits no discharge. No scleral icterus.   Neck: Normal range of motion. Neck supple. No JVD present. No tracheal deviation present. No thyromegaly present.   Cardiovascular: Normal rate and regular rhythm. Exam reveals no gallop and no friction rub.   No murmur heard.  Pulmonary/Chest: Effort normal. No stridor. No respiratory distress. He has wheezes. He has rales. He exhibits no tenderness.   Abdominal: Soft. Bowel sounds are normal. He exhibits no distension and no mass. There is no tenderness. There is no rebound.   Musculoskeletal: Normal range of motion. He exhibits no edema.   Lymphadenopathy:     He has no cervical adenopathy.   Neurological: He is alert and oriented to person, place, and time. No cranial nerve deficit. Coordination normal.   Skin: Skin is warm and dry. No rash noted. He is not diaphoretic. No erythema. No pallor.   Psychiatric: He has a normal mood and affect. Thought content normal.   Vitals reviewed.      Significant Labs:   CBC:   Recent Labs   Lab  09/21/18 2117 09/22/18   0527   WBC   8.84  7.59   HGB  13.6*  13.0*   HCT  41.7  39.6*   PLT  243  252   , CMP:   Recent Labs   Lab  09/21/18 2117 09/22/18   0527   NA  143  136   K  4.2  4.4   CL  101  98   CO2  29  24   GLU  145*  359*   BUN  16  15   CREATININE  0.8  0.9   CALCIUM  9.5  9.3   PROT  7.3   --    ALBUMIN  3.0*   --    BILITOT  0.3   --    ALKPHOS  152*   --    AST  34   --    ALT  23   --    ANIONGAP  13  14   EGFRNONAA  >60  >60   , Coagulation:   Recent Labs   Lab  09/21/18 2117   INR  1.0   APTT  29.9   , Haptoglobin: No results for input(s): HAPTOGLOBIN in the last 48 hours., Immunology: No results for input(s): SPEP, JANAE, CARMEN, FREELAMBDALI in the last 48 hours. and LFTs:   Recent Labs   Lab  09/21/18 2117   ALT  23   AST  34   ALKPHOS  152*   BILITOT  0.3   PROT  7.3   ALBUMIN  3.0*       Diagnostic Results:  I have reviewed all pertinent imaging results/findings within the past 24 hours.

## 2018-09-22 NOTE — HPI
Mr. Garza is a 73yo male with a PMHx of newly diagnosed metastatic left lung CA s/p nondiagnostic bronchoscopy/biopsy on 9/2/18, COPD on chronic home O2, PAF/FL, HTN, HLD, Afib, COPD, DM, HTN, and DM 1, who presented to the ED with c/o SOB that has progressively worsened since ~5PM this evening.  Associated LUQ ABD pain.  Aggravated by lying flat, no alleviating factors.  Denies any CP, palpitations, cough, hemoptysis, PND, edema, ABD distention, N/V/D, dysuria, hematuria, weakness, back pain, HA, AMS, lightheadedness/dizziness, syncope, focal deficits, fever, or chills.  Patient's daughter reports his O2 sat has remained between 75-79% on his home 3L NC since 5PM tonight, and is unable to get oxygen sat above 79% with supplemental O2 or neb treatments.  Upon arrival to ED, patient's O2 sat was 87% on 3L NC.  ABGs resulted pH 7.455, pCO2 44.4, pO2 55, HCO3 31.2, SaO2 89% on 3L NC.  CTA of the chest showed no evidence of PE with no significant changes from recent PET scan on 9/17.  Patient received IV Solumedrol 80mg x 1 dose, and Duonebs x 3 with improvement in respiratory status.  Hospital Medicine was called for admission. Currently, patient appears comfortable in NAD.  O2 sat stable at 93% on 3L NC.  Patient recently had a PET scan on 9/17 that showed extensive FDG avid mediastinal lymphadenopathy, single small subcentimeter pulmonary nodule within the right lung, metastatic lesion within the dome of the liver, and extensive osseous metastatic disease noted throughout the vertebral column.  He is followed by Dr. Lagos, and is scheduled for repeat CT guided biopsy on 9/25 with possible EBUS tissue diagnosis if needed.

## 2018-09-22 NOTE — CONSULTS
[facility] -   Pulmonology  Consult Note    Patient Name: Nico Garza Jr.  MRN: [id number]  Admission Date: 9/21/2018  Hospital Length of Stay: 0 days  Code Status: Full Code  Attending Physician: Vick Maldonado, *  Primary Care Provider: Tiffany Davis DO   Principal Problem: Acute on chronic respiratory failure with hypoxia      Subjective:     HPI:  72 year old male who  has a past medical history of Arthritis, Atrial fibrillation and flutter, Atrial flutter, COPD (chronic obstructive pulmonary disease), COPD (chronic obstructive pulmonary disease) with emphysema (11/18/2013), Chronic respiratory failure on supplemental oxygen at 3  L/ min, DM (diabetes mellitus) (1996), Hyperlipidemia, Hypertension, Lung disease, Neuropathy, diabetic, Pancreatitis, and Type 1 diabetes mellitus with diabetic neuropathy (4/27/2018) admitted with progressive dyspnea and hypoxemia (pateint checks his oxygen saturation wit ha pulse oximeter at home on a regular basis and report a low reading in the high 70's).      He has suspected metastatic lung cancer. Bronchoscopy/biopsy on 09/02/2018 non diagnostic. Recent PET scan revealed =>  extensive FDG avid mediastinal adenopathy with the largest conglomerate of adenopathy/mass is noted in the prevascular region and a single subcentimeter FDG avid pulmonary nodule noted within the right lung, metastatic lesion noted within the dome of the liver and numerous osseous metastatic lesions.     Patient was scheduled for CT-guided biopsy of lumbar metastasis/ Liver mets.    In the interim he developed progressive dyspnea  yesterday and was admitted through the ER with hypoxemia and worsening shortness of breath.        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    DM (diabetes  mellitus) 1996    Hyperlipidemia     Hypertension     Lung disease     Neuropathy, diabetic     Pancreatitis     Type 1 diabetes mellitus with diabetic neuropathy 4/27/2018       Past Surgical History:   Procedure Laterality Date    ABLATION N/A 12/4/2017    Performed by [facility] at [facility] CATH LAB    BICEPS TENDON REPAIR      CARDIOVERSION      CHOLECYSTECTOMY      PATELLA SURGERY      RADIOFREQUENCY ABLATION      ROTATOR CUFF REPAIR      TRANSESOPHAGEAL ECHOCARDIOGRAM (DILLAN) N/A 12/4/2017    Performed by Jaret Freire MD at [facility] CATH LAB       Review of patient's allergies indicates:   Allergen Reactions    Anoro ellipta [umeclidinium-vilanterol] Shortness Of Breath    Flomax [tamsulosin]      Dizzy       Scheduled Meds:   albuterol-ipratropium  3 mL Nebulization Q6H    budesonide  0.5 mg Nebulization Q12H    diltiaZEM  360 mg Oral Daily    flecainide  100 mg Oral Q12H    gabapentin  600 mg Oral BID    insulin detemir U-100  50 Units Subcutaneous Daily    nystatin  5 mL Oral QID    pantoprazole  40 mg Oral Daily    pravastatin  40 mg Oral Daily    quinapril  40 mg Oral Nightly     Continuous Infusions:  PRN Meds:.dextrose 50%, dextrose 50%, glucagon (human recombinant), glucose, glucose, HYDROcodone-acetaminophen, HYDROcodone-acetaminophen, insulin aspart U-100, traMADol    Family History     Problem Relation (Age of Onset)    Cancer Maternal Aunt    Cataracts Sister    Diabetes Mother, Sister, Sister, Sister    Heart attack Brother    Heart failure Brother    Hypertension Mother, Father    Stroke Mother, Father, Paternal Grandmother, Paternal Grandfather        Tobacco Use    Smoking status: Light Tobacco Smoker     Packs/day: 0.50     Types: Cigarettes     Start date: 1/1/1960     Last attempt to quit: 3/8/2015     Years since quitting: 3.5    Smokeless tobacco: Former User     Types: Snuff     Quit date: 10/7/1995   Substance and Sexual Activity    Alcohol use: No      Alcohol/week: 0.0 oz    Drug use: No    Sexual activity: Not on file         Review of Systems   Constitutional: Negative for chills, diaphoresis, fatigue, fever and unexpected weight change.   HENT: Negative for congestion, postnasal drip, rhinorrhea, sinus pressure, sore throat and trouble swallowing.    Eyes: Negative for visual disturbance.   Respiratory: Positive for shortness of breath and wheezing. Negative for apnea, cough and chest tightness.    Cardiovascular: Negative for chest pain, palpitations and leg swelling.   Gastrointestinal: Negative for abdominal distention, abdominal pain (LUQ), blood in stool, constipation, diarrhea, nausea and vomiting.   Genitourinary: Negative for decreased urine volume, difficulty urinating, dysuria, frequency, hematuria and urgency.   Musculoskeletal: Positive for back pain. Negative for arthralgias, gait problem, joint swelling and myalgias.   Skin: Negative for pallor, rash and wound.   Neurological: Negative for dizziness, syncope, weakness, light-headedness, numbness and headaches.   Psychiatric/Behavioral: Negative for confusion. The patient is not nervous/anxious.    All other systems reviewed and are negative.    Objective:     Vital Signs (Most Recent):  Temp: 97.9 °F (36.6 °C) (09/22/18 1522)  Pulse: 90 (09/22/18 1522)  Resp: 18 (09/22/18 1522)  BP: (!) 109/51 (09/22/18 1522)  SpO2: (!) 92 % (09/22/18 1522) Vital Signs (24h Range):  Temp:  [96.5 °F (35.8 °C)-99.7 °F (37.6 °C)] 97.9 °F (36.6 °C)  Pulse:  [] 90  Resp:  [12-35] 18  SpO2:  [87 %-94 %] 92 %  BP: (103-139)/(51-72) 109/51     Weight: 84.5 kg (186 lb 4.6 oz)  Body mass index is 27.91 kg/m².      Intake/Output Summary (Last 24 hours) at 9/22/2018 1630  Last data filed at 9/22/2018 0400  Gross per 24 hour   Intake 240 ml   Output --   Net 240 ml       Physical Exam   Constitutional: He is oriented to person, place, and time. He appears well-developed and well-nourished. No distress.   HENT:    Head: Normocephalic and atraumatic.   Right Ear: External ear normal.   Nose: Nose normal.   Mouth/Throat: Oropharynx is clear and moist. No oropharyngeal exudate.   Eyes: Conjunctivae and EOM are normal. Pupils are equal, round, and reactive to light. Right eye exhibits no discharge. Left eye exhibits no discharge. No scleral icterus.   Neck: Normal range of motion. Neck supple. No JVD present. No tracheal deviation present. No thyromegaly present.   Cardiovascular: Normal rate and regular rhythm. Exam reveals no gallop and no friction rub.   No murmur heard.  Pulmonary/Chest: Effort normal. No stridor. No respiratory distress. He has wheezes. He has rales. He exhibits no tenderness.   Abdominal: Soft. Bowel sounds are normal. He exhibits no distension and no mass. There is no tenderness. There is no rebound.   Musculoskeletal: Normal range of motion. He exhibits no edema.   Lymphadenopathy:     He has no cervical adenopathy.   Neurological: He is alert and oriented to person, place, and time. No cranial nerve deficit. Coordination normal.   Skin: Skin is warm and dry. No rash noted. He is not diaphoretic. No erythema. No pallor.   Psychiatric: He has a normal mood and affect. Thought content normal.   Vitals reviewed.      Vents:  Oxygen Concentration (%): 36 (09/22/18 1300)    Lines/Drains/Airways     Peripheral Intravenous Line                 Peripheral IV - Single Lumen 09/21/18 2125 Left Antecubital less than 1 day                Significant Labs:    CBC/Anemia Profile:  Recent Labs   Lab  09/21/18 2117 09/22/18   0527   WBC  8.84  7.59   HGB  13.6*  13.0*   HCT  41.7  39.6*   PLT  243  252   MCV  89  88   RDW  14.9*  14.7*        Chemistries:  Recent Labs   Lab  09/21/18 2117 09/22/18   0527   NA  143  136   K  4.2  4.4   CL  101  98   CO2  29  24   BUN  16  15   CREATININE  0.8  0.9   CALCIUM  9.5  9.3   ALBUMIN  3.0*   --    PROT  7.3   --    BILITOT  0.3   --    ALKPHOS  152*   --    ALT  23   --     AST  34   --    MG  1.7   --        ABGs:   Recent Labs   Lab  09/21/18   2140   PH  7.455*   PCO2  44.4   HCO3  31.2*   POCSATURATED  89*   BE  7     Bilirubin:   Recent Labs   Lab  08/31/18   0452  09/01/18   0443  09/02/18   0502  09/03/18   0435  09/21/18 2117   BILITOT  0.4  0.5  0.3  0.3  0.3     Coagulation:   Recent Labs   Lab  09/21/18 2117   INR  1.0   APTT  29.9       Pathology Results  (Last 10 years)    None        POCT Glucose:   Recent Labs   Lab  09/22/18   0607  09/22/18   1113   POCTGLUCOSE  342*  433*   Troponin:   Recent Labs   Lab  09/21/18 2117 09/22/18   0526  09/22/18 0918   TROPONINI  0.011  <0.006  <0.006     Urine Studies:   Recent Labs   Lab  09/21/18 2117   COLORU  Yellow   APPEARANCEUA  Clear   PHUR  6.0   SPECGRAV  1.025   PROTEINUA  Negative   GLUCUA  Negative   KETONESU  Negative   BILIRUBINUA  Negative   OCCULTUA  Negative   NITRITE  Negative   UROBILINOGEN  1.0   LEUKOCYTESUR  Negative       Significant Imaging:     NM PET CT Routine Skull to Mid Thigh ( 09/10/18)    Head/neck: There is normal physiologic uptake noted within the visualized brain parenchyma. No FDG avid adenopathy within the neck.    Chest: There is a single FDG avid pulmonary nodule within the right upper lung zone posteriorly that is subpleural in location and measures approximately 7 mm and demonstrates a SUV max of 2.3. there are numerous FDG avid mediastinal lymph nodes including the right paratracheal, subcarinal and prevascular region.  The largest conglomerate of mass/adenopathy is in the prevascular region.  There also some FDG avid lymph nodes seen adjacent to the superior vena cava/ascending aorta.These lymph nodes demonstrate an SUV max ranging from 7.0-14.3. small left-sided pleural effusion noted.  Minimal consolidation also noted in the region of the left lung base.  No significant FDG uptake.    Abdomen/Pelvis: There is an FDG avid lesion within the dome of the liver right hepatic lobe  segment 7 which demonstrates a SUV max of 5.0.The adrenals are unremarkable in appearance.  There is diverticulosis of the sigmoid colon.    Skeletal: There are numerous osseous metastatic lesions involving numerous levels within the thoracic and lumbar spine at and C7 level and multiple sites in the bony pelvis, left femoral neck and left femoral shaft.  Lesion involving the manubrium to the right of midline there are a couple of metastatic lesions also seen involving the scapula on the left.  There are a few lesions involving multiple ribs.  There is also at least 1 additional lesion involving the sternum more inferiorly.  Simple upper pole left renal cyst noted.  There also appear to be a couple of simple cysts associated with the upper pole to interpolar region of the right kidney.      Assessment/Plan:     * Acute on chronic respiratory failure with hypoxia    Supplement oxygenation. Keep SAO2 >= 92%. Start BIPAP 10/5 QHS and PRN. Bronchodilators per orders        Metastatic left lung cancer (metastasis from lung to other site) with mediastinal lymphadenopathy    CT scan/PET scan consistent with lung cancer with widespread metastatic disease. Previous bronchoscopy/biopsy was nondiagnostic   Agree with biopsy of lumbar metastasis by IR.   Procedure to be expedited.           COPD (chronic obstructive pulmonary disease) with emphysema    Oxygen supplementation. Keep SAO2 > 92 %                              Continue with LABA, JAMMIE and ICS.        Continue IV glucocorticoids                      Thank you for your consult. I will follow-up with patient. Please contact us if you have any additional questions.     Swapnil Ibrahim MD  Pulmonology  [facility] -

## 2018-09-22 NOTE — H&P (VIEW-ONLY)
[facility] -   Pulmonology  Consult Note    Patient Name: Nico Garza Jr.  MRN: [id number]  Admission Date: 9/21/2018  Hospital Length of Stay: 0 days  Code Status: Full Code  Attending Physician: Vick Maldonado, *  Primary Care Provider: Tiffany Davis DO   Principal Problem: Acute on chronic respiratory failure with hypoxia      Subjective:     HPI:  72 year old male who  has a past medical history of Arthritis, Atrial fibrillation and flutter, Atrial flutter, COPD (chronic obstructive pulmonary disease), COPD (chronic obstructive pulmonary disease) with emphysema (11/18/2013), Chronic respiratory failure on supplemental oxygen at 3  L/ min, DM (diabetes mellitus) (1996), Hyperlipidemia, Hypertension, Lung disease, Neuropathy, diabetic, Pancreatitis, and Type 1 diabetes mellitus with diabetic neuropathy (4/27/2018) admitted with progressive dyspnea and hypoxemia (pateint checks his oxygen saturation wit ha pulse oximeter at home on a regular basis and report a low reading in the high 70's).      He has suspected metastatic lung cancer. Bronchoscopy/biopsy on 09/02/2018 non diagnostic. Recent PET scan revealed =>  extensive FDG avid mediastinal adenopathy with the largest conglomerate of adenopathy/mass is noted in the prevascular region and a single subcentimeter FDG avid pulmonary nodule noted within the right lung, metastatic lesion noted within the dome of the liver and numerous osseous metastatic lesions.     Patient was scheduled for CT-guided biopsy of lumbar metastasis/ Liver mets.    In the interim he developed progressive dyspnea  yesterday and was admitted through the ER with hypoxemia and worsening shortness of breath.        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    DM (diabetes  mellitus) 1996    Hyperlipidemia     Hypertension     Lung disease     Neuropathy, diabetic     Pancreatitis     Type 1 diabetes mellitus with diabetic neuropathy 4/27/2018       Past Surgical History:   Procedure Laterality Date    ABLATION N/A 12/4/2017    Performed by Jaret Freire MD at [facility] CATH LAB    BICEPS TENDON REPAIR      CARDIOVERSION      CHOLECYSTECTOMY      PATELLA SURGERY      RADIOFREQUENCY ABLATION      ROTATOR CUFF REPAIR      TRANSESOPHAGEAL ECHOCARDIOGRAM (DILLAN) N/A 12/4/2017    Performed by Jaret Freire MD at [facility] CATH LAB       Review of patient's allergies indicates:   Allergen Reactions    Anoro ellipta [umeclidinium-vilanterol] Shortness Of Breath    Flomax [tamsulosin]      Dizzy       Scheduled Meds:   albuterol-ipratropium  3 mL Nebulization Q6H    budesonide  0.5 mg Nebulization Q12H    diltiaZEM  360 mg Oral Daily    flecainide  100 mg Oral Q12H    gabapentin  600 mg Oral BID    insulin detemir U-100  50 Units Subcutaneous Daily    nystatin  5 mL Oral QID    pantoprazole  40 mg Oral Daily    pravastatin  40 mg Oral Daily    quinapril  40 mg Oral Nightly     Continuous Infusions:  PRN Meds:.dextrose 50%, dextrose 50%, glucagon (human recombinant), glucose, glucose, HYDROcodone-acetaminophen, HYDROcodone-acetaminophen, insulin aspart U-100, traMADol    Family History     Problem Relation (Age of Onset)    Cancer Maternal Aunt    Cataracts Sister    Diabetes Mother, Sister, Sister, Sister    Heart attack Brother    Heart failure Brother    Hypertension Mother, Father    Stroke Mother, Father, Paternal Grandmother, Paternal Grandfather        Tobacco Use    Smoking status: Light Tobacco Smoker     Packs/day: 0.50     Types: Cigarettes     Start date: 1/1/1960     Last attempt to quit: 3/8/2015     Years since quitting: 3.5    Smokeless tobacco: Former User     Types: Snuff     Quit date: 10/7/1995   Substance and Sexual Activity    Alcohol use: No      Alcohol/week: 0.0 oz    Drug use: No    Sexual activity: Not on file         Review of Systems   Constitutional: Negative for chills, diaphoresis, fatigue, fever and unexpected weight change.   HENT: Negative for congestion, postnasal drip, rhinorrhea, sinus pressure, sore throat and trouble swallowing.    Eyes: Negative for visual disturbance.   Respiratory: Positive for shortness of breath and wheezing. Negative for apnea, cough and chest tightness.    Cardiovascular: Negative for chest pain, palpitations and leg swelling.   Gastrointestinal: Negative for abdominal distention, abdominal pain (LUQ), blood in stool, constipation, diarrhea, nausea and vomiting.   Genitourinary: Negative for decreased urine volume, difficulty urinating, dysuria, frequency, hematuria and urgency.   Musculoskeletal: Positive for back pain. Negative for arthralgias, gait problem, joint swelling and myalgias.   Skin: Negative for pallor, rash and wound.   Neurological: Negative for dizziness, syncope, weakness, light-headedness, numbness and headaches.   Psychiatric/Behavioral: Negative for confusion. The patient is not nervous/anxious.    All other systems reviewed and are negative.    Objective:     Vital Signs (Most Recent):  Temp: 97.9 °F (36.6 °C) (09/22/18 1522)  Pulse: 90 (09/22/18 1522)  Resp: 18 (09/22/18 1522)  BP: (!) 109/51 (09/22/18 1522)  SpO2: (!) 92 % (09/22/18 1522) Vital Signs (24h Range):  Temp:  [96.5 °F (35.8 °C)-99.7 °F (37.6 °C)] 97.9 °F (36.6 °C)  Pulse:  [] 90  Resp:  [12-35] 18  SpO2:  [87 %-94 %] 92 %  BP: (103-139)/(51-72) 109/51     Weight: 84.5 kg (186 lb 4.6 oz)  Body mass index is 27.91 kg/m².      Intake/Output Summary (Last 24 hours) at 9/22/2018 1630  Last data filed at 9/22/2018 0400  Gross per 24 hour   Intake 240 ml   Output --   Net 240 ml       Physical Exam   Constitutional: He is oriented to person, place, and time. He appears well-developed and well-nourished. No distress.   HENT:    Head: Normocephalic and atraumatic.   Right Ear: External ear normal.   Nose: Nose normal.   Mouth/Throat: Oropharynx is clear and moist. No oropharyngeal exudate.   Eyes: Conjunctivae and EOM are normal. Pupils are equal, round, and reactive to light. Right eye exhibits no discharge. Left eye exhibits no discharge. No scleral icterus.   Neck: Normal range of motion. Neck supple. No JVD present. No tracheal deviation present. No thyromegaly present.   Cardiovascular: Normal rate and regular rhythm. Exam reveals no gallop and no friction rub.   No murmur heard.  Pulmonary/Chest: Effort normal. No stridor. No respiratory distress. He has wheezes. He has rales. He exhibits no tenderness.   Abdominal: Soft. Bowel sounds are normal. He exhibits no distension and no mass. There is no tenderness. There is no rebound.   Musculoskeletal: Normal range of motion. He exhibits no edema.   Lymphadenopathy:     He has no cervical adenopathy.   Neurological: He is alert and oriented to person, place, and time. No cranial nerve deficit. Coordination normal.   Skin: Skin is warm and dry. No rash noted. He is not diaphoretic. No erythema. No pallor.   Psychiatric: He has a normal mood and affect. Thought content normal.   Vitals reviewed.      Vents:  Oxygen Concentration (%): 36 (09/22/18 1300)    Lines/Drains/Airways     Peripheral Intravenous Line                 Peripheral IV - Single Lumen 09/21/18 2125 Left Antecubital less than 1 day                Significant Labs:    CBC/Anemia Profile:  Recent Labs   Lab  09/21/18 2117 09/22/18   0527   WBC  8.84  7.59   HGB  13.6*  13.0*   HCT  41.7  39.6*   PLT  243  252   MCV  89  88   RDW  14.9*  14.7*        Chemistries:  Recent Labs   Lab  09/21/18 2117 09/22/18   0527   NA  143  136   K  4.2  4.4   CL  101  98   CO2  29  24   BUN  16  15   CREATININE  0.8  0.9   CALCIUM  9.5  9.3   ALBUMIN  3.0*   --    PROT  7.3   --    BILITOT  0.3   --    ALKPHOS  152*   --    ALT  23   --     AST  34   --    MG  1.7   --        ABGs:   Recent Labs   Lab  09/21/18   2140   PH  7.455*   PCO2  44.4   HCO3  31.2*   POCSATURATED  89*   BE  7     Bilirubin:   Recent Labs   Lab  08/31/18   0452  09/01/18   0443  09/02/18   0502  09/03/18   0435  09/21/18 2117   BILITOT  0.4  0.5  0.3  0.3  0.3     Coagulation:   Recent Labs   Lab  09/21/18 2117   INR  1.0   APTT  29.9       Pathology Results  (Last 10 years)    None        POCT Glucose:   Recent Labs   Lab  09/22/18   0607  09/22/18   1113   POCTGLUCOSE  342*  433*   Troponin:   Recent Labs   Lab  09/21/18 2117 09/22/18   0526  09/22/18 0918   TROPONINI  0.011  <0.006  <0.006     Urine Studies:   Recent Labs   Lab  09/21/18 2117   COLORU  Yellow   APPEARANCEUA  Clear   PHUR  6.0   SPECGRAV  1.025   PROTEINUA  Negative   GLUCUA  Negative   KETONESU  Negative   BILIRUBINUA  Negative   OCCULTUA  Negative   NITRITE  Negative   UROBILINOGEN  1.0   LEUKOCYTESUR  Negative       Significant Imaging:     NM PET CT Routine Skull to Mid Thigh ( 09/10/18)    Head/neck: There is normal physiologic uptake noted within the visualized brain parenchyma. No FDG avid adenopathy within the neck.    Chest: There is a single FDG avid pulmonary nodule within the right upper lung zone posteriorly that is subpleural in location and measures approximately 7 mm and demonstrates a SUV max of 2.3. there are numerous FDG avid mediastinal lymph nodes including the right paratracheal, subcarinal and prevascular region.  The largest conglomerate of mass/adenopathy is in the prevascular region.  There also some FDG avid lymph nodes seen adjacent to the superior vena cava/ascending aorta.These lymph nodes demonstrate an SUV max ranging from 7.0-14.3. small left-sided pleural effusion noted.  Minimal consolidation also noted in the region of the left lung base.  No significant FDG uptake.    Abdomen/Pelvis: There is an FDG avid lesion within the dome of the liver right hepatic lobe  segment 7 which demonstrates a SUV max of 5.0.The adrenals are unremarkable in appearance.  There is diverticulosis of the sigmoid colon.    Skeletal: There are numerous osseous metastatic lesions involving numerous levels within the thoracic and lumbar spine at and C7 level and multiple sites in the bony pelvis, left femoral neck and left femoral shaft.  Lesion involving the manubrium to the right of midline there are a couple of metastatic lesions also seen involving the scapula on the left.  There are a few lesions involving multiple ribs.  There is also at least 1 additional lesion involving the sternum more inferiorly.  Simple upper pole left renal cyst noted.  There also appear to be a couple of simple cysts associated with the upper pole to interpolar region of the right kidney.      Assessment/Plan:     * Acute on chronic respiratory failure with hypoxia    Supplement oxygenation. Keep SAO2 >= 92%. Start BIPAP 10/5 QHS and PRN. Bronchodilators per orders        Metastatic left lung cancer (metastasis from lung to other site) with mediastinal lymphadenopathy    CT scan/PET scan consistent with lung cancer with widespread metastatic disease. Previous bronchoscopy/biopsy was nondiagnostic   Agree with biopsy of lumbar metastasis by IR.   Procedure to be expedited.           COPD (chronic obstructive pulmonary disease) with emphysema    Oxygen supplementation. Keep SAO2 > 92 %                              Continue with LABA, JAMMIE and ICS.        Continue IV glucocorticoids                      Thank you for your consult. I will follow-up with patient. Please contact us if you have any additional questions.     Swanpil Ibrahim MD  Pulmonology  [facility] -

## 2018-09-22 NOTE — HPI
72 year old male who  has a past medical history of Arthritis, Atrial fibrillation and flutter, Atrial flutter, COPD (chronic obstructive pulmonary disease), COPD (chronic obstructive pulmonary disease) with emphysema (11/18/2013), Chronic respiratory failure on supplemental oxygen at 3  L/ min, DM (diabetes mellitus) (1996), Hyperlipidemia, Hypertension, Lung disease, Neuropathy, diabetic, Pancreatitis, and Type 1 diabetes mellitus with diabetic neuropathy (4/27/2018) admitted with progressive dyspnea and hypoxemia (pateint checks his oxygen saturation wit ha pulse oximeter at home on a regular basis and report a low reading in the high 70's).      He has suspected metastatic lung cancer. Bronchoscopy/biopsy on 09/02/2018 non diagnostic. Recent PET scan revealed =>  extensive FDG avid mediastinal adenopathy with the largest conglomerate of adenopathy/mass is noted in the prevascular region and a single subcentimeter FDG avid pulmonary nodule noted within the right lung, metastatic lesion noted within the dome of the liver and numerous osseous metastatic lesions.     Patient was scheduled for CT-guided biopsy of lumbar metastasis/ Liver mets.    In the interim he developed progressive dyspnea  yesterday and was admitted through the ER with hypoxemia and worsening shortness of breath.

## 2018-09-22 NOTE — ASSESSMENT & PLAN NOTE
CT scan/PET scan consistent with lung cancer with widespread metastatic disease.  Previous bronchoscopy/biopsy was nondiagnostic and planning CT-guided biopsy of lumbar metastasis.  Need tissue diagnosis prior to planning treatment.    --continue supportive care/pain control  --CT-guided biopsy of lumbar metastasis

## 2018-09-22 NOTE — ASSESSMENT & PLAN NOTE
- Currently, O2 sat stable on 3L NC.  Continue oxygen supplementation.  Duonebs Q6 hours.  IV steroids given in ED.  - Pulmonology consult in AM.

## 2018-09-22 NOTE — HPI
72-year-old gentleman status post recent hospitalization for left lower lung mass with extensive mediastinal lymphadenopathy, adrenal nodule, and lytic lesion of L1.  He underwent nondiagnostic bronchoscopy/biopsy on 09/02/2018.  Patient was scheduled for CT-guided biopsy of lumbar metastasis, however, he developed significant shortness of breath yesterday and was admitted through the ER with hypoxemia and worsening shortness of breath.  The patient was started on steroids and breathing treatments with significant improvement in symptoms.

## 2018-09-22 NOTE — ASSESSMENT & PLAN NOTE
CT scan/PET scan consistent with lung cancer with widespread metastatic disease. Previous bronchoscopy/biopsy was nondiagnostic   Agree with biopsy of lumbar metastasis by IR.   Procedure to be expedited.

## 2018-09-22 NOTE — PLAN OF CARE
Problem: Patient Care Overview  Goal: Plan of Care Review  Outcome: Ongoing (interventions implemented as appropriate)  Patient arrived from ER at 0300, SR on monitor, reviewed plan with patient and family member.  Will continue to monitor.

## 2018-09-22 NOTE — PROGRESS NOTES
Pt was seen and examined at bedside . He was admitting  With a dx of acute on chronic copd exacerbation , acute on chronic  respiratory failure  And mediastinal mass .  He was started on JAMMIE and  IV steroid . He is schedule for a  Bone BX . Cont current tx

## 2018-09-22 NOTE — PLAN OF CARE
Problem: Patient Care Overview  Goal: Plan of Care Review  Outcome: Ongoing (interventions implemented as appropriate)  POC reviewed with pt, verbalized understanding. Pt remained free from falls, fall precautions in place. Pt is nsr on monitor, 90s. VS monitored. Blood glucose monitored. Pt independent and able to reposition self. IV intact, saline locked. PRN pain medication given. No other c/o at this time. Call bell and personal belongings within reach. Hourly rounding complete. Reminded to call for assistance. Will continue to monitor.

## 2018-09-22 NOTE — PLAN OF CARE
Discharge planning assessment completed with patient's assistance.  Patient reported that he normally lives alone but since his last discharge from the hospital his daughter, Belia, had been livng with him.  Patient reported being independent and has home O2 and a nebulizer.  Patient's preferred pharmacy is [facility].  Patient provided with a Healthcare Packet and contents reviewed with patient.  Discussed Advance Directives for quite some time.  Discharge Disposition:  Home .  No needs identified at this time.  Communication board updated.     09/22/18 120   Discharge Assessment   Assessment Type Discharge Planning Assessment   Confirmed/corrected address and phone number on facesheet? Yes   Assessment information obtained from? Patient   Prior to hospitilization cognitive status: Alert/Oriented   Prior to hospitalization functional status: Independent;Assistive Equipment   Current cognitive status: Alert/Oriented   Current Functional Status: Independent;Assistive Equipment   Facility Arrived From: home   Lives With alone  (Daughter has been staying with patient since his last discharge.  )   Able to Return to Prior Arrangements yes   Is patient able to care for self after discharge? Yes   Who are your caregiver(s) and their phone number(s)? Belia Garza; daughter; [phone]   Patient's perception of discharge disposition admitted as an inpatient;home or selfcare   Readmission Within The Last 30 Days current reason for admission unrelated to previous admission   If yes, most recent facility name: [facility]   Patient currently being followed by outpatient case management? No   Patient currently receives any other outside agency services? No   Equipment Currently Used at Home oxygen  (Nebulizer.)   Do you have any problems affording any of your prescribed medications? No  ([facility] and [place])   Is the patient taking medications as prescribed?  yes   Does the patient have transportation home? Yes   Transportation Available family or friend will provide   Dialysis Name and Scheduled days N/a   Does the patient receive services at the Coumadin Clinic? No  (Eliquis)   Discharge Plan A Home   Patient/Family In Agreement With Plan yes   Readmission Questionnaire   At the time of your discharge, did someone talk to you about what your health problems were? Yes   At the time of discharge, did someone talk to you about what to watch out for regarding worsening of your health problem? Yes   At the time of discharge, did someone talk to you about what to do if you experienced worsening of your health problem? Yes   At the time of discharge, did someone talk to you about which medication to take when you left the hospital and which ones to stop taking? Yes   At the time of discharge, did someone talk to you about when and where to follow up with a doctor after you left the hospital? Yes   What do you believe caused you to be sick enough to be re-admitted? Shortness of breath   How often do you need to have someone help you when you read instructions, pamphlets, or other written material from your doctor or pharmacy? Rarely   Do you have problems taking your medications as prescribed? No   Do you have any problems affording any of  your prescribed medications? No   Do you have problems obtaining/receiving your medications? No   Does the patient have transportation to healthcare appointments? Yes   Living Arrangements house   Does the patient have family/friends to help with healtcare needs after discharge? yes   Does your caregiver provide all the help you need? Yes   Are you currently feeling confused? No   Are you currently having problems thinking? No   Are you currently having memory problems? No   In the last 7 days, my sleep quality was: poor   Ana Chao LMSW, LUIS-LIZZY, [facility]  9/22/2018

## 2018-09-22 NOTE — PROGRESS NOTES
[facility] -   Hematology/Oncology  Progress Note    Patient Name: Nico Garza Jr.  Admission Date: 9/21/2018  Hospital Length of Stay: 0 days  Code Status: Full Code     Subjective:     HPI:    72-year-old gentleman status post recent hospitalization for left lower lung mass with extensive mediastinal lymphadenopathy, adrenal nodule, and lytic lesion of L1.  He underwent nondiagnostic bronchoscopy/biopsy on 09/02/2018.  Patient was scheduled for CT-guided biopsy of lumbar metastasis, however, he developed significant shortness of breath yesterday and was admitted through the ER with hypoxemia and worsening shortness of breath.  The patient was started on steroids and breathing treatments with significant improvement in symptoms.      Oncology Treatment Plan:   [No treatment plan]    Medications:  Continuous Infusions:  Scheduled Meds:   albuterol-ipratropium  3 mL Nebulization Q6H    budesonide  0.5 mg Nebulization Q12H    diltiaZEM  360 mg Oral Daily    flecainide  100 mg Oral Q12H    gabapentin  600 mg Oral BID    insulin detemir U-100  50 Units Subcutaneous Daily    nystatin  5 mL Oral QID    pantoprazole  40 mg Oral Daily    pravastatin  40 mg Oral Daily    quinapril  40 mg Oral Nightly     PRN Meds:dextrose 50%, dextrose 50%, glucagon (human recombinant), glucose, glucose, HYDROcodone-acetaminophen, HYDROcodone-acetaminophen, insulin aspart U-100, traMADol     Review of Systems   Constitutional: Negative for chills, diaphoresis, fatigue, fever and unexpected weight change.   HENT: Negative for congestion, postnasal drip, rhinorrhea, sinus pressure, sore throat and trouble swallowing.    Eyes: Negative for visual disturbance.   Respiratory: Positive for shortness of breath and wheezing. Negative for apnea, cough and chest tightness.    Cardiovascular: Negative for chest pain, palpitations and leg swelling.   Gastrointestinal: Negative for abdominal distention, abdominal pain (LUQ),  blood in stool, constipation, diarrhea, nausea and vomiting.   Genitourinary: Negative for decreased urine volume, difficulty urinating, dysuria, frequency, hematuria and urgency.   Musculoskeletal: Positive for back pain. Negative for arthralgias, gait problem, joint swelling and myalgias.   Skin: Negative for pallor, rash and wound.   Neurological: Negative for dizziness, syncope, weakness, light-headedness, numbness and headaches.   Psychiatric/Behavioral: Negative for confusion. The patient is not nervous/anxious.    All other systems reviewed and are negative.    Objective:     Vital Signs (Most Recent):  Temp: 96.5 °F (35.8 °C) (09/22/18 0739)  Pulse: 91 (09/22/18 0956)  Resp: 18 (09/22/18 0739)  BP: (!) 105/55 (09/22/18 0956)  SpO2: (!) 89 % (09/22/18 0739) Vital Signs (24h Range):  Temp:  [96.5 °F (35.8 °C)-99.7 °F (37.6 °C)] 96.5 °F (35.8 °C)  Pulse:  [] 91  Resp:  [12-35] 18  SpO2:  [87 %-94 %] 89 %  BP: (103-139)/(55-72) 105/55     Weight: 84.5 kg (186 lb 4.6 oz)  Body mass index is 27.91 kg/m².  Body surface area is 2.02 meters squared.      Intake/Output Summary (Last 24 hours) at 9/22/2018 1037  Last data filed at 9/22/2018 0400  Gross per 24 hour   Intake 240 ml   Output --   Net 240 ml       Physical Exam   Constitutional: He is oriented to person, place, and time. He appears well-developed and well-nourished. No distress.   HENT:   Head: Normocephalic and atraumatic.   Right Ear: External ear normal.   Nose: Nose normal.   Mouth/Throat: Oropharynx is clear and moist. No oropharyngeal exudate.   Eyes: Conjunctivae and EOM are normal. Pupils are equal, round, and reactive to light. Right eye exhibits no discharge. Left eye exhibits no discharge. No scleral icterus.   Neck: Normal range of motion. Neck supple. No JVD present. No tracheal deviation present. No thyromegaly present.   Cardiovascular: Normal rate and regular rhythm. Exam reveals no gallop and no friction rub.   No murmur  heard.  Pulmonary/Chest: Effort normal. No stridor. No respiratory distress. He has wheezes. He has rales. He exhibits no tenderness.   Abdominal: Soft. Bowel sounds are normal. He exhibits no distension and no mass. There is no tenderness. There is no rebound.   Musculoskeletal: Normal range of motion. He exhibits no edema.   Lymphadenopathy:     He has no cervical adenopathy.   Neurological: He is alert and oriented to person, place, and time. No cranial nerve deficit. Coordination normal.   Skin: Skin is warm and dry. No rash noted. He is not diaphoretic. No erythema. No pallor.   Psychiatric: He has a normal mood and affect. Thought content normal.   Vitals reviewed.      Significant Labs:   CBC:   Recent Labs   Lab  09/21/18 2117 09/22/18 0527   WBC  8.84  7.59   HGB  13.6*  13.0*   HCT  41.7  39.6*   PLT  243  252   , CMP:   Recent Labs   Lab  09/21/18 2117 09/22/18 0527   NA  143  136   K  4.2  4.4   CL  101  98   CO2  29  24   GLU  145*  359*   BUN  16  15   CREATININE  0.8  0.9   CALCIUM  9.5  9.3   PROT  7.3   --    ALBUMIN  3.0*   --    BILITOT  0.3   --    ALKPHOS  152*   --    AST  34   --    ALT  23   --    ANIONGAP  13  14   EGFRNONAA  >60  >60   , Coagulation:   Recent Labs   Lab  09/21/18 2117   INR  1.0   APTT  29.9   , Haptoglobin: No results for input(s): HAPTOGLOBIN in the last 48 hours., Immunology: No results for input(s): SPEP, JANAE, CARMEN, FREELAMBDALI in the last 48 hours. and LFTs:   Recent Labs   Lab  09/21/18 2117   ALT  23   AST  34   ALKPHOS  152*   BILITOT  0.3   PROT  7.3   ALBUMIN  3.0*       Diagnostic Results:  I have reviewed all pertinent imaging results/findings within the past 24 hours.    Assessment/Plan:     * Acute on chronic respiratory failure with hypoxia    Symptoms have significantly improved after steroids and respiratory treatments    --continue supportive care        Metastatic left lung cancer (metastasis from lung to other site) with mediastinal  lymphadenopathy    CT scan/PET scan consistent with lung cancer with widespread metastatic disease.  Previous bronchoscopy/biopsy was nondiagnostic and planning CT-guided biopsy of lumbar metastasis.  Need tissue diagnosis prior to planning treatment.    --continue supportive care/pain control  --CT-guided biopsy of lumbar metastasis            Thank you for your consult. I will follow-up with patient. Please contact us if you have any additional questions.     Issa Lagos Jr, MD  Hematology/Oncology  [facility] [place]

## 2018-09-23 LAB
ANION GAP SERPL CALC-SCNC: 14 MMOL/L
BASOPHILS # BLD AUTO: 0 K/UL
BASOPHILS NFR BLD: 0 %
BUN SERPL-MCNC: 20 MG/DL
CALCIUM SERPL-MCNC: 9.8 MG/DL
CHLORIDE SERPL-SCNC: 97 MMOL/L
CO2 SERPL-SCNC: 28 MMOL/L
CREAT SERPL-MCNC: 0.9 MG/DL
DIFFERENTIAL METHOD: ABNORMAL
EOSINOPHIL # BLD AUTO: 0 K/UL
EOSINOPHIL NFR BLD: 0.2 %
ERYTHROCYTE [DISTWIDTH] IN BLOOD BY AUTOMATED COUNT: 15 %
EST. GFR  (AFRICAN AMERICAN): >60 ML/MIN/1.73 M^2
EST. GFR  (NON AFRICAN AMERICAN): >60 ML/MIN/1.73 M^2
GLUCOSE SERPL-MCNC: 263 MG/DL
HCT VFR BLD AUTO: 39.7 %
HGB BLD-MCNC: 12.7 G/DL
LYMPHOCYTES # BLD AUTO: 1 K/UL
LYMPHOCYTES NFR BLD: 7.8 %
MCH RBC QN AUTO: 28.4 PG
MCHC RBC AUTO-ENTMCNC: 32 G/DL
MCV RBC AUTO: 89 FL
MONOCYTES # BLD AUTO: 0.6 K/UL
MONOCYTES NFR BLD: 5.1 %
NEUTROPHILS # BLD AUTO: 10.8 K/UL
NEUTROPHILS NFR BLD: 86.9 %
PLATELET # BLD AUTO: 290 K/UL
PMV BLD AUTO: 9.6 FL
POCT GLUCOSE: 104 MG/DL (ref 70–110)
POCT GLUCOSE: 135 MG/DL (ref 70–110)
POCT GLUCOSE: 266 MG/DL (ref 70–110)
POCT GLUCOSE: 273 MG/DL (ref 70–110)
POTASSIUM SERPL-SCNC: 4.4 MMOL/L
RBC # BLD AUTO: 4.47 M/UL
SODIUM SERPL-SCNC: 139 MMOL/L
WBC # BLD AUTO: 12.44 K/UL

## 2018-09-23 PROCEDURE — 25000242 PHARM REV CODE 250 ALT 637 W/ HCPCS: Performed by: NURSE PRACTITIONER

## 2018-09-23 PROCEDURE — 27000221 HC OXYGEN, UP TO 24 HOURS

## 2018-09-23 PROCEDURE — 99233 SBSQ HOSP IP/OBS HIGH 50: CPT | Mod: ,,, | Performed by: INTERNAL MEDICINE

## 2018-09-23 PROCEDURE — 25000003 PHARM REV CODE 250: Performed by: INTERNAL MEDICINE

## 2018-09-23 PROCEDURE — 94640 AIRWAY INHALATION TREATMENT: CPT

## 2018-09-23 PROCEDURE — 25000003 PHARM REV CODE 250: Performed by: NURSE PRACTITIONER

## 2018-09-23 PROCEDURE — 21400001 HC TELEMETRY ROOM

## 2018-09-23 PROCEDURE — 80048 BASIC METABOLIC PNL TOTAL CA: CPT

## 2018-09-23 PROCEDURE — 85025 COMPLETE CBC W/AUTO DIFF WBC: CPT

## 2018-09-23 PROCEDURE — 25000242 PHARM REV CODE 250 ALT 637 W/ HCPCS: Performed by: INTERNAL MEDICINE

## 2018-09-23 PROCEDURE — 36415 COLL VENOUS BLD VENIPUNCTURE: CPT

## 2018-09-23 RX ADMIN — PANTOPRAZOLE SODIUM 40 MG: 40 TABLET, DELAYED RELEASE ORAL at 08:09

## 2018-09-23 RX ADMIN — INSULIN DETEMIR 50 UNITS: 100 INJECTION, SOLUTION SUBCUTANEOUS at 08:09

## 2018-09-23 RX ADMIN — FLECAINIDE ACETATE 100 MG: 50 TABLET ORAL at 08:09

## 2018-09-23 RX ADMIN — IPRATROPIUM BROMIDE AND ALBUTEROL SULFATE 3 ML: .5; 3 SOLUTION RESPIRATORY (INHALATION) at 12:09

## 2018-09-23 RX ADMIN — IPRATROPIUM BROMIDE AND ALBUTEROL SULFATE 3 ML: .5; 3 SOLUTION RESPIRATORY (INHALATION) at 07:09

## 2018-09-23 RX ADMIN — NYSTATIN 500000 UNITS: 100000 SUSPENSION ORAL at 08:09

## 2018-09-23 RX ADMIN — GABAPENTIN 600 MG: 300 CAPSULE ORAL at 08:09

## 2018-09-23 RX ADMIN — NYSTATIN 500000 UNITS: 100000 SUSPENSION ORAL at 01:09

## 2018-09-23 RX ADMIN — PRAVASTATIN SODIUM 40 MG: 20 TABLET ORAL at 08:09

## 2018-09-23 RX ADMIN — HYDROCODONE BITARTRATE AND ACETAMINOPHEN 1 TABLET: 10; 325 TABLET ORAL at 01:09

## 2018-09-23 RX ADMIN — BUDESONIDE 0.5 MG: 0.5 SUSPENSION RESPIRATORY (INHALATION) at 07:09

## 2018-09-23 RX ADMIN — INSULIN DETEMIR 20 UNITS: 100 INJECTION, SOLUTION SUBCUTANEOUS at 08:09

## 2018-09-23 RX ADMIN — HYDROCODONE BITARTRATE AND ACETAMINOPHEN 1 TABLET: 10; 325 TABLET ORAL at 11:09

## 2018-09-23 RX ADMIN — INSULIN ASPART 6 UNITS: 100 INJECTION, SOLUTION INTRAVENOUS; SUBCUTANEOUS at 11:09

## 2018-09-23 RX ADMIN — INSULIN ASPART 6 UNITS: 100 INJECTION, SOLUTION INTRAVENOUS; SUBCUTANEOUS at 05:09

## 2018-09-23 RX ADMIN — NYSTATIN 500000 UNITS: 100000 SUSPENSION ORAL at 05:09

## 2018-09-23 NOTE — SUBJECTIVE & OBJECTIVE
Interval History: No acute events overnight. Patient reports SOB is the same. Plan for lumbar biopsy tomorrow if possible. Pulmonology and Oncology following.     Review of Systems   Constitutional: Negative for chills, diaphoresis, fatigue, fever and unexpected weight change.   HENT: Negative for congestion, postnasal drip, rhinorrhea, sinus pressure, sore throat and trouble swallowing.    Eyes: Negative for visual disturbance.   Respiratory: Positive for shortness of breath and wheezing. Negative for apnea, cough and chest tightness.    Cardiovascular: Negative for chest pain, palpitations and leg swelling.   Gastrointestinal: Negative for abdominal distention, abdominal pain (LUQ), blood in stool, constipation, diarrhea, nausea and vomiting.   Genitourinary: Negative for decreased urine volume, difficulty urinating, dysuria, frequency, hematuria and urgency.   Musculoskeletal: Positive for back pain. Negative for arthralgias, gait problem, joint swelling and myalgias.   Skin: Negative for pallor, rash and wound.   Neurological: Negative for dizziness, syncope, weakness, light-headedness, numbness and headaches.   Psychiatric/Behavioral: Negative for confusion. The patient is not nervous/anxious.    All other systems reviewed and are negative.    Objective:     Vital Signs (Most Recent):  Temp: 97.6 °F (36.4 °C) (09/23/18 0725)  Pulse: 81 (09/23/18 0904)  Resp: 18 (09/23/18 0736)  BP: (!) 92/53 (09/23/18 0725)  SpO2: (!) 90 % (09/23/18 0731) Vital Signs (24h Range):  Temp:  [97.6 °F (36.4 °C)-98 °F (36.7 °C)] 97.6 °F (36.4 °C)  Pulse:  [] 81  Resp:  [16-19] 18  SpO2:  [90 %-95 %] 90 %  BP: ()/(51-57) 92/53     Weight: 84.5 kg (186 lb 4.6 oz)  Body mass index is 27.91 kg/m².    Intake/Output Summary (Last 24 hours) at 9/23/2018 1036  Last data filed at 9/23/2018 0359  Gross per 24 hour   Intake --   Output 100 ml   Net -100 ml      Physical Exam   Constitutional: He is oriented to person, place, and  time. He appears well-developed and well-nourished. No distress.   HENT:   Head: Normocephalic and atraumatic.   Eyes: Conjunctivae are normal.   Neck: Normal range of motion. Neck supple. No JVD present.   Cardiovascular: Normal rate, regular rhythm, S1 normal, S2 normal and intact distal pulses.  No extrasystoles are present. Exam reveals no gallop.   No murmur heard.  Pulses:       Radial pulses are 2+ on the right side, and 2+ on the left side.        Dorsalis pedis pulses are 2+ on the right side, and 2+ on the left side.        Posterior tibial pulses are 2+ on the right side, and 2+ on the left side.   Pulmonary/Chest: Effort normal. No accessory muscle usage. No tachypnea. No respiratory distress. He has decreased breath sounds in the left lower field. He has no wheezes. He has no rhonchi. He has no rales.   Abdominal: Soft. Bowel sounds are normal. He exhibits no distension. There is tenderness (mild TTP) in the left upper quadrant. There is no rebound, no guarding and no CVA tenderness.   Musculoskeletal: Normal range of motion. He exhibits no edema, tenderness or deformity.   Neurological: He is alert and oriented to person, place, and time. No cranial nerve deficit or sensory deficit.   Skin: Skin is warm, dry and intact. Capillary refill takes less than 2 seconds. No rash noted. He is not diaphoretic. No cyanosis or erythema.   Psychiatric: He has a normal mood and affect. His speech is normal and behavior is normal. Cognition and memory are normal.   Nursing note and vitals reviewed.      Significant Labs:   BMP:   Recent Labs   Lab  09/21/18 2117 09/23/18   0536   GLU  145*   < >  263*   NA  143   < >  139   K  4.2   < >  4.4   CL  101   < >  97   CO2  29   < >  28   BUN  16   < >  20   CREATININE  0.8   < >  0.9   CALCIUM  9.5   < >  9.8   MG  1.7   --    --     < > = values in this interval not displayed.     CBC:   Recent Labs   Lab  09/21/18 2117 09/22/18   0527  09/23/18   0536   WBC  8.84   7.59  12.44   HGB  13.6*  13.0*  12.7*   HCT  41.7  39.6*  39.7*   PLT  243  252  290     CMP:   Recent Labs   Lab  09/21/18   2117  09/22/18   0527  09/23/18   0536   NA  143  136  139   K  4.2  4.4  4.4   CL  101  98  97   CO2  29  24  28   GLU  145*  359*  263*   BUN  16  15  20   CREATININE  0.8  0.9  0.9   CALCIUM  9.5  9.3  9.8   PROT  7.3   --    --    ALBUMIN  3.0*   --    --    BILITOT  0.3   --    --    ALKPHOS  152*   --    --    AST  34   --    --    ALT  23   --    --    ANIONGAP  13  14  14   EGFRNONAA  >60  >60  >60     All pertinent labs within the past 24 hours have been reviewed.    Significant Imaging:   Imaging Results          CTA Chest Non-Coronary (PE Study) (Final result)  Result time 09/21/18 23:06:44    Final result by Elias Martinez MD (09/21/18 23:06:44)                 Impression:      No pulmonary embolus.    Left mediastinal mass encases the lingular pulmonary arteries and veins without occlusion.  No significant change compared to the previous PET-CT examination.  See the details above.    All CT scans at this facility are performed  using dose modulation techniques as appropriate to performed exam including the following:  automated exposure control; adjustment of mA and/or kV according to the patients size (this includes techniques or standardized protocols for targeted exams where dose is matched to indication/reason for exam: i.e. extremities or head);  iterative reconstruction technique.      Electronically signed by: Elias Martinez MD  Date:    09/21/2018  Time:    23:06             Narrative:    EXAMINATION:  CTA CHEST NON CORONARY    CLINICAL HISTORY:  Dyspnea, cardiac origin suspected; lung cancer.    TECHNIQUE:  Axial CTA images performed through the chest after the administration of 100 cc intravenous contrast. Maximum intensity projections were performed and interpreted.    COMPARISON:  PET-CT performed 09/17/2018    FINDINGS:  Stable left anterior and middle mediastinal mass  corresponding to known lung cancer that measures up to 8.2 cm in greatest dimension on today's study.  The mass encases the lingular pulmonary arteries and veins.  No pulmonary embolus identified.  Right paratracheal, subcarinal and right hilar lymphadenopathy is stable.  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  There is persistent compressive left basilar atelectasis that is unchanged from the previous PET CT.  Stable right upper lobe pulmonary nodule.  Right basilar subsegmental atelectasis.  Severe emphysema.    Aortic atherosclerosis and coronary artery calcifications.  The heart size is within normal limits.    No effusions.  No pneumothorax.    Benign left upper pole renal cyst.    Known osseous metastatic disease including a destructive L1 vertebral body lesion.                               X-Ray Chest AP Portable (Final result)  Result time 09/21/18 21:41:16    Final result by Vincent Fried MD (Timothy) (09/21/18 21:41:16)                 Impression:      Stable chest with chronic elevation of the right hemidiaphragm and adjacent discoid atelectasis.      Electronically signed by: Vincent Fried MD  Date:    09/21/2018  Time:    21:41             Narrative:    EXAMINATION:  XR CHEST AP PORTABLE    CLINICAL HISTORY:  Shortness of breath    TECHNIQUE:  Single frontal view of the chest was performed.    COMPARISON:  09/02/2018.    FINDINGS:  Stable heart size.  Stable chronic elevation left hemidiaphragm with adjacent discoid atelectasis.  Right lung remains relatively clear.

## 2018-09-23 NOTE — ASSESSMENT & PLAN NOTE
CT scan/PET scan consistent with lung cancer with widespread metastatic disease. Previous bronchoscopy/biopsy was non diagnostic. Biopsy of lumbar metastasis by IR awaited.

## 2018-09-23 NOTE — ASSESSMENT & PLAN NOTE
- Continue basal insulin 50 units daily.  - Accuchecks with moderate SSI.  - Diabetic diet.  - HbA1c 8.5

## 2018-09-23 NOTE — SUBJECTIVE & OBJECTIVE
Interval History: Seen and examined at bedside. Hospital chart reviewed. No acute interval detrimental events noted. He reports that he  has slightly improved. Tolerated BIPAP overnight.     Review of Systems   Constitutional: Negative for chills, diaphoresis, fatigue, fever and unexpected weight change.   HENT: Negative for congestion, postnasal drip, rhinorrhea, sinus pressure, sore throat and trouble swallowing.    Eyes: Negative for visual disturbance.   Respiratory: Positive for shortness of breath and wheezing. Negative for apnea, cough and chest tightness.    Cardiovascular: Negative for chest pain, palpitations and leg swelling.   Gastrointestinal: Negative for abdominal distention, abdominal pain (LUQ), blood in stool, constipation, diarrhea, nausea and vomiting.   Genitourinary: Negative for decreased urine volume, difficulty urinating, dysuria, frequency, hematuria and urgency.   Musculoskeletal: Positive for back pain. Negative for arthralgias, gait problem, joint swelling and myalgias.   Skin: Negative for pallor, rash and wound.   Neurological: Negative for dizziness, syncope, weakness, light-headedness, numbness and headaches.   Psychiatric/Behavioral: Negative for confusion. The patient is not nervous/anxious.    All other systems reviewed and are negative.      Objective:     Vital Signs (Most Recent):  Temp: 97.1 °F (36.2 °C) (09/23/18 1152)  Pulse: 81 (09/23/18 1305)  Resp: 17 (09/23/18 1247)  BP: 111/60 (09/23/18 1152)  SpO2: 98 % (09/23/18 1247) Vital Signs (24h Range):  Temp:  [97.1 °F (36.2 °C)-97.9 °F (36.6 °C)] 97.1 °F (36.2 °C)  Pulse:  [] 81  Resp:  [17-19] 17  SpO2:  [90 %-98 %] 98 %  BP: ()/(51-60) 111/60     Weight: 84.5 kg (186 lb 4.6 oz)  Body mass index is 27.91 kg/m².      Intake/Output Summary (Last 24 hours) at 9/23/2018 1521  Last data filed at 9/23/2018 1400  Gross per 24 hour   Intake 480 ml   Output 500 ml   Net -20 ml       Physical Exam   Constitutional: He is  oriented to person, place, and time. He appears well-developed and well-nourished. No distress.   HENT:   Head: Normocephalic and atraumatic.   Right Ear: External ear normal.   Nose: Nose normal.   Mouth/Throat: Oropharynx is clear and moist. No oropharyngeal exudate.   Eyes: Conjunctivae and EOM are normal. Pupils are equal, round, and reactive to light. Right eye exhibits no discharge. Left eye exhibits no discharge. No scleral icterus.   Neck: Normal range of motion. Neck supple. No JVD present. No tracheal deviation present. No thyromegaly present.   Cardiovascular: Normal rate and regular rhythm. Exam reveals no gallop and no friction rub.   No murmur heard.  Pulmonary/Chest: Effort normal. No stridor. No respiratory distress. He has no wheezes. He has rales. He exhibits no tenderness.   Abdominal: Soft. Bowel sounds are normal. He exhibits no distension and no mass. There is no tenderness. There is no rebound.   Musculoskeletal: Normal range of motion. He exhibits no edema.   Lymphadenopathy:     He has no cervical adenopathy.   Neurological: He is alert and oriented to person, place, and time. No cranial nerve deficit. Coordination normal.   Skin: Skin is warm and dry. No rash noted. He is not diaphoretic. No erythema. No pallor.   Psychiatric: He has a normal mood and affect. Thought content normal.   Vitals reviewed.      Vents:  Oxygen Concentration (%): 32 (09/23/18 1247)    Lines/Drains/Airways     Peripheral Intravenous Line                 Peripheral IV - Single Lumen 09/21/18 2125 Left Antecubital 1 day                Significant Labs:    CBC/Anemia Profile:  Recent Labs   Lab  09/21/18 2117 09/22/18   0527  09/23/18   0536   WBC  8.84  7.59  12.44   HGB  13.6*  13.0*  12.7*   HCT  41.7  39.6*  39.7*   PLT  243  252  290   MCV  89  88  89   RDW  14.9*  14.7*  15.0*        Chemistries:  Recent Labs   Lab  09/21/18 2117 09/22/18   0527  09/23/18   0536   NA  143  136  139   K  4.2  4.4  4.4   CL   101  98  97   CO2  29  24  28   BUN  16  15  20   CREATININE  0.8  0.9  0.9   CALCIUM  9.5  9.3  9.8   ALBUMIN  3.0*   --    --    PROT  7.3   --    --    BILITOT  0.3   --    --    ALKPHOS  152*   --    --    ALT  23   --    --    AST  34   --    --    MG  1.7   --    --          Significant Imaging:     Radiology:  Reviewed recent imaging studies. Appear stable other than abnormalities addressed in plan.

## 2018-09-23 NOTE — ASSESSMENT & PLAN NOTE
CT scan/PET scan consistent with lung cancer with widespread metastatic disease.  Previous bronchoscopy/biopsy was nondiagnostic and planning CT-guided biopsy of lumbar metastasis.  Need tissue diagnosis prior to planning treatment.    --continue supportive care/pain control  --CT-guided biopsy of lumbar metastasis--will discuss with IR and attempt to obtain biopsy ASAP

## 2018-09-23 NOTE — ASSESSMENT & PLAN NOTE
- BP stable.  - Continue home CCB and ACEi   -BP marginally low, ACEi on hold will restart when appropriate   -Monitor closely

## 2018-09-23 NOTE — HOSPITAL COURSE
Patient admitted for acute on chronic respiratory failure with hypoxia. Patient recently had a PET scan on 9/17 that showed extensive FDG avid mediastinal lymphadenopathy, single small subcentimeter pulmonary nodule within the right lung, metastatic lesion within the dome of the liver, and extensive osseous metastatic disease noted throughout the vertebral column. Previous bronchoscopy/biopsy was nondiagnostic. He is followed by Dr. Lagos, and is scheduled for CT guided biopsy on 9/25 with possible EBUS tissue diagnosis if needed.  Pulmonology and Hematology/Oncology consulted to assist with medical management. Patient was started on JAMMIE, LABA, ICS, and IV steroids. CT-guided biopsy of lumbar metastasis planned for today. Will continue supportive care. 9/25/18-Patient underwent CT-guided biopsy today, tolerated procedure well. Case discussed will Dr. Joya. Patient ready for discharge. Home meds reconciled. New prescription given for prednisone 20 mg po x7 days. Patient to follow-up with PCP in 3 days for hospital follow-up. Patient also to follow-up with Dr. Ibrahim for hospital follow-up and discuss pathology results. Patient seen and examined on the date of discharge and found suitable for discharge.

## 2018-09-23 NOTE — ASSESSMENT & PLAN NOTE
- CTA chest showed no evidence of pulmonary embolus, left mediastinal mass encases the lingular pulmonary arteries and veins without occlusion, no significant change compared to the previous PET-CT examination.   - s/p nondiagnostic bronchoscopy/biopsy on 09/02/2018.  PET scan performed on 09/17/2018 demonstrated extensive FDG avid mediastinal lymphadenopathy, single small subcentimeter pulmonary nodule within the right lung, metastatic lesion within the dome of the liver, and extensive osseous metastatic disease noted throughout the vertebral column.  - Plan for repeat CT guided biopsy on 9/24 with possible EBUS tissue diagnosis if needed.  - Oncology and Pulmonology following

## 2018-09-23 NOTE — PROGRESS NOTES
[facility] -   Pulmonology  Progress Note    Patient Name: Nico Garza Jr.  MRN: [id number]  Admission Date: 9/21/2018  Hospital Length of Stay: 1 days  Code Status: Full Code  Attending Provider: Vick Maldonado, *  Primary Care Provider: Tiffany Davis DO   Principal Problem: Acute on chronic respiratory failure with hypoxia    Subjective:     Interval History: Seen and examined at bedside. Hospital chart reviewed. No acute interval detrimental events noted. He reports that he  has slightly improved. Tolerated BIPAP overnight.     Review of Systems   Constitutional: Negative for chills, diaphoresis, fatigue, fever and unexpected weight change.   HENT: Negative for congestion, postnasal drip, rhinorrhea, sinus pressure, sore throat and trouble swallowing.    Eyes: Negative for visual disturbance.   Respiratory: Positive for shortness of breath and wheezing. Negative for apnea, cough and chest tightness.    Cardiovascular: Negative for chest pain, palpitations and leg swelling.   Gastrointestinal: Negative for abdominal distention, abdominal pain (LUQ), blood in stool, constipation, diarrhea, nausea and vomiting.   Genitourinary: Negative for decreased urine volume, difficulty urinating, dysuria, frequency, hematuria and urgency.   Musculoskeletal: Positive for back pain. Negative for arthralgias, gait problem, joint swelling and myalgias.   Skin: Negative for pallor, rash and wound.   Neurological: Negative for dizziness, syncope, weakness, light-headedness, numbness and headaches.   Psychiatric/Behavioral: Negative for confusion. The patient is not nervous/anxious.    All other systems reviewed and are negative.      Objective:     Vital Signs (Most Recent):  Temp: 97.1 °F (36.2 °C) (09/23/18 1152)  Pulse: 81 (09/23/18 1305)  Resp: 17 (09/23/18 1247)  BP: 111/60 (09/23/18 1152)  SpO2: 98 % (09/23/18 1247) Vital Signs (24h Range):  Temp:  [97.1 °F (36.2 °C)-97.9 °F (36.6 °C)] 97.1 °F (36.2  °C)  Pulse:  [] 81  Resp:  [17-19] 17  SpO2:  [90 %-98 %] 98 %  BP: ()/(51-60) 111/60     Weight: 84.5 kg (186 lb 4.6 oz)  Body mass index is 27.91 kg/m².      Intake/Output Summary (Last 24 hours) at 9/23/2018 1521  Last data filed at 9/23/2018 1400  Gross per 24 hour   Intake 480 ml   Output 500 ml   Net -20 ml       Physical Exam   Constitutional: He is oriented to person, place, and time. He appears well-developed and well-nourished. No distress.   HENT:   Head: Normocephalic and atraumatic.   Right Ear: External ear normal.   Nose: Nose normal.   Mouth/Throat: Oropharynx is clear and moist. No oropharyngeal exudate.   Eyes: Conjunctivae and EOM are normal. Pupils are equal, round, and reactive to light. Right eye exhibits no discharge. Left eye exhibits no discharge. No scleral icterus.   Neck: Normal range of motion. Neck supple. No JVD present. No tracheal deviation present. No thyromegaly present.   Cardiovascular: Normal rate and regular rhythm. Exam reveals no gallop and no friction rub.   No murmur heard.  Pulmonary/Chest: Effort normal. No stridor. No respiratory distress. He has no wheezes. He has rales. He exhibits no tenderness.   Abdominal: Soft. Bowel sounds are normal. He exhibits no distension and no mass. There is no tenderness. There is no rebound.   Musculoskeletal: Normal range of motion. He exhibits no edema.   Lymphadenopathy:     He has no cervical adenopathy.   Neurological: He is alert and oriented to person, place, and time. No cranial nerve deficit. Coordination normal.   Skin: Skin is warm and dry. No rash noted. He is not diaphoretic. No erythema. No pallor.   Psychiatric: He has a normal mood and affect. Thought content normal.   Vitals reviewed.      Vents:  Oxygen Concentration (%): 32 (09/23/18 1247)    Lines/Drains/Airways     Peripheral Intravenous Line                 Peripheral IV - Single Lumen 09/21/18 2125 Left Antecubital 1 day                Significant  Labs:    CBC/Anemia Profile:  Recent Labs   Lab  09/21/18 2117 09/22/18   0527  09/23/18   0536   WBC  8.84  7.59  12.44   HGB  13.6*  13.0*  12.7*   HCT  41.7  39.6*  39.7*   PLT  243  252  290   MCV  89  88  89   RDW  14.9*  14.7*  15.0*        Chemistries:  Recent Labs   Lab  09/21/18 2117 09/22/18   0527  09/23/18   0536   NA  143  136  139   K  4.2  4.4  4.4   CL  101  98  97   CO2  29  24  28   BUN  16  15  20   CREATININE  0.8  0.9  0.9   CALCIUM  9.5  9.3  9.8   ALBUMIN  3.0*   --    --    PROT  7.3   --    --    BILITOT  0.3   --    --    ALKPHOS  152*   --    --    ALT  23   --    --    AST  34   --    --    MG  1.7   --    --          Significant Imaging:     Radiology:  Reviewed recent imaging studies. Appear stable other than abnormalities addressed in plan.       Assessment/Plan:     * Acute on chronic respiratory failure with hypoxia    Supplement oxygenation. Keep SAO2 >= 92%. Start BIPAP 10/5 QHS and PRN. Bronchodilators per orders        Metastatic left lung cancer (metastasis from lung to other site) with mediastinal lymphadenopathy    CT scan/PET scan consistent with lung cancer with widespread metastatic disease. Previous bronchoscopy/biopsy was non diagnostic. Biopsy of lumbar metastasis by IR awaited.            COPD (chronic obstructive pulmonary disease) with emphysema    Oxygen supplementation. Keep SAO2 > 92 %                              Continue with LABA, JAMMIE and ICS.        Continue IV glucocorticoids                       Swapnil Ibrahim MD  Pulmonology  [facility]

## 2018-09-23 NOTE — PROGRESS NOTES
[facility] -   Hematology/Oncology  Progress Note    Patient Name: Nico Garza Jr.  Admission Date: 9/21/2018  Hospital Length of Stay: 1 days  Code Status: Full Code     Subjective:     HPI:    72-year-old gentleman status post recent hospitalization for left lower lung mass with extensive mediastinal lymphadenopathy, adrenal nodule, and lytic lesion of L1.  He underwent nondiagnostic bronchoscopy/biopsy on 09/02/2018.  Patient was scheduled for CT-guided biopsy of lumbar metastasis, however, he developed significant shortness of breath yesterday and was admitted through the ER with hypoxemia and worsening shortness of breath.  The patient was started on steroids and breathing treatments with significant improvement in symptoms.    Interval History:   Patient reports improvement in respiratory symptoms overnight    Oncology Treatment Plan:   [No treatment plan]    Medications:  Continuous Infusions:  Scheduled Meds:   albuterol-ipratropium  3 mL Nebulization Q6H    budesonide  0.5 mg Nebulization Q12H    diltiaZEM  360 mg Oral Daily    flecainide  100 mg Oral Q12H    gabapentin  600 mg Oral BID    insulin detemir U-100  20 Units Subcutaneous QHS    insulin detemir U-100  50 Units Subcutaneous Daily    nystatin  5 mL Oral QID    pantoprazole  40 mg Oral Daily    pravastatin  40 mg Oral Daily    quinapril  40 mg Oral Nightly     PRN Meds:dextrose 50%, dextrose 50%, glucagon (human recombinant), glucose, glucose, HYDROcodone-acetaminophen, HYDROcodone-acetaminophen, insulin aspart U-100, traMADol     Review of Systems   Constitutional: Positive for activity change and fatigue. Negative for chills, diaphoresis, fever and unexpected weight change.   HENT: Negative for congestion, postnasal drip, rhinorrhea, sinus pressure, sore throat and trouble swallowing.    Eyes: Negative for visual disturbance.   Respiratory: Positive for shortness of breath and wheezing. Negative for apnea, cough and  chest tightness.    Cardiovascular: Negative for chest pain, palpitations and leg swelling.   Gastrointestinal: Negative for abdominal distention, abdominal pain (LUQ), blood in stool, constipation, diarrhea, nausea and vomiting.   Genitourinary: Negative for decreased urine volume, difficulty urinating, dysuria, frequency, hematuria and urgency.   Musculoskeletal: Positive for back pain. Negative for arthralgias, gait problem, joint swelling and myalgias.   Skin: Negative for pallor, rash and wound.   Neurological: Negative for dizziness, syncope, weakness, light-headedness, numbness and headaches.   Psychiatric/Behavioral: Negative for confusion. The patient is not nervous/anxious.    All other systems reviewed and are negative.    Objective:     Vital Signs (Most Recent):  Temp: 97.6 °F (36.4 °C) (09/23/18 0725)  Pulse: 81 (09/23/18 0904)  Resp: 18 (09/23/18 0736)  BP: (!) 92/53 (09/23/18 0725)  SpO2: (!) 90 % (09/23/18 0731) Vital Signs (24h Range):  Temp:  [97.6 °F (36.4 °C)-98 °F (36.7 °C)] 97.6 °F (36.4 °C)  Pulse:  [] 81  Resp:  [16-19] 18  SpO2:  [90 %-95 %] 90 %  BP: ()/(51-57) 92/53     Weight: 84.5 kg (186 lb 4.6 oz)  Body mass index is 27.91 kg/m².  Body surface area is 2.02 meters squared.      Intake/Output Summary (Last 24 hours) at 9/23/2018 1014  Last data filed at 9/23/2018 0359  Gross per 24 hour   Intake --   Output 100 ml   Net -100 ml       Physical Exam   Constitutional: He is oriented to person, place, and time. He appears well-developed and well-nourished. No distress.   HENT:   Head: Normocephalic and atraumatic.   Right Ear: External ear normal.   Nose: Nose normal.   Mouth/Throat: Oropharynx is clear and moist. No oropharyngeal exudate.   Eyes: Conjunctivae and EOM are normal. Pupils are equal, round, and reactive to light. Right eye exhibits no discharge. Left eye exhibits no discharge.   Neck: Normal range of motion. Neck supple. No tracheal deviation present. No  thyromegaly present.   Cardiovascular: Normal rate and regular rhythm. Exam reveals no gallop and no friction rub.   No murmur heard.  Pulmonary/Chest: Effort normal. No stridor. No respiratory distress. He has wheezes. He has rales. He exhibits no tenderness.   Abdominal: Soft. Bowel sounds are normal. He exhibits no distension and no mass. There is no tenderness. There is no rebound and no guarding.   Musculoskeletal: Normal range of motion.   Neurological: He is alert and oriented to person, place, and time. He displays normal reflexes. No cranial nerve deficit. Coordination normal.   Skin: Skin is warm, dry and intact. Capillary refill takes less than 2 seconds. No abrasion, no bruising and no rash noted.   Psychiatric: He has a normal mood and affect. Thought content normal.   Vitals reviewed.      Significant Labs:   CBC:   Recent Labs   Lab  09/21/18 2117 09/22/18 0527 09/23/18 0536   WBC  8.84  7.59  12.44   HGB  13.6*  13.0*  12.7*   HCT  41.7  39.6*  39.7*   PLT  243  252  290   , CMP:   Recent Labs   Lab  09/21/18 2117 09/22/18 0527 09/23/18   0536   NA  143  136  139   K  4.2  4.4  4.4   CL  101  98  97   CO2  29  24  28   GLU  145*  359*  263*   BUN  16  15  20   CREATININE  0.8  0.9  0.9   CALCIUM  9.5  9.3  9.8   PROT  7.3   --    --    ALBUMIN  3.0*   --    --    BILITOT  0.3   --    --    ALKPHOS  152*   --    --    AST  34   --    --    ALT  23   --    --    ANIONGAP  13  14  14   EGFRNONAA  >60  >60  >60   , Coagulation:   Recent Labs   Lab  09/21/18 2117   INR  1.0   APTT  29.9   , Haptoglobin: No results for input(s): HAPTOGLOBIN in the last 48 hours., Immunology: No results for input(s): SPEP, JANAE, CARMEN, FREELAMBDALI in the last 48 hours., LDH: No results for input(s): LDHCSF, BFSOURCE in the last 48 hours. and LFTs:   Recent Labs   Lab  09/21/18 2117   ALT  23   AST  34   ALKPHOS  152*   BILITOT  0.3   PROT  7.3   ALBUMIN  3.0*       Diagnostic Results:  I have reviewed all  pertinent imaging results/findings within the past 24 hours.    Assessment/Plan:     * Acute on chronic respiratory failure with hypoxia    Symptoms have significantly improved after steroids and respiratory treatments    --continue supportive care/nebs        Metastatic left lung cancer (metastasis from lung to other site) with mediastinal lymphadenopathy    CT scan/PET scan consistent with lung cancer with widespread metastatic disease.  Previous bronchoscopy/biopsy was nondiagnostic and planning CT-guided biopsy of lumbar metastasis.  Need tissue diagnosis prior to planning treatment.    --continue supportive care/pain control  --CT-guided biopsy of lumbar metastasis--will discuss with IR and attempt to obtain biopsy ASAP            Thank you for your consult. I will follow-up with patient. Please contact us if you have any additional questions.     Issa Lagos Jr, MD  Hematology/Oncology  [facility]

## 2018-09-23 NOTE — PLAN OF CARE
Problem: Patient Care Overview  Goal: Plan of Care Review  Outcome: Ongoing (interventions implemented as appropriate)  POC reviewed with pt, verbalized understanding. Pt remained free from falls, fall precautions in place. Hr 80-100s. VS monitored. Blood glucose monitored. Pt independent and able to reposition self. Pt IV intact, saline locked. PRN pain medication given. No other c/o at this time. Call bell and personal belongings within reach. Hourly rounding complete. Reminded to call for assistance. Will continue to monitor.

## 2018-09-23 NOTE — PLAN OF CARE
Problem: Patient Care Overview  Goal: Plan of Care Review  Outcome: Ongoing (interventions implemented as appropriate)  POC reviewed, verbalized understanding. Pt remained free from falls, fall precautions in place. Pt is NSR on monitor, 70-80s. VSS. Bipap QHS. Pain controlled with prn medication. No other c/o at this time. PIV intact. Blood glucose monitored. Call bell and personal belongings within reach. Hourly rounding complete. Reminded to call for assistance. Will continue to monitor.

## 2018-09-23 NOTE — ASSESSMENT & PLAN NOTE
Symptoms have significantly improved after steroids and respiratory treatments    --continue supportive care/nebs

## 2018-09-23 NOTE — PROGRESS NOTES
[facility] Hospital Medicine  Progress Note    Patient Name: Nico Garza Jr.  MRN: [id number]  Patient Class: IP- Inpatient   Admission Date: 9/21/2018  Length of Stay: 1 days  Attending Physician: Vick Maldonado, *  Primary Care Provider: Tiffany Davis DO        Subjective:     Principal Problem:Acute on chronic respiratory failure with hypoxia    HPI:  Mr. Garza is a 73yo male with a PMHx of newly diagnosed metastatic left lung CA s/p nondiagnostic bronchoscopy/biopsy on 9/2/18, COPD on chronic home O2, PAF/FL, HTN, HLD, Afib, COPD, DM, HTN, and DM 1, who presented to the ED with c/o SOB that has progressively worsened since ~5PM this evening.  Associated LUQ ABD pain.  Aggravated by lying flat, no alleviating factors.  Denies any CP, palpitations, cough, hemoptysis, PND, edema, ABD distention, N/V/D, dysuria, hematuria, weakness, back pain, HA, AMS, lightheadedness/dizziness, syncope, focal deficits, fever, or chills.  Patient's daughter reports his O2 sat has remained between 75-79% on his home 3L NC since 5PM tonight, and is unable to get oxygen sat above 79% with supplemental O2 or neb treatments.  Upon arrival to ED, patient's O2 sat was 87% on 3L NC.  ABGs resulted pH 7.455, pCO2 44.4, pO2 55, HCO3 31.2, SaO2 89% on 3L NC.  CTA of the chest showed no evidence of PE with no significant changes from recent PET scan on 9/17.  Patient received IV Solumedrol 80mg x 1 dose, and Duonebs x 3 with improvement in respiratory status.  Hospital Medicine was called for admission. Currently, patient appears comfortable in NAD.  O2 sat stable at 93% on 3L NC.  Patient recently had a PET scan on 9/17 that showed extensive FDG avid mediastinal lymphadenopathy, single small subcentimeter pulmonary nodule within the right lung, metastatic lesion within the dome of the liver, and extensive osseous metastatic disease noted throughout the vertebral column.  He is followed by Dr. Lagos, and is  scheduled for repeat CT guided biopsy on 9/25 with possible EBUS tissue diagnosis if needed.      Hospital Course:  Patient admitted for acute on chronic respiratory failure with hypoxia. Patient recently had a PET scan on 9/17 that showed extensive FDG avid mediastinal lymphadenopathy, single small subcentimeter pulmonary nodule within the right lung, metastatic lesion within the dome of the liver, and extensive osseous metastatic disease noted throughout the vertebral column. Previous bronchoscopy/biopsy was nondiagnostic. He is followed by Dr. Lagos, and is scheduled for CT guided biopsy on 9/25 with possible EBUS tissue diagnosis if needed.  Pulmonology and Hematology/Oncology consulted to assist with medical management. Patient was started on JAMMIE, LABA, ICS, and IV steroids. Plan for CT-guided biopsy of lumbar metastasis soon. Will continue supportive care.     Interval History: No acute events overnight. Patient reports SOB is the same. Plan for lumbar biopsy tomorrow if possible. Pulmonology and Oncology following.     Review of Systems   Constitutional: Negative for chills, diaphoresis, fatigue, fever and unexpected weight change.   HENT: Negative for congestion, postnasal drip, rhinorrhea, sinus pressure, sore throat and trouble swallowing.    Eyes: Negative for visual disturbance.   Respiratory: Positive for shortness of breath and wheezing. Negative for apnea, cough and chest tightness.    Cardiovascular: Negative for chest pain, palpitations and leg swelling.   Gastrointestinal: Negative for abdominal distention, abdominal pain (LUQ), blood in stool, constipation, diarrhea, nausea and vomiting.   Genitourinary: Negative for decreased urine volume, difficulty urinating, dysuria, frequency, hematuria and urgency.   Musculoskeletal: Positive for back pain. Negative for arthralgias, gait problem, joint swelling and myalgias.   Skin: Negative for pallor, rash and wound.   Neurological: Negative for  dizziness, syncope, weakness, light-headedness, numbness and headaches.   Psychiatric/Behavioral: Negative for confusion. The patient is not nervous/anxious.    All other systems reviewed and are negative.    Objective:     Vital Signs (Most Recent):  Temp: 97.6 °F (36.4 °C) (09/23/18 0725)  Pulse: 81 (09/23/18 0904)  Resp: 18 (09/23/18 0736)  BP: (!) 92/53 (09/23/18 0725)  SpO2: (!) 90 % (09/23/18 0731) Vital Signs (24h Range):  Temp:  [97.6 °F (36.4 °C)-98 °F (36.7 °C)] 97.6 °F (36.4 °C)  Pulse:  [] 81  Resp:  [16-19] 18  SpO2:  [90 %-95 %] 90 %  BP: ()/(51-57) 92/53     Weight: 84.5 kg (186 lb 4.6 oz)  Body mass index is 27.91 kg/m².    Intake/Output Summary (Last 24 hours) at 9/23/2018 1036  Last data filed at 9/23/2018 0359  Gross per 24 hour   Intake --   Output 100 ml   Net -100 ml      Physical Exam   Constitutional: He is oriented to person, place, and time. He appears well-developed and well-nourished. No distress.   HENT:   Head: Normocephalic and atraumatic.   Eyes: Conjunctivae are normal.   Neck: Normal range of motion. Neck supple. No JVD present.   Cardiovascular: Normal rate, regular rhythm, S1 normal, S2 normal and intact distal pulses.  No extrasystoles are present. Exam reveals no gallop.   No murmur heard.  Pulses:       Radial pulses are 2+ on the right side, and 2+ on the left side.        Dorsalis pedis pulses are 2+ on the right side, and 2+ on the left side.        Posterior tibial pulses are 2+ on the right side, and 2+ on the left side.   Pulmonary/Chest: Effort normal. No accessory muscle usage. No tachypnea. No respiratory distress. He has decreased breath sounds in the left lower field. He has no wheezes. He has no rhonchi. He has no rales.   Abdominal: Soft. Bowel sounds are normal. He exhibits no distension. There is tenderness (mild TTP) in the left upper quadrant. There is no rebound, no guarding and no CVA tenderness.   Musculoskeletal: Normal range of motion. He  exhibits no edema, tenderness or deformity.   Neurological: He is alert and oriented to person, place, and time. No cranial nerve deficit or sensory deficit.   Skin: Skin is warm, dry and intact. Capillary refill takes less than 2 seconds. No rash noted. He is not diaphoretic. No cyanosis or erythema.   Psychiatric: He has a normal mood and affect. His speech is normal and behavior is normal. Cognition and memory are normal.   Nursing note and vitals reviewed.      Significant Labs:   BMP:   Recent Labs   Lab  09/21/18 2117 09/23/18   0536   GLU  145*   < >  263*   NA  143   < >  139   K  4.2   < >  4.4   CL  101   < >  97   CO2  29   < >  28   BUN  16   < >  20   CREATININE  0.8   < >  0.9   CALCIUM  9.5   < >  9.8   MG  1.7   --    --     < > = values in this interval not displayed.     CBC:   Recent Labs   Lab  09/21/18 2117 09/22/18 0527 09/23/18   0536   WBC  8.84  7.59  12.44   HGB  13.6*  13.0*  12.7*   HCT  41.7  39.6*  39.7*   PLT  243  252  290     CMP:   Recent Labs   Lab  09/21/18 2117 09/22/18 0527 09/23/18   0536   NA  143  136  139   K  4.2  4.4  4.4   CL  101  98  97   CO2  29  24  28   GLU  145*  359*  263*   BUN  16  15  20   CREATININE  0.8  0.9  0.9   CALCIUM  9.5  9.3  9.8   PROT  7.3   --    --    ALBUMIN  3.0*   --    --    BILITOT  0.3   --    --    ALKPHOS  152*   --    --    AST  34   --    --    ALT  23   --    --    ANIONGAP  13  14  14   EGFRNONAA  >60  >60  >60     All pertinent labs within the past 24 hours have been reviewed.    Significant Imaging:   Imaging Results          CTA Chest Non-Coronary (PE Study) (Final result)  Result time 09/21/18 23:06:44    Final result by Elias Martinez MD (09/21/18 23:06:44)                 Impression:      No pulmonary embolus.    Left mediastinal mass encases the lingular pulmonary arteries and veins without occlusion.  No significant change compared to the previous PET-CT examination.  See the details above.    All CT scans at this  facility are performed  using dose modulation techniques as appropriate to performed exam including the following:  automated exposure control; adjustment of mA and/or kV according to the patients size (this includes techniques or standardized protocols for targeted exams where dose is matched to indication/reason for exam: i.e. extremities or head);  iterative reconstruction technique.      Electronically signed by: Elias Martinez MD  Date:    09/21/2018  Time:    23:06             Narrative:    EXAMINATION:  CTA CHEST NON CORONARY    CLINICAL HISTORY:  Dyspnea, cardiac origin suspected; lung cancer.    TECHNIQUE:  Axial CTA images performed through the chest after the administration of 100 cc intravenous contrast. Maximum intensity projections were performed and interpreted.    COMPARISON:  PET-CT performed 09/17/2018    FINDINGS:  Stable left anterior and middle mediastinal mass corresponding to known lung cancer that measures up to 8.2 cm in greatest dimension on today's study.  The mass encases the lingular pulmonary arteries and veins.  No pulmonary embolus identified.  Right paratracheal, subcarinal and right hilar lymphadenopathy is stable.  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  There is persistent compressive left basilar atelectasis that is unchanged from the previous PET CT.  Stable right upper lobe pulmonary nodule.  Right basilar subsegmental atelectasis.  Severe emphysema.    Aortic atherosclerosis and coronary artery calcifications.  The heart size is within normal limits.    No effusions.  No pneumothorax.    Benign left upper pole renal cyst.    Known osseous metastatic disease including a destructive L1 vertebral body lesion.                               X-Ray Chest AP Portable (Final result)  Result time 09/21/18 21:41:16    Final result by Vincent Fried MD (Timothy) (09/21/18 21:41:16)                 Impression:      Stable chest with chronic elevation of the right  hemidiaphragm and adjacent discoid atelectasis.      Electronically signed by: Vincent Fried MD  Date:    09/21/2018  Time:    21:41             Narrative:    EXAMINATION:  XR CHEST AP PORTABLE    CLINICAL HISTORY:  Shortness of breath    TECHNIQUE:  Single frontal view of the chest was performed.    COMPARISON:  09/02/2018.    FINDINGS:  Stable heart size.  Stable chronic elevation left hemidiaphragm with adjacent discoid atelectasis.  Right lung remains relatively clear.                              Assessment/Plan:      * Acute on chronic respiratory failure with hypoxia    - Currently, O2 sat stable on 3L NC.    - Continue oxygen supplementation.    - Duonebs Q6 hours.    -IV steroids given in ED.  - Pulmonology consult   -Bipap qhs and prn         Metastatic left lung cancer (metastasis from lung to other site) with mediastinal lymphadenopathy    - CTA chest showed no evidence of pulmonary embolus, left mediastinal mass encases the lingular pulmonary arteries and veins without occlusion, no significant change compared to the previous PET-CT examination.   - s/p nondiagnostic bronchoscopy/biopsy on 09/02/2018.  PET scan performed on 09/17/2018 demonstrated extensive FDG avid mediastinal lymphadenopathy, single small subcentimeter pulmonary nodule within the right lung, metastatic lesion within the dome of the liver, and extensive osseous metastatic disease noted throughout the vertebral column.  - Plan for repeat CT guided biopsy on 9/24 with possible EBUS tissue diagnosis if needed.  - Oncology and Pulmonology following         Type 1 diabetes mellitus with diabetic neuropathy    - Continue basal insulin 50 units daily.  - Accuchecks with moderate SSI.  - Diabetic diet.  - HbA1c 8.5        Paroxysmal atrial flutter    - Sinus rhythm at present, monitor telemetry.  - Continue home diltiazem and flecainide for HR/rhythm suppression.  - Eliquis has been on hold for planned procedure/biopsy, will continue to  hold.        Hypertension goal BP (blood pressure) < 130/80    - BP stable.  - Continue home CCB and ACEi   -BP marginally low, ACEi on hold will restart when appropriate   -Monitor closely         COPD (chronic obstructive pulmonary disease) with emphysema    - See plan above.  -Continue with LABA, JAMMIE and ICS.                  VTE Risk Mitigation (From admission, onward)        Ordered     Place sequential compression device  Until discontinued      09/22/18 0033              Pamella Salamanca NP  Department of Hospital Medicine   [facility]

## 2018-09-23 NOTE — SUBJECTIVE & OBJECTIVE
Interval History:   Patient reports improvement in respiratory symptoms overnight    Oncology Treatment Plan:   [No treatment plan]    Medications:  Continuous Infusions:  Scheduled Meds:   albuterol-ipratropium  3 mL Nebulization Q6H    budesonide  0.5 mg Nebulization Q12H    diltiaZEM  360 mg Oral Daily    flecainide  100 mg Oral Q12H    gabapentin  600 mg Oral BID    insulin detemir U-100  20 Units Subcutaneous QHS    insulin detemir U-100  50 Units Subcutaneous Daily    nystatin  5 mL Oral QID    pantoprazole  40 mg Oral Daily    pravastatin  40 mg Oral Daily    quinapril  40 mg Oral Nightly     PRN Meds:dextrose 50%, dextrose 50%, glucagon (human recombinant), glucose, glucose, HYDROcodone-acetaminophen, HYDROcodone-acetaminophen, insulin aspart U-100, traMADol     Review of Systems   Constitutional: Positive for activity change and fatigue. Negative for chills, diaphoresis, fever and unexpected weight change.   HENT: Negative for congestion, postnasal drip, rhinorrhea, sinus pressure, sore throat and trouble swallowing.    Eyes: Negative for visual disturbance.   Respiratory: Positive for shortness of breath and wheezing. Negative for apnea, cough and chest tightness.    Cardiovascular: Negative for chest pain, palpitations and leg swelling.   Gastrointestinal: Negative for abdominal distention, abdominal pain (LUQ), blood in stool, constipation, diarrhea, nausea and vomiting.   Genitourinary: Negative for decreased urine volume, difficulty urinating, dysuria, frequency, hematuria and urgency.   Musculoskeletal: Positive for back pain. Negative for arthralgias, gait problem, joint swelling and myalgias.   Skin: Negative for pallor, rash and wound.   Neurological: Negative for dizziness, syncope, weakness, light-headedness, numbness and headaches.   Psychiatric/Behavioral: Negative for confusion. The patient is not nervous/anxious.    All other systems reviewed and are negative.    Objective:      Vital Signs (Most Recent):  Temp: 97.6 °F (36.4 °C) (09/23/18 0725)  Pulse: 81 (09/23/18 0904)  Resp: 18 (09/23/18 0736)  BP: (!) 92/53 (09/23/18 0725)  SpO2: (!) 90 % (09/23/18 0731) Vital Signs (24h Range):  Temp:  [97.6 °F (36.4 °C)-98 °F (36.7 °C)] 97.6 °F (36.4 °C)  Pulse:  [] 81  Resp:  [16-19] 18  SpO2:  [90 %-95 %] 90 %  BP: ()/(51-57) 92/53     Weight: 84.5 kg (186 lb 4.6 oz)  Body mass index is 27.91 kg/m².  Body surface area is 2.02 meters squared.      Intake/Output Summary (Last 24 hours) at 9/23/2018 1014  Last data filed at 9/23/2018 0359  Gross per 24 hour   Intake --   Output 100 ml   Net -100 ml       Physical Exam   Constitutional: He is oriented to person, place, and time. He appears well-developed and well-nourished. No distress.   HENT:   Head: Normocephalic and atraumatic.   Right Ear: External ear normal.   Nose: Nose normal.   Mouth/Throat: Oropharynx is clear and moist. No oropharyngeal exudate.   Eyes: Conjunctivae and EOM are normal. Pupils are equal, round, and reactive to light. Right eye exhibits no discharge. Left eye exhibits no discharge.   Neck: Normal range of motion. Neck supple. No tracheal deviation present. No thyromegaly present.   Cardiovascular: Normal rate and regular rhythm. Exam reveals no gallop and no friction rub.   No murmur heard.  Pulmonary/Chest: Effort normal. No stridor. No respiratory distress. He has wheezes. He has rales. He exhibits no tenderness.   Abdominal: Soft. Bowel sounds are normal. He exhibits no distension and no mass. There is no tenderness. There is no rebound and no guarding.   Musculoskeletal: Normal range of motion.   Neurological: He is alert and oriented to person, place, and time. He displays normal reflexes. No cranial nerve deficit. Coordination normal.   Skin: Skin is warm, dry and intact. Capillary refill takes less than 2 seconds. No abrasion, no bruising and no rash noted.   Psychiatric: He has a normal mood and  affect. Thought content normal.   Vitals reviewed.      Significant Labs:   CBC:   Recent Labs   Lab  09/21/18 2117 09/22/18 0527 09/23/18   0536   WBC  8.84  7.59  12.44   HGB  13.6*  13.0*  12.7*   HCT  41.7  39.6*  39.7*   PLT  243  252  290   , CMP:   Recent Labs   Lab  09/21/18 2117 09/22/18 0527 09/23/18   0536   NA  143  136  139   K  4.2  4.4  4.4   CL  101  98  97   CO2  29  24  28   GLU  145*  359*  263*   BUN  16  15  20   CREATININE  0.8  0.9  0.9   CALCIUM  9.5  9.3  9.8   PROT  7.3   --    --    ALBUMIN  3.0*   --    --    BILITOT  0.3   --    --    ALKPHOS  152*   --    --    AST  34   --    --    ALT  23   --    --    ANIONGAP  13  14  14   EGFRNONAA  >60  >60  >60   , Coagulation:   Recent Labs   Lab  09/21/18 2117   INR  1.0   APTT  29.9   , Haptoglobin: No results for input(s): HAPTOGLOBIN in the last 48 hours., Immunology: No results for input(s): SPEP, JANAE, CARMEN, FREELAMBDALI in the last 48 hours., LDH: No results for input(s): LDHCSF, BFSOURCE in the last 48 hours. and LFTs:   Recent Labs   Lab  09/21/18 2117   ALT  23   AST  34   ALKPHOS  152*   BILITOT  0.3   PROT  7.3   ALBUMIN  3.0*       Diagnostic Results:  I have reviewed all pertinent imaging results/findings within the past 24 hours.

## 2018-09-23 NOTE — ASSESSMENT & PLAN NOTE
- Currently, O2 sat stable on 3L NC.    - Continue oxygen supplementation.    - Duonebs Q6 hours.    -IV steroids given in ED.  - Pulmonology consult   -Bipap qhs and prn

## 2018-09-23 NOTE — PLAN OF CARE
Problem: Patient Care Overview  Goal: Plan of Care Review  Outcome: Ongoing (interventions implemented as appropriate)  Patient resting well on NC 3L and tolerating neb txs well. No complaints or resp distress at this time.

## 2018-09-24 LAB
ANION GAP SERPL CALC-SCNC: 11 MMOL/L
BASOPHILS # BLD AUTO: 0.01 K/UL
BASOPHILS NFR BLD: 0.1 %
BUN SERPL-MCNC: 19 MG/DL
CALCIUM SERPL-MCNC: 9.4 MG/DL
CHLORIDE SERPL-SCNC: 97 MMOL/L
CO2 SERPL-SCNC: 31 MMOL/L
CREAT SERPL-MCNC: 0.8 MG/DL
DIFFERENTIAL METHOD: ABNORMAL
EOSINOPHIL # BLD AUTO: 0.1 K/UL
EOSINOPHIL NFR BLD: 1.3 %
ERYTHROCYTE [DISTWIDTH] IN BLOOD BY AUTOMATED COUNT: 14.9 %
EST. GFR  (AFRICAN AMERICAN): >60 ML/MIN/1.73 M^2
EST. GFR  (NON AFRICAN AMERICAN): >60 ML/MIN/1.73 M^2
GLUCOSE SERPL-MCNC: 80 MG/DL
HCT VFR BLD AUTO: 40.4 %
HGB BLD-MCNC: 12.7 G/DL
LYMPHOCYTES # BLD AUTO: 1.8 K/UL
LYMPHOCYTES NFR BLD: 19.6 %
MCH RBC QN AUTO: 28.2 PG
MCHC RBC AUTO-ENTMCNC: 31.4 G/DL
MCV RBC AUTO: 90 FL
MONOCYTES # BLD AUTO: 0.5 K/UL
MONOCYTES NFR BLD: 5.5 %
NEUTROPHILS # BLD AUTO: 6.7 K/UL
NEUTROPHILS NFR BLD: 73.5 %
PLATELET # BLD AUTO: 294 K/UL
PMV BLD AUTO: 9.2 FL
POCT GLUCOSE: 101 MG/DL (ref 70–110)
POCT GLUCOSE: 190 MG/DL (ref 70–110)
POCT GLUCOSE: 223 MG/DL (ref 70–110)
POCT GLUCOSE: 46 MG/DL (ref 70–110)
POCT GLUCOSE: 77 MG/DL (ref 70–110)
POCT GLUCOSE: 86 MG/DL (ref 70–110)
POTASSIUM SERPL-SCNC: 4.2 MMOL/L
RBC # BLD AUTO: 4.51 M/UL
SODIUM SERPL-SCNC: 139 MMOL/L
WBC # BLD AUTO: 9.1 K/UL

## 2018-09-24 PROCEDURE — 25000242 PHARM REV CODE 250 ALT 637 W/ HCPCS: Performed by: INTERNAL MEDICINE

## 2018-09-24 PROCEDURE — 94761 N-INVAS EAR/PLS OXIMETRY MLT: CPT

## 2018-09-24 PROCEDURE — 21400001 HC TELEMETRY ROOM

## 2018-09-24 PROCEDURE — 94660 CPAP INITIATION&MGMT: CPT

## 2018-09-24 PROCEDURE — 63600175 PHARM REV CODE 636 W HCPCS: Mod: JG | Performed by: NURSE PRACTITIONER

## 2018-09-24 PROCEDURE — 36415 COLL VENOUS BLD VENIPUNCTURE: CPT

## 2018-09-24 PROCEDURE — 25000003 PHARM REV CODE 250: Performed by: NURSE PRACTITIONER

## 2018-09-24 PROCEDURE — 94640 AIRWAY INHALATION TREATMENT: CPT

## 2018-09-24 PROCEDURE — 99232 SBSQ HOSP IP/OBS MODERATE 35: CPT | Mod: ,,, | Performed by: INTERNAL MEDICINE

## 2018-09-24 PROCEDURE — 85025 COMPLETE CBC W/AUTO DIFF WBC: CPT

## 2018-09-24 PROCEDURE — 25000242 PHARM REV CODE 250 ALT 637 W/ HCPCS: Performed by: NURSE PRACTITIONER

## 2018-09-24 PROCEDURE — 25000003 PHARM REV CODE 250: Performed by: INTERNAL MEDICINE

## 2018-09-24 PROCEDURE — 27000221 HC OXYGEN, UP TO 24 HOURS

## 2018-09-24 PROCEDURE — 80048 BASIC METABOLIC PNL TOTAL CA: CPT

## 2018-09-24 RX ORDER — PREDNISONE 20 MG/1
40 TABLET ORAL DAILY
Status: DISCONTINUED | OUTPATIENT
Start: 2018-09-24 | End: 2018-09-25 | Stop reason: HOSPADM

## 2018-09-24 RX ORDER — POLYETHYLENE GLYCOL 3350 17 G/17G
17 POWDER, FOR SOLUTION ORAL DAILY
Status: DISCONTINUED | OUTPATIENT
Start: 2018-09-24 | End: 2018-09-25 | Stop reason: HOSPADM

## 2018-09-24 RX ADMIN — GABAPENTIN 600 MG: 300 CAPSULE ORAL at 09:09

## 2018-09-24 RX ADMIN — IPRATROPIUM BROMIDE AND ALBUTEROL SULFATE 3 ML: .5; 3 SOLUTION RESPIRATORY (INHALATION) at 07:09

## 2018-09-24 RX ADMIN — INSULIN ASPART 2 UNITS: 100 INJECTION, SOLUTION INTRAVENOUS; SUBCUTANEOUS at 09:09

## 2018-09-24 RX ADMIN — GLUCAGON 1 MG: KIT at 11:09

## 2018-09-24 RX ADMIN — BUDESONIDE 0.5 MG: 0.5 SUSPENSION RESPIRATORY (INHALATION) at 07:09

## 2018-09-24 RX ADMIN — IPRATROPIUM BROMIDE AND ALBUTEROL SULFATE 3 ML: .5; 3 SOLUTION RESPIRATORY (INHALATION) at 12:09

## 2018-09-24 RX ADMIN — INSULIN DETEMIR 50 UNITS: 100 INJECTION, SOLUTION SUBCUTANEOUS at 08:09

## 2018-09-24 RX ADMIN — HYDROCODONE BITARTRATE AND ACETAMINOPHEN 1 TABLET: 10; 325 TABLET ORAL at 08:09

## 2018-09-24 RX ADMIN — FLECAINIDE ACETATE 100 MG: 50 TABLET ORAL at 09:09

## 2018-09-24 RX ADMIN — INSULIN DETEMIR 20 UNITS: 100 INJECTION, SOLUTION SUBCUTANEOUS at 09:09

## 2018-09-24 RX ADMIN — HYDROCODONE BITARTRATE AND ACETAMINOPHEN 1 TABLET: 10; 325 TABLET ORAL at 05:09

## 2018-09-24 RX ADMIN — IPRATROPIUM BROMIDE AND ALBUTEROL SULFATE 3 ML: .5; 3 SOLUTION RESPIRATORY (INHALATION) at 01:09

## 2018-09-24 RX ADMIN — INSULIN ASPART 2 UNITS: 100 INJECTION, SOLUTION INTRAVENOUS; SUBCUTANEOUS at 05:09

## 2018-09-24 RX ADMIN — HYDROCODONE BITARTRATE AND ACETAMINOPHEN 1 TABLET: 10; 325 TABLET ORAL at 11:09

## 2018-09-24 RX ADMIN — NYSTATIN 500000 UNITS: 100000 SUSPENSION ORAL at 09:09

## 2018-09-24 NOTE — ASSESSMENT & PLAN NOTE
- CTA chest showed no evidence of pulmonary embolus, left mediastinal mass encases the lingular pulmonary arteries and veins without occlusion, no significant change compared to the previous PET-CT examination.   - s/p nondiagnostic bronchoscopy/biopsy on 09/02/2018.  PET scan performed on 09/17/2018 demonstrated extensive FDG avid mediastinal lymphadenopathy, single small subcentimeter pulmonary nodule within the right lung, metastatic lesion within the dome of the liver, and extensive osseous metastatic disease noted throughout the vertebral column.  - Plan for repeat CT guided biopsy on 9/24 with EBUS tissue diagnosis   - Oncology and Pulmonology following

## 2018-09-24 NOTE — ASSESSMENT & PLAN NOTE
CT scan/PET scan consistent with lung cancer with widespread metastatic disease. Previous bronchoscopy/biopsy was non diagnostic. Biopsy of lumbar metastasis by IR awaited.  9/24 awaiting biopsy per IR

## 2018-09-24 NOTE — PROGRESS NOTES
[facility]   Pulmonology  Progress Note    Patient Name: Nico Garza Jr.  MRN: [id number]  Admission Date: 9/21/2018  Hospital Length of Stay: 2 days  Code Status: Full Code  Attending Provider: Vick Maldonado, *  Primary Care Provider: Tiffany Davis DO   Principal Problem: Acute on chronic respiratory failure with hypoxia    Subjective: awaiting biopsy     Interval History: Seen and examined at bedside. Hospital chart reviewed. No acute interval detrimental events noted. He reports that he  has slightly improved. Tolerated BIPAP overnight.     Review of Systems   Constitutional: Negative for chills, diaphoresis, fatigue, fever and unexpected weight change.   HENT: Negative for congestion, postnasal drip, rhinorrhea, sinus pressure, sore throat and trouble swallowing.    Eyes: Negative for visual disturbance.   Respiratory: Positive for shortness of breath and wheezing. Negative for apnea, cough and chest tightness.    Cardiovascular: Negative for chest pain, palpitations and leg swelling.   Gastrointestinal: Negative for abdominal distention, abdominal pain (LUQ), blood in stool, constipation, diarrhea, nausea and vomiting.   Genitourinary: Negative for decreased urine volume, difficulty urinating, dysuria, frequency, hematuria and urgency.   Musculoskeletal: Positive for back pain. Negative for arthralgias, gait problem, joint swelling and myalgias.   Skin: Negative for pallor, rash and wound.   Neurological: Negative for dizziness, syncope, weakness, light-headedness, numbness and headaches.   Psychiatric/Behavioral: Negative for confusion. The patient is not nervous/anxious.    All other systems reviewed and are negative.      Objective:     Vital Signs (Most Recent):  Temp: 97.9 °F (36.6 °C) (09/24/18 0714)  Pulse: (!) 116 (09/24/18 1341)  Resp: 20 (09/24/18 1341)  BP: 116/71 (09/24/18 0714)  SpO2: (!) 91 % (09/24/18 1341) Vital Signs (24h Range):  Temp:  [97.2 °F (36.2 °C)-98.4 °F  (36.9 °C)] 97.9 °F (36.6 °C)  Pulse:  [] 116  Resp:  [18-20] 20  SpO2:  [89 %-98 %] 91 %  BP: (106-133)/(55-71) 116/71     Weight: 84.5 kg (186 lb 4.6 oz)  Body mass index is 27.91 kg/m².      Intake/Output Summary (Last 24 hours) at 9/24/2018 1435  Last data filed at 9/24/2018 0549  Gross per 24 hour   Intake 240 ml   Output 450 ml   Net -210 ml       Physical Exam   Constitutional: He is oriented to person, place, and time. He appears well-developed and well-nourished. No distress.   HENT:   Head: Normocephalic and atraumatic.   Right Ear: External ear normal.   Nose: Nose normal.   Mouth/Throat: Oropharynx is clear and moist. No oropharyngeal exudate.   Eyes: Conjunctivae and EOM are normal. Pupils are equal, round, and reactive to light. Right eye exhibits no discharge. Left eye exhibits no discharge. No scleral icterus.   Neck: Normal range of motion. Neck supple. No JVD present. No tracheal deviation present. No thyromegaly present.   Cardiovascular: Normal rate and regular rhythm. Exam reveals no gallop and no friction rub.   No murmur heard.  Pulmonary/Chest: Effort normal. No stridor. No respiratory distress. He has no wheezes. He has rales. He exhibits no tenderness.   Abdominal: Soft. Bowel sounds are normal. He exhibits no distension and no mass. There is no tenderness. There is no rebound.   Musculoskeletal: Normal range of motion. He exhibits no edema.   Lymphadenopathy:     He has no cervical adenopathy.   Neurological: He is alert and oriented to person, place, and time. No cranial nerve deficit. Coordination normal.   Skin: Skin is warm and dry. No rash noted. He is not diaphoretic. No erythema. No pallor.   Psychiatric: He has a normal mood and affect. Thought content normal.   Vitals reviewed.      Vents:  Oxygen Concentration (%): 32 (09/24/18 0046)    Lines/Drains/Airways     Peripheral Intravenous Line                 Peripheral IV - Single Lumen 09/21/18 3415 Left Antecubital 2 days                 Significant Labs:    CBC/Anemia Profile:  Recent Labs   Lab  09/23/18   0536  09/24/18   0545   WBC  12.44  9.10   HGB  12.7*  12.7*   HCT  39.7*  40.4   PLT  290  294   MCV  89  90   RDW  15.0*  14.9*        Chemistries:  Recent Labs   Lab  09/23/18   0536  09/24/18   0545   NA  139  139   K  4.4  4.2   CL  97  97   CO2  28  31*   BUN  20  19   CREATININE  0.9  0.8   CALCIUM  9.8  9.4         Significant Imaging:     Radiology:  Reviewed recent imaging studies. Appear stable other than abnormalities addressed in plan.       Assessment/Plan:     Metastatic left lung cancer (metastasis from lung to other site) with mediastinal lymphadenopathy    CT scan/PET scan consistent with lung cancer with widespread metastatic disease. Previous bronchoscopy/biopsy was non diagnostic. Biopsy of lumbar metastasis by IR awaited.  9/24 awaiting biopsy per IR            COPD (chronic obstructive pulmonary disease) with emphysema    Oxygen supplementation. Keep SAO2 > 92 %                              Continue with LABA, JAMMIE and ICS.        Continue IV glucocorticoids      9/24 continue present treatment                   Aldair Deleon MD  Pulmonology  [facility]

## 2018-09-24 NOTE — ASSESSMENT & PLAN NOTE
CT scan/PET scan consistent with lung cancer with widespread metastatic disease.  Previous bronchoscopy/biopsy was nondiagnostic and planning CT-guided biopsy of lumbar metastasis.  Need tissue diagnosis prior to planning treatment.    --continue supportive care/pain control  --CT-guided biopsy of lumbar metastasis--will discuss with IR and attempt to obtain biopsy ASAP    09/24/18 Patient will have CT guided biopsy to lumbar spine today.  Patient and daughter are aware of the plan of care.   -  Follow up with hematology/oncology outpatient

## 2018-09-24 NOTE — ASSESSMENT & PLAN NOTE
- BP stable.  - Continue home CCB and ACEi   -BP marginally low, ACEi on hold will restart when appropriate   -Monitor closely  9/24/18  -Bp improved   -Will continue to monitor

## 2018-09-24 NOTE — SUBJECTIVE & OBJECTIVE
Interval History: No acute events overnight. No change in status. CT-guided biopsy of lumbar metastasis planned for today.     Review of Systems   Constitutional: Negative for chills, diaphoresis, fatigue, fever and unexpected weight change.   HENT: Negative for congestion, postnasal drip, rhinorrhea, sinus pressure, sore throat and trouble swallowing.    Eyes: Negative for visual disturbance.   Respiratory: Positive for shortness of breath and wheezing. Negative for apnea, cough and chest tightness.    Cardiovascular: Negative for chest pain, palpitations and leg swelling.   Gastrointestinal: Negative for abdominal distention, abdominal pain, blood in stool, constipation, diarrhea, nausea and vomiting.   Genitourinary: Negative for decreased urine volume, difficulty urinating, dysuria, frequency, hematuria and urgency.   Musculoskeletal: Positive for back pain. Negative for arthralgias, gait problem, joint swelling and myalgias.   Skin: Negative for pallor, rash and wound.   Neurological: Negative for dizziness, syncope, weakness, light-headedness, numbness and headaches.   Psychiatric/Behavioral: Negative for confusion. The patient is not nervous/anxious.    All other systems reviewed and are negative.    Objective:     Vital Signs (Most Recent):  Temp: 97.9 °F (36.6 °C) (09/24/18 0714)  Pulse: 89 (09/24/18 1050)  Resp: 18 (09/24/18 0742)  BP: 116/71 (09/24/18 0714)  SpO2: (!) 92 % (09/24/18 0742) Vital Signs (24h Range):  Temp:  [97.1 °F (36.2 °C)-98.4 °F (36.9 °C)] 97.9 °F (36.6 °C)  Pulse:  [] 89  Resp:  [17-20] 18  SpO2:  [89 %-98 %] 92 %  BP: (106-133)/(55-71) 116/71     Weight: 84.5 kg (186 lb 4.6 oz)  Body mass index is 27.91 kg/m².    Intake/Output Summary (Last 24 hours) at 9/24/2018 1123  Last data filed at 9/24/2018 0549  Gross per 24 hour   Intake 480 ml   Output 650 ml   Net -170 ml      Physical Exam   Constitutional: He is oriented to person, place, and time. He appears well-developed and  well-nourished. No distress.   HENT:   Head: Normocephalic and atraumatic.   Eyes: Conjunctivae are normal.   Neck: Normal range of motion. Neck supple. No JVD present.   Cardiovascular: Normal rate, regular rhythm, S1 normal, S2 normal and intact distal pulses.  No extrasystoles are present. Exam reveals no gallop.   No murmur heard.  Pulses:       Radial pulses are 2+ on the right side, and 2+ on the left side.        Dorsalis pedis pulses are 2+ on the right side, and 2+ on the left side.        Posterior tibial pulses are 2+ on the right side, and 2+ on the left side.   Pulmonary/Chest: Effort normal. No accessory muscle usage. No tachypnea. No respiratory distress. He has decreased breath sounds in the left lower field. He has no wheezes. He has no rhonchi. He has no rales.   Abdominal: Soft. Bowel sounds are normal. He exhibits no distension. There is no tenderness. There is no rebound, no guarding and no CVA tenderness.   Musculoskeletal: Normal range of motion. He exhibits no edema, tenderness or deformity.   Neurological: He is alert and oriented to person, place, and time. No cranial nerve deficit or sensory deficit.   Skin: Skin is warm, dry and intact. Capillary refill takes less than 2 seconds. No rash noted. He is not diaphoretic. No cyanosis or erythema.   Psychiatric: He has a normal mood and affect. His speech is normal and behavior is normal. Cognition and memory are normal.   Nursing note and vitals reviewed.      Significant Labs:   BMP:   Recent Labs   Lab  09/24/18   0545   GLU  80   NA  139   K  4.2   CL  97   CO2  31*   BUN  19   CREATININE  0.8   CALCIUM  9.4     CBC:   Recent Labs   Lab  09/23/18   0536  09/24/18   0545   WBC  12.44  9.10   HGB  12.7*  12.7*   HCT  39.7*  40.4   PLT  290  294     All pertinent labs within the past 24 hours have been reviewed.    Significant Imaging:   Imaging Results          CTA Chest Non-Coronary (PE Study) (Final result)  Result time 09/21/18 23:06:44     Final result by Elias Martinez MD (09/21/18 23:06:44)                 Impression:      No pulmonary embolus.    Left mediastinal mass encases the lingular pulmonary arteries and veins without occlusion.  No significant change compared to the previous PET-CT examination.  See the details above.    All CT scans at this facility are performed  using dose modulation techniques as appropriate to performed exam including the following:  automated exposure control; adjustment of mA and/or kV according to the patients size (this includes techniques or standardized protocols for targeted exams where dose is matched to indication/reason for exam: i.e. extremities or head);  iterative reconstruction technique.      Electronically signed by: Elias Martinez MD  Date:    09/21/2018  Time:    23:06             Narrative:    EXAMINATION:  CTA CHEST NON CORONARY    CLINICAL HISTORY:  Dyspnea, cardiac origin suspected; lung cancer.    TECHNIQUE:  Axial CTA images performed through the chest after the administration of 100 cc intravenous contrast. Maximum intensity projections were performed and interpreted.    COMPARISON:  PET-CT performed 09/17/2018    FINDINGS:  Stable left anterior and middle mediastinal mass corresponding to known lung cancer that measures up to 8.2 cm in greatest dimension on today's study.  The mass encases the lingular pulmonary arteries and veins.  No pulmonary embolus identified.  Right paratracheal, subcarinal and right hilar lymphadenopathy is stable.  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  There is persistent compressive left basilar atelectasis that is unchanged from the previous PET CT.  Stable right upper lobe pulmonary nodule.  Right basilar subsegmental atelectasis.  Severe emphysema.    Aortic atherosclerosis and coronary artery calcifications.  The heart size is within normal limits.    No effusions.  No pneumothorax.    Benign left upper pole renal cyst.    Known osseous  metastatic disease including a destructive L1 vertebral body lesion.                               X-Ray Chest AP Portable (Final result)  Result time 09/21/18 21:41:16    Final result by Vincent Fried MD (Timothy) (09/21/18 21:41:16)                 Impression:      Stable chest with chronic elevation of the right hemidiaphragm and adjacent discoid atelectasis.      Electronically signed by: Vincent Fried MD  Date:    09/21/2018  Time:    21:41             Narrative:    EXAMINATION:  XR CHEST AP PORTABLE    CLINICAL HISTORY:  Shortness of breath    TECHNIQUE:  Single frontal view of the chest was performed.    COMPARISON:  09/02/2018.    FINDINGS:  Stable heart size.  Stable chronic elevation left hemidiaphragm with adjacent discoid atelectasis.  Right lung remains relatively clear.

## 2018-09-24 NOTE — PROGRESS NOTES
[facility] -   Hematology/Oncology  Progress Note    Patient Name: Nico Garza Jr.  Admission Date: 9/21/2018  Hospital Length of Stay: 2 days  Code Status: Full Code     Subjective:     HPI:    72-year-old gentleman status post recent hospitalization for left lower lung mass with extensive mediastinal lymphadenopathy, adrenal nodule, and lytic lesion of L1.  He underwent nondiagnostic bronchoscopy/biopsy on 09/02/2018.  Patient was scheduled for CT-guided biopsy of lumbar metastasis, however, he developed significant shortness of breath yesterday and was admitted through the ER with hypoxemia and worsening shortness of breath.  The patient was started on steroids and breathing treatments with significant improvement in symptoms.    Interval History: Patient seen at bedside today with daughter at bedside.  The patient states that he has dyspnea with exertion, but feels much better than he did on admit.  He is aware of CT guide biopsy to lumbar spine today.  He complains of constipation.    Oncology Treatment Plan:   [No treatment plan]    Medications:  Continuous Infusions:  Scheduled Meds:   albuterol-ipratropium  3 mL Nebulization Q6H    budesonide  0.5 mg Nebulization Q12H    diltiaZEM  360 mg Oral Daily    flecainide  100 mg Oral Q12H    gabapentin  600 mg Oral BID    insulin detemir U-100  20 Units Subcutaneous QHS    insulin detemir U-100  50 Units Subcutaneous Daily    nystatin  5 mL Oral QID    pantoprazole  40 mg Oral Daily    polyethylene glycol  17 g Oral Daily    pravastatin  40 mg Oral Daily    predniSONE  40 mg Oral Daily     PRN Meds:dextrose 50%, dextrose 50%, glucagon (human recombinant), glucose, glucose, HYDROcodone-acetaminophen, HYDROcodone-acetaminophen, insulin aspart U-100, traMADol     Review of Systems   Constitutional: Positive for activity change and fatigue. Negative for fever.   Respiratory: Positive for shortness of breath (with exertion). Negative for  chest tightness.    Cardiovascular: Negative for chest pain and palpitations.   Gastrointestinal: Positive for constipation. Negative for anal bleeding, blood in stool, diarrhea and nausea.   Genitourinary: Negative for hematuria.   Skin: Negative for rash and wound.   Neurological: Negative for dizziness, syncope and light-headedness.   Hematological: Negative for adenopathy. Does not bruise/bleed easily.   Psychiatric/Behavioral: Positive for dysphoric mood. The patient is nervous/anxious.      Objective:     Vital Signs (Most Recent):  Temp: 97.9 °F (36.6 °C) (09/24/18 0714)  Pulse: 82 (09/24/18 0742)  Resp: 18 (09/24/18 0742)  BP: 116/71 (09/24/18 0714)  SpO2: (!) 92 % (09/24/18 0742) Vital Signs (24h Range):  Temp:  [97.1 °F (36.2 °C)-98.4 °F (36.9 °C)] 97.9 °F (36.6 °C)  Pulse:  [] 82  Resp:  [17-20] 18  SpO2:  [89 %-98 %] 92 %  BP: (106-133)/(55-71) 116/71     Weight: 84.5 kg (186 lb 4.6 oz)  Body mass index is 27.91 kg/m².  Body surface area is 2.02 meters squared.      Intake/Output Summary (Last 24 hours) at 9/24/2018 0900  Last data filed at 9/24/2018 0549  Gross per 24 hour   Intake 480 ml   Output 650 ml   Net -170 ml       Physical Exam   Constitutional: He is oriented to person, place, and time. He appears well-developed and well-nourished.  Non-toxic appearance. He does not have a sickly appearance. He appears ill.   HENT:   Head: Normocephalic and atraumatic.   Eyes: Conjunctivae and lids are normal. No scleral icterus.   Cardiovascular: Normal rate, regular rhythm, S1 normal and S2 normal. Exam reveals no gallop and no friction rub.   Murmur heard.   Systolic murmur is present with a grade of 1/6.  Pulmonary/Chest: Effort normal. No accessory muscle usage. No apnea, no tachypnea and no bradypnea. No respiratory distress. He has decreased breath sounds.   Abdominal: Normal appearance. He exhibits no distension.   Musculoskeletal: He exhibits no edema.   Neurological: He is alert and oriented  to person, place, and time.   Skin: Skin is warm, dry and intact. No rash noted. He is not diaphoretic. No pallor.   Psychiatric: His speech is normal and behavior is normal. Judgment and thought content normal. His mood appears anxious. Cognition and memory are normal. He is attentive.   Vitals reviewed.      Significant Labs:   BMP:   Recent Labs   Lab  09/23/18   0536  09/24/18   0545   GLU  263*  80   NA  139  139   K  4.4  4.2   CL  97  97   CO2  28  31*   BUN  20  19   CREATININE  0.9  0.8   CALCIUM  9.8  9.4   , CBC:   Recent Labs   Lab  09/23/18   0536  09/24/18   0545   WBC  12.44  9.10   HGB  12.7*  12.7*   HCT  39.7*  40.4   PLT  290  294   , CMP:   Recent Labs   Lab  09/23/18   0536  09/24/18   0545   NA  139  139   K  4.4  4.2   CL  97  97   CO2  28  31*   GLU  263*  80   BUN  20  19   CREATININE  0.9  0.8   CALCIUM  9.8  9.4   ANIONGAP  14  11   EGFRNONAA  >60  >60   , Urine Studies: No results for input(s): COLORU, APPEARANCEUA, PHUR, SPECGRAV, PROTEINUA, GLUCUA, KETONESU, BILIRUBINUA, OCCULTUA, NITRITE, UROBILINOGEN, LEUKOCYTESUR, RBCUA, WBCUA, BACTERIA, SQUAMEPITHEL, HYALINECASTS in the last 48 hours.    Invalid input(s): WRIGHTSUR and All pertinent labs from the last 24 hours have been reviewed.    Diagnostic Results:  I have reviewed all pertinent imaging results/findings within the past 24 hours.    Assessment/Plan:     * Acute on chronic respiratory failure with hypoxia    Symptoms have significantly improved after steroids and respiratory treatments    --continue supportive care/nebs    09/24/18 States still get SOB on exertion, but feels much better than he did upon arrival to hospital.  States that he has home O2.  - Continue supportive care/nebs        Metastatic left lung cancer (metastasis from lung to other site) with mediastinal lymphadenopathy    CT scan/PET scan consistent with lung cancer with widespread metastatic disease.  Previous bronchoscopy/biopsy was nondiagnostic and planning  CT-guided biopsy of lumbar metastasis.  Need tissue diagnosis prior to planning treatment.    --continue supportive care/pain control  --CT-guided biopsy of lumbar metastasis--will discuss with IR and attempt to obtain biopsy ASAP    09/24/18 Patient will have CT guided biopsy to lumbar spine today.  Patient and daughter are aware of the plan of care.   -  Follow up with hematology/oncology outpatient            Thank you for your consult. I will follow-up with patient. Please contact us if you have any additional questions.     Callie Rodriguez NP  Hematology/Oncology  [facility] [place]

## 2018-09-24 NOTE — SUBJECTIVE & OBJECTIVE
Interval History: Patient seen at bedside today with daughter at bedside.  The patient states that he has dyspnea with exertion, but feels much better than he did on admit.  He is aware of CT guide biopsy to lumbar spine today.  He complains of constipation.    Oncology Treatment Plan:   [No treatment plan]    Medications:  Continuous Infusions:  Scheduled Meds:   albuterol-ipratropium  3 mL Nebulization Q6H    budesonide  0.5 mg Nebulization Q12H    diltiaZEM  360 mg Oral Daily    flecainide  100 mg Oral Q12H    gabapentin  600 mg Oral BID    insulin detemir U-100  20 Units Subcutaneous QHS    insulin detemir U-100  50 Units Subcutaneous Daily    nystatin  5 mL Oral QID    pantoprazole  40 mg Oral Daily    polyethylene glycol  17 g Oral Daily    pravastatin  40 mg Oral Daily    predniSONE  40 mg Oral Daily     PRN Meds:dextrose 50%, dextrose 50%, glucagon (human recombinant), glucose, glucose, HYDROcodone-acetaminophen, HYDROcodone-acetaminophen, insulin aspart U-100, traMADol     Review of Systems   Constitutional: Positive for activity change and fatigue. Negative for fever.   Respiratory: Positive for shortness of breath (with exertion). Negative for chest tightness.    Cardiovascular: Negative for chest pain and palpitations.   Gastrointestinal: Positive for constipation. Negative for anal bleeding, blood in stool, diarrhea and nausea.   Genitourinary: Negative for hematuria.   Skin: Negative for rash and wound.   Neurological: Negative for dizziness, syncope and light-headedness.   Hematological: Negative for adenopathy. Does not bruise/bleed easily.   Psychiatric/Behavioral: Positive for dysphoric mood. The patient is nervous/anxious.      Objective:     Vital Signs (Most Recent):  Temp: 97.9 °F (36.6 °C) (09/24/18 0714)  Pulse: 82 (09/24/18 0742)  Resp: 18 (09/24/18 0742)  BP: 116/71 (09/24/18 0714)  SpO2: (!) 92 % (09/24/18 0742) Vital Signs (24h Range):  Temp:  [97.1 °F (36.2 °C)-98.4 °F (36.9  °C)] 97.9 °F (36.6 °C)  Pulse:  [] 82  Resp:  [17-20] 18  SpO2:  [89 %-98 %] 92 %  BP: (106-133)/(55-71) 116/71     Weight: 84.5 kg (186 lb 4.6 oz)  Body mass index is 27.91 kg/m².  Body surface area is 2.02 meters squared.      Intake/Output Summary (Last 24 hours) at 9/24/2018 0900  Last data filed at 9/24/2018 0549  Gross per 24 hour   Intake 480 ml   Output 650 ml   Net -170 ml       Physical Exam   Constitutional: He is oriented to person, place, and time. He appears well-developed and well-nourished.  Non-toxic appearance. He does not have a sickly appearance. He appears ill.   HENT:   Head: Normocephalic and atraumatic.   Eyes: Conjunctivae and lids are normal. No scleral icterus.   Cardiovascular: Normal rate, regular rhythm, S1 normal and S2 normal. Exam reveals no gallop and no friction rub.   Murmur heard.   Systolic murmur is present with a grade of 1/6.  Pulmonary/Chest: Effort normal. No accessory muscle usage. No apnea, no tachypnea and no bradypnea. No respiratory distress. He has decreased breath sounds.   Abdominal: Normal appearance. He exhibits no distension.   Musculoskeletal: He exhibits no edema.   Neurological: He is alert and oriented to person, place, and time.   Skin: Skin is warm, dry and intact. No rash noted. He is not diaphoretic. No pallor.   Psychiatric: His speech is normal and behavior is normal. Judgment and thought content normal. His mood appears anxious. Cognition and memory are normal. He is attentive.   Vitals reviewed.      Significant Labs:   BMP:   Recent Labs   Lab  09/23/18   0536  09/24/18   0545   GLU  263*  80   NA  139  139   K  4.4  4.2   CL  97  97   CO2  28  31*   BUN  20  19   CREATININE  0.9  0.8   CALCIUM  9.8  9.4   , CBC:   Recent Labs   Lab  09/23/18   0536  09/24/18   0545   WBC  12.44  9.10   HGB  12.7*  12.7*   HCT  39.7*  40.4   PLT  290  294   , CMP:   Recent Labs   Lab  09/23/18   0536  09/24/18   0545   NA  139  139   K  4.4  4.2   CL  97  97    CO2  28  31*   GLU  263*  80   BUN  20  19   CREATININE  0.9  0.8   CALCIUM  9.8  9.4   ANIONGAP  14  11   EGFRNONAA  >60  >60   , Urine Studies: No results for input(s): COLORU, APPEARANCEUA, PHUR, SPECGRAV, PROTEINUA, GLUCUA, KETONESU, BILIRUBINUA, OCCULTUA, NITRITE, UROBILINOGEN, LEUKOCYTESUR, RBCUA, WBCUA, BACTERIA, SQUAMEPITHEL, HYALINECASTS in the last 48 hours.    Invalid input(s): WRIGHTSUR and All pertinent labs from the last 24 hours have been reviewed.    Diagnostic Results:  I have reviewed all pertinent imaging results/findings within the past 24 hours.

## 2018-09-24 NOTE — PLAN OF CARE
Problem: Patient Care Overview  Goal: Plan of Care Review  Outcome: Ongoing (interventions implemented as appropriate)  POC reviewed, verbalized understanding. Pt remained free from falls, fall precautions in place. Pt is NSR on monitor, 80-90s. VSS. 3L O2 NC. Bipap QHS. PRN medication given for pain. No other c/o at this time. PIV intact. Blood glucose monitored, SSI. Call bell and personal belongings within reach. Hourly rounding complete. Reminded to call for assistance. Will continue to monitor.

## 2018-09-24 NOTE — ASSESSMENT & PLAN NOTE
Symptoms have significantly improved after steroids and respiratory treatments    --continue supportive care/nebs    09/24/18 States still get SOB on exertion, but feels much better than he did upon arrival to hospital.  States that he has home O2.  - Continue supportive care/nebs

## 2018-09-24 NOTE — ASSESSMENT & PLAN NOTE
Oxygen supplementation. Keep SAO2 > 92 %                              Continue with LABA, JAMMIE and ICS.        Continue IV glucocorticoids      9/24 continue present treatment

## 2018-09-24 NOTE — SUBJECTIVE & OBJECTIVE
Interval History: Seen and examined at bedside. Hospital chart reviewed. No acute interval detrimental events noted. He reports that he  has slightly improved. Tolerated BIPAP overnight.     Review of Systems   Constitutional: Negative for chills, diaphoresis, fatigue, fever and unexpected weight change.   HENT: Negative for congestion, postnasal drip, rhinorrhea, sinus pressure, sore throat and trouble swallowing.    Eyes: Negative for visual disturbance.   Respiratory: Positive for shortness of breath and wheezing. Negative for apnea, cough and chest tightness.    Cardiovascular: Negative for chest pain, palpitations and leg swelling.   Gastrointestinal: Negative for abdominal distention, abdominal pain (LUQ), blood in stool, constipation, diarrhea, nausea and vomiting.   Genitourinary: Negative for decreased urine volume, difficulty urinating, dysuria, frequency, hematuria and urgency.   Musculoskeletal: Positive for back pain. Negative for arthralgias, gait problem, joint swelling and myalgias.   Skin: Negative for pallor, rash and wound.   Neurological: Negative for dizziness, syncope, weakness, light-headedness, numbness and headaches.   Psychiatric/Behavioral: Negative for confusion. The patient is not nervous/anxious.    All other systems reviewed and are negative.      Objective:     Vital Signs (Most Recent):  Temp: 97.9 °F (36.6 °C) (09/24/18 0714)  Pulse: (!) 116 (09/24/18 1341)  Resp: 20 (09/24/18 1341)  BP: 116/71 (09/24/18 0714)  SpO2: (!) 91 % (09/24/18 1341) Vital Signs (24h Range):  Temp:  [97.2 °F (36.2 °C)-98.4 °F (36.9 °C)] 97.9 °F (36.6 °C)  Pulse:  [] 116  Resp:  [18-20] 20  SpO2:  [89 %-98 %] 91 %  BP: (106-133)/(55-71) 116/71     Weight: 84.5 kg (186 lb 4.6 oz)  Body mass index is 27.91 kg/m².      Intake/Output Summary (Last 24 hours) at 9/24/2018 1437  Last data filed at 9/24/2018 0549  Gross per 24 hour   Intake 240 ml   Output 450 ml   Net -210 ml       Physical Exam    Constitutional: He is oriented to person, place, and time. He appears well-developed and well-nourished. No distress.   HENT:   Head: Normocephalic and atraumatic.   Right Ear: External ear normal.   Nose: Nose normal.   Mouth/Throat: Oropharynx is clear and moist. No oropharyngeal exudate.   Eyes: Conjunctivae and EOM are normal. Pupils are equal, round, and reactive to light. Right eye exhibits no discharge. Left eye exhibits no discharge. No scleral icterus.   Neck: Normal range of motion. Neck supple. No JVD present. No tracheal deviation present. No thyromegaly present.   Cardiovascular: Normal rate and regular rhythm. Exam reveals no gallop and no friction rub.   No murmur heard.  Pulmonary/Chest: Effort normal. No stridor. No respiratory distress. He has no wheezes. He has rales. He exhibits no tenderness.   Abdominal: Soft. Bowel sounds are normal. He exhibits no distension and no mass. There is no tenderness. There is no rebound.   Musculoskeletal: Normal range of motion. He exhibits no edema.   Lymphadenopathy:     He has no cervical adenopathy.   Neurological: He is alert and oriented to person, place, and time. No cranial nerve deficit. Coordination normal.   Skin: Skin is warm and dry. No rash noted. He is not diaphoretic. No erythema. No pallor.   Psychiatric: He has a normal mood and affect. Thought content normal.   Vitals reviewed.      Vents:  Oxygen Concentration (%): 32 (09/24/18 0046)    Lines/Drains/Airways     Peripheral Intravenous Line                 Peripheral IV - Single Lumen 09/21/18 2125 Left Antecubital 2 days                Significant Labs:    CBC/Anemia Profile:  Recent Labs   Lab  09/23/18   0536  09/24/18   0545   WBC  12.44  9.10   HGB  12.7*  12.7*   HCT  39.7*  40.4   PLT  290  294   MCV  89  90   RDW  15.0*  14.9*        Chemistries:  Recent Labs   Lab  09/23/18   0536  09/24/18   0545   NA  139  139   K  4.4  4.2   CL  97  97   CO2  28  31*   BUN  20  19   CREATININE  0.9   0.8   CALCIUM  9.8  9.4         Significant Imaging:     Radiology:  Reviewed recent imaging studies. Appear stable other than abnormalities addressed in plan.

## 2018-09-24 NOTE — PROGRESS NOTES
[facility] Hospital Medicine  Progress Note    Patient Name: Nico Garza Jr.  MRN: [id number]  Patient Class: IP- Inpatient   Admission Date: 9/21/2018  Length of Stay: 2 days  Attending Physician: Vick Maldonado, *  Primary Care Provider: Tiffany Davis DO        Subjective:     Principal Problem:Acute on chronic respiratory failure with hypoxia    HPI:  Mr. Garza is a 73yo male with a PMHx of newly diagnosed metastatic left lung CA s/p nondiagnostic bronchoscopy/biopsy on 9/2/18, COPD on chronic home O2, PAF/FL, HTN, HLD, Afib, COPD, DM, HTN, and DM 1, who presented to the ED with c/o SOB that has progressively worsened since ~5PM this evening.  Associated LUQ ABD pain.  Aggravated by lying flat, no alleviating factors.  Denies any CP, palpitations, cough, hemoptysis, PND, edema, ABD distention, N/V/D, dysuria, hematuria, weakness, back pain, HA, AMS, lightheadedness/dizziness, syncope, focal deficits, fever, or chills.  Patient's daughter reports his O2 sat has remained between 75-79% on his home 3L NC since 5PM tonight, and is unable to get oxygen sat above 79% with supplemental O2 or neb treatments.  Upon arrival to ED, patient's O2 sat was 87% on 3L NC.  ABGs resulted pH 7.455, pCO2 44.4, pO2 55, HCO3 31.2, SaO2 89% on 3L NC.  CTA of the chest showed no evidence of PE with no significant changes from recent PET scan on 9/17.  Patient received IV Solumedrol 80mg x 1 dose, and Duonebs x 3 with improvement in respiratory status.  Hospital Medicine was called for admission. Currently, patient appears comfortable in NAD.  O2 sat stable at 93% on 3L NC.  Patient recently had a PET scan on 9/17 that showed extensive FDG avid mediastinal lymphadenopathy, single small subcentimeter pulmonary nodule within the right lung, metastatic lesion within the dome of the liver, and extensive osseous metastatic disease noted throughout the vertebral column.  He is followed by Dr. Lagos, and is  scheduled for repeat CT guided biopsy on 9/25 with possible EBUS tissue diagnosis if needed.      Hospital Course:  Patient admitted for acute on chronic respiratory failure with hypoxia. Patient recently had a PET scan on 9/17 that showed extensive FDG avid mediastinal lymphadenopathy, single small subcentimeter pulmonary nodule within the right lung, metastatic lesion within the dome of the liver, and extensive osseous metastatic disease noted throughout the vertebral column. Previous bronchoscopy/biopsy was nondiagnostic. He is followed by Dr. Lagos, and is scheduled for CT guided biopsy on 9/25 with possible EBUS tissue diagnosis if needed.  Pulmonology and Hematology/Oncology consulted to assist with medical management. Patient was started on JAMMIE, LABA, ICS, and IV steroids. CT-guided biopsy of lumbar metastasis planned for today. Will continue supportive care.     Interval History: No acute events overnight. No change in status. CT-guided biopsy of lumbar metastasis planned for today.     Review of Systems   Constitutional: Negative for chills, diaphoresis, fatigue, fever and unexpected weight change.   HENT: Negative for congestion, postnasal drip, rhinorrhea, sinus pressure, sore throat and trouble swallowing.    Eyes: Negative for visual disturbance.   Respiratory: Positive for shortness of breath and wheezing. Negative for apnea, cough and chest tightness.    Cardiovascular: Negative for chest pain, palpitations and leg swelling.   Gastrointestinal: Negative for abdominal distention, abdominal pain, blood in stool, constipation, diarrhea, nausea and vomiting.   Genitourinary: Negative for decreased urine volume, difficulty urinating, dysuria, frequency, hematuria and urgency.   Musculoskeletal: Positive for back pain. Negative for arthralgias, gait problem, joint swelling and myalgias.   Skin: Negative for pallor, rash and wound.   Neurological: Negative for dizziness, syncope, weakness,  light-headedness, numbness and headaches.   Psychiatric/Behavioral: Negative for confusion. The patient is not nervous/anxious.    All other systems reviewed and are negative.    Objective:     Vital Signs (Most Recent):  Temp: 97.9 °F (36.6 °C) (09/24/18 0714)  Pulse: 89 (09/24/18 1050)  Resp: 18 (09/24/18 0742)  BP: 116/71 (09/24/18 0714)  SpO2: (!) 92 % (09/24/18 0742) Vital Signs (24h Range):  Temp:  [97.1 °F (36.2 °C)-98.4 °F (36.9 °C)] 97.9 °F (36.6 °C)  Pulse:  [] 89  Resp:  [17-20] 18  SpO2:  [89 %-98 %] 92 %  BP: (106-133)/(55-71) 116/71     Weight: 84.5 kg (186 lb 4.6 oz)  Body mass index is 27.91 kg/m².    Intake/Output Summary (Last 24 hours) at 9/24/2018 1123  Last data filed at 9/24/2018 0549  Gross per 24 hour   Intake 480 ml   Output 650 ml   Net -170 ml      Physical Exam   Constitutional: He is oriented to person, place, and time. He appears well-developed and well-nourished. No distress.   HENT:   Head: Normocephalic and atraumatic.   Eyes: Conjunctivae are normal.   Neck: Normal range of motion. Neck supple. No JVD present.   Cardiovascular: Normal rate, regular rhythm, S1 normal, S2 normal and intact distal pulses.  No extrasystoles are present. Exam reveals no gallop.   No murmur heard.  Pulses:       Radial pulses are 2+ on the right side, and 2+ on the left side.        Dorsalis pedis pulses are 2+ on the right side, and 2+ on the left side.        Posterior tibial pulses are 2+ on the right side, and 2+ on the left side.   Pulmonary/Chest: Effort normal. No accessory muscle usage. No tachypnea. No respiratory distress. He has decreased breath sounds in the left lower field. He has no wheezes. He has no rhonchi. He has no rales.   Abdominal: Soft. Bowel sounds are normal. He exhibits no distension. There is no tenderness. There is no rebound, no guarding and no CVA tenderness.   Musculoskeletal: Normal range of motion. He exhibits no edema, tenderness or deformity.   Neurological: He is  alert and oriented to person, place, and time. No cranial nerve deficit or sensory deficit.   Skin: Skin is warm, dry and intact. Capillary refill takes less than 2 seconds. No rash noted. He is not diaphoretic. No cyanosis or erythema.   Psychiatric: He has a normal mood and affect. His speech is normal and behavior is normal. Cognition and memory are normal.   Nursing note and vitals reviewed.      Significant Labs:   BMP:   Recent Labs   Lab  09/24/18   0545   GLU  80   NA  139   K  4.2   CL  97   CO2  31*   BUN  19   CREATININE  0.8   CALCIUM  9.4     CBC:   Recent Labs   Lab  09/23/18   0536  09/24/18   0545   WBC  12.44  9.10   HGB  12.7*  12.7*   HCT  39.7*  40.4   PLT  290  294     All pertinent labs within the past 24 hours have been reviewed.    Significant Imaging:   Imaging Results          CTA Chest Non-Coronary (PE Study) (Final result)  Result time 09/21/18 23:06:44    Final result by Elias Martinez MD (09/21/18 23:06:44)                 Impression:      No pulmonary embolus.    Left mediastinal mass encases the lingular pulmonary arteries and veins without occlusion.  No significant change compared to the previous PET-CT examination.  See the details above.    All CT scans at this facility are performed  using dose modulation techniques as appropriate to performed exam including the following:  automated exposure control; adjustment of mA and/or kV according to the patients size (this includes techniques or standardized protocols for targeted exams where dose is matched to indication/reason for exam: i.e. extremities or head);  iterative reconstruction technique.      Electronically signed by: Elias Martinez MD  Date:    09/21/2018  Time:    23:06             Narrative:    EXAMINATION:  CTA CHEST NON CORONARY    CLINICAL HISTORY:  Dyspnea, cardiac origin suspected; lung cancer.    TECHNIQUE:  Axial CTA images performed through the chest after the administration of 100 cc intravenous contrast. Maximum  intensity projections were performed and interpreted.    COMPARISON:  PET-CT performed 09/17/2018    FINDINGS:  Stable left anterior and middle mediastinal mass corresponding to known lung cancer that measures up to 8.2 cm in greatest dimension on today's study.  The mass encases the lingular pulmonary arteries and veins.  No pulmonary embolus identified.  Right paratracheal, subcarinal and right hilar lymphadenopathy is stable.  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  There is persistent compressive left basilar atelectasis that is unchanged from the previous PET CT.  Stable right upper lobe pulmonary nodule.  Right basilar subsegmental atelectasis.  Severe emphysema.    Aortic atherosclerosis and coronary artery calcifications.  The heart size is within normal limits.    No effusions.  No pneumothorax.    Benign left upper pole renal cyst.    Known osseous metastatic disease including a destructive L1 vertebral body lesion.                               X-Ray Chest AP Portable (Final result)  Result time 09/21/18 21:41:16    Final result by Vincent Fried MD (Timothy) (09/21/18 21:41:16)                 Impression:      Stable chest with chronic elevation of the right hemidiaphragm and adjacent discoid atelectasis.      Electronically signed by: Vincent Fried MD  Date:    09/21/2018  Time:    21:41             Narrative:    EXAMINATION:  XR CHEST AP PORTABLE    CLINICAL HISTORY:  Shortness of breath    TECHNIQUE:  Single frontal view of the chest was performed.    COMPARISON:  09/02/2018.    FINDINGS:  Stable heart size.  Stable chronic elevation left hemidiaphragm with adjacent discoid atelectasis.  Right lung remains relatively clear.                              Assessment/Plan:      * Acute on chronic respiratory failure with hypoxia    - Currently, O2 sat stable on 3L NC.    - Continue oxygen supplementation.    - Duonebs Q6 hours.    -IV steroids given in ED.  - Pulmonology consult    -Bipap qhs and prn   - Slight Improvement         Metastatic left lung cancer (metastasis from lung to other site) with mediastinal lymphadenopathy    - CTA chest showed no evidence of pulmonary embolus, left mediastinal mass encases the lingular pulmonary arteries and veins without occlusion, no significant change compared to the previous PET-CT examination.   - s/p nondiagnostic bronchoscopy/biopsy on 09/02/2018.  PET scan performed on 09/17/2018 demonstrated extensive FDG avid mediastinal lymphadenopathy, single small subcentimeter pulmonary nodule within the right lung, metastatic lesion within the dome of the liver, and extensive osseous metastatic disease noted throughout the vertebral column.  - Plan for repeat CT guided biopsy on 9/24 with EBUS tissue diagnosis   - Oncology and Pulmonology following           Type 1 diabetes mellitus with diabetic neuropathy    - Continue basal insulin 50 units daily.  - Accuchecks with moderate SSI.  - Diabetic diet.  - HbA1c 8.5        Paroxysmal atrial flutter    - Sinus rhythm, monitor telemetry.  - Continue home diltiazem and flecainide for HR/rhythm suppression.  - Eliquis has been on hold for planned procedure/biopsy, will continue to hold.        Hypertension goal BP (blood pressure) < 130/80    - BP stable.  - Continue home CCB and ACEi   -BP marginally low, ACEi on hold will restart when appropriate   -Monitor closely  9/24/18  -Bp improved   -Will continue to monitor          COPD (chronic obstructive pulmonary disease) with emphysema    - See plan above.  -Continue with LABA, JAMMIE and ICS.      -Pulmonology following                VTE Risk Mitigation (From admission, onward)        Ordered     Place sequential compression device  Until discontinued      09/22/18 0033              Pamella Salamanca NP  Department of Hospital Medicine   [facility]

## 2018-09-24 NOTE — ASSESSMENT & PLAN NOTE
- Sinus rhythm, monitor telemetry.  - Continue home diltiazem and flecainide for HR/rhythm suppression.  - Eliquis has been on hold for planned procedure/biopsy, will continue to hold.

## 2018-09-25 VITALS
BODY MASS INDEX: 29.06 KG/M2 | DIASTOLIC BLOOD PRESSURE: 66 MMHG | WEIGHT: 196.19 LBS | RESPIRATION RATE: 18 BRPM | HEART RATE: 101 BPM | HEIGHT: 69 IN | SYSTOLIC BLOOD PRESSURE: 125 MMHG | TEMPERATURE: 99 F | OXYGEN SATURATION: 96 %

## 2018-09-25 LAB
ANION GAP SERPL CALC-SCNC: 16 MMOL/L
BASOPHILS # BLD AUTO: 0.01 K/UL
BASOPHILS NFR BLD: 0.1 %
BUN SERPL-MCNC: 16 MG/DL
CALCIUM SERPL-MCNC: 9.8 MG/DL
CHLORIDE SERPL-SCNC: 96 MMOL/L
CO2 SERPL-SCNC: 26 MMOL/L
CREAT SERPL-MCNC: 0.8 MG/DL
DIFFERENTIAL METHOD: ABNORMAL
EOSINOPHIL # BLD AUTO: 0.2 K/UL
EOSINOPHIL NFR BLD: 2.5 %
ERYTHROCYTE [DISTWIDTH] IN BLOOD BY AUTOMATED COUNT: 14.9 %
EST. GFR  (AFRICAN AMERICAN): >60 ML/MIN/1.73 M^2
EST. GFR  (NON AFRICAN AMERICAN): >60 ML/MIN/1.73 M^2
GLUCOSE SERPL-MCNC: 91 MG/DL
HCT VFR BLD AUTO: 44.2 %
HGB BLD-MCNC: 14.2 G/DL
LYMPHOCYTES # BLD AUTO: 1.9 K/UL
LYMPHOCYTES NFR BLD: 26.3 %
MCH RBC QN AUTO: 28.7 PG
MCHC RBC AUTO-ENTMCNC: 32.1 G/DL
MCV RBC AUTO: 89 FL
MONOCYTES # BLD AUTO: 0.4 K/UL
MONOCYTES NFR BLD: 5.8 %
NEUTROPHILS # BLD AUTO: 4.7 K/UL
NEUTROPHILS NFR BLD: 65.3 %
PLATELET # BLD AUTO: 342 K/UL
PMV BLD AUTO: 9.4 FL
POCT GLUCOSE: 129 MG/DL (ref 70–110)
POCT GLUCOSE: 87 MG/DL (ref 70–110)
POTASSIUM SERPL-SCNC: 4 MMOL/L
RBC # BLD AUTO: 4.95 M/UL
SODIUM SERPL-SCNC: 138 MMOL/L
WBC # BLD AUTO: 7.12 K/UL

## 2018-09-25 PROCEDURE — 25000003 PHARM REV CODE 250: Performed by: INTERNAL MEDICINE

## 2018-09-25 PROCEDURE — 88305 TISSUE EXAM BY PATHOLOGIST: CPT | Mod: 26,,, | Performed by: PATHOLOGY

## 2018-09-25 PROCEDURE — 25000003 PHARM REV CODE 250: Performed by: NURSE PRACTITIONER

## 2018-09-25 PROCEDURE — 94660 CPAP INITIATION&MGMT: CPT

## 2018-09-25 PROCEDURE — 85025 COMPLETE CBC W/AUTO DIFF WBC: CPT

## 2018-09-25 PROCEDURE — 88305 TISSUE EXAM BY PATHOLOGIST: CPT | Performed by: PATHOLOGY

## 2018-09-25 PROCEDURE — 36415 COLL VENOUS BLD VENIPUNCTURE: CPT

## 2018-09-25 PROCEDURE — 25000242 PHARM REV CODE 250 ALT 637 W/ HCPCS: Performed by: NURSE PRACTITIONER

## 2018-09-25 PROCEDURE — 63600175 PHARM REV CODE 636 W HCPCS: Performed by: INTERNAL MEDICINE

## 2018-09-25 PROCEDURE — 94640 AIRWAY INHALATION TREATMENT: CPT

## 2018-09-25 PROCEDURE — 88341 IMHCHEM/IMCYTCHM EA ADD ANTB: CPT | Mod: 26,,, | Performed by: PATHOLOGY

## 2018-09-25 PROCEDURE — 80048 BASIC METABOLIC PNL TOTAL CA: CPT

## 2018-09-25 PROCEDURE — 63600175 PHARM REV CODE 636 W HCPCS: Performed by: RADIOLOGY

## 2018-09-25 PROCEDURE — 99232 SBSQ HOSP IP/OBS MODERATE 35: CPT | Mod: ,,, | Performed by: INTERNAL MEDICINE

## 2018-09-25 PROCEDURE — 0QB03ZX EXCISION OF LUMBAR VERTEBRA, PERCUTANEOUS APPROACH, DIAGNOSTIC: ICD-10-PCS | Performed by: RADIOLOGY

## 2018-09-25 PROCEDURE — 88342 IMHCHEM/IMCYTCHM 1ST ANTB: CPT | Mod: 26,,, | Performed by: PATHOLOGY

## 2018-09-25 PROCEDURE — 99900035 HC TECH TIME PER 15 MIN (STAT)

## 2018-09-25 PROCEDURE — 88342 IMHCHEM/IMCYTCHM 1ST ANTB: CPT | Performed by: PATHOLOGY

## 2018-09-25 RX ORDER — MIDAZOLAM HYDROCHLORIDE 1 MG/ML
INJECTION INTRAMUSCULAR; INTRAVENOUS CODE/TRAUMA/SEDATION MEDICATION
Status: COMPLETED | OUTPATIENT
Start: 2018-09-25 | End: 2018-09-25

## 2018-09-25 RX ORDER — FENTANYL CITRATE 50 UG/ML
INJECTION, SOLUTION INTRAMUSCULAR; INTRAVENOUS CODE/TRAUMA/SEDATION MEDICATION
Status: COMPLETED | OUTPATIENT
Start: 2018-09-25 | End: 2018-09-25

## 2018-09-25 RX ORDER — NYSTATIN 100000 [USP'U]/ML
5 SUSPENSION ORAL 4 TIMES DAILY
Qty: 160 ML | Refills: 0 | Status: SHIPPED | OUTPATIENT
Start: 2018-09-25 | End: 2018-10-03

## 2018-09-25 RX ORDER — PREDNISONE 20 MG/1
20 TABLET ORAL DAILY
Qty: 7 TABLET | Refills: 0 | Status: SHIPPED | OUTPATIENT
Start: 2018-09-25 | End: 2018-10-03

## 2018-09-25 RX ADMIN — MIDAZOLAM HYDROCHLORIDE 1 MG: 1 INJECTION, SOLUTION INTRAMUSCULAR; INTRAVENOUS at 09:09

## 2018-09-25 RX ADMIN — NYSTATIN 500000 UNITS: 100000 SUSPENSION ORAL at 02:09

## 2018-09-25 RX ADMIN — GABAPENTIN 600 MG: 300 CAPSULE ORAL at 02:09

## 2018-09-25 RX ADMIN — DILTIAZEM HYDROCHLORIDE 360 MG: 180 CAPSULE, COATED, EXTENDED RELEASE ORAL at 02:09

## 2018-09-25 RX ADMIN — FLECAINIDE ACETATE 100 MG: 50 TABLET ORAL at 02:09

## 2018-09-25 RX ADMIN — INSULIN DETEMIR 50 UNITS: 100 INJECTION, SOLUTION SUBCUTANEOUS at 02:09

## 2018-09-25 RX ADMIN — IPRATROPIUM BROMIDE AND ALBUTEROL SULFATE 3 ML: .5; 3 SOLUTION RESPIRATORY (INHALATION) at 12:09

## 2018-09-25 RX ADMIN — PANTOPRAZOLE SODIUM 40 MG: 40 TABLET, DELAYED RELEASE ORAL at 02:09

## 2018-09-25 RX ADMIN — MIDAZOLAM HYDROCHLORIDE 1 MG: 1 INJECTION, SOLUTION INTRAMUSCULAR; INTRAVENOUS at 08:09

## 2018-09-25 RX ADMIN — PREDNISONE 40 MG: 20 TABLET ORAL at 02:09

## 2018-09-25 RX ADMIN — HYDROCODONE BITARTRATE AND ACETAMINOPHEN 1 TABLET: 10; 325 TABLET ORAL at 10:09

## 2018-09-25 RX ADMIN — PRAVASTATIN SODIUM 40 MG: 20 TABLET ORAL at 02:09

## 2018-09-25 RX ADMIN — FENTANYL CITRATE 50 MCG: 50 INJECTION, SOLUTION INTRAMUSCULAR; INTRAVENOUS at 08:09

## 2018-09-25 RX ADMIN — POLYETHYLENE GLYCOL 3350 17 G: 17 POWDER, FOR SOLUTION ORAL at 02:09

## 2018-09-25 NOTE — INTERVAL H&P NOTE
The patient has been examined and the H&P has been reviewed:    I concur with the findings and no changes have occurred since H&P was written.        Active Hospital Problems    Diagnosis  POA    *Acute on chronic respiratory failure with hypoxia [J96.21]  Yes    Metastatic left lung cancer (metastasis from lung to other site) with mediastinal lymphadenopathy [C34.92]  Yes    Chronic respiratory failure with hypoxia [J96.11]  Yes    Type 1 diabetes mellitus with diabetic neuropathy [E10.40]  Yes     Chronic    Paroxysmal atrial flutter [I48.92]  Yes     Chronic    Hypertension goal BP (blood pressure) < 130/80 [I10]  Yes     Chronic    COPD (chronic obstructive pulmonary disease) with emphysema [J43.9]  Yes     Chronic      Resolved Hospital Problems   No resolved problems to display.

## 2018-09-25 NOTE — PLAN OF CARE
Pt was out of room at time of visit. Sw met with pt's daughter. Sw placed contact information on white board. Pt's dtr stated pt might need CPAP at discharge. Yari will f/u with MD.

## 2018-09-25 NOTE — PROGRESS NOTES
[facility]   Hematology/Oncology  Progress Note    Patient Name: Nico Garza Jr.  Admission Date: 9/21/2018  Hospital Length of Stay: 3 days  Code Status: Full Code     Subjective:     HPI:    72-year-old gentleman status post recent hospitalization for left lower lung mass with extensive mediastinal lymphadenopathy, adrenal nodule, and lytic lesion of L1.  He underwent nondiagnostic bronchoscopy/biopsy on 09/02/2018.  Patient was scheduled for CT-guided biopsy of lumbar metastasis, however, he developed significant shortness of breath yesterday and was admitted through the ER with hypoxemia and worsening shortness of breath.  The patient was started on steroids and breathing treatments with significant improvement in symptoms.    Interval History: Daughter in room when went to see patient this morning.  Patient has been taken to biopsy.  She states that he had a BM last night.  She states that he is afraid to go home because is afraid his sats will drop again.  She states they live 10 minutes away.  She states does have pulse oximetry at home and check often.  He also has home O2.      Oncology Treatment Plan:   [No treatment plan]    Medications:  Continuous Infusions:  Scheduled Meds:   albuterol-ipratropium  3 mL Nebulization Q6H    budesonide  0.5 mg Nebulization Q12H    diltiaZEM  360 mg Oral Daily    flecainide  100 mg Oral Q12H    gabapentin  600 mg Oral BID    insulin detemir U-100  20 Units Subcutaneous QHS    insulin detemir U-100  50 Units Subcutaneous Daily    nystatin  5 mL Oral QID    pantoprazole  40 mg Oral Daily    polyethylene glycol  17 g Oral Daily    pravastatin  40 mg Oral Daily    predniSONE  40 mg Oral Daily     PRN Meds:dextrose 50%, dextrose 50%, fentaNYL, glucagon (human recombinant), glucose, glucose, HYDROcodone-acetaminophen, HYDROcodone-acetaminophen, insulin aspart U-100, midazolam, traMADol     Review of Systems   Unable to perform ROS: Other (off to  biopsy)     Objective:     Vital Signs (Most Recent):  Temp: 98.1 °F (36.7 °C) (09/25/18 0752)  Pulse: 103 (09/25/18 0843)  Resp: 14 (09/25/18 0843)  BP: (!) 153/72 (09/25/18 0843)  SpO2: 100 % (09/25/18 0843) Vital Signs (24h Range):  Temp:  [97.6 °F (36.4 °C)-99 °F (37.2 °C)] 98.1 °F (36.7 °C)  Pulse:  [] 103  Resp:  [14-23] 14  SpO2:  [91 %-100 %] 100 %  BP: (116-153)/(58-76) 153/72     Weight: 89 kg (196 lb 3.4 oz)  Body mass index is 29.4 kg/m².  Body surface area is 2.07 meters squared.      Intake/Output Summary (Last 24 hours) at 9/25/2018 0846  Last data filed at 9/25/2018 0000  Gross per 24 hour   Intake 240 ml   Output --   Net 240 ml       Physical Exam    Significant Labs:   BMP:   Recent Labs   Lab  09/24/18   0545  09/25/18   0549   GLU  80  91   NA  139  138   K  4.2  4.0   CL  97  96   CO2  31*  26   BUN  19  16   CREATININE  0.8  0.8   CALCIUM  9.4  9.8   , CBC:   Recent Labs   Lab  09/24/18   0545  09/25/18   0550   WBC  9.10  7.12   HGB  12.7*  14.2   HCT  40.4  44.2   PLT  294  342   , CMP:   Recent Labs   Lab  09/24/18   0545  09/25/18   0549   NA  139  138   K  4.2  4.0   CL  97  96   CO2  31*  26   GLU  80  91   BUN  19  16   CREATININE  0.8  0.8   CALCIUM  9.4  9.8   ANIONGAP  11  16   EGFRNONAA  >60  >60   , Urine Studies: No results for input(s): COLORU, APPEARANCEUA, PHUR, SPECGRAV, PROTEINUA, GLUCUA, KETONESU, BILIRUBINUA, OCCULTUA, NITRITE, UROBILINOGEN, LEUKOCYTESUR, RBCUA, WBCUA, BACTERIA, SQUAMEPITHEL, HYALINECASTS in the last 48 hours.    Invalid input(s): WRIGHTSUR and All pertinent labs from the last 24 hours have been reviewed.    Diagnostic Results:  I have reviewed all pertinent imaging results/findings within the past 24 hours.    Assessment/Plan:     * Acute on chronic respiratory failure with hypoxia    Symptoms have significantly improved after steroids and respiratory treatments    --continue supportive care/nebs    09/24/18 States still get SOB on exertion, but  feels much better than he did upon arrival to hospital.  States that he has home O2.  - Continue supportive care/nebs        Metastatic left lung cancer (metastasis from lung to other site) with mediastinal lymphadenopathy    CT scan/PET scan consistent with lung cancer with widespread metastatic disease.  Previous bronchoscopy/biopsy was nondiagnostic and planning CT-guided biopsy of lumbar metastasis.  Need tissue diagnosis prior to planning treatment.    --continue supportive care/pain control  --CT-guided biopsy of lumbar metastasis--will discuss with IR and attempt to obtain biopsy ASAP    09/24/18 Patient will have CT guided biopsy to lumbar spine today.  Patient and daughter are aware of the plan of care.   -  Follow up with hematology/oncology outpatient    09/25/18 biopsy postponed from yesterday to today due to patient having low blood sugars.  He is currently with IR having biopsy performed now.    - Follow up outpatient with hematology/oncology        Paroxysmal atrial flutter    09/25/18 Having CT guided biopsy to lumbar spine today.  May restart eliquis tomorrow.            Thank you for your consult. I will follow-up with patient. Please contact us if you have any additional questions.     Callie Rodriguez NP  Hematology/Oncology  [facility]

## 2018-09-25 NOTE — SEDATION DOCUMENTATION
Procedure complete. Pt tolerated well, vss. bandaid placed to healthy site. Manual pressure held to site for 5 minutes

## 2018-09-25 NOTE — SUBJECTIVE & OBJECTIVE
Interval History: Daughter in room when went to see patient this morning.  Patient has been taken to biopsy.  She states that he had a BM last night.  She states that he is afraid to go home because is afraid his sats will drop again.  She states they live 10 minutes away.  She states does have pulse oximetry at home and check often.  He also has home O2.      Oncology Treatment Plan:   [No treatment plan]    Medications:  Continuous Infusions:  Scheduled Meds:   albuterol-ipratropium  3 mL Nebulization Q6H    budesonide  0.5 mg Nebulization Q12H    diltiaZEM  360 mg Oral Daily    flecainide  100 mg Oral Q12H    gabapentin  600 mg Oral BID    insulin detemir U-100  20 Units Subcutaneous QHS    insulin detemir U-100  50 Units Subcutaneous Daily    nystatin  5 mL Oral QID    pantoprazole  40 mg Oral Daily    polyethylene glycol  17 g Oral Daily    pravastatin  40 mg Oral Daily    predniSONE  40 mg Oral Daily     PRN Meds:dextrose 50%, dextrose 50%, fentaNYL, glucagon (human recombinant), glucose, glucose, HYDROcodone-acetaminophen, HYDROcodone-acetaminophen, insulin aspart U-100, midazolam, traMADol     Review of Systems   Unable to perform ROS: Other (off to biopsy)     Objective:     Vital Signs (Most Recent):  Temp: 98.1 °F (36.7 °C) (09/25/18 0752)  Pulse: 103 (09/25/18 0843)  Resp: 14 (09/25/18 0843)  BP: (!) 153/72 (09/25/18 0843)  SpO2: 100 % (09/25/18 0843) Vital Signs (24h Range):  Temp:  [97.6 °F (36.4 °C)-99 °F (37.2 °C)] 98.1 °F (36.7 °C)  Pulse:  [] 103  Resp:  [14-23] 14  SpO2:  [91 %-100 %] 100 %  BP: (116-153)/(58-76) 153/72     Weight: 89 kg (196 lb 3.4 oz)  Body mass index is 29.4 kg/m².  Body surface area is 2.07 meters squared.      Intake/Output Summary (Last 24 hours) at 9/25/2018 0846  Last data filed at 9/25/2018 0000  Gross per 24 hour   Intake 240 ml   Output --   Net 240 ml       Physical Exam    Significant Labs:   BMP:   Recent Labs   Lab  09/24/18   0545  09/25/18   0549    GLU  80  91   NA  139  138   K  4.2  4.0   CL  97  96   CO2  31*  26   BUN  19  16   CREATININE  0.8  0.8   CALCIUM  9.4  9.8   , CBC:   Recent Labs   Lab  09/24/18   0545  09/25/18   0550   WBC  9.10  7.12   HGB  12.7*  14.2   HCT  40.4  44.2   PLT  294  342   , CMP:   Recent Labs   Lab  09/24/18   0545  09/25/18   0549   NA  139  138   K  4.2  4.0   CL  97  96   CO2  31*  26   GLU  80  91   BUN  19  16   CREATININE  0.8  0.8   CALCIUM  9.4  9.8   ANIONGAP  11  16   EGFRNONAA  >60  >60   , Urine Studies: No results for input(s): COLORU, APPEARANCEUA, PHUR, SPECGRAV, PROTEINUA, GLUCUA, KETONESU, BILIRUBINUA, OCCULTUA, NITRITE, UROBILINOGEN, LEUKOCYTESUR, RBCUA, WBCUA, BACTERIA, SQUAMEPITHEL, HYALINECASTS in the last 48 hours.    Invalid input(s): WRIGHTSUR and All pertinent labs from the last 24 hours have been reviewed.    Diagnostic Results:  I have reviewed all pertinent imaging results/findings within the past 24 hours.

## 2018-09-25 NOTE — SEDATION DOCUMENTATION
Pt turned supine and transferred self to stretcher and transported to radiology recovery room for monitoring

## 2018-09-25 NOTE — PLAN OF CARE
Rohan called back and reports pt does not qualify for Trilogy and she will follow up with Dr. Ibrahim in the clinic at pt's follow up appointment.

## 2018-09-25 NOTE — OP NOTE
Sterile technique was performed in the posterior right flank, lidocaine was used as a local anesthetic.  Multiple samples taken from the lumbar lesion.  Pt tolerated the procedure well without immediate complications.  Please see radiologist report for details. F/u with PCP and/or ordering physician.

## 2018-09-25 NOTE — PLAN OF CARE
Problem: Patient Care Overview  Goal: Plan of Care Review  Outcome: Ongoing (interventions implemented as appropriate)  POC reviewed, verbalized understanding. Pt remained free from falls, fall precautions in place. Pt is NSR on monitor, 80-90s. VSS. PRN medication given for pain. No other c/o at this time. PIV intact. Blood glucose monitored. NPO after midnight for biopsy. Call bell and personal belongings within reach. Hourly rounding complete. Reminded to call for assistance. Will continue to monitor.

## 2018-09-25 NOTE — ASSESSMENT & PLAN NOTE
CT scan/PET scan consistent with lung cancer with widespread metastatic disease.  Previous bronchoscopy/biopsy was nondiagnostic and planning CT-guided biopsy of lumbar metastasis.  Need tissue diagnosis prior to planning treatment.    --continue supportive care/pain control  --CT-guided biopsy of lumbar metastasis--will discuss with IR and attempt to obtain biopsy ASAP    09/24/18 Patient will have CT guided biopsy to lumbar spine today.  Patient and daughter are aware of the plan of care.   -  Follow up with hematology/oncology outpatient    09/25/18 biopsy postponed from yesterday to today due to patient having low blood sugars.  He is currently with IR having biopsy performed now.    - Follow up outpatient with hematology/oncology

## 2018-09-25 NOTE — DISCHARGE SUMMARY
[facility] Hospital Medicine  Discharge Summary      Patient Name: Nico Garza Jr.  MRN: [id number]  Admission Date: 9/21/2018  Hospital Length of Stay: 3 days  Discharge Date and Time:  09/25/2018 2:35 PM  Attending Physician: Vick Maldonado, *   Discharging Provider: Pamella Salamanca NP  Primary Care Provider: Tiffany Davis DO      HPI:   Mr. Garza is a 71yo male with a PMHx of newly diagnosed metastatic left lung CA s/p nondiagnostic bronchoscopy/biopsy on 9/2/18, COPD on chronic home O2, PAF/FL, HTN, HLD, Afib, COPD, DM, HTN, and DM 1, who presented to the ED with c/o SOB that has progressively worsened since ~5PM this evening.  Associated LUQ ABD pain.  Aggravated by lying flat, no alleviating factors.  Denies any CP, palpitations, cough, hemoptysis, PND, edema, ABD distention, N/V/D, dysuria, hematuria, weakness, back pain, HA, AMS, lightheadedness/dizziness, syncope, focal deficits, fever, or chills.  Patient's daughter reports his O2 sat has remained between 75-79% on his home 3L NC since 5PM tonight, and is unable to get oxygen sat above 79% with supplemental O2 or neb treatments.  Upon arrival to ED, patient's O2 sat was 87% on 3L NC.  ABGs resulted pH 7.455, pCO2 44.4, pO2 55, HCO3 31.2, SaO2 89% on 3L NC.  CTA of the chest showed no evidence of PE with no significant changes from recent PET scan on 9/17.  Patient received IV Solumedrol 80mg x 1 dose, and Duonebs x 3 with improvement in respiratory status.  Hospital Medicine was called for admission. Currently, patient appears comfortable in NAD.  O2 sat stable at 93% on 3L NC.  Patient recently had a PET scan on 9/17 that showed extensive FDG avid mediastinal lymphadenopathy, single small subcentimeter pulmonary nodule within the right lung, metastatic lesion within the dome of the liver, and extensive osseous metastatic disease noted throughout the vertebral column.  He is followed by Dr. Lagos, and is scheduled for repeat  CT guided biopsy on 9/25 with possible EBUS tissue diagnosis if needed.      * No surgery found *      Hospital Course:   Patient admitted for acute on chronic respiratory failure with hypoxia. Patient recently had a PET scan on 9/17 that showed extensive FDG avid mediastinal lymphadenopathy, single small subcentimeter pulmonary nodule within the right lung, metastatic lesion within the dome of the liver, and extensive osseous metastatic disease noted throughout the vertebral column. Previous bronchoscopy/biopsy was nondiagnostic. He is followed by Dr. Lagos, and is scheduled for CT guided biopsy on 9/25 with possible EBUS tissue diagnosis if needed.  Pulmonology and Hematology/Oncology consulted to assist with medical management. Patient was started on JAMMIE, LABA, ICS, and IV steroids. CT-guided biopsy of lumbar metastasis planned for today. Will continue supportive care. 9/25/18-Patient underwent CT-guided biopsy today, tolerated procedure well. Social work consult for discharge planning (Trilogy setup and H/H). Case discussed will Dr. Joya. Patient ready for discharge. Home meds reconciled. New prescription given for prednisone 20 mg po x7 days. Patient to follow-up with PCP in 3 days for hospital follow-up as well as Dr. Ibrahim. Patient also to follow-up with Dr. Lagos for hospital follow-up and discuss pathology results. Patient seen and examined on the date of discharge and found suitable for discharge.      Consults:   Consults (From admission, onward)        Status Ordering Provider     Inpatient consult to Hematology/Oncology  Once     Provider:  Issa Lagos Jr., MD    Acknowledged LUIS ROYAL     Inpatient consult to Pulmonology  Once     Provider:  Swapnil Ibrahim MD    Acknowledged LUIS ROYAL     Inpatient consult to Respiratory Care  Once     Provider:  (Not yet assigned)    Acknowledged HUNTER CARTER          No new Assessment & Plan notes have been filed under this hospital  service since the last note was generated.  Service: Hospital Medicine    Final Active Diagnoses:    Diagnosis Date Noted POA    PRINCIPAL PROBLEM:  Acute on chronic respiratory failure with hypoxia [J96.21] 09/22/2018 Yes    Metastatic left lung cancer (metastasis from lung to other site) with mediastinal lymphadenopathy [C34.92] 08/31/2018 Yes    Chronic respiratory failure with hypoxia [J96.11] 08/31/2018 Yes    Type 1 diabetes mellitus with diabetic neuropathy [E10.40] 04/27/2018 Yes     Chronic    Paroxysmal atrial flutter [I48.92] 12/04/2017 Yes     Chronic    Hypertension goal BP (blood pressure) < 130/80 [I10] 06/30/2015 Yes     Chronic    COPD (chronic obstructive pulmonary disease) with emphysema [J43.9] 11/18/2013 Yes     Chronic      Problems Resolved During this Admission:       Discharged Condition: stable    Disposition: Home or Self Care    Follow Up:  Follow-up Information     Tiffany Davis DO In 3 days.    Specialty:  Internal Medicine  Why:  hospital follow-up   Contact information:  [address] [place] [address] [address] Breezy [place]ONTANEZ [address]  [phone]             Swapnil Ibrahim MD In 1 week.    Specialty:  Pulmonary Disease  Why:  hospital follow-up and discuss pathology results   Contact information:  [address]  [place] [address]  [phone]                 Patient Instructions:      Ambulatory referral to Home Health   Referral Priority: Routine Referral Type: Home Health   Referral Reason: Specialty Services Required   Requested Specialty: Home Health Services   Number of Visits Requested: 1     Notify your health care provider if you experience any of the following:  temperature >100.4     Notify your health care provider if you experience any of the following:  difficulty breathing or increased cough     Notify your health care provider if you experience any of the following:  increased confusion or weakness     Notify your health care provider if you experience any of the following:   persistent dizziness, light-headedness, or visual disturbances     Activity as tolerated       Significant Diagnostic Studies: Labs:   BMP:   Recent Labs   Lab  09/24/18   0545  09/25/18   0549   GLU  80  91   NA  139  138   K  4.2  4.0   CL  97  96   CO2  31*  26   BUN  19  16   CREATININE  0.8  0.8   CALCIUM  9.4  9.8   , CBC   Recent Labs   Lab  09/24/18   0545  09/25/18   0550   WBC  9.10  7.12   HGB  12.7*  14.2   HCT  40.4  44.2   PLT  294  342    and All labs within the past 24 hours have been reviewed    Pending Diagnostic Studies:     Procedure Component Value Units Date/Time    IR Biopsy Bone Superficial [[id number]]     Order Status:  Sent Lab Status:  No result     Pathology Tissue Specimen To Pathology, Radiology Biopsy [[id number]] Collected:  09/25/18 0907    Order Status:  Sent Lab Status:  In process Updated:  09/25/18 0920    Specimen:  Biopsy from Other, please specify          Medications:  Reconciled Home Medications:      Medication List      CHANGE how you take these medications    insulin lispro 100 unit/mL Inpn pen  Commonly known as:  HumaLOG KwikPen Insulin  ADMINISTER 17 UNITS UNDER THE SKIN THREE TIMES DAILY BEFORE MEALS  What changed:  additional instructions     pravastatin 40 MG tablet  Commonly known as:  PRAVACHOL  TAKE 1 TABLET DAILY  What changed:  See the new instructions.     predniSONE 20 MG tablet  Commonly known as:  DELTASONE  Take 1 tablet (20 mg total) by mouth once daily. for 7 days  What changed:  additional instructions     quinapril 40 MG tablet  Commonly known as:  ACCUPRIL  Take 1 tablet (40 mg total) by mouth once daily.  What changed:  when to take this        CONTINUE taking these medications    albuterol-ipratropium 2.5 mg-0.5 mg/3 mL nebulizer solution  Commonly known as:  DUO-NEB  Take 3 mLs by nebulization every 6 (six) hours. Rescue     b complex vitamins tablet  Take 1 tablet by mouth once daily.     blood sugar diagnostic Strp  1 each by Misc.(Non-Drug;  "Combo Route) route 3 (three) times daily. Dispense preferred on insurance     blood-glucose meter kit  Use as instructed - preferred on insurance     diltiaZEM 360 MG 24 hr capsule  Commonly known as:  CARDIZEM CD  TAKE 1 CAPSULE(360 MG) BY MOUTH EVERY DAY     DISPOSABLE NEEDLES MISC  Inject 1 each into the skin 3 (three) times daily.     ELIQUIS 5 mg Tab  Generic drug:  apixaban  TAKE 1 TABLET(5 MG) BY MOUTH TWICE DAILY     flecainide 100 MG Tab  Commonly known as:  TAMBOCOR  Take 1 tablet (100 mg total) by mouth every 12 (twelve) hours.     gabapentin 300 MG capsule  Commonly known as:  NEURONTIN  Take 2 capsules (600 mg total) by mouth 2 (two) times daily.     HYDROcodone-acetaminophen 5-325 mg per tablet  Commonly known as:  NORCO  Take 1 tablet by mouth every 6 (six) hours as needed for Pain.     insulin degludec 200 unit/mL (3 mL) Inpn  Commonly known as:  TRESIBA FLEXTOUCH U-200  Inject 50 Units into the skin once daily.     multivitamin capsule  Take 1 capsule by mouth once daily.     nystatin 100,000 unit/mL suspension  Commonly known as:  MYCOSTATIN  Take 5 mLs (500,000 Units total) by mouth 4 (four) times daily. for 8 days     omeprazole 20 MG capsule  Commonly known as:  PRILOSEC  Take 1 capsule (20 mg total) by mouth once daily.     pen needle, diabetic 32 gauge x 1/4" Ndle  Insulin injections four times per day.     traMADol 50 mg tablet  Commonly known as:  ULTRAM  TAKE 2 TABLETS BY MOUTH ONCE DAILY AS NEEDED FOR PAIN     vitamin D 1000 units Tab  Commonly known as:  VITAMIN D3  Take 1,000 Units by mouth once daily.        STOP taking these medications    amoxicillin-clavulanate 875-125mg 875-125 mg per tablet  Commonly known as:  AUGMENTIN     SUPREP BOWEL PREP KIT 17.5-3.13-1.6 gram Solr  Generic drug:  sodium,potassium,mag sulfates            Indwelling Lines/Drains at time of discharge:   Lines/Drains/Airways          None          Time spent on the discharge of patient: 45 minutes  Patient was " seen and examined on the date of discharge and determined to be suitable for discharge.         Pamella Salamanca NP  Department of Hospital Medicine  [facility]

## 2018-09-25 NOTE — PLAN OF CARE
Problem: Patient Care Overview  Goal: Plan of Care Review  Outcome: Ongoing (interventions implemented as appropriate)  Patient received his discharge instructions and verbalized understanding. piv access to LAC removed with no blood noted to site. Cardiac monitor removed. Patient being wheeled out by nurse's assistant with portable home O2 in use. No distress noted.

## 2018-09-26 ENCOUNTER — DOCUMENTATION ONLY (OUTPATIENT)
Dept: ENDOSCOPY | Facility: HOSPITAL | Age: 73
End: 2018-09-26

## 2018-09-26 NOTE — PROGRESS NOTES
Incoming call. Pt daughter canceled fathers endoscopy procedure due to illness related to lung cancer. Appt canceled from 10/9/18.

## 2018-09-26 NOTE — PLAN OF CARE
09/26/18 0926   Final Note   Assessment Type Final Discharge Note   Discharge Disposition Home-Health   Right Care Referral Info   Post Acute Recommendation Home-care  ([facility])

## 2018-09-28 ENCOUNTER — TELEPHONE (OUTPATIENT)
Dept: INTERNAL MEDICINE | Facility: CLINIC | Age: 73
End: 2018-09-28

## 2018-09-28 NOTE — TELEPHONE ENCOUNTER
Faxed [facility] paperwork to [phone], per Belia's request. [employer] in the front office for her to .

## 2018-10-01 ENCOUNTER — HOSPITAL ENCOUNTER (OUTPATIENT)
Dept: RADIATION THERAPY | Facility: HOSPITAL | Age: 73
Discharge: HOME | End: 2018-10-01
Attending: RADIOLOGY
Payer: MEDICARE

## 2018-10-01 ENCOUNTER — OFFICE VISIT (OUTPATIENT)
Dept: CARDIOLOGY | Facility: CLINIC | Age: 73
End: 2018-10-01
Payer: MEDICARE

## 2018-10-01 VITALS
HEART RATE: 100 BPM | SYSTOLIC BLOOD PRESSURE: 90 MMHG | WEIGHT: 183 LBS | HEIGHT: 68 IN | BODY MASS INDEX: 27.74 KG/M2 | DIASTOLIC BLOOD PRESSURE: 50 MMHG

## 2018-10-01 DIAGNOSIS — K21.9 GASTROESOPHAGEAL REFLUX DISEASE, ESOPHAGITIS PRESENCE NOT SPECIFIED: ICD-10-CM

## 2018-10-01 DIAGNOSIS — E10.40 TYPE 1 DIABETES MELLITUS WITH DIABETIC NEUROPATHY: Chronic | ICD-10-CM

## 2018-10-01 DIAGNOSIS — I48.3 TYPICAL ATRIAL FLUTTER: Chronic | ICD-10-CM

## 2018-10-01 DIAGNOSIS — I48.92 ATRIAL FLUTTER WITH RAPID VENTRICULAR RESPONSE: Primary | ICD-10-CM

## 2018-10-01 PROCEDURE — 99214 OFFICE O/P EST MOD 30 MIN: CPT | Mod: S$PBB,,, | Performed by: INTERNAL MEDICINE

## 2018-10-01 PROCEDURE — 99999 PR PBB SHADOW E&M-EST. PATIENT-LVL III: CPT | Mod: PBBFAC,,, | Performed by: INTERNAL MEDICINE

## 2018-10-01 PROCEDURE — 99213 OFFICE O/P EST LOW 20 MIN: CPT | Mod: PBBFAC | Performed by: INTERNAL MEDICINE

## 2018-10-01 RX ORDER — PRAVASTATIN SODIUM 40 MG/1
TABLET ORAL
Qty: 90 TABLET | Refills: 3 | Status: SHIPPED | OUTPATIENT
Start: 2018-10-01

## 2018-10-01 NOTE — PROGRESS NOTES
Subjective:    Patient ID:  Nico Garza Jr. is a 72 y.o. male who presents for follow-up of Atrial Fibrillation      72 yoM AFL, AF, severe COPD here for arrhythmia management. He saw Dr Garcia for AF/AFL management after 2 failed CVs and ablation was offered 2015. He opted not to pursue ablation at that time. He has been on flecainide in the past which was stopped due to ineffectiveness. He has been on eliquis 5 mg bid for CVA prophylaxis. He has chronic BENITO, possibly due to COPD, as well as fatigue. Last PFTs 2 years ago, followed by Dr Ibrahim. He had non-obstructive CAD on [facility] 2 years ago. All ECGs in the system since 2015 show AFL with the exception of a single AF ECG. No history of TIA/CVA, CHF, CAD, PVD. He no longer smokes. Most recent echo showed normal LV function 3/15.     2/18: He underwent AFL ablation 12/4/17 without complication. He was started on flecainide 100 mg bid. He takes eliquis 5 mg bid. He has had no recurrence of arrhythmia. He does not feel a major change in his overall symptoms.     Interval history: He has a new diagnosis of suspected lung cancer. He had AF during his index admission. No need for CV. He has a lung mass with metastatic spread to the liver, mediastinum and bone.     Dr Cid  3/19/15 echo:  CONCLUSIONS     1 - Biatrial enlargement.     2 - Concentric remodeling.     3 - Normal left ventricular systolic function (EF 60-65%).     4 - Normal left ventricular diastolic function.     5 - Normal right ventricular systolic function .     6 - Trivial mitral regurgitation.     7 - Mild tricuspid regurgitation.     8 - Left pleural effusion.     Past Medical History:  No date: Arthritis  No date: Atrial fibrillation and flutter  No date: Atrial flutter  No date: COPD (chronic obstructive pulmonary disease)  11/18/2013: COPD (chronic obstructive pulmonary disease) with   emphysema  1996: DM (diabetes mellitus)  No date: Hyperlipidemia  No date: Hypertension  No date: Lung  disease  No date: Neuropathy, diabetic  No date: Pancreatitis  4/27/2018: Type 1 diabetes mellitus with diabetic neuropathy    Past Surgical History:  12/4/2017: ABLATION; N/A      Comment:  Performed by Jaret Freire MD at [facility] CATH LAB  No date: BICEPS TENDON REPAIR  No date: CARDIOVERSION  No date: CHOLECYSTECTOMY  No date: PATELLA SURGERY  No date: RADIOFREQUENCY ABLATION  No date: ROTATOR CUFF REPAIR  12/4/2017: TRANSESOPHAGEAL ECHOCARDIOGRAM (DILLAN); N/A      Comment:  Performed by Jaret Freire MD at [facility] CATH LAB    Social History    Socioeconomic History      Marital status:       Spouse name: Not on file      Number of children: 2      Years of education: Not on file      Highest education level: Not on file    Social Needs      Financial resource strain: Not on file      Food insecurity - worry: Not on file      Food insecurity - inability: Not on file      Transportation needs - medical: Not on file      Transportation needs - non-medical: Not on file    Occupational History      Not on file    Tobacco Use      Smoking status: Former Smoker        Packs/day: 0.50        Types: Cigarettes        Start date: 1/1/1960        Quit date: 3/8/2015        Years since quitting: 3.5      Smokeless tobacco: Former User        Types: Snuff        Quit date: 10/7/1995    Substance and Sexual Activity      Alcohol use: No        Alcohol/week: 0.0 oz      Drug use: No      Sexual activity: Not on file    Other Topics      Concerns:        Not on file    Social History Narrative      Not on file    Review of patient's family history indicates:  Problem: Heart failure      Relation: Brother          Age of Onset: (Not Specified)  Problem: Heart attack      Relation: Brother          Age of Onset: (Not Specified)  Problem: Diabetes      Relation: Mother          Age of Onset: (Not Specified)  Problem: Hypertension      Relation: Mother          Age of Onset: (Not Specified)  Problem: Stroke       Relation: Mother          Age of Onset: (Not Specified)  Problem: Hypertension      Relation: Father          Age of Onset: (Not Specified)  Problem: Stroke      Relation: Father          Age of Onset: (Not Specified)  Problem: Diabetes      Relation: Sister          Age of Onset: (Not Specified)  Problem: Cataracts      Relation: Sister          Age of Onset: (Not Specified)  Problem: Cancer      Relation: Maternal Aunt          Age of Onset: (Not Specified)  Problem: Diabetes      Relation: Sister          Age of Onset: (Not Specified)  Problem: Diabetes      Relation: Sister          Age of Onset: (Not Specified)  Problem: Stroke      Relation: Paternal Grandmother          Age of Onset: (Not Specified)  Problem: Stroke      Relation: Paternal Grandfather          Age of Onset: (Not Specified)          Review of Systems   Constitution: Positive for malaise/fatigue.   HENT: Negative.    Eyes: Negative.    Cardiovascular: Negative for chest pain, dyspnea on exertion, irregular heartbeat, leg swelling, near-syncope, palpitations and syncope.   Respiratory: Positive for shortness of breath.    Endocrine: Negative.    Hematologic/Lymphatic: Negative.    Skin: Negative.    Musculoskeletal: Negative.    Gastrointestinal: Negative.    Genitourinary: Negative.    Neurological: Negative.  Negative for dizziness and light-headedness.   Psychiatric/Behavioral: Negative.    Allergic/Immunologic: Negative.         Objective:    Physical Exam   Constitutional: He is oriented to person, place, and time. He appears well-developed and well-nourished. No distress.   HENT:   Head: Normocephalic and atraumatic.   Eyes: EOM are normal. Pupils are equal, round, and reactive to light.   Neck: Normal range of motion. No JVD present. No thyromegaly present.   Cardiovascular: Normal rate, regular rhythm, S1 normal, S2 normal and normal heart sounds. PMI is not displaced. Exam reveals no gallop and no friction rub.   No murmur  heard.  Pulmonary/Chest: Effort normal and breath sounds normal. No respiratory distress. He has no wheezes. He has no rales.   Abdominal: Soft. Bowel sounds are normal. He exhibits no distension. There is no tenderness. There is no rebound and no guarding.   Musculoskeletal: Normal range of motion. He exhibits no edema or tenderness.   Neurological: He is alert and oriented to person, place, and time. No cranial nerve deficit.   Skin: Skin is warm and dry. No rash noted. No erythema.   Psychiatric: He has a normal mood and affect. His behavior is normal. Judgment and thought content normal.   Vitals reviewed.    ECg: NSR nl AK, RBBB        Assessment:       1. Atrial flutter with rapid ventricular response    2. Typical atrial flutter    3. Type 1 diabetes mellitus with diabetic neuropathy         Plan:       72 yoM AFL, AF here for follow up. He has interval diagnosis of suspected metastatic lung cancer. He had AF which was paroxysmal during his hospitalization. Continue flecainide and eliquis. RTC 6m

## 2018-10-02 ENCOUNTER — OFFICE VISIT (OUTPATIENT)
Dept: PULMONOLOGY | Facility: CLINIC | Age: 73
End: 2018-10-02
Payer: MEDICARE

## 2018-10-02 ENCOUNTER — OFFICE VISIT (OUTPATIENT)
Dept: HEMATOLOGY/ONCOLOGY | Facility: CLINIC | Age: 73
End: 2018-10-02
Payer: MEDICARE

## 2018-10-02 VITALS
OXYGEN SATURATION: 97 % | HEIGHT: 68 IN | WEIGHT: 184.06 LBS | RESPIRATION RATE: 19 BRPM | HEART RATE: 93 BPM | DIASTOLIC BLOOD PRESSURE: 62 MMHG | SYSTOLIC BLOOD PRESSURE: 110 MMHG | BODY MASS INDEX: 27.9 KG/M2

## 2018-10-02 VITALS
SYSTOLIC BLOOD PRESSURE: 110 MMHG | BODY MASS INDEX: 27.26 KG/M2 | WEIGHT: 184.06 LBS | TEMPERATURE: 99 F | OXYGEN SATURATION: 95 % | HEIGHT: 69 IN | HEART RATE: 93 BPM | DIASTOLIC BLOOD PRESSURE: 62 MMHG

## 2018-10-02 DIAGNOSIS — C34.90 LUNG CANCER METASTATIC TO BONE: Primary | ICD-10-CM

## 2018-10-02 DIAGNOSIS — J43.2 CENTRILOBULAR EMPHYSEMA: Chronic | ICD-10-CM

## 2018-10-02 DIAGNOSIS — C79.51 LUNG CANCER METASTATIC TO BONE: Primary | ICD-10-CM

## 2018-10-02 DIAGNOSIS — J96.11 CHRONIC RESPIRATORY FAILURE WITH HYPOXIA: Chronic | ICD-10-CM

## 2018-10-02 DIAGNOSIS — C34.31 MALIGNANT NEOPLASM OF LOWER LOBE OF RIGHT LUNG: ICD-10-CM

## 2018-10-02 DIAGNOSIS — C34.92 METASTATIC LUNG CANCER (METASTASIS FROM LUNG TO OTHER SITE), LEFT: Primary | Chronic | ICD-10-CM

## 2018-10-02 DIAGNOSIS — C34.90 LUNG CANCER METASTATIC TO BONE: ICD-10-CM

## 2018-10-02 DIAGNOSIS — C79.51 LUNG CANCER METASTATIC TO BONE: ICD-10-CM

## 2018-10-02 PROCEDURE — 99215 OFFICE O/P EST HI 40 MIN: CPT | Mod: S$PBB,,, | Performed by: INTERNAL MEDICINE

## 2018-10-02 PROCEDURE — 99999 PR PBB SHADOW E&M-EST. PATIENT-LVL III: CPT | Mod: PBBFAC,,, | Performed by: INTERNAL MEDICINE

## 2018-10-02 PROCEDURE — 99999 PR PBB SHADOW E&M-EST. PATIENT-LVL IV: CPT | Mod: PBBFAC,,, | Performed by: INTERNAL MEDICINE

## 2018-10-02 PROCEDURE — 99213 OFFICE O/P EST LOW 20 MIN: CPT | Mod: PBBFAC | Performed by: INTERNAL MEDICINE

## 2018-10-02 PROCEDURE — 99214 OFFICE O/P EST MOD 30 MIN: CPT | Mod: PBBFAC,27 | Performed by: INTERNAL MEDICINE

## 2018-10-02 RX ORDER — OMEPRAZOLE 20 MG/1
CAPSULE, DELAYED RELEASE ORAL
Qty: 90 CAPSULE | Refills: 1 | Status: SHIPPED | OUTPATIENT
Start: 2018-10-02 | End: 2018-10-02 | Stop reason: SDUPTHER

## 2018-10-02 RX ORDER — ONDANSETRON 8 MG/1
8 TABLET, ORALLY DISINTEGRATING ORAL EVERY 8 HOURS PRN
Qty: 30 TABLET | Refills: 5 | Status: SHIPPED | OUTPATIENT
Start: 2018-10-02 | End: 2019-10-02

## 2018-10-02 RX ORDER — PROCHLORPERAZINE MALEATE 10 MG
10 TABLET ORAL EVERY 6 HOURS PRN
Qty: 30 TABLET | Refills: 5 | Status: SHIPPED | OUTPATIENT
Start: 2018-10-02 | End: 2018-10-02 | Stop reason: SDUPTHER

## 2018-10-02 RX ORDER — PREDNISONE 10 MG/1
10 TABLET ORAL DAILY
Qty: 30 TABLET | Refills: 3 | Status: SHIPPED | OUTPATIENT
Start: 2018-10-02 | End: 2018-10-25

## 2018-10-02 RX ORDER — DEXAMETHASONE 4 MG/1
TABLET ORAL
Qty: 20 TABLET | Refills: 5 | Status: SHIPPED | OUTPATIENT
Start: 2018-10-02 | End: 2018-10-18

## 2018-10-02 RX ORDER — PROCHLORPERAZINE MALEATE 10 MG
TABLET ORAL
Qty: 385 TABLET | Refills: 5 | Status: SHIPPED | OUTPATIENT
Start: 2018-10-02

## 2018-10-02 NOTE — ASSESSMENT & PLAN NOTE
Metastatic lung cancer suspected.   Diagnostic Bronchoscopy 09/02/18: Non diagnostic  PET scan scheduled.    CT guided bone biopsy: L1 biopsy:  Metastatic poorly-differentiated squamous cell carcinoma     Follow up with ONCOLOGY as scheduled.

## 2018-10-02 NOTE — ASSESSMENT & PLAN NOTE
EMPHYSEMA ROS: taking medications as instructed, no medication side effects noted, no significant ongoing wheezing or shortness of breath, using bronchodilator MDI less than twice a week.   New concerns: None.   Exam: appears well, vitals normal, no respiratory distress, acyanotic, normal RR, chest clear, no wheezing, crepitations, rhonchi, normal symmetric air entry.   Assessment:  EMPHYSEMA stable.   Plan: Start INCRUSE. Continue DUONEB. Continue Prednisone 10mg PO daily ( refilled).

## 2018-10-02 NOTE — PATIENT INSTRUCTIONS
Lung Anatomy  Your lungs take air in to give your body oxygen, which the body needs to work. Your lungs, like all the tissues in your body, are made up of billions of tiny specialized cells. Old lung cells die and are replaced by new, identical lung cells. This natural process helps ensure healthy lungs.    Date Last Reviewed: 11/1/2016  © 4948-1118 [employer]. [address] [place] [address], [place] [address]. All rights reserved. This information is not intended as a substitute for professional medical care. Always follow your healthcare professional's instructions.

## 2018-10-02 NOTE — PROGRESS NOTES
Hematology/Oncology Office Note    Reason for referral:  Lung mass/mediastinal lymphadenopathy    CC:  Lung mass    Referred by:  No ref. provider found    Diagnosis:  Lung mass/mediastinal lymphadenopathy, left adrenal nodule and lytic lesion of L1    History of present illness:  72-year-old gentleman status post recent hospitalization for left lower lung mass with extensive mediastinal lymphadenopathy, adrenal nodule, and lytic lesion of L1.  He underwent nondiagnostic bronchoscopy/biopsy on 09/02/2018.  Patient reports stable shortness of breath without hemoptysis, chest pain, dizziness, or palpitations.      I have reviewed and updated the HPI, ROS, PMHx, Social Hx, Family Hx and treatment history.    Today's visit:  Patient presents today to discuss final results of pathology.  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        HPI    Review of Systems   Constitutional: Positive for activity change, fatigue and unexpected weight change. Negative for appetite change, chills, diaphoresis and fever.   HENT: Negative for congestion, dental problem, drooling, ear discharge, ear pain, facial swelling,  "nosebleeds, rhinorrhea and sinus pressure.    Eyes: Negative for photophobia, pain, discharge, itching and visual disturbance.   Respiratory: Positive for shortness of breath. Negative for apnea, cough, choking and chest tightness.    Cardiovascular: Negative for chest pain, palpitations and leg swelling.   Gastrointestinal: Negative for abdominal distention, abdominal pain, anal bleeding, blood in stool, constipation, diarrhea, nausea and vomiting.   Endocrine: Negative for cold intolerance, heat intolerance, polydipsia, polyphagia and polyuria.   Genitourinary: Negative for difficulty urinating, dysuria, enuresis, flank pain, frequency, genital sores and hematuria.   Musculoskeletal: Positive for back pain and myalgias. Negative for arthralgias, gait problem, joint swelling, neck pain and neck stiffness.        Worsening lumbar pain   Skin: Negative for color change, pallor, rash and wound.   Allergic/Immunologic: Negative for environmental allergies, food allergies and immunocompromised state.   Neurological: Negative for dizziness, seizures, facial asymmetry, speech difficulty, light-headedness, numbness and headaches.   Hematological: Negative for adenopathy. Does not bruise/bleed easily.   Psychiatric/Behavioral: Negative for agitation, confusion and hallucinations. The patient is not nervous/anxious and is not hyperactive.        Objective:       Vitals:    10/02/18 1450   BP: 110/62   Pulse: 93   Temp: 98.7 °F (37.1 °C)   TempSrc: Oral   SpO2: 95%   Weight: 83.5 kg (184 lb 1.4 oz)   Height: 5' 8.5" (1.74 m)     Physical Exam   Constitutional: He is oriented to person, place, and time. He appears well-developed and well-nourished. No distress.   HENT:   Head: Normocephalic and atraumatic.   Nose: Nose normal.   Mouth/Throat: Oropharynx is clear and moist. No oropharyngeal exudate.   Eyes: Conjunctivae and EOM are normal. Pupils are equal, round, and reactive to light. Right eye exhibits no discharge. Left eye " exhibits no discharge. No scleral icterus.   Neck: Normal range of motion. Neck supple. No JVD present. No tracheal deviation present. No thyromegaly present.   Cardiovascular: Normal rate and regular rhythm. Exam reveals no gallop and no friction rub.   No murmur heard.  Pulmonary/Chest: Effort normal. No stridor. No respiratory distress. He has decreased breath sounds. He has no wheezes. He has no rhonchi. He has rales.   Abdominal: Soft. Bowel sounds are normal. He exhibits no distension and no mass. There is no tenderness. There is no rebound.   Musculoskeletal: Normal range of motion. He exhibits no edema, tenderness or deformity.   Lymphadenopathy:     He has no cervical adenopathy.   Neurological: He is alert and oriented to person, place, and time. No cranial nerve deficit. Coordination normal.   Skin: Skin is warm, dry and intact. No rash noted.   Psychiatric: He has a normal mood and affect. Thought content normal.   Vitals reviewed.        Lab Results   Component Value Date    WBC 7.12 09/25/2018    HGB 14.2 09/25/2018    HCT 44.2 09/25/2018    MCV 89 09/25/2018     09/25/2018     Lab Results   Component Value Date    CREATININE 0.8 09/25/2018    BUN 16 09/25/2018     09/25/2018    K 4.0 09/25/2018    CL 96 09/25/2018    CO2 26 09/25/2018     Lab Results   Component Value Date    ALT 23 09/21/2018    AST 34 09/21/2018    ALKPHOS 152 (H) 09/21/2018    BILITOT 0.3 09/21/2018         Assessment:       72-year-old gentleman status post recent hospitalization where he was noted to have left lower lung mass with extensive mediastinal lymphadenopathy, left adrenal nodule, an L1 lytic lesion consistent with metastatic lung cancer.  He underwent bronchoscopy/biopsy which was nondiagnostic on 09/02/2018.  PET scan performed on 09/17/2018 demonstrated extensive FDG avid mediastinal lymphadenopathy, single small subcentimeter pulmonary nodule within the right lung, metastatic lesion within the dome of  the liver, and extensive osseous metastatic disease noted throughout the vertebral column.  The patient underwent CT-guided biopsy of lumbar mass which has demonstrated poorly differentiated squamous cell carcinoma.  I have last to have PDL1 performed on specimen.  In the meantime, we will have the patient evaluated by Radiation Oncology for palliative radiation to the lumbar spine.  We will plan to start weekly carboplatin/Taxol and transition to single agent Keytruda if PDL1 status is greater than 50%.        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

## 2018-10-02 NOTE — PROGRESS NOTES
Post hospital discharge pulmonary follow up note      Nico Garza Jr. is a 72 y.o. male    ADMIT DATE: 9/21/18    DISCHARGE DATE: 9/25/18     ADMISSION DIAGNOSIS:  Acute on chronic respiratory failure with hypoxia    DISCHARGE DIAGNOSIS:  Acute on chronic respiratory failure with hypoxia. Metastatic lung cancer.     Procedures Performed During Admission: CT guided bone biopsy: L1 biopsy:  Metastatic poorly-differentiated squamous cell carcinoma    Treatment at Discharge: .       Medication List           Accurate as of 10/2/18  2:33 PM. If you have any questions, ask your nurse or doctor.               START taking these medications    tiotropium bromide 2.5 mcg/actuation Mist  Commonly known as:  SPIRIVA RESPIMAT  Inhale 2 puffs into the lungs once daily. Controller  Started by:  Swapnil Ibrahim MD        CHANGE how you take these medications    gabapentin 300 MG capsule  Commonly known as:  NEURONTIN  Take 2 capsules (600 mg total) by mouth 2 (two) times daily.  What changed:    · when to take this  · additional instructions     insulin lispro 100 unit/mL Inpn pen  Commonly known as:  HumaLOG KwikPen Insulin  ADMINISTER 17 UNITS UNDER THE SKIN THREE TIMES DAILY BEFORE MEALS  What changed:  additional instructions     * predniSONE 20 MG tablet  Commonly known as:  DELTASONE  Take 1 tablet (20 mg total) by mouth once daily. for 7 days  What changed:  Another medication with the same name was added. Make sure you understand how and when to take each.  Changed by:  Swapnil Ibrahim MD     * predniSONE 10 MG tablet  Commonly known as:  DELTASONE  Take 1 tablet (10 mg total) by mouth once daily.  What changed:  You were already taking a medication with the same name, and this prescription was added. Make sure you understand how and when to take each.  Changed by:  Swapnil Ibrahim MD     quinapril 40 MG tablet  Commonly known as:  ACCUPRIL  Take 1 tablet (40 mg total) by mouth once daily.  What changed:  when to take  "this         * This list has 2 medication(s) that are the same as other medications prescribed for you. Read the directions carefully, and ask your doctor or other care provider to review them with you.            CONTINUE taking these medications    albuterol-ipratropium 2.5 mg-0.5 mg/3 mL nebulizer solution  Commonly known as:  DUO-NEB  Take 3 mLs by nebulization every 6 (six) hours. Rescue     b complex vitamins tablet     blood sugar diagnostic Strp  1 each by Misc.(Non-Drug; Combo Route) route 3 (three) times daily. Dispense preferred on insurance     blood-glucose meter kit  Use as instructed - preferred on insurance     diltiaZEM 360 MG 24 hr capsule  Commonly known as:  CARDIZEM CD  TAKE 1 CAPSULE(360 MG) BY MOUTH EVERY DAY     DISPOSABLE NEEDLES MISC     ELIQUIS 5 mg Tab  Generic drug:  apixaban  TAKE 1 TABLET(5 MG) BY MOUTH TWICE DAILY     flecainide 100 MG Tab  Commonly known as:  TAMBOCOR  Take 1 tablet (100 mg total) by mouth every 12 (twelve) hours.     HYDROcodone-acetaminophen 5-325 mg per tablet  Commonly known as:  NORCO  Take 1 tablet by mouth every 6 (six) hours as needed for Pain.     insulin degludec 200 unit/mL (3 mL) Inpn  Commonly known as:  TRESIBA FLEXTOUCH U-200  Inject 50 Units into the skin once daily.     multivitamin capsule     nystatin 100,000 unit/mL suspension  Commonly known as:  MYCOSTATIN  Take 5 mLs (500,000 Units total) by mouth 4 (four) times daily. for 8 days     omeprazole 20 MG capsule  Commonly known as:  PRILOSEC  Take 1 capsule (20 mg total) by mouth once daily.     pen needle, diabetic 32 gauge x 1/4" Ndle  Insulin injections four times per day.     pravastatin 40 MG tablet  Commonly known as:  PRAVACHOL  TAKE 1 TABLET DAILY     traMADol 50 mg tablet  Commonly known as:  ULTRAM  TAKE 2 TABLETS BY MOUTH ONCE DAILY AS NEEDED FOR PAIN     vitamin D 1000 units Tab  Commonly known as:  VITAMIN D3           Where to Get Your Medications      These medications were sent to " [facility] [address] [facility], [place] - [address] [facility]  [address], [facility] [address]    Phone:  [phone]   · predniSONE 10 MG tablet  · tiotropium bromide 2.5 mcg/actuation Mist       He reports that that he feels no different since discharge. He is currently completing his prednisone taper.      Problem list     Patient Active Problem List   Diagnosis    COPD (chronic obstructive pulmonary disease) with emphysema    Atrial flutter with rapid ventricular response    Hypertension goal BP (blood pressure) < 130/80    Hyperlipidemia LDL goal <70    Paroxysmal atrial fibrillation    Paroxysmal atrial flutter    Type 1 diabetes mellitus with diabetic neuropathy    Metastatic left lung cancer (metastasis from lung to other site) with mediastinal lymphadenopathy    Chronic respiratory failure with hypoxia    Moderate malnutrition    Constipation    Left diaphragmatic paralysis    Mass of lower lobe of left lung    Mass of left lung    Mediastinal lymphadenopathy    Cigarette nicotine dependence in remission    Acute on chronic respiratory failure with hypoxia       Problem list has been reviewed.    Subjective:       HPI    Patients reports NYHA II dyspnea    The patient does not have currently have symptoms / an exacerbation.       No recent change in breathing.        His current respiratory regimen: DUONEB  : He does use medications compliantly.       COPD QUESTIONNAIRE  How often do you cough?: A little of the time  How often do you have phlegm (mucus) in your chest?: A little of the time  How often does your chest feel tight?: Never  When you walk up a hill or one flight of stairs, how often are you breathless?: All of the time  How often are you limited doing any activities at home?: Some of the time  How often are you confident leaving the house despite your lung condition?: All of the time  How often do you sleep  "soundly?: Some of the time  How often do you have energy?: Almost never  Total score: 18       Review of Systems   Constitutional: Negative for chills, malaise/fatigue and weight loss.   HENT: Positive for nosebleeds and sore throat.    Eyes: Negative for blurred vision and double vision.   Respiratory: Positive for sputum production and shortness of breath. Negative for cough and hemoptysis.    Cardiovascular: Positive for palpitations. Negative for chest pain.   Gastrointestinal: Negative for abdominal pain, blood in stool, constipation, diarrhea, heartburn, melena, nausea and vomiting.   Musculoskeletal: Positive for back pain.   Neurological: Negative for dizziness and headaches.   Endo/Heme/Allergies: Negative for environmental allergies.   Psychiatric/Behavioral: Negative for depression and suicidal ideas.   All other systems reviewed and are negative.    Immunization status reviewed and updated.        A full  review of systems, past , family  and social histories was performed except as mentioned in the note above, these are non contributory to the main issues discussed today.      Objective:      Vitals:    10/02/18 1356   BP: 110/62   Pulse: 93   Resp: 19   SpO2: 97%   Weight: 83.5 kg (184 lb 1.4 oz)   Height: 5' 8" (1.727 m)     Body mass index is 27.99 kg/m².    Physical Exam   Constitutional: He is oriented to person, place, and time. He appears well-developed and well-nourished.   HENT:   Head: Normocephalic and atraumatic.   Eyes: EOM are normal. Pupils are equal, round, and reactive to light.   Neck: Normal range of motion. No tracheal deviation present.   Cardiovascular: Normal rate, regular rhythm and intact distal pulses. Exam reveals no friction rub.   No murmur heard.  Pulmonary/Chest: Effort normal and breath sounds normal. He has no wheezes. He has no rales.   Abdominal: Soft. Bowel sounds are normal. He exhibits no distension. There is no tenderness.   Musculoskeletal: Normal range of motion. " He exhibits no edema or deformity.   Neurological: He is alert and oriented to person, place, and time.   Skin: Skin is warm and dry. No rash noted.   Psychiatric: He has a normal mood and affect. His behavior is normal.            Lab Review   Lab Results   Component Value Date     09/25/2018    K 4.0 09/25/2018    CL 96 09/25/2018    CO2 26 09/25/2018    BUN 16 09/25/2018    CREATININE 0.8 09/25/2018    CALCIUM 9.8 09/25/2018     Lab Results   Component Value Date    CKMB 1.5 03/14/2013    TROPONINI <0.006 09/22/2018    TROPONINI <0.04 03/14/2013     Lab Results   Component Value Date    WBC 7.12 09/25/2018    HGB 14.2 09/25/2018    HCT 44.2 09/25/2018    MCV 89 09/25/2018     09/25/2018     Lab Results   Component Value Date    CHOL 121 09/22/2018    TRIG 63 09/22/2018    HDL 54 09/22/2018        Assessment / Plan :       Problem List Items Addressed This Visit        Pulmonary    COPD (chronic obstructive pulmonary disease) with emphysema (Chronic)    Current Assessment & Plan     EMPHYSEMA ROS: taking medications as instructed, no medication side effects noted, no significant ongoing wheezing or shortness of breath, using bronchodilator MDI less than twice a week.   New concerns: None.   Exam: appears well, vitals normal, no respiratory distress, acyanotic, normal RR, chest clear, no wheezing, crepitations, rhonchi, normal symmetric air entry.   Assessment:  EMPHYSEMA stable.   Plan: Start INCRUSE. Continue DUONEB. Continue Prednisone 10mg PO daily ( refilled).            Relevant Medications    tiotropium bromide (SPIRIVA RESPIMAT) 2.5 mcg/actuation Mist    predniSONE (DELTASONE) 10 MG tablet    Chronic respiratory failure with hypoxia    Current Assessment & Plan     Oxygen supplementation 3 L /min ( pulse dose)             Oncology    Metastatic left lung cancer (metastasis from lung to other site) with mediastinal lymphadenopathy - Primary    Current Assessment & Plan     Metastatic lung cancer  suspected.   Diagnostic Bronchoscopy 09/02/18: Non diagnostic  PET scan scheduled.    CT guided bone biopsy: L1 biopsy:  Metastatic poorly-differentiated squamous cell carcinoma     Follow up with ONCOLOGY as scheduled.                    TIME SPENT WITH PATIENT: Time spent: 40 minutes in face to face  discussion concerning diagnosis, prognosis, review of lab and test results, benefits of treatment as well as management of disease, counseling of patient and coordination of care between various health  care providers . Greater than half the time spent was used for coordination of care and counseling of patient.     Follow-up in about 5 weeks (around 11/9/2018) for Emphysema, Chronic Respiratory Failure, Lung Cancer.

## 2018-10-03 ENCOUNTER — OFFICE VISIT (OUTPATIENT)
Dept: HEMATOLOGY/ONCOLOGY | Facility: CLINIC | Age: 73
End: 2018-10-03
Payer: MEDICARE

## 2018-10-03 ENCOUNTER — OFFICE VISIT (OUTPATIENT)
Dept: SURGERY | Facility: CLINIC | Age: 73
End: 2018-10-03
Payer: MEDICARE

## 2018-10-03 VITALS
WEIGHT: 184.31 LBS | DIASTOLIC BLOOD PRESSURE: 53 MMHG | HEIGHT: 69 IN | TEMPERATURE: 98 F | BODY MASS INDEX: 27.3 KG/M2 | HEART RATE: 79 BPM | SYSTOLIC BLOOD PRESSURE: 100 MMHG

## 2018-10-03 DIAGNOSIS — C34.92 METASTATIC LUNG CANCER (METASTASIS FROM LUNG TO OTHER SITE), LEFT: Primary | ICD-10-CM

## 2018-10-03 LAB — FUNGUS SPEC CULT: NORMAL

## 2018-10-03 PROCEDURE — 99214 OFFICE O/P EST MOD 30 MIN: CPT | Mod: PBBFAC,27,PO | Performed by: SURGERY

## 2018-10-03 PROCEDURE — 99212 OFFICE O/P EST SF 10 MIN: CPT | Mod: PBBFAC,PO | Performed by: NURSE PRACTITIONER

## 2018-10-03 PROCEDURE — 99999 PR PBB SHADOW E&M-EST. PATIENT-LVL II: CPT | Mod: PBBFAC,,, | Performed by: NURSE PRACTITIONER

## 2018-10-03 PROCEDURE — 99215 OFFICE O/P EST HI 40 MIN: CPT | Mod: S$PBB,,, | Performed by: NURSE PRACTITIONER

## 2018-10-03 PROCEDURE — 99203 OFFICE O/P NEW LOW 30 MIN: CPT | Mod: S$PBB,,, | Performed by: SURGERY

## 2018-10-03 PROCEDURE — 99999 PR PBB SHADOW E&M-EST. PATIENT-LVL IV: CPT | Mod: PBBFAC,,, | Performed by: SURGERY

## 2018-10-03 RX ORDER — SODIUM CHLORIDE 9 MG/ML
INJECTION, SOLUTION INTRAVENOUS CONTINUOUS
Status: CANCELLED | OUTPATIENT
Start: 2018-10-03

## 2018-10-03 RX ORDER — LIDOCAINE HYDROCHLORIDE 10 MG/ML
1 INJECTION, SOLUTION EPIDURAL; INFILTRATION; INTRACAUDAL; PERINEURAL ONCE
Status: CANCELLED | OUTPATIENT
Start: 2018-10-03 | End: 2018-10-03

## 2018-10-03 NOTE — PROGRESS NOTES
History & Physical    SUBJECTIVE:     History of Present Illness:  Patient is a 72 y.o. male referred for port placement for chemotherapy.  Patient was recently diagnosed with lung cancer and will be undergoing chemotherapy.  He is right-handed and denies any prior ports or central lines.  He is currently on home oxygen.    Chief Complaint   Patient presents with    Consult       Review of patient's allergies indicates:   Allergen Reactions    Anoro ellipta [umeclidinium-vilanterol] Shortness Of Breath    Flomax [tamsulosin]      Dizzy         Current Outpatient Medications   Medication Sig Dispense Refill    albuterol-ipratropium (DUO-NEB) 2.5 mg-0.5 mg/3 mL nebulizer solution Take 3 mLs by nebulization every 6 (six) hours. Rescue 360 mL 1    b complex vitamins tablet Take 1 tablet by mouth once daily.      blood sugar diagnostic Strp 1 each by Misc.(Non-Drug; Combo Route) route 3 (three) times daily. Dispense preferred on insurance 100 strip 11    blood-glucose meter kit Use as instructed - preferred on insurance 1 each 0    dexamethasone (DECADRON) 4 MG Tab 20 mg (5 tablets) p.o. 12 and 6 hours prior to chemotherapy 20 tablet 5    diltiaZEM (CARDIZEM CD) 360 MG 24 hr capsule TAKE 1 CAPSULE(360 MG) BY MOUTH EVERY DAY 90 capsule 0    ELIQUIS 5 mg Tab TAKE 1 TABLET(5 MG) BY MOUTH TWICE DAILY 60 tablet 6    flecainide (TAMBOCOR) 100 MG Tab Take 1 tablet (100 mg total) by mouth every 12 (twelve) hours. 60 tablet 11    gabapentin (NEURONTIN) 300 MG capsule Take 2 capsules (600 mg total) by mouth 2 (two) times daily. (Patient taking differently: Take 600 mg by mouth every evening. Takes two tablets by mouth at night) 360 capsule 1    HYDROcodone-acetaminophen (NORCO) 5-325 mg per tablet Take 1 tablet by mouth every 6 (six) hours as needed for Pain. 60 tablet 0    insulin degludec (TRESIBA FLEXTOUCH U-200) 200 unit/mL (3 mL) InPn Inject 50 Units into the skin once daily. 9 mL 3    insulin lispro (HUMALOG  "KWIKPEN) 100 unit/mL InPn pen ADMINISTER 17 UNITS UNDER THE SKIN THREE TIMES DAILY BEFORE MEALS (Patient taking differently: ADMINISTER 17 UNITS UNDER THE SKIN IN MORNING AND 25 UNITS NOON AND NIGHT) 45 mL 0    insulin needles, disposable, 32 x 1/4 " Ndle Insulin injections four times per day. 400 each 3    multivitamin capsule Take 1 capsule by mouth once daily.      NEEDLES, DISPOSABLE (DISPOSABLE NEEDLES MISC) Inject 1 each into the skin 3 (three) times daily.      nystatin (MYCOSTATIN) 100,000 unit/mL suspension Take 5 mLs (500,000 Units total) by mouth 4 (four) times daily. for 8 days 160 mL 0    omeprazole (PRILOSEC) 20 MG capsule Take 1 capsule (20 mg total) by mouth once daily. 90 capsule 1    ondansetron (ZOFRAN-ODT) 8 MG TbDL Take 1 tablet (8 mg total) by mouth every 8 (eight) hours as needed. 30 tablet 5    pravastatin (PRAVACHOL) 40 MG tablet TAKE 1 TABLET DAILY 90 tablet 3    predniSONE (DELTASONE) 10 MG tablet Take 1 tablet (10 mg total) by mouth once daily. 30 tablet 3    prochlorperazine (COMPAZINE) 10 MG tablet TAKE 1 TABLET BY MOUTH EVERY 6 HOURS AS NEEDED. 385 tablet 5    quinapril (ACCUPRIL) 40 MG tablet Take 1 tablet (40 mg total) by mouth once daily. (Patient taking differently: Take 40 mg by mouth nightly. ) 90 tablet 3    tiotropium bromide (SPIRIVA RESPIMAT) 2.5 mcg/actuation Mist Inhale 2 puffs into the lungs once daily. Controller 4 g 11    traMADol (ULTRAM) 50 mg tablet TAKE 2 TABLETS BY MOUTH ONCE DAILY AS NEEDED FOR PAIN 60 tablet 3    vitamin D 1000 units Tab Take 1,000 Units by mouth once daily.       No current facility-administered medications for this visit.        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    DM (diabetes mellitus) 1996    Hyperlipidemia     Hypertension     " Lung disease     Neuropathy, diabetic     Pancreatitis     Type 1 diabetes mellitus with diabetic neuropathy 4/27/2018     Past Surgical History:   Procedure Laterality Date    ABLATION N/A 12/4/2017    [facility]    BICEPS TENDON REPAIR      CARDIOVERSION      CHOLECYSTECTOMY      PATELLA SURGERY      RADIOFREQUENCY ABLATION      ROTATOR CUFF REPAIR      TRANSESOPHAGEAL ECHOCARDIOGRAM (DILLAN) N/A 12/4/2017    Performed by [facility] at [facility] CATH LAB     Family History   Problem Relation Age of Onset    Heart failure Brother     Heart attack Brother     Diabetes Mother     Hypertension Mother     Stroke Mother     Hypertension Father     Stroke Father     Diabetes Sister     Cataracts Sister     Cancer Maternal Aunt     Diabetes Sister     Diabetes Sister     Stroke Paternal Grandmother     Stroke Paternal Grandfather      Social History     Tobacco Use    Smoking status: Former Smoker     Packs/day: 0.50     Types: Cigarettes     Start date: 1/1/1960     Last attempt to quit: 3/8/2015     Years since quitting: 3.5    Smokeless tobacco: Former User     Types: Snuff     Quit date: 10/7/1995   Substance Use Topics    Alcohol use: No     Alcohol/week: 0.0 oz    Drug use: No        Review of Systems:  Review of Systems   Constitutional: Negative for chills, fever and unexpected weight change.   HENT: Negative for congestion.    Eyes: Negative for visual disturbance.   Respiratory: Negative for shortness of breath.    Cardiovascular: Negative for chest pain.   Gastrointestinal: Negative for abdominal distention, abdominal pain, constipation, nausea, rectal pain and vomiting.   Genitourinary: Negative for dysuria.   Musculoskeletal: Negative for arthralgias.   Skin: Negative for rash.   Neurological: Negative for light-headedness.   Hematological: Negative for adenopathy.       OBJECTIVE:     Vital Signs (Most Recent)  Temp: 98 °F (36.7 °C)  "(10/03/18 1417)  Pulse: 79 (10/03/18 1417)  BP: (!) 100/53 (10/03/18 1417)  5' 8.5" (1.74 m)  83.6 kg (184 lb 4.9 oz)     Physical Exam:  Physical Exam   Constitutional: He is oriented to person, place, and time. He appears well-developed and well-nourished.   HENT:   Head: Normocephalic and atraumatic.   Eyes: EOM are normal.   Neck: Normal range of motion. Neck supple.   Cardiovascular: Normal rate and regular rhythm.   Pulmonary/Chest: Effort normal. He has wheezes.   Home oxygen   Abdominal: Soft. Bowel sounds are normal. He exhibits no distension. There is no tenderness.   Neurological: He is alert and oriented to person, place, and time.   Skin: Skin is warm and dry.   Vitals reviewed.          ASSESSMENT/PLAN:     72-year-old male with metastatic lung cancer    PLAN:Plan     Port placement 10/09/2018  Preop: CBC, BMP; EKG reviewed  Risks and benefits discussed with patient including: Pain, bleeding, infection, injury to vessels, pneumothorax, need for further procedure       "

## 2018-10-03 NOTE — PROGRESS NOTES
71 y/o male for chemotherapy teaching. Patient given the Navigation Notebook. Explained the contents of the chemotherapy binder. Discussed with patient the rationale for chemotherapy, how it works, the process of treatment, potential side effects and symptoms to report.  Zofran, compazine, and decadron previously ordered by Dr. Lagos.  Medications reviewed.  Patient and daughter verbalized understanding.  Patient states that he has not signed chemotherapy consent yet.  He has an appointment with surgery after this visit to discuss port placement.      Reviewed the specific medication and gave them a handout describing the potential side effects of carboplatin and taxol     Discussed potential side effects such as:  Nausea and vomiting  Myelosuppression  Fatigue  Anorexia  Alopecia  Stomatitis  Diarrhea  Cystitis  Gastritis  Fever > 100.4  Antiemetics instructions  Skin care  Constipation  Rash  Hyperpigmentation  Photosensitivity  Sunscreen  Small freq meals  Increased protein  Increased calories  Vitamin support   Taste alterations  Neuropathys  Hydration  Renal toxicity  Hepatic toxicity  Port management  Community resources  Thrombocytopenia precautions  Importance of monitoring blood sugars if diabetic and reporting elevations or lows    Phone numbers to contact healthcare team provided to patient.  Patient verbalized understanding plan of care and how to contact healthcare team if needed.

## 2018-10-03 NOTE — H&P (VIEW-ONLY)
History & Physical    SUBJECTIVE:     History of Present Illness:  Patient is a 72 y.o. male referred for port placement for chemotherapy.  Patient was recently diagnosed with lung cancer and will be undergoing chemotherapy.  He is right-handed and denies any prior ports or central lines.  He is currently on home oxygen.    Chief Complaint   Patient presents with    Consult       Review of patient's allergies indicates:   Allergen Reactions    Anoro ellipta [umeclidinium-vilanterol] Shortness Of Breath    Flomax [tamsulosin]      Dizzy         Current Outpatient Medications   Medication Sig Dispense Refill    albuterol-ipratropium (DUO-NEB) 2.5 mg-0.5 mg/3 mL nebulizer solution Take 3 mLs by nebulization every 6 (six) hours. Rescue 360 mL 1    b complex vitamins tablet Take 1 tablet by mouth once daily.      blood sugar diagnostic Strp 1 each by Misc.(Non-Drug; Combo Route) route 3 (three) times daily. Dispense preferred on insurance 100 strip 11    blood-glucose meter kit Use as instructed - preferred on insurance 1 each 0    dexamethasone (DECADRON) 4 MG Tab 20 mg (5 tablets) p.o. 12 and 6 hours prior to chemotherapy 20 tablet 5    diltiaZEM (CARDIZEM CD) 360 MG 24 hr capsule TAKE 1 CAPSULE(360 MG) BY MOUTH EVERY DAY 90 capsule 0    ELIQUIS 5 mg Tab TAKE 1 TABLET(5 MG) BY MOUTH TWICE DAILY 60 tablet 6    flecainide (TAMBOCOR) 100 MG Tab Take 1 tablet (100 mg total) by mouth every 12 (twelve) hours. 60 tablet 11    gabapentin (NEURONTIN) 300 MG capsule Take 2 capsules (600 mg total) by mouth 2 (two) times daily. (Patient taking differently: Take 600 mg by mouth every evening. Takes two tablets by mouth at night) 360 capsule 1    HYDROcodone-acetaminophen (NORCO) 5-325 mg per tablet Take 1 tablet by mouth every 6 (six) hours as needed for Pain. 60 tablet 0    insulin degludec (TRESIBA FLEXTOUCH U-200) 200 unit/mL (3 mL) InPn Inject 50 Units into the skin once daily. 9 mL 3    insulin lispro (HUMALOG  "KWIKPEN) 100 unit/mL InPn pen ADMINISTER 17 UNITS UNDER THE SKIN THREE TIMES DAILY BEFORE MEALS (Patient taking differently: ADMINISTER 17 UNITS UNDER THE SKIN IN MORNING AND 25 UNITS NOON AND NIGHT) 45 mL 0    insulin needles, disposable, 32 x 1/4 " Ndle Insulin injections four times per day. 400 each 3    multivitamin capsule Take 1 capsule by mouth once daily.      NEEDLES, DISPOSABLE (DISPOSABLE NEEDLES MISC) Inject 1 each into the skin 3 (three) times daily.      nystatin (MYCOSTATIN) 100,000 unit/mL suspension Take 5 mLs (500,000 Units total) by mouth 4 (four) times daily. for 8 days 160 mL 0    omeprazole (PRILOSEC) 20 MG capsule Take 1 capsule (20 mg total) by mouth once daily. 90 capsule 1    ondansetron (ZOFRAN-ODT) 8 MG TbDL Take 1 tablet (8 mg total) by mouth every 8 (eight) hours as needed. 30 tablet 5    pravastatin (PRAVACHOL) 40 MG tablet TAKE 1 TABLET DAILY 90 tablet 3    predniSONE (DELTASONE) 10 MG tablet Take 1 tablet (10 mg total) by mouth once daily. 30 tablet 3    prochlorperazine (COMPAZINE) 10 MG tablet TAKE 1 TABLET BY MOUTH EVERY 6 HOURS AS NEEDED. 385 tablet 5    quinapril (ACCUPRIL) 40 MG tablet Take 1 tablet (40 mg total) by mouth once daily. (Patient taking differently: Take 40 mg by mouth nightly. ) 90 tablet 3    tiotropium bromide (SPIRIVA RESPIMAT) 2.5 mcg/actuation Mist Inhale 2 puffs into the lungs once daily. Controller 4 g 11    traMADol (ULTRAM) 50 mg tablet TAKE 2 TABLETS BY MOUTH ONCE DAILY AS NEEDED FOR PAIN 60 tablet 3    vitamin D 1000 units Tab Take 1,000 Units by mouth once daily.       No current facility-administered medications for this visit.        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    DM (diabetes mellitus) 1996    Hyperlipidemia     Hypertension     " Lung disease     Neuropathy, diabetic     Pancreatitis     Type 1 diabetes mellitus with diabetic neuropathy 4/27/2018     Past Surgical History:   Procedure Laterality Date    ABLATION N/A 12/4/2017    [facility] CATH LAB    BICEPS TENDON REPAIR      CARDIOVERSION      CHOLECYSTECTOMY      PATELLA SURGERY      RADIOFREQUENCY ABLATION      ROTATOR CUFF REPAIR      TRANSESOPHAGEAL ECHOCARDIOGRAM (DILLAN) N/A 12/4/2017    Performed by [facility] at [facility] CATH LAB     Family History   Problem Relation Age of Onset    Heart failure Brother     Heart attack Brother     Diabetes Mother     Hypertension Mother     Stroke Mother     Hypertension Father     Stroke Father     Diabetes Sister     Cataracts Sister     Cancer Maternal Aunt     Diabetes Sister     Diabetes Sister     Stroke Paternal Grandmother     Stroke Paternal Grandfather      Social History     Tobacco Use    Smoking status: Former Smoker     Packs/day: 0.50     Types: Cigarettes     Start date: 1/1/1960     Last attempt to quit: 3/8/2015     Years since quitting: 3.5    Smokeless tobacco: Former User     Types: Snuff     Quit date: 10/7/1995   Substance Use Topics    Alcohol use: No     Alcohol/week: 0.0 oz    Drug use: No        Review of Systems:  Review of Systems   Constitutional: Negative for chills, fever and unexpected weight change.   HENT: Negative for congestion.    Eyes: Negative for visual disturbance.   Respiratory: Negative for shortness of breath.    Cardiovascular: Negative for chest pain.   Gastrointestinal: Negative for abdominal distention, abdominal pain, constipation, nausea, rectal pain and vomiting.   Genitourinary: Negative for dysuria.   Musculoskeletal: Negative for arthralgias.   Skin: Negative for rash.   Neurological: Negative for light-headedness.   Hematological: Negative for adenopathy.       OBJECTIVE:     Vital Signs (Most Recent)  Temp: 98 °F (36.7 °C)  "(10/03/18 1417)  Pulse: 79 (10/03/18 1417)  BP: (!) 100/53 (10/03/18 1417)  5' 8.5" (1.74 m)  83.6 kg (184 lb 4.9 oz)     Physical Exam:  Physical Exam   Constitutional: He is oriented to person, place, and time. He appears well-developed and well-nourished.   HENT:   Head: Normocephalic and atraumatic.   Eyes: EOM are normal.   Neck: Normal range of motion. Neck supple.   Cardiovascular: Normal rate and regular rhythm.   Pulmonary/Chest: Effort normal. He has wheezes.   Home oxygen   Abdominal: Soft. Bowel sounds are normal. He exhibits no distension. There is no tenderness.   Neurological: He is alert and oriented to person, place, and time.   Skin: Skin is warm and dry.   Vitals reviewed.          ASSESSMENT/PLAN:     72-year-old male with metastatic lung cancer    PLAN:Plan     Port placement 10/09/2018  Preop: CBC, BMP; EKG reviewed  Risks and benefits discussed with patient including: Pain, bleeding, infection, injury to vessels, pneumothorax, need for further procedure       "

## 2018-10-03 NOTE — LETTER
October 3, 2018      Issa Lagos Jr., MD  [address] [place] [address]           [facility] General Surgery  [address] [place] [address]  Phone: [phone]  Fax: [phone]          Patient: Nico Garza Jr.   MR Number: [id number]   YOB: 1945   Date of Visit: 10/3/2018       Dear Dr. Issa Lagos Jr.:    Thank you for referring Nico Garza to me for evaluation. Attached you will find relevant portions of my assessment and plan of care.    If you have questions, please do not hesitate to call me. I look forward to following Nico Garza along with you.    Sincerely,    Christophe Brandt MD    Enclosure  CC:  No Recipients    If you would like to receive this communication electronically, please contact [email or web address] or [phone] to request more information on [employer] access.    For providers and/or their staff who would like to refer a patient to [facility], please contact us through our one-stop-shop provider referral line, [facility] [place], operated by [facility], at [phone].    If you feel you have received this communication in error or would no longer like to receive these types of communications, please e-mail [email or web address]

## 2018-10-05 NOTE — PRE-PROCEDURE INSTRUCTIONS
Pre op instructions reviewed with patient per phone:    To confirm, Your surgeon has instructed you:  Surgery is scheduled 10/9/18 at 1600.  Pre admit office to call afternoon prior to surgery with final arrival time.  If surgery is on Monday, Pre admit office to call Friday afternoon with with final arrival time      Please report to [facility] 1st floor main lobby by 1430.      INSTRUCTIONS IMPORTANT!!!  ¨ No smoking after 12 midnight, the night before surgery.  ¨ No solid food after 12 midnight, but you may have clear liquids up until 3 hours prior to surgery.  This includes: grape, cranberry, and apple juice (not orange, and no coffee.)   ¨ OK to brush teeth, but no gum, candy or mints!    ¨ Take only these medicines with a small swallow of water-morning of surgery.  Albuterol neb, Prednisone, Cardizem, Flecainide, Prilosec, Spiriva    ____  Do not wear makeup, including mascara.  ____  No powder, lotions or creams to surgical area.  ____  Please remove all jewelry, including piercings and leave at home.  ____  No money or valuables needed. Please leave at home.  ____  Please bring identification and insurance information to hospital.  ____  If going home the same day, arrange for a ride home. You will not be able to   drive if Anesthesia was used.  ____  Children, under 12 years old, must remain in the waiting room with an adult.  They are not allowed in patient areas.  ____  Wear loose fitting clothing. Allow for dressings, bandages.  ____  Stop Aspirin, Ibuprofen, Motrin and Aleve at least 5-7 days before surgery, unless otherwise instructed by your doctor, or the nurse.   You MAY use Tylenol/acetaminophen until day of surgery.  ____  If you take diabetic medication, do not take am of surgery unless instructed by   Doctor.  ____ Stop taking any Fish Oil supplement or any Vitamins that contain Vitamin E at least 5 days prior to surgery.          Bathing Instructions-- The night before  surgery and the morning prior to coming to the hospital:   -Do not shave the surgical area.   -Shower and wash your hair and body as usual with your regular soap and shampoo.   -Rinse your hair and body completely.   -Use one packet of hibiclens to wash the surgical site (using your hand) gently for 5 minutes.  Do not scrub you skin too hard.   -Do not use hibiclens on your head, face, or genitals.   -Do not wash with regular soap after you use the hibiclens.   -Rinse your body thoroughly.   -Dry with clean, soft towel.  Do not use lotion, cream, deodorant, or powders on   the surgical site.    Use antibacterial soap in place of hibiclens if your surgery is on the head, face or genitals.         Surgical Site Infection    Prevention of surgical site infections:     -Keep incisions clean and dry.   -Do not soak/submerge incisions in water until completely healed.   -Do not apply lotions, powders, creams, or deodorants to site.   -Always make sure hands are cleaned with antibacterial soap/ alcohol-based   prior to touching the surgical site.  (This includes doctors, nurses, staff, and yourself.)    Signs and symptoms:   -Redness and pain around the area where you had surgery   -Drainage of cloudy fluid from your surgical wound   -Fever over 100.4  I have read or had read and explained to me, and understand the above information.

## 2018-10-06 DIAGNOSIS — I10 HYPERTENSION GOAL BP (BLOOD PRESSURE) < 130/80: Chronic | ICD-10-CM

## 2018-10-08 ENCOUNTER — OFFICE VISIT (OUTPATIENT)
Dept: INTERNAL MEDICINE | Facility: CLINIC | Age: 73
End: 2018-10-08
Payer: MEDICARE

## 2018-10-08 ENCOUNTER — TELEPHONE (OUTPATIENT)
Dept: INTERNAL MEDICINE | Facility: CLINIC | Age: 73
End: 2018-10-08

## 2018-10-08 ENCOUNTER — OFFICE VISIT (OUTPATIENT)
Dept: HEMATOLOGY/ONCOLOGY | Facility: CLINIC | Age: 73
End: 2018-10-08
Payer: MEDICARE

## 2018-10-08 VITALS
SYSTOLIC BLOOD PRESSURE: 98 MMHG | HEIGHT: 69 IN | TEMPERATURE: 98 F | BODY MASS INDEX: 27.04 KG/M2 | WEIGHT: 182.56 LBS | DIASTOLIC BLOOD PRESSURE: 50 MMHG | OXYGEN SATURATION: 93 % | HEART RATE: 93 BPM

## 2018-10-08 DIAGNOSIS — C34.92 PRIMARY MALIGNANT NEOPLASM OF LEFT LUNG METASTATIC TO OTHER SITE: ICD-10-CM

## 2018-10-08 DIAGNOSIS — C34.92 METASTATIC LUNG CANCER (METASTASIS FROM LUNG TO OTHER SITE), LEFT: Primary | ICD-10-CM

## 2018-10-08 DIAGNOSIS — R82.90 ABNORMAL URINALYSIS: Primary | ICD-10-CM

## 2018-10-08 DIAGNOSIS — F43.23 SITUATIONAL MIXED ANXIETY AND DEPRESSIVE DISORDER: ICD-10-CM

## 2018-10-08 DIAGNOSIS — R30.0 DYSURIA: Primary | ICD-10-CM

## 2018-10-08 LAB
BACTERIA #/AREA URNS HPF: ABNORMAL /HPF
BILIRUB UR QL STRIP: NEGATIVE
CLARITY UR: ABNORMAL
COLOR UR: YELLOW
GLUCOSE UR QL STRIP: NEGATIVE
HGB UR QL STRIP: NEGATIVE
HYALINE CASTS #/AREA URNS LPF: 10 /LPF
KETONES UR QL STRIP: NEGATIVE
LEUKOCYTE ESTERASE UR QL STRIP: ABNORMAL
MICROSCOPIC COMMENT: ABNORMAL
NITRITE UR QL STRIP: NEGATIVE
NON-SQ EPI CELLS #/AREA URNS HPF: 3 /HPF
PH UR STRIP: 6 [PH] (ref 5–8)
PROT UR QL STRIP: ABNORMAL
RBC #/AREA URNS HPF: 6 /HPF (ref 0–4)
SP GR UR STRIP: 1.02 (ref 1–1.03)
SQUAMOUS #/AREA URNS HPF: ABNORMAL /HPF
URN SPEC COLLECT METH UR: ABNORMAL
WBC #/AREA URNS HPF: 52 /HPF (ref 0–5)

## 2018-10-08 PROCEDURE — 99499 UNLISTED E&M SERVICE: CPT | Mod: S$PBB,,, | Performed by: NURSE PRACTITIONER

## 2018-10-08 PROCEDURE — 99999 PR PBB SHADOW E&M-EST. PATIENT-LVL III: CPT | Mod: PBBFAC,,, | Performed by: INTERNAL MEDICINE

## 2018-10-08 PROCEDURE — 81000 URINALYSIS NONAUTO W/SCOPE: CPT

## 2018-10-08 PROCEDURE — 99211 OFF/OP EST MAY X REQ PHY/QHP: CPT | Mod: PBBFAC,27 | Performed by: NURSE PRACTITIONER

## 2018-10-08 PROCEDURE — 99213 OFFICE O/P EST LOW 20 MIN: CPT | Mod: PBBFAC | Performed by: INTERNAL MEDICINE

## 2018-10-08 PROCEDURE — 99999 PR PBB SHADOW E&M-EST. PATIENT-LVL I: CPT | Mod: PBBFAC,,, | Performed by: NURSE PRACTITIONER

## 2018-10-08 PROCEDURE — 99214 OFFICE O/P EST MOD 30 MIN: CPT | Mod: S$PBB,,, | Performed by: INTERNAL MEDICINE

## 2018-10-08 RX ORDER — CLONAZEPAM 0.5 MG/1
0.5 TABLET ORAL 2 TIMES DAILY PRN
Qty: 45 TABLET | Refills: 1 | Status: SHIPPED | OUTPATIENT
Start: 2018-10-08 | End: 2019-10-08

## 2018-10-08 RX ORDER — SERTRALINE HYDROCHLORIDE 50 MG/1
TABLET, FILM COATED ORAL
Qty: 90 TABLET | Refills: 1 | OUTPATIENT
Start: 2018-10-08

## 2018-10-08 RX ORDER — SERTRALINE HYDROCHLORIDE 50 MG/1
50 TABLET, FILM COATED ORAL DAILY
Qty: 30 TABLET | Refills: 1 | Status: SHIPPED | OUTPATIENT
Start: 2018-10-08 | End: 2018-12-10 | Stop reason: SDUPTHER

## 2018-10-08 RX ORDER — SULFAMETHOXAZOLE AND TRIMETHOPRIM 800; 160 MG/1; MG/1
1 TABLET ORAL 2 TIMES DAILY
Qty: 14 TABLET | Refills: 0 | Status: SHIPPED | OUTPATIENT
Start: 2018-10-08 | End: 2018-10-15

## 2018-10-08 NOTE — PROGRESS NOTES
Nico Garza Jr.  72 y.o. White male    Chief Complaint   Patient presents with    Dysuria     HPI: Presents to the clinic with his daughter with complaint of dysuria. He has also had frequency. His symptoms started 2 days ago. He denies a fever.   He is also wanting to get on medication for anxiety and depression. He was recently diagnosed with metastatic lung cancer. He will be having a Medi-port placed and start chemotherapy soon.     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 Rescue, Disp: 360 mL, Rfl: 1    b complex vitamins tablet, Take 1 tablet by mouth once daily., Disp: , Rfl:     blood sugar diagnostic Strp, 1 each by Misc.(Non-Drug; Combo Route) route 3 (three) times daily. Dispense preferred on insurance, Disp: 100 strip, Rfl: 11    blood-glucose meter kit, Use as instructed - preferred on insurance, Disp: 1 each, Rfl: 0    diltiaZEM (CARDIZEM CD) 360 MG 24 hr capsule, TAKE 1 CAPSULE(360 MG) BY MOUTH EVERY DAY, Disp: 90 capsule, Rfl: 0    ELIQUIS 5 mg Tab, TAKE 1 TABLET(5 MG) BY MOUTH TWICE DAILY, Disp: 60 tablet, Rfl: 6    flecainide (TAMBOCOR) 100 MG Tab, Take 1 tablet (100 mg total) by mouth every 12 (twelve) hours., Disp: 60 tablet, Rfl: 11    gabapentin (NEURONTIN) 300 MG capsule, Take 2 capsules (600 mg total) by mouth 2 (two) times daily. (Patient taking differently: Take 600 mg by mouth every evening. Takes two tablets by  "mouth at night), Disp: 360 capsule, Rfl: 1    HYDROcodone-acetaminophen (NORCO) 5-325 mg per tablet, Take 1 tablet by mouth every 6 (six) hours as needed for Pain., Disp: 60 tablet, Rfl: 0    insulin degludec (TRESIBA FLEXTOUCH U-200) 200 unit/mL (3 mL) InPn, Inject 50 Units into the skin once daily., Disp: 9 mL, Rfl: 3    insulin lispro (HUMALOG KWIKPEN) 100 unit/mL InPn pen, ADMINISTER 17 UNITS UNDER THE SKIN THREE TIMES DAILY BEFORE MEALS (Patient taking differently: ADMINISTER 17 UNITS UNDER THE SKIN IN MORNING AND 25 UNITS NOON AND NIGHT), Disp: 45 mL, Rfl: 0    insulin needles, disposable, 32 x 1/4 " Ndle, Insulin injections four times per day., Disp: 400 each, Rfl: 3    multivitamin capsule, Take 1 capsule by mouth once daily., Disp: , Rfl:     NEEDLES, DISPOSABLE (DISPOSABLE NEEDLES MISC), Inject 1 each into the skin 3 (three) times daily., Disp: , Rfl:     omeprazole (PRILOSEC) 20 MG capsule, Take 1 capsule (20 mg total) by mouth once daily., Disp: 90 capsule, Rfl: 1    ondansetron (ZOFRAN-ODT) 8 MG TbDL, Take 1 tablet (8 mg total) by mouth every 8 (eight) hours as needed., Disp: 30 tablet, Rfl: 5    pravastatin (PRAVACHOL) 40 MG tablet, TAKE 1 TABLET DAILY (Patient taking differently: TAKE 1 TABLET NIGHTLY), Disp: 90 tablet, Rfl: 3    predniSONE (DELTASONE) 10 MG tablet, Take 1 tablet (10 mg total) by mouth once daily., Disp: 30 tablet, Rfl: 3    prochlorperazine (COMPAZINE) 10 MG tablet, TAKE 1 TABLET BY MOUTH EVERY 6 HOURS AS NEEDED., Disp: 385 tablet, Rfl: 5    quinapril (ACCUPRIL) 40 MG tablet, Take 1 tablet (40 mg total) by mouth once daily. (Patient taking differently: Take 40 mg by mouth nightly. ), Disp: 90 tablet, Rfl: 3    tiotropium bromide (SPIRIVA RESPIMAT) 2.5 mcg/actuation Mist, Inhale 2 puffs into the lungs once daily. Controller, Disp: 4 g, Rfl: 11    traMADol (ULTRAM) 50 mg tablet, TAKE 2 TABLETS BY MOUTH ONCE DAILY AS NEEDED FOR PAIN, Disp: 60 tablet, Rfl: 3    vitamin D " 1000 units Tab, Take 1,000 Units by mouth once daily., Disp: , Rfl:     clonazePAM (KLONOPIN) 0.5 MG tablet, Take 1 tablet (0.5 mg total) by mouth 2 (two) times daily as needed for Anxiety., Disp: 45 tablet, Rfl: 1    dexamethasone (DECADRON) 4 MG Tab, 20 mg (5 tablets) p.o. 12 and 6 hours prior to chemotherapy, Disp: 20 tablet, Rfl: 5    sertraline (ZOLOFT) 50 MG tablet, Take 1 tablet (50 mg total) by mouth once daily., Disp: 30 tablet, Rfl: 1    Allergies:  Review of patient's allergies indicates:   Allergen Reactions    Anoro ellipta [umeclidinium-vilanterol] Shortness Of Breath    Flomax [tamsulosin]      PHYSICAL EXAM:  VITAL SIGNS: Reviewed  GENERAL: Alert and oriented, no acute distress  HEART: Regular rate   LUNGS: Respirations unlabored; wearing oxygen    ASSESSMENT/PLAN:  Nico was seen today for dysuria.    Diagnoses and all orders for this visit:    Dysuria  -     Urinalysis    Situational mixed anxiety and depressive disorder  -     sertraline (ZOLOFT) 50 MG tablet; Take 1 tablet (50 mg total) by mouth once daily.  -     clonazePAM (KLONOPIN) 0.5 MG tablet; Take 1 tablet (0.5 mg total) by mouth 2 (two) times daily as needed for Anxiety.  -     Discussed medication risks and benefits.     Primary malignant neoplasm of left lung metastatic to other site    F/U in 4 weeks for anxiety and depression.

## 2018-10-08 NOTE — TELEPHONE ENCOUNTER
Spoke to patients daughter arya, she stated that she will discuss the [facility] paperwork with dr. Davis when she comes into pt appt today

## 2018-10-08 NOTE — PATIENT INSTRUCTIONS
Sertraline tablets  What is this medicine?  SERTRALINE (SER tra dorothy) is used to treat depression. It may also be used to treat obsessive compulsive disorder, panic disorder, post-trauma stress, premenstrual dysphoric disorder (PMDD) or social anxiety.  How should I use this medicine?  Take this medicine by mouth with a glass of water. Follow the directions on the prescription label. You can take it with or without food. Take your medicine at regular intervals. Do not take your medicine more often than directed. Do not stop taking this medicine suddenly except upon the advice of your doctor. Stopping this medicine too quickly may cause serious side effects or your condition may worsen.  A special MedGuide will be given to you by the pharmacist with each prescription and refill. Be sure to read this information carefully each time.  Talk to your pediatrician regarding the use of this medicine in children. While this drug may be prescribed for children as young as 7 years for selected conditions, precautions do apply.  What side effects may I notice from receiving this medicine?  Side effects that you should report to your doctor or health care professional as soon as possible:  · allergic reactions like skin rash, itching or hives, swelling of the face, lips, or tongue  · black or bloody stools, blood in the urine or vomit  · fast, irregular heartbeat  · feeling faint or lightheaded, falls  · hallucination, loss of contact with reality  · seizures  · suicidal thoughts or other mood changes  · unusual bleeding or bruising  · unusually weak or tired  · vomiting  Side effects that usually do not require medical attention (report to your doctor or health care professional if they continue or are bothersome):  · change in appetite  · change in sex drive or performance  · diarrhea  · increased sweating  · indigestion, nausea  · tremors  What may interact with this medicine?  Do not take this medicine with any of the  following medications:  · certain medicines for fungal infections like fluconazole, itraconazole, ketoconazole, posaconazole, voriconazole  · cisapride  · disulfiram  · dofetilide  · linezolid  · MAOIs like Carbex, Eldepryl, Marplan, Nardil, and Parnate  · metronidazole  · methylene blue (injected into a vein)  · pimozide  · thioridazine  · ziprasidone  This medicine may also interact with the following medications:  · alcohol  · aspirin and aspirin-like medicines  · certain medicines for depression, anxiety, or psychotic disturbances  · certain medicines for irregular heart beat like flecainide, propafenone  · certain medicines for migraine headaches like almotriptan, eletriptan, frovatriptan, naratriptan, rizatriptan, sumatriptan, zolmitriptan  · certain medicines for sleep  · certain medicines for seizures like carbamazepine, valproic acid, phenytoin  · certain medicines that treat or prevent blood clots like warfarin, enoxaparin, dalteparin  · cimetidine  · digoxin  · diuretics  · fentanyl  · furazolidone  · isoniazid  · lithium  · NSAIDs, medicines for pain and inflammation, like ibuprofen or naproxen  · other medicines that prolong the QT interval (cause an abnormal heart rhythm)  · procarbazine  · rasagiline  · supplements like Hardwick's wort, kava kava, valerian  · tolbutamide  · tramadol  · tryptophan  What if I miss a dose?  If you miss a dose, take it as soon as you can. If it is almost time for your next dose, take only that dose. Do not take double or extra doses.  Where should I keep my medicine?  Keep out of the reach of children.  Store at room temperature between 15 and 30 degrees C (59 and 86 degrees F). Throw away any unused medicine after the expiration date.  What should I tell my health care provider before I take this medicine?  They need to know if you have any of these conditions:  · bipolar disorder or a family history of bipolar disorder  · diabetes  · glaucoma  · heart disease  · high  blood pressure  · history of irregular heartbeat  · history of low levels of calcium, magnesium, or potassium in the blood  · if you often drink alcohol  · liver disease  · receiving electroconvulsive therapy  · seizures  · suicidal thoughts, plans, or attempt; a previous suicide attempt by you or a family member  · thyroid disease  · an unusual or allergic reaction to sertraline, other medicines, foods, dyes, or preservatives  · pregnant or trying to get pregnant  · breast-feeding  What should I watch for while using this medicine?  Tell your doctor if your symptoms do not get better or if they get worse. Visit your doctor or health care professional for regular checks on your progress. Because it may take several weeks to see the full effects of this medicine, it is important to continue your treatment as prescribed by your doctor.  Patients and their families should watch out for new or worsening thoughts of suicide or depression. Also watch out for sudden changes in feelings such as feeling anxious, agitated, panicky, irritable, hostile, aggressive, impulsive, severely restless, overly excited and hyperactive, or not being able to sleep. If this happens, especially at the beginning of treatment or after a change in dose, call your health care professional.  You may get drowsy or dizzy. Do not drive, use machinery, or do anything that needs mental alertness until you know how this medicine affects you. Do not stand or sit up quickly, especially if you are an older patient. This reduces the risk of dizzy or fainting spells. Alcohol may interfere with the effect of this medicine. Avoid alcoholic drinks.  Your mouth may get dry. Chewing sugarless gum or sucking hard candy, and drinking plenty of water may help. Contact your doctor if the problem does not go away or is severe.  NOTE:This sheet is a summary. It may not cover all possible information. If you have questions about this medicine, talk to your doctor,  pharmacist, or health care provider. Copyright© 2017 [employer]

## 2018-10-08 NOTE — TELEPHONE ENCOUNTER
----- Message from Abimbola Noland sent at 10/8/2018  9:39 AM CDT -----  Contact: Pt/Daughter (Belia)  Please give Belia a call at [phone] regarding her [facility] paper work being rejected and the dept refax something else over.

## 2018-10-09 ENCOUNTER — ANESTHESIA EVENT (OUTPATIENT)
Dept: SURGERY | Facility: HOSPITAL | Age: 73
End: 2018-10-09
Payer: MEDICARE

## 2018-10-09 ENCOUNTER — ANESTHESIA (OUTPATIENT)
Dept: SURGERY | Facility: HOSPITAL | Age: 73
End: 2018-10-09
Payer: MEDICARE

## 2018-10-09 ENCOUNTER — HOSPITAL ENCOUNTER (OUTPATIENT)
Facility: HOSPITAL | Age: 73
Discharge: HOME | End: 2018-10-09
Attending: SURGERY | Admitting: SURGERY
Payer: MEDICARE

## 2018-10-09 DIAGNOSIS — C34.92 METASTATIC LUNG CANCER (METASTASIS FROM LUNG TO OTHER SITE), LEFT: ICD-10-CM

## 2018-10-09 DIAGNOSIS — C34.90 LUNG CANCER: ICD-10-CM

## 2018-10-09 LAB — POCT GLUCOSE: 197 MG/DL (ref 70–110)

## 2018-10-09 PROCEDURE — 71000033 HC RECOVERY, INTIAL HOUR: Performed by: SURGERY

## 2018-10-09 PROCEDURE — 77001 FLUOROGUIDE FOR VEIN DEVICE: CPT | Mod: 26,,, | Performed by: SURGERY

## 2018-10-09 PROCEDURE — 25000003 PHARM REV CODE 250: Performed by: NURSE ANESTHETIST, CERTIFIED REGISTERED

## 2018-10-09 PROCEDURE — 37000008 HC ANESTHESIA 1ST 15 MINUTES: Performed by: SURGERY

## 2018-10-09 PROCEDURE — 71000015 HC POSTOP RECOV 1ST HR: Performed by: SURGERY

## 2018-10-09 PROCEDURE — 25000003 PHARM REV CODE 250: Performed by: ANESTHESIOLOGY

## 2018-10-09 PROCEDURE — 63600175 PHARM REV CODE 636 W HCPCS: Performed by: ANESTHESIOLOGY

## 2018-10-09 PROCEDURE — S0020 INJECTION, BUPIVICAINE HYDRO: HCPCS | Performed by: SURGERY

## 2018-10-09 PROCEDURE — 36000706: Performed by: SURGERY

## 2018-10-09 PROCEDURE — 37000009 HC ANESTHESIA EA ADD 15 MINS: Performed by: SURGERY

## 2018-10-09 PROCEDURE — 63600175 PHARM REV CODE 636 W HCPCS: Performed by: SURGERY

## 2018-10-09 PROCEDURE — 36000707: Performed by: SURGERY

## 2018-10-09 PROCEDURE — 36561 INSERT TUNNELED CV CATH: CPT | Mod: RT,,, | Performed by: SURGERY

## 2018-10-09 PROCEDURE — 63600175 PHARM REV CODE 636 W HCPCS: Performed by: NURSE ANESTHETIST, CERTIFIED REGISTERED

## 2018-10-09 PROCEDURE — 25000003 PHARM REV CODE 250: Performed by: SURGERY

## 2018-10-09 PROCEDURE — C1788 PORT, INDWELLING, IMP: HCPCS | Performed by: SURGERY

## 2018-10-09 DEVICE — PORT POWER CLEAR VIEW: Type: IMPLANTABLE DEVICE | Site: NECK | Status: FUNCTIONAL

## 2018-10-09 RX ORDER — SODIUM CHLORIDE 9 MG/ML
INJECTION, SOLUTION INTRAVENOUS CONTINUOUS
Status: DISCONTINUED | OUTPATIENT
Start: 2018-10-09 | End: 2018-10-09 | Stop reason: HOSPADM

## 2018-10-09 RX ORDER — MEPERIDINE HYDROCHLORIDE 50 MG/ML
12.5 INJECTION INTRAMUSCULAR; INTRAVENOUS; SUBCUTANEOUS ONCE AS NEEDED
Status: DISCONTINUED | OUTPATIENT
Start: 2018-10-09 | End: 2018-10-09 | Stop reason: HOSPADM

## 2018-10-09 RX ORDER — HYDROCODONE BITARTRATE AND ACETAMINOPHEN 10; 325 MG/1; MG/1
1 TABLET ORAL EVERY 4 HOURS PRN
Status: DISCONTINUED | OUTPATIENT
Start: 2018-10-09 | End: 2018-10-09 | Stop reason: HOSPADM

## 2018-10-09 RX ORDER — HYDROCODONE BITARTRATE AND ACETAMINOPHEN 5; 325 MG/1; MG/1
1 TABLET ORAL EVERY 6 HOURS PRN
Qty: 10 TABLET | Refills: 0 | Status: SHIPPED | OUTPATIENT
Start: 2018-10-09 | End: 2018-10-16 | Stop reason: SDUPTHER

## 2018-10-09 RX ORDER — QUINAPRIL 40 MG/1
TABLET ORAL
Qty: 90 TABLET | Refills: 0 | Status: SHIPPED | OUTPATIENT
Start: 2018-10-09 | End: 2018-11-08

## 2018-10-09 RX ORDER — BUPIVACAINE HYDROCHLORIDE 5 MG/ML
INJECTION, SOLUTION EPIDURAL; INTRACAUDAL
Status: DISCONTINUED | OUTPATIENT
Start: 2018-10-09 | End: 2018-10-09 | Stop reason: HOSPADM

## 2018-10-09 RX ORDER — CEFAZOLIN SODIUM 1 G/50ML
2 SOLUTION INTRAVENOUS
Status: COMPLETED | OUTPATIENT
Start: 2018-10-09 | End: 2018-10-09

## 2018-10-09 RX ORDER — HEPARIN 100 UNIT/ML
SYRINGE INTRAVENOUS
Status: DISCONTINUED | OUTPATIENT
Start: 2018-10-09 | End: 2018-10-09 | Stop reason: HOSPADM

## 2018-10-09 RX ORDER — ACETAMINOPHEN 10 MG/ML
1000 INJECTION, SOLUTION INTRAVENOUS ONCE
Status: COMPLETED | OUTPATIENT
Start: 2018-10-09 | End: 2018-10-09

## 2018-10-09 RX ORDER — FENTANYL CITRATE 50 UG/ML
25 INJECTION, SOLUTION INTRAMUSCULAR; INTRAVENOUS EVERY 5 MIN PRN
Status: DISCONTINUED | OUTPATIENT
Start: 2018-10-09 | End: 2018-10-09 | Stop reason: HOSPADM

## 2018-10-09 RX ORDER — LIDOCAINE HYDROCHLORIDE 10 MG/ML
1 INJECTION, SOLUTION EPIDURAL; INFILTRATION; INTRACAUDAL; PERINEURAL ONCE
Status: DISCONTINUED | OUTPATIENT
Start: 2018-10-09 | End: 2018-10-09 | Stop reason: HOSPADM

## 2018-10-09 RX ORDER — LIDOCAINE HYDROCHLORIDE 10 MG/ML
INJECTION, SOLUTION EPIDURAL; INFILTRATION; INTRACAUDAL; PERINEURAL
Status: DISCONTINUED | OUTPATIENT
Start: 2018-10-09 | End: 2018-10-09 | Stop reason: HOSPADM

## 2018-10-09 RX ORDER — OXYCODONE HYDROCHLORIDE 5 MG/1
5 TABLET ORAL ONCE AS NEEDED
Status: COMPLETED | OUTPATIENT
Start: 2018-10-09 | End: 2018-10-09

## 2018-10-09 RX ORDER — SODIUM CHLORIDE, SODIUM LACTATE, POTASSIUM CHLORIDE, CALCIUM CHLORIDE 600; 310; 30; 20 MG/100ML; MG/100ML; MG/100ML; MG/100ML
INJECTION, SOLUTION INTRAVENOUS CONTINUOUS PRN
Status: DISCONTINUED | OUTPATIENT
Start: 2018-10-09 | End: 2018-10-09

## 2018-10-09 RX ORDER — ONDANSETRON 2 MG/ML
4 INJECTION INTRAMUSCULAR; INTRAVENOUS DAILY PRN
Status: DISCONTINUED | OUTPATIENT
Start: 2018-10-09 | End: 2018-10-09 | Stop reason: HOSPADM

## 2018-10-09 RX ORDER — HYDROCODONE BITARTRATE AND ACETAMINOPHEN 5; 325 MG/1; MG/1
1 TABLET ORAL EVERY 4 HOURS PRN
Status: DISCONTINUED | OUTPATIENT
Start: 2018-10-09 | End: 2018-10-09 | Stop reason: HOSPADM

## 2018-10-09 RX ORDER — FENTANYL CITRATE 50 UG/ML
INJECTION, SOLUTION INTRAMUSCULAR; INTRAVENOUS
Status: DISCONTINUED | OUTPATIENT
Start: 2018-10-09 | End: 2018-10-09

## 2018-10-09 RX ORDER — ONDANSETRON 2 MG/ML
4 INJECTION INTRAMUSCULAR; INTRAVENOUS EVERY 12 HOURS PRN
Status: DISCONTINUED | OUTPATIENT
Start: 2018-10-09 | End: 2018-10-09 | Stop reason: HOSPADM

## 2018-10-09 RX ADMIN — ACETAMINOPHEN 1000 MG: 10 INJECTION, SOLUTION INTRAVENOUS at 04:10

## 2018-10-09 RX ADMIN — SODIUM CHLORIDE, SODIUM LACTATE, POTASSIUM CHLORIDE, AND CALCIUM CHLORIDE: 600; 310; 30; 20 INJECTION, SOLUTION INTRAVENOUS at 02:10

## 2018-10-09 RX ADMIN — OXYCODONE HYDROCHLORIDE 5 MG: 5 TABLET ORAL at 04:10

## 2018-10-09 RX ADMIN — FENTANYL CITRATE 100 MCG: 50 INJECTION, SOLUTION INTRAMUSCULAR; INTRAVENOUS at 02:10

## 2018-10-09 RX ADMIN — CEFAZOLIN SODIUM 2 G: 1 SOLUTION INTRAVENOUS at 02:10

## 2018-10-09 NOTE — PLAN OF CARE
Updated pt and daughter on current POC and discharge instructions, no questions noted. Verbalized understanding.

## 2018-10-09 NOTE — ANESTHESIA POSTPROCEDURE EVALUATION
"Anesthesia Post Evaluation    Patient: Nico Garza Jr.    Procedure(s) Performed: Procedure(s) (LRB):  [id number] (Right)    Final Anesthesia Type: MAC  Patient location during evaluation: PACU  Patient participation: Yes- Able to Participate  Level of consciousness: awake and alert and oriented  Post-procedure vital signs: reviewed and stable  Pain management: adequate  Airway patency: patent  PONV status at discharge: No PONV  Anesthetic complications: no      Cardiovascular status: blood pressure returned to baseline and hemodynamically stable  Respiratory status: unassisted and spontaneous ventilation  Hydration status: euvolemic  Follow-up not needed.        Visit Vitals  BP (!) 115/59 (BP Location: Right arm, Patient Position: Sitting)   Pulse 102   Temp 36.7 °C (98.1 °F) (Skin)   Resp (!) 24   Ht 5' 8.5" (1.74 m)   Wt 82.3 kg (181 lb 7 oz)   SpO2 (!) 94%   BMI 27.19 kg/m²       Pain/Arnaldo Score: No Data Recorded      "

## 2018-10-09 NOTE — TRANSFER OF CARE
"Anesthesia Transfer of Care Note    Patient: Nico Garza Jr.    Procedure(s) Performed: Procedure(s) (LRB):  [id number] (Right)    Patient location: PACU    Anesthesia Type: MAC    Transport from OR: Transported from OR on 2-3 L/min O2 by NC with adequate spontaneous ventilation    Post pain: adequate analgesia    Post assessment: no apparent anesthetic complications and tolerated procedure well    Post vital signs: stable    Level of consciousness: awake, alert and oriented    Nausea/Vomiting: no nausea/vomiting    Complications: none    Transfer of care protocol was followed      Last vitals:   Visit Vitals  BP (!) 115/59 (BP Location: Right arm, Patient Position: Sitting)   Pulse 102   Temp 36.7 °C (98.1 °F) (Skin)   Resp (!) 24   Ht 5' 8.5" (1.74 m)   Wt 82.3 kg (181 lb 7 oz)   SpO2 (!) 94%   BMI 27.19 kg/m²     "

## 2018-10-09 NOTE — DISCHARGE INSTRUCTIONS
General Information:    1. Do not drink alcoholic beverages including beer for 24 hours or as long as you are on pain medication..  2. Do not drive a motor vehicle, operate machinery or power tools, or signs legal papers for 24 hours or as long as you are on pain medication.   3. You may experience light-headedness, dizziness, and sleepiness following surgery. Please do not stay alone. A responsible adult should be with you for this 24 hour period.  4. Go home and rest.  5. Progress slowly to a normal diet unless instructed.  Otherwise, begin with liquids such as soft drinks, then soup and crackers working up to solid foods. Drink plenty of nonalcoholic fluids.  6. Certain anesthetics and pain medications produce nausea and vomiting in certain individuals. If nausea becomes a problem at home, call you doctor.  7. A nurse will be calling you sometime after surgery. Do not be alarmed. This is our way of finding out how you are doing.  8. Several times every hour while you are awake, take 2-3 deep breaths and cough. If you had stomach surgery hold a pillow or rolled towel firmly against your stomach before you cough. This will help with any pain the cough might cause.  9. Several times every hour while you are awake, pump and flex your feet 5-6 times and do foot circles. This will help prevent blood clots.  10. Call your doctor for severe pain, bleeding, fever, or signs or symptoms of infection (pain, swelling, redness, foul odor, drainage).  11. You can contact your doctor anytime by [phone] for the [facility] (at [facility]) or [phone] for the [facility] on [facility].   [email or web address] is another way to contact your doctor if you are an active participant online with [facility].  12. Continue Nozin provided at discharge twice daily for 7 days or until the incision is healed.  See pamphlet or box for instructions.

## 2018-10-09 NOTE — INTERVAL H&P NOTE
The patient has been examined and the H&P has been reviewed:    I concur with the findings and no changes have occurred since H&P was written.    Anesthesia/Surgery risks, benefits and alternative options discussed and understood by patient/family.          Active Hospital Problems    Diagnosis  POA    Lung cancer [C34.90]  Yes      Resolved Hospital Problems   No resolved problems to display.

## 2018-10-09 NOTE — PLAN OF CARE
Ambulated slowly to preop with home o2 at 3l nc. Pt states he is ordered 4l prn and uses 2-3l at all times.

## 2018-10-09 NOTE — ANESTHESIA PREPROCEDURE EVALUATION
10/09/2018  Nico Garza Jr. is a 72 y.o., male.    Anesthesia Evaluation    I have reviewed the Patient Summary Reports.    I have reviewed the Nursing Notes.   I have reviewed the Medications.   Steroids Taken In Past Year: Prednisone    Review of Systems  Anesthesia Hx:  No problems with previous Anesthesia Denies Hx of Anesthetic complications  History of prior surgery of interest to airway management or planning: Previous anesthesia: General Denies Family Hx of Anesthesia complications.   Denies Personal Hx of Anesthesia complications.   Social:  Former Smoker    Hematology/Oncology:        Oncology Comments: Metastatic lung cancer, 4L O2 at home   Cardiovascular:   Exercise tolerance: poor Hypertension Dysrhythmias (fib/ flutter) atrial fibrillation Chronic anti coagulation with eliquis- off 5 days Hypertension, Essential Hypertension  Disorder of Cardiac Rhythm, Atrial Fibrillation, Atrial Flutter, S/P electrical cardioversion, S/P surgical ablation    Pulmonary:   COPD, moderate Lung cancer Pulmonary Symptoms:  are currently symptomatic, shortness of breath with activity and pleuritic chest pain.  Chronic Obstructive Pulmonary Disease (COPD):  is secondary to smoking.  Chest Tumor/Mass:  malignant.    Neurological:   Neuromuscular Disease,    Endocrine:   Diabetes, type 2, using insulin Recent steroids       Physical Exam  General:  Well nourished, Obesity    Airway/Jaw/Neck:  Airway Findings: Mouth Opening: Normal Tongue: Normal  General Airway Assessment: Adult  Mallampati: II  TM Distance: Normal, at least 6 cm  Jaw/Neck Findings:     Neck ROM: Normal ROM      Dental:  Dental Findings:    Chest/Lungs:  Chest/Lungs Findings: Clear to auscultation, Normal Respiratory Rate     Heart/Vascular:  Heart Findings: Rate: Normal  Rhythm: Regular Rhythm  Sounds: Normal        Mental Status:  Mental Status  Findings:  Cooperative, Alert and Oriented         Anesthesia Plan  Type of Anesthesia, risks & benefits discussed:  Anesthesia Type:  MAC  Patient's Preference:   Intra-op Monitoring Plan: standard ASA monitors  Intra-op Monitoring Plan Comments:   Post Op Pain Control Plan: multimodal analgesia  Post Op Pain Control Plan Comments:   Induction:   IV  Beta Blocker:  Patient is not currently on a Beta-Blocker (No further documentation required).       Informed Consent:  Anesthesia consent signed with patient.  ASA Score: 4     Day of Surgery Review of History & Physical: I have interviewed and examined the patient. I have reviewed the patient's H&P dated: 10/3/18. There are no significant changes.  H&P update referred to the surgeon.         Ready For Surgery From Anesthesia Perspective.

## 2018-10-09 NOTE — BRIEF OP NOTE
[facility]  Brief Operative Note     SUMMARY     Surgery Date: 10/9/2018     Surgeon(s) and Role:     * Christophe Brandt MD - Primary    Assisting Surgeon: None    Pre-op Diagnosis:  Metastatic lung cancer (metastasis from lung to other site), left [C34.92]    Post-op Diagnosis:  Post-Op Diagnosis Codes:     * Metastatic lung cancer (metastasis from lung to other site), left [C34.92]    Procedure(s) (LRB):  [id number] (Right)    Anesthesia: General    Description of the findings of the procedure:  Port-A-Cath placement    Findings/Key Components:  See op note    Estimated Blood Loss: * No values recorded between 10/9/2018  2:55 PM and 10/9/2018  3:37 PM *         Specimens:   Specimen (12h ago, onward)    None          Discharge Note    SUMMARY     Admit Date: 10/9/2018    Discharge Date and Time:  10/09/2018 3:38 PM    Hospital Course patient underwent Port-A-Cath placement was discharged postoperatively    Final Diagnosis: Post-Op Diagnosis Codes:     * Metastatic lung cancer (metastasis from lung to other site), left [C34.92]    Disposition: Home or Self Care    Follow Up/Patient Instructions:     Medications:  Reconciled Home Medications:      Medication List      CHANGE how you take these medications    gabapentin 300 MG capsule  Commonly known as:  NEURONTIN  Take 2 capsules (600 mg total) by mouth 2 (two) times daily.  What changed:    · when to take this  · additional instructions     * HYDROcodone-acetaminophen 5-325 mg per tablet  Commonly known as:  NORCO  Take 1 tablet by mouth every 6 (six) hours as needed for Pain.  What changed:  Another medication with the same name was added. Make sure you understand how and when to take each.     * HYDROcodone-acetaminophen 5-325 mg per tablet  Commonly known as:  NORCO  Take 1 tablet by mouth every 6 (six) hours as needed for Pain.  What changed:  You were already taking a medication with the same name, and this prescription was added.  Make sure you understand how and when to take each.     insulin lispro 100 unit/mL Inpn pen  Commonly known as:  HumaLOG KwikPen Insulin  ADMINISTER 17 UNITS UNDER THE SKIN THREE TIMES DAILY BEFORE MEALS  What changed:  additional instructions     pravastatin 40 MG tablet  Commonly known as:  PRAVACHOL  TAKE 1 TABLET DAILY  What changed:  See the new instructions.         * This list has 2 medication(s) that are the same as other medications prescribed for you. Read the directions carefully, and ask your doctor or other care provider to review them with you.            CONTINUE taking these medications    albuterol-ipratropium 2.5 mg-0.5 mg/3 mL nebulizer solution  Commonly known as:  DUO-NEB  Take 3 mLs by nebulization every 6 (six) hours. Rescue     b complex vitamins tablet  Take 1 tablet by mouth once daily.     blood sugar diagnostic Strp  1 each by Misc.(Non-Drug; Combo Route) route 3 (three) times daily. Dispense preferred on insurance     blood-glucose meter kit  Use as instructed - preferred on insurance     clonazePAM 0.5 MG tablet  Commonly known as:  KLONOPIN  Take 1 tablet (0.5 mg total) by mouth 2 (two) times daily as needed for Anxiety.     dexamethasone 4 MG Tab  Commonly known as:  DECADRON  20 mg (5 tablets) p.o. 12 and 6 hours prior to chemotherapy     diltiaZEM 360 MG 24 hr capsule  Commonly known as:  CARDIZEM CD  TAKE 1 CAPSULE(360 MG) BY MOUTH EVERY DAY     DISPOSABLE NEEDLES MISC  Inject 1 each into the skin 3 (three) times daily.     ELIQUIS 5 mg Tab  Generic drug:  apixaban  TAKE 1 TABLET(5 MG) BY MOUTH TWICE DAILY     flecainide 100 MG Tab  Commonly known as:  TAMBOCOR  Take 1 tablet (100 mg total) by mouth every 12 (twelve) hours.     insulin degludec 200 unit/mL (3 mL) Inpn  Commonly known as:  TRESIBA FLEXTOUCH U-200  Inject 50 Units into the skin once daily.     multivitamin capsule  Take 1 capsule by mouth once daily.     omeprazole 20 MG capsule  Commonly known as:  PRILOSEC  Take 1  "capsule (20 mg total) by mouth once daily.     ondansetron 8 MG Tbdl  Commonly known as:  ZOFRAN-ODT  Take 1 tablet (8 mg total) by mouth every 8 (eight) hours as needed.     pen needle, diabetic 32 gauge x 1/4" Ndle  Insulin injections four times per day.     predniSONE 10 MG tablet  Commonly known as:  DELTASONE  Take 1 tablet (10 mg total) by mouth once daily.     prochlorperazine 10 MG tablet  Commonly known as:  COMPAZINE  TAKE 1 TABLET BY MOUTH EVERY 6 HOURS AS NEEDED.     quinapril 40 MG tablet  Commonly known as:  ACCUPRIL  TAKE 1 TABLET BY MOUTH ONCE DAILY     sertraline 50 MG tablet  Commonly known as:  ZOLOFT  Take 1 tablet (50 mg total) by mouth once daily.     sulfamethoxazole-trimethoprim 800-160mg 800-160 mg Tab  Commonly known as:  BACTRIM DS  Take 1 tablet by mouth 2 (two) times daily. for 7 days     tiotropium bromide 2.5 mcg/actuation Mist  Commonly known as:  SPIRIVA RESPIMAT  Inhale 2 puffs into the lungs once daily. Controller     traMADol 50 mg tablet  Commonly known as:  ULTRAM  TAKE 2 TABLETS BY MOUTH ONCE DAILY AS NEEDED FOR PAIN     vitamin D 1000 units Tab  Commonly known as:  VITAMIN D3  Take 1,000 Units by mouth once daily.          Discharge Procedure Orders   Diet general     Call MD for:  temperature >100.4     Call MD for:  persistent nausea and vomiting     Call MD for:  severe uncontrolled pain     Call MD for:  difficulty breathing, headache or visual disturbances     Call MD for:  redness, tenderness, or signs of infection (pain, swelling, redness, odor or green/yellow discharge around incision site)     Call MD for:  hives     Call MD for:  persistent dizziness or light-headedness     Call MD for:  extreme fatigue     Remove dressing in 48 hours     Follow-up Information     Christophe Brandt MD.    Specialties:  General Surgery, Bariatrics  Why:  As needed  Contact information:  [address] [place] [address] DR Breezy MONTANEZ [address] [phone]                 "

## 2018-10-09 NOTE — OP NOTE
[facility]  Surgery Department  Operative Note    SUMMARY     Date of Procedure: 10/9/2018     Procedure: Procedure(s) (LRB):  [id number] (Right)   Ultrasound guidance venous access    Surgeon(s) and Role:     * Christophe Brandt MD - Primary    Assisting Surgeon: None    Pre-Operative Diagnosis: Metastatic lung cancer (metastasis from lung to other site), left [C34.92]    Post-Operative Diagnosis: Post-Op Diagnosis Codes:     * Metastatic lung cancer (metastasis from lung to other site), left [C34.92]    Anesthesia: General    Technical Procedures Used:  Port-A-Cath placement    Description of the Findings of the Procedure:  Right internal jugular port, ultrasound guidance venous access      Complications: No    Estimated Blood Loss (EBL): 5cc           Implants:   Implant Name Type Inv. Item Serial No.  Lot No. LRB No. Used   PORT POWER CLEAR VIEW - [id number]  PORT POWER CLEAR VIEW  [employer] [id number] Right 1       Specimens:   Specimen (12h ago, onward)    None                  Condition: Good    Disposition: PACU - hemodynamically stable.    Procedure in detail:  The patient was identified in Preoperative Holding,   brought back to the Operating Room, and placed supine on the operating table   and padded appropriately. Monitors were applied. Monitored anesthesia care   was initiated. The right chest was prepped and draped in the standard sterile   surgical fashion. A timeout was performed and all team members present agreed   this was the correct procedure on the correct patient. We also confirmed   administration of appropriate preoperative antibiotics.     After administration of appropriate local anesthesia, we attempted right subclavian vein access however on unable to cannulate the vein. The ultrasound was then used to access the right  internal jugular vein was cannulated with a hollow-bore needle.  This was visualized with ultrasound while placing.  A guidewire was  placed and confirmed to be in correct position by fluoroscopy. An appropriate area for the pocket was chosen, and the incision was anesthetized with lidocaine. A 4 cm transverse skin incision was made. Subcutaneous tissue was divided with Bovie electrocautery.  The   catheter was measured for length appropriately and cut. Additional local was   administered for the pocket for the port and then the port pocket was created   with a mixture of Bovie electrocautery and blunt dissection. The port was tunneled from the incision to the cannulation site with the tunneling device. The port was secured to the investing pectoral fascia with two 2-0 Vicryl sutures. Under fluoroscopic guidance, the split sheath and dilator were placed over the guidewire, and the guidewire and dilator were then removed. The catheter was placed down the split sheath and the sheath was split and peeled away. Fluoroscopy confirmed appropriate position of the catheter, which aspirated and flushed easily. Heparinized saline was instilled in the catheter. The skin incision was closed in layers with deep buried interrupted 3-0 Vicryl and  running subcuticular 4-0 Monocryl. The cannulation incision was closed with a buried interrupted 4-0 Monocryl stitch. A sterile dressing was applied. The patient was transported to the Recovery Room in stable condition. All sponge, instrument and needle counts were correct at the end of the procedure. I was present and scrubbed for the entire case.

## 2018-10-10 ENCOUNTER — TELEPHONE (OUTPATIENT)
Dept: INTERNAL MEDICINE | Facility: CLINIC | Age: 73
End: 2018-10-10

## 2018-10-10 ENCOUNTER — TELEPHONE (OUTPATIENT)
Dept: FAMILY MEDICINE | Facility: CLINIC | Age: 73
End: 2018-10-10

## 2018-10-10 NOTE — TELEPHONE ENCOUNTER
----- Message from Magali Muller sent at 10/10/2018 11:38 AM CDT -----  Contact: Belia - daughter  Request a call concerning [facility] paperwork, can be reached at [phone]///thxMW

## 2018-10-11 NOTE — PROGRESS NOTES
[facility]    RADIATION ONCOLOGY CONSULTATION    Name: Nico Garza Jr.  : 1945      Patient Referred To Radiation Oncology By:  Dr. 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 is a pleasant 72 y.o. male who has an extensive cardiopulmonary history who had a CT angiography 18 showing extensive mediastinal adenopathy, a tiny left adrenal nodule and a lytic lesion in L1 vertebral body.  There was mass effect on the left superior pulmonary vein with partial encasement.  There was elevation of the left hemidiaphragm with left lung base compressive atelectasis and significant emphysema in the lungs.  18 CT abdomen and pelvis showed a 12 mm left adrenal nodule, suggestion of wall irregularity in the right bladder, sclerotic/lytic L1 lesion.  18 brain MRI was negative for metastatic disease.  18 PET scan showed a 7 mm right upper lobe nodule at 2.3 SUV, multiple FDG avid mediastinal lymph nodes, left-sided pleural effusion, 5 SUV FDG avid lesion in the right dome of the liver and unremarkable adrenals.  There were numerous osseous metastatic lesions in the thoracic and lumbar spine and C7 as well as the pelvis, left femoral neck and femoral shaft, manubrium, left scapula, sternum and multiple ribs.  18 left lower lobe biopsy had no neoplasia.  Bronchial washing, brushing and an FNA of a carinal lymph node were also negative for malignant cells.  Biopsy of L1 vertebral body 18 returned metastatic poorly differentiated squamous cell carcinoma.  He had a port placed with plans on weekly Carboplatin and Taxol while PD L1 status is pending.    He has pain in the breast, left lateral flank and low back.  He is taking Norco and also has a  prescription for tramadol.  These provide some relief.  He feels like his left leg is weak for a couple of months.  He is on oxygen continuously.    REVIEW OF SYSTEMS: (Positive findings bold, otherwise negative)   Constitutional: fever, fatigue, weight loss 20lbs in 3 months  Eyes: blurred vision in the past 3 months, double vision   ENT: ear pain, new mouth lesions, jaw pain, difficulty swallowing, sore throat  Cardiovascular: chest pain on exertion, reflux, extremity swelling  Respiratory: shortness of breath, dyspnea, cough, hemoptysis.   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    DM (diabetes mellitus) 1996    Hyperlipidemia     Hypertension     Lung disease     Neuropathy, diabetic     Pancreatitis     Type 1 diabetes mellitus with diabetic neuropathy 4/27/2018       PAST SURGICAL HISTORY:  Past Surgical History:   Procedure Laterality Date    ABLATION N/A 12/4/2017    [facility] CATH LAB    BICEPS TENDON REPAIR Right     CARDIOVERSION      CHOLECYSTECTOMY      INSERTION OF TUNNELED CENTRAL VENOUS CATHETER (CVC) WITH SUBCUTANEOUS PORT Right 10/9/2018    Procedure: [id number]-CATH;  Surgeon: Christophe Brandt MD;  Location: [facility] OR;  Service: General;  Laterality: Right;    [id number]-E-CATH Right 10/9/2018    [facility] at [facility]  OR    PATELLA SURGERY Right     RADIOFREQUENCY ABLATION      ROTATOR CUFF REPAIR Left     TRANSESOPHAGEAL ECHOCARDIOGRAM (DILLAN) N/A 12/4/2017    [facility] CATH LAB    ULTRASOUND GUIDANCE Right 10/9/2018    Procedure: ULTRASOUND GUIDANCE;  Surgeon: Christophe Brandt MD;  Location: [facility] OR;  Service: General;  Laterality: Right;    ULTRASOUND GUIDANCE Right 10/9/2018    Performed by [facility]t [facility] at [facility] OR       ALLERGIES:   Review of patient's allergies indicates:   Allergen Reactions    Anoro ellipta [umeclidinium-vilanterol] Shortness Of Breath    Flomax [tamsulosin]      Dizzy         MEDICATIONS:    Current Outpatient Medications:     albuterol-ipratropium (DUO-NEB) 2.5 mg-0.5 mg/3 mL nebulizer solution, Take 3 mLs by nebulization every 6 (six) hours. Rescue, Disp: 360 mL, Rfl: 1    b complex vitamins tablet, Take 1 tablet by mouth once daily., Disp: , Rfl:     blood sugar diagnostic Strp, 1 each by Misc.(Non-Drug; Combo Route) route 3 (three) times daily. Dispense preferred on insurance, Disp: 100 strip, Rfl: 11    blood-glucose meter kit, Use as instructed - preferred on insurance, Disp: 1 each, Rfl: 0    dexamethasone (DECADRON) 4 MG Tab, 20 mg (5 tablets) p.o. 12 and 6 hours prior to chemotherapy, Disp: 20 tablet, Rfl: 5    diltiaZEM (CARDIZEM CD) 360 MG 24 hr capsule, TAKE 1 CAPSULE(360 MG) BY MOUTH EVERY DAY, Disp: 90 capsule, Rfl: 0    ELIQUIS 5 mg Tab, TAKE 1 TABLET(5 MG) BY MOUTH TWICE DAILY, Disp: 60 tablet, Rfl: 6    flecainide (TAMBOCOR) 100 MG Tab, Take 1 tablet (100 mg total) by mouth every 12 (twelve) hours., Disp: 60 tablet, Rfl: 11    gabapentin (NEURONTIN) 300 MG capsule, Take 2 capsules (600 mg total) by mouth 2 (two) times daily. (Patient taking differently: Take 600 mg by mouth every evening. Takes two tablets by mouth at night), Disp: 360 capsule, Rfl: 1    HYDROcodone-acetaminophen (NORCO) 5-325 mg per tablet, Take 1 tablet by mouth every 6  "(six) hours as needed for Pain., Disp: 10 tablet, Rfl: 0    insulin degludec (TRESIBA FLEXTOUCH U-200) 200 unit/mL (3 mL) InPn, Inject 50 Units into the skin once daily., Disp: 9 mL, Rfl: 3    insulin lispro (HUMALOG KWIKPEN) 100 unit/mL InPn pen, ADMINISTER 17 UNITS UNDER THE SKIN THREE TIMES DAILY BEFORE MEALS (Patient taking differently: ADMINISTER 17 UNITS UNDER THE SKIN IN MORNING AND 25 UNITS NOON AND NIGHT), Disp: 45 mL, Rfl: 0    insulin needles, disposable, 32 x 1/4 " Ndle, Insulin injections four times per day., Disp: 400 each, Rfl: 3    multivitamin capsule, Take 1 capsule by mouth once daily., Disp: , Rfl:     NEEDLES, DISPOSABLE (DISPOSABLE NEEDLES MISC), Inject 1 each into the skin 3 (three) times daily., Disp: , Rfl:     omeprazole (PRILOSEC) 20 MG capsule, Take 1 capsule (20 mg total) by mouth once daily., Disp: 90 capsule, Rfl: 1    ondansetron (ZOFRAN-ODT) 8 MG TbDL, Take 1 tablet (8 mg total) by mouth every 8 (eight) hours as needed., Disp: 30 tablet, Rfl: 5    pravastatin (PRAVACHOL) 40 MG tablet, TAKE 1 TABLET DAILY (Patient taking differently: TAKE 1 TABLET NIGHTLY), Disp: 90 tablet, Rfl: 3    predniSONE (DELTASONE) 10 MG tablet, Take 1 tablet (10 mg total) by mouth once daily., Disp: 30 tablet, Rfl: 3    prochlorperazine (COMPAZINE) 10 MG tablet, TAKE 1 TABLET BY MOUTH EVERY 6 HOURS AS NEEDED., Disp: 385 tablet, Rfl: 5    quinapril (ACCUPRIL) 40 MG tablet, TAKE 1 TABLET BY MOUTH ONCE DAILY, Disp: 90 tablet, Rfl: 0    sertraline (ZOLOFT) 50 MG tablet, Take 1 tablet (50 mg total) by mouth once daily., Disp: 30 tablet, Rfl: 1    sulfamethoxazole-trimethoprim 800-160mg (BACTRIM DS) 800-160 mg Tab, Take 1 tablet by mouth 2 (two) times daily. for 7 days, Disp: 14 tablet, Rfl: 0    tiotropium bromide (SPIRIVA RESPIMAT) 2.5 mcg/actuation Mist, Inhale 2 puffs into the lungs once daily. Controller, Disp: 4 g, Rfl: 11    traMADol (ULTRAM) 50 mg tablet, TAKE 2 TABLETS BY MOUTH ONCE DAILY AS " NEEDED FOR PAIN, Disp: 60 tablet, Rfl: 3    vitamin D 1000 units Tab, Take 1,000 Units by mouth once daily., Disp: , Rfl:     clonazePAM (KLONOPIN) 0.5 MG tablet, Take 1 tablet (0.5 mg total) by mouth 2 (two) times daily as needed for Anxiety., Disp: 45 tablet, Rfl: 1    SOCIAL HISTORY:  Social History     Socioeconomic History    Marital status:      Spouse name: Not on file    Number of children: 2    Years of education: Not on file    Highest education level: Not on file   Social Needs    Financial resource strain: Not on file    Food insecurity - worry: Not on file    Food insecurity - inability: Not on file    Transportation needs - medical: Not on file    Transportation needs - non-medical: Not on file   Occupational History    Not on file   Tobacco Use    Smoking status: Former Smoker     Packs/day: 0.50     Types: Cigarettes     Start date: 1/1/1960     Last attempt to quit: 3/8/2015     Years since quitting: 3.6    Smokeless tobacco: Former User     Types: Snuff     Quit date: 10/7/1995   Substance and Sexual Activity    Alcohol use: No     Alcohol/week: 0.0 oz    Drug use: No    Sexual activity: Not on file   Other Topics Concern    Not on file   Social History Narrative    Fire protection installation - retired       FAMILY HISTORY:  Family History   Problem Relation Age of Onset    Heart failure Brother     Heart attack Brother     Diabetes Mother     Hypertension Mother     Stroke Mother     Hypertension Father     Stroke Father     Diabetes Sister     Cataracts Sister     Cancer Maternal Aunt     Diabetes Sister     Diabetes Sister     Stroke Paternal Grandmother     Stroke Paternal Grandfather            PHYSICAL EXAMINATION:  Constitutional: well appearing, no acute distress, ECOG 2 - Ambulates, capable of self care only  Vitals:    BP (!) 91/53   Pulse 83   Temp 97.8 °F (36.6 °C)   Resp 20   Wt 82 kg (180 lb 12.4 oz)   SpO2 (!) 91% Comment: 3 L o2  BMI  27.09 kg/m²   Eyes: sclera anicteric, EOMI, pupils equal, round and reactive to light  Neck: trachea midline, neck supple  Lymphatic: no cervical, supraclavicular or axillary adenopathy  Cardiovascular: regular rate, no edema of the upper or lower extremities, radial pulse 2+  Respiratory: unlabored effort, clear to auscultation, no wheezes, oxygen by nasal cannula, decreased breath sounds bilaterally  Abdomen: soft, non-tender, no rigidity, no masses, no hepatomegaly  Neuro: strength 5/5 lower extremities  Spine: non-tender to percussion cervical, thoracic spine.  Mild tenderness upper lumbar spine.  No palpable masses  Musculoskeletal:  Mild tenderness in the sternum inferiorly to the right and left of midline. Mild tenderness left flank below the ribs without palpable mass.    IMAGING AND LABORATORY FINDINGS: As per HPI; images reviewed personally.    ASSESSMENT:  Large lytic FDG avid L1 lesion    PLAN:  I offered him palliative radiation 30 Gy in 10 fractions to the L1 vertebral body to reduce pain and decreased fracture risk.  He wishes to proceed.  Radiation planning will be performed next week and we will begin next week.  He will start chemotherapy soon.  He also has some mild sternal pain and some small FDG avid areas on his PET scan however the pain is mild, so I will not treat these areas unless they do not respond to systemic therapy.  The left lateral flank pain does not have correlate on PET scan, but it could be from radicular pain due to the spinal disease possibly at L1.    We discussed the techniques, toxicities and indications of radiation and I answered the patient's questions to their apparent satisfaction.    ANN MARIE Culp M.D.  Radiation Oncology  [facility] [address] [place] [address]  Ph: [phone]  [email or web address]

## 2018-10-12 ENCOUNTER — INITIAL CONSULT (OUTPATIENT)
Dept: RADIATION ONCOLOGY | Facility: CLINIC | Age: 73
End: 2018-10-12
Payer: MEDICARE

## 2018-10-12 VITALS
DIASTOLIC BLOOD PRESSURE: 53 MMHG | TEMPERATURE: 98 F | BODY MASS INDEX: 27.09 KG/M2 | HEART RATE: 83 BPM | OXYGEN SATURATION: 91 % | SYSTOLIC BLOOD PRESSURE: 91 MMHG | RESPIRATION RATE: 20 BRPM | WEIGHT: 180.75 LBS

## 2018-10-12 DIAGNOSIS — C34.92 METASTATIC LUNG CANCER (METASTASIS FROM LUNG TO OTHER SITE), LEFT: Primary | ICD-10-CM

## 2018-10-12 DIAGNOSIS — C79.51 BONE METASTASES: ICD-10-CM

## 2018-10-12 DIAGNOSIS — C34.90 MALIGNANT NEOPLASM OF LUNG, UNSPECIFIED LATERALITY, UNSPECIFIED PART OF LUNG: Primary | ICD-10-CM

## 2018-10-12 DIAGNOSIS — F43.23 SITUATIONAL MIXED ANXIETY AND DEPRESSIVE DISORDER: ICD-10-CM

## 2018-10-12 PROCEDURE — 99205 OFFICE O/P NEW HI 60 MIN: CPT | Mod: S$PBB,,, | Performed by: RADIOLOGY

## 2018-10-12 PROCEDURE — 99215 OFFICE O/P EST HI 40 MIN: CPT | Mod: PBBFAC | Performed by: RADIOLOGY

## 2018-10-12 PROCEDURE — 99999 PR PBB SHADOW E&M-EST. PATIENT-LVL V: CPT | Mod: PBBFAC,,, | Performed by: RADIOLOGY

## 2018-10-12 NOTE — LETTER
October 12, 2018      Issa Lagos Jr., MD  [address]  [facility] [address]           [facility] - Radiation Oncology  [address] [place] [address]  Phone: [phone]  Fax: [phone]          Patient: Nico Garza Jr.   MR Number: [id number]   YOB: 1945   Date of Visit: 10/12/2018       Dear Dr. Issa Lagos Jr.:    Thank you for referring Nico Garza to me for evaluation. Attached you will find relevant portions of my assessment and plan of care.    If you have questions, please do not hesitate to call me. I look forward to following Nico Garza along with you.    Sincerely,    Loi Culp II, MD    Enclosure  CC:  No Recipients    If you would like to receive this communication electronically, please contact [email or web address] or [phone] to request more information on [employer] access.    For providers and/or their staff who would like to refer a patient to [facility], please contact us through our one-stop-shop provider referral line, [facility], at [phone].    If you feel you have received this communication in error or would no longer like to receive these types of communications, please e-mail [email or web address]

## 2018-10-15 RX ORDER — CLONAZEPAM 0.5 MG/1
0.5 TABLET ORAL 2 TIMES DAILY PRN
Qty: 45 TABLET | Refills: 1 | OUTPATIENT
Start: 2018-10-15 | End: 2019-10-15

## 2018-10-16 ENCOUNTER — TELEPHONE (OUTPATIENT)
Dept: HEMATOLOGY/ONCOLOGY | Facility: CLINIC | Age: 73
End: 2018-10-16

## 2018-10-16 PROCEDURE — 77263 THER RADIOLOGY TX PLNG CPLX: CPT | Mod: ,,, | Performed by: RADIOLOGY

## 2018-10-16 PROCEDURE — 77014 HC CT GUIDANCE RADIATION THERAPY FLDS PLACEMENT: CPT | Mod: TC | Performed by: RADIOLOGY

## 2018-10-16 PROCEDURE — 77290 THER RAD SIMULAJ FIELD CPLX: CPT | Mod: TC | Performed by: RADIOLOGY

## 2018-10-16 PROCEDURE — 77290 THER RAD SIMULAJ FIELD CPLX: CPT | Mod: 26,,, | Performed by: RADIOLOGY

## 2018-10-16 PROCEDURE — 77334 RADIATION TREATMENT AID(S): CPT | Mod: 26,,, | Performed by: RADIOLOGY

## 2018-10-16 PROCEDURE — 77334 RADIATION TREATMENT AID(S): CPT | Mod: TC | Performed by: RADIOLOGY

## 2018-10-16 RX ORDER — HYDROCODONE BITARTRATE AND ACETAMINOPHEN 5; 325 MG/1; MG/1
1 TABLET ORAL EVERY 6 HOURS PRN
Qty: 60 TABLET | Refills: 0 | Status: SHIPPED | OUTPATIENT
Start: 2018-10-16 | End: 2018-10-23

## 2018-10-16 NOTE — TELEPHONE ENCOUNTER
----- Message from Olivia Scott sent at 10/16/2018  8:48 AM CDT -----  Contact: Belia pt's daughter  She's calling to get a refill...    1. What is the name of the medication you are requesting? Norco  2. What is the dose? 5 mg  3. How do you take the medication? Orally, topically, etc? orally  4. How often do you take this medication? One every 6 hrs  5. Do you need a 30 day or 90 day supply? 30  6. How many refills are you requesting? 1  7. What is your preferred pharmacy and location of the pharmacy? [facility] on [address] in [place]  8. Who can we contact with further questions? [phone]

## 2018-10-17 VITALS
HEIGHT: 69 IN | WEIGHT: 181.44 LBS | HEART RATE: 84 BPM | OXYGEN SATURATION: 100 % | TEMPERATURE: 98 F | BODY MASS INDEX: 26.87 KG/M2 | SYSTOLIC BLOOD PRESSURE: 117 MMHG | RESPIRATION RATE: 19 BRPM | DIASTOLIC BLOOD PRESSURE: 62 MMHG

## 2018-10-17 PROCEDURE — 77334 RADIATION TREATMENT AID(S): CPT | Mod: 26,,, | Performed by: RADIOLOGY

## 2018-10-17 PROCEDURE — 77300 RADIATION THERAPY DOSE PLAN: CPT | Mod: 26,,, | Performed by: RADIOLOGY

## 2018-10-17 PROCEDURE — 77295 3-D RADIOTHERAPY PLAN: CPT | Mod: 26,,, | Performed by: RADIOLOGY

## 2018-10-17 PROCEDURE — 77300 RADIATION THERAPY DOSE PLAN: CPT | Mod: TC | Performed by: RADIOLOGY

## 2018-10-17 PROCEDURE — 77334 RADIATION TREATMENT AID(S): CPT | Mod: TC | Performed by: RADIOLOGY

## 2018-10-17 PROCEDURE — 77295 3-D RADIOTHERAPY PLAN: CPT | Mod: TC | Performed by: RADIOLOGY

## 2018-10-18 ENCOUNTER — OFFICE VISIT (OUTPATIENT)
Dept: HEMATOLOGY/ONCOLOGY | Facility: CLINIC | Age: 73
End: 2018-10-18
Payer: MEDICARE

## 2018-10-18 ENCOUNTER — INFUSION (OUTPATIENT)
Dept: INFUSION THERAPY | Facility: HOSPITAL | Age: 73
End: 2018-10-18
Attending: INTERNAL MEDICINE
Payer: MEDICARE

## 2018-10-18 ENCOUNTER — SOCIAL WORK (OUTPATIENT)
Dept: HEMATOLOGY/ONCOLOGY | Facility: CLINIC | Age: 73
End: 2018-10-18

## 2018-10-18 VITALS
WEIGHT: 178.56 LBS | DIASTOLIC BLOOD PRESSURE: 64 MMHG | HEIGHT: 67 IN | SYSTOLIC BLOOD PRESSURE: 115 MMHG | OXYGEN SATURATION: 94 % | HEART RATE: 102 BPM | TEMPERATURE: 98 F | BODY MASS INDEX: 28.02 KG/M2 | RESPIRATION RATE: 18 BRPM

## 2018-10-18 VITALS — DIASTOLIC BLOOD PRESSURE: 68 MMHG | SYSTOLIC BLOOD PRESSURE: 108 MMHG | HEART RATE: 87 BPM

## 2018-10-18 DIAGNOSIS — C34.92 METASTATIC LUNG CANCER (METASTASIS FROM LUNG TO OTHER SITE), LEFT: Primary | ICD-10-CM

## 2018-10-18 DIAGNOSIS — R93.89 ABNORMAL FINDINGS ON DIAGNOSTIC IMAGING OF OTHER SPECIFIED BODY STRUCTURES: ICD-10-CM

## 2018-10-18 LAB — GLUCOSE SERPL-MCNC: 217 MG/DL (ref 70–110)

## 2018-10-18 PROCEDURE — 96413 CHEMO IV INFUSION 1 HR: CPT

## 2018-10-18 PROCEDURE — 77387 GUIDANCE FOR RADJ TX DLVR: CPT | Mod: TC | Performed by: RADIOLOGY

## 2018-10-18 PROCEDURE — 77412 RADIATION TX DELIVERY LVL 3: CPT | Performed by: RADIOLOGY

## 2018-10-18 PROCEDURE — 63600175 PHARM REV CODE 636 W HCPCS: Mod: JG | Performed by: INTERNAL MEDICINE

## 2018-10-18 PROCEDURE — G6002 STEREOSCOPIC X-RAY GUIDANCE: HCPCS | Mod: 26,,, | Performed by: RADIOLOGY

## 2018-10-18 PROCEDURE — 96366 THER/PROPH/DIAG IV INF ADDON: CPT

## 2018-10-18 PROCEDURE — 77417 THER RADIOLOGY PORT IMAGE(S): CPT | Performed by: RADIOLOGY

## 2018-10-18 PROCEDURE — 96361 HYDRATE IV INFUSION ADD-ON: CPT

## 2018-10-18 PROCEDURE — 99999 PR PBB SHADOW E&M-EST. PATIENT-LVL III: CPT | Mod: PBBFAC,,, | Performed by: INTERNAL MEDICINE

## 2018-10-18 PROCEDURE — 25000003 PHARM REV CODE 250: Performed by: INTERNAL MEDICINE

## 2018-10-18 PROCEDURE — 99215 OFFICE O/P EST HI 40 MIN: CPT | Mod: S$PBB,,, | Performed by: INTERNAL MEDICINE

## 2018-10-18 PROCEDURE — 96365 THER/PROPH/DIAG IV INF INIT: CPT

## 2018-10-18 PROCEDURE — 99213 OFFICE O/P EST LOW 20 MIN: CPT | Mod: PBBFAC | Performed by: INTERNAL MEDICINE

## 2018-10-18 RX ORDER — SODIUM CHLORIDE 9 MG/ML
1000 INJECTION, SOLUTION INTRAVENOUS CONTINUOUS
Status: ACTIVE | OUTPATIENT
Start: 2018-10-18 | End: 2018-10-18

## 2018-10-18 RX ORDER — HEPARIN 100 UNIT/ML
500 SYRINGE INTRAVENOUS
Status: DISCONTINUED | OUTPATIENT
Start: 2018-10-18 | End: 2018-10-18 | Stop reason: HOSPADM

## 2018-10-18 RX ORDER — HEPARIN 100 UNIT/ML
500 SYRINGE INTRAVENOUS
Status: CANCELLED | OUTPATIENT
Start: 2018-10-18

## 2018-10-18 RX ORDER — SODIUM CHLORIDE 9 MG/ML
INJECTION, SOLUTION INTRAVENOUS CONTINUOUS
Status: CANCELLED
Start: 2018-10-18

## 2018-10-18 RX ORDER — SODIUM CHLORIDE 0.9 % (FLUSH) 0.9 %
10 SYRINGE (ML) INJECTION
Status: CANCELLED | OUTPATIENT
Start: 2018-10-18

## 2018-10-18 RX ADMIN — HEPARIN 500 UNITS: 100 SYRINGE at 12:10

## 2018-10-18 RX ADMIN — SODIUM CHLORIDE 1000 ML: 0.9 INJECTION, SOLUTION INTRAVENOUS at 10:10

## 2018-10-18 RX ADMIN — SODIUM CHLORIDE 200 MG: 9 INJECTION, SOLUTION INTRAVENOUS at 10:10

## 2018-10-18 NOTE — PROGRESS NOTES
Hematology/Oncology Office Note    Reason for referral:  Lung mass/mediastinal lymphadenopathy    CC:  Lung mass    Referred by:  No ref. 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  He underwent nondiagnostic bronchoscopy/biopsy on 09/02/2018.  Patient reports stable shortness of breath without hemoptysis, chest pain, dizziness, or palpitations.      I have reviewed and updated the HPI, ROS, PMHx, Social Hx, Family Hx and treatment history.    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        HPI    Review of Systems   Constitutional: Positive for activity change, fatigue and unexpected weight change. Negative for appetite change, chills, diaphoresis and fever.   HENT: Negative for  "congestion, dental problem, drooling, ear discharge, ear pain, facial swelling, hearing loss, mouth sores and nosebleeds.    Eyes: Negative for photophobia, pain, discharge, itching and visual disturbance.   Respiratory: Positive for shortness of breath. Negative for apnea, cough, choking and chest tightness.    Cardiovascular: Negative for chest pain, palpitations and leg swelling.   Gastrointestinal: Negative for abdominal distention, abdominal pain, anal bleeding, blood in stool, constipation, diarrhea, nausea and vomiting.   Endocrine: Negative for cold intolerance, heat intolerance, polydipsia, polyphagia and polyuria.   Genitourinary: Negative for difficulty urinating, dysuria, enuresis, flank pain, frequency, genital sores and hematuria.   Musculoskeletal: Positive for back pain and myalgias. Negative for arthralgias, gait problem and joint swelling.        Worsening lumbar pain   Skin: Negative for color change and pallor.   Allergic/Immunologic: Negative for environmental allergies, food allergies and immunocompromised state.   Neurological: Negative for dizziness, tremors, seizures, syncope, facial asymmetry, speech difficulty, light-headedness, numbness and headaches.   Hematological: Negative for adenopathy. Does not bruise/bleed easily.   Psychiatric/Behavioral: Negative for agitation, confusion, decreased concentration, dysphoric mood and hallucinations. The patient is not nervous/anxious and is not hyperactive.        Objective:       Vitals:    10/18/18 0835   BP: 115/64   Pulse: 102   Resp: 18   Temp: 98.3 °F (36.8 °C)   TempSrc: Oral   SpO2: (!) 94%   Weight: 81 kg (178 lb 9.2 oz)   Height: 5' 7" (1.702 m)     Physical Exam   Constitutional: He is oriented to person, place, and time. He appears well-developed and well-nourished. No distress.   HENT:   Head: Normocephalic and atraumatic.   Nose: Nose normal.   Mouth/Throat: Oropharynx is clear and moist. No oropharyngeal exudate.   Eyes: " Conjunctivae and EOM are normal. Pupils are equal, round, and reactive to light. Right eye exhibits no discharge. Left eye exhibits no discharge. No scleral icterus.   Neck: Normal range of motion. Neck supple. No JVD present. No tracheal deviation present. No thyromegaly present.   Cardiovascular: Normal rate and regular rhythm. Exam reveals no gallop and no friction rub.   No murmur heard.  Pulmonary/Chest: Effort normal. No stridor. No respiratory distress. He has decreased breath sounds. He has no wheezes. He has no rhonchi. He has rales.   Abdominal: Soft. Bowel sounds are normal. He exhibits no distension and no mass. There is no tenderness. There is no rebound.   Musculoskeletal: Normal range of motion. He exhibits no edema, tenderness or deformity.   Lymphadenopathy:     He has no cervical adenopathy.   Neurological: He is alert and oriented to person, place, and time. No cranial nerve deficit. Coordination normal.   Skin: Skin is warm, dry and intact. Capillary refill takes less than 2 seconds. No abrasion, no bruising, no ecchymosis and no rash noted. He is not diaphoretic. No cyanosis. No pallor. Nails show no clubbing.   Psychiatric: He has a normal mood and affect. Thought content normal.   Vitals reviewed.        Lab Results   Component Value Date    WBC 8.85 10/18/2018    HGB 12.9 (L) 10/18/2018    HCT 39.9 (L) 10/18/2018    MCV 87 10/18/2018     10/18/2018     Lab Results   Component Value Date    CREATININE 1.4 10/18/2018    BUN 30 (H) 10/18/2018     (L) 10/18/2018    K 5.4 (H) 10/18/2018    CL 94 (L) 10/18/2018    CO2 21 (L) 10/18/2018     Lab Results   Component Value Date    ALT 31 10/18/2018    AST 33 10/18/2018    ALKPHOS 174 (H) 10/18/2018    BILITOT 0.4 10/18/2018         Assessment:       72-year-old gentleman status post recent hospitalization where he was noted to have left lower lung mass with extensive mediastinal lymphadenopathy, left adrenal nodule, an L1 lytic lesion  consistent with metastatic lung cancer.  He underwent bronchoscopy/biopsy which was nondiagnostic on 09/02/2018.  PET scan performed on 09/17/2018 demonstrated extensive FDG avid mediastinal lymphadenopathy, single small subcentimeter pulmonary nodule within the right lung, metastatic lesion within the dome of the liver, and extensive osseous metastatic disease noted throughout the vertebral column.  The patient underwent CT-guided biopsy of lumbar mass which has demonstrated poorly differentiated squamous cell carcinoma.  PD L1 expression is high at 75%, therefore, we will proceed with palliative immunotherapy/Keytruda.  He is also receiving palliative radiation to the lumbar spine which will begin today.  He will follow up in 3 weeks to continue palliative Keytruda.        Stage IV Non-small cell lung cancer/squamous cell carcinoma stage IV with metastasis involving mediastinum, liver, and extensive bone Mets--PDL1 status high  --proceed with palliative immunotherapy/Keytruda  --palliative radiation to the lumbar spine  --will attempt to prednisone taper the as this will affect the efficacy of Keytruda    Hyperglycemia related to diabetes mellitus/steroid therapy:  --1 L of normal saline in clinic today  --5 units of regular insulin  --monitor glucose  --taper prednisone

## 2018-10-18 NOTE — PLAN OF CARE
Problem: Patient Care Overview  Goal: Plan of Care Review  Outcome: Ongoing (interventions implemented as appropriate)  Pt. Said needs to hook up to our oxygen

## 2018-10-18 NOTE — PATIENT INSTRUCTIONS
[facility]  [address]  [address] [address]  [phone] phone     [phone] fax  Hours of Operation: Monday- Friday 8:00am- 5:00pm  After hours phone  [phone]  Hematology / Oncology Physicians on call      Dr. Yonathan Lagos                        Please call with any concerns regarding your appointment today.  WAYS TO HELP PREVENT INFECTION         WASH YOUR HANDS OFTEN DURING THE DAY, ESPECIALLY BEFORE YOU EAT, AFTER USING THE BATHROOM, AND AFTER TOUCHING ANIMALS     STAY AWAY FROM PEOPLE WHO HAVE ILLNESSES YOU CAN CATCH; SUCH AS COLDS, FLU, CHICKEN POX     TRY TO AVOID CROWDS     STAY AWAY FROM CHILDREN WHO RECENTLY HAVE RECEIVED LIVE VIRUS VACCINES     MAINTAIN GOOD MOUTH CARE     DO NOT SQUEEZE OR SCRATCH PIMPLES     CLEAN CUTS & SCRAPES RIGHT AWAY AND DAILY UNTIL HEALED WITH WARM WATER, SOAP & AN ANTISEPTIC     AVOID CONTACT WITH LITTER BOXES, BIRD CAGES, & FISH TANKS     AVOID STANDING WATER, IE., BIRD BATHS, FLOWER POTS/VASES, OR HUMIDIFIERS     WEAR GLOVES WHEN GARDENING OR CLEANING UP AFTER OTHERS, ESPECIALLY BABIES & SMALL CHILDREN    DO NOT EAT RAW FISH, SEAFOOD, MEAT, OR EGGSHOME CARE AFTER CHEMOTHERAPY   Meals   Many patients feel sick and lose their appetites during treatment. Eat small meals several times a day. Choose bland foods with little taste or smell if you have problems with nausea. Be sure to cook all food thoroughly. This kills bacteria and helps you avoid intestinal infection. Soft foods are easier to swallow and digest.   Activity   Exercise keeps you strong and keeps your heart and lungs active. Talk to your doctor about an appropriate exercise program for you.   Skin Care   To prevent a skin infection, bathe or shower once a day. Use a moisturizing soap and wash with warm water. Avoid very hot or cold water. Chemotherapy can make your skin dry . Apply moisturizing lotion to help relieve dry  skin. Some drugs used in high doses can cause slight burns to appear (like sunburn). Ask for a special cream to help relieve the burn and protect your skin.   Prevent Mouth Sores   During chemotherapy, many people get mouth sores. Do the following to help prevent mouth sores or to ease discomfort.   Brush your teeth with a soft-bristle toothbrush after every meal.  Don't use dental floss if your platelet count is below 50,000. Your doctor or nurse will tell you if this is the case.  Use an oral swab or special soft toothbrush if your gums bleed during regular brushing.  Use mouthwash as directed. If you can't tolerate commercial mouthwash, use salt and baking soda to clean your mouth. Mix 1 teaspoon of salt and 1 teaspoon of baking soda into a glass of water. Swish and spit.  Call your doctor or return to this facility if you develop any of the following:   Sore throat   White patches in the mouth or throat   Fever of 100.4ºF (38ºC) or higher, or as directed by your healthcare provider  © 3032-8091 [employer], [address] [place] [address], [place] [address]. All rights reserved. This information is not intended as a substitute for professional medical care. Always follow your healthcare professional's   

## 2018-10-19 ENCOUNTER — DOCUMENTATION ONLY (OUTPATIENT)
Dept: RADIATION ONCOLOGY | Facility: CLINIC | Age: 73
End: 2018-10-19

## 2018-10-19 PROCEDURE — G6002 STEREOSCOPIC X-RAY GUIDANCE: HCPCS | Mod: 26,,, | Performed by: RADIOLOGY

## 2018-10-19 PROCEDURE — 77387 GUIDANCE FOR RADJ TX DLVR: CPT | Mod: TC | Performed by: RADIOLOGY

## 2018-10-19 PROCEDURE — 77412 RADIATION TX DELIVERY LVL 3: CPT | Performed by: RADIOLOGY

## 2018-10-19 NOTE — PLAN OF CARE
Problem: Patient Care Overview  Goal: Plan of Care Review  Outcome: Ongoing (interventions implemented as appropriate)  Day 2 xrt to spine. Short term side effects handout and verbal instructions given. Contact info given. Skin care and side effects reviewed.

## 2018-10-22 ENCOUNTER — PES CALL (OUTPATIENT)
Dept: ADMINISTRATIVE | Facility: CLINIC | Age: 73
End: 2018-10-22

## 2018-10-22 PROCEDURE — 77387 GUIDANCE FOR RADJ TX DLVR: CPT | Mod: TC | Performed by: RADIOLOGY

## 2018-10-22 PROCEDURE — G6002 STEREOSCOPIC X-RAY GUIDANCE: HCPCS | Mod: 26,,, | Performed by: RADIOLOGY

## 2018-10-22 PROCEDURE — 77412 RADIATION TX DELIVERY LVL 3: CPT | Performed by: RADIOLOGY

## 2018-10-22 NOTE — PROGRESS NOTES
LIZZY met with pt, his daughter and his sister [facility]. Pt appeared in good spirits, but he also seemed somewhat anxious. LIZZY introduced herself and explained her role in the dept. LIZZY informed pt and family members about supportive care services available including nurse navigation, SW, telepsych, nutritional seminars, and orientation and support groups. SW to internal as well as external resources such as [employer] (ACS) and Cancer Services [facility] ([facility] Pt declined the ACS referral, but he was interested in a referral to [facility]. LIZZY will fax off referral for pt. LIZZY provided pt with a checklist of potential services and encouraged him to review with family and return to next visit. LIZZY also provided pt with her contact info should he need further assistance. LIZZY will f/u with pt when he RTC to address any other needs that may have arisen since his last visit.

## 2018-10-23 ENCOUNTER — OFFICE VISIT (OUTPATIENT)
Dept: HEMATOLOGY/ONCOLOGY | Facility: CLINIC | Age: 73
DRG: 189 | End: 2018-10-23
Payer: MEDICARE

## 2018-10-23 VITALS
HEART RATE: 100 BPM | OXYGEN SATURATION: 93 % | RESPIRATION RATE: 18 BRPM | HEIGHT: 67 IN | SYSTOLIC BLOOD PRESSURE: 112 MMHG | BODY MASS INDEX: 27.97 KG/M2 | TEMPERATURE: 97 F | DIASTOLIC BLOOD PRESSURE: 60 MMHG

## 2018-10-23 DIAGNOSIS — C34.31 MALIGNANT NEOPLASM OF LOWER LOBE OF RIGHT LUNG: ICD-10-CM

## 2018-10-23 DIAGNOSIS — G89.3 PAIN OF METASTATIC MALIGNANCY: ICD-10-CM

## 2018-10-23 DIAGNOSIS — C79.9 MULTIPLE LESIONS OF METASTATIC MALIGNANCY: ICD-10-CM

## 2018-10-23 DIAGNOSIS — C34.90 LUNG CANCER METASTATIC TO BONE: ICD-10-CM

## 2018-10-23 DIAGNOSIS — C79.51 LUNG CANCER METASTATIC TO BONE: ICD-10-CM

## 2018-10-23 DIAGNOSIS — C78.7 LIVER METASTASES: ICD-10-CM

## 2018-10-23 DIAGNOSIS — R93.89 ABNORMAL FINDINGS ON DIAGNOSTIC IMAGING OF OTHER SPECIFIED BODY STRUCTURES: ICD-10-CM

## 2018-10-23 DIAGNOSIS — C34.92 METASTATIC LUNG CANCER (METASTASIS FROM LUNG TO OTHER SITE), LEFT: Primary | ICD-10-CM

## 2018-10-23 DIAGNOSIS — R91.8 MASS OF LOWER LOBE OF LEFT LUNG: ICD-10-CM

## 2018-10-23 PROBLEM — I25.10 CORONARY ARTERY CALCIFICATION: Status: ACTIVE | Noted: 2018-10-23

## 2018-10-23 PROBLEM — I25.84 CORONARY ARTERY CALCIFICATION: Status: ACTIVE | Noted: 2018-10-23

## 2018-10-23 PROBLEM — I70.0 ATHEROSCLEROSIS OF AORTA: Status: ACTIVE | Noted: 2018-10-23

## 2018-10-23 PROBLEM — F43.23 SITUATIONAL MIXED ANXIETY AND DEPRESSIVE DISORDER: Status: ACTIVE | Noted: 2018-10-23

## 2018-10-23 PROCEDURE — 99215 OFFICE O/P EST HI 40 MIN: CPT | Mod: S$PBB,,, | Performed by: INTERNAL MEDICINE

## 2018-10-23 PROCEDURE — G6002 STEREOSCOPIC X-RAY GUIDANCE: HCPCS | Mod: 26,,, | Performed by: RADIOLOGY

## 2018-10-23 PROCEDURE — 77412 RADIATION TX DELIVERY LVL 3: CPT | Performed by: RADIOLOGY

## 2018-10-23 PROCEDURE — 99999 PR PBB SHADOW E&M-EST. PATIENT-LVL III: CPT | Mod: PBBFAC,,, | Performed by: INTERNAL MEDICINE

## 2018-10-23 PROCEDURE — 99213 OFFICE O/P EST LOW 20 MIN: CPT | Mod: PBBFAC,25 | Performed by: INTERNAL MEDICINE

## 2018-10-23 PROCEDURE — 77387 GUIDANCE FOR RADJ TX DLVR: CPT | Mod: TC | Performed by: RADIOLOGY

## 2018-10-23 RX ORDER — OXYCODONE HYDROCHLORIDE 15 MG/1
15 TABLET ORAL EVERY 4 HOURS PRN
Qty: 90 TABLET | Refills: 0 | Status: ON HOLD | OUTPATIENT
Start: 2018-10-23 | End: 2018-11-18 | Stop reason: HOSPADM

## 2018-10-24 ENCOUNTER — DOCUMENTATION ONLY (OUTPATIENT)
Dept: RADIATION ONCOLOGY | Facility: CLINIC | Age: 73
End: 2018-10-24

## 2018-10-24 PROCEDURE — 77387 GUIDANCE FOR RADJ TX DLVR: CPT | Mod: TC | Performed by: RADIOLOGY

## 2018-10-24 PROCEDURE — G6002 STEREOSCOPIC X-RAY GUIDANCE: HCPCS | Mod: 26,,, | Performed by: RADIOLOGY

## 2018-10-24 PROCEDURE — 77412 RADIATION TX DELIVERY LVL 3: CPT | Performed by: RADIOLOGY

## 2018-10-24 NOTE — PLAN OF CARE
Problem: Patient Care Overview  Goal: Plan of Care Review  Outcome: Ongoing (interventions implemented as appropriate)  Day 5 xrt to spine. C/o LBP, on oxycodone. Started Keytruda. Will continue to monitor.

## 2018-10-25 ENCOUNTER — HOSPITAL ENCOUNTER (INPATIENT)
Facility: HOSPITAL | Age: 73
LOS: 5 days | Discharge: HOME HEALTH | DRG: 189 | End: 2018-10-30
Attending: EMERGENCY MEDICINE | Admitting: INTERNAL MEDICINE
Payer: MEDICARE

## 2018-10-25 ENCOUNTER — DOCUMENTATION ONLY (OUTPATIENT)
Dept: INTERNAL MEDICINE | Facility: CLINIC | Age: 73
End: 2018-10-25

## 2018-10-25 DIAGNOSIS — E43 SEVERE MALNUTRITION: ICD-10-CM

## 2018-10-25 DIAGNOSIS — C34.92 PRIMARY CANCER OF LEFT LUNG METASTATIC TO OTHER SITE: ICD-10-CM

## 2018-10-25 DIAGNOSIS — R09.02 HYPOXIA: Primary | ICD-10-CM

## 2018-10-25 DIAGNOSIS — I95.9 HYPOTENSION, UNSPECIFIED HYPOTENSION TYPE: ICD-10-CM

## 2018-10-25 DIAGNOSIS — J96.20 ACUTE ON CHRONIC RESPIRATORY FAILURE: ICD-10-CM

## 2018-10-25 DIAGNOSIS — J96.21 ACUTE ON CHRONIC RESPIRATORY FAILURE WITH HYPOXIA: ICD-10-CM

## 2018-10-25 DIAGNOSIS — R91.8 MASS OF LOWER LOBE OF LEFT LUNG: ICD-10-CM

## 2018-10-25 DIAGNOSIS — E10.40 TYPE 1 DIABETES MELLITUS WITH DIABETIC NEUROPATHY: Chronic | ICD-10-CM

## 2018-10-25 DIAGNOSIS — J43.9 PULMONARY EMPHYSEMA, UNSPECIFIED EMPHYSEMA TYPE: Chronic | ICD-10-CM

## 2018-10-25 DIAGNOSIS — N30.01 ACUTE CYSTITIS WITH HEMATURIA: ICD-10-CM

## 2018-10-25 DIAGNOSIS — C79.51 SECONDARY CANCER OF BONE: ICD-10-CM

## 2018-10-25 DIAGNOSIS — C34.92 METASTATIC LUNG CANCER (METASTASIS FROM LUNG TO OTHER SITE), LEFT: ICD-10-CM

## 2018-10-25 DIAGNOSIS — R53.1 WEAKNESS: ICD-10-CM

## 2018-10-25 PROBLEM — Z86.39 HISTORY OF MALNUTRITION: Status: ACTIVE | Noted: 2018-08-31

## 2018-10-25 PROBLEM — E86.1 INTRAVASCULAR VOLUME DEPLETION: Status: ACTIVE | Noted: 2018-10-25

## 2018-10-25 LAB
ALBUMIN SERPL BCP-MCNC: 2.5 G/DL
ALLENS TEST: ABNORMAL
ALP SERPL-CCNC: 163 U/L
ALT SERPL W/O P-5'-P-CCNC: 21 U/L
ANION GAP SERPL CALC-SCNC: 10 MMOL/L
AST SERPL-CCNC: 48 U/L
BACTERIA #/AREA URNS HPF: ABNORMAL /HPF
BASOPHILS # BLD AUTO: 0.01 K/UL
BASOPHILS NFR BLD: 0.1 %
BILIRUB SERPL-MCNC: 0.4 MG/DL
BILIRUB UR QL STRIP: NEGATIVE
BNP SERPL-MCNC: 24 PG/ML
BUN SERPL-MCNC: 21 MG/DL
CALCIUM SERPL-MCNC: 9.1 MG/DL
CHLORIDE SERPL-SCNC: 100 MMOL/L
CLARITY UR: CLEAR
CO2 SERPL-SCNC: 28 MMOL/L
COLOR UR: YELLOW
CREAT SERPL-MCNC: 0.8 MG/DL
DELSYS: ABNORMAL
DIFFERENTIAL METHOD: ABNORMAL
EOSINOPHIL # BLD AUTO: 0.1 K/UL
EOSINOPHIL NFR BLD: 1 %
ERYTHROCYTE [DISTWIDTH] IN BLOOD BY AUTOMATED COUNT: 15.6 %
EST. GFR  (AFRICAN AMERICAN): >60 ML/MIN/1.73 M^2
EST. GFR  (NON AFRICAN AMERICAN): >60 ML/MIN/1.73 M^2
FIO2: 36
FLOW: 4
GLUCOSE SERPL-MCNC: 45 MG/DL
GLUCOSE UR QL STRIP: NEGATIVE
HCO3 UR-SCNC: 27.8 MMOL/L (ref 24–28)
HCT VFR BLD AUTO: 36.6 %
HGB BLD-MCNC: 11.8 G/DL
HGB UR QL STRIP: NEGATIVE
INFLUENZA A, MOLECULAR: NEGATIVE
INFLUENZA B, MOLECULAR: NEGATIVE
KETONES UR QL STRIP: ABNORMAL
LACTATE SERPL-SCNC: 1.2 MMOL/L
LEUKOCYTE ESTERASE UR QL STRIP: ABNORMAL
LYMPHOCYTES # BLD AUTO: 1.5 K/UL
LYMPHOCYTES NFR BLD: 13.5 %
MCH RBC QN AUTO: 28 PG
MCHC RBC AUTO-ENTMCNC: 32.2 G/DL
MCV RBC AUTO: 87 FL
MICROSCOPIC COMMENT: ABNORMAL
MODE: ABNORMAL
MONOCYTES # BLD AUTO: 1.1 K/UL
MONOCYTES NFR BLD: 10.3 %
NEUTROPHILS # BLD AUTO: 8.3 K/UL
NEUTROPHILS NFR BLD: 75.1 %
NITRITE UR QL STRIP: POSITIVE
PCO2 BLDA: 45.3 MMHG (ref 35–45)
PH SMN: 7.4 [PH] (ref 7.35–7.45)
PH UR STRIP: 6 [PH] (ref 5–8)
PLATELET # BLD AUTO: 319 K/UL
PMV BLD AUTO: 8.7 FL
PO2 BLDA: 44 MMHG (ref 80–100)
POC BE: 3 MMOL/L
POC SATURATED O2: 79 % (ref 95–100)
POCT GLUCOSE: 105 MG/DL (ref 70–110)
POCT GLUCOSE: 216 MG/DL (ref 70–110)
POCT GLUCOSE: 222 MG/DL (ref 70–110)
POCT GLUCOSE: 50 MG/DL (ref 70–110)
POTASSIUM SERPL-SCNC: 3.9 MMOL/L
PROCALCITONIN SERPL IA-MCNC: 0.45 NG/ML
PROT SERPL-MCNC: 6.8 G/DL
PROT UR QL STRIP: ABNORMAL
RBC # BLD AUTO: 4.22 M/UL
RBC #/AREA URNS HPF: 3 /HPF (ref 0–4)
SAMPLE: ABNORMAL
SITE: ABNORMAL
SODIUM SERPL-SCNC: 138 MMOL/L
SP GR UR STRIP: 1.02 (ref 1–1.03)
SPECIMEN SOURCE: NORMAL
SQUAMOUS #/AREA URNS HPF: 1 /HPF
TROPONIN I SERPL DL<=0.01 NG/ML-MCNC: <0.006 NG/ML
URN SPEC COLLECT METH UR: ABNORMAL
UROBILINOGEN UR STRIP-ACNC: ABNORMAL EU/DL
WBC # BLD AUTO: 11.01 K/UL
WBC #/AREA URNS HPF: 60 /HPF (ref 0–5)

## 2018-10-25 PROCEDURE — 93005 ELECTROCARDIOGRAM TRACING: CPT

## 2018-10-25 PROCEDURE — 81000 URINALYSIS NONAUTO W/SCOPE: CPT

## 2018-10-25 PROCEDURE — 99900035 HC TECH TIME PER 15 MIN (STAT)

## 2018-10-25 PROCEDURE — 25000003 PHARM REV CODE 250: Performed by: NURSE PRACTITIONER

## 2018-10-25 PROCEDURE — 25000242 PHARM REV CODE 250 ALT 637 W/ HCPCS: Performed by: NURSE PRACTITIONER

## 2018-10-25 PROCEDURE — 93010 ELECTROCARDIOGRAM REPORT: CPT | Mod: ,,, | Performed by: INTERNAL MEDICINE

## 2018-10-25 PROCEDURE — 83880 ASSAY OF NATRIURETIC PEPTIDE: CPT

## 2018-10-25 PROCEDURE — 27100171 HC OXYGEN HIGH FLOW UP TO 24 HOURS

## 2018-10-25 PROCEDURE — 77334 RADIATION TREATMENT AID(S): CPT | Mod: 26,,, | Performed by: RADIOLOGY

## 2018-10-25 PROCEDURE — 84145 PROCALCITONIN (PCT): CPT

## 2018-10-25 PROCEDURE — 80053 COMPREHEN METABOLIC PANEL: CPT

## 2018-10-25 PROCEDURE — 85025 COMPLETE CBC W/AUTO DIFF WBC: CPT

## 2018-10-25 PROCEDURE — 82803 BLOOD GASES ANY COMBINATION: CPT

## 2018-10-25 PROCEDURE — 96361 HYDRATE IV INFUSION ADD-ON: CPT

## 2018-10-25 PROCEDURE — 82962 GLUCOSE BLOOD TEST: CPT

## 2018-10-25 PROCEDURE — 94640 AIRWAY INHALATION TREATMENT: CPT

## 2018-10-25 PROCEDURE — 87086 URINE CULTURE/COLONY COUNT: CPT

## 2018-10-25 PROCEDURE — 87040 BLOOD CULTURE FOR BACTERIA: CPT

## 2018-10-25 PROCEDURE — 36600 WITHDRAWAL OF ARTERIAL BLOOD: CPT

## 2018-10-25 PROCEDURE — 25000003 PHARM REV CODE 250: Performed by: HOSPITALIST

## 2018-10-25 PROCEDURE — 77295 3-D RADIOTHERAPY PLAN: CPT | Mod: 26,,, | Performed by: RADIOLOGY

## 2018-10-25 PROCEDURE — 83605 ASSAY OF LACTIC ACID: CPT

## 2018-10-25 PROCEDURE — 27100092 HC HIGH FLOW DELIVERY CANNULA

## 2018-10-25 PROCEDURE — 63600175 PHARM REV CODE 636 W HCPCS: Performed by: EMERGENCY MEDICINE

## 2018-10-25 PROCEDURE — 99291 CRITICAL CARE FIRST HOUR: CPT | Mod: 25

## 2018-10-25 PROCEDURE — 63600175 PHARM REV CODE 636 W HCPCS: Performed by: NURSE PRACTITIONER

## 2018-10-25 PROCEDURE — 77334 RADIATION TREATMENT AID(S): CPT | Mod: TC | Performed by: RADIOLOGY

## 2018-10-25 PROCEDURE — 96365 THER/PROPH/DIAG IV INF INIT: CPT

## 2018-10-25 PROCEDURE — 21400001 HC TELEMETRY ROOM

## 2018-10-25 PROCEDURE — 77300 RADIATION THERAPY DOSE PLAN: CPT | Mod: 26,,, | Performed by: RADIOLOGY

## 2018-10-25 PROCEDURE — 87502 INFLUENZA DNA AMP PROBE: CPT

## 2018-10-25 PROCEDURE — 77336 RADIATION PHYSICS CONSULT: CPT | Performed by: RADIOLOGY

## 2018-10-25 PROCEDURE — 27000221 HC OXYGEN, UP TO 24 HOURS

## 2018-10-25 PROCEDURE — 77295 3-D RADIOTHERAPY PLAN: CPT | Mod: TC | Performed by: RADIOLOGY

## 2018-10-25 PROCEDURE — 25000003 PHARM REV CODE 250: Performed by: EMERGENCY MEDICINE

## 2018-10-25 PROCEDURE — 77300 RADIATION THERAPY DOSE PLAN: CPT | Mod: TC | Performed by: RADIOLOGY

## 2018-10-25 PROCEDURE — 84484 ASSAY OF TROPONIN QUANT: CPT

## 2018-10-25 RX ORDER — MORPHINE SULFATE 4 MG/ML
2 INJECTION, SOLUTION INTRAMUSCULAR; INTRAVENOUS EVERY 4 HOURS PRN
Status: DISCONTINUED | OUTPATIENT
Start: 2018-10-25 | End: 2018-10-30

## 2018-10-25 RX ORDER — IBUPROFEN 200 MG
16 TABLET ORAL
Status: DISCONTINUED | OUTPATIENT
Start: 2018-10-25 | End: 2018-10-30 | Stop reason: HOSPADM

## 2018-10-25 RX ORDER — ACETAMINOPHEN 325 MG/1
650 TABLET ORAL EVERY 8 HOURS PRN
Status: DISCONTINUED | OUTPATIENT
Start: 2018-10-25 | End: 2018-10-30 | Stop reason: HOSPADM

## 2018-10-25 RX ORDER — OXYCODONE HYDROCHLORIDE 5 MG/1
15 TABLET ORAL EVERY 6 HOURS PRN
Status: DISCONTINUED | OUTPATIENT
Start: 2018-10-25 | End: 2018-10-25

## 2018-10-25 RX ORDER — PANTOPRAZOLE SODIUM 40 MG/1
40 TABLET, DELAYED RELEASE ORAL DAILY
Status: DISCONTINUED | OUTPATIENT
Start: 2018-10-26 | End: 2018-10-30 | Stop reason: HOSPADM

## 2018-10-25 RX ORDER — ONDANSETRON 2 MG/ML
4 INJECTION INTRAMUSCULAR; INTRAVENOUS EVERY 8 HOURS PRN
Status: DISCONTINUED | OUTPATIENT
Start: 2018-10-25 | End: 2018-10-30 | Stop reason: HOSPADM

## 2018-10-25 RX ORDER — IPRATROPIUM BROMIDE AND ALBUTEROL SULFATE 2.5; .5 MG/3ML; MG/3ML
3 SOLUTION RESPIRATORY (INHALATION) EVERY 6 HOURS
Status: DISCONTINUED | OUTPATIENT
Start: 2018-10-25 | End: 2018-10-30 | Stop reason: HOSPADM

## 2018-10-25 RX ORDER — BUDESONIDE 0.5 MG/2ML
0.5 INHALANT ORAL 2 TIMES DAILY
Status: DISCONTINUED | OUTPATIENT
Start: 2018-10-25 | End: 2018-10-30 | Stop reason: HOSPADM

## 2018-10-25 RX ORDER — GLUCAGON 1 MG
1 KIT INJECTION
Status: DISCONTINUED | OUTPATIENT
Start: 2018-10-25 | End: 2018-10-30 | Stop reason: HOSPADM

## 2018-10-25 RX ORDER — SODIUM CHLORIDE 9 MG/ML
INJECTION, SOLUTION INTRAVENOUS CONTINUOUS
Status: DISCONTINUED | OUTPATIENT
Start: 2018-10-25 | End: 2018-10-30 | Stop reason: HOSPADM

## 2018-10-25 RX ORDER — DILTIAZEM HYDROCHLORIDE 180 MG/1
360 CAPSULE, COATED, EXTENDED RELEASE ORAL DAILY
Status: DISCONTINUED | OUTPATIENT
Start: 2018-10-26 | End: 2018-10-30 | Stop reason: HOSPADM

## 2018-10-25 RX ORDER — SYRING-NEEDL,DISP,INSUL,0.3 ML 29 G X1/2"
296 SYRINGE, EMPTY DISPOSABLE MISCELLANEOUS ONCE
Status: COMPLETED | OUTPATIENT
Start: 2018-10-25 | End: 2018-10-25

## 2018-10-25 RX ORDER — GABAPENTIN 300 MG/1
600 CAPSULE ORAL NIGHTLY
Status: DISCONTINUED | OUTPATIENT
Start: 2018-10-25 | End: 2018-10-30 | Stop reason: HOSPADM

## 2018-10-25 RX ORDER — IBUPROFEN 200 MG
24 TABLET ORAL
Status: DISCONTINUED | OUTPATIENT
Start: 2018-10-25 | End: 2018-10-30 | Stop reason: HOSPADM

## 2018-10-25 RX ORDER — ACETAMINOPHEN 325 MG/1
650 TABLET ORAL EVERY 6 HOURS PRN
Status: DISCONTINUED | OUTPATIENT
Start: 2018-10-25 | End: 2018-10-30 | Stop reason: HOSPADM

## 2018-10-25 RX ORDER — SERTRALINE HYDROCHLORIDE 50 MG/1
50 TABLET, FILM COATED ORAL DAILY
Status: DISCONTINUED | OUTPATIENT
Start: 2018-10-26 | End: 2018-10-30 | Stop reason: HOSPADM

## 2018-10-25 RX ORDER — ONDANSETRON 2 MG/ML
4 INJECTION INTRAMUSCULAR; INTRAVENOUS EVERY 12 HOURS PRN
Status: DISCONTINUED | OUTPATIENT
Start: 2018-10-25 | End: 2018-10-25 | Stop reason: ALTCHOICE

## 2018-10-25 RX ORDER — FLECAINIDE ACETATE 50 MG/1
100 TABLET ORAL EVERY 12 HOURS
Status: DISCONTINUED | OUTPATIENT
Start: 2018-10-25 | End: 2018-10-30 | Stop reason: HOSPADM

## 2018-10-25 RX ORDER — OXYCODONE HYDROCHLORIDE 5 MG/1
10 TABLET ORAL EVERY 4 HOURS PRN
Status: DISCONTINUED | OUTPATIENT
Start: 2018-10-25 | End: 2018-10-30 | Stop reason: HOSPADM

## 2018-10-25 RX ORDER — INSULIN ASPART 100 [IU]/ML
0-5 INJECTION, SOLUTION INTRAVENOUS; SUBCUTANEOUS
Status: DISCONTINUED | OUTPATIENT
Start: 2018-10-25 | End: 2018-10-30 | Stop reason: HOSPADM

## 2018-10-25 RX ORDER — PRAVASTATIN SODIUM 20 MG/1
40 TABLET ORAL NIGHTLY
Status: DISCONTINUED | OUTPATIENT
Start: 2018-10-25 | End: 2018-10-30 | Stop reason: HOSPADM

## 2018-10-25 RX ADMIN — OXYCODONE HYDROCHLORIDE 10 MG: 5 TABLET ORAL at 09:10

## 2018-10-25 RX ADMIN — PRAVASTATIN SODIUM 40 MG: 20 TABLET ORAL at 08:10

## 2018-10-25 RX ADMIN — CEFTRIAXONE 1 G: 1 INJECTION, SOLUTION INTRAVENOUS at 05:10

## 2018-10-25 RX ADMIN — BUDESONIDE 0.5 MG: 0.5 SUSPENSION RESPIRATORY (INHALATION) at 07:10

## 2018-10-25 RX ADMIN — SODIUM CHLORIDE 2430 ML: 0.9 INJECTION, SOLUTION INTRAVENOUS at 02:10

## 2018-10-25 RX ADMIN — GABAPENTIN 600 MG: 300 CAPSULE ORAL at 08:10

## 2018-10-25 RX ADMIN — FLECAINIDE ACETATE 100 MG: 50 TABLET ORAL at 08:10

## 2018-10-25 RX ADMIN — MORPHINE SULFATE 2 MG: 4 INJECTION INTRAVENOUS at 08:10

## 2018-10-25 RX ADMIN — INSULIN ASPART 2 UNITS: 100 INJECTION, SOLUTION INTRAVENOUS; SUBCUTANEOUS at 08:10

## 2018-10-25 RX ADMIN — IPRATROPIUM BROMIDE AND ALBUTEROL SULFATE 3 ML: .5; 3 SOLUTION RESPIRATORY (INHALATION) at 07:10

## 2018-10-25 RX ADMIN — SODIUM CHLORIDE: 0.9 INJECTION, SOLUTION INTRAVENOUS at 08:10

## 2018-10-25 RX ADMIN — Medication 296 ML: at 08:10

## 2018-10-25 RX ADMIN — APIXABAN 5 MG: 2.5 TABLET, FILM COATED ORAL at 08:10

## 2018-10-25 NOTE — PROGRESS NOTES
Patient moved to room with air and o2 source for hght flow nc vapotherm setup. Patient placed on vapotherm with settings titrated to maintain O2 sat 92-93%% on 33 L and 45% FiO2.  KS 88-92%. Per MD.

## 2018-10-25 NOTE — SUBJECTIVE & OBJECTIVE
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  Others are too small to accurately characterize.    Lung disease     Nephrolithiasis     ct urogram 12/15/2017-Bilateral nephrolithiasis.     Neuropathy, diabetic     Pancreatitis     Type 1 diabetes mellitus with diabetic neuropathy 4/27/2018       Past Surgical History:   Procedure Laterality Date    ABLATION N/A 12/4/2017    Performed by J[facility]ire [facility] at [facility] CATH LAB    BICEPS TENDON REPAIR Right     BRONCHOSCOPY Bilateral 9/2/2018    Procedure: BRONCHOSCOPY- insert lighted tube into airway to obtain biopsy of lung;  Surgeon: Aldair Deleon MD;  Location: [facility] ENDO;  Service: Endoscopy;  Laterality: Bilateral;    BRONCHOSCOPY- insert lighted tube into airway to obtain biopsy of lung Bilateral 9/2/2018    Performed by Aldair Deleon MD at [facility] ENDO    CARDIOVERSION      CHOLECYSTECTOMY      INSERTION OF TUNNELED CENTRAL VENOUS CATHETER (CVC) WITH SUBCUTANEOUS PORT Right 10/9/2018    Procedure: [id number]-[id number]-V-CATH;  Surgeon: Christophe Brandt MD;  Location: [facility] OR;  Service: General;  Laterality: Right;    DNLYSAMFA-UAWE-C-CATH Right 10/9/2018    Performed by [facility] at [facility] OR    PATELLA SURGERY Right     RADIOFREQUENCY ABLATION      ROTATOR CUFF REPAIR Left     TRANSESOPHAGEAL ECHOCARDIOGRAM (DILLAN) N/A 12/4/2017    Performed by [facility] at [facility] CATH LAB    ULTRASOUND GUIDANCE Right 10/9/2018    Procedure: ULTRASOUND GUIDANCE;  Surgeon: Christophe Brandt MD;  Location: [facility] OR;  Service: General;  Laterality: Right;    ULTRASOUND GUIDANCE Right 10/9/2018    Performed by  [facility]t MD at [facility] OR       Review of patient's allergies indicates:   Allergen Reactions    Anoro ellipta [umeclidinium-vilanterol] Shortness Of Breath    Flomax [tamsulosin]      Dizzy         No current facility-administered medications on file prior to encounter.      Current Outpatient Medications on File Prior to Encounter   Medication Sig    albuterol-ipratropium (DUO-NEB) 2.5 mg-0.5 mg/3 mL nebulizer solution Take 3 mLs by nebulization every 6 (six) hours. Rescue    b complex vitamins tablet Take 1 tablet by mouth once daily.    diltiaZEM (CARDIZEM CD) 360 MG 24 hr capsule TAKE 1 CAPSULE(360 MG) BY MOUTH EVERY DAY    ELIQUIS 5 mg Tab TAKE 1 TABLET(5 MG) BY MOUTH TWICE DAILY    flecainide (TAMBOCOR) 100 MG Tab Take 1 tablet (100 mg total) by mouth every 12 (twelve) hours.    gabapentin (NEURONTIN) 300 MG capsule Take 2 capsules (600 mg total) by mouth 2 (two) times daily. (Patient taking differently: Take 600 mg by mouth every evening. Takes two tablets by mouth at night)    insulin degludec (TRESIBA FLEXTOUCH U-200) 200 unit/mL (3 mL) InPn Inject 50 Units into the skin once daily.    insulin lispro (HUMALOG KWIKPEN) 100 unit/mL InPn pen ADMINISTER 17 UNITS UNDER THE SKIN THREE TIMES DAILY BEFORE MEALS (Patient taking differently: ADMINISTER 17 UNITS UNDER THE SKIN IN MORNING AND 25 UNITS NOON AND NIGHT)    multivitamin capsule Take 1 capsule by mouth once daily.    omeprazole (PRILOSEC) 20 MG capsule Take 1 capsule (20 mg total) by mouth once daily.    ondansetron (ZOFRAN-ODT) 8 MG TbDL Take 1 tablet (8 mg total) by mouth every 8 (eight) hours as needed.    oxyCODONE (ROXICODONE) 15 MG Tab Take 1 tablet (15 mg total) by mouth every 4 (four) hours as needed for Pain.    pravastatin (PRAVACHOL) 40 MG tablet TAKE 1 TABLET DAILY (Patient taking differently: TAKE 1 TABLET NIGHTLY)    prochlorperazine (COMPAZINE) 10 MG tablet TAKE 1 TABLET BY MOUTH EVERY 6 HOURS AS NEEDED.    quinapril  "(ACCUPRIL) 40 MG tablet TAKE 1 TABLET BY MOUTH ONCE DAILY    sertraline (ZOLOFT) 50 MG tablet Take 1 tablet (50 mg total) by mouth once daily.    tiotropium bromide (SPIRIVA RESPIMAT) 2.5 mcg/actuation Mist Inhale 2 puffs into the lungs once daily. Controller    vitamin D 1000 units Tab Take 1,000 Units by mouth once daily.    blood sugar diagnostic Strp 1 each by Misc.(Non-Drug; Combo Route) route 3 (three) times daily. Dispense preferred on insurance    blood-glucose meter kit Use as instructed - preferred on insurance    clonazePAM (KLONOPIN) 0.5 MG tablet Take 1 tablet (0.5 mg total) by mouth 2 (two) times daily as needed for Anxiety.    insulin needles, disposable, 32 x 1/4 " Ndle Insulin injections four times per day.    NEEDLES, DISPOSABLE (DISPOSABLE NEEDLES MISC) Inject 1 each into the skin 3 (three) times daily.    [DISCONTINUED] predniSONE (DELTASONE) 10 MG tablet Take 1 tablet (10 mg total) by mouth once daily.     Family History     Problem Relation (Age of Onset)    Cancer Maternal Aunt    Cataracts Sister    Diabetes Mother, Sister, Sister, Sister    Heart attack Brother    Heart failure Brother    Hypertension Mother, Father    Stroke Mother, Father, Paternal Grandmother, Paternal Grandfather        Tobacco Use    Smoking status: Former Smoker     Packs/day: 0.50     Types: Cigarettes     Start date: 1/1/1960     Last attempt to quit: 3/8/2015     Years since quitting: 3.6    Smokeless tobacco: Former User     Types: Snuff     Quit date: 10/7/1995   Substance and Sexual Activity    Alcohol use: No     Alcohol/week: 0.0 oz    Drug use: No    Sexual activity: Not on file     Review of Systems   Constitutional: Positive for appetite change. Negative for chills, diaphoresis, fatigue and fever.   HENT: Negative.    Eyes: Positive for visual disturbance.   Respiratory: Positive for cough and shortness of breath. Negative for wheezing.    Cardiovascular: Positive for chest pain and " palpitations. Negative for leg swelling.   Gastrointestinal: Positive for abdominal pain and constipation. Negative for diarrhea, nausea and vomiting.   Genitourinary: Positive for urgency. Negative for dysuria and hematuria.   Musculoskeletal: Positive for back pain.   Skin: Negative for pallor, rash and wound.   Neurological: Positive for light-headedness. Negative for seizures, facial asymmetry, speech difficulty, weakness and headaches.   Psychiatric/Behavioral: Negative for confusion. The patient is not nervous/anxious.      Objective:     Vital Signs (Most Recent):  Temp: 98.6 °F (37 °C) (10/25/18 1404)  Pulse: 82 (10/25/18 1847)  Resp: (!) 23 (10/25/18 1847)  BP: (!) 117/56 (10/25/18 1801)  SpO2: (!) 93 % (10/25/18 1847) Vital Signs (24h Range):  Temp:  [98.6 °F (37 °C)] 98.6 °F (37 °C)  Pulse:  [73-86] 82  Resp:  [19-29] 23  SpO2:  [84 %-93 %] 93 %  BP: ()/(47-58) 117/56     Weight: 81 kg (178 lb 9.2 oz)  Body mass index is 27.97 kg/m².    Physical Exam   Constitutional: He is oriented to person, place, and time. He appears well-developed.   HENT:   Head: Normocephalic and atraumatic.   Nose: Nose normal.   Mouth/Throat: Oropharynx is clear and moist.   Eyes: Conjunctivae are normal. Pupils are equal, round, and reactive to light. No scleral icterus.   Neck: Normal range of motion. Neck supple.   Cardiovascular: Normal rate, regular rhythm and normal heart sounds. Exam reveals no gallop and no friction rub.   No murmur heard.  Pulmonary/Chest: Effort normal. He has decreased breath sounds in the right upper field, the left upper field, the left middle field and the left lower field.   Abdominal: Soft. Bowel sounds are normal.   Musculoskeletal: Normal range of motion. He exhibits no edema or tenderness.   Neurological: He is alert and oriented to person, place, and time.   Skin: Skin is warm and dry.   Psychiatric: He has a normal mood and affect. His behavior is normal.   Nursing note and vitals  reviewed.        CRANIAL NERVES     CN III, IV, VI   Pupils are equal, round, and reactive to light.       Significant Labs:   CBC:   Recent Labs   Lab 10/25/18  1425   WBC 11.01   HGB 11.8*   HCT 36.6*        CMP:   Recent Labs   Lab 10/25/18  1425      K 3.9      CO2 28   GLU 45*   BUN 21   CREATININE 0.8   CALCIUM 9.1   PROT 6.8   ALBUMIN 2.5*   BILITOT 0.4   ALKPHOS 163*   AST 48*   ALT 21   ANIONGAP 10   EGFRNONAA >60     All pertinent labs within the past 24 hours have been reviewed.    Significant Imaging: I have reviewed all pertinent imaging results/findings within the past 24 hours.

## 2018-10-25 NOTE — PROGRESS NOTES
Hematology/Oncology Office Note    Reason for referral:  Lung mass/mediastinal lymphadenopathy    CC:  Lung mass    Referred by:  No ref. 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  He underwent nondiagnostic bronchoscopy/biopsy on 09/02/2018.  Patient reports stable shortness of breath without hemoptysis, chest pain, dizziness, or palpitations.      I have reviewed and updated the HPI, ROS, PMHx, Social Hx, Family Hx and treatment history.    Today's visit:  Patient presents today with uncontrolled lumbar pain.  He continues to receive palliative radiation to the lumbar spine, however, he reports worsening pain over the previous week.  He is currently on hydrocodone 5 mg p.r.n. and reports this is not controlling symptoms.    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  Others are too small to accurately characterize.    Lung disease     Nephrolithiasis     ct urogram 12/15/2017-Bilateral nephrolithiasis.     Neuropathy, diabetic     Pancreatitis     Type 1 diabetes mellitus with diabetic neuropathy 4/27/2018         Social History:      Family History: family history includes Cancer in his maternal aunt; Cataracts in his sister; Diabetes in his mother, sister, sister, and sister; Heart attack in his brother;  Heart failure in his brother; Hypertension in his father and mother; Stroke in his father, mother, paternal grandfather, and paternal grandmother.        HPI    Review of Systems   Constitutional: Positive for activity change. Negative for appetite change, chills, diaphoresis, fatigue, fever and unexpected weight change.   HENT: Negative for congestion, dental problem, drooling, ear discharge, ear pain, facial swelling, hearing loss, mouth sores, nosebleeds and sinus pain.    Eyes: Negative for photophobia, pain, discharge, itching and visual disturbance.   Respiratory: Positive for shortness of breath. Negative for apnea, cough, choking, chest tightness, wheezing and stridor.    Cardiovascular: Negative for chest pain, palpitations and leg swelling.   Gastrointestinal: Negative for abdominal distention, abdominal pain, anal bleeding, blood in stool, constipation, diarrhea, nausea and vomiting.   Endocrine: Negative for cold intolerance, heat intolerance, polydipsia, polyphagia and polyuria.   Genitourinary: Negative for difficulty urinating, dysuria, enuresis, flank pain, frequency, genital sores and hematuria.   Musculoskeletal: Positive for back pain and myalgias. Negative for arthralgias, gait problem and joint swelling.        Worsening/uncontrolled lumbar pain   Skin: Negative for color change and pallor.   Allergic/Immunologic: Negative for environmental allergies, food allergies and immunocompromised state.   Neurological: Negative for dizziness, tremors, seizures, syncope, facial asymmetry, speech difficulty, weakness, light-headedness, numbness and headaches.   Hematological: Negative for adenopathy. Does not bruise/bleed easily.   Psychiatric/Behavioral: Negative for agitation, confusion, decreased concentration, dysphoric mood and hallucinations. The patient is not nervous/anxious and is not hyperactive.        Objective:       Vitals:    10/23/18 1439   BP: 112/60   Pulse: 100   Resp: 18   Temp: 97 °F  "(36.1 °C)   TempSrc: Oral   SpO2: (!) 93%  Comment: with 3 liters of O2   Height: 5' 7" (1.702 m)     Physical Exam   Constitutional: He is oriented to person, place, and time. He appears well-developed and well-nourished. No distress.   HENT:   Head: Normocephalic and atraumatic.   Nose: Nose normal.   Mouth/Throat: Oropharynx is clear and moist.   Eyes: Conjunctivae and EOM are normal. Pupils are equal, round, and reactive to light. Right eye exhibits no discharge. Left eye exhibits no discharge.   Neck: Normal range of motion. Neck supple. No tracheal deviation present. No thyromegaly present.   Cardiovascular: Normal rate and regular rhythm. Exam reveals no gallop and no friction rub.   No murmur heard.  Pulmonary/Chest: Effort normal. No stridor. No respiratory distress. He has decreased breath sounds. He has no wheezes. He has no rhonchi. He has rales. He exhibits no tenderness.   On supplemental oxygen   Abdominal: Soft. Bowel sounds are normal. He exhibits no distension and no mass. There is no tenderness. There is no rebound and no guarding. No hernia.   Musculoskeletal: Normal range of motion. He exhibits no edema, tenderness or deformity.   Lymphadenopathy:     He has no cervical adenopathy.   Neurological: He is alert and oriented to person, place, and time. He displays normal reflexes. No cranial nerve deficit. Coordination normal.   Skin: Skin is warm, dry and intact. Capillary refill takes less than 2 seconds. No abrasion and no rash noted. He is not diaphoretic. No pallor.   Psychiatric: He has a normal mood and affect. Thought content normal.   Vitals reviewed.        Lab Results   Component Value Date    WBC 8.85 10/18/2018    HGB 12.9 (L) 10/18/2018    HCT 39.9 (L) 10/18/2018    MCV 87 10/18/2018     10/18/2018     Lab Results   Component Value Date    CREATININE 1.4 10/18/2018    BUN 30 (H) 10/18/2018     (L) 10/18/2018    K 5.4 (H) 10/18/2018    CL 94 (L) 10/18/2018    CO2 21 (L) " 10/18/2018     Lab Results   Component Value Date    ALT 31 10/18/2018    AST 33 10/18/2018    ALKPHOS 174 (H) 10/18/2018    BILITOT 0.4 10/18/2018         Assessment:       72-year-old gentleman status post recent hospitalization where he was noted to have left lower lung mass with extensive mediastinal lymphadenopathy, left adrenal nodule, an L1 lytic lesion consistent with metastatic lung cancer.  He underwent bronchoscopy/biopsy which was nondiagnostic on 09/02/2018.  PET scan performed on 09/17/2018 demonstrated extensive FDG avid mediastinal lymphadenopathy, single small subcentimeter pulmonary nodule within the right lung, metastatic lesion within the dome of the liver, and extensive osseous metastatic disease noted throughout the vertebral column.  The patient underwent CT-guided biopsy of lumbar mass which has demonstrated poorly differentiated squamous cell carcinoma.  PD L1 expression is high at 75%, therefore, we proceeded with palliative immunotherapy/Keytruda--the 1st dose given on 10/18/2018.  He is also receiving palliative radiation to the lumbar spine.  Patient's pain is poorly controlled on hydrocodone 5 mg p.r.n..  I discussed treatment options which included extended release morphine and Duragesic patch.  The patient desires increased dose of short-acting pain medication for now.  I have provided a prescription for oxycodone 15 mg q.4 hours p.r.n..  If this is not alleviate pain, we will add extended release morphine or oxycodone.  He will follow up in 1 week.      Poorly controlled pain related to malignancy:  --oxycodone 15 mg q.4 hours p.r.n.  --may add long-acting narcotic if this treatment is not successful    Stage IV Non-small cell lung cancer/squamous cell carcinoma stage IV with metastasis involving mediastinum, liver, and extensive bone Mets--PDL1 status high  --status post cycle 1 of Keytruda on 10/18/2028  --palliative radiation to the lumbar spine

## 2018-10-25 NOTE — PROGRESS NOTES
"1320-Patient presents with daughter this afternoon for health risk assessment/annual wellness visit. BP: 72/42; repeat per LPN 78/46; HR: 92; O2 94% on 3L; Weight 81.6kg; Height: 170.5cm  Reports he overall feels at his baseline. On further discussion, reports on walk from lobby to room, he saw a "light" for a few seconds that resolved with sitting. Denies lightheadedness, dizziness, syncope, or chest pain. Denies nausea, vomiting, or diarrhea. He is currently being treated for metastatic lung cancer. He reports a decrease in oral intake. Discussed with patient and daughter the need for acute evaluation and treatment at the ER. They verbalized understanding. Declined ambulance.  Patient brought out via wheelchair and assisted in daughter's personal vehicle to be taken to [facility] ER. Reports on the way to [facility], he saw the "light" again. He is in no acute distress. They have no questions at this time. Someone will call him to reschedule HRA.   "

## 2018-10-25 NOTE — ED NOTES
The patient is resting quietly, eyes closed, arouses easily to stimuli. Airway is open and patent, respirations are spontaneous, normal respiratory effort and rate noted, skin warm and dry, appearance: in no acute distress and resting comfortably. Family at bedside

## 2018-10-25 NOTE — ED PROVIDER NOTES
SCRIBE #1 NOTE: I, Corinne Mack, am scribing for, and in the presence of, Jayesh Morejon MD. I have scribed the entire note.      History      Chief Complaint   Patient presents with    Hypotension     sent over by NP Yee Pearson for eval in triage 86/47 and Dr. office 72/42    Constipation    Fatigue    Dizziness    Back Pain    Flank Pain     left, urinary dribble       Review of patient's allergies indicates:   Allergen Reactions    Anoro ellipta [umeclidinium-vilanterol] Shortness Of Breath    Flomax [tamsulosin]      Dizzy          HPI   HPI    10/25/2018, 2:13 PM   History obtained from the patient      History of Present Illness: Nico Garza Jr. is a 72 y.o. male patient with PMHx of Afib, COPD, DM, HTN, and lung cancer who was sent to the Emergency Department by Shruthi Lane NP (Internal Medicine) for further evaluation of pt hypotension. Pt is currently undergoing chemo therapy for lung cancer (last chemo Tx was 10/18/18) and was seeing his PCP for his annual wellness visit. It was noted in the clinic that the pt's BP was 72/42 and his SpO2 was 94% (pt is on O2 at all times) which prompted the pt's PCP to send him to the ED. Pt c/o constipation which onset a few days ago; pt reports decreased PO intake as well. Symptoms are constant and moderate in severity. No mitigating or exacerbating factors reported. Associated sxs include fatigue, dizziness, back pain, and generalized weakness. Patient denies any fever, chills, CP, SOB, N/V/D, neck pain, HA, numbness, and all other sxs at this time. No prior Tx reported. No further complaints or concerns at this time.         Arrival mode: Personal vehicle    PCP: Tiffany Davis DO       Past Medical History:  Past Medical History:   Diagnosis Date    Arthritis     Atrial fibrillation and flutter     Atrial flutter     Cancer     LUNG    COPD (chronic obstructive pulmonary disease)     COPD (chronic obstructive pulmonary disease) with  emphysema 11/18/2013    DM (diabetes mellitus) 1996    Hyperlipidemia     Hypertension     Kidney cysts     ct urogram 12/15/2017-2 cysts within the left kidney.  Others are too small to accurately characterize.    Lung disease     Nephrolithiasis     ct urogram 12/15/2017-Bilateral nephrolithiasis.     Neuropathy, diabetic     Pancreatitis     Type 1 diabetes mellitus with diabetic neuropathy 4/27/2018       Past Surgical History:  Past Surgical History:   Procedure Laterality Date    ABLATION N/A 12/4/2017    Performed by Jaret Freire MD at [facility] CATH LAB    BICEPS TENDON REPAIR Right     BRONCHOSCOPY Bilateral 9/2/2018    Procedure: BRONCHOSCOPY- insert lighted tube into airway to obtain biopsy of lung;  Surgeon: Aldair Deleon MD;  Location: [facility] ENDO;  Service: Endoscopy;  Laterality: Bilateral;    BRONCHOSCOPY- insert lighted tube into airway to obtain biopsy of lung Bilateral 9/2/2018    Performed by Aldair Deleon MD at [facility] ENDO    CARDIOVERSION      CHOLECYSTECTOMY      INSERTION OF TUNNELED CENTRAL VENOUS CATHETER (CVC) WITH SUBCUTANEOUS PORT Right 10/9/2018    Procedure: [id number]-Z-CATH;  Surgeon: Christophe Brandt MD;  Location: [facility] OR;  Service: General;  Laterality: Right;    [facility]-CATH Right 10/9/2018    Performed by [facility] Brandt MD at [facility] OR    PATELLA SURGERY Right     RADIOFREQUENCY ABLATION      ROTATOR CUFF REPAIR Left     TRANSESOPHAGEAL ECHOCARDIOGRAM (DILLAN) N/A 12/4/2017    Performed by Jaret Freire MD at [facility] CATH LAB    ULTRASOUND GUIDANCE Right 10/9/2018    Procedure: ULTRASOUND GUIDANCE;  Surgeon: Christophe Brandt MD;  Location: [facility] OR;  Service: General;  Laterality: Right;    ULTRASOUND GUIDANCE Right 10/9/2018    Performed by [facility]ophe Brandt MD at [facility] OR         Family History:  Family History   Problem Relation Age of Onset    Heart failure Brother     Heart attack Brother     Diabetes Mother     Hypertension Mother     Stroke  Mother     Hypertension Father     Stroke Father     Diabetes Sister     Cataracts Sister     Cancer Maternal Aunt     Diabetes Sister     Diabetes Sister     Stroke Paternal Grandmother     Stroke Paternal Grandfather        Social History:  Social History     Tobacco Use    Smoking status: Former Smoker     Packs/day: 0.50     Types: Cigarettes     Start date: 1/1/1960     Last attempt to quit: 3/8/2015     Years since quitting: 3.6    Smokeless tobacco: Former User     Types: Snuff     Quit date: 10/7/1995   Substance and Sexual Activity    Alcohol use: No     Alcohol/week: 0.0 oz    Drug use: No    Sexual activity: Unknown       ROS   Review of Systems   Constitutional: Positive for appetite change (decreased) and fatigue. Negative for chills and fever.   Respiratory: Negative for cough and shortness of breath.    Cardiovascular: Negative for chest pain and leg swelling.        (+) low BP   Gastrointestinal: Positive for constipation. Negative for abdominal pain, diarrhea, nausea and vomiting.   Musculoskeletal: Negative for back pain, neck pain and neck stiffness.   Skin: Negative for rash and wound.   Neurological: Positive for dizziness and weakness (generalized). Negative for light-headedness, numbness and headaches.   All other systems reviewed and are negative.    Physical Exam      Initial Vitals [10/25/18 1404]   BP Pulse Resp Temp SpO2   (!) 86/47 86 19 98.6 °F (37 °C) (!) 90 %      MAP       --          Physical Exam  Nursing Notes and Vital Signs Reviewed.  Constitutional: Patient is in no acute distress. Well-developed. Elderly. Frail.  Head: Atraumatic. Normocephalic.  Eyes: PERRL. EOM intact. Conjunctivae are not pale. No scleral icterus.  ENT: Mucous membranes are moist. Oropharynx is clear and symmetric.    Neck: Supple. Full ROM. No lymphadenopathy.  Cardiovascular: Regular rate. Regular rhythm. No murmurs, rubs, or gallops. Distal pulses are 2+ and symmetric.  Pulmonary/Chest: No  respiratory distress. Clear to auscultation bilaterally. No wheezing or rales. Mediport to the R upper chest wall.  Abdominal: Soft and non-distended.  There is no tenderness.  No rebound, guarding, or rigidity.   Musculoskeletal: Moves all extremities. No obvious deformities.  Skin: Warm and dry.  Neurological:  Alert, awake, and appropriate.  Normal speech.  No acute focal neurological deficits are appreciated.  Psychiatric: Normal affect. Good eye contact. Appropriate in content.    ED Course    Critical Care  Date/Time: 10/25/2018 5:53 PM  Performed by: Jayesh Morejon MD  Authorized by: Jayesh Morejon MD   Direct patient critical care time: 23 minutes  Additional history critical care time: 6 minutes  Ordering / reviewing critical care time: 9 minutes  Documentation critical care time: 11 minutes  Consulting other physicians critical care time: 13 minutes  Total critical care time (exclusive of procedural time) : 62 minutes  Critical care time was exclusive of separately billable procedures and treating other patients and teaching time.  Critical care was necessary to treat or prevent imminent or life-threatening deterioration of the following conditions: respiratory failure (hypotension).  Critical care was time spent personally by me on the following activities: blood draw for specimens, development of treatment plan with patient or surrogate, discussions with consultants, interpretation of cardiac output measurements, evaluation of patient's response to treatment, examination of patient, obtaining history from patient or surrogate, ordering and performing treatments and interventions, ordering and review of laboratory studies, ordering and review of radiographic studies, pulse oximetry, re-evaluation of patient's condition and review of old charts.        ED Vital Signs:  Vitals:    10/25/18 1404 10/25/18 1418 10/25/18 1512 10/25/18 1605   BP: (!) 86/47 (!) 89/50 (!) 132/58    Pulse: 86 84 74    Resp:  "19 (!) 25 (!) 26    Temp: 98.6 °F (37 °C)      TempSrc: Oral      SpO2: (!) 90% (!) 90% (!) 86% (!) 84%   Weight: 81 kg (178 lb 9.2 oz)      Height: 5' 7" (1.702 m)       10/25/18 1611 10/25/18 1633 10/25/18 1638 10/25/18 1719   BP:  (!) 101/53  (!) 116/55   Pulse: 78 82 77 75   Resp: (!) 29 (!) 25  (!) 22   Temp:       TempSrc:       SpO2: (!) 93% (!) 92%  (!) 92%   Weight:       Height:           Abnormal Lab Results:  Labs Reviewed   CBC W/ AUTO DIFFERENTIAL - Abnormal; Notable for the following components:       Result Value    RBC 4.22 (*)     Hemoglobin 11.8 (*)     Hematocrit 36.6 (*)     RDW 15.6 (*)     MPV 8.7 (*)     Gran # (ANC) 8.3 (*)     Mono # 1.1 (*)     Gran% 75.1 (*)     Lymph% 13.5 (*)     All other components within normal limits   COMPREHENSIVE METABOLIC PANEL - Abnormal; Notable for the following components:    Glucose 45 (*)     Albumin 2.5 (*)     Alkaline Phosphatase 163 (*)     AST 48 (*)     All other components within normal limits    Narrative:      GLUCOSE  critical result(s) called and verbal readback obtained from   ETIENNE MARTE RN , 10/25/2018 15:37   URINALYSIS, REFLEX TO URINE CULTURE - Abnormal; Notable for the following components:    Protein, UA Trace (*)     Ketones, UA Trace (*)     Nitrite, UA Positive (*)     Urobilinogen, UA 2.0-3.0 (*)     Leukocytes, UA Trace (*)     All other components within normal limits    Narrative:     Preferred Collection Type->Urine, Clean Catch   PROCALCITONIN - Abnormal; Notable for the following components:    Procalcitonin 0.45 (*)     All other components within normal limits   URINALYSIS MICROSCOPIC - Abnormal; Notable for the following components:    WBC, UA 60 (*)     All other components within normal limits    Narrative:     Preferred Collection Type->Urine, Clean Catch   POCT GLUCOSE - Abnormal; Notable for the following components:    POCT Glucose 50 (*)     All other components within normal limits   ISTAT PROCEDURE - Abnormal; " Notable for the following components:    POC PCO2 45.3 (*)     POC PO2 44 (*)     POC SATURATED O2 79 (*)     All other components within normal limits   INFLUENZA A & B BY MOLECULAR   CULTURE, BLOOD   CULTURE, BLOOD   CULTURE, URINE   LACTIC ACID, PLASMA   B-TYPE NATRIURETIC PEPTIDE   TROPONIN I   POCT GLUCOSE        All Lab Results:  Results for orders placed or performed during the hospital encounter of 10/25/18   Influenza A & B by Molecular   Result Value Ref Range    Influenza A, Molecular Negative Negative    Influenza B, Molecular Negative Negative    Flu A & B Source NP    CBC auto differential   Result Value Ref Range    WBC 11.01 3.90 - 12.70 K/uL    RBC 4.22 (L) 4.60 - 6.20 M/uL    Hemoglobin 11.8 (L) 14.0 - 18.0 g/dL    Hematocrit 36.6 (L) 40.0 - 54.0 %    MCV 87 82 - 98 fL    MCH 28.0 27.0 - 31.0 pg    MCHC 32.2 32.0 - 36.0 g/dL    RDW 15.6 (H) 11.5 - 14.5 %    Platelets 319 150 - 350 K/uL    MPV 8.7 (L) 9.2 - 12.9 fL    Gran # (ANC) 8.3 (H) 1.8 - 7.7 K/uL    Lymph # 1.5 1.0 - 4.8 K/uL    Mono # 1.1 (H) 0.3 - 1.0 K/uL    Eos # 0.1 0.0 - 0.5 K/uL    Baso # 0.01 0.00 - 0.20 K/uL    Gran% 75.1 (H) 38.0 - 73.0 %    Lymph% 13.5 (L) 18.0 - 48.0 %    Mono% 10.3 4.0 - 15.0 %    Eosinophil% 1.0 0.0 - 8.0 %    Basophil% 0.1 0.0 - 1.9 %    Differential Method Automated    Comprehensive metabolic panel   Result Value Ref Range    Sodium 138 136 - 145 mmol/L    Potassium 3.9 3.5 - 5.1 mmol/L    Chloride 100 95 - 110 mmol/L    CO2 28 23 - 29 mmol/L    Glucose 45 (LL) 70 - 110 mg/dL    BUN, Bld 21 8 - 23 mg/dL    Creatinine 0.8 0.5 - 1.4 mg/dL    Calcium 9.1 8.7 - 10.5 mg/dL    Total Protein 6.8 6.0 - 8.4 g/dL    Albumin 2.5 (L) 3.5 - 5.2 g/dL    Total Bilirubin 0.4 0.1 - 1.0 mg/dL    Alkaline Phosphatase 163 (H) 55 - 135 U/L    AST 48 (H) 10 - 40 U/L    ALT 21 10 - 44 U/L    Anion Gap 10 8 - 16 mmol/L    eGFR if African American >60 >60 mL/min/1.73 m^2    eGFR if non African American >60 >60 mL/min/1.73 m^2   Lactic  acid, plasma #1   Result Value Ref Range    Lactate (Lactic Acid) 1.2 0.5 - 2.2 mmol/L   Urinalysis, Reflex to Urine Culture Urine, Clean Catch   Result Value Ref Range    Specimen UA Urine, Clean Catch     Color, UA Yellow Yellow, Straw, Norma    Appearance, UA Clear Clear    pH, UA 6.0 5.0 - 8.0    Specific Gravity, UA 1.025 1.005 - 1.030    Protein, UA Trace (A) Negative    Glucose, UA Negative Negative    Ketones, UA Trace (A) Negative    Bilirubin (UA) Negative Negative    Occult Blood UA Negative Negative    Nitrite, UA Positive (A) Negative    Urobilinogen, UA 2.0-3.0 (A) <2.0 EU/dL    Leukocytes, UA Trace (A) Negative   Brain natriuretic peptide   Result Value Ref Range    BNP 24 0 - 99 pg/mL   Troponin I   Result Value Ref Range    Troponin I <0.006 0.000 - 0.026 ng/mL   Procalcitonin   Result Value Ref Range    Procalcitonin 0.45 (H) <0.25 ng/mL   Urinalysis Microscopic   Result Value Ref Range    RBC, UA 3 0 - 4 /hpf    WBC, UA 60 (H) 0 - 5 /hpf    Bacteria, UA Rare None-Occ /hpf    Squam Epithel, UA 1 /hpf    Microscopic Comment SEE COMMENT    POCT glucose   Result Value Ref Range    POCT Glucose 50 (LL) 70 - 110 mg/dL   ISTAT PROCEDURE   Result Value Ref Range    POC PH 7.397 7.35 - 7.45    POC PCO2 45.3 (H) 35 - 45 mmHg    POC PO2 44 (LL) 80 - 100 mmHg    POC HCO3 27.8 24 - 28 mmol/L    POC BE 3 -2 to 2 mmol/L    POC SATURATED O2 79 (L) 95 - 100 %    Sample ARTERIAL     Site LR     Allens Test Pass     DelSys Nasal Can     Mode SPONT     Flow 4     FiO2 36    POCT glucose   Result Value Ref Range    POCT Glucose 105 70 - 110 mg/dL       Imaging Results:  Imaging Results          CT Renal Stone Study ABD Pelvis WO (Final result)  Result time 10/25/18 17:22:21    Final result by Jos Brito MD (10/25/18 17:22:21)                 Impression:      Punctate nonobstructing stones are seen bilaterally.  No evidence of obstructive uropathy.    Moderate constipation.    3.0 cm hypodense liver lesion is  seen within segment 7 of the right hepatic lobe which is not characterized on noncontrast imaging. Recommend follow-up triple phase CT or MRI for further characterization. Differential includes primary or secondary malignancy.    Lytic lesion is seen within the L1 vertebral body concerning for metastatic disease measuring 3.3 x 2.1 cm. Bone scan can be obtained for further characterization.    All CT scans at this facility use dose modulation, iterative reconstruction and/or weight based dosing when appropriate to reduce radiation dose to as low as reasonably achievable.      Electronically signed by: Jos Brito MD  Date:    10/25/2018  Time:    17:22             Narrative:    EXAMINATION:  CT RENAL STONE STUDY ABD PELVIS WO    CLINICAL HISTORY:  Flank pain, stone disease suspected;Left flank pain;    TECHNIQUE:  Routine 5 mm non-contrast images of the abdomen and pelvis were done.  Sagittal and coronal reformats were also submitted for interpretation.    COMPARISON:  8/31/18    FINDINGS:  Emphysematous changes are seen at the lung bases with bilateral atelectatic changes.  The visualized portions of the heart appear normal.    The liver is normal in size and attenuation.  3.0 cm hypodense liver lesion is seen within segment 7 of the right hepatic lobe which is not characterized on noncontrast imaging.  Recommend follow-up triple phase CT or MRI for further characterization.  Differential includes primary or secondary malignancy..  Gallbladder is surgically absent.  There is no intra-or extrahepatic biliary ductal dilatation.    The spleen, pancreas, and adrenal glands are unremarkable.    The kidneys are normal in size and location.  2.0 cm lesion is seen within the upper and lower pole of the left kidney which are not fully characterized on noncontrast imaging.  Lobular appearance of the right kidney is noted.  Mass is not entirely excluded.  Recommend follow-up ultrasound or MRI.  Punctate nonobstructing  stones are seen within the kidneys bilaterally.  No evidence of hydronephrosis.  Mild bladder wall thickening is noted which is nonspecific but can be seen with cystitis.  Prostate is enlarged measuring 5.3 cm.    Elevation of the left hemidiaphragm is noted.  Moderate amount of ingested material seen within the stomach.  The visualized loops of small bowel show no evidence of obstruction or inflammation. Large bowel demonstrates moderate constipation.  Appendix is not seen.  There is no ascites, free fluid, or intraperitoneal free air.    There is no evidence of lymph node enlargement in the abdomen or pelvis.    The abdominal aorta is normal in course and caliber with severe atherosclerotic calcifications.    Advanced degenerative changes of the bones noted.  Lytic lesion is seen within the L1 vertebral body concerning for metastatic disease measuring 3.3 x 2.1 cm.  Bone scan can be obtained for further characterization.  Diffuse heterogeneous marrow signal which is predominantly osteopenic limits evaluation.    The extraperitoneal soft tissues are unremarkable.                               X-Ray Chest AP Portable (Final result)  Result time 10/25/18 15:01:27    Final result by Nico Corral MD (10/25/18 15:01:27)                 Impression:      Stable chest x-ray as above.      Electronically signed by: Nico Corral MD  Date:    10/25/2018  Time:    15:01             Narrative:    EXAMINATION:  XR CHEST AP PORTABLE    CLINICAL HISTORY:  Hypotension., Sepsis;    COMPARISON:  10/09/2018.    FINDINGS:  Right chest wall MediPort catheter again noted.  There is prominent elevation of the left hemidiaphragm with left lung base atelectasis.  Difficult to assess the cardiac size.    The right lung reveals minor interstitial coarsening, unchanged.                                 The EKG was ordered, reviewed, and independently interpreted by the ED provider.  Interpretation time: 1414  Rate: 87 BPM  Rhythm:  Sinus rhythm with 1st degree AV block  Interpretation: Incomplete RBBB. No STEMI.             The Emergency Provider reviewed the vital signs and test results, which are outlined above.    ED Discussion     5:21 PM: Re-evaluated pt. I have discussed test results, shared treatment plan, and the need for admission with patient and family at bedside. Pt and family express understanding at this time and agree with all information. All questions answered. Pt and family have no further questions or concerns at this time. Pt is ready for admit.    5:53 PM: Discussed case with Genie Akbar NP ([facility]). Dr. Joya agrees with current care and management of pt and accepts admission.   Admitting Service: [facility]   Admitting Physician: Dr. Joya  Admit to: Telemetry        ED Medication(s):  Medications   sodium chloride 0.9% bolus 2,430 mL (0 mL/kg × 81 kg Intravenous Stopped 10/25/18 2786)   cefTRIAXone (ROCEPHIN) 1 g in dextrose 5 % 50 mL IVPB (1 g Intravenous New Bag 10/25/18 4862)          Medication List      ASK your doctor about these medications    albuterol-ipratropium 2.5 mg-0.5 mg/3 mL nebulizer solution  Commonly known as:  DUO-NEB  Take 3 mLs by nebulization every 6 (six) hours. Rescue     b complex vitamins tablet     blood sugar diagnostic Strp  1 each by Misc.(Non-Drug; Combo Route) route 3 (three) times daily. Dispense preferred on insurance     blood-glucose meter kit  Use as instructed - preferred on insurance     clonazePAM 0.5 MG tablet  Commonly known as:  KLONOPIN  Take 1 tablet (0.5 mg total) by mouth 2 (two) times daily as needed for Anxiety.     diltiaZEM 360 MG 24 hr capsule  Commonly known as:  CARDIZEM CD  TAKE 1 CAPSULE(360 MG) BY MOUTH EVERY DAY     DISPOSABLE NEEDLES MISC     ELIQUIS 5 mg Tab  Generic drug:  apixaban  TAKE 1 TABLET(5 MG) BY MOUTH TWICE DAILY     flecainide 100 MG Tab  Commonly known as:  TAMBOCOR  Take 1 tablet (100 mg total) by mouth every 12 (twelve)  "hours.     gabapentin 300 MG capsule  Commonly known as:  NEURONTIN  Take 2 capsules (600 mg total) by mouth 2 (two) times daily.     insulin degludec 200 unit/mL (3 mL) Inpn  Commonly known as:  TRESIBA FLEXTOUCH U-200  Inject 50 Units into the skin once daily.     insulin lispro 100 unit/mL Inpn pen  Commonly known as:  HumaLOG KwikPen Insulin  ADMINISTER 17 UNITS UNDER THE SKIN THREE TIMES DAILY BEFORE MEALS     multivitamin capsule     omeprazole 20 MG capsule  Commonly known as:  PRILOSEC  Take 1 capsule (20 mg total) by mouth once daily.     ondansetron 8 MG Tbdl  Commonly known as:  ZOFRAN-ODT  Take 1 tablet (8 mg total) by mouth every 8 (eight) hours as needed.     oxyCODONE 15 MG Tab  Commonly known as:  ROXICODONE  Take 1 tablet (15 mg total) by mouth every 4 (four) hours as needed for Pain.     pen needle, diabetic 32 gauge x 1/4" Ndle  Insulin injections four times per day.     pravastatin 40 MG tablet  Commonly known as:  PRAVACHOL  TAKE 1 TABLET DAILY     prochlorperazine 10 MG tablet  Commonly known as:  COMPAZINE  TAKE 1 TABLET BY MOUTH EVERY 6 HOURS AS NEEDED.     quinapril 40 MG tablet  Commonly known as:  ACCUPRIL  TAKE 1 TABLET BY MOUTH ONCE DAILY     sertraline 50 MG tablet  Commonly known as:  ZOLOFT  Take 1 tablet (50 mg total) by mouth once daily.     tiotropium bromide 2.5 mcg/actuation Mist  Commonly known as:  SPIRIVA RESPIMAT  Inhale 2 puffs into the lungs once daily. Controller     vitamin D 1000 units Tab  Commonly known as:  VITAMIN D3                  Medical Decision Making    Medical Decision Making:   Clinical Tests:   Lab Tests: Ordered and Reviewed  Radiological Study: Ordered and Reviewed  Medical Tests: Ordered and Reviewed           Scribe Attestation:   Scribe #1: I performed the above scribed service and the documentation accurately describes the services I performed. I attest to the accuracy of the note 10/25/2018 5:54 PM.    Attending:   Physician Attestation Statement for " Scribe #1: I, Jayesh Morejon MD, personally performed the services described in this documentation, as scribed by Corinne Mack, in my presence, and it is both accurate and complete.          Clinical Impression       ICD-10-CM ICD-9-CM   1. Hypoxia R09.02 799.02   2. Weakness R53.1 780.79   3. Hypotension, unspecified hypotension type I95.9 458.9   4. Acute cystitis with hematuria N30.01 595.0       Disposition:   Disposition: Admitted  Condition: Serious           Jayesh Morejon MD  10/25/18 1810

## 2018-10-25 NOTE — ED NOTES
Dr Morejon notified of pts glucose of 45.  Pt in bed eating chips and drinking a drink, glucose checking will be repeated in 15 min

## 2018-10-25 NOTE — HPI
Nico Garza is a 71 yo male with metastatic lung cancer with mets to spine, PAF, COPD, HTN, HPL and IDDM who was at the clinic and developed some lightheadedness and bright light visual disturbance and had to sit in a chair to prevent from falling.  Associated symptoms lumbar back pain, BENITO, dry cough, chest pain, abdominal pain, suprapubic pain & urinary urgency. In the ED, temp 98.6, pulse 86, Resp 25, B/P 86/47 and ABG shows hypoxic respiratory failure. Fluid resuscitation given and B/P improved 116/55. Pt presently wearing Vapotherm. CBC shows a normocytic anemia, glucose 45 (improved after eating), troponin normal, lactic acid 1.2 and procal 0.45. U/A positive nitrites and 60 WBC. Blood and urine culture pending. Pt is admitted for treatment of intravascular volume depletion, UTI and hypoxic respiratory failure.

## 2018-10-26 ENCOUNTER — TELEPHONE (OUTPATIENT)
Dept: RADIATION ONCOLOGY | Facility: CLINIC | Age: 73
End: 2018-10-26

## 2018-10-26 ENCOUNTER — TELEPHONE (OUTPATIENT)
Dept: FAMILY MEDICINE | Facility: CLINIC | Age: 73
End: 2018-10-26

## 2018-10-26 PROBLEM — E43 SEVERE MALNUTRITION: Status: ACTIVE | Noted: 2018-10-26

## 2018-10-26 PROBLEM — N39.0 UTI (URINARY TRACT INFECTION): Status: ACTIVE | Noted: 2018-10-26

## 2018-10-26 LAB
POCT GLUCOSE: 204 MG/DL (ref 70–110)
POCT GLUCOSE: 206 MG/DL (ref 70–110)
POCT GLUCOSE: 312 MG/DL (ref 70–110)

## 2018-10-26 PROCEDURE — 97802 MEDICAL NUTRITION INDIV IN: CPT

## 2018-10-26 PROCEDURE — 21400001 HC TELEMETRY ROOM

## 2018-10-26 PROCEDURE — 25000242 PHARM REV CODE 250 ALT 637 W/ HCPCS: Performed by: NURSE PRACTITIONER

## 2018-10-26 PROCEDURE — 63600175 PHARM REV CODE 636 W HCPCS: Performed by: NURSE PRACTITIONER

## 2018-10-26 PROCEDURE — 25000003 PHARM REV CODE 250: Performed by: HOSPITALIST

## 2018-10-26 PROCEDURE — 94761 N-INVAS EAR/PLS OXIMETRY MLT: CPT

## 2018-10-26 PROCEDURE — 25000003 PHARM REV CODE 250: Performed by: NURSE PRACTITIONER

## 2018-10-26 PROCEDURE — 94640 AIRWAY INHALATION TREATMENT: CPT

## 2018-10-26 PROCEDURE — 63600175 PHARM REV CODE 636 W HCPCS: Performed by: HOSPITALIST

## 2018-10-26 PROCEDURE — 99223 1ST HOSP IP/OBS HIGH 75: CPT | Mod: ,,, | Performed by: INTERNAL MEDICINE

## 2018-10-26 RX ORDER — POLYETHYLENE GLYCOL 3350 17 G/17G
17 POWDER, FOR SOLUTION ORAL DAILY
Status: DISCONTINUED | OUTPATIENT
Start: 2018-10-26 | End: 2018-10-30 | Stop reason: HOSPADM

## 2018-10-26 RX ORDER — PREDNISONE 20 MG/1
20 TABLET ORAL DAILY
Status: DISCONTINUED | OUTPATIENT
Start: 2018-10-26 | End: 2018-10-30 | Stop reason: HOSPADM

## 2018-10-26 RX ADMIN — INSULIN ASPART 2 UNITS: 100 INJECTION, SOLUTION INTRAVENOUS; SUBCUTANEOUS at 05:10

## 2018-10-26 RX ADMIN — PANTOPRAZOLE SODIUM 40 MG: 40 TABLET, DELAYED RELEASE ORAL at 09:10

## 2018-10-26 RX ADMIN — BUDESONIDE 0.5 MG: 0.5 SUSPENSION RESPIRATORY (INHALATION) at 07:10

## 2018-10-26 RX ADMIN — PRAVASTATIN SODIUM 40 MG: 20 TABLET ORAL at 08:10

## 2018-10-26 RX ADMIN — IPRATROPIUM BROMIDE AND ALBUTEROL SULFATE 3 ML: .5; 3 SOLUTION RESPIRATORY (INHALATION) at 07:10

## 2018-10-26 RX ADMIN — SERTRALINE HYDROCHLORIDE 50 MG: 50 TABLET ORAL at 09:10

## 2018-10-26 RX ADMIN — FLECAINIDE ACETATE 100 MG: 50 TABLET ORAL at 09:10

## 2018-10-26 RX ADMIN — OXYCODONE HYDROCHLORIDE 10 MG: 5 TABLET ORAL at 03:10

## 2018-10-26 RX ADMIN — CEFTRIAXONE 1 G: 1 INJECTION, SOLUTION INTRAVENOUS at 05:10

## 2018-10-26 RX ADMIN — OXYCODONE HYDROCHLORIDE 10 MG: 5 TABLET ORAL at 10:10

## 2018-10-26 RX ADMIN — GABAPENTIN 600 MG: 300 CAPSULE ORAL at 08:10

## 2018-10-26 RX ADMIN — SODIUM CHLORIDE: 0.9 INJECTION, SOLUTION INTRAVENOUS at 09:10

## 2018-10-26 RX ADMIN — INSULIN DETEMIR 10 UNITS: 100 INJECTION, SOLUTION SUBCUTANEOUS at 08:10

## 2018-10-26 RX ADMIN — APIXABAN 5 MG: 2.5 TABLET, FILM COATED ORAL at 09:10

## 2018-10-26 RX ADMIN — INSULIN ASPART 4 UNITS: 100 INJECTION, SOLUTION INTRAVENOUS; SUBCUTANEOUS at 11:10

## 2018-10-26 RX ADMIN — APIXABAN 5 MG: 2.5 TABLET, FILM COATED ORAL at 08:10

## 2018-10-26 RX ADMIN — OXYCODONE HYDROCHLORIDE 10 MG: 5 TABLET ORAL at 08:10

## 2018-10-26 RX ADMIN — IPRATROPIUM BROMIDE AND ALBUTEROL SULFATE 3 ML: .5; 3 SOLUTION RESPIRATORY (INHALATION) at 12:10

## 2018-10-26 RX ADMIN — SODIUM CHLORIDE: 0.9 INJECTION, SOLUTION INTRAVENOUS at 10:10

## 2018-10-26 RX ADMIN — DILTIAZEM HYDROCHLORIDE 360 MG: 180 CAPSULE, COATED, EXTENDED RELEASE ORAL at 09:10

## 2018-10-26 RX ADMIN — FLECAINIDE ACETATE 100 MG: 50 TABLET ORAL at 08:10

## 2018-10-26 RX ADMIN — INSULIN DETEMIR 10 UNITS: 100 INJECTION, SOLUTION SUBCUTANEOUS at 09:10

## 2018-10-26 NOTE — PLAN OF CARE
10/26/18 1130   Medicare Message   Important Message from Medicare regarding Discharge Appeal Rights Given to patient/caregiver;Explained to patient/caregiver;Signed/date by patient/caregiver   Date IMM was signed 10/26/18   Time IMM was signed 1122

## 2018-10-26 NOTE — H&P
[facility] Hospital Medicine  History & Physical    Patient Name: Nico Garza Jr.  MRN: [id number]  Admission Date: 10/25/2018  Attending Physician: Vick Joya MD  Primary Care Provider: Tiffany Davis DO         Patient information was obtained from patient, past medical records and ER records.     Subjective:     Principal Problem:Acute on chronic respiratory failure    Chief Complaint:   Chief Complaint   Patient presents with    Hypotension     sent over by JULIETTE Pearson for eval in triage 86/47 and Dr. office 72/42    Constipation    Fatigue    Dizziness    Back Pain    Flank Pain     left, urinary dribble        HPI: Nico Garza is a 71 yo male with metastatic lung cancer with mets to spine, PAF, COPD, HTN, HPL and IDDM who was at the clinic and developed some lightheadedness and bright light visual disturbance and had to sit in a chair to prevent from falling.  Associated symptoms lumbar back pain, BENITO, dry cough, chest pain, abdominal pain, suprapubic pain & urinary urgency. In the ED, temp 98.6, pulse 86, Resp 25, B/P 86/47 and ABG shows hypoxic respiratory failure. Fluid resuscitation given and B/P improved 116/55. Pt presently wearing Vapotherm. CBC shows a normocytic anemia, glucose 45 (improved after eating), troponin normal, lactic acid 1.2 and procal 0.45. U/A positive nitrites and 60 WBC. Blood and urine culture pending. Pt is admitted for treatment of intravascular volume depletion, UTI and hypoxic respiratory failure.     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  Others are too small to accurately characterize.     Lung disease     Nephrolithiasis     ct urogram 12/15/2017-Bilateral nephrolithiasis.     Neuropathy, diabetic     Pancreatitis     Type 1 diabetes mellitus with diabetic neuropathy 4/27/2018       Past Surgical History:   Procedure Laterality Date    ABLATION N/A 12/4/2017    Performed by Jaret Freire MD at [facility] CATH LAB    BICEPS TENDON REPAIR Right     BRONCHOSCOPY Bilateral 9/2/2018    Procedure: BRONCHOSCOPY- insert lighted tube into airway to obtain biopsy of lung;  Surgeon: Aldair Deleon MD;  Location: [facility] ENDO;  Service: Endoscopy;  Laterality: Bilateral;    BRONCHOSCOPY- insert lighted tube into airway to obtain biopsy of lung Bilateral 9/2/2018    Performed by Aldair Deleon MD at [facility] ENDO    CARDIOVERSION      CHOLECYSTECTOMY      INSERTION OF TUNNELED CENTRAL VENOUS CATHETER (CVC) WITH SUBCUTANEOUS PORT Right 10/9/2018    Procedure: [id number];  Surgeon: Christophe Brandt MD;  Location: [facility] OR;  Service: General;  Laterality: Right;    [id number]-K-CATH Right 10/9/2018    Performed by [facility]t MD at [facility] OR    PATELLA SURGERY Right     RADIOFREQUENCY ABLATION      ROTATOR CUFF REPAIR Left     TRANSESOPHAGEAL ECHOCARDIOGRAM (DILLAN) N/A 12/4/2017    Performed by J[facility] Freire [facility] at [facility] CATH LAB    ULTRASOUND GUIDANCE Right 10/9/2018    Procedure: ULTRASOUND GUIDANCE;  Surgeon: Christophe Brandt MD;  Location: [facility] OR;  Service: General;  Laterality: Right;    ULTRASOUND GUIDANCE Right 10/9/2018    Performed by Christophe Brandt MD at [facility] OR       Review of patient's allergies indicates:   Allergen Reactions    Anoro ellipta [umeclidinium-vilanterol] Shortness Of Breath    Flomax [tamsulosin]      Dizzy         No current facility-administered medications on file prior to encounter.      Current Outpatient Medications on File Prior to Encounter   Medication Sig    albuterol-ipratropium (DUO-NEB) 2.5 mg-0.5 mg/3 mL nebulizer solution Take 3 mLs by  nebulization every 6 (six) hours. Rescue    b complex vitamins tablet Take 1 tablet by mouth once daily.    diltiaZEM (CARDIZEM CD) 360 MG 24 hr capsule TAKE 1 CAPSULE(360 MG) BY MOUTH EVERY DAY    ELIQUIS 5 mg Tab TAKE 1 TABLET(5 MG) BY MOUTH TWICE DAILY    flecainide (TAMBOCOR) 100 MG Tab Take 1 tablet (100 mg total) by mouth every 12 (twelve) hours.    gabapentin (NEURONTIN) 300 MG capsule Take 2 capsules (600 mg total) by mouth 2 (two) times daily. (Patient taking differently: Take 600 mg by mouth every evening. Takes two tablets by mouth at night)    insulin degludec (TRESIBA FLEXTOUCH U-200) 200 unit/mL (3 mL) InPn Inject 50 Units into the skin once daily.    insulin lispro (HUMALOG KWIKPEN) 100 unit/mL InPn pen ADMINISTER 17 UNITS UNDER THE SKIN THREE TIMES DAILY BEFORE MEALS (Patient taking differently: ADMINISTER 17 UNITS UNDER THE SKIN IN MORNING AND 25 UNITS NOON AND NIGHT)    multivitamin capsule Take 1 capsule by mouth once daily.    omeprazole (PRILOSEC) 20 MG capsule Take 1 capsule (20 mg total) by mouth once daily.    ondansetron (ZOFRAN-ODT) 8 MG TbDL Take 1 tablet (8 mg total) by mouth every 8 (eight) hours as needed.    oxyCODONE (ROXICODONE) 15 MG Tab Take 1 tablet (15 mg total) by mouth every 4 (four) hours as needed for Pain.    pravastatin (PRAVACHOL) 40 MG tablet TAKE 1 TABLET DAILY (Patient taking differently: TAKE 1 TABLET NIGHTLY)    prochlorperazine (COMPAZINE) 10 MG tablet TAKE 1 TABLET BY MOUTH EVERY 6 HOURS AS NEEDED.    quinapril (ACCUPRIL) 40 MG tablet TAKE 1 TABLET BY MOUTH ONCE DAILY    sertraline (ZOLOFT) 50 MG tablet Take 1 tablet (50 mg total) by mouth once daily.    tiotropium bromide (SPIRIVA RESPIMAT) 2.5 mcg/actuation Mist Inhale 2 puffs into the lungs once daily. Controller    vitamin D 1000 units Tab Take 1,000 Units by mouth once daily.    blood sugar diagnostic Strp 1 each by Misc.(Non-Drug; Combo Route) route 3 (three) times daily. Dispense preferred  "on insurance    blood-glucose meter kit Use as instructed - preferred on insurance    clonazePAM (KLONOPIN) 0.5 MG tablet Take 1 tablet (0.5 mg total) by mouth 2 (two) times daily as needed for Anxiety.    insulin needles, disposable, 32 x 1/4 " Ndle Insulin injections four times per day.    NEEDLES, DISPOSABLE (DISPOSABLE NEEDLES MISC) Inject 1 each into the skin 3 (three) times daily.    [DISCONTINUED] predniSONE (DELTASONE) 10 MG tablet Take 1 tablet (10 mg total) by mouth once daily.     Family History     Problem Relation (Age of Onset)    Cancer Maternal Aunt    Cataracts Sister    Diabetes Mother, Sister, Sister, Sister    Heart attack Brother    Heart failure Brother    Hypertension Mother, Father    Stroke Mother, Father, Paternal Grandmother, Paternal Grandfather        Tobacco Use    Smoking status: Former Smoker     Packs/day: 0.50     Types: Cigarettes     Start date: 1/1/1960     Last attempt to quit: 3/8/2015     Years since quitting: 3.6    Smokeless tobacco: Former User     Types: Snuff     Quit date: 10/7/1995   Substance and Sexual Activity    Alcohol use: No     Alcohol/week: 0.0 oz    Drug use: No    Sexual activity: Not on file     Review of Systems   Constitutional: Positive for appetite change. Negative for chills, diaphoresis, fatigue and fever.   HENT: Negative.    Eyes: Positive for visual disturbance.   Respiratory: Positive for cough and shortness of breath. Negative for wheezing.    Cardiovascular: Positive for chest pain and palpitations. Negative for leg swelling.   Gastrointestinal: Positive for abdominal pain and constipation. Negative for diarrhea, nausea and vomiting.   Genitourinary: Positive for urgency. Negative for dysuria and hematuria.   Musculoskeletal: Positive for back pain.   Skin: Negative for pallor, rash and wound.   Neurological: Positive for light-headedness. Negative for seizures, facial asymmetry, speech difficulty, weakness and headaches. "   Psychiatric/Behavioral: Negative for confusion. The patient is not nervous/anxious.      Objective:     Vital Signs (Most Recent):  Temp: 98.6 °F (37 °C) (10/25/18 1404)  Pulse: 82 (10/25/18 1847)  Resp: (!) 23 (10/25/18 1847)  BP: (!) 117/56 (10/25/18 1801)  SpO2: (!) 93 % (10/25/18 1847) Vital Signs (24h Range):  Temp:  [98.6 °F (37 °C)] 98.6 °F (37 °C)  Pulse:  [73-86] 82  Resp:  [19-29] 23  SpO2:  [84 %-93 %] 93 %  BP: ()/(47-58) 117/56     Weight: 81 kg (178 lb 9.2 oz)  Body mass index is 27.97 kg/m².    Physical Exam   Constitutional: He is oriented to person, place, and time. He appears well-developed.   HENT:   Head: Normocephalic and atraumatic.   Nose: Nose normal.   Mouth/Throat: Oropharynx is clear and moist.   Eyes: Conjunctivae are normal. Pupils are equal, round, and reactive to light. No scleral icterus.   Neck: Normal range of motion. Neck supple.   Cardiovascular: Normal rate, regular rhythm and normal heart sounds. Exam reveals no gallop and no friction rub.   No murmur heard.  Pulmonary/Chest: Effort normal. He has decreased breath sounds in the right upper field, the left upper field, the left middle field and the left lower field.   Abdominal: Soft. Bowel sounds are normal.   Musculoskeletal: Normal range of motion. He exhibits no edema or tenderness.   Neurological: He is alert and oriented to person, place, and time.   Skin: Skin is warm and dry.   Psychiatric: He has a normal mood and affect. His behavior is normal.   Nursing note and vitals reviewed.        CRANIAL NERVES     CN III, IV, VI   Pupils are equal, round, and reactive to light.       Significant Labs:   CBC:   Recent Labs   Lab 10/25/18  1425   WBC 11.01   HGB 11.8*   HCT 36.6*        CMP:   Recent Labs   Lab 10/25/18  1425      K 3.9      CO2 28   GLU 45*   BUN 21   CREATININE 0.8   CALCIUM 9.1   PROT 6.8   ALBUMIN 2.5*   BILITOT 0.4   ALKPHOS 163*   AST 48*   ALT 21   ANIONGAP 10   EGFRNONAA >60      All pertinent labs within the past 24 hours have been reviewed.    Significant Imaging: I have reviewed all pertinent imaging results/findings within the past 24 hours.    Assessment/Plan:     * Acute on chronic respiratory failure    --Lung Ca/COPD exacerbation   --Currently, on vapotherm   -- Duonebs Q6 hours.  -- ICS                Intravascular volume depletion    Continue IVFs   Orthostatics        Mass of lower lobe of left lung    Followed outpatient by Dr. Lagos   Patient currently receiving radiation   Consult Hematology/Oncology        Constipation    Magnesium citrate        Paroxysmal atrial fibrillation    Sinus rhythm, monitor telemetry.  - Continue home diltiazem and flecainide for HR/rhythm suppression.  - Continue Eliquis           COPD (chronic obstructive pulmonary disease) with emphysema    -On vapotherm   -JAMMIE and ICS.          VTE Risk Mitigation (From admission, onward)        Ordered     apixaban tablet 5 mg  2 times daily      10/25/18 1850             Genie Akbar NP  Department of Hospital Medicine   [facility]

## 2018-10-26 NOTE — NURSING
Patient assessed for diabetes educational needs following chart review   Family at bedside for info  Reports was diagnosed over 10 years ago and has attended diabetes education class in the past  Has a home glucose monitor and checks 3 times daily with average less than 140  Is consistent with administering his insulin.    They do not identify any diabetes educational needs at this time

## 2018-10-26 NOTE — ASSESSMENT & PLAN NOTE
Followed outpatient by Dr. Lagos   Patient currently receiving radiation   Consult Hematology/Oncology

## 2018-10-26 NOTE — SUBJECTIVE & OBJECTIVE
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  Others are too small to accurately characterize.    Lung disease     Nephrolithiasis     ct urogram 12/15/2017-Bilateral nephrolithiasis.     Neuropathy, diabetic     Pancreatitis     Type 1 diabetes mellitus with diabetic neuropathy 4/27/2018       Past Surgical History:   Procedure Laterality Date    ABLATION N/A 12/4/2017    Performed by [facility]ire [facility] at [facility] CATH LAB    BICEPS TENDON REPAIR Right     BRONCHOSCOPY Bilateral 9/2/2018    Procedure: BRONCHOSCOPY- insert lighted tube into airway to obtain biopsy of lung;  Surgeon: Aldair Deleon MD;  Location: [facility] ENDO;  Service: Endoscopy;  Laterality: Bilateral;    BRONCHOSCOPY- insert lighted tube into airway to obtain biopsy of lung Bilateral 9/2/2018    Performed by Aldair Deleon MD at [facility] ENDO    CARDIOVERSION      CHOLECYSTECTOMY      INSERTION OF TUNNELED CENTRAL VENOUS CATHETER (CVC) WITH SUBCUTANEOUS PORT Right 10/9/2018    Procedure: [id number]-CATH;  Surgeon: Christophe Brandt MD;  Location: [facility] OR;  Service: General;  Laterality: Right;    [id number]-U-CATH Right 10/9/2018    Performed by [facility]ophe Brandt MD at [facility] OR    PATELLA SURGERY Right     RADIOFREQUENCY ABLATION      ROTATOR CUFF REPAIR Left     TRANSESOPHAGEAL ECHOCARDIOGRAM (DILLAN) N/A 12/4/2017    Performed by J[facility] Freire [facility] at [facility] CATH LAB    ULTRASOUND GUIDANCE Right 10/9/2018    Procedure: ULTRASOUND GUIDANCE;  Surgeon: Christophe Brandt MD;  Location: [facility] OR;  Service: General;  Laterality: Right;    ULTRASOUND GUIDANCE Right 10/9/2018    Performed by  [facility]t MD at [facility] OR       Review of patient's allergies indicates:   Allergen Reactions    Anoro ellipta [umeclidinium-vilanterol] Shortness Of Breath    Flomax [tamsulosin]      Dizzy         No current facility-administered medications on file prior to encounter.      Current Outpatient Medications on File Prior to Encounter   Medication Sig    albuterol-ipratropium (DUO-NEB) 2.5 mg-0.5 mg/3 mL nebulizer solution Take 3 mLs by nebulization every 6 (six) hours. Rescue    b complex vitamins tablet Take 1 tablet by mouth once daily.    diltiaZEM (CARDIZEM CD) 360 MG 24 hr capsule TAKE 1 CAPSULE(360 MG) BY MOUTH EVERY DAY    ELIQUIS 5 mg Tab TAKE 1 TABLET(5 MG) BY MOUTH TWICE DAILY    flecainide (TAMBOCOR) 100 MG Tab Take 1 tablet (100 mg total) by mouth every 12 (twelve) hours.    gabapentin (NEURONTIN) 300 MG capsule Take 2 capsules (600 mg total) by mouth 2 (two) times daily. (Patient taking differently: Take 600 mg by mouth every evening. Takes two tablets by mouth at night)    insulin degludec (TRESIBA FLEXTOUCH U-200) 200 unit/mL (3 mL) InPn Inject 50 Units into the skin once daily.    insulin lispro (HUMALOG KWIKPEN) 100 unit/mL InPn pen ADMINISTER 17 UNITS UNDER THE SKIN THREE TIMES DAILY BEFORE MEALS (Patient taking differently: ADMINISTER 17 UNITS UNDER THE SKIN IN MORNING AND 25 UNITS NOON AND NIGHT)    multivitamin capsule Take 1 capsule by mouth once daily.    omeprazole (PRILOSEC) 20 MG capsule Take 1 capsule (20 mg total) by mouth once daily.    ondansetron (ZOFRAN-ODT) 8 MG TbDL Take 1 tablet (8 mg total) by mouth every 8 (eight) hours as needed.    oxyCODONE (ROXICODONE) 15 MG Tab Take 1 tablet (15 mg total) by mouth every 4 (four) hours as needed for Pain.    pravastatin (PRAVACHOL) 40 MG tablet TAKE 1 TABLET DAILY (Patient taking differently: TAKE 1 TABLET NIGHTLY)    prochlorperazine (COMPAZINE) 10 MG tablet TAKE 1 TABLET BY MOUTH EVERY 6 HOURS AS NEEDED.    quinapril  "(ACCUPRIL) 40 MG tablet TAKE 1 TABLET BY MOUTH ONCE DAILY    sertraline (ZOLOFT) 50 MG tablet Take 1 tablet (50 mg total) by mouth once daily.    tiotropium bromide (SPIRIVA RESPIMAT) 2.5 mcg/actuation Mist Inhale 2 puffs into the lungs once daily. Controller    vitamin D 1000 units Tab Take 1,000 Units by mouth once daily.    blood sugar diagnostic Strp 1 each by Misc.(Non-Drug; Combo Route) route 3 (three) times daily. Dispense preferred on insurance    blood-glucose meter kit Use as instructed - preferred on insurance    clonazePAM (KLONOPIN) 0.5 MG tablet Take 1 tablet (0.5 mg total) by mouth 2 (two) times daily as needed for Anxiety.    insulin needles, disposable, 32 x 1/4 " Ndle Insulin injections four times per day.    NEEDLES, DISPOSABLE (DISPOSABLE NEEDLES MISC) Inject 1 each into the skin 3 (three) times daily.     Family History     Problem Relation (Age of Onset)    Cancer Maternal Aunt    Cataracts Sister    Diabetes Mother, Sister, Sister, Sister    Heart attack Brother    Heart failure Brother    Hypertension Mother, Father    Stroke Mother, Father, Paternal Grandmother, Paternal Grandfather        Tobacco Use    Smoking status: Former Smoker     Packs/day: 0.50     Types: Cigarettes     Start date: 1/1/1960     Last attempt to quit: 3/8/2015     Years since quitting: 3.6    Smokeless tobacco: Former User     Types: Snuff     Quit date: 10/7/1995   Substance and Sexual Activity    Alcohol use: No     Alcohol/week: 0.0 oz    Drug use: No    Sexual activity: Not on file     Review of Systems   Constitutional: Positive for appetite change. Negative for chills, diaphoresis, fatigue and fever.   HENT: Negative.    Eyes: Positive for visual disturbance.   Respiratory: Positive for cough and shortness of breath. Negative for wheezing.    Cardiovascular: Positive for chest pain and palpitations. Negative for leg swelling.   Gastrointestinal: Positive for abdominal pain and constipation. Negative " for diarrhea, nausea and vomiting.   Genitourinary: Positive for urgency. Negative for dysuria and hematuria.   Musculoskeletal: Positive for back pain.   Skin: Negative for pallor, rash and wound.   Neurological: Positive for light-headedness. Negative for seizures, facial asymmetry, speech difficulty, weakness and headaches.   Psychiatric/Behavioral: Negative for confusion. The patient is not nervous/anxious.      Objective:     Vital Signs (Most Recent):  Temp: 97.6 °F (36.4 °C) (10/26/18 0738)  Pulse: 90 (10/26/18 0738)  Resp: 18 (10/26/18 0738)  BP: 122/60 (10/26/18 0738)  SpO2: (!) 94 % (10/26/18 0738) Vital Signs (24h Range):  Temp:  [97.6 °F (36.4 °C)-99.7 °F (37.6 °C)] 97.6 °F (36.4 °C)  Pulse:  [] 90  Resp:  [18-29] 18  SpO2:  [84 %-95 %] 94 %  BP: ()/(47-63) 122/60     Weight: 81 kg (178 lb 9.2 oz)  Body mass index is 27.97 kg/m².    Physical Exam   Constitutional: He is oriented to person, place, and time. He appears well-developed.  Non-toxic appearance. He has a sickly appearance. He appears ill. No distress.   HENT:   Head: Normocephalic and atraumatic.   Nose: Nose normal.   Mouth/Throat: Oropharynx is clear and moist.   Eyes: Conjunctivae, EOM and lids are normal. Pupils are equal, round, and reactive to light. No scleral icterus.   Neck: Normal range of motion. Neck supple.   Cardiovascular: Normal rate, regular rhythm, S1 normal and S2 normal. Exam reveals no gallop and no friction rub.   Murmur heard.   Systolic murmur is present with a grade of 2/6.  Pulmonary/Chest: Effort normal. No accessory muscle usage or stridor. No apnea, no tachypnea and no bradypnea. No respiratory distress. He has decreased breath sounds in the right lower field, the left upper field, the left middle field and the left lower field. He has no wheezes. He has no rales.   Abdominal: Soft. Bowel sounds are normal.   Musculoskeletal: Normal range of motion. He exhibits no edema or tenderness.   Neurological: He  is alert and oriented to person, place, and time.   Skin: Skin is warm, dry and intact. No ecchymosis noted. He is not diaphoretic. There is pallor.   Psychiatric: His speech is normal and behavior is normal. Judgment and thought content normal. His mood appears anxious. Cognition and memory are normal. He is attentive.   Nursing note and vitals reviewed.        CRANIAL NERVES     CN III, IV, VI   Pupils are equal, round, and reactive to light.  Extraocular motions are normal.        Significant Labs:   CBC:   Recent Labs   Lab 10/25/18  1425   WBC 11.01   HGB 11.8*   HCT 36.6*        CMP:   Recent Labs   Lab 10/25/18  1425      K 3.9      CO2 28   GLU 45*   BUN 21   CREATININE 0.8   CALCIUM 9.1   PROT 6.8   ALBUMIN 2.5*   BILITOT 0.4   ALKPHOS 163*   AST 48*   ALT 21   ANIONGAP 10   EGFRNONAA >60     All pertinent labs within the past 24 hours have been reviewed.    Significant Imaging: I have reviewed all pertinent imaging results/findings within the past 24 hours.

## 2018-10-26 NOTE — TELEPHONE ENCOUNTER
----- Message from Shruthi Lane NP sent at 10/26/2018  8:05 AM CDT -----  Good morning,   This patient was scheduled yesterday for HRA but was not able to be completed due to sending him to ER. It looks like he was admitted. Do you mind calling this patient later today or next week to reschedule his HRA? Thanks and have a great weekend.   Shruthi   ----- Message -----  From: Shruthi Lane NP  Sent: 10/25/2018   2:10 PM  To: Shruthi Lane NP    Call to reschedule HRA.    Pt has been rescheduled for 12/4/18..

## 2018-10-26 NOTE — ASSESSMENT & PLAN NOTE
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  He is receiving Keytruda and radiation to the spine for treatment.  First dose of Keytruda was on October 18, 2018.  He also has begun radiation treatments to the spine.  He is scheduled for radiation today.  He will follow up with his primary oncologist, Dr. Woodall, after acute illness resolved.  Continue supportive care including:  -Nebulizer treatments every 6 hr  -oxygen supplementation as needed

## 2018-10-26 NOTE — ASSESSMENT & PLAN NOTE
Malnutrition in the context of Chronic Illness/Injury    Related to (etiology):  Decreased PO intake 2/2 cancer tx    Signs and Symptoms (as evidenced by):  Energy Intake: <75% of estimated energy requirement for 2 months  Body Fat Depletion: mild depletion of triceps   Muscle Mass Depletion: mild depletion of temples and clavicle region   Weight Loss: -9.6% x 1 month  Fluid Accumulation: moderate    Interventions/Recommendations (treatment strategy):  Recommend Diabetic 2000/Low Na diet  Recommend Boost Glucose control (any flavor) w/ all meal trays  Will continue to encourage PO intake    Nutrition Diagnosis Status:  New

## 2018-10-26 NOTE — CONSULTS
[facility] -   Hematology/Oncology  Consult Note    Patient Name: Nico Garza Jr.  MRN: [id number]  Admission Date: 10/25/2018  Hospital Length of Stay: 1 days  Code Status: Full Code   Attending Provider: Vick Maldonado, *  Consulting Provider: Callie Rodriguez NP  Primary Care Physician: Tiffany Davis DO  Principal Problem:Acute on chronic respiratory failure    Consults  Subjective:     HPI:  Nico Garza is a 71 yo male with metastatic lung cancer with mets to spine, PAF, COPD, HTN, HPL and IDDM who was at the clinic and developed some lightheadedness and bright light visual disturbance and had to sit in a chair to prevent from falling.  Associated symptoms lumbar back pain, BENITO, dry cough, chest pain, abdominal pain, suprapubic pain & urinary urgency. In the ED, temp 98.6, pulse 86, Resp 25, B/P 86/47 and ABG shows hypoxic respiratory failure. Fluid resuscitation given and B/P improved 116/55. Pt presently wearing Vapotherm. CBC shows a normocytic anemia, glucose 45 (improved after eating), troponin normal, lactic acid 1.2 and procal 0.45. U/A positive nitrites and 60 WBC. Blood and urine culture pending. Pt is admitted for treatment of intravascular volume depletion, UTI and hypoxic respiratory failure.     Patient has metastatic lung cancer with mets to liver.  He received 1st dose of Keytruda on October 18, 2018 and is also getting radiation to his spine.  His primary oncologist is Dr. Issa Lagos.  Hematology/oncology consulted for further management of care.        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  Others are too small  to accurately characterize.    Lung disease     Nephrolithiasis     ct urogram 12/15/2017-Bilateral nephrolithiasis.     Neuropathy, diabetic     Pancreatitis     Type 1 diabetes mellitus with diabetic neuropathy 4/27/2018       Past Surgical History:   Procedure Laterality Date    ABLATION N/A 12/4/2017    Performed by Jaret Freire MD at [facility] CATH LAB    BICEPS TENDON REPAIR Right     BRONCHOSCOPY Bilateral 9/2/2018    Procedure: BRONCHOSCOPY- insert lighted tube into airway to obtain biopsy of lung;  Surgeon: Aldair Deleon MD;  Location: [facility];  Service: Endoscopy;  Laterality: Bilateral;    BRONCHOSCOPY- insert lighted tube into airway to obtain biopsy of lung Bilateral 9/2/2018    Performed by [facility]on [facility] at [facility]    CARDIOVERSION      CHOLECYSTECTOMY      INSERTION OF TUNNELED CENTRAL VENOUS CATHETER (CVC) WITH SUBCUTANEOUS PORT Right 10/9/2018    Procedure: IOLZNHWFA-LTEY-I-CATH;  Surgeon: Christophe Brandt MD;  Location: [facility];  Service: General;  Laterality: Right;    RMYZTUKJU-MCNV-H-CATH Right 10/9/2018    [facility] at [facility]    PATELLA SURGERY Right     RADIOFREQUENCY ABLATION      ROTATOR CUFF REPAIR Left     TRANSESOPHAGEAL ECHOCARDIOGRAM (DILLAN) N/A 12/4/2017    Performed by J[facility] Freire MD at [facility] CATH LAB    ULTRASOUND GUIDANCE Right 10/9/2018    Procedure: ULTRASOUND GUIDANCE;  Surgeon: Christophe Brandt MD;  Location: [facility];  Service: General;  Laterality: Right;    ULTRASOUND GUIDANCE Right 10/9/2018    Performed by [facility] at [facility]       Review of patient's allergies indicates:   Allergen Reactions    Anoro ellipta [umeclidinium-vilanterol] Shortness Of Breath    Flomax [tamsulosin]      Dizzy         No current facility-administered medications on file prior to encounter.      Current Outpatient Medications on File Prior to Encounter   Medication Sig    albuterol-ipratropium (DUO-NEB) 2.5 mg-0.5 mg/3 mL nebulizer  solution Take 3 mLs by nebulization every 6 (six) hours. Rescue    b complex vitamins tablet Take 1 tablet by mouth once daily.    diltiaZEM (CARDIZEM CD) 360 MG 24 hr capsule TAKE 1 CAPSULE(360 MG) BY MOUTH EVERY DAY    ELIQUIS 5 mg Tab TAKE 1 TABLET(5 MG) BY MOUTH TWICE DAILY    flecainide (TAMBOCOR) 100 MG Tab Take 1 tablet (100 mg total) by mouth every 12 (twelve) hours.    gabapentin (NEURONTIN) 300 MG capsule Take 2 capsules (600 mg total) by mouth 2 (two) times daily. (Patient taking differently: Take 600 mg by mouth every evening. Takes two tablets by mouth at night)    insulin degludec (TRESIBA FLEXTOUCH U-200) 200 unit/mL (3 mL) InPn Inject 50 Units into the skin once daily.    insulin lispro (HUMALOG KWIKPEN) 100 unit/mL InPn pen ADMINISTER 17 UNITS UNDER THE SKIN THREE TIMES DAILY BEFORE MEALS (Patient taking differently: ADMINISTER 17 UNITS UNDER THE SKIN IN MORNING AND 25 UNITS NOON AND NIGHT)    multivitamin capsule Take 1 capsule by mouth once daily.    omeprazole (PRILOSEC) 20 MG capsule Take 1 capsule (20 mg total) by mouth once daily.    ondansetron (ZOFRAN-ODT) 8 MG TbDL Take 1 tablet (8 mg total) by mouth every 8 (eight) hours as needed.    oxyCODONE (ROXICODONE) 15 MG Tab Take 1 tablet (15 mg total) by mouth every 4 (four) hours as needed for Pain.    pravastatin (PRAVACHOL) 40 MG tablet TAKE 1 TABLET DAILY (Patient taking differently: TAKE 1 TABLET NIGHTLY)    prochlorperazine (COMPAZINE) 10 MG tablet TAKE 1 TABLET BY MOUTH EVERY 6 HOURS AS NEEDED.    quinapril (ACCUPRIL) 40 MG tablet TAKE 1 TABLET BY MOUTH ONCE DAILY    sertraline (ZOLOFT) 50 MG tablet Take 1 tablet (50 mg total) by mouth once daily.    tiotropium bromide (SPIRIVA RESPIMAT) 2.5 mcg/actuation Mist Inhale 2 puffs into the lungs once daily. Controller    vitamin D 1000 units Tab Take 1,000 Units by mouth once daily.    blood sugar diagnostic Strp 1 each by Misc.(Non-Drug; Combo Route) route 3 (three) times  "daily. Dispense preferred on insurance    blood-glucose meter kit Use as instructed - preferred on insurance    clonazePAM (KLONOPIN) 0.5 MG tablet Take 1 tablet (0.5 mg total) by mouth 2 (two) times daily as needed for Anxiety.    insulin needles, disposable, 32 x 1/4 " Ndle Insulin injections four times per day.    NEEDLES, DISPOSABLE (DISPOSABLE NEEDLES MISC) Inject 1 each into the skin 3 (three) times daily.     Family History     Problem Relation (Age of Onset)    Cancer Maternal Aunt    Cataracts Sister    Diabetes Mother, Sister, Sister, Sister    Heart attack Brother    Heart failure Brother    Hypertension Mother, Father    Stroke Mother, Father, Paternal Grandmother, Paternal Grandfather        Tobacco Use    Smoking status: Former Smoker     Packs/day: 0.50     Types: Cigarettes     Start date: 1/1/1960     Last attempt to quit: 3/8/2015     Years since quitting: 3.6    Smokeless tobacco: Former User     Types: Snuff     Quit date: 10/7/1995   Substance and Sexual Activity    Alcohol use: No     Alcohol/week: 0.0 oz    Drug use: No    Sexual activity: Not on file     Review of Systems   Constitutional: Positive for appetite change. Negative for chills, diaphoresis, fatigue and fever.   HENT: Negative.    Eyes: Positive for visual disturbance.   Respiratory: Positive for cough and shortness of breath. Negative for wheezing.    Cardiovascular: Positive for chest pain and palpitations. Negative for leg swelling.   Gastrointestinal: Positive for abdominal pain and constipation. Negative for diarrhea, nausea and vomiting.   Genitourinary: Positive for urgency. Negative for dysuria and hematuria.   Musculoskeletal: Positive for back pain.   Skin: Negative for pallor, rash and wound.   Neurological: Positive for light-headedness. Negative for seizures, facial asymmetry, speech difficulty, weakness and headaches.   Psychiatric/Behavioral: Negative for confusion. The patient is not nervous/anxious.  "     Objective:     Vital Signs (Most Recent):  Temp: 97.6 °F (36.4 °C) (10/26/18 0738)  Pulse: 90 (10/26/18 0738)  Resp: 18 (10/26/18 0738)  BP: 122/60 (10/26/18 0738)  SpO2: (!) 94 % (10/26/18 0738) Vital Signs (24h Range):  Temp:  [97.6 °F (36.4 °C)-99.7 °F (37.6 °C)] 97.6 °F (36.4 °C)  Pulse:  [] 90  Resp:  [18-29] 18  SpO2:  [84 %-95 %] 94 %  BP: ()/(47-63) 122/60     Weight: 81 kg (178 lb 9.2 oz)  Body mass index is 27.97 kg/m².    Physical Exam   Constitutional: He is oriented to person, place, and time. He appears well-developed.  Non-toxic appearance. He has a sickly appearance. He appears ill. No distress.   HENT:   Head: Normocephalic and atraumatic.   Nose: Nose normal.   Mouth/Throat: Oropharynx is clear and moist.   Eyes: Conjunctivae, EOM and lids are normal. Pupils are equal, round, and reactive to light. No scleral icterus.   Neck: Normal range of motion. Neck supple.   Cardiovascular: Normal rate, regular rhythm, S1 normal and S2 normal. Exam reveals no gallop and no friction rub.   Murmur heard.   Systolic murmur is present with a grade of 2/6.  Pulmonary/Chest: Effort normal. No accessory muscle usage or stridor. No apnea, no tachypnea and no bradypnea. No respiratory distress. He has decreased breath sounds in the right lower field, the left upper field, the left middle field and the left lower field. He has no wheezes. He has no rales.   Abdominal: Soft. Bowel sounds are normal.   Musculoskeletal: Normal range of motion. He exhibits no edema or tenderness.   Neurological: He is alert and oriented to person, place, and time.   Skin: Skin is warm, dry and intact. No ecchymosis noted. He is not diaphoretic. There is pallor.   Psychiatric: His speech is normal and behavior is normal. Judgment and thought content normal. His mood appears anxious. Cognition and memory are normal. He is attentive.   Nursing note and vitals reviewed.        CRANIAL NERVES     CN III, IV, VI   Pupils are  equal, round, and reactive to light.  Extraocular motions are normal.        Significant Labs:   CBC:   Recent Labs   Lab 10/25/18  1425   WBC 11.01   HGB 11.8*   HCT 36.6*        CMP:   Recent Labs   Lab 10/25/18  1425      K 3.9      CO2 28   GLU 45*   BUN 21   CREATININE 0.8   CALCIUM 9.1   PROT 6.8   ALBUMIN 2.5*   BILITOT 0.4   ALKPHOS 163*   AST 48*   ALT 21   ANIONGAP 10   EGFRNONAA >60     All pertinent labs within the past 24 hours have been reviewed.    Significant Imaging: I have reviewed all pertinent imaging results/findings within the past 24 hours.    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  He is receiving Keytruda and radiation to the spine for treatment.  First dose of Keytruda was on October 18, 2018.  He also has begun radiation treatments to the spine.  He is scheduled for radiation today.  He will follow up with his primary oncologist, Dr. Woodall, after acute illness resolved.  Continue supportive care including:  -Nebulizer treatments every 6 hr  -oxygen supplementation as needed             Thank you for your consult. I will follow-up with patient. Please contact us if you have any additional questions.    Callie Rodriguez NP  Hematology/Oncology  [facility]

## 2018-10-26 NOTE — PLAN OF CARE
Problem: Patient Care Overview  Goal: Plan of Care Review  Outcome: Ongoing (interventions implemented as appropriate)  Recommendations    Recommendation/Intervention:   1. Recommend Diabetic 2000/Low Na diet.   2. Recommend Boost Glucose Control - any flavor with all meals.   3. Will continue to monitor, encourage PO intake.    Goals: Meal intake at least 50% at all meals  Nutrition Goal Status: new  Communication of RD Recs: (POC review, sticky note)

## 2018-10-26 NOTE — ASSESSMENT & PLAN NOTE
Sinus rhythm, monitor telemetry.  - Continue home diltiazem and flecainide for HR/rhythm suppression.  - Continue Eliquis

## 2018-10-26 NOTE — PLAN OF CARE
Problem: Patient Care Overview  Goal: Plan of Care Review  Outcome: Ongoing (interventions implemented as appropriate)  Plan of care discussed with patient, and patient verbalized understanding.  Patient remained AOx4, venti mask, and NSR on the monitor. Chronic pain moderately controlled with medication.  Blood glucose monitor - coverage given.  Radiation will resume Monday.   Patient remained free of falls, accidents, and trama during the day shift.  Bed is in the lowest position and call light is within reach - Continue to monitor.

## 2018-10-26 NOTE — PLAN OF CARE
Sw met with pt and dtr at bedside to complete assessment. Sw explained role/purpose of visit. Transitional navigator was provided to pt. Sw placed contact information on white board. Pt had [facility] before inpatient stay. Pt wants to resume home health at discharge. Pt's pcp is Tiffany Davis DO. Pt uses   [employer] [address] [facility] [address] [facility] [address]  [address]  [facility] [address]  Phone: [phone] Fax: [phone]    [facility] - [place], [place] - [address] [facility]  [address] [address]  Phone: [phone] Fax: [phone]    [facility] - [place], [place] - [address]  [address] [facility] [address]  Phone: [phone] Fax: [phone]       10/26/18 1133   Discharge Assessment   Assessment Type Discharge Planning Assessment   Confirmed/corrected address and phone number on facesheet? Yes   Assessment information obtained from? Patient   Communicated expected length of stay with patient/caregiver yes   Prior to hospitilization cognitive status: Alert/Oriented   Prior to hospitalization functional status: Independent   Current cognitive status: Alert/Oriented   Current Functional Status: Independent   Facility Arrived From: Home   Lives With alone   Able to Return to Prior Arrangements yes   Is patient able to care for self after discharge? Yes   Who are your caregiver(s) and their phone number(s)? Belia Garza, dtr, [phone]   Patient's perception of discharge disposition home health   Readmission Within The Last 30 Days current reason for admission unrelated to previous admission   Patient currently being followed by outpatient case management? No   Patient currently receives any other outside agency services? Yes   Name and contact number of agency or person providing outside services [facility]   Equipment Currently Used  at Home nebulizer;oxygen   Do you have any problems affording any of your prescribed medications? No   Is the patient taking medications as prescribed? yes   Does the patient have transportation home? Yes   Transportation Available family or friend will provide   Does the patient receive services at the [facility]? No   Discharge Plan A Home;Home Health   Discharge Plan B Home;Home Health   Patient/Family In Agreement With Plan yes   Readmission Questionnaire   At the time of your discharge, did someone talk to you about what your health problems were? Yes   At the time of discharge, did someone talk to you about what to watch out for regarding worsening of your health problem? Yes   At the time of discharge, did someone talk to you about what to do if you experienced worsening of your health problem? Yes   At the time of discharge, did someone talk to you about which medication to take when you left the hospital and which ones to stop taking? Yes   At the time of discharge, did someone talk to you about when and where to follow up with a doctor after you left the hospital? Yes   What do you believe caused you to be sick enough to be re-admitted? weakness   How often do you need to have someone help you when you read instructions, pamphlets, or other written material from your doctor or pharmacy? Rarely   Do you have problems taking your medications as prescribed? No   Do you have any problems affording any of  your prescribed medications? To be determined   Do you have problems obtaining/receiving your medications? Yes   Does the patient have transportation to healthcare appointments? Yes   Living Arrangements house   Does the patient have family/friends to help with healtcare needs after discharge? yes   Does your caregiver provide all the help you need? Yes   Are you currently feeling confused? No   Are you currently having problems thinking? No   Are you currently having memory problems? No   Have you felt  down, depressed, or hopeless? 0   Have you felt little interest or pleasure in doing things? 0   In the last 7 days, my sleep quality was: fair

## 2018-10-26 NOTE — PROGRESS NOTES
[facility] Hospital Medicine  Progress Note    Patient Name: Nico Garza Jr.  MRN: [id number]  Patient Class: IP- Inpatient   Admission Date: 10/25/2018  Length of Stay: 1 days  Attending Physician: Vick Maldonado, *  Primary Care Provider: Tiffany Davis DO        Subjective:     Principal Problem:Acute on chronic respiratory failure    HPI:  Nico Garza is a 73 yo male with metastatic lung cancer with mets to spine, PAF, COPD, HTN, HPL and IDDM who was at the clinic and developed some lightheadedness and bright light visual disturbance and had to sit in a chair to prevent from falling.  Associated symptoms lumbar back pain, BENITO, dry cough, chest pain, abdominal pain, suprapubic pain & urinary urgency. In the ED, temp 98.6, pulse 86, Resp 25, B/P 86/47 and ABG shows hypoxic respiratory failure. Fluid resuscitation given and B/P improved 116/55. Pt presently wearing Vapotherm. CBC shows a normocytic anemia, glucose 45 (improved after eating), troponin normal, lactic acid 1.2 and procal 0.45. U/A positive nitrites and 60 WBC. Blood and urine culture pending. Pt is admitted for treatment of intravascular volume depletion, UTI and hypoxic respiratory failure.     Hospital Course:  73 y/o wm admitted with a dx of acute on chronic respiratory failure , UTI and acute on chronic copd exacerbation . He was started on iv rocephin , LABA, JAMMIE and IHS . He is a o2 home dependent .     Interval History: Pt was seen and examined at bedside . He states feel better . He is on NRM at  6 lt  With a o2 sat > 92 % . There was no acute event overnight     Review of Systems   Constitutional: Positive for appetite change and chills. Negative for diaphoresis, fatigue and fever.   HENT: Negative.    Eyes: Negative for visual disturbance.   Respiratory: Positive for cough and shortness of breath. Negative for wheezing.    Cardiovascular: Negative for chest pain, palpitations and leg swelling.    Gastrointestinal: Positive for constipation. Negative for abdominal pain, diarrhea, nausea and vomiting.   Genitourinary: Positive for urgency. Negative for dysuria and hematuria.   Musculoskeletal: Positive for back pain.   Skin: Negative for pallor, rash and wound.   Neurological: Negative for seizures, facial asymmetry, speech difficulty, weakness, light-headedness and headaches.   Psychiatric/Behavioral: Negative for confusion. The patient is not nervous/anxious.      Objective:     Vital Signs (Most Recent):  Temp: 98.7 °F (37.1 °C) (10/26/18 1231)  Pulse: 84 (10/26/18 1244)  Resp: 20 (10/26/18 1244)  BP: (!) 119/56 (10/26/18 1231)  SpO2: (S) (!) 91 % (10/26/18 1250) Vital Signs (24h Range):  Temp:  [97.6 °F (36.4 °C)-99.7 °F (37.6 °C)] 98.7 °F (37.1 °C)  Pulse:  [] 84  Resp:  [18-29] 20  SpO2:  [84 %-98 %] 91 %  BP: ()/(47-63) 119/56     Weight: 81 kg (178 lb 9.2 oz)  Body mass index is 27.97 kg/m².    Intake/Output Summary (Last 24 hours) at 10/26/2018 1329  Last data filed at 10/26/2018 0700  Gross per 24 hour   Intake 2480 ml   Output 450 ml   Net 2030 ml      Physical Exam   Constitutional: He is oriented to person, place, and time. He appears well-developed.   HENT:   Head: Normocephalic and atraumatic.   Nose: Nose normal.   Mouth/Throat: Oropharynx is clear and moist.   Eyes: Conjunctivae are normal. Pupils are equal, round, and reactive to light. No scleral icterus.   Neck: Normal range of motion. Neck supple.   Cardiovascular: Normal rate, regular rhythm and normal heart sounds. Exam reveals no gallop and no friction rub.   No murmur heard.  Pulmonary/Chest: Effort normal. He has decreased breath sounds in the right upper field, the left upper field, the left middle field and the left lower field.   Abdominal: Soft. Bowel sounds are normal.   Musculoskeletal: Normal range of motion. He exhibits no edema or tenderness.   Neurological: He is alert and oriented to person, place, and time.    Skin: Skin is warm and dry.   Psychiatric: He has a normal mood and affect. His behavior is normal.   Nursing note and vitals reviewed.      Significant Labs: All pertinent labs within the past 24 hours have been reviewed.    Significant Imaging: I have reviewed all pertinent imaging results/findings within the past 24 hours.    Assessment/Plan:      * Acute on chronic respiratory failure    --Lung Ca/COPD exacerbation   --Currently, on vapotherm   -- Duonebs Q6 hours.  -- ICS                Severe malnutrition    Encourage po intake        UTI (urinary tract infection)    Cont rocephin        Intravascular volume depletion    Continue IVFs   Orthostatics        Mass of lower lobe of left lung    Followed outpatient by Dr. Lagos   Patient currently receiving radiation   Consult Hematology/Oncology        Constipation    Magnesium citrate   miralax qd       Paroxysmal atrial fibrillation    Sinus rhythm, monitor telemetry.  - Continue home diltiazem and flecainide for HR/rhythm suppression.  - Continue Eliquis           COPD (chronic obstructive pulmonary disease) with emphysema    -Acute on chronic copd exacerbation   - add prednisone 20 mg po qd   -JAMMIE and ICS.          VTE Risk Mitigation (From admission, onward)        Ordered     apixaban tablet 5 mg  2 times daily      10/25/18 0960              Vick Maldonado MD  Department of Hospital Medicine   [facility]

## 2018-10-26 NOTE — PROGRESS NOTES
[facility] -   Adult Nutrition  Progress Note    SUMMARY       Recommendations    Recommendation/Intervention:   1. Recommend Diabetic 2000/Low Na diet.   2. Recommend Boost Glucose Control - any flavor with all meals.   3. Will continue to monitor, encourage PO intake.    Goals: Meal intake at least 50% at all meals  Nutrition Goal Status: new  Communication of RD Recs: (POC review, sticky note)    Reason for Assessment    Reason for Assessment: identified at risk by screening criteria(MST Score)  Dx:  1. Hypoxia    2. Weakness    3. Hypotension, unspecified hypotension type    4. Acute cystitis with hematuria      Past Medical History:   Diagnosis Date    Arthritis     Atrial fibrillation and flutter     Atrial flutter     Cancer     LUNG    COPD (chronic obstructive pulmonary disease)     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  Others are too small to accurately characterize.    Lung disease     Nephrolithiasis     ct urogram 12/15/2017-Bilateral nephrolithiasis.     Neuropathy, diabetic     Pancreatitis     Type 1 diabetes mellitus with diabetic neuropathy 4/27/2018     General Information Comments: Pt seen for MST Score = 3. Pt reports decreased appetite for 2 months, states his cancer treatment has worsened appetite over the past month. Reports 20lb unintended wt loss. Per [facility] records, pt weighed 89.6kg on 9/25/18, 81kg on 10/25/18 (-9.6% body wt in 1 month). Mild muscle wasting noted to temples, clavicles per NFPE (10.26.18). Pt meets criteria for malnutrition. Pt states he's never had Ensure/Boost, but is open to trying them. RD explained which ONS are appropriate for this pt. RD encouraged PO intake, high protein foods, limit simple sugar intake. Pt verbalized understanding.    Nutrition Discharge Planning:  "Diabetic diet    Nutrition Risk Screen    Nutrition Risk Screen: no indicators present    Nutrition/Diet History    Do you have any cultural, spiritual, Cheondoism conflicts, given your current situation?: none  Food Allergies: NKFA    Anthropometrics    Temp: 97.6 °F (36.4 °C)  Height Method: Stated  Height: 5' 7" (170.2 cm)  Height (inches): 67 in  Weight Method: Stated  Weight: 81 kg (178 lb 9.2 oz)  Weight (lb): 178.57 lb  Ideal Body Weight (IBW), Male: 148 lb  % Ideal Body Weight, Male (lb): 120.66 lb  BMI (Calculated): 28  BMI Grade: 25 - 29.9 - overweight       Lab/Procedures/Meds    Pertinent Labs Reviewed: reviewed  BMP  Lab Results   Component Value Date     10/25/2018    K 3.9 10/25/2018     10/25/2018    CO2 28 10/25/2018    BUN 21 10/25/2018    CREATININE 0.8 10/25/2018    CALCIUM 9.1 10/25/2018    ANIONGAP 10 10/25/2018    ESTGFRAFRICA >60 10/25/2018    EGFRNONAA >60 10/25/2018     Lab Results   Component Value Date    ALBUMIN 2.5 (L) 10/25/2018     Recent Labs   Lab 10/25/18  2017   POCTGLUCOSE 222*     Lab Results   Component Value Date    HGBA1C 8.5 (H) 09/22/2018     Pertinent Medications Reviewed: reviewed  Scheduled Meds:   albuterol-ipratropium  3 mL Nebulization Q6H    apixaban  5 mg Oral BID    budesonide  0.5 mg Nebulization BID    cefTRIAXone (ROCEPHIN) IVPB  1 g Intravenous Q24H    diltiaZEM  360 mg Oral Daily    flecainide  100 mg Oral Q12H    gabapentin  600 mg Oral QHS    insulin detemir U-100  10 Units Subcutaneous BID    pantoprazole  40 mg Oral Daily    pravastatin  40 mg Oral Nightly    sertraline  50 mg Oral Daily     Continuous Infusions:   sodium chloride 0.9% 75 mL/hr at 10/26/18 0957     PRN Meds:.acetaminophen, acetaminophen, dextrose 50%, dextrose 50%, glucagon (human recombinant), glucose, glucose, insulin aspart U-100, morphine, ondansetron, oxyCODONE, promethazine (PHENERGAN) IVPB    Physical Findings/Assessment    Overall Physical Appearance: loss of " muscle mass, overweight  Oral/Mouth Cavity: WDL  Skin: (Stephon=19)    Estimated/Assessed Needs    Weight Used For Calorie Calculations: 81 kg (178 lb 9.2 oz)  Energy Calorie Requirements (kcal): 8356-4349  Energy Need Method: [place]-St Jeor(x1.2-1.3)  Protein Requirements:   Weight Used For Protein Calculations: 81 kg (178 lb 9.2 oz)(x1.2-1.5)     Fluid Need Method: RDA Method(or per MD)  RDA Method (mL): 1822  CHO Requirement: 50% EEN      Nutrition Prescription Ordered    Current Diet Order: Low Na    Evaluation of Received Nutrient/Fluid Intake       % Intake of Estimated Energy Needs: 50 - 75 %  % Meal Intake: 50 - 75 %    Nutrition Risk    Level of Risk/Frequency of Follow-up: (f/u x2 weekly)     Assessment and Plan    Severe malnutrition    Malnutrition in the context of Chronic Illness/Injury    Related to (etiology):  Decreased PO intake 2/2 cancer tx    Signs and Symptoms (as evidenced by):  Energy Intake: <75% of estimated energy requirement for 2 months  Body Fat Depletion: mild depletion of triceps   Muscle Mass Depletion: mild depletion of temples and clavicle region   Weight Loss: -9.6% x 1 month  Fluid Accumulation: moderate    Interventions/Recommendations (treatment strategy):  Recommend Diabetic 2000/Low Na diet  Recommend Boost Glucose control (any flavor) w/ all meal trays  Will continue to encourage PO intake    Nutrition Diagnosis Status:  New              Monitor and Evaluation    Food and Nutrient Intake: energy intake, food and beverage intake  Food and Nutrient Adminstration: diet order  Anthropometric Measurements: weight  Biochemical Data, Medical Tests and Procedures: electrolyte and renal panel, glucose/endocrine profile, inflammatory profile  Nutrition-Focused Physical Findings: overall appearance     Nutrition Follow-Up    RD Follow-up?: Yes

## 2018-10-26 NOTE — SUBJECTIVE & OBJECTIVE
Interval History: Pt was seen and examined at bedside . He states feel better . He is on NRM at  6 lt  With a o2 sat > 92 % . There was no acute event overnight     Review of Systems   Constitutional: Positive for appetite change and chills. Negative for diaphoresis, fatigue and fever.   HENT: Negative.    Eyes: Negative for visual disturbance.   Respiratory: Positive for cough and shortness of breath. Negative for wheezing.    Cardiovascular: Negative for chest pain, palpitations and leg swelling.   Gastrointestinal: Positive for constipation. Negative for abdominal pain, diarrhea, nausea and vomiting.   Genitourinary: Positive for urgency. Negative for dysuria and hematuria.   Musculoskeletal: Positive for back pain.   Skin: Negative for pallor, rash and wound.   Neurological: Negative for seizures, facial asymmetry, speech difficulty, weakness, light-headedness and headaches.   Psychiatric/Behavioral: Negative for confusion. The patient is not nervous/anxious.      Objective:     Vital Signs (Most Recent):  Temp: 98.7 °F (37.1 °C) (10/26/18 1231)  Pulse: 84 (10/26/18 1244)  Resp: 20 (10/26/18 1244)  BP: (!) 119/56 (10/26/18 1231)  SpO2: (S) (!) 91 % (10/26/18 1250) Vital Signs (24h Range):  Temp:  [97.6 °F (36.4 °C)-99.7 °F (37.6 °C)] 98.7 °F (37.1 °C)  Pulse:  [] 84  Resp:  [18-29] 20  SpO2:  [84 %-98 %] 91 %  BP: ()/(47-63) 119/56     Weight: 81 kg (178 lb 9.2 oz)  Body mass index is 27.97 kg/m².    Intake/Output Summary (Last 24 hours) at 10/26/2018 1329  Last data filed at 10/26/2018 0700  Gross per 24 hour   Intake 2480 ml   Output 450 ml   Net 2030 ml      Physical Exam   Constitutional: He is oriented to person, place, and time. He appears well-developed.   HENT:   Head: Normocephalic and atraumatic.   Nose: Nose normal.   Mouth/Throat: Oropharynx is clear and moist.   Eyes: Conjunctivae are normal. Pupils are equal, round, and reactive to light. No scleral icterus.   Neck: Normal range of  motion. Neck supple.   Cardiovascular: Normal rate, regular rhythm and normal heart sounds. Exam reveals no gallop and no friction rub.   No murmur heard.  Pulmonary/Chest: Effort normal. He has decreased breath sounds in the right upper field, the left upper field, the left middle field and the left lower field.   Abdominal: Soft. Bowel sounds are normal.   Musculoskeletal: Normal range of motion. He exhibits no edema or tenderness.   Neurological: He is alert and oriented to person, place, and time.   Skin: Skin is warm and dry.   Psychiatric: He has a normal mood and affect. His behavior is normal.   Nursing note and vitals reviewed.      Significant Labs: All pertinent labs within the past 24 hours have been reviewed.    Significant Imaging: I have reviewed all pertinent imaging results/findings within the past 24 hours.

## 2018-10-26 NOTE — TELEPHONE ENCOUNTER
Spoke with nurse Jimenes for patient status report for radiation therapy. Patient reported on 15L O2 venti mask, SOB on exertion, pain level 8 and loose stools. Report given to Dr Culp and patient will remain on break until Monday.

## 2018-10-26 NOTE — HOSPITAL COURSE
73 y/o wm admitted with a dx of acute on chronic respiratory failure , UTI and acute on chronic copd exacerbation . He was started on iv rocephin , LABA, JAMMIE and IHS . He is a o2 home dependent .     10/27-he remains stable. CTA of the chest was done to rule out acute PE and it showed -    No acute chest findings.  Mild interval reduction in size of left mediastinal mass.  However, there has been interval enlargement of multiple metastatic pulmonary nodules as well as a lesion in the dome of the right lobe of the liver.  Interval development of a new right lower lobe 1 cm nodule.  10/28 -he remains weak, still on oxygen at 8l/min  10/30-  73 yo male with metastatic lung cancer with mets to spine, PAF, COPD, HTN, HPL and IDDM who was at the clinic and developed some lightheadedness and bright light visual disturbance and had to sit in a chair to prevent from falling. . In the ED, temp 98.6, pulse 86, Resp 25, B/P 86/47 and ABG shows hypoxic respiratory failure. He was admitted and was managed with vaoptherm ,duonebs , started on empiric rocephin . CTA of the chest was done to rule out PE and it showed mild interval reduction in size of left mediastinal mass.  However, there has been interval enlargement of multiple metastatic pulmonary nodules as well as a lesion in the dome of the right lobe of the liver.  Interval development of a new right lower lobe 1 cm nodule.  He was seen in consultation with oncology and he will continue Keydura/radiation therapy  on discharge .  He will be discharged to follow up with pulmonology , oncology and with home health . He refused steroid therapy during his stay .  He was seen and examined today and deemed suitable for discharge on his home regime of oxygen at 4l/min

## 2018-10-27 LAB
ANION GAP SERPL CALC-SCNC: 7 MMOL/L
BACTERIA UR CULT: NORMAL
BACTERIA UR CULT: NORMAL
BASOPHILS # BLD AUTO: 0.01 K/UL
BASOPHILS NFR BLD: 0.1 %
BUN SERPL-MCNC: 9 MG/DL
CALCIUM SERPL-MCNC: 9.1 MG/DL
CHLORIDE SERPL-SCNC: 97 MMOL/L
CO2 SERPL-SCNC: 32 MMOL/L
CREAT SERPL-MCNC: 0.7 MG/DL
DIFFERENTIAL METHOD: ABNORMAL
EOSINOPHIL # BLD AUTO: 0.1 K/UL
EOSINOPHIL NFR BLD: 1.7 %
ERYTHROCYTE [DISTWIDTH] IN BLOOD BY AUTOMATED COUNT: 15.5 %
EST. GFR  (AFRICAN AMERICAN): >60 ML/MIN/1.73 M^2
EST. GFR  (NON AFRICAN AMERICAN): >60 ML/MIN/1.73 M^2
GLUCOSE SERPL-MCNC: 123 MG/DL
HCT VFR BLD AUTO: 34.4 %
HGB BLD-MCNC: 10.7 G/DL
LYMPHOCYTES # BLD AUTO: 1.1 K/UL
LYMPHOCYTES NFR BLD: 13.8 %
MCH RBC QN AUTO: 27.2 PG
MCHC RBC AUTO-ENTMCNC: 31.1 G/DL
MCV RBC AUTO: 88 FL
MONOCYTES # BLD AUTO: 0.9 K/UL
MONOCYTES NFR BLD: 10.9 %
NEUTROPHILS # BLD AUTO: 6 K/UL
NEUTROPHILS NFR BLD: 73.5 %
PLATELET # BLD AUTO: 263 K/UL
PMV BLD AUTO: 8.7 FL
POCT GLUCOSE: 125 MG/DL (ref 70–110)
POCT GLUCOSE: 190 MG/DL (ref 70–110)
POCT GLUCOSE: 208 MG/DL (ref 70–110)
POCT GLUCOSE: 232 MG/DL (ref 70–110)
POTASSIUM SERPL-SCNC: 4.4 MMOL/L
RBC # BLD AUTO: 3.93 M/UL
SODIUM SERPL-SCNC: 136 MMOL/L
WBC # BLD AUTO: 8.18 K/UL

## 2018-10-27 PROCEDURE — 63600175 PHARM REV CODE 636 W HCPCS: Performed by: NURSE PRACTITIONER

## 2018-10-27 PROCEDURE — 36415 COLL VENOUS BLD VENIPUNCTURE: CPT

## 2018-10-27 PROCEDURE — 27000221 HC OXYGEN, UP TO 24 HOURS

## 2018-10-27 PROCEDURE — 25000242 PHARM REV CODE 250 ALT 637 W/ HCPCS: Performed by: NURSE PRACTITIONER

## 2018-10-27 PROCEDURE — 99233 SBSQ HOSP IP/OBS HIGH 50: CPT | Mod: ,,, | Performed by: INTERNAL MEDICINE

## 2018-10-27 PROCEDURE — 63600175 PHARM REV CODE 636 W HCPCS: Performed by: HOSPITALIST

## 2018-10-27 PROCEDURE — 94761 N-INVAS EAR/PLS OXIMETRY MLT: CPT

## 2018-10-27 PROCEDURE — 85025 COMPLETE CBC W/AUTO DIFF WBC: CPT

## 2018-10-27 PROCEDURE — 27100171 HC OXYGEN HIGH FLOW UP TO 24 HOURS

## 2018-10-27 PROCEDURE — 25000003 PHARM REV CODE 250: Performed by: INTERNAL MEDICINE

## 2018-10-27 PROCEDURE — 80048 BASIC METABOLIC PNL TOTAL CA: CPT

## 2018-10-27 PROCEDURE — 21400001 HC TELEMETRY ROOM

## 2018-10-27 PROCEDURE — 25000003 PHARM REV CODE 250: Performed by: NURSE PRACTITIONER

## 2018-10-27 PROCEDURE — 94640 AIRWAY INHALATION TREATMENT: CPT

## 2018-10-27 PROCEDURE — 25000003 PHARM REV CODE 250: Performed by: HOSPITALIST

## 2018-10-27 PROCEDURE — 25500020 PHARM REV CODE 255: Performed by: INTERNAL MEDICINE

## 2018-10-27 PROCEDURE — 96372 THER/PROPH/DIAG INJ SC/IM: CPT | Mod: 59

## 2018-10-27 RX ADMIN — INSULIN ASPART 2 UNITS: 100 INJECTION, SOLUTION INTRAVENOUS; SUBCUTANEOUS at 05:10

## 2018-10-27 RX ADMIN — GABAPENTIN 600 MG: 300 CAPSULE ORAL at 08:10

## 2018-10-27 RX ADMIN — IOHEXOL 75 ML: 350 INJECTION, SOLUTION INTRAVENOUS at 01:10

## 2018-10-27 RX ADMIN — PRAVASTATIN SODIUM 40 MG: 20 TABLET ORAL at 08:10

## 2018-10-27 RX ADMIN — IPRATROPIUM BROMIDE AND ALBUTEROL SULFATE 3 ML: .5; 3 SOLUTION RESPIRATORY (INHALATION) at 07:10

## 2018-10-27 RX ADMIN — PANTOPRAZOLE SODIUM 40 MG: 40 TABLET, DELAYED RELEASE ORAL at 09:10

## 2018-10-27 RX ADMIN — IPRATROPIUM BROMIDE AND ALBUTEROL SULFATE 3 ML: .5; 3 SOLUTION RESPIRATORY (INHALATION) at 12:10

## 2018-10-27 RX ADMIN — OXYCODONE HYDROCHLORIDE 10 MG: 5 TABLET ORAL at 05:10

## 2018-10-27 RX ADMIN — APIXABAN 5 MG: 2.5 TABLET, FILM COATED ORAL at 09:10

## 2018-10-27 RX ADMIN — DILTIAZEM HYDROCHLORIDE 360 MG: 180 CAPSULE, COATED, EXTENDED RELEASE ORAL at 09:10

## 2018-10-27 RX ADMIN — FLECAINIDE ACETATE 100 MG: 50 TABLET ORAL at 08:10

## 2018-10-27 RX ADMIN — FLECAINIDE ACETATE 100 MG: 50 TABLET ORAL at 09:10

## 2018-10-27 RX ADMIN — BUDESONIDE 0.5 MG: 0.5 SUSPENSION RESPIRATORY (INHALATION) at 07:10

## 2018-10-27 RX ADMIN — IPRATROPIUM BROMIDE AND ALBUTEROL SULFATE 3 ML: .5; 3 SOLUTION RESPIRATORY (INHALATION) at 01:10

## 2018-10-27 RX ADMIN — APIXABAN 5 MG: 2.5 TABLET, FILM COATED ORAL at 08:10

## 2018-10-27 RX ADMIN — POLYETHYLENE GLYCOL 3350 17 G: 17 POWDER, FOR SOLUTION ORAL at 09:10

## 2018-10-27 RX ADMIN — SERTRALINE HYDROCHLORIDE 50 MG: 50 TABLET ORAL at 09:10

## 2018-10-27 RX ADMIN — CEFTRIAXONE 1 G: 1 INJECTION, SOLUTION INTRAVENOUS at 05:10

## 2018-10-27 RX ADMIN — INSULIN DETEMIR 10 UNITS: 100 INJECTION, SOLUTION SUBCUTANEOUS at 08:10

## 2018-10-27 RX ADMIN — OXYCODONE HYDROCHLORIDE 10 MG: 5 TABLET ORAL at 11:10

## 2018-10-27 RX ADMIN — INSULIN DETEMIR 10 UNITS: 100 INJECTION, SOLUTION SUBCUTANEOUS at 09:10

## 2018-10-27 RX ADMIN — OXYCODONE HYDROCHLORIDE 10 MG: 5 TABLET ORAL at 01:10

## 2018-10-27 RX ADMIN — INSULIN ASPART 2 UNITS: 100 INJECTION, SOLUTION INTRAVENOUS; SUBCUTANEOUS at 11:10

## 2018-10-27 NOTE — PROGRESS NOTES
[facility] Hospital Medicine  Progress Note    Patient Name: Nico Garza Jr.  MRN: [id number]  Patient Class: IP- Inpatient   Admission Date: 10/25/2018  Length of Stay: 2 days  Attending Physician: Mohinder Yee MD  Primary Care Provider: Tiffany Davis DO        Subjective:     Principal Problem:Acute on chronic respiratory failure    HPI:  Nico Garza is a 71 yo male with metastatic lung cancer with mets to spine, PAF, COPD, HTN, HPL and IDDM who was at the clinic and developed some lightheadedness and bright light visual disturbance and had to sit in a chair to prevent from falling.  Associated symptoms lumbar back pain, BENITO, dry cough, chest pain, abdominal pain, suprapubic pain & urinary urgency. In the ED, temp 98.6, pulse 86, Resp 25, B/P 86/47 and ABG shows hypoxic respiratory failure. Fluid resuscitation given and B/P improved 116/55. Pt presently wearing Vapotherm. CBC shows a normocytic anemia, glucose 45 (improved after eating), troponin normal, lactic acid 1.2 and procal 0.45. U/A positive nitrites and 60 WBC. Blood and urine culture pending. Pt is admitted for treatment of intravascular volume depletion, UTI and hypoxic respiratory failure.     Hospital Course:  73 y/o wm admitted with a dx of acute on chronic respiratory failure , UTI and acute on chronic copd exacerbation . He was started on iv rocephin , LABA, JAMMIE and IHS . He is a o2 home dependent .     10/27-he remains stable. CTA of the chest was done to rule out acute PE and it showed -    No acute chest findings.  Mild interval reduction in size of left mediastinal mass.  However, there has been interval enlargement of multiple metastatic pulmonary nodules as well as a lesion in the dome of the right lobe of the liver.  Interval development of a new right lower lobe 1 cm nodule.      Interval History: Pt was seen and examined at bedside . He states feel better . He is on NRM at  6 lt  With a o2 sat > 92 % . There was no  acute event overnight     10/27-   CTA of the chest was done to rule out acute PE and it showed -  .  Mild interval reduction in size of left mediastinal mass.  However, there has been interval enlargement of multiple metastatic pulmonary nodules as well as a lesion in the dome of the right lobe of the liver.  Interval development of a new right lower lobe 1 cm nodule.      Review of Systems   Constitutional: Positive for appetite change and chills. Negative for diaphoresis, fatigue and fever.   HENT: Negative.    Eyes: Negative for visual disturbance.   Respiratory: Positive for cough and shortness of breath. Negative for wheezing.    Cardiovascular: Negative for chest pain, palpitations and leg swelling.   Gastrointestinal: Positive for constipation. Negative for abdominal pain, diarrhea, nausea and vomiting.   Genitourinary: Positive for urgency. Negative for dysuria and hematuria.   Musculoskeletal: Positive for back pain.   Skin: Negative for pallor, rash and wound.   Neurological: Negative for seizures, facial asymmetry, speech difficulty, weakness, light-headedness and headaches.   Psychiatric/Behavioral: Negative for confusion. The patient is not nervous/anxious.      Objective:     Vital Signs (Most Recent):  Temp: 98.1 °F (36.7 °C) (10/27/18 1553)  Pulse: 88 (10/27/18 1553)  Resp: 18 (10/27/18 1300)  BP: (!) 143/61 (10/27/18 1553)  SpO2: (!) 93 % (10/27/18 1553) Vital Signs (24h Range):  Temp:  [98.1 °F (36.7 °C)-98.7 °F (37.1 °C)] 98.1 °F (36.7 °C)  Pulse:  [72-90] 88  Resp:  [18-22] 18  SpO2:  [92 %-96 %] 93 %  BP: (120-143)/(60-71) 143/61     Weight: 82.3 kg (181 lb 7 oz)  Body mass index is 28.42 kg/m².    Intake/Output Summary (Last 24 hours) at 10/27/2018 1723  Last data filed at 10/27/2018 1400  Gross per 24 hour   Intake 1905 ml   Output 675 ml   Net 1230 ml      Physical Exam   Constitutional: He is oriented to person, place, and time. He appears well-developed.   HENT:   Head: Normocephalic and  atraumatic.   Nose: Nose normal.   Mouth/Throat: Oropharynx is clear and moist.   Eyes: Conjunctivae are normal. Pupils are equal, round, and reactive to light. No scleral icterus.   Neck: Normal range of motion. Neck supple.   Cardiovascular: Normal rate, regular rhythm and normal heart sounds. Exam reveals no gallop and no friction rub.   No murmur heard.  Pulmonary/Chest: Effort normal. He has decreased breath sounds in the right upper field, the left upper field, the left middle field and the left lower field.   Abdominal: Soft. Bowel sounds are normal.   Musculoskeletal: Normal range of motion. He exhibits no edema or tenderness.   Neurological: He is alert and oriented to person, place, and time.   Skin: Skin is warm and dry.   Psychiatric: He has a normal mood and affect. His behavior is normal.   Nursing note and vitals reviewed.      Significant Labs: All pertinent labs within the past 24 hours have been reviewed.    Significant Imaging: I have reviewed all pertinent imaging results/findings within the past 24 hours.    Assessment/Plan:      * Acute on chronic respiratory failure    --Lung Ca/COPD exacerbation   --Currently, on vapotherm   -- Duonebs Q6 hours.  -- ICS         10/27- related to lung cancer/COPD exacerbation-will wean off oxygen as tolerated.  Might need pulmonology follow up .        Severe malnutrition    Encourage po intake        UTI (urinary tract infection)    Cont rocephin     10/27- will continue rocephin , will closely monitor      Intravascular volume depletion    Continue IVFs   Orthostatics        Situational mixed anxiety and depressive disorder      10/27- will continue setraline      Mass of lower lobe of left lung    Followed outpatient by Dr. Lagos   Patient currently receiving radiation   Consult Hematology/Oncology       10/27- oncology input appreciated. Will monitor closely ,repeat chest ct scan showed -  Mild interval reduction in size of left mediastinal mass.   However, there has been interval enlargement of multiple metastatic pulmonary nodules as well as a lesion in the dome of the right lobe of the liver.  Interval development of a new right lower lobe 1 cm nodule.          Constipation    Magnesium citrate   miralax qd       Type 1 diabetes mellitus with diabetic neuropathy      10/27- will continue acuchecks ,continue levemir 10 units bid     Paroxysmal atrial fibrillation    Sinus rhythm, monitor telemetry.  - Continue home diltiazem and flecainide for HR/rhythm suppression.  - Continue Eliquis     10/27- will continue eliquis,diltiazem and flecainide.Rtae controlled           COPD (chronic obstructive pulmonary disease) with emphysema    -Acute on chronic copd exacerbation   - add prednisone 20 mg po qd   -JAMMIE and ICS.       10/27- will continue duonebs, continue steroids       VTE Risk Mitigation (From admission, onward)        Ordered     apixaban tablet 5 mg  2 times daily      10/25/18 1850              Mohinder Yee MD  Department of Hospital Medicine   [facility]

## 2018-10-27 NOTE — SUBJECTIVE & OBJECTIVE
Interval History: Pt was seen and examined at bedside . He states feel better . He is on NRM at  6 lt  With a o2 sat > 92 % . There was no acute event overnight     10/27-   CTA of the chest was done to rule out acute PE and it showed -  .  Mild interval reduction in size of left mediastinal mass.  However, there has been interval enlargement of multiple metastatic pulmonary nodules as well as a lesion in the dome of the right lobe of the liver.  Interval development of a new right lower lobe 1 cm nodule.      Review of Systems   Constitutional: Positive for appetite change and chills. Negative for diaphoresis, fatigue and fever.   HENT: Negative.    Eyes: Negative for visual disturbance.   Respiratory: Positive for cough and shortness of breath. Negative for wheezing.    Cardiovascular: Negative for chest pain, palpitations and leg swelling.   Gastrointestinal: Positive for constipation. Negative for abdominal pain, diarrhea, nausea and vomiting.   Genitourinary: Positive for urgency. Negative for dysuria and hematuria.   Musculoskeletal: Positive for back pain.   Skin: Negative for pallor, rash and wound.   Neurological: Negative for seizures, facial asymmetry, speech difficulty, weakness, light-headedness and headaches.   Psychiatric/Behavioral: Negative for confusion. The patient is not nervous/anxious.      Objective:     Vital Signs (Most Recent):  Temp: 98.1 °F (36.7 °C) (10/27/18 1553)  Pulse: 88 (10/27/18 1553)  Resp: 18 (10/27/18 1300)  BP: (!) 143/61 (10/27/18 1553)  SpO2: (!) 93 % (10/27/18 1553) Vital Signs (24h Range):  Temp:  [98.1 °F (36.7 °C)-98.7 °F (37.1 °C)] 98.1 °F (36.7 °C)  Pulse:  [72-90] 88  Resp:  [18-22] 18  SpO2:  [92 %-96 %] 93 %  BP: (120-143)/(60-71) 143/61     Weight: 82.3 kg (181 lb 7 oz)  Body mass index is 28.42 kg/m².    Intake/Output Summary (Last 24 hours) at 10/27/2018 1712  Last data filed at 10/27/2018 1400  Gross per 24 hour   Intake 1905 ml   Output 675 ml   Net 1230 ml       Physical Exam   Constitutional: He is oriented to person, place, and time. He appears well-developed.   HENT:   Head: Normocephalic and atraumatic.   Nose: Nose normal.   Mouth/Throat: Oropharynx is clear and moist.   Eyes: Conjunctivae are normal. Pupils are equal, round, and reactive to light. No scleral icterus.   Neck: Normal range of motion. Neck supple.   Cardiovascular: Normal rate, regular rhythm and normal heart sounds. Exam reveals no gallop and no friction rub.   No murmur heard.  Pulmonary/Chest: Effort normal. He has decreased breath sounds in the right upper field, the left upper field, the left middle field and the left lower field.   Abdominal: Soft. Bowel sounds are normal.   Musculoskeletal: Normal range of motion. He exhibits no edema or tenderness.   Neurological: He is alert and oriented to person, place, and time.   Skin: Skin is warm and dry.   Psychiatric: He has a normal mood and affect. His behavior is normal.   Nursing note and vitals reviewed.      Significant Labs: All pertinent labs within the past 24 hours have been reviewed.    Significant Imaging: I have reviewed all pertinent imaging results/findings within the past 24 hours.

## 2018-10-27 NOTE — ASSESSMENT & PLAN NOTE
--Lung Ca/COPD exacerbation   --Currently, on vapotherm   -- Duonebs Q6 hours.  -- ICS         10/27- related to lung cancer/COPD exacerbation-will wean off oxygen as tolerated.  Might need pulmonology follow up .

## 2018-10-27 NOTE — ASSESSMENT & PLAN NOTE
Followed outpatient by Dr. Lagos   Patient currently receiving radiation   Consult Hematology/Oncology       10/27- oncology input appreciated. Will monitor closely ,repeat chest ct scan showed -  Mild interval reduction in size of left mediastinal mass.  However, there has been interval enlargement of multiple metastatic pulmonary nodules as well as a lesion in the dome of the right lobe of the liver.  Interval development of a new right lower lobe 1 cm nodule.

## 2018-10-27 NOTE — ASSESSMENT & PLAN NOTE
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  He is receiving Keytruda and radiation to the spine for treatment.  First dose of Keytruda was on October 18, 2018.  He also has begun radiation treatments to the spine.  He is scheduled for radiation today.  He will follow up with his primary oncologist, Dr. Woodall, after acute illness resolved.  Continue supportive care including:  -Nebulizer treatments every 6 hr  -oxygen supplementation as needed    October 27, 2018-I had a detailed discussion with the patient today with regard to his current clinical situation.  He will resume treatment with keytruda as an outpatient.  He will continue palliative XRT to the spine.

## 2018-10-27 NOTE — ASSESSMENT & PLAN NOTE
Sinus rhythm, monitor telemetry.  - Continue home diltiazem and flecainide for HR/rhythm suppression.  - Continue Eliquis     10/27- will continue eliquis,diltiazem and flecainide.Rtae controlled

## 2018-10-27 NOTE — ASSESSMENT & PLAN NOTE
-Acute on chronic copd exacerbation   - add prednisone 20 mg po qd   -JAMMIE and ICS.       10/27- will continue duonebs, continue steroids

## 2018-10-27 NOTE — PLAN OF CARE
Problem: Patient Care Overview  Goal: Plan of Care Review  Outcome: Ongoing (interventions implemented as appropriate)  POC discussed w/patient, verbalized understanding.   AAO X 4. NSR on monitor. VSS.   Patient on 15 L per venti mask throughout night.   Patient complained of back pain. PRN medications administered as prescribed.  Voids per urinal at bedside.   IV intact. Medications adminsitered as prescribed. NS infusing at 75 ml/hr.   Patient turns independently in bed.   Fall precautions in place, call bell in reach, bed in low and locked position, family at bedside.   Call bell in reach, bed in low and locked position.   Patient denies needs at this time.   Will continue to monitor.

## 2018-10-27 NOTE — PROGRESS NOTES
[facility] Hospital Medicine  Progress Note    Patient Name: Nico Garza Jr.  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  Associated symptoms lumbar back pain, BENITO, dry cough, chest pain, abdominal pain, suprapubic pain & urinary urgency. In the ED, temp 98.6, pulse 86, Resp 25, B/P 86/47 and ABG shows hypoxic respiratory failure. Fluid resuscitation given and B/P improved 116/55. Pt presently wearing Vapotherm. CBC shows a normocytic anemia, glucose 45 (improved after eating), troponin normal, lactic acid 1.2 and procal 0.45. U/A positive nitrites and 60 WBC. Blood and urine culture pending. Pt is admitted for treatment of intravascular volume depletion, UTI and hypoxic respiratory failure.     Hospital Course:  71 y/o wm admitted with a dx of acute on chronic respiratory failure , UTI and acute on chronic copd exacerbation . He was started on iv rocephin , LABA, JAMMIE and IHS . He is a o2 home dependent .     10/27-he remains stable. CTA of the chest was done to rule out acute PE and it showed -    No acute chest findings.  Mild interval reduction in size of left mediastinal mass.  However, there has been interval enlargement of multiple metastatic pulmonary nodules as well as a lesion in the dome of the right lobe of the liver.  Interval development of a new right lower lobe 1 cm nodule.      Interval History: Pt was seen and examined at bedside . He states feel better . He is on NRM at  6 lt  With a o2 sat > 92 % . There was no  acute event overnight     10/27-   CTA of the chest was done to rule out acute PE and it showed -  .  Mild interval reduction in size of left mediastinal mass.  However, there has been interval enlargement of multiple metastatic pulmonary nodules as well as a lesion in the dome of the right lobe of the liver.  Interval development of a new right lower lobe 1 cm nodule.      Review of Systems   Constitutional: Positive for appetite change and chills. Negative for diaphoresis, fatigue and fever.   HENT: Negative.    Eyes: Negative for visual disturbance.   Respiratory: Positive for cough and shortness of breath. Negative for wheezing.    Cardiovascular: Negative for chest pain, palpitations and leg swelling.   Gastrointestinal: Positive for constipation. Negative for abdominal pain, diarrhea, nausea and vomiting.   Genitourinary: Positive for urgency. Negative for dysuria and hematuria.   Musculoskeletal: Positive for back pain.   Skin: Negative for pallor, rash and wound.   Neurological: Negative for seizures, facial asymmetry, speech difficulty, weakness, light-headedness and headaches.   Psychiatric/Behavioral: Negative for confusion. The patient is not nervous/anxious.      Objective:     Vital Signs (Most Recent):  Temp: 98.1 °F (36.7 °C) (10/27/18 1553)  Pulse: 88 (10/27/18 1553)  Resp: 18 (10/27/18 1300)  BP: (!) 143/61 (10/27/18 1553)  SpO2: (!) 93 % (10/27/18 1553) Vital Signs (24h Range):  Temp:  [98.1 °F (36.7 °C)-98.7 °F (37.1 °C)] 98.1 °F (36.7 °C)  Pulse:  [72-90] 88  Resp:  [18-22] 18  SpO2:  [92 %-96 %] 93 %  BP: (120-143)/(60-71) 143/61     Weight: 82.3 kg (181 lb 7 oz)  Body mass index is 28.42 kg/m².    Intake/Output Summary (Last 24 hours) at 10/27/2018 1712  Last data filed at 10/27/2018 1400  Gross per 24 hour   Intake 1905 ml   Output 675 ml   Net 1230 ml      Physical Exam   Constitutional: He is oriented to person, place, and time. He appears well-developed.   HENT:   Head: Normocephalic and  atraumatic.   Nose: Nose normal.   Mouth/Throat: Oropharynx is clear and moist.   Eyes: Conjunctivae are normal. Pupils are equal, round, and reactive to light. No scleral icterus.   Neck: Normal range of motion. Neck supple.   Cardiovascular: Normal rate, regular rhythm and normal heart sounds. Exam reveals no gallop and no friction rub.   No murmur heard.  Pulmonary/Chest: Effort normal. He has decreased breath sounds in the right upper field, the left upper field, the left middle field and the left lower field.   Abdominal: Soft. Bowel sounds are normal.   Musculoskeletal: Normal range of motion. He exhibits no edema or tenderness.   Neurological: He is alert and oriented to person, place, and time.   Skin: Skin is warm and dry.   Psychiatric: He has a normal mood and affect. His behavior is normal.   Nursing note and vitals reviewed.      Significant Labs: All pertinent labs within the past 24 hours have been reviewed.    Significant Imaging: I have reviewed all pertinent imaging results/findings within the past 24 hours.    Assessment/Plan:      * Acute on chronic respiratory failure    --Lung Ca/COPD exacerbation   --Currently, on vapotherm   -- Duonebs Q6 hours.  -- ICS         10/27- related to lung cancer/COPD exacerbation-will wean off oxygen as tolerated.  Might need pulmonology follow up .        Severe malnutrition    Encourage po intake        UTI (urinary tract infection)    Cont rocephin     10/27- will continue rocephin , will closely monitor      Intravascular volume depletion    Continue IVFs   Orthostatics        Mass of lower lobe of left lung    Followed outpatient by Dr. Lagos   Patient currently receiving radiation   Consult Hematology/Oncology       10/27- oncology input appreciated. Will monitor closely ,repeat chest ct scan showed -  Mild interval reduction in size of left mediastinal mass.  However, there has been interval enlargement of multiple metastatic pulmonary nodules as well as a  lesion in the dome of the right lobe of the liver.  Interval development of a new right lower lobe 1 cm nodule.          Constipation    Magnesium citrate   miralax qd       Paroxysmal atrial fibrillation    Sinus rhythm, monitor telemetry.  - Continue home diltiazem and flecainide for HR/rhythm suppression.  - Continue Eliquis     10/27- will continue eliquis,diltiazem and flecainide.Rtae controlled           COPD (chronic obstructive pulmonary disease) with emphysema    -Acute on chronic copd exacerbation   - add prednisone 20 mg po qd   -JAMMIE and ICS.       10/27- will continue duonebs, continue steroids       VTE Risk Mitigation (From admission, onward)        Ordered     apixaban tablet 5 mg  2 times daily      10/25/18 1850              Mohinder Yee MD  Department of Hospital Medicine   [facility]

## 2018-10-27 NOTE — PROGRESS NOTES
[facility] -   Hematology/Oncology  Progress Note    Patient Name: Nico Garza Jr.  Admission Date: 10/25/2018  Hospital Length of Stay: 2 days  Code Status: Full Code     Subjective:     HPI:  Nico Garza is a 71 yo male with metastatic lung cancer with mets to spine, PAF, COPD, HTN, HPL and IDDM who was at the clinic and developed some lightheadedness and bright light visual disturbance and had to sit in a chair to prevent from falling.  Associated symptoms lumbar back pain, BENITO, dry cough, chest pain, abdominal pain, suprapubic pain & urinary urgency. In the ED, temp 98.6, pulse 86, Resp 25, B/P 86/47 and ABG shows hypoxic respiratory failure. Fluid resuscitation given and B/P improved 116/55. Pt presently wearing Vapotherm. CBC shows a normocytic anemia, glucose 45 (improved after eating), troponin normal, lactic acid 1.2 and procal 0.45. U/A positive nitrites and 60 WBC. Blood and urine culture pending. Pt is admitted for treatment of intravascular volume depletion, UTI and hypoxic respiratory failure.     Patient has metastatic lung cancer with mets to liver.  He received 1st dose of Keytruda on October 18, 2018 and is also getting radiation to his spine.  His primary oncologist is Dr. Issa Lagos.  Hematology/oncology consulted for further management of care.        Interval History:   October 27, 2018-the patient reports that he continues to have shortness of breath and is requiring high-dose supplemental oxygen support.  He reports generalized weakness and fatigue.    Oncology Treatment Plan:   OP PEMBROLIZUMAB 200MG Q3W    Medications:  Continuous Infusions:   sodium chloride 0.9% 75 mL/hr at 10/26/18 2201     Scheduled Meds:   albuterol-ipratropium  3 mL Nebulization Q6H    apixaban  5 mg Oral BID    budesonide  0.5 mg Nebulization BID    cefTRIAXone (ROCEPHIN) IVPB  1 g Intravenous Q24H    diltiaZEM  360 mg Oral Daily    flecainide  100 mg Oral Q12H    gabapentin  600 mg  Oral QHS    insulin detemir U-100  10 Units Subcutaneous BID    pantoprazole  40 mg Oral Daily    polyethylene glycol  17 g Oral Daily    pravastatin  40 mg Oral Nightly    predniSONE  20 mg Oral Daily    sertraline  50 mg Oral Daily     PRN Meds:acetaminophen, acetaminophen, dextrose 50%, dextrose 50%, glucagon (human recombinant), glucose, glucose, insulin aspart U-100, morphine, ondansetron, oxyCODONE, promethazine (PHENERGAN) IVPB     Review of Systems   Constitutional: Positive for appetite change. Negative for chills, diaphoresis, fatigue and fever.   HENT: Negative.    Eyes: Positive for visual disturbance.   Respiratory: Positive for cough and shortness of breath. Negative for wheezing.    Cardiovascular: Positive for chest pain and palpitations. Negative for leg swelling.   Gastrointestinal: Positive for abdominal pain and constipation. Negative for diarrhea, nausea and vomiting.   Genitourinary: Positive for urgency. Negative for dysuria and hematuria.   Musculoskeletal: Positive for back pain.   Skin: Negative for pallor, rash and wound.   Neurological: Positive for light-headedness. Negative for seizures, facial asymmetry, speech difficulty, weakness and headaches.   Psychiatric/Behavioral: Negative for confusion. The patient is not nervous/anxious.      Objective:     Vital Signs (Most Recent):  Temp: 98.7 °F (37.1 °C) (10/27/18 1205)  Pulse: 90 (10/27/18 1205)  Resp: (!) 22 (10/27/18 1205)  BP: 132/61 (10/27/18 1205)  SpO2: 95 % (10/27/18 1205) Vital Signs (24h Range):  Temp:  [98.4 °F (36.9 °C)-98.9 °F (37.2 °C)] 98.7 °F (37.1 °C)  Pulse:  [72-90] 90  Resp:  [18-22] 22  SpO2:  [74 %-95 %] 95 %  BP: (120-140)/(60-71) 132/61     Weight: 82.3 kg (181 lb 7 oz)  Body mass index is 28.42 kg/m².  Body surface area is 1.97 meters squared.      Intake/Output Summary (Last 24 hours) at 10/27/2018 1255  Last data filed at 10/27/2018 0900  Gross per 24 hour   Intake 1545 ml   Output 375 ml   Net 1170 ml        Physical Exam   Constitutional: He is oriented to person, place, and time. He appears well-developed.  Non-toxic appearance. He has a sickly appearance. He appears ill. No distress.   HENT:   Head: Normocephalic and atraumatic.   Nose: Nose normal.   Mouth/Throat: Oropharynx is clear and moist.   Eyes: Conjunctivae, EOM and lids are normal. Pupils are equal, round, and reactive to light. No scleral icterus.   Neck: Normal range of motion. Neck supple.   Cardiovascular: Normal rate, regular rhythm, S1 normal and S2 normal. Exam reveals no gallop and no friction rub.   Murmur heard.   Systolic murmur is present with a grade of 2/6.  Pulmonary/Chest: Effort normal. No accessory muscle usage or stridor. No apnea, no tachypnea and no bradypnea. No respiratory distress. He has decreased breath sounds in the right lower field, the left upper field, the left middle field and the left lower field. He has no wheezes. He has no rales.   Abdominal: Soft. Bowel sounds are normal.   Musculoskeletal: Normal range of motion. He exhibits no edema or tenderness.   Neurological: He is alert and oriented to person, place, and time.   Skin: Skin is warm, dry and intact. No ecchymosis noted. He is not diaphoretic. There is pallor.   Psychiatric: His speech is normal and behavior is normal. Judgment and thought content normal. His mood appears anxious. Cognition and memory are normal. He is attentive.   Nursing note and vitals reviewed.      Significant Labs:   I have reviewed all of the patient's relevant lab work available in the medical record and have utilized this in my evaluation and management recommendations today    Diagnostic Results:  I have reviewed all of the patient's diagnostic/imaging results available in the medical record and have utilized this in my evaluation and management recommendations today.    Assessment/Plan:     * Acute on chronic respiratory failure    October 27, 2018-I have recommended proceeding with  PE protocol CT of the thorax to evaluate for any evidence of pulmonary embolism as the etiology of his acute on chronic respiratory failure.  I have discussed my recommendations with Hospital Medicine today.     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  He is receiving Keytruda and radiation to the spine for treatment.  First dose of Keytruda was on October 18, 2018.  He also has begun radiation treatments to the spine.  He is scheduled for radiation today.  He will follow up with his primary oncologist, Dr. Woodall, after acute illness resolved.  Continue supportive care including:  -Nebulizer treatments every 6 hr  -oxygen supplementation as needed    October 27, 2018-I had a detailed discussion with the patient today with regard to his current clinical situation.  He will resume treatment with keytruda as an outpatient.  He will continue palliative XRT to the spine.             Thank you for your consult.      Deric Sow MD  Hematology/Oncology  [facility]

## 2018-10-27 NOTE — ASSESSMENT & PLAN NOTE
10/26/18 - urinalysis positive for UTI  -continue broad-spectrum antibiotics; switch to sensitive antibiotic once culture resulted  -monitor for fever  -CBC daily

## 2018-10-27 NOTE — SUBJECTIVE & OBJECTIVE
Interval History:   October 27, 2018-the patient reports that he continues to have shortness of breath and is requiring high-dose supplemental oxygen support.  He reports generalized weakness and fatigue.    Oncology Treatment Plan:   OP PEMBROLIZUMAB 200MG Q3W    Medications:  Continuous Infusions:   sodium chloride 0.9% 75 mL/hr at 10/26/18 2201     Scheduled Meds:   albuterol-ipratropium  3 mL Nebulization Q6H    apixaban  5 mg Oral BID    budesonide  0.5 mg Nebulization BID    cefTRIAXone (ROCEPHIN) IVPB  1 g Intravenous Q24H    diltiaZEM  360 mg Oral Daily    flecainide  100 mg Oral Q12H    gabapentin  600 mg Oral QHS    insulin detemir U-100  10 Units Subcutaneous BID    pantoprazole  40 mg Oral Daily    polyethylene glycol  17 g Oral Daily    pravastatin  40 mg Oral Nightly    predniSONE  20 mg Oral Daily    sertraline  50 mg Oral Daily     PRN Meds:acetaminophen, acetaminophen, dextrose 50%, dextrose 50%, glucagon (human recombinant), glucose, glucose, insulin aspart U-100, morphine, ondansetron, oxyCODONE, promethazine (PHENERGAN) IVPB     Review of Systems   Constitutional: Positive for appetite change. Negative for chills, diaphoresis, fatigue and fever.   HENT: Negative.    Eyes: Positive for visual disturbance.   Respiratory: Positive for cough and shortness of breath. Negative for wheezing.    Cardiovascular: Positive for chest pain and palpitations. Negative for leg swelling.   Gastrointestinal: Positive for abdominal pain and constipation. Negative for diarrhea, nausea and vomiting.   Genitourinary: Positive for urgency. Negative for dysuria and hematuria.   Musculoskeletal: Positive for back pain.   Skin: Negative for pallor, rash and wound.   Neurological: Positive for light-headedness. Negative for seizures, facial asymmetry, speech difficulty, weakness and headaches.   Psychiatric/Behavioral: Negative for confusion. The patient is not nervous/anxious.      Objective:     Vital Signs  (Most Recent):  Temp: 98.7 °F (37.1 °C) (10/27/18 1205)  Pulse: 90 (10/27/18 1205)  Resp: (!) 22 (10/27/18 1205)  BP: 132/61 (10/27/18 1205)  SpO2: 95 % (10/27/18 1205) Vital Signs (24h Range):  Temp:  [98.4 °F (36.9 °C)-98.9 °F (37.2 °C)] 98.7 °F (37.1 °C)  Pulse:  [72-90] 90  Resp:  [18-22] 22  SpO2:  [74 %-95 %] 95 %  BP: (120-140)/(60-71) 132/61     Weight: 82.3 kg (181 lb 7 oz)  Body mass index is 28.42 kg/m².  Body surface area is 1.97 meters squared.      Intake/Output Summary (Last 24 hours) at 10/27/2018 1255  Last data filed at 10/27/2018 0900  Gross per 24 hour   Intake 1545 ml   Output 375 ml   Net 1170 ml       Physical Exam   Constitutional: He is oriented to person, place, and time. He appears well-developed.  Non-toxic appearance. He has a sickly appearance. He appears ill. No distress.   HENT:   Head: Normocephalic and atraumatic.   Nose: Nose normal.   Mouth/Throat: Oropharynx is clear and moist.   Eyes: Conjunctivae, EOM and lids are normal. Pupils are equal, round, and reactive to light. No scleral icterus.   Neck: Normal range of motion. Neck supple.   Cardiovascular: Normal rate, regular rhythm, S1 normal and S2 normal. Exam reveals no gallop and no friction rub.   Murmur heard.   Systolic murmur is present with a grade of 2/6.  Pulmonary/Chest: Effort normal. No accessory muscle usage or stridor. No apnea, no tachypnea and no bradypnea. No respiratory distress. He has decreased breath sounds in the right lower field, the left upper field, the left middle field and the left lower field. He has no wheezes. He has no rales.   Abdominal: Soft. Bowel sounds are normal.   Musculoskeletal: Normal range of motion. He exhibits no edema or tenderness.   Neurological: He is alert and oriented to person, place, and time.   Skin: Skin is warm, dry and intact. No ecchymosis noted. He is not diaphoretic. There is pallor.   Psychiatric: His speech is normal and behavior is normal. Judgment and thought content  normal. His mood appears anxious. Cognition and memory are normal. He is attentive.   Nursing note and vitals reviewed.      Significant Labs:   I have reviewed all of the patient's relevant lab work available in the medical record and have utilized this in my evaluation and management recommendations today    Diagnostic Results:  I have reviewed all of the patient's diagnostic/imaging results available in the medical record and have utilized this in my evaluation and management recommendations today.

## 2018-10-27 NOTE — ASSESSMENT & PLAN NOTE
October 27, 2018-I have recommended proceeding with PE protocol CT of the thorax to evaluate for any evidence of pulmonary embolism as the etiology of his acute on chronic respiratory failure.  I have discussed my recommendations with Hospital Medicine today.

## 2018-10-27 NOTE — SUBJECTIVE & OBJECTIVE
Interval History: Pt was seen and examined at bedside . He states feel better . He is on NRM at  6 lt  With a o2 sat > 92 % . There was no acute event overnight     10/27-   CTA of the chest was done to rule out acute PE and it showed -  .  Mild interval reduction in size of left mediastinal mass.  However, there has been interval enlargement of multiple metastatic pulmonary nodules as well as a lesion in the dome of the right lobe of the liver.  Interval development of a new right lower lobe 1 cm nodule.      Review of Systems   Constitutional: Positive for appetite change and chills. Negative for diaphoresis, fatigue and fever.   HENT: Negative.    Eyes: Negative for visual disturbance.   Respiratory: Positive for cough and shortness of breath. Negative for wheezing.    Cardiovascular: Negative for chest pain, palpitations and leg swelling.   Gastrointestinal: Positive for constipation. Negative for abdominal pain, diarrhea, nausea and vomiting.   Genitourinary: Positive for urgency. Negative for dysuria and hematuria.   Musculoskeletal: Positive for back pain.   Skin: Negative for pallor, rash and wound.   Neurological: Negative for seizures, facial asymmetry, speech difficulty, weakness, light-headedness and headaches.   Psychiatric/Behavioral: Negative for confusion. The patient is not nervous/anxious.      Objective:     Vital Signs (Most Recent):  Temp: 98.1 °F (36.7 °C) (10/27/18 1553)  Pulse: 88 (10/27/18 1553)  Resp: 18 (10/27/18 1300)  BP: (!) 143/61 (10/27/18 1553)  SpO2: (!) 93 % (10/27/18 1553) Vital Signs (24h Range):  Temp:  [98.1 °F (36.7 °C)-98.7 °F (37.1 °C)] 98.1 °F (36.7 °C)  Pulse:  [72-90] 88  Resp:  [18-22] 18  SpO2:  [92 %-96 %] 93 %  BP: (120-143)/(60-71) 143/61     Weight: 82.3 kg (181 lb 7 oz)  Body mass index is 28.42 kg/m².    Intake/Output Summary (Last 24 hours) at 10/27/2018 1723  Last data filed at 10/27/2018 1400  Gross per 24 hour   Intake 1905 ml   Output 675 ml   Net 1230 ml       Physical Exam   Constitutional: He is oriented to person, place, and time. He appears well-developed.   HENT:   Head: Normocephalic and atraumatic.   Nose: Nose normal.   Mouth/Throat: Oropharynx is clear and moist.   Eyes: Conjunctivae are normal. Pupils are equal, round, and reactive to light. No scleral icterus.   Neck: Normal range of motion. Neck supple.   Cardiovascular: Normal rate, regular rhythm and normal heart sounds. Exam reveals no gallop and no friction rub.   No murmur heard.  Pulmonary/Chest: Effort normal. He has decreased breath sounds in the right upper field, the left upper field, the left middle field and the left lower field.   Abdominal: Soft. Bowel sounds are normal.   Musculoskeletal: Normal range of motion. He exhibits no edema or tenderness.   Neurological: He is alert and oriented to person, place, and time.   Skin: Skin is warm and dry.   Psychiatric: He has a normal mood and affect. His behavior is normal.   Nursing note and vitals reviewed.      Significant Labs: All pertinent labs within the past 24 hours have been reviewed.    Significant Imaging: I have reviewed all pertinent imaging results/findings within the past 24 hours.

## 2018-10-28 LAB
ANION GAP SERPL CALC-SCNC: 11 MMOL/L
BASOPHILS # BLD AUTO: 0.01 K/UL
BASOPHILS NFR BLD: 0.1 %
BUN SERPL-MCNC: 8 MG/DL
CALCIUM SERPL-MCNC: 9.4 MG/DL
CHLORIDE SERPL-SCNC: 97 MMOL/L
CO2 SERPL-SCNC: 32 MMOL/L
CREAT SERPL-MCNC: 0.7 MG/DL
DIFFERENTIAL METHOD: ABNORMAL
EOSINOPHIL # BLD AUTO: 0.1 K/UL
EOSINOPHIL NFR BLD: 1.6 %
ERYTHROCYTE [DISTWIDTH] IN BLOOD BY AUTOMATED COUNT: 15.6 %
EST. GFR  (AFRICAN AMERICAN): >60 ML/MIN/1.73 M^2
EST. GFR  (NON AFRICAN AMERICAN): >60 ML/MIN/1.73 M^2
GLUCOSE SERPL-MCNC: 114 MG/DL
HCT VFR BLD AUTO: 34.4 %
HGB BLD-MCNC: 10.8 G/DL
LYMPHOCYTES # BLD AUTO: 1 K/UL
LYMPHOCYTES NFR BLD: 12.6 %
MCH RBC QN AUTO: 27.4 PG
MCHC RBC AUTO-ENTMCNC: 31.4 G/DL
MCV RBC AUTO: 87 FL
MONOCYTES # BLD AUTO: 0.8 K/UL
MONOCYTES NFR BLD: 10.2 %
NEUTROPHILS # BLD AUTO: 6 K/UL
NEUTROPHILS NFR BLD: 75.5 %
PLATELET # BLD AUTO: 277 K/UL
PMV BLD AUTO: 8.8 FL
POCT GLUCOSE: 127 MG/DL (ref 70–110)
POCT GLUCOSE: 221 MG/DL (ref 70–110)
POCT GLUCOSE: 240 MG/DL (ref 70–110)
POCT GLUCOSE: 287 MG/DL (ref 70–110)
POTASSIUM SERPL-SCNC: 4.2 MMOL/L
RBC # BLD AUTO: 3.94 M/UL
SODIUM SERPL-SCNC: 140 MMOL/L
WBC # BLD AUTO: 7.92 K/UL

## 2018-10-28 PROCEDURE — 99233 SBSQ HOSP IP/OBS HIGH 50: CPT | Mod: ,,, | Performed by: INTERNAL MEDICINE

## 2018-10-28 PROCEDURE — 25000003 PHARM REV CODE 250: Performed by: NURSE PRACTITIONER

## 2018-10-28 PROCEDURE — 94761 N-INVAS EAR/PLS OXIMETRY MLT: CPT

## 2018-10-28 PROCEDURE — 63600175 PHARM REV CODE 636 W HCPCS: Performed by: INTERNAL MEDICINE

## 2018-10-28 PROCEDURE — 94640 AIRWAY INHALATION TREATMENT: CPT

## 2018-10-28 PROCEDURE — 63600175 PHARM REV CODE 636 W HCPCS: Performed by: NURSE PRACTITIONER

## 2018-10-28 PROCEDURE — 36415 COLL VENOUS BLD VENIPUNCTURE: CPT

## 2018-10-28 PROCEDURE — 96372 THER/PROPH/DIAG INJ SC/IM: CPT

## 2018-10-28 PROCEDURE — 85025 COMPLETE CBC W/AUTO DIFF WBC: CPT

## 2018-10-28 PROCEDURE — 27100092 HC HIGH FLOW DELIVERY CANNULA

## 2018-10-28 PROCEDURE — 21400001 HC TELEMETRY ROOM

## 2018-10-28 PROCEDURE — 27000221 HC OXYGEN, UP TO 24 HOURS

## 2018-10-28 PROCEDURE — 25000003 PHARM REV CODE 250: Performed by: HOSPITALIST

## 2018-10-28 PROCEDURE — 27100171 HC OXYGEN HIGH FLOW UP TO 24 HOURS

## 2018-10-28 PROCEDURE — 25000242 PHARM REV CODE 250 ALT 637 W/ HCPCS: Performed by: NURSE PRACTITIONER

## 2018-10-28 PROCEDURE — 80048 BASIC METABOLIC PNL TOTAL CA: CPT

## 2018-10-28 PROCEDURE — 25000003 PHARM REV CODE 250: Performed by: INTERNAL MEDICINE

## 2018-10-28 RX ADMIN — SODIUM CHLORIDE: 0.9 INJECTION, SOLUTION INTRAVENOUS at 05:10

## 2018-10-28 RX ADMIN — APIXABAN 5 MG: 2.5 TABLET, FILM COATED ORAL at 09:10

## 2018-10-28 RX ADMIN — IPRATROPIUM BROMIDE AND ALBUTEROL SULFATE 3 ML: .5; 3 SOLUTION RESPIRATORY (INHALATION) at 01:10

## 2018-10-28 RX ADMIN — IPRATROPIUM BROMIDE AND ALBUTEROL SULFATE 3 ML: .5; 3 SOLUTION RESPIRATORY (INHALATION) at 07:10

## 2018-10-28 RX ADMIN — BUDESONIDE 0.5 MG: 0.5 SUSPENSION RESPIRATORY (INHALATION) at 07:10

## 2018-10-28 RX ADMIN — DILTIAZEM HYDROCHLORIDE 360 MG: 180 CAPSULE, COATED, EXTENDED RELEASE ORAL at 09:10

## 2018-10-28 RX ADMIN — FLECAINIDE ACETATE 100 MG: 50 TABLET ORAL at 08:10

## 2018-10-28 RX ADMIN — FLECAINIDE ACETATE 100 MG: 50 TABLET ORAL at 09:10

## 2018-10-28 RX ADMIN — APIXABAN 5 MG: 2.5 TABLET, FILM COATED ORAL at 08:10

## 2018-10-28 RX ADMIN — PANTOPRAZOLE SODIUM 40 MG: 40 TABLET, DELAYED RELEASE ORAL at 09:10

## 2018-10-28 RX ADMIN — OXYCODONE HYDROCHLORIDE 10 MG: 5 TABLET ORAL at 11:10

## 2018-10-28 RX ADMIN — POLYETHYLENE GLYCOL 3350 17 G: 17 POWDER, FOR SOLUTION ORAL at 09:10

## 2018-10-28 RX ADMIN — SERTRALINE HYDROCHLORIDE 50 MG: 50 TABLET ORAL at 09:10

## 2018-10-28 RX ADMIN — PREDNISONE 20 MG: 20 TABLET ORAL at 09:10

## 2018-10-28 RX ADMIN — INSULIN DETEMIR 10 UNITS: 100 INJECTION, SOLUTION SUBCUTANEOUS at 09:10

## 2018-10-28 RX ADMIN — PRAVASTATIN SODIUM 40 MG: 20 TABLET ORAL at 08:10

## 2018-10-28 RX ADMIN — OXYCODONE HYDROCHLORIDE 10 MG: 5 TABLET ORAL at 05:10

## 2018-10-28 RX ADMIN — INSULIN ASPART 2 UNITS: 100 INJECTION, SOLUTION INTRAVENOUS; SUBCUTANEOUS at 11:10

## 2018-10-28 RX ADMIN — GABAPENTIN 600 MG: 300 CAPSULE ORAL at 08:10

## 2018-10-28 RX ADMIN — INSULIN DETEMIR 10 UNITS: 100 INJECTION, SOLUTION SUBCUTANEOUS at 08:10

## 2018-10-28 RX ADMIN — CEFTRIAXONE 1 G: 1 INJECTION, SOLUTION INTRAVENOUS at 05:10

## 2018-10-28 RX ADMIN — INSULIN ASPART 2 UNITS: 100 INJECTION, SOLUTION INTRAVENOUS; SUBCUTANEOUS at 05:10

## 2018-10-28 NOTE — ASSESSMENT & PLAN NOTE
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  He is receiving Keytruda and radiation to the spine for treatment.  First dose of Keytruda was on October 18, 2018.  He also has begun radiation treatments to the spine.  He is scheduled for radiation today.  He will follow up with his primary oncologist, Dr. Woodall, after acute illness resolved.  Continue supportive care including:  -Nebulizer treatments every 6 hr  -oxygen supplementation as needed    October 27, 2018-I had a detailed discussion with the patient today with regard to his current clinical situation.  He will resume treatment with keytruda as an outpatient.  He will continue palliative XRT to the spine.    October 28, 2018-I had a detailed discussion with the patient and his daughter today who was at the bedside with regard to his current clinical situation.  He will resume treatment with keytruda as an outpatient.  He will continue palliative XRT to the spine.

## 2018-10-28 NOTE — ASSESSMENT & PLAN NOTE
Followed outpatient by Dr. Lagos   Patient currently receiving radiation   Consult Hematology/Oncology       10/27- oncology input appreciated. Will monitor closely ,repeat chest ct scan showed -  Mild interval reduction in size of left mediastinal mass.  However, there has been interval enlargement of multiple metastatic pulmonary nodules as well as a lesion in the dome of the right lobe of the liver.  Interval development of a new right lower lobe 1 cm nodule.   10/28- case discussed with oncology , will continue supportive care /radiation therapy

## 2018-10-28 NOTE — ASSESSMENT & PLAN NOTE
-Acute on chronic copd exacerbation   - add prednisone 20 mg po qd   -JAMMIE and ICS.       10/27- will continue duonebs, continue steroids  10/28- will continue duonebs, does not want to take steroids at this time as he thinks it interferes with his oncology therapy .

## 2018-10-28 NOTE — ASSESSMENT & PLAN NOTE
10/27- will continue acuchecks ,continue levemir 10 units bid    10/28- now better controlled, will continue levemir at 10 units bid

## 2018-10-28 NOTE — ASSESSMENT & PLAN NOTE
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  -will continue  Keytruda and radiation to the spine for treatment.  First dose of Keytruda was on October 18, 2018  Will follow oncology

## 2018-10-28 NOTE — PLAN OF CARE
Problem: Patient Care Overview  Goal: Plan of Care Review  Outcome: Ongoing (interventions implemented as appropriate)  POC discussed w/patient, verbalized understanding.   AAO X 4. NSR on monitor. VSS.   Patient on 9 L high flow nasal cannula with humidication.  Patient complained of back pain. PRN medications administered as prescribed.  Voids per urinal at bedside.   IV intact. Medications adminsitered as prescribed. NS infusing at 75 ml/hr.   Patient turns independently in bed.   Fall precautions in place, call bell in reach, bed in low and locked position, family at bedside.   Call bell in reach, bed in low and locked position.   Patient denies needs at this time.   Will continue to monitor.

## 2018-10-28 NOTE — ASSESSMENT & PLAN NOTE
October 27, 2018-I have recommended proceeding with PE protocol CT of the thorax to evaluate for any evidence of pulmonary embolism as the etiology of his acute on chronic respiratory failure.  I have discussed my recommendations with Hospital Medicine today.    October 28, 2018-results of PE protocol CT of the thorax were reviewed in detail with the patient and his daughter.  Results also discussed in detail with Hospital Medicine.  It is too early to determine response to therapy as he has just gotten 1 dose of keytruda so far.  I have recommended that he start corticosteroid therapy in patient as recommended by Hospital Medicine.  Patient has been hesitant to take this because of him being on CT today.  However I have explained to the patient that at this time the benefit of taking corticosteroid therapy to help with his COPD exacerbation outweighs the risk from this.  He expressed understanding and is agreeable to take this.

## 2018-10-28 NOTE — PROGRESS NOTES
[facility] Hospital Medicine  Progress Note    Patient Name: Nico Garza Jr.  MRN: [id number]  Patient Class: IP- Inpatient   Admission Date: 10/25/2018  Length of Stay: 3 days  Attending Physician: Mohinder Yee MD  Primary Care Provider: Tiffany Davis DO        Subjective:     Principal Problem:Acute on chronic respiratory failure    HPI:  Nico Garza is a 71 yo male with metastatic lung cancer with mets to spine, PAF, COPD, HTN, HPL and IDDM who was at the clinic and developed some lightheadedness and bright light visual disturbance and had to sit in a chair to prevent from falling.  Associated symptoms lumbar back pain, BENITO, dry cough, chest pain, abdominal pain, suprapubic pain & urinary urgency. In the ED, temp 98.6, pulse 86, Resp 25, B/P 86/47 and ABG shows hypoxic respiratory failure. Fluid resuscitation given and B/P improved 116/55. Pt presently wearing Vapotherm. CBC shows a normocytic anemia, glucose 45 (improved after eating), troponin normal, lactic acid 1.2 and procal 0.45. U/A positive nitrites and 60 WBC. Blood and urine culture pending. Pt is admitted for treatment of intravascular volume depletion, UTI and hypoxic respiratory failure.     Hospital Course:  73 y/o wm admitted with a dx of acute on chronic respiratory failure , UTI and acute on chronic copd exacerbation . He was started on iv rocephin , LABA, JAMMIE and IHS . He is a o2 home dependent .     10/27-he remains stable. CTA of the chest was done to rule out acute PE and it showed -    No acute chest findings.  Mild interval reduction in size of left mediastinal mass.  However, there has been interval enlargement of multiple metastatic pulmonary nodules as well as a lesion in the dome of the right lobe of the liver.  Interval development of a new right lower lobe 1 cm nodule.  10/28 -he remains weak, still on oxygen at 8l/min    Interval History:   10/28- seen at bedside with his wife , he remains on oxygen via nasal  kylah.    Review of Systems   Constitutional: Positive for appetite change and chills. Negative for diaphoresis, fatigue and fever.   HENT: Negative.    Eyes: Negative for visual disturbance.   Respiratory: Positive for cough and shortness of breath. Negative for wheezing.    Cardiovascular: Negative for chest pain, palpitations and leg swelling.   Gastrointestinal: Positive for constipation. Negative for abdominal pain, diarrhea, nausea and vomiting.   Genitourinary: Positive for urgency. Negative for dysuria and hematuria.   Musculoskeletal: Positive for back pain.   Skin: Negative for pallor, rash and wound.   Neurological: Negative for seizures, facial asymmetry, speech difficulty, weakness, light-headedness and headaches.   Psychiatric/Behavioral: Negative for confusion. The patient is not nervous/anxious.      Objective:     Vital Signs (Most Recent):  Temp: 97.4 °F (36.3 °C) (10/28/18 1119)  Pulse: 82 (10/28/18 1331)  Resp: 18 (10/28/18 1331)  BP: (!) 110/56 (10/28/18 1119)  SpO2: 97 % (10/28/18 1331) Vital Signs (24h Range):  Temp:  [97.1 °F (36.2 °C)-98.7 °F (37.1 °C)] 97.4 °F (36.3 °C)  Pulse:  [73-94] 82  Resp:  [18-20] 18  SpO2:  [91 %-97 %] 97 %  BP: (106-143)/(52-61) 110/56     Weight: 82.4 kg (181 lb 10.5 oz)  Body mass index is 28.45 kg/m².    Intake/Output Summary (Last 24 hours) at 10/28/2018 1454  Last data filed at 10/28/2018 1200  Gross per 24 hour   Intake 2120 ml   Output 300 ml   Net 1820 ml      Physical Exam   Constitutional: He is oriented to person, place, and time. He appears well-developed.   HENT:   Head: Normocephalic and atraumatic.   Nose: Nose normal.   Mouth/Throat: Oropharynx is clear and moist.   Eyes: Conjunctivae are normal. Pupils are equal, round, and reactive to light. No scleral icterus.   Neck: Normal range of motion. Neck supple.   Cardiovascular: Normal rate, regular rhythm and normal heart sounds. Exam reveals no gallop and no friction rub.   No murmur  heard.  Pulmonary/Chest: Effort normal. He has decreased breath sounds in the right upper field, the left upper field, the left middle field and the left lower field.   Abdominal: Soft. Bowel sounds are normal.   Musculoskeletal: Normal range of motion. He exhibits no edema or tenderness.   Neurological: He is alert and oriented to person, place, and time.   Skin: Skin is warm and dry.   Psychiatric: He has a normal mood and affect. His behavior is normal.   Nursing note and vitals reviewed.      Significant Labs:   Blood Culture: No results for input(s): LABBLOO in the last 48 hours.  BMP:   Recent Labs   Lab 10/28/18  0410   *      K 4.2   CL 97   CO2 32*   BUN 8   CREATININE 0.7   CALCIUM 9.4     CBC:   Recent Labs   Lab 10/27/18  0423 10/28/18  0410   WBC 8.18 7.92   HGB 10.7* 10.8*   HCT 34.4* 34.4*    277     All pertinent labs within the past 24 hours have been reviewed.    Significant Imaging: I have reviewed and interpreted all pertinent imaging results/findings within the past 24 hours.    Assessment/Plan:      * Acute on chronic respiratory failure    --Lung Ca/COPD exacerbation   --Currently, on vapotherm   -- Duonebs Q6 hours.  -- ICS         10/27- related to lung cancer/COPD exacerbation-will wean off oxygen as tolerated.  Might need pulmonology follow up .     10/28- discussed with respiratory therapy , will wean off oxygen to his home regime of 4L/min     Severe malnutrition    Encourage po intake        UTI (urinary tract infection)    Cont rocephin     10/27- will continue rocephin , will closely monitor      Intravascular volume depletion    Continue IVFs   Orthostatics        Situational mixed anxiety and depressive disorder      10/27- will continue setraline      Mass of lower lobe of left lung    Followed outpatient by Dr. Lagos   Patient currently receiving radiation   Consult Hematology/Oncology       10/27- oncology input appreciated. Will monitor closely ,repeat chest  ct scan showed -  Mild interval reduction in size of left mediastinal mass.  However, there has been interval enlargement of multiple metastatic pulmonary nodules as well as a lesion in the dome of the right lobe of the liver.  Interval development of a new right lower lobe 1 cm nodule.   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  -will continue  Keytruda and radiation to the spine for treatment.  First dose of Keytruda was on October 18, 2018  Will follow oncology      Type 1 diabetes mellitus with diabetic neuropathy      10/27- will continue acuchecks ,continue levemir 10 units bid    10/28- now better controlled, will continue levemir at 10 units bid     Paroxysmal atrial fibrillation    Sinus rhythm, monitor telemetry.  - Continue home diltiazem and flecainide for HR/rhythm suppression.  - Continue Eliquis     10/27- will continue eliquis,diltiazem and flecainide.Rtae controlled     10/28- now rate controlled , will continued eliquis, diltiazem/flecainide.     COPD (chronic obstructive pulmonary disease) with emphysema    -Acute on chronic copd exacerbation   - add prednisone 20 mg po qd   -JAMMIE and ICS.       10/27- will continue duonebs, continue steroids  10/28- will continue duonebs, does not want to take steroids at this time as he thinks it interferes with his oncology therapy .       VTE Risk Mitigation (From admission, onward)        Ordered     apixaban tablet 5 mg  2 times daily      10/25/18 1850              Mohinder Yee MD  Department of Hospital Medicine   [facility]

## 2018-10-28 NOTE — SUBJECTIVE & OBJECTIVE
Interval History:   October 27, 2018-the patient reports that he continues to have shortness of breath and is requiring high-dose supplemental oxygen support.  He reports generalized weakness and fatigue.    October 28, 2018-the patient reports that he continues to have shortness of breath and is requiring high-dose supplemental oxygen support.  He reports generalized weakness and fatigue.  He has been refusing his prednisone yesterday and today.    Oncology Treatment Plan:   OP PEMBROLIZUMAB 200MG Q3W    Medications:  Continuous Infusions:   sodium chloride 0.9% 75 mL/hr at 10/28/18 0543     Scheduled Meds:   albuterol-ipratropium  3 mL Nebulization Q6H    apixaban  5 mg Oral BID    budesonide  0.5 mg Nebulization BID    cefTRIAXone (ROCEPHIN) IVPB  1 g Intravenous Q24H    diltiaZEM  360 mg Oral Daily    flecainide  100 mg Oral Q12H    gabapentin  600 mg Oral QHS    insulin detemir U-100  10 Units Subcutaneous BID    pantoprazole  40 mg Oral Daily    polyethylene glycol  17 g Oral Daily    pravastatin  40 mg Oral Nightly    predniSONE  20 mg Oral Daily    sertraline  50 mg Oral Daily     PRN Meds:acetaminophen, acetaminophen, dextrose 50%, dextrose 50%, glucagon (human recombinant), glucose, glucose, insulin aspart U-100, morphine, ondansetron, oxyCODONE, promethazine (PHENERGAN) IVPB     Review of Systems   Constitutional: Positive for appetite change. Negative for chills, diaphoresis, fatigue and fever.   HENT: Negative.    Eyes: Positive for visual disturbance.   Respiratory: Positive for cough and shortness of breath. Negative for wheezing.    Cardiovascular: Positive for chest pain and palpitations. Negative for leg swelling.   Gastrointestinal: Positive for abdominal pain and constipation. Negative for diarrhea, nausea and vomiting.   Genitourinary: Positive for urgency. Negative for dysuria and hematuria.   Musculoskeletal: Positive for back pain.   Skin: Negative for pallor, rash and wound.    Neurological: Positive for light-headedness. Negative for seizures, facial asymmetry, speech difficulty, weakness and headaches.   Psychiatric/Behavioral: Negative for confusion. The patient is not nervous/anxious.      Objective:     Vital Signs (Most Recent):  Temp: 97.4 °F (36.3 °C) (10/28/18 1119)  Pulse: 85 (10/28/18 1119)  Resp: 18 (10/28/18 1119)  BP: (!) 110/56 (10/28/18 1119)  SpO2: (!) 94 % (10/28/18 1145) Vital Signs (24h Range):  Temp:  [97.1 °F (36.2 °C)-98.7 °F (37.1 °C)] 97.4 °F (36.3 °C)  Pulse:  [73-94] 85  Resp:  [18-22] 18  SpO2:  [91 %-96 %] 94 %  BP: (106-143)/(52-61) 110/56     Weight: 82.4 kg (181 lb 10.5 oz)  Body mass index is 28.45 kg/m².  Body surface area is 1.97 meters squared.      Intake/Output Summary (Last 24 hours) at 10/28/2018 1156  Last data filed at 10/28/2018 0600  Gross per 24 hour   Intake 1940 ml   Output 600 ml   Net 1340 ml       Physical Exam   Constitutional: He is oriented to person, place, and time. He appears well-developed.  Non-toxic appearance. He has a sickly appearance. He appears ill. No distress.   HENT:   Head: Normocephalic and atraumatic.   Nose: Nose normal.   Mouth/Throat: Oropharynx is clear and moist.   Eyes: Conjunctivae, EOM and lids are normal. Pupils are equal, round, and reactive to light. No scleral icterus.   Neck: Normal range of motion. Neck supple.   Cardiovascular: Normal rate, regular rhythm, S1 normal and S2 normal. Exam reveals no gallop and no friction rub.   Murmur heard.   Systolic murmur is present with a grade of 2/6.  Pulmonary/Chest: Effort normal. No accessory muscle usage or stridor. No apnea, no tachypnea and no bradypnea. No respiratory distress. He has decreased breath sounds in the right lower field, the left upper field, the left middle field and the left lower field. He has no wheezes. He has no rales.   Abdominal: Soft. Bowel sounds are normal.   Musculoskeletal: Normal range of motion. He exhibits no edema or tenderness.    Neurological: He is alert and oriented to person, place, and time.   Skin: Skin is warm, dry and intact. No ecchymosis noted. He is not diaphoretic. There is pallor.   Psychiatric: His speech is normal and behavior is normal. Judgment and thought content normal. His mood appears anxious. Cognition and memory are normal. He is attentive.   Nursing note and vitals reviewed.      Significant Labs:   I have reviewed all of the patient's relevant lab work available in the medical record and have utilized this in my evaluation and management recommendations today    Diagnostic Results:  I have reviewed all of the patient's diagnostic/imaging results available in the medical record and have utilized this in my evaluation and management recommendations today.

## 2018-10-28 NOTE — PLAN OF CARE
Problem: Patient Care Overview  Goal: Plan of Care Review  Outcome: Ongoing (interventions implemented as appropriate)  Plan of care discussed with patient, and patient verbalized understanding.  Patient remained AOx4, 10L NC, and NSR on the monitor.  Blood glucose monitored - coverage given.  Patient's chronic pain - moderately controlled with medication.   Patient remained free of falls, accidents, and trama during the day shift.  Bed is in the lowest position and call light is within reach - Continue to monitor.

## 2018-10-28 NOTE — ASSESSMENT & PLAN NOTE
--Lung Ca/COPD exacerbation   --Currently, on vapotherm   -- Duonebs Q6 hours.  -- ICS         10/27- related to lung cancer/COPD exacerbation-will wean off oxygen as tolerated.  Might need pulmonology follow up .     10/28- discussed with respiratory therapy , will wean off oxygen to his home regime of 4L/min

## 2018-10-28 NOTE — ASSESSMENT & PLAN NOTE
Sinus rhythm, monitor telemetry.  - Continue home diltiazem and flecainide for HR/rhythm suppression.  - Continue Eliquis     10/27- will continue eliquis,diltiazem and flecainide.Rtae controlled     10/28- now rate controlled , will continued eliquis, diltiazem/flecainide.

## 2018-10-28 NOTE — SUBJECTIVE & OBJECTIVE
Interval History:   10/28- seen at bedside with his wife , he remains on oxygen via nasal canula.    Review of Systems   Constitutional: Positive for appetite change and chills. Negative for diaphoresis, fatigue and fever.   HENT: Negative.    Eyes: Negative for visual disturbance.   Respiratory: Positive for cough and shortness of breath. Negative for wheezing.    Cardiovascular: Negative for chest pain, palpitations and leg swelling.   Gastrointestinal: Positive for constipation. Negative for abdominal pain, diarrhea, nausea and vomiting.   Genitourinary: Positive for urgency. Negative for dysuria and hematuria.   Musculoskeletal: Positive for back pain.   Skin: Negative for pallor, rash and wound.   Neurological: Negative for seizures, facial asymmetry, speech difficulty, weakness, light-headedness and headaches.   Psychiatric/Behavioral: Negative for confusion. The patient is not nervous/anxious.      Objective:     Vital Signs (Most Recent):  Temp: 97.4 °F (36.3 °C) (10/28/18 1119)  Pulse: 82 (10/28/18 1331)  Resp: 18 (10/28/18 1331)  BP: (!) 110/56 (10/28/18 1119)  SpO2: 97 % (10/28/18 1331) Vital Signs (24h Range):  Temp:  [97.1 °F (36.2 °C)-98.7 °F (37.1 °C)] 97.4 °F (36.3 °C)  Pulse:  [73-94] 82  Resp:  [18-20] 18  SpO2:  [91 %-97 %] 97 %  BP: (106-143)/(52-61) 110/56     Weight: 82.4 kg (181 lb 10.5 oz)  Body mass index is 28.45 kg/m².    Intake/Output Summary (Last 24 hours) at 10/28/2018 1454  Last data filed at 10/28/2018 1200  Gross per 24 hour   Intake 2120 ml   Output 300 ml   Net 1820 ml      Physical Exam   Constitutional: He is oriented to person, place, and time. He appears well-developed.   HENT:   Head: Normocephalic and atraumatic.   Nose: Nose normal.   Mouth/Throat: Oropharynx is clear and moist.   Eyes: Conjunctivae are normal. Pupils are equal, round, and reactive to light. No scleral icterus.   Neck: Normal range of motion. Neck supple.   Cardiovascular: Normal rate, regular rhythm and  normal heart sounds. Exam reveals no gallop and no friction rub.   No murmur heard.  Pulmonary/Chest: Effort normal. He has decreased breath sounds in the right upper field, the left upper field, the left middle field and the left lower field.   Abdominal: Soft. Bowel sounds are normal.   Musculoskeletal: Normal range of motion. He exhibits no edema or tenderness.   Neurological: He is alert and oriented to person, place, and time.   Skin: Skin is warm and dry.   Psychiatric: He has a normal mood and affect. His behavior is normal.   Nursing note and vitals reviewed.      Significant Labs:   Blood Culture: No results for input(s): LABBLOO in the last 48 hours.  BMP:   Recent Labs   Lab 10/28/18  0410   *      K 4.2   CL 97   CO2 32*   BUN 8   CREATININE 0.7   CALCIUM 9.4     CBC:   Recent Labs   Lab 10/27/18  0423 10/28/18  0410   WBC 8.18 7.92   HGB 10.7* 10.8*   HCT 34.4* 34.4*    277     All pertinent labs within the past 24 hours have been reviewed.    Significant Imaging: I have reviewed and interpreted all pertinent imaging results/findings within the past 24 hours.

## 2018-10-29 ENCOUNTER — TELEPHONE (OUTPATIENT)
Dept: RADIATION ONCOLOGY | Facility: CLINIC | Age: 73
End: 2018-10-29

## 2018-10-29 ENCOUNTER — TELEPHONE (OUTPATIENT)
Dept: FAMILY MEDICINE | Facility: CLINIC | Age: 73
End: 2018-10-29

## 2018-10-29 DIAGNOSIS — I10 ESSENTIAL HYPERTENSION: ICD-10-CM

## 2018-10-29 LAB
ANION GAP SERPL CALC-SCNC: 10 MMOL/L
BASOPHILS # BLD AUTO: 0 K/UL
BASOPHILS NFR BLD: 0 %
BUN SERPL-MCNC: 9 MG/DL
CALCIUM SERPL-MCNC: 9.4 MG/DL
CHLORIDE SERPL-SCNC: 97 MMOL/L
CO2 SERPL-SCNC: 31 MMOL/L
CREAT SERPL-MCNC: 0.8 MG/DL
DIFFERENTIAL METHOD: ABNORMAL
EOSINOPHIL # BLD AUTO: 0.1 K/UL
EOSINOPHIL NFR BLD: 0.7 %
ERYTHROCYTE [DISTWIDTH] IN BLOOD BY AUTOMATED COUNT: 15.5 %
EST. GFR  (AFRICAN AMERICAN): >60 ML/MIN/1.73 M^2
EST. GFR  (NON AFRICAN AMERICAN): >60 ML/MIN/1.73 M^2
GLUCOSE SERPL-MCNC: 196 MG/DL
HCT VFR BLD AUTO: 34.8 %
HGB BLD-MCNC: 10.8 G/DL
LYMPHOCYTES # BLD AUTO: 1.1 K/UL
LYMPHOCYTES NFR BLD: 11.9 %
MCH RBC QN AUTO: 27.1 PG
MCHC RBC AUTO-ENTMCNC: 31 G/DL
MCV RBC AUTO: 87 FL
MONOCYTES # BLD AUTO: 0.7 K/UL
MONOCYTES NFR BLD: 7.7 %
NEUTROPHILS # BLD AUTO: 7.2 K/UL
NEUTROPHILS NFR BLD: 79.7 %
PLATELET # BLD AUTO: 291 K/UL
PMV BLD AUTO: 8.8 FL
POCT GLUCOSE: 147 MG/DL (ref 70–110)
POCT GLUCOSE: 167 MG/DL (ref 70–110)
POCT GLUCOSE: 192 MG/DL (ref 70–110)
POCT GLUCOSE: 206 MG/DL (ref 70–110)
POCT GLUCOSE: 282 MG/DL (ref 70–110)
POTASSIUM SERPL-SCNC: 4.3 MMOL/L
RBC # BLD AUTO: 3.99 M/UL
SODIUM SERPL-SCNC: 138 MMOL/L
WBC # BLD AUTO: 8.97 K/UL

## 2018-10-29 PROCEDURE — 21400001 HC TELEMETRY ROOM

## 2018-10-29 PROCEDURE — 99233 SBSQ HOSP IP/OBS HIGH 50: CPT | Mod: ,,, | Performed by: INTERNAL MEDICINE

## 2018-10-29 PROCEDURE — 77412 RADIATION TX DELIVERY LVL 3: CPT | Performed by: RADIOLOGY

## 2018-10-29 PROCEDURE — 27000221 HC OXYGEN, UP TO 24 HOURS

## 2018-10-29 PROCEDURE — 25000003 PHARM REV CODE 250: Performed by: HOSPITALIST

## 2018-10-29 PROCEDURE — 94761 N-INVAS EAR/PLS OXIMETRY MLT: CPT

## 2018-10-29 PROCEDURE — 85025 COMPLETE CBC W/AUTO DIFF WBC: CPT

## 2018-10-29 PROCEDURE — 63600175 PHARM REV CODE 636 W HCPCS: Performed by: INTERNAL MEDICINE

## 2018-10-29 PROCEDURE — 80048 BASIC METABOLIC PNL TOTAL CA: CPT

## 2018-10-29 PROCEDURE — 25000003 PHARM REV CODE 250: Performed by: NURSE PRACTITIONER

## 2018-10-29 PROCEDURE — 77417 THER RADIOLOGY PORT IMAGE(S): CPT | Performed by: RADIOLOGY

## 2018-10-29 PROCEDURE — 77387 GUIDANCE FOR RADJ TX DLVR: CPT | Mod: TC | Performed by: RADIOLOGY

## 2018-10-29 PROCEDURE — G6002 STEREOSCOPIC X-RAY GUIDANCE: HCPCS | Mod: 26,,, | Performed by: RADIOLOGY

## 2018-10-29 PROCEDURE — 27100171 HC OXYGEN HIGH FLOW UP TO 24 HOURS

## 2018-10-29 PROCEDURE — 25000242 PHARM REV CODE 250 ALT 637 W/ HCPCS: Performed by: NURSE PRACTITIONER

## 2018-10-29 PROCEDURE — 25000003 PHARM REV CODE 250: Performed by: INTERNAL MEDICINE

## 2018-10-29 PROCEDURE — 36415 COLL VENOUS BLD VENIPUNCTURE: CPT

## 2018-10-29 PROCEDURE — 63600175 PHARM REV CODE 636 W HCPCS: Performed by: NURSE PRACTITIONER

## 2018-10-29 PROCEDURE — 94640 AIRWAY INHALATION TREATMENT: CPT

## 2018-10-29 PROCEDURE — 96372 THER/PROPH/DIAG INJ SC/IM: CPT

## 2018-10-29 RX ORDER — DILTIAZEM HYDROCHLORIDE 360 MG/1
CAPSULE, EXTENDED RELEASE ORAL
Qty: 90 CAPSULE | Refills: 0 | Status: SHIPPED | OUTPATIENT
Start: 2018-10-29 | End: 2019-01-25 | Stop reason: SDUPTHER

## 2018-10-29 RX ADMIN — CEFTRIAXONE 1 G: 1 INJECTION, SOLUTION INTRAVENOUS at 05:10

## 2018-10-29 RX ADMIN — PREDNISONE 20 MG: 20 TABLET ORAL at 08:10

## 2018-10-29 RX ADMIN — OXYCODONE HYDROCHLORIDE 10 MG: 5 TABLET ORAL at 04:10

## 2018-10-29 RX ADMIN — BUDESONIDE 0.5 MG: 0.5 SUSPENSION RESPIRATORY (INHALATION) at 07:10

## 2018-10-29 RX ADMIN — DILTIAZEM HYDROCHLORIDE 360 MG: 180 CAPSULE, COATED, EXTENDED RELEASE ORAL at 08:10

## 2018-10-29 RX ADMIN — OXYCODONE HYDROCHLORIDE 10 MG: 5 TABLET ORAL at 02:10

## 2018-10-29 RX ADMIN — PRAVASTATIN SODIUM 40 MG: 20 TABLET ORAL at 09:10

## 2018-10-29 RX ADMIN — ONDANSETRON 4 MG: 2 INJECTION INTRAMUSCULAR; INTRAVENOUS at 06:10

## 2018-10-29 RX ADMIN — INSULIN DETEMIR 10 UNITS: 100 INJECTION, SOLUTION SUBCUTANEOUS at 09:10

## 2018-10-29 RX ADMIN — APIXABAN 5 MG: 2.5 TABLET, FILM COATED ORAL at 08:10

## 2018-10-29 RX ADMIN — IPRATROPIUM BROMIDE AND ALBUTEROL SULFATE 3 ML: .5; 3 SOLUTION RESPIRATORY (INHALATION) at 07:10

## 2018-10-29 RX ADMIN — OXYCODONE HYDROCHLORIDE 10 MG: 5 TABLET ORAL at 07:10

## 2018-10-29 RX ADMIN — GABAPENTIN 600 MG: 300 CAPSULE ORAL at 09:10

## 2018-10-29 RX ADMIN — INSULIN ASPART 3 UNITS: 100 INJECTION, SOLUTION INTRAVENOUS; SUBCUTANEOUS at 05:10

## 2018-10-29 RX ADMIN — FLECAINIDE ACETATE 100 MG: 50 TABLET ORAL at 09:10

## 2018-10-29 RX ADMIN — INSULIN DETEMIR 10 UNITS: 100 INJECTION, SOLUTION SUBCUTANEOUS at 08:10

## 2018-10-29 RX ADMIN — FLECAINIDE ACETATE 100 MG: 50 TABLET ORAL at 08:10

## 2018-10-29 RX ADMIN — INSULIN ASPART 2 UNITS: 100 INJECTION, SOLUTION INTRAVENOUS; SUBCUTANEOUS at 11:10

## 2018-10-29 RX ADMIN — OXYCODONE HYDROCHLORIDE 10 MG: 5 TABLET ORAL at 08:10

## 2018-10-29 RX ADMIN — SERTRALINE HYDROCHLORIDE 50 MG: 50 TABLET ORAL at 08:10

## 2018-10-29 RX ADMIN — APIXABAN 5 MG: 2.5 TABLET, FILM COATED ORAL at 09:10

## 2018-10-29 RX ADMIN — POLYETHYLENE GLYCOL 3350 17 G: 17 POWDER, FOR SOLUTION ORAL at 08:10

## 2018-10-29 RX ADMIN — IPRATROPIUM BROMIDE AND ALBUTEROL SULFATE 3 ML: .5; 3 SOLUTION RESPIRATORY (INHALATION) at 12:10

## 2018-10-29 RX ADMIN — PANTOPRAZOLE SODIUM 40 MG: 40 TABLET, DELAYED RELEASE ORAL at 08:10

## 2018-10-29 RX ADMIN — IPRATROPIUM BROMIDE AND ALBUTEROL SULFATE 3 ML: .5; 3 SOLUTION RESPIRATORY (INHALATION) at 01:10

## 2018-10-29 NOTE — PROGRESS NOTES
[facility] -   Hematology/Oncology  Progress Note    Patient Name: Nico Garza Jr.  Admission Date: 10/25/2018  Hospital Length of Stay: 4 days  Code Status: Full Code     Subjective:     HPI:  Nico Garza is a 71 yo male with metastatic lung cancer with mets to spine, PAF, COPD, HTN, HPL and IDDM who was at the clinic and developed some lightheadedness and bright light visual disturbance and had to sit in a chair to prevent from falling.  Associated symptoms lumbar back pain, BENITO, dry cough, chest pain, abdominal pain, suprapubic pain & urinary urgency. In the ED, temp 98.6, pulse 86, Resp 25, B/P 86/47 and ABG shows hypoxic respiratory failure. Fluid resuscitation given and B/P improved 116/55. Pt presently wearing Vapotherm. CBC shows a normocytic anemia, glucose 45 (improved after eating), troponin normal, lactic acid 1.2 and procal 0.45. U/A positive nitrites and 60 WBC. Blood and urine culture pending. Pt is admitted for treatment of intravascular volume depletion, UTI and hypoxic respiratory failure.     Patient has metastatic lung cancer with mets to liver.  He received 1st dose of Keytruda on October 18, 2018 and is also getting radiation to his spine.  His primary oncologist is Dr. Issa Lagos.  Hematology/oncology consulted for further management of care.        Interval History: 10/29/18 Patient sitting up in bed with sister at bedside when entering this morning.  Patient is currently on 4 liters NC, which is his baseline O2 setting at home.  He states it was just decreased to see how he will do with a lowered setting.  He states that he is feeling better this morning, but still complains of dyspnea on exertion short distances.  He has remained afebrile over the past 24 hr.    Oncology Treatment Plan:   OP PEMBROLIZUMAB 200MG Q3W    Medications:  Continuous Infusions:   sodium chloride 0.9% 75 mL/hr at 10/28/18 0543     Scheduled Meds:   albuterol-ipratropium  3 mL Nebulization  Q6H    apixaban  5 mg Oral BID    budesonide  0.5 mg Nebulization BID    cefTRIAXone (ROCEPHIN) IVPB  1 g Intravenous Q24H    diltiaZEM  360 mg Oral Daily    flecainide  100 mg Oral Q12H    gabapentin  600 mg Oral QHS    insulin detemir U-100  10 Units Subcutaneous BID    pantoprazole  40 mg Oral Daily    polyethylene glycol  17 g Oral Daily    pravastatin  40 mg Oral Nightly    predniSONE  20 mg Oral Daily    sertraline  50 mg Oral Daily     PRN Meds:acetaminophen, acetaminophen, dextrose 50%, dextrose 50%, glucagon (human recombinant), glucose, glucose, insulin aspart U-100, morphine, ondansetron, oxyCODONE, promethazine (PHENERGAN) IVPB     Review of Systems   Constitutional: Negative for appetite change and fever.   Respiratory: Positive for cough and shortness of breath (On exertion). Negative for chest tightness and wheezing.    Cardiovascular: Negative for chest pain and palpitations.   Gastrointestinal: Negative for abdominal pain, anal bleeding, blood in stool, constipation, diarrhea and nausea.   Genitourinary: Negative for dysuria and hematuria.   Skin: Positive for rash ( 2 forehead patient states comes and goes). Negative for wound.   Neurological: Positive for dizziness. Negative for syncope, light-headedness and headaches. Facial asymmetry:  occasional.   Hematological: Negative for adenopathy. Does not bruise/bleed easily.   Psychiatric/Behavioral: Negative for confusion and decreased concentration.     Objective:     Vital Signs (Most Recent):  Temp: 98 °F (36.7 °C) (10/29/18 0734)  Pulse: 85 (10/29/18 0734)  Resp: 18 (10/29/18 0734)  BP: 120/63 (10/29/18 0734)  SpO2: 95 % (10/29/18 0734) Vital Signs (24h Range):  Temp:  [96.4 °F (35.8 °C)-98.7 °F (37.1 °C)] 98 °F (36.7 °C)  Pulse:  [72-90] 85  Resp:  [18-22] 18  SpO2:  [90 %-97 %] 95 %  BP: (110-131)/(56-63) 120/63     Weight: 82.4 kg (181 lb 10.5 oz)  Body mass index is 28.45 kg/m².  Body surface area is 1.97 meters  squared.      Intake/Output Summary (Last 24 hours) at 10/29/2018 0954  Last data filed at 10/29/2018 0406  Gross per 24 hour   Intake 630 ml   Output 450 ml   Net 180 ml       Physical Exam   Constitutional: He is oriented to person, place, and time. Vital signs are normal. He appears well-developed and well-nourished.  Non-toxic appearance. He does not have a sickly appearance. He appears ill. No distress.   HENT:   Head: Normocephalic and atraumatic.   Eyes: Conjunctivae, EOM and lids are normal. Right eye exhibits no discharge. Left eye exhibits no discharge. No scleral icterus.   Cardiovascular: Normal rate, regular rhythm, S1 normal and S2 normal. Exam reveals no gallop and no friction rub.   Murmur heard.   Systolic murmur is present with a grade of 2/6.  Pulmonary/Chest: Effort normal. No stridor. No apnea. No respiratory distress. He has decreased breath sounds in the right upper field, the right middle field, the right lower field, the left upper field, the left middle field and the left lower field. He has no wheezes. He has no rales.   Abdominal: Soft. Normal appearance. He exhibits no distension. There is no tenderness. There is no guarding.   Musculoskeletal: Normal range of motion. He exhibits no edema.   Neurological: He is alert and oriented to person, place, and time.   Skin: Skin is warm, dry and intact. Capillary refill takes less than 2 seconds. Rash (Two forehead patient states has always had on and off) noted. He is not diaphoretic. No pallor.   Psychiatric: His speech is normal and behavior is normal. Judgment and thought content normal. His mood appears anxious. Cognition and memory are normal. He is attentive.       Significant Labs:   BMP:   Recent Labs   Lab 10/28/18  0410 10/29/18  0415   * 196*    138   K 4.2 4.3   CL 97 97   CO2 32* 31*   BUN 8 9   CREATININE 0.7 0.8   CALCIUM 9.4 9.4   , CBC:   Recent Labs   Lab 10/28/18  0410 10/29/18  0415   WBC 7.92 8.97   HGB 10.8*  10.8*   HCT 34.4* 34.8*    291   , CMP:   Recent Labs   Lab 10/28/18  0410 10/29/18  0415    138   K 4.2 4.3   CL 97 97   CO2 32* 31*   * 196*   BUN 8 9   CREATININE 0.7 0.8   CALCIUM 9.4 9.4   ANIONGAP 11 10   EGFRNONAA >60 >60   , Coagulation: No results for input(s): PT, INR, APTT in the last 48 hours., LDH: No results for input(s): LDHCSF, BFSOURCE in the last 48 hours., Urine Studies: No results for input(s): COLORU, APPEARANCEUA, PHUR, SPECGRAV, PROTEINUA, GLUCUA, KETONESU, BILIRUBINUA, OCCULTUA, NITRITE, UROBILINOGEN, LEUKOCYTESUR, RBCUA, WBCUA, BACTERIA, SQUAMEPITHEL, HYALINECASTS in the last 48 hours.    Invalid input(s): WRIGHTSUR and All pertinent labs from the last 24 hours have been reviewed.    Diagnostic Results:  I have reviewed all pertinent imaging results/findings within the past 24 hours.    Assessment/Plan:     * Acute on chronic respiratory failure    October 27, 2018-I have recommended proceeding with PE protocol CT of the thorax to evaluate for any evidence of pulmonary embolism as the etiology of his acute on chronic respiratory failure.  I have discussed my recommendations with Hospital Medicine today.    October 28, 2018-results of PE protocol CT of the thorax were reviewed in detail with the patient and his daughter.  Results also discussed in detail with Hospital Medicine.  It is too early to determine response to therapy as he has just gotten 1 dose of keytruda so far.  I have recommended that he start corticosteroid therapy in patient as recommended by Hospital Medicine.  Patient has been hesitant to take this because of him being on CT today.  However I have explained to the patient that at this time the benefit of taking corticosteroid therapy to help with his COPD exacerbation outweighs the risk from this.  He expressed understanding and is agreeable to take this.    10/29/18 - patient states he has been taking his steroids as ordered since discussion with  .  He understands the importance of this to help with breathing during the acute COPD exacerbation.  His breathing has improved.  He is current on 4 L of oxygen via nasal cannula.  He states still has shortness of breath on exertion with short walks to the bathroom.  CT of chest ruled out pulmonary embolism.  BNP normal. Last troponin on 10/25/2018 normal. Blood cultures no growth to date.  -continue nebulizer breathing treatments every 6 hr  -continue prednisone 20 mg p.o. 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  He is receiving Keytruda and radiation to the spine for treatment.  First dose of Keytruda was on October 18, 2018.  He also has begun radiation treatments to the spine.  He is scheduled for radiation today.  He will follow up with his primary oncologist, Dr. Woodall, after acute illness resolved.  Continue supportive care including:  -Nebulizer treatments every 6 hr  -oxygen supplementation as needed    October 27, 2018-I had a detailed discussion with the patient today with regard to his current clinical situation.  He will resume treatment with keytruda as an outpatient.  He will continue palliative XRT to the spine.    October 28, 2018-I had a detailed discussion with the patient and his daughter today who was at the bedside with regard to his current clinical situation.  He will resume treatment with keytruda as an outpatient.  He will continue palliative XRT to the spine.         Metastatic left lung cancer (metastasis from lung to other site) with mediastinal  lymphadenopathy    10/29/18 - he will follow up outpatient with Dr. Lagos to discuss continuation of Keytruda once acute illness is resolved.  He will continue palliative radiation to spine.  He is scheduled for radiation therapy today at 2:30 pm.         Thank you for your consult. I will follow-up with patient. Please contact us if you have any additional questions.     Callie Rodriguez NP  Hematology/Oncology  [facility]

## 2018-10-29 NOTE — ASSESSMENT & PLAN NOTE
10/29/18 - he will follow up outpatient with Dr. Lagos to discuss continuation of Keytruda once acute illness is resolved.  He will continue palliative radiation to spine.  He is scheduled for radiation therapy today at 2:30 pm.

## 2018-10-29 NOTE — TELEPHONE ENCOUNTER
Notified nurse Ronan of patient's radiation therapy appt at 2:30 pm today. Patient was reported as stable, can transfer via wheelchair w/o heart monitor and on 5L O2 per NC.

## 2018-10-29 NOTE — PLAN OF CARE
Problem: Patient Care Overview  Goal: Plan of Care Review  Outcome: Ongoing (interventions implemented as appropriate)  Patient is educated his copd condition and breathing technic when he has a hard breath.

## 2018-10-29 NOTE — ASSESSMENT & PLAN NOTE
October 27, 2018-I have recommended proceeding with PE protocol CT of the thorax to evaluate for any evidence of pulmonary embolism as the etiology of his acute on chronic respiratory failure.  I have discussed my recommendations with Hospital Medicine today.    October 28, 2018-results of PE protocol CT of the thorax were reviewed in detail with the patient and his daughter.  Results also discussed in detail with Hospital Medicine.  It is too early to determine response to therapy as he has just gotten 1 dose of keytruda so far.  I have recommended that he start corticosteroid therapy in patient as recommended by Hospital Medicine.  Patient has been hesitant to take this because of him being on CT today.  However I have explained to the patient that at this time the benefit of taking corticosteroid therapy to help with his COPD exacerbation outweighs the risk from this.  He expressed understanding and is agreeable to take this.    10/29/18 - patient states he has been taking his steroids as ordered since discussion with .  He understands the importance of this to help with breathing during the acute COPD exacerbation.  His breathing has improved.  He is current on 4 L of oxygen via nasal cannula.  He states still has shortness of breath on exertion with short walks to the bathroom.  CT of chest ruled out pulmonary embolism.  BNP normal. Last troponin on 10/25/2018 normal. Blood cultures no growth to date.  -continue nebulizer breathing treatments every 6 hr  -continue prednisone 20 mg p.o. Daily  -continue supplemental oxygen as needed

## 2018-10-29 NOTE — SUBJECTIVE & OBJECTIVE
Interval History: 10/29/18 Patient sitting up in bed with sister at bedside when entering this morning.  Patient is currently on 4 liters NC, which is his baseline O2 setting at home.  He states it was just decreased to see how he will do with a lowered setting.  He states that he is feeling better this morning, but still complains of dyspnea on exertion short distances.  He has remained afebrile over the past 24 hr.    Oncology Treatment Plan:   OP PEMBROLIZUMAB 200MG Q3W    Medications:  Continuous Infusions:   sodium chloride 0.9% 75 mL/hr at 10/28/18 0543     Scheduled Meds:   albuterol-ipratropium  3 mL Nebulization Q6H    apixaban  5 mg Oral BID    budesonide  0.5 mg Nebulization BID    cefTRIAXone (ROCEPHIN) IVPB  1 g Intravenous Q24H    diltiaZEM  360 mg Oral Daily    flecainide  100 mg Oral Q12H    gabapentin  600 mg Oral QHS    insulin detemir U-100  10 Units Subcutaneous BID    pantoprazole  40 mg Oral Daily    polyethylene glycol  17 g Oral Daily    pravastatin  40 mg Oral Nightly    predniSONE  20 mg Oral Daily    sertraline  50 mg Oral Daily     PRN Meds:acetaminophen, acetaminophen, dextrose 50%, dextrose 50%, glucagon (human recombinant), glucose, glucose, insulin aspart U-100, morphine, ondansetron, oxyCODONE, promethazine (PHENERGAN) IVPB     Review of Systems   Constitutional: Negative for appetite change and fever.   Respiratory: Positive for cough and shortness of breath (On exertion). Negative for chest tightness and wheezing.    Cardiovascular: Negative for chest pain and palpitations.   Gastrointestinal: Negative for abdominal pain, anal bleeding, blood in stool, constipation, diarrhea and nausea.   Genitourinary: Negative for dysuria and hematuria.   Skin: Positive for rash ( 2 forehead patient states comes and goes). Negative for wound.   Neurological: Positive for dizziness. Negative for syncope, light-headedness and headaches. Facial asymmetry:  occasional.   Hematological:  Negative for adenopathy. Does not bruise/bleed easily.   Psychiatric/Behavioral: Negative for confusion and decreased concentration.     Objective:     Vital Signs (Most Recent):  Temp: 98 °F (36.7 °C) (10/29/18 0734)  Pulse: 85 (10/29/18 0734)  Resp: 18 (10/29/18 0734)  BP: 120/63 (10/29/18 0734)  SpO2: 95 % (10/29/18 0734) Vital Signs (24h Range):  Temp:  [96.4 °F (35.8 °C)-98.7 °F (37.1 °C)] 98 °F (36.7 °C)  Pulse:  [72-90] 85  Resp:  [18-22] 18  SpO2:  [90 %-97 %] 95 %  BP: (110-131)/(56-63) 120/63     Weight: 82.4 kg (181 lb 10.5 oz)  Body mass index is 28.45 kg/m².  Body surface area is 1.97 meters squared.      Intake/Output Summary (Last 24 hours) at 10/29/2018 0954  Last data filed at 10/29/2018 0406  Gross per 24 hour   Intake 630 ml   Output 450 ml   Net 180 ml       Physical Exam   Constitutional: He is oriented to person, place, and time. Vital signs are normal. He appears well-developed and well-nourished.  Non-toxic appearance. He does not have a sickly appearance. He appears ill. No distress.   HENT:   Head: Normocephalic and atraumatic.   Eyes: Conjunctivae, EOM and lids are normal. Right eye exhibits no discharge. Left eye exhibits no discharge. No scleral icterus.   Cardiovascular: Normal rate, regular rhythm, S1 normal and S2 normal. Exam reveals no gallop and no friction rub.   Murmur heard.   Systolic murmur is present with a grade of 2/6.  Pulmonary/Chest: Effort normal. No stridor. No apnea. No respiratory distress. He has decreased breath sounds in the right upper field, the right middle field, the right lower field, the left upper field, the left middle field and the left lower field. He has no wheezes. He has no rales.   Abdominal: Soft. Normal appearance. He exhibits no distension. There is no tenderness. There is no guarding.   Musculoskeletal: Normal range of motion. He exhibits no edema.   Neurological: He is alert and oriented to person, place, and time.   Skin: Skin is warm, dry and  intact. Capillary refill takes less than 2 seconds. Rash (Two forehead patient states has always had on and off) noted. He is not diaphoretic. No pallor.   Psychiatric: His speech is normal and behavior is normal. Judgment and thought content normal. His mood appears anxious. Cognition and memory are normal. He is attentive.       Significant Labs:   BMP:   Recent Labs   Lab 10/28/18  0410 10/29/18  0415   * 196*    138   K 4.2 4.3   CL 97 97   CO2 32* 31*   BUN 8 9   CREATININE 0.7 0.8   CALCIUM 9.4 9.4   , CBC:   Recent Labs   Lab 10/28/18  0410 10/29/18  0415   WBC 7.92 8.97   HGB 10.8* 10.8*   HCT 34.4* 34.8*    291   , CMP:   Recent Labs   Lab 10/28/18  0410 10/29/18  0415    138   K 4.2 4.3   CL 97 97   CO2 32* 31*   * 196*   BUN 8 9   CREATININE 0.7 0.8   CALCIUM 9.4 9.4   ANIONGAP 11 10   EGFRNONAA >60 >60   , Coagulation: No results for input(s): PT, INR, APTT in the last 48 hours., LDH: No results for input(s): LDHCSF, BFSOURCE in the last 48 hours., Urine Studies: No results for input(s): COLORU, APPEARANCEUA, PHUR, SPECGRAV, PROTEINUA, GLUCUA, KETONESU, BILIRUBINUA, OCCULTUA, NITRITE, UROBILINOGEN, LEUKOCYTESUR, RBCUA, WBCUA, BACTERIA, SQUAMEPITHEL, HYALINECASTS in the last 48 hours.    Invalid input(s): WRIGHTSUR and All pertinent labs from the last 24 hours have been reviewed.    Diagnostic Results:  I have reviewed all pertinent imaging results/findings within the past 24 hours.

## 2018-10-30 VITALS
OXYGEN SATURATION: 93 % | RESPIRATION RATE: 18 BRPM | HEIGHT: 67 IN | DIASTOLIC BLOOD PRESSURE: 52 MMHG | WEIGHT: 181.69 LBS | SYSTOLIC BLOOD PRESSURE: 98 MMHG | HEART RATE: 81 BPM | BODY MASS INDEX: 28.52 KG/M2 | TEMPERATURE: 98 F

## 2018-10-30 LAB
ANION GAP SERPL CALC-SCNC: 9 MMOL/L
BACTERIA BLD CULT: NORMAL
BACTERIA BLD CULT: NORMAL
BASOPHILS # BLD AUTO: 0.01 K/UL
BASOPHILS NFR BLD: 0.1 %
BUN SERPL-MCNC: 12 MG/DL
CALCIUM SERPL-MCNC: 9.2 MG/DL
CHLORIDE SERPL-SCNC: 98 MMOL/L
CO2 SERPL-SCNC: 33 MMOL/L
CREAT SERPL-MCNC: 0.8 MG/DL
DIFFERENTIAL METHOD: ABNORMAL
EOSINOPHIL # BLD AUTO: 0.1 K/UL
EOSINOPHIL NFR BLD: 1.2 %
ERYTHROCYTE [DISTWIDTH] IN BLOOD BY AUTOMATED COUNT: 15.7 %
EST. GFR  (AFRICAN AMERICAN): >60 ML/MIN/1.73 M^2
EST. GFR  (NON AFRICAN AMERICAN): >60 ML/MIN/1.73 M^2
GLUCOSE SERPL-MCNC: 141 MG/DL
HCT VFR BLD AUTO: 33.7 %
HGB BLD-MCNC: 10.6 G/DL
LYMPHOCYTES # BLD AUTO: 1.1 K/UL
LYMPHOCYTES NFR BLD: 11.7 %
MCH RBC QN AUTO: 27.5 PG
MCHC RBC AUTO-ENTMCNC: 31.5 G/DL
MCV RBC AUTO: 88 FL
MONOCYTES # BLD AUTO: 0.7 K/UL
MONOCYTES NFR BLD: 7.3 %
NEUTROPHILS # BLD AUTO: 7.6 K/UL
NEUTROPHILS NFR BLD: 79.7 %
PLATELET # BLD AUTO: 299 K/UL
PMV BLD AUTO: 8.8 FL
POTASSIUM SERPL-SCNC: 4.1 MMOL/L
RBC # BLD AUTO: 3.85 M/UL
SODIUM SERPL-SCNC: 140 MMOL/L
WBC # BLD AUTO: 9.54 K/UL

## 2018-10-30 PROCEDURE — 25000242 PHARM REV CODE 250 ALT 637 W/ HCPCS: Performed by: NURSE PRACTITIONER

## 2018-10-30 PROCEDURE — 94761 N-INVAS EAR/PLS OXIMETRY MLT: CPT

## 2018-10-30 PROCEDURE — 77387 GUIDANCE FOR RADJ TX DLVR: CPT | Mod: TC | Performed by: RADIOLOGY

## 2018-10-30 PROCEDURE — 63600175 PHARM REV CODE 636 W HCPCS: Performed by: INTERNAL MEDICINE

## 2018-10-30 PROCEDURE — 36415 COLL VENOUS BLD VENIPUNCTURE: CPT

## 2018-10-30 PROCEDURE — 80048 BASIC METABOLIC PNL TOTAL CA: CPT

## 2018-10-30 PROCEDURE — 25000003 PHARM REV CODE 250: Performed by: NURSE PRACTITIONER

## 2018-10-30 PROCEDURE — 85025 COMPLETE CBC W/AUTO DIFF WBC: CPT

## 2018-10-30 PROCEDURE — G6002 STEREOSCOPIC X-RAY GUIDANCE: HCPCS | Mod: 26,,, | Performed by: RADIOLOGY

## 2018-10-30 PROCEDURE — 25000003 PHARM REV CODE 250: Performed by: HOSPITALIST

## 2018-10-30 PROCEDURE — 25000003 PHARM REV CODE 250: Performed by: INTERNAL MEDICINE

## 2018-10-30 PROCEDURE — 99233 SBSQ HOSP IP/OBS HIGH 50: CPT | Mod: ,,, | Performed by: INTERNAL MEDICINE

## 2018-10-30 PROCEDURE — 27000221 HC OXYGEN, UP TO 24 HOURS

## 2018-10-30 PROCEDURE — 94640 AIRWAY INHALATION TREATMENT: CPT

## 2018-10-30 PROCEDURE — 77412 RADIATION TX DELIVERY LVL 3: CPT | Performed by: RADIOLOGY

## 2018-10-30 RX ORDER — MORPHINE SULFATE 4 MG/ML
2 INJECTION, SOLUTION INTRAMUSCULAR; INTRAVENOUS EVERY 4 HOURS PRN
Status: DISCONTINUED | OUTPATIENT
Start: 2018-10-30 | End: 2018-10-30 | Stop reason: HOSPADM

## 2018-10-30 RX ORDER — DOXYCYCLINE HYCLATE 100 MG
100 TABLET ORAL EVERY 12 HOURS
Qty: 12 TABLET | Refills: 0 | Status: ON HOLD | OUTPATIENT
Start: 2018-10-30 | End: 2018-11-18 | Stop reason: HOSPADM

## 2018-10-30 RX ADMIN — FLECAINIDE ACETATE 100 MG: 50 TABLET ORAL at 09:10

## 2018-10-30 RX ADMIN — IPRATROPIUM BROMIDE AND ALBUTEROL SULFATE 3 ML: .5; 3 SOLUTION RESPIRATORY (INHALATION) at 12:10

## 2018-10-30 RX ADMIN — SODIUM CHLORIDE: 0.9 INJECTION, SOLUTION INTRAVENOUS at 12:10

## 2018-10-30 RX ADMIN — BUDESONIDE 0.5 MG: 0.5 SUSPENSION RESPIRATORY (INHALATION) at 07:10

## 2018-10-30 RX ADMIN — PANTOPRAZOLE SODIUM 40 MG: 40 TABLET, DELAYED RELEASE ORAL at 08:10

## 2018-10-30 RX ADMIN — OXYCODONE HYDROCHLORIDE 10 MG: 5 TABLET ORAL at 06:10

## 2018-10-30 RX ADMIN — APIXABAN 5 MG: 2.5 TABLET, FILM COATED ORAL at 08:10

## 2018-10-30 RX ADMIN — DILTIAZEM HYDROCHLORIDE 360 MG: 180 CAPSULE, COATED, EXTENDED RELEASE ORAL at 08:10

## 2018-10-30 RX ADMIN — OXYCODONE HYDROCHLORIDE 10 MG: 5 TABLET ORAL at 12:10

## 2018-10-30 RX ADMIN — INSULIN DETEMIR 10 UNITS: 100 INJECTION, SOLUTION SUBCUTANEOUS at 08:10

## 2018-10-30 RX ADMIN — POLYETHYLENE GLYCOL 3350 17 G: 17 POWDER, FOR SOLUTION ORAL at 08:10

## 2018-10-30 RX ADMIN — OXYCODONE HYDROCHLORIDE 10 MG: 5 TABLET ORAL at 11:10

## 2018-10-30 RX ADMIN — IPRATROPIUM BROMIDE AND ALBUTEROL SULFATE 3 ML: .5; 3 SOLUTION RESPIRATORY (INHALATION) at 07:10

## 2018-10-30 RX ADMIN — PREDNISONE 20 MG: 20 TABLET ORAL at 08:10

## 2018-10-30 NOTE — ASSESSMENT & PLAN NOTE
Sinus rhythm, monitor telemetry.  - Continue home diltiazem and flecainide for HR/rhythm suppression.  - Continue Eliquis     10/27- will continue eliquis,diltiazem and flecainide.Rtae controlled     10/28- now rate controlled , will continued eliquis, diltiazem/flecainide.    10/29- will continue diltiazem, flecainide

## 2018-10-30 NOTE — ASSESSMENT & PLAN NOTE
October 27, 2018-I have recommended proceeding with PE protocol CT of the thorax to evaluate for any evidence of pulmonary embolism as the etiology of his acute on chronic respiratory failure.  I have discussed my recommendations with Hospital Medicine today.    October 28, 2018-results of PE protocol CT of the thorax were reviewed in detail with the patient and his daughter.  Results also discussed in detail with Hospital Medicine.  It is too early to determine response to therapy as he has just gotten 1 dose of keytruda so far.  I have recommended that he start corticosteroid therapy in patient as recommended by Hospital Medicine.  Patient has been hesitant to take this because of him being on CT today.  However I have explained to the patient that at this time the benefit of taking corticosteroid therapy to help with his COPD exacerbation outweighs the risk from this.  He expressed understanding and is agreeable to take this.    10/29/18 - patient states he has been taking his steroids as ordered since discussion with .  He understands the importance of this to help with breathing during the acute COPD exacerbation.  His breathing has improved.  He is current on 4 L of oxygen via nasal cannula.  He states still has shortness of breath on exertion with short walks to the bathroom.  CT of chest ruled out pulmonary embolism.  BNP normal. Last troponin on 10/25/2018 normal. Blood cultures no growth to date.  -continue nebulizer breathing treatments every 6 hr  -continue prednisone 20 mg p.o. Daily  -continue supplemental oxygen as needed    10/30/18 - patient currently is on 4 L of oxygen and he states he is feeling much better.  Continue taper dose of oral steroids for home.  Follow up outpatient with Dr. Lagos upon discharge. Patient is still complaining of urinary tract infection symptoms.  Continue antibiotic therapy for UTI upon discharge.

## 2018-10-30 NOTE — PLAN OF CARE
Problem: Patient Care Overview  Goal: Plan of Care Review  Outcome: Ongoing (interventions implemented as appropriate)  Pt discharged home, PIV removed, tele-monitor removed, discharge instructions reviewed with the family, follow-up appointments set up

## 2018-10-30 NOTE — PROGRESS NOTES
[facility] Hospital Medicine  Progress Note    Patient Name: Nico Garza Jr.  MRN: [id number]  Patient Class: IP- Inpatient   Admission Date: 10/25/2018  Length of Stay: 4 days  Attending Physician: Mohinder Yee MD  Primary Care Provider: Tiffany Davis DO        Subjective:     Principal Problem:Acute on chronic respiratory failure    HPI:  Nico Garza is a 71 yo male with metastatic lung cancer with mets to spine, PAF, COPD, HTN, HPL and IDDM who was at the clinic and developed some lightheadedness and bright light visual disturbance and had to sit in a chair to prevent from falling.  Associated symptoms lumbar back pain, BENITO, dry cough, chest pain, abdominal pain, suprapubic pain & urinary urgency. In the ED, temp 98.6, pulse 86, Resp 25, B/P 86/47 and ABG shows hypoxic respiratory failure. Fluid resuscitation given and B/P improved 116/55. Pt presently wearing Vapotherm. CBC shows a normocytic anemia, glucose 45 (improved after eating), troponin normal, lactic acid 1.2 and procal 0.45. U/A positive nitrites and 60 WBC. Blood and urine culture pending. Pt is admitted for treatment of intravascular volume depletion, UTI and hypoxic respiratory failure.     Hospital Course:  71 y/o wm admitted with a dx of acute on chronic respiratory failure , UTI and acute on chronic copd exacerbation . He was started on iv rocephin , LABA, JAMMIE and IHS . He is a o2 home dependent .     10/27-he remains stable. CTA of the chest was done to rule out acute PE and it showed -    No acute chest findings.  Mild interval reduction in size of left mediastinal mass.  However, there has been interval enlargement of multiple metastatic pulmonary nodules as well as a lesion in the dome of the right lobe of the liver.  Interval development of a new right lower lobe 1 cm nodule.  10/28 -he remains weak, still on oxygen at 8l/min        Interval History:   10/28- seen at bedside with his wife , he remains on oxygen via  nasal canula.  10/29-  Feels better, now has improving oxygenation   Review of Systems   Constitutional: Positive for appetite change and chills. Negative for diaphoresis, fatigue and fever.   HENT: Negative.    Eyes: Negative for visual disturbance.   Respiratory: Positive for cough and shortness of breath. Negative for wheezing.    Cardiovascular: Negative for chest pain, palpitations and leg swelling.   Gastrointestinal: Positive for constipation. Negative for abdominal pain, diarrhea, nausea and vomiting.   Genitourinary: Positive for urgency. Negative for dysuria and hematuria.   Musculoskeletal: Positive for back pain.   Skin: Negative for pallor, rash and wound.   Neurological: Negative for seizures, facial asymmetry, speech difficulty, weakness, light-headedness and headaches.   Psychiatric/Behavioral: Negative for confusion. The patient is not nervous/anxious.      Objective:     Vital Signs (Most Recent):  Temp: 97.5 °F (36.4 °C) (10/29/18 1944)  Pulse: 99 (10/29/18 1944)  Resp: 20 (10/29/18 1944)  BP: (!) 109/59 (10/29/18 1944)  SpO2: (!) 92 % (10/29/18 1944) Vital Signs (24h Range):  Temp:  [97.5 °F (36.4 °C)-98.7 °F (37.1 °C)] 97.5 °F (36.4 °C)  Pulse:  [72-99] 99  Resp:  [16-22] 20  SpO2:  [90 %-95 %] 92 %  BP: (109-136)/(59-64) 109/59     Weight: 82.4 kg (181 lb 10.5 oz)  Body mass index is 28.45 kg/m².    Intake/Output Summary (Last 24 hours) at 10/29/2018 2139  Last data filed at 10/29/2018 0406  Gross per 24 hour   Intake 210 ml   Output --   Net 210 ml      Physical Exam   Constitutional: He is oriented to person, place, and time. He appears well-developed.   HENT:   Head: Normocephalic and atraumatic.   Nose: Nose normal.   Mouth/Throat: Oropharynx is clear and moist.   Eyes: Conjunctivae are normal. Pupils are equal, round, and reactive to light. No scleral icterus.   Neck: Normal range of motion. Neck supple.   Cardiovascular: Normal rate, regular rhythm and normal heart sounds. Exam reveals no  gallop and no friction rub.   No murmur heard.  Pulmonary/Chest: Effort normal. He has decreased breath sounds in the right upper field, the left upper field, the left middle field and the left lower field.   Abdominal: Soft. Bowel sounds are normal.   Musculoskeletal: Normal range of motion. He exhibits no edema or tenderness.   Neurological: He is alert and oriented to person, place, and time.   Skin: Skin is warm and dry.   Psychiatric: He has a normal mood and affect. His behavior is normal.   Nursing note and vitals reviewed.      Significant Labs:   Blood Culture: No results for input(s): LABBLOO in the last 48 hours.  BMP:   Recent Labs   Lab 10/29/18  0415   *      K 4.3   CL 97   CO2 31*   BUN 9   CREATININE 0.8   CALCIUM 9.4     CBC:   Recent Labs   Lab 10/28/18  0410 10/29/18  0415   WBC 7.92 8.97   HGB 10.8* 10.8*   HCT 34.4* 34.8*    291     All pertinent labs within the past 24 hours have been reviewed.    Significant Imaging: I have reviewed and interpreted all pertinent imaging results/findings within the past 24 hours.    Assessment/Plan:      * Acute on chronic respiratory failure    --Lung Ca/COPD exacerbation   --Currently, on vapotherm   -- Duonebs Q6 hours.  -- ICS         10/27- related to lung cancer/COPD exacerbation-will wean off oxygen as tolerated.  Might need pulmonology follow up .     10/28- discussed with respiratory therapy , will wean off oxygen to his home regime of 4L/min    10/29- will consult pulmonology , wean off oxygen as tolerated , need close out patient follow up     Primary cancer of left lung metastatic to other site      10/29- oncology follow up     Severe malnutrition    Encourage po intake        UTI (urinary tract infection)    Cont rocephin     10/27- will continue rocephin , will closely monitor     10/29- treated     Intravascular volume depletion    Continue IVFs   Orthostatics        Situational mixed anxiety and depressive disorder       10/27- will continue setraline      Mass of lower lobe of left lung    Followed outpatient by Dr. Lagos   Patient currently receiving radiation   Consult Hematology/Oncology       10/27- oncology input appreciated. Will monitor closely ,repeat chest ct scan showed -  Mild interval reduction in size of left mediastinal mass.  However, there has been interval enlargement of multiple metastatic pulmonary nodules as well as a lesion in the dome of the right lobe of the liver.  Interval development of a new right lower lobe 1 cm nodule.   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  -will continue  Keytruda and radiation to the spine for treatment.  First dose of Keytruda was on October 18, 2018  Will follow oncology      Type 1 diabetes mellitus with diabetic neuropathy      10/27- will continue acuchecks ,continue levemir 10 units bid    10/28- now better controlled, will continue levemir at 10 units bid    10/29- will continue levemir and closely monitor glucose     Paroxysmal atrial fibrillation    Sinus rhythm, monitor telemetry.  - Continue home diltiazem and flecainide for HR/rhythm suppression.  - Continue Eliquis     10/27- will continue eliquis,diltiazem and flecainide.Rtae controlled     10/28- now rate controlled , will continued eliquis, diltiazem/flecainide.    10/29- will continue diltiazem, flecainide     COPD (chronic obstructive pulmonary disease) with emphysema    -Acute on chronic copd exacerbation   - add prednisone 20 mg po qd   -JAMMIE and ICS.       10/27- will continue duonebs, continue steroids  10/28- will continue duonebs, does not want to take steroids at this time as  he thinks it interferes with his oncology therapy .       VTE Risk Mitigation (From admission, onward)        Ordered     apixaban tablet 5 mg  2 times daily      10/25/18 1850              Mohinder Yee MD  Department of Hospital Medicine   [facility]

## 2018-10-30 NOTE — ASSESSMENT & PLAN NOTE
-Acute on chronic copd exacerbation   - add prednisone 20 mg po qd   -JAMMIE and ICS.       10/27- will continue duonebs, continue steroids  10/28- will continue duonebs, does not want to take steroids at this time as he thinks it interferes with his oncology therapy .    10/29- will continue duonebs and pulmonology follow up on discharge, continue oxygen

## 2018-10-30 NOTE — ASSESSMENT & PLAN NOTE
--Lung Ca/COPD exacerbation   --Currently, on vapotherm   -- Duonebs Q6 hours.  -- ICS         10/27- related to lung cancer/COPD exacerbation-will wean off oxygen as tolerated.  Might need pulmonology follow up .     10/28- discussed with respiratory therapy , will wean off oxygen to his home regime of 4L/min    10/29- will consult pulmonology , wean off oxygen as tolerated , need close out patient follow up

## 2018-10-30 NOTE — PLAN OF CARE
Problem: Patient Care Overview  Goal: Plan of Care Review  Outcome: Ongoing (interventions implemented as appropriate)  POC reviewed with patient and pt spouse they both verbalized understanding. Pt remains free from falls. Fall precautions in place. SR on cardiac monitor. VSS.  Pt has O2 at 4LNC , pt still SOB on exertion. Neb treatments as needed and scheduled. Pt has chronic pain and is receiving prn pain medication as needed. Instructed pt to use urinal since pt still has orders for strict intake and output. NS @75ml/hr infusing. ABX d'c'd per Dr. Yee. Call ghazal w/in reach. Reminded to call for assistance. PT spouse at the bedside, and room located near the nursing station.

## 2018-10-30 NOTE — PLAN OF CARE
Yari faxed HH order and discharge order to [facility] via [facility]. No further needs at this time.

## 2018-10-30 NOTE — SUBJECTIVE & OBJECTIVE
Interval History:   10/28- seen at bedside with his wife , he remains on oxygen via nasal canula.  10/29-  Feels better, now has improving oxygenation   Review of Systems   Constitutional: Positive for appetite change and chills. Negative for diaphoresis, fatigue and fever.   HENT: Negative.    Eyes: Negative for visual disturbance.   Respiratory: Positive for cough and shortness of breath. Negative for wheezing.    Cardiovascular: Negative for chest pain, palpitations and leg swelling.   Gastrointestinal: Positive for constipation. Negative for abdominal pain, diarrhea, nausea and vomiting.   Genitourinary: Positive for urgency. Negative for dysuria and hematuria.   Musculoskeletal: Positive for back pain.   Skin: Negative for pallor, rash and wound.   Neurological: Negative for seizures, facial asymmetry, speech difficulty, weakness, light-headedness and headaches.   Psychiatric/Behavioral: Negative for confusion. The patient is not nervous/anxious.      Objective:     Vital Signs (Most Recent):  Temp: 97.5 °F (36.4 °C) (10/29/18 1944)  Pulse: 99 (10/29/18 1944)  Resp: 20 (10/29/18 1944)  BP: (!) 109/59 (10/29/18 1944)  SpO2: (!) 92 % (10/29/18 1944) Vital Signs (24h Range):  Temp:  [97.5 °F (36.4 °C)-98.7 °F (37.1 °C)] 97.5 °F (36.4 °C)  Pulse:  [72-99] 99  Resp:  [16-22] 20  SpO2:  [90 %-95 %] 92 %  BP: (109-136)/(59-64) 109/59     Weight: 82.4 kg (181 lb 10.5 oz)  Body mass index is 28.45 kg/m².    Intake/Output Summary (Last 24 hours) at 10/29/2018 2139  Last data filed at 10/29/2018 0406  Gross per 24 hour   Intake 210 ml   Output --   Net 210 ml      Physical Exam   Constitutional: He is oriented to person, place, and time. He appears well-developed.   HENT:   Head: Normocephalic and atraumatic.   Nose: Nose normal.   Mouth/Throat: Oropharynx is clear and moist.   Eyes: Conjunctivae are normal. Pupils are equal, round, and reactive to light. No scleral icterus.   Neck: Normal range of motion. Neck supple.    Cardiovascular: Normal rate, regular rhythm and normal heart sounds. Exam reveals no gallop and no friction rub.   No murmur heard.  Pulmonary/Chest: Effort normal. He has decreased breath sounds in the right upper field, the left upper field, the left middle field and the left lower field.   Abdominal: Soft. Bowel sounds are normal.   Musculoskeletal: Normal range of motion. He exhibits no edema or tenderness.   Neurological: He is alert and oriented to person, place, and time.   Skin: Skin is warm and dry.   Psychiatric: He has a normal mood and affect. His behavior is normal.   Nursing note and vitals reviewed.      Significant Labs:   Blood Culture: No results for input(s): LABBLOO in the last 48 hours.  BMP:   Recent Labs   Lab 10/29/18  0415   *      K 4.3   CL 97   CO2 31*   BUN 9   CREATININE 0.8   CALCIUM 9.4     CBC:   Recent Labs   Lab 10/28/18  0410 10/29/18  0415   WBC 7.92 8.97   HGB 10.8* 10.8*   HCT 34.4* 34.8*    291     All pertinent labs within the past 24 hours have been reviewed.    Significant Imaging: I have reviewed and interpreted all pertinent imaging results/findings within the past 24 hours.

## 2018-10-30 NOTE — SUBJECTIVE & OBJECTIVE
Interval History:  Patient seen at bedside this morning.  Sister present during assessment.  Patient states that he is feeling better.  He is currently on 4 L of oxygen nasal cannula.  He still complains of shortness of breath with exertion to bathroom, but is better than yesterday.  He has remained afebrile overnight.    Oncology Treatment Plan:   OP PEMBROLIZUMAB 200MG Q3W    Medications:  Continuous Infusions:   sodium chloride 0.9% 75 mL/hr at 10/30/18 0021     Scheduled Meds:   albuterol-ipratropium  3 mL Nebulization Q6H    apixaban  5 mg Oral BID    budesonide  0.5 mg Nebulization BID    diltiaZEM  360 mg Oral Daily    flecainide  100 mg Oral Q12H    gabapentin  600 mg Oral QHS    insulin detemir U-100  10 Units Subcutaneous BID    pantoprazole  40 mg Oral Daily    polyethylene glycol  17 g Oral Daily    pravastatin  40 mg Oral Nightly    predniSONE  20 mg Oral Daily    sertraline  50 mg Oral Daily     PRN Meds:acetaminophen, acetaminophen, dextrose 50%, dextrose 50%, glucagon (human recombinant), glucose, glucose, insulin aspart U-100, morphine, ondansetron, oxyCODONE, promethazine (PHENERGAN) IVPB     Review of Systems   Constitutional: Negative for appetite change and fever.   Respiratory: Positive for cough and shortness of breath (On exertion). Negative for chest tightness and wheezing.    Cardiovascular: Negative for chest pain and palpitations.   Gastrointestinal: Negative for abdominal pain, anal bleeding, blood in stool, constipation, diarrhea and nausea.   Genitourinary: Negative for dysuria and hematuria.   Skin: Positive for rash ( 2 forehead patient states comes and goes). Negative for wound.   Neurological: Negative for dizziness, syncope, light-headedness and headaches. Facial asymmetry:  occasional.   Hematological: Negative for adenopathy. Does not bruise/bleed easily.   Psychiatric/Behavioral: Negative for confusion and decreased concentration.     Objective:     Vital Signs  (Most Recent):  Temp: 98 °F (36.7 °C) (10/30/18 0754)  Pulse: 81 (10/30/18 0754)  Resp: 18 (10/30/18 0754)  BP: (!) 98/52 (10/30/18 0754)  SpO2: (!) 93 % (10/30/18 0754) Vital Signs (24h Range):  Temp:  [97.5 °F (36.4 °C)-98.3 °F (36.8 °C)] 98 °F (36.7 °C)  Pulse:  [65-99] 81  Resp:  [16-20] 18  SpO2:  [90 %-94 %] 93 %  BP: ()/(52-64) 98/52     Weight: 82.4 kg (181 lb 10.5 oz)  Body mass index is 28.45 kg/m².  Body surface area is 1.97 meters squared.      Intake/Output Summary (Last 24 hours) at 10/30/2018 0943  Last data filed at 10/30/2018 0632  Gross per 24 hour   Intake 1651.25 ml   Output 550 ml   Net 1101.25 ml       Physical Exam   Constitutional: He is oriented to person, place, and time. Vital signs are normal. He appears well-developed and well-nourished.  Non-toxic appearance. He does not have a sickly appearance. He appears ill. No distress.   HENT:   Head: Normocephalic and atraumatic.   Eyes: Conjunctivae, EOM and lids are normal. Right eye exhibits no discharge. Left eye exhibits no discharge. No scleral icterus.   Cardiovascular: Normal rate, regular rhythm, S1 normal and S2 normal. Exam reveals no gallop and no friction rub.   Murmur heard.   Systolic murmur is present with a grade of 2/6.  Pulmonary/Chest: Effort normal. No stridor. No apnea. No respiratory distress. He has decreased breath sounds in the right upper field, the right middle field, the right lower field, the left upper field, the left middle field and the left lower field. He has no wheezes. He has no rales.   Abdominal: Soft. Normal appearance. He exhibits no distension. There is no tenderness. There is no guarding.   Musculoskeletal: Normal range of motion. He exhibits no edema.   Neurological: He is alert and oriented to person, place, and time.   Skin: Skin is warm, dry and intact. Capillary refill takes less than 2 seconds. Rash (Two forehead patient states has always had on and off) noted. He is not diaphoretic. No  pallor.   Psychiatric: His speech is normal and behavior is normal. Judgment and thought content normal. His mood appears anxious. Cognition and memory are normal. He is attentive.       Significant Labs:   BMP:   Recent Labs   Lab 10/29/18  0415 10/30/18  0458   * 141*    140   K 4.3 4.1   CL 97 98   CO2 31* 33*   BUN 9 12   CREATININE 0.8 0.8   CALCIUM 9.4 9.2   , CBC:   Recent Labs   Lab 10/29/18  0415 10/30/18  0458   WBC 8.97 9.54   HGB 10.8* 10.6*   HCT 34.8* 33.7*    299   , CMP:   Recent Labs   Lab 10/29/18  0415 10/30/18  0458    140   K 4.3 4.1   CL 97 98   CO2 31* 33*   * 141*   BUN 9 12   CREATININE 0.8 0.8   CALCIUM 9.4 9.2   ANIONGAP 10 9   EGFRNONAA >60 >60   , Coagulation: No results for input(s): PT, INR, APTT in the last 48 hours., LDH: No results for input(s): LDHCSF, BFSOURCE in the last 48 hours., Urine Studies: No results for input(s): COLORU, APPEARANCEUA, PHUR, SPECGRAV, PROTEINUA, GLUCUA, KETONESU, BILIRUBINUA, OCCULTUA, NITRITE, UROBILINOGEN, LEUKOCYTESUR, RBCUA, WBCUA, BACTERIA, SQUAMEPITHEL, HYALINECASTS in the last 48 hours.    Invalid input(s): WRIGHTSUR and All pertinent labs from the last 24 hours have been reviewed.    Diagnostic Results:  I have reviewed all pertinent imaging results/findings within the past 24 hours.

## 2018-10-30 NOTE — PROGRESS NOTES
[facility] -   Hematology/Oncology  Progress Note    Patient Name: Nico Garza Jr.  Admission Date: 10/25/2018  Hospital Length of Stay: 5 days  Code Status: Full Code     Subjective:     HPI:  Nico Garza is a 71 yo male with metastatic lung cancer with mets to spine, PAF, COPD, HTN, HPL and IDDM who was at the clinic and developed some lightheadedness and bright light visual disturbance and had to sit in a chair to prevent from falling.  Associated symptoms lumbar back pain, BENITO, dry cough, chest pain, abdominal pain, suprapubic pain & urinary urgency. In the ED, temp 98.6, pulse 86, Resp 25, B/P 86/47 and ABG shows hypoxic respiratory failure. Fluid resuscitation given and B/P improved 116/55. Pt presently wearing Vapotherm. CBC shows a normocytic anemia, glucose 45 (improved after eating), troponin normal, lactic acid 1.2 and procal 0.45. U/A positive nitrites and 60 WBC. Blood and urine culture pending. Pt is admitted for treatment of intravascular volume depletion, UTI and hypoxic respiratory failure.     Patient has metastatic lung cancer with mets to liver.  He received 1st dose of Keytruda on October 18, 2018 and is also getting radiation to his spine.  His primary oncologist is Dr. Issa Lagos.  Hematology/oncology consulted for further management of care.        Interval History:  Patient seen at bedside this morning.  Sister present during assessment.  Patient states that he is feeling better.  He is currently on 4 L of oxygen nasal cannula.  He still complains of shortness of breath with exertion to bathroom, but is better than yesterday.  He has remained afebrile overnight.    Oncology Treatment Plan:   OP PEMBROLIZUMAB 200MG Q3W    Medications:  Continuous Infusions:   sodium chloride 0.9% 75 mL/hr at 10/30/18 0021     Scheduled Meds:   albuterol-ipratropium  3 mL Nebulization Q6H    apixaban  5 mg Oral BID    budesonide  0.5 mg Nebulization BID    diltiaZEM  360 mg Oral  Daily    flecainide  100 mg Oral Q12H    gabapentin  600 mg Oral QHS    insulin detemir U-100  10 Units Subcutaneous BID    pantoprazole  40 mg Oral Daily    polyethylene glycol  17 g Oral Daily    pravastatin  40 mg Oral Nightly    predniSONE  20 mg Oral Daily    sertraline  50 mg Oral Daily     PRN Meds:acetaminophen, acetaminophen, dextrose 50%, dextrose 50%, glucagon (human recombinant), glucose, glucose, insulin aspart U-100, morphine, ondansetron, oxyCODONE, promethazine (PHENERGAN) IVPB     Review of Systems   Constitutional: Negative for appetite change and fever.   Respiratory: Positive for cough and shortness of breath (On exertion). Negative for chest tightness and wheezing.    Cardiovascular: Negative for chest pain and palpitations.   Gastrointestinal: Negative for abdominal pain, anal bleeding, blood in stool, constipation, diarrhea and nausea.   Genitourinary: Negative for dysuria and hematuria.   Skin: Positive for rash ( 2 forehead patient states comes and goes). Negative for wound.   Neurological: Negative for dizziness, syncope, light-headedness and headaches. Facial asymmetry:  occasional.   Hematological: Negative for adenopathy. Does not bruise/bleed easily.   Psychiatric/Behavioral: Negative for confusion and decreased concentration.     Objective:     Vital Signs (Most Recent):  Temp: 98 °F (36.7 °C) (10/30/18 0754)  Pulse: 81 (10/30/18 0754)  Resp: 18 (10/30/18 0754)  BP: (!) 98/52 (10/30/18 0754)  SpO2: (!) 93 % (10/30/18 0754) Vital Signs (24h Range):  Temp:  [97.5 °F (36.4 °C)-98.3 °F (36.8 °C)] 98 °F (36.7 °C)  Pulse:  [65-99] 81  Resp:  [16-20] 18  SpO2:  [90 %-94 %] 93 %  BP: ()/(52-64) 98/52     Weight: 82.4 kg (181 lb 10.5 oz)  Body mass index is 28.45 kg/m².  Body surface area is 1.97 meters squared.      Intake/Output Summary (Last 24 hours) at 10/30/2018 0943  Last data filed at 10/30/2018 0632  Gross per 24 hour   Intake 1651.25 ml   Output 550 ml   Net 1101.25 ml        Physical Exam   Constitutional: He is oriented to person, place, and time. Vital signs are normal. He appears well-developed and well-nourished.  Non-toxic appearance. He does not have a sickly appearance. He appears ill. No distress.   HENT:   Head: Normocephalic and atraumatic.   Eyes: Conjunctivae, EOM and lids are normal. Right eye exhibits no discharge. Left eye exhibits no discharge. No scleral icterus.   Cardiovascular: Normal rate, regular rhythm, S1 normal and S2 normal. Exam reveals no gallop and no friction rub.   Murmur heard.   Systolic murmur is present with a grade of 2/6.  Pulmonary/Chest: Effort normal. No stridor. No apnea. No respiratory distress. He has decreased breath sounds in the right upper field, the right middle field, the right lower field, the left upper field, the left middle field and the left lower field. He has no wheezes. He has no rales.   Abdominal: Soft. Normal appearance. He exhibits no distension. There is no tenderness. There is no guarding.   Musculoskeletal: Normal range of motion. He exhibits no edema.   Neurological: He is alert and oriented to person, place, and time.   Skin: Skin is warm, dry and intact. Capillary refill takes less than 2 seconds. Rash (Two forehead patient states has always had on and off) noted. He is not diaphoretic. No pallor.   Psychiatric: His speech is normal and behavior is normal. Judgment and thought content normal. His mood appears anxious. Cognition and memory are normal. He is attentive.       Significant Labs:   BMP:   Recent Labs   Lab 10/29/18  0415 10/30/18  0458   * 141*    140   K 4.3 4.1   CL 97 98   CO2 31* 33*   BUN 9 12   CREATININE 0.8 0.8   CALCIUM 9.4 9.2   , CBC:   Recent Labs   Lab 10/29/18  0415 10/30/18  0458   WBC 8.97 9.54   HGB 10.8* 10.6*   HCT 34.8* 33.7*    299   , CMP:   Recent Labs   Lab 10/29/18  0415 10/30/18  0458    140   K 4.3 4.1   CL 97 98   CO2 31* 33*   * 141*   BUN 9  12   CREATININE 0.8 0.8   CALCIUM 9.4 9.2   ANIONGAP 10 9   EGFRNONAA >60 >60   , Coagulation: No results for input(s): PT, INR, APTT in the last 48 hours., LDH: No results for input(s): LDHCSF, BFSOURCE in the last 48 hours., Urine Studies: No results for input(s): COLORU, APPEARANCEUA, PHUR, SPECGRAV, PROTEINUA, GLUCUA, KETONESU, BILIRUBINUA, OCCULTUA, NITRITE, UROBILINOGEN, LEUKOCYTESUR, RBCUA, WBCUA, BACTERIA, SQUAMEPITHEL, HYALINECASTS in the last 48 hours.    Invalid input(s): WRIGHTSUR and All pertinent labs from the last 24 hours have been reviewed.    Diagnostic Results:  I have reviewed all pertinent imaging results/findings within the past 24 hours.    Assessment/Plan:     * Acute on chronic respiratory failure    October 27, 2018-I have recommended proceeding with PE protocol CT of the thorax to evaluate for any evidence of pulmonary embolism as the etiology of his acute on chronic respiratory failure.  I have discussed my recommendations with Hospital Medicine today.    October 28, 2018-results of PE protocol CT of the thorax were reviewed in detail with the patient and his daughter.  Results also discussed in detail with Hospital Medicine.  It is too early to determine response to therapy as he has just gotten 1 dose of keytruda so far.  I have recommended that he start corticosteroid therapy in patient as recommended by Hospital Medicine.  Patient has been hesitant to take this because of him being on CT today.  However I have explained to the patient that at this time the benefit of taking corticosteroid therapy to help with his COPD exacerbation outweighs the risk from this.  He expressed understanding and is agreeable to take this.    10/29/18 - patient states he has been taking his steroids as ordered since discussion with .  He understands the importance of this to help with breathing during the acute COPD exacerbation.  His breathing has improved.  He is current on 4 L of oxygen via  nasal cannula.  He states still has shortness of breath on exertion with short walks to the bathroom.  CT of chest ruled out pulmonary embolism.  BNP normal. Last troponin on 10/25/2018 normal. Blood cultures no growth to date.  -continue nebulizer breathing treatments every 6 hr  -continue prednisone 20 mg p.o. Daily  -continue supplemental oxygen as needed    10/30/18 - patient currently is on 4 L of oxygen and he states he is feeling much better.  Continue taper dose of oral steroids for home.  Follow up outpatient with Dr. Lagos upon discharge. Patient is still complaining of urinary tract infection symptoms.  Continue antibiotic therapy for UTI upon discharge.     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  He is receiving Keytruda and radiation to the spine for treatment.  First dose of Keytruda was on October 18, 2018.  He also has begun radiation treatments to the spine.  He is scheduled for radiation today.  He will follow up with his primary oncologist, Dr. Woodall, after acute illness resolved.  Continue supportive care including:  -Nebulizer treatments every 6 hr  -oxygen supplementation as needed    October 27, 2018-I had a detailed discussion with the patient today with regard to his current clinical situation.  He will resume treatment with keytruda as an outpatient.  He will continue palliative XRT to the spine.    October 28, 2018-I had a detailed discussion with the patient and his daughter today who was at the bedside with regard to his current clinical situation.  He will resume treatment with keytruda as an outpatient.   He will continue palliative XRT to the spine.         Metastatic left lung cancer (metastasis from lung to other site) with mediastinal lymphadenopathy    10/29/18 - he will follow up outpatient with Dr. Lagos to discuss continuation of Keytruda once acute illness is resolved.  He will continue palliative radiation to spine.  He is scheduled for radiation therapy today at 2:30 pm.         Thank you for your consult. I will follow-up with patient. Please contact us if you have any additional questions.     Callie Rodriguez NP  Hematology/Oncology  [facility]

## 2018-10-30 NOTE — ASSESSMENT & PLAN NOTE
10/27- will continue acuchecks ,continue levemir 10 units bid    10/28- now better controlled, will continue levemir at 10 units bid    10/29- will continue levemir and closely monitor glucose

## 2018-10-30 NOTE — PLAN OF CARE
10/30/18 0954   Medicare Message   Important Message from Medicare regarding Discharge Appeal Rights Given to patient/caregiver;Explained to patient/caregiver;Signed/date by patient/caregiver   Date IMM was signed 10/30/18   Time IMM was signed 0950

## 2018-10-30 NOTE — DISCHARGE SUMMARY
[facility] Hospital Medicine  Discharge Summary      Patient Name: Nico Garza Jr.  MRN: [id number]  Admission Date: 10/25/2018  Hospital Length of Stay: 5 days  Discharge Date and Time:  10/30/2018 8:45 AM  Attending Physician: Mohinder Yee MD   Discharging Provider: Mohinder Yee MD  Primary Care Provider: Tiffany Davis DO      HPI:   Nico Garza is a 73 yo male with metastatic lung cancer with mets to spine, PAF, COPD, HTN, HPL and IDDM who was at the clinic and developed some lightheadedness and bright light visual disturbance and had to sit in a chair to prevent from falling.  Associated symptoms lumbar back pain, BENITO, dry cough, chest pain, abdominal pain, suprapubic pain & urinary urgency. In the ED, temp 98.6, pulse 86, Resp 25, B/P 86/47 and ABG shows hypoxic respiratory failure. Fluid resuscitation given and B/P improved 116/55. Pt presently wearing Vapotherm. CBC shows a normocytic anemia, glucose 45 (improved after eating), troponin normal, lactic acid 1.2 and procal 0.45. U/A positive nitrites and 60 WBC. Blood and urine culture pending. Pt is admitted for treatment of intravascular volume depletion, UTI and hypoxic respiratory failure.     * No surgery found *      Hospital Course:   71 y/o wm admitted with a dx of acute on chronic respiratory failure , UTI and acute on chronic copd exacerbation . He was started on iv rocephin , LABA, JAMMIE and IHS . He is a o2 home dependent .     10/27-he remains stable. CTA of the chest was done to rule out acute PE and it showed -    No acute chest findings.  Mild interval reduction in size of left mediastinal mass.  However, there has been interval enlargement of multiple metastatic pulmonary nodules as well as a lesion in the dome of the right lobe of the liver.  Interval development of a new right lower lobe 1 cm nodule.  10/28 -he remains weak, still on oxygen at 8l/min  10/30-  73 yo male with metastatic lung cancer with mets to spine,  PAF, COPD, HTN, HPL and IDDM who was at the clinic and developed some lightheadedness and bright light visual disturbance and had to sit in a chair to prevent from falling. . In the ED, temp 98.6, pulse 86, Resp 25, B/P 86/47 and ABG shows hypoxic respiratory failure. He was admitted and was managed with vaoptherm ,duonebs , started on empiric rocephin . CTA of the chest was done to rule out PE and it showed mild interval reduction in size of left mediastinal mass.  However, there has been interval enlargement of multiple metastatic pulmonary nodules as well as a lesion in the dome of the right lobe of the liver.  Interval development of a new right lower lobe 1 cm nodule.  He was seen in consultation with oncology and he will continue Keydura/radiation therapy  on discharge .  He will be discharged to follow up with pulmonology , oncology and with home health . He refused steroid therapy during his stay .  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  -will continue  Keytruda and radiation to the spine for treatment.  First dose of Keytruda was on October 18, 2018  Will follow oncology      COPD (chronic obstructive pulmonary disease) with emphysema    -Acute on chronic copd exacerbation   - add prednisone 20 mg po qd   -JAMMIE and ICS.       10/27- will continue duonebs, continue steroids  10/28- will continue duonebs, does not want to take steroids at this time as he thinks it interferes with his oncology therapy  .    10/29- will continue duonebs and pulmonology follow up on discharge, continue oxygen         Final Active Diagnoses:    Diagnosis Date Noted POA    PRINCIPAL PROBLEM:  Acute on chronic respiratory failure [J96.20] 09/22/2018 Yes    Primary cancer of left lung metastatic to other site [C34.92]  Unknown    Secondary cancer of bone [C79.51]  Unknown    UTI (urinary tract infection) [N39.0] 10/26/2018 Yes    Severe malnutrition [E43] 10/26/2018 Yes    Intravascular volume depletion [E86.1] 10/25/2018 Yes    Situational mixed anxiety and depressive disorder [F43.23] 10/23/2018 Yes    Constipation [K59.00] 08/31/2018 Yes    Mass of lower lobe of left lung [R91.8] 08/31/2018 Yes    Metastatic left lung cancer (metastasis from lung to other site) with mediastinal lymphadenopathy [C34.92] 08/31/2018 Yes    Type 1 diabetes mellitus with diabetic neuropathy [E10.40] 04/27/2018 Yes     Chronic    Paroxysmal atrial fibrillation [I48.0] 10/05/2017 Yes    COPD (chronic obstructive pulmonary disease) with emphysema [J43.9] 11/18/2013 Yes     Chronic      Problems Resolved During this Admission:       Discharged Condition: fair    Disposition: Home or Self Care    Follow Up:  Follow-up Information     Tiffany Davis DO In 1 week.    Specialty:  Internal Medicine  Contact information:  [address] [place] [address] [address] Breezy [address]ONTANEZ [address]  [phone]             Swapnil Ibrahim MD In 3 days.    Specialty:  Pulmonary Disease  Contact information:  [address]  [place] [id number] [phone]                 Patient Instructions:      Activity as tolerated       Significant Diagnostic Studies: Labs: All labs within the past 24 hours have been reviewed  Microbiology:   Blood Culture   Lab Results   Component Value Date    LABBLOO No Growth to date 10/25/2018    LABBLOO No Growth to date 10/25/2018    LABBLOO No Growth to date 10/25/2018    LABBLOO No Growth to date 10/25/2018    LABBLOO No Growth to date 10/25/2018        Pending Diagnostic Studies:     None         Medications:  Reconciled Home Medications:      Medication List      CHANGE how you take these medications    gabapentin 300 MG capsule  Commonly known as:  NEURONTIN  Take 2 capsules (600 mg total) by mouth 2 (two) times daily.  What changed:    · when to take this  · additional instructions     insulin lispro 100 unit/mL Inpn pen  Commonly known as:  HumaLOG KwikPen Insulin  ADMINISTER 17 UNITS UNDER THE SKIN THREE TIMES DAILY BEFORE MEALS  What changed:  additional instructions     pravastatin 40 MG tablet  Commonly known as:  PRAVACHOL  TAKE 1 TABLET DAILY  What changed:    · how much to take  · how to take this  · when to take this  · additional instructions        CONTINUE taking these medications    albuterol-ipratropium 2.5 mg-0.5 mg/3 mL nebulizer solution  Commonly known as:  DUO-NEB  Take 3 mLs by nebulization every 6 (six) hours. Rescue     b complex vitamins tablet  Take 1 tablet by mouth once daily.     blood sugar diagnostic Strp  1 each by Misc.(Non-Drug; Combo Route) route 3 (three) times daily. Dispense preferred on insurance     blood-glucose meter kit  Use as instructed - preferred on insurance     clonazePAM 0.5 MG tablet  Commonly known as:  KLONOPIN  Take 1 tablet (0.5 mg total) by mouth 2 (two) times daily as needed for Anxiety.     diltiaZEM 360 MG 24 hr capsule  Commonly known as:  CARDIZEM CD  TAKE 1 CAPSULE(360 MG) BY MOUTH EVERY DAY     DISPOSABLE NEEDLES MISC  Inject 1 each into the skin 3 (three) times daily.     ELIQUIS 5 mg Tab  Generic drug:  apixaban  TAKE 1 TABLET(5 MG) BY MOUTH TWICE DAILY     flecainide 100 MG Tab  Commonly known as:  TAMBOCOR  Take 1 tablet (100 mg total) by mouth every 12 (twelve) hours.     insulin degludec 200 unit/mL (3 mL) Inpn  Commonly known as:  TRESIBA FLEXTOUCH U-200  Inject 50 Units into the skin once daily.     multivitamin capsule  Take 1 capsule by mouth once daily.     omeprazole 20 MG  "capsule  Commonly known as:  PRILOSEC  Take 1 capsule (20 mg total) by mouth once daily.     ondansetron 8 MG Tbdl  Commonly known as:  ZOFRAN-ODT  Take 1 tablet (8 mg total) by mouth every 8 (eight) hours as needed.     oxyCODONE 15 MG Tab  Commonly known as:  ROXICODONE  Take 1 tablet (15 mg total) by mouth every 4 (four) hours as needed for Pain.     pen needle, diabetic 32 gauge x 1/4" Ndle  Insulin injections four times per day.     prochlorperazine 10 MG tablet  Commonly known as:  COMPAZINE  TAKE 1 TABLET BY MOUTH EVERY 6 HOURS AS NEEDED.     quinapril 40 MG tablet  Commonly known as:  ACCUPRIL  TAKE 1 TABLET BY MOUTH ONCE DAILY     sertraline 50 MG tablet  Commonly known as:  ZOLOFT  Take 1 tablet (50 mg total) by mouth once daily.     tiotropium bromide 2.5 mcg/actuation Mist  Commonly known as:  SPIRIVA RESPIMAT  Inhale 2 puffs into the lungs once daily. Controller     vitamin D 1000 units Tab  Commonly known as:  VITAMIN D3  Take 1,000 Units by mouth once daily.            Indwelling Lines/Drains at time of discharge:   Lines/Drains/Airways          None          Time spent on the discharge of patient:  45 minutes  Patient was seen and examined on the date of discharge and determined to be suitable for discharge.         Mohinder Yee MD  Department of Hospital Medicine  [facility]  "

## 2018-10-31 ENCOUNTER — DOCUMENTATION ONLY (OUTPATIENT)
Dept: RADIATION ONCOLOGY | Facility: CLINIC | Age: 73
End: 2018-10-31

## 2018-10-31 PROCEDURE — 77412 RADIATION TX DELIVERY LVL 3: CPT | Performed by: RADIOLOGY

## 2018-10-31 PROCEDURE — 77387 GUIDANCE FOR RADJ TX DLVR: CPT | Mod: TC | Performed by: RADIOLOGY

## 2018-10-31 PROCEDURE — G6002 STEREOSCOPIC X-RAY GUIDANCE: HCPCS | Mod: 26,,, | Performed by: RADIOLOGY

## 2018-11-01 ENCOUNTER — OFFICE VISIT (OUTPATIENT)
Dept: PULMONOLOGY | Facility: CLINIC | Age: 73
End: 2018-11-01
Payer: MEDICARE

## 2018-11-01 ENCOUNTER — HOSPITAL ENCOUNTER (OUTPATIENT)
Dept: RADIATION THERAPY | Facility: HOSPITAL | Age: 73
Discharge: HOME | End: 2018-11-01
Attending: RADIOLOGY
Payer: MEDICARE

## 2018-11-01 VITALS
HEART RATE: 90 BPM | OXYGEN SATURATION: 91 % | BODY MASS INDEX: 27.68 KG/M2 | SYSTOLIC BLOOD PRESSURE: 110 MMHG | DIASTOLIC BLOOD PRESSURE: 64 MMHG | HEIGHT: 67 IN | WEIGHT: 176.38 LBS | RESPIRATION RATE: 18 BRPM

## 2018-11-01 DIAGNOSIS — J96.11 CHRONIC RESPIRATORY FAILURE WITH HYPOXIA: Chronic | ICD-10-CM

## 2018-11-01 DIAGNOSIS — C34.92 METASTATIC LUNG CANCER (METASTASIS FROM LUNG TO OTHER SITE), LEFT: Chronic | ICD-10-CM

## 2018-11-01 DIAGNOSIS — J43.2 CENTRILOBULAR EMPHYSEMA: Primary | Chronic | ICD-10-CM

## 2018-11-01 PROCEDURE — 99213 OFFICE O/P EST LOW 20 MIN: CPT | Mod: PBBFAC | Performed by: INTERNAL MEDICINE

## 2018-11-01 PROCEDURE — 77412 RADIATION TX DELIVERY LVL 3: CPT | Performed by: RADIOLOGY

## 2018-11-01 PROCEDURE — G6002 STEREOSCOPIC X-RAY GUIDANCE: HCPCS | Mod: 26,,, | Performed by: RADIOLOGY

## 2018-11-01 PROCEDURE — 99215 OFFICE O/P EST HI 40 MIN: CPT | Mod: S$PBB,,, | Performed by: INTERNAL MEDICINE

## 2018-11-01 PROCEDURE — 77387 GUIDANCE FOR RADJ TX DLVR: CPT | Mod: TC | Performed by: RADIOLOGY

## 2018-11-01 PROCEDURE — 99999 PR PBB SHADOW E&M-EST. PATIENT-LVL III: CPT | Mod: PBBFAC,,, | Performed by: INTERNAL MEDICINE

## 2018-11-01 NOTE — ASSESSMENT & PLAN NOTE
Metastatic poorly-differentiated squamous cell carcinoma   Currently on  KEYTRUDA.    Follow up with ONCOLOGY as scheduled.

## 2018-11-01 NOTE — PATIENT INSTRUCTIONS
Lung Anatomy  Your lungs take air in to give your body oxygen, which the body needs to work. Your lungs, like all the tissues in your body, are made up of billions of tiny specialized cells. Old lung cells die and are replaced by new, identical lung cells. This natural process helps ensure healthy lungs.    Date Last Reviewed: 11/1/2016  © 0112-4988 [employer] [address] [place] [address], [place] [address]. All rights reserved. This information is not intended as a substitute for professional medical care. Always follow your healthcare professional's instructions.

## 2018-11-01 NOTE — PLAN OF CARE
Problem: Patient Care Overview  Goal: Plan of Care Review  Outcome: Ongoing (interventions implemented as appropriate)  Day 8 xrt to spine. Recent d/c from inpatient hospital stay for respiratory failure, UTI, and COPD exacerbation. On oxycodone for pain control and 2L O2 NC. Will continue to monitor.

## 2018-11-01 NOTE — ASSESSMENT & PLAN NOTE
EMPHYSEMA ROS: taking medications as instructed, no medication side effects noted, no significant ongoing wheezing or shortness of breath, using bronchodilator MDI less than twice a week.   New concerns: None.   Exam: appears well, vitals normal, no respiratory distress, acyanotic, normal RR, chest clear, no wheezing, crepitations, rhonchi, normal symmetric air entry.   Assessment:  EMPHYSEMA stable.   Plan: SPIRIVA, DUONEB. CONTINUE CURRENT REGIMEN. PFT and 6MWT on 11/9/18 as previously scheduled.

## 2018-11-01 NOTE — PROGRESS NOTES
Post hospital discharge pulmonary follow up note      Nico Garza Jr. is a 72 y.o. male    ADMIT DATE: 10/25/18    DISCHARGE DATE: 10/30/18     ADMISSION DIAGNOSIS:  Acute on chronic respiratory failure , UTI and acute on chronic copd exacerbation    DISCHARGE DIAGNOSIS:  Acute on chronic respiratory failure , UTI and acute on chronic copd exacerbation  Metastatic lung cancer.     Procedures Performed During Admission: None    Treatment at Discharge: .       Medication List           Accurate as of 11/1/18  5:13 PM. If you have any questions, ask your nurse or doctor.               CHANGE how you take these medications    gabapentin 300 MG capsule  Commonly known as:  NEURONTIN  Take 2 capsules (600 mg total) by mouth 2 (two) times daily.  What changed:    · when to take this  · additional instructions     insulin lispro 100 unit/mL Inpn pen  Commonly known as:  HumaLOG KwikPen Insulin  ADMINISTER 17 UNITS UNDER THE SKIN THREE TIMES DAILY BEFORE MEALS  What changed:  additional instructions     pravastatin 40 MG tablet  Commonly known as:  PRAVACHOL  TAKE 1 TABLET DAILY  What changed:    · how much to take  · how to take this  · when to take this  · additional instructions        CONTINUE taking these medications    albuterol-ipratropium 2.5 mg-0.5 mg/3 mL nebulizer solution  Commonly known as:  DUO-NEB  Take 3 mLs by nebulization every 6 (six) hours. Rescue     b complex vitamins tablet     blood sugar diagnostic Strp  1 each by Misc.(Non-Drug; Combo Route) route 3 (three) times daily. Dispense preferred on insurance     blood-glucose meter kit  Use as instructed - preferred on insurance     clonazePAM 0.5 MG tablet  Commonly known as:  KLONOPIN  Take 1 tablet (0.5 mg total) by mouth 2 (two) times daily as needed for Anxiety.     diltiaZEM 360 MG 24 hr capsule  Commonly known as:  CARDIZEM CD  TAKE 1 CAPSULE(360 MG) BY MOUTH EVERY DAY     DISPOSABLE NEEDLES MISC     doxycycline 100 MG tablet  Commonly known as:  " VIBRA-TABS  Take 1 tablet (100 mg total) by mouth every 12 (twelve) hours.     ELIQUIS 5 mg Tab  Generic drug:  apixaban  TAKE 1 TABLET(5 MG) BY MOUTH TWICE DAILY     flecainide 100 MG Tab  Commonly known as:  TAMBOCOR  Take 1 tablet (100 mg total) by mouth every 12 (twelve) hours.     insulin degludec 200 unit/mL (3 mL) Inpn  Commonly known as:  TRESIBA FLEXTOUCH U-200  Inject 50 Units into the skin once daily.     multivitamin capsule     omeprazole 20 MG capsule  Commonly known as:  PRILOSEC  Take 1 capsule (20 mg total) by mouth once daily.     ondansetron 8 MG Tbdl  Commonly known as:  ZOFRAN-ODT  Take 1 tablet (8 mg total) by mouth every 8 (eight) hours as needed.     oxyCODONE 15 MG Tab  Commonly known as:  ROXICODONE  Take 1 tablet (15 mg total) by mouth every 4 (four) hours as needed for Pain.     pen needle, diabetic 32 gauge x 1/4" Ndle  Insulin injections four times per day.     prochlorperazine 10 MG tablet  Commonly known as:  COMPAZINE  TAKE 1 TABLET BY MOUTH EVERY 6 HOURS AS NEEDED.     quinapril 40 MG tablet  Commonly known as:  ACCUPRIL  TAKE 1 TABLET BY MOUTH ONCE DAILY     sertraline 50 MG tablet  Commonly known as:  ZOLOFT  Take 1 tablet (50 mg total) by mouth once daily.     tiotropium bromide 2.5 mcg/actuation Mist  Commonly known as:  SPIRIVA RESPIMAT  Inhale 2 puffs into the lungs once daily. Controller     vitamin D 1000 units Tab  Commonly known as:  VITAMIN D3          He reports that that he feels weak  no different since discharge. He is currently completing his prednisone taper.      Problem list     Patient Active Problem List   Diagnosis    COPD (chronic obstructive pulmonary disease) with emphysema    Atrial flutter with rapid ventricular response    Hypertension goal BP (blood pressure) < 130/80    Hyperlipidemia LDL goal <70    Paroxysmal atrial fibrillation    Paroxysmal atrial flutter    Type 1 diabetes mellitus with diabetic neuropathy    Metastatic left lung cancer " (metastasis from lung to other site) with mediastinal lymphadenopathy    Chronic respiratory failure with hypoxia    History of malnutrition    Constipation    Left diaphragmatic paralysis    Mass of lower lobe of left lung    Mass of left lung    Mediastinal lymphadenopathy    Cigarette nicotine dependence in remission    Lung cancer    Bone metastases    Situational mixed anxiety and depressive disorder    Atherosclerosis of aorta    Coronary artery calcification    Intravascular volume depletion    UTI (urinary tract infection)    Severe malnutrition    Primary cancer of left lung metastatic to other site    Secondary cancer of bone       Problem list has been reviewed.    Subjective:       HPI    Patients reports NYHA II dyspnea    The patient does not have currently have symptoms / an exacerbation.       No recent change in breathing.      His current respiratory regimen: SPIRIVA, DUONEB  : He does use medications compliantly.       COPD QUESTIONNAIRE  How often do you cough?: Some of the time  How often do you have phlegm (mucus) in your chest?: Some of the time  How often does your chest feel tight?: All of the time  When you walk up a hill or one flight of stairs, how often are you breathless?: All of the time  How often are you limited doing any activities at home?: Most of the time  How often are you confident leaving the house despite your lung condition?: All of the time  How often do you sleep soundly?: Almost never  How often do you have energy?: Almost never  Total score: 28       Review of Systems   Constitutional: Negative for chills, malaise/fatigue and weight loss.   HENT: Positive for nosebleeds and sore throat.    Eyes: Negative for blurred vision and double vision.   Respiratory: Positive for sputum production and shortness of breath. Negative for cough and hemoptysis.    Cardiovascular: Positive for palpitations. Negative for chest pain.   Gastrointestinal: Negative for  "abdominal pain, blood in stool, constipation, diarrhea, heartburn, melena, nausea and vomiting.   Musculoskeletal: Positive for back pain.   Neurological: Negative for dizziness and headaches.   Endo/Heme/Allergies: Negative for environmental allergies.   Psychiatric/Behavioral: Negative for depression and suicidal ideas.   All other systems reviewed and are negative.    Immunization status reviewed and updated.      A full  review of systems, past , family  and social histories was performed except as mentioned in the note above, these are non contributory to the main issues discussed today.      Objective:      Vitals:    11/01/18 1537   BP: 110/64   Pulse: 90   Resp: 18   SpO2: (!) 91%   Weight: 80 kg (176 lb 5.9 oz)   Height: 5' 7" (1.702 m)     Body mass index is 27.62 kg/m².    Physical Exam   Constitutional: He is oriented to person, place, and time. He appears well-developed and well-nourished.   HENT:   Head: Normocephalic and atraumatic.   Eyes: EOM are normal. Pupils are equal, round, and reactive to light.   Neck: Normal range of motion. No tracheal deviation present.   Cardiovascular: Normal rate, regular rhythm and intact distal pulses. Exam reveals no friction rub.   No murmur heard.  Pulmonary/Chest: Effort normal and breath sounds normal. He has no wheezes. He has no rales.   Abdominal: Soft. Bowel sounds are normal. He exhibits no distension. There is no tenderness.   Musculoskeletal: Normal range of motion. He exhibits no edema or deformity.   Neurological: He is alert and oriented to person, place, and time.   Skin: Skin is warm and dry. No rash noted.   Psychiatric: He has a normal mood and affect. His behavior is normal.            Lab Review   Lab Results   Component Value Date     10/30/2018    K 4.1 10/30/2018    CL 98 10/30/2018    CO2 33 (H) 10/30/2018    BUN 12 10/30/2018    CREATININE 0.8 10/30/2018    CALCIUM 9.2 10/30/2018     Lab Results   Component Value Date    CKMB 1.5 " 03/14/2013    TROPONINI <0.006 10/25/2018    TROPONINI <0.04 03/14/2013     Lab Results   Component Value Date    WBC 9.54 10/30/2018    HGB 10.6 (L) 10/30/2018    HCT 33.7 (L) 10/30/2018    MCV 88 10/30/2018     10/30/2018     Lab Results   Component Value Date    CHOL 121 09/22/2018    TRIG 63 09/22/2018    HDL 54 09/22/2018        CT CHEST WITH CONTRAST: 10/27/18: Severe emphysematous change again noted.  Elevation left hemidiaphragm with consolidation of the left lower lobe likely compressive.  Left anterior medial/mediastinal mass slightly decreased in size from 8.2-7.3 cm on today's exam.  The airway is patent.  Multiple other enlarged mediastinal and paratracheal lymph nodes are stable.  No obvious pulmonary embolus.  Interval enlargement of multiple pulmonary nodules.  Reference nodule in the superior segment left lower lobe has increased in size from 5 mm to 8 mm.  There is a new pulmonary nodule in the right lung base the measures 1 cm.  Dependent atelectasis.  No effusion or pneumothorax.  Development of a 4 cm metastatic lesion within the dome of the liver it measured 2 cm on the previous exam but was difficult to visualize.  Diffuse osseous metastatic disease again noted with no evidence of a new compression fracture.    Assessment / Plan :       Problem List Items Addressed This Visit        Pulmonary    COPD (chronic obstructive pulmonary disease) with emphysema - Primary (Chronic)    Overview     cta chest 9/21/2018- Severe emphysema.         Current Assessment & Plan     EMPHYSEMA ROS: taking medications as instructed, no medication side effects noted, no significant ongoing wheezing or shortness of breath, using bronchodilator MDI less than twice a week.   New concerns: None.   Exam: appears well, vitals normal, no respiratory distress, acyanotic, normal RR, chest clear, no wheezing, crepitations, rhonchi, normal symmetric air entry.   Assessment:  EMPHYSEMA stable.   Plan: SPIRIVA, DUONEB.  CONTINUE CURRENT REGIMEN. PFT and 6MWT on 11/9/18 as previously scheduled.           Relevant Orders    Stress test, pulmonary    Chronic respiratory failure with hypoxia    Current Assessment & Plan     Oxygen supplementation 3 L /min ( pulse dose)             Oncology    Metastatic left lung cancer (metastasis from lung to other site) with mediastinal lymphadenopathy    Current Assessment & Plan       Metastatic poorly-differentiated squamous cell carcinoma   Currently on  KEYTRUDA.    Follow up with ONCOLOGY as scheduled.                    TIME SPENT WITH PATIENT: Time spent: 40 minutes in face to face  discussion concerning diagnosis, prognosis, review of lab and test results, benefits of treatment as well as management of disease, counseling of patient and coordination of care between various health  care providers . Greater than half the time spent was used for coordination of care and counseling of patient.     Follow-up in about 8 days (around 11/9/2018) for Lung Cancer, Emphysema, Chronic Respiratory Failure.

## 2018-11-02 ENCOUNTER — DOCUMENTATION ONLY (OUTPATIENT)
Dept: RADIATION ONCOLOGY | Facility: CLINIC | Age: 73
End: 2018-11-02

## 2018-11-02 PROCEDURE — 77412 RADIATION TX DELIVERY LVL 3: CPT | Performed by: RADIOLOGY

## 2018-11-02 PROCEDURE — G6002 STEREOSCOPIC X-RAY GUIDANCE: HCPCS | Mod: 26,,, | Performed by: RADIOLOGY

## 2018-11-02 PROCEDURE — 77387 GUIDANCE FOR RADJ TX DLVR: CPT | Mod: TC | Performed by: RADIOLOGY

## 2018-11-02 NOTE — PLAN OF CARE
Problem: Patient Care Overview  Goal: Plan of Care Review  Outcome: Outcome(s) achieved Date Met: 11/02/18  Completed xrt to spine  today 11-2-18. Will make f/u appt.

## 2018-11-04 LAB
ACID FAST MOD KINY STN SPEC: NORMAL
MYCOBACTERIUM SPEC QL CULT: NORMAL

## 2018-11-04 NOTE — PLAN OF CARE
11/04/18 1404   Final Note   Assessment Type Final Discharge Note   Anticipated Discharge Disposition Home-Health   Right Care Referral Info   Post Acute Recommendation Home-care   Facility Name [facility], State [place], [place]

## 2018-11-05 ENCOUNTER — OFFICE VISIT (OUTPATIENT)
Dept: INTERNAL MEDICINE | Facility: CLINIC | Age: 73
End: 2018-11-05
Payer: MEDICARE

## 2018-11-05 VITALS
WEIGHT: 180.56 LBS | HEIGHT: 67 IN | DIASTOLIC BLOOD PRESSURE: 62 MMHG | TEMPERATURE: 96 F | SYSTOLIC BLOOD PRESSURE: 116 MMHG | BODY MASS INDEX: 28.34 KG/M2 | HEART RATE: 81 BPM | OXYGEN SATURATION: 90 %

## 2018-11-05 DIAGNOSIS — J96.11 CHRONIC HYPOXEMIC RESPIRATORY FAILURE: Primary | ICD-10-CM

## 2018-11-05 DIAGNOSIS — F41.9 ANXIETY: ICD-10-CM

## 2018-11-05 DIAGNOSIS — C34.92 PRIMARY MALIGNANT NEOPLASM OF LEFT LUNG METASTATIC TO OTHER SITE: ICD-10-CM

## 2018-11-05 DIAGNOSIS — J44.9 COPD, SEVERE: ICD-10-CM

## 2018-11-05 DIAGNOSIS — Z99.81 SUPPLEMENTAL OXYGEN DEPENDENT: ICD-10-CM

## 2018-11-05 DIAGNOSIS — K13.79 MOUTH SORES: ICD-10-CM

## 2018-11-05 DIAGNOSIS — I10 ESSENTIAL HYPERTENSION: ICD-10-CM

## 2018-11-05 PROCEDURE — 99496 TRANSJ CARE MGMT HIGH F2F 7D: CPT | Mod: PBBFAC | Performed by: INTERNAL MEDICINE

## 2018-11-05 PROCEDURE — 99214 OFFICE O/P EST MOD 30 MIN: CPT | Mod: S$PBB,,, | Performed by: INTERNAL MEDICINE

## 2018-11-05 PROCEDURE — 99213 OFFICE O/P EST LOW 20 MIN: CPT | Mod: PBBFAC,25 | Performed by: INTERNAL MEDICINE

## 2018-11-05 PROCEDURE — 99999 PR PBB SHADOW E&M-EST. PATIENT-LVL III: CPT | Mod: PBBFAC,,, | Performed by: INTERNAL MEDICINE

## 2018-11-05 PROCEDURE — 77336 RADIATION PHYSICS CONSULT: CPT | Performed by: RADIOLOGY

## 2018-11-05 RX ORDER — NYSTATIN 100000 [USP'U]/ML
5 SUSPENSION ORAL 4 TIMES DAILY
Qty: 200 ML | Refills: 1 | Status: SHIPPED | OUTPATIENT
Start: 2018-11-05

## 2018-11-06 ENCOUNTER — OUTPATIENT CASE MANAGEMENT (OUTPATIENT)
Dept: ADMINISTRATIVE | Facility: OTHER | Age: 73
End: 2018-11-06

## 2018-11-06 NOTE — PROGRESS NOTES
Transitional Care Note  Subjective:       Patient ID: Nico Garza Jr. is a 72 y.o. male.  Chief Complaint: Hospital Follow Up    Family and/or Caretaker present at visit?  Yes.  Diagnostic tests reviewed/disposition: I have reviewed all completed as well as pending diagnostic tests at the time of discharge.  Disease/illness education: done  Home health/community services discussion/referrals: Patient has home health established at [facility].   Establishment or re-establishment of referral orders for community resources: No other necessary community resources.   Discussion with other health care providers: No discussion with other health care providers necessary.     HPI: Hospitalized from 10/25 to 10/30 for acute on chronic respiratory failure. He has severe COPD and metastatic lung cancer. He wears continuous oxygen.   He also has low b/p, which improved with fluids. His blood pressure is being monitored at home and it occasionally drops but overall is much better. He has not been taking blood pressure medication.   He is complaining of mouth sores. He thinks it is due to all the inhalers/breathing treatments. He was using Nystatin swish and swallow and it helped, but he is out.   He is also having a lot of anxiety. He is taking sertraline and as needed clonazepam, which helps.     Past Medical History:  Arthritis  Atrial fibrillation and flutter  Atrial flutter  Cancer      Comment:  LUNG  COPD (chronic obstructive pulmonary disease)  11/18/2013: COPD (chronic obstructive pulmonary disease) with   emphysema  1996: DM (diabetes mellitus)  Hyperlipidemia  Hypertension  Kidney cysts      Comment:  ct urogram 12/15/2017-2 cysts within the left kidney.                 Others are too small to accurately characterize.  Lung disease  Nephrolithiasis      Comment:  ct urogram 12/15/2017-Bilateral nephrolithiasis.   Neuropathy, diabetic  Pancreatitis  4/27/2018: Type 1 diabetes mellitus with diabetic neuropathy    Current  "Outpatient Medications on File Prior to Visit:  b complex vitamins tablet, Take 1 tablet by mouth once daily., Disp: , Rfl:    blood sugar diagnostic Strp, 1 each by Misc.(Non-Drug; Combo Route) route 3 (three) times daily. Dispense preferred on insurance, Disp: 100 strip, Rfl: 11  blood-glucose meter kit, Use as instructed - preferred on insurance, Disp: 1 each, Rfl: 0  clonazePAM (KLONOPIN) 0.5 MG tablet, Take 1 tablet (0.5 mg total) by mouth 2 (two) times daily as needed for Anxiety., Disp: 45 tablet, Rfl: 1  diltiaZEM (CARDIZEM CD) 360 MG 24 hr capsule, TAKE 1 CAPSULE(360 MG) BY MOUTH EVERY DAY, Disp: 90 capsule, Rfl: 0  ELIQUIS 5 mg Tab, TAKE 1 TABLET(5 MG) BY MOUTH TWICE DAILY, Disp: 60 tablet, Rfl: 6  flecainide (TAMBOCOR) 100 MG Tab, Take 1 tablet (100 mg total) by mouth every 12 (twelve) hours., Disp: 60 tablet, Rfl: 11  gabapentin (NEURONTIN) 300 MG capsule, Take 2 capsules (600 mg total) by mouth 2 (two) times daily. (Patient taking differently: Take 600 mg by mouth every evening. Takes two tablets by mouth at night), Disp: 360 capsule, Rfl: 1  insulin degludec (TRESIBA FLEXTOUCH U-200) 200 unit/mL (3 mL) InPn, Inject 50 Units into the skin once daily., Disp: 9 mL, Rfl: 3  insulin lispro (HUMALOG KWIKPEN) 100 unit/mL InPn pen, ADMINISTER 17 UNITS UNDER THE SKIN THREE TIMES DAILY BEFORE MEALS (Patient taking differently: ADMINISTER 17 UNITS UNDER THE SKIN IN MORNING AND 25 UNITS NOON AND NIGHT), Disp: 45 mL, Rfl: 0  insulin needles, disposable, 32 x 1/4 " Ndle, Insulin injections four times per day., Disp: 400 each, Rfl: 3  multivitamin capsule, Take 1 capsule by mouth once daily., Disp: , Rfl:   NEEDLES, DISPOSABLE (DISPOSABLE NEEDLES MISC), Inject 1 each into the skin 3 (three) times daily., Disp: , Rfl:   omeprazole (PRILOSEC) 20 MG capsule, Take 1 capsule (20 mg total) by mouth once daily., Disp: 90 capsule, Rfl: 1  ondansetron (ZOFRAN-ODT) 8 MG TbDL, Take 1 tablet (8 mg total) by mouth every 8 (eight) " hours as needed., Disp: 30 tablet, Rfl: 5  oxyCODONE (ROXICODONE) 15 MG Tab, Take 1 tablet (15 mg total) by mouth every 4 (four) hours as needed for Pain., Disp: 90 tablet, Rfl: 0  pravastatin (PRAVACHOL) 40 MG tablet, TAKE 1 TABLET DAILY (Patient taking differently: TAKE 1 TABLET NIGHTLY), Disp: 90 tablet, Rfl: 3  prochlorperazine (COMPAZINE) 10 MG tablet, TAKE 1 TABLET BY MOUTH EVERY 6 HOURS AS NEEDED., Disp: 385 tablet, Rfl: 5  quinapril (ACCUPRIL) 40 MG tablet, TAKE 1 TABLET BY MOUTH ONCE DAILY, Disp: 90 tablet, Rfl: 0  sertraline (ZOLOFT) 50 MG tablet, Take 1 tablet (50 mg total) by mouth once daily., Disp: 30 tablet, Rfl: 1  tiotropium bromide (SPIRIVA RESPIMAT) 2.5 mcg/actuation Mist, Inhale 2 puffs into the lungs once daily. Controller, Disp: 4 g, Rfl: 11  vitamin D 1000 units Tab, Take 1,000 Units by mouth once daily., Disp: , Rfl:   albuterol-ipratropium (DUO-NEB) 2.5 mg-0.5 mg/3 mL nebulizer solution, Take 3 mLs by nebulization every 6 (six) hours. Rescue, Disp: 360 mL, Rfl: 1  doxycycline (VIBRA-TABS) 100 MG tablet, Take 1 tablet (100 mg total) by mouth every 12 (twelve) hours., Disp: 12 tablet, Rfl: 0    Allergies:  Review of patient's allergies indicates:   -- Anoro ellipta (umeclidinium-vilanterol) -- Shortness Of Breath   -- Flomax (tamsulosin)     --  Dizzy        Review of Systems   Constitutional: Negative for fever.   Respiratory: Positive for shortness of breath. Negative for cough.    Cardiovascular: Positive for chest pain (left lateral).   Gastrointestinal: Negative for abdominal pain, constipation and diarrhea.   Musculoskeletal: Positive for gait problem.   Neurological: Negative for dizziness and headaches.   Psychiatric/Behavioral: The patient is nervous/anxious.        Objective:      Physical Exam   Constitutional: He is oriented to person, place, and time. He appears well-developed and well-nourished. No distress.   Eyes: No scleral icterus.   Cardiovascular: Normal rate, regular  rhythm and normal heart sounds.   Pulmonary/Chest: Effort normal. He has decreased breath sounds. He exhibits no tenderness.   Wearing oxygen   Abdominal: Soft. Bowel sounds are normal.   Neurological: He is alert and oriented to person, place, and time.   Skin: Skin is warm and dry.   Psychiatric: He has a normal mood and affect.   Vitals reviewed.      Assessment:       1. Chronic hypoxemic respiratory failure    2. COPD, severe    3. Primary malignant neoplasm of left lung metastatic to other site    4. Supplemental oxygen dependent    5. Essential hypertension    6. Mouth sores    7. Anxiety        Plan:       Nico was seen today for hospital follow up.    Diagnoses and all orders for this visit:    Chronic hypoxemic respiratory failure    COPD, severe    Primary malignant neoplasm of left lung metastatic to other site    Supplemental oxygen dependent    Essential hypertension  -     Continue to monitor    Mouth sores  -     nystatin (MYCOSTATIN) 100,000 unit/mL suspension; Take 5 mLs (500,000 Units total) by mouth 4 (four) times daily.    Anxiety  -     Continue current management    F/U in 4 weeks for anxiety and HTN

## 2018-11-06 NOTE — PROGRESS NOTES
Subjective:       Patient ID: Nico Garza Jr. is a 72 y.o. male.    Chief Complaint: Hospital Follow Up    HPI  Review of Systems    Objective:      Physical Exam    Assessment:       1. Chronic hypoxemic respiratory failure    2. COPD, severe    3. Primary malignant neoplasm of left lung metastatic to other site    4. Supplemental oxygen dependent    5. Essential hypertension    6. Mouth sores    7. Anxiety        Plan:       ***

## 2018-11-06 NOTE — PROGRESS NOTES
The following patient has been assigned to Livier Noyola RN with Outpatient Complex Care Management for high risk screening.    Reason: High Risk    Please contact [facility] at [phone] with any questions.    Thank you,    Lu Steele    Outpatient Case Management

## 2018-11-08 ENCOUNTER — TELEPHONE (OUTPATIENT)
Dept: ADMINISTRATIVE | Facility: CLINIC | Age: 73
End: 2018-11-08

## 2018-11-08 ENCOUNTER — INFUSION (OUTPATIENT)
Dept: INFUSION THERAPY | Facility: HOSPITAL | Age: 73
End: 2018-11-08
Attending: INTERNAL MEDICINE
Payer: MEDICARE

## 2018-11-08 ENCOUNTER — OFFICE VISIT (OUTPATIENT)
Dept: HEMATOLOGY/ONCOLOGY | Facility: CLINIC | Age: 73
End: 2018-11-08
Payer: MEDICARE

## 2018-11-08 ENCOUNTER — LAB VISIT (OUTPATIENT)
Dept: LAB | Facility: HOSPITAL | Age: 73
End: 2018-11-08
Attending: INTERNAL MEDICINE
Payer: MEDICARE

## 2018-11-08 VITALS
TEMPERATURE: 98 F | OXYGEN SATURATION: 91 % | DIASTOLIC BLOOD PRESSURE: 55 MMHG | SYSTOLIC BLOOD PRESSURE: 90 MMHG | HEART RATE: 69 BPM | WEIGHT: 180.31 LBS | BODY MASS INDEX: 28.3 KG/M2 | RESPIRATION RATE: 18 BRPM | HEIGHT: 67 IN

## 2018-11-08 VITALS
HEART RATE: 92 BPM | DIASTOLIC BLOOD PRESSURE: 59 MMHG | HEIGHT: 67 IN | BODY MASS INDEX: 28.3 KG/M2 | WEIGHT: 180.31 LBS | SYSTOLIC BLOOD PRESSURE: 95 MMHG

## 2018-11-08 DIAGNOSIS — E86.1 FLUID VOLUME DEFICIT: ICD-10-CM

## 2018-11-08 DIAGNOSIS — C34.92 METASTATIC LUNG CANCER (METASTASIS FROM LUNG TO OTHER SITE), LEFT: Primary | ICD-10-CM

## 2018-11-08 DIAGNOSIS — C34.90 LUNG CANCER METASTATIC TO BONE: ICD-10-CM

## 2018-11-08 DIAGNOSIS — R93.89 ABNORMAL FINDINGS ON DIAGNOSTIC IMAGING OF OTHER SPECIFIED BODY STRUCTURES: ICD-10-CM

## 2018-11-08 DIAGNOSIS — C34.92 METASTATIC LUNG CANCER (METASTASIS FROM LUNG TO OTHER SITE), LEFT: ICD-10-CM

## 2018-11-08 DIAGNOSIS — C34.31 MALIGNANT NEOPLASM OF LOWER LOBE OF RIGHT LUNG: ICD-10-CM

## 2018-11-08 DIAGNOSIS — C79.51 LUNG CANCER METASTATIC TO BONE: ICD-10-CM

## 2018-11-08 LAB
ALBUMIN SERPL BCP-MCNC: 2.8 G/DL
ALP SERPL-CCNC: 250 U/L
ALT SERPL W/O P-5'-P-CCNC: 63 U/L
ANION GAP SERPL CALC-SCNC: 14 MMOL/L
AST SERPL-CCNC: 91 U/L
BASOPHILS # BLD AUTO: 0.02 K/UL
BASOPHILS NFR BLD: 0.3 %
BILIRUB SERPL-MCNC: 0.5 MG/DL
BUN SERPL-MCNC: 11 MG/DL
CALCIUM SERPL-MCNC: 9.2 MG/DL
CHLORIDE SERPL-SCNC: 96 MMOL/L
CO2 SERPL-SCNC: 29 MMOL/L
CREAT SERPL-MCNC: 0.8 MG/DL
DIFFERENTIAL METHOD: ABNORMAL
EOSINOPHIL # BLD AUTO: 0.2 K/UL
EOSINOPHIL NFR BLD: 3.5 %
ERYTHROCYTE [DISTWIDTH] IN BLOOD BY AUTOMATED COUNT: 16.3 %
EST. GFR  (AFRICAN AMERICAN): >60 ML/MIN/1.73 M^2
EST. GFR  (NON AFRICAN AMERICAN): >60 ML/MIN/1.73 M^2
GLUCOSE SERPL-MCNC: 84 MG/DL
HCT VFR BLD AUTO: 37.1 %
HGB BLD-MCNC: 11.6 G/DL
LYMPHOCYTES # BLD AUTO: 0.8 K/UL
LYMPHOCYTES NFR BLD: 11.9 %
MCH RBC QN AUTO: 27.6 PG
MCHC RBC AUTO-ENTMCNC: 31.3 G/DL
MCV RBC AUTO: 88 FL
MONOCYTES # BLD AUTO: 0.8 K/UL
MONOCYTES NFR BLD: 11.5 %
NEUTROPHILS # BLD AUTO: 5.1 K/UL
NEUTROPHILS NFR BLD: 72.8 %
PLATELET # BLD AUTO: 273 K/UL
PMV BLD AUTO: 8.8 FL
POTASSIUM SERPL-SCNC: 4 MMOL/L
PROT SERPL-MCNC: 6.9 G/DL
RBC # BLD AUTO: 4.21 M/UL
SODIUM SERPL-SCNC: 139 MMOL/L
TSH SERPL DL<=0.005 MIU/L-ACNC: 2.22 UIU/ML
WBC # BLD AUTO: 6.95 K/UL

## 2018-11-08 PROCEDURE — 84443 ASSAY THYROID STIM HORMONE: CPT

## 2018-11-08 PROCEDURE — 99213 OFFICE O/P EST LOW 20 MIN: CPT | Mod: PBBFAC,25 | Performed by: INTERNAL MEDICINE

## 2018-11-08 PROCEDURE — 36415 COLL VENOUS BLD VENIPUNCTURE: CPT

## 2018-11-08 PROCEDURE — 63600175 PHARM REV CODE 636 W HCPCS: Performed by: INTERNAL MEDICINE

## 2018-11-08 PROCEDURE — 85025 COMPLETE CBC W/AUTO DIFF WBC: CPT

## 2018-11-08 PROCEDURE — 80053 COMPREHEN METABOLIC PANEL: CPT

## 2018-11-08 PROCEDURE — 99215 OFFICE O/P EST HI 40 MIN: CPT | Mod: S$PBB,,, | Performed by: INTERNAL MEDICINE

## 2018-11-08 PROCEDURE — 99999 PR PBB SHADOW E&M-EST. PATIENT-LVL III: CPT | Mod: PBBFAC,,, | Performed by: INTERNAL MEDICINE

## 2018-11-08 PROCEDURE — 25000003 PHARM REV CODE 250: Performed by: INTERNAL MEDICINE

## 2018-11-08 PROCEDURE — 96413 CHEMO IV INFUSION 1 HR: CPT

## 2018-11-08 PROCEDURE — 96361 HYDRATE IV INFUSION ADD-ON: CPT

## 2018-11-08 RX ORDER — HEPARIN 100 UNIT/ML
500 SYRINGE INTRAVENOUS
Status: DISCONTINUED | OUTPATIENT
Start: 2018-11-08 | End: 2018-11-08 | Stop reason: HOSPADM

## 2018-11-08 RX ORDER — SODIUM CHLORIDE 0.9 % (FLUSH) 0.9 %
10 SYRINGE (ML) INJECTION
Status: CANCELLED | OUTPATIENT
Start: 2018-11-08

## 2018-11-08 RX ORDER — SODIUM CHLORIDE 9 MG/ML
INJECTION, SOLUTION INTRAVENOUS CONTINUOUS
Status: CANCELLED
Start: 2018-11-08

## 2018-11-08 RX ORDER — SODIUM CHLORIDE 9 MG/ML
1000 INJECTION, SOLUTION INTRAVENOUS CONTINUOUS
Status: ACTIVE | OUTPATIENT
Start: 2018-11-08 | End: 2018-11-08

## 2018-11-08 RX ORDER — HEPARIN 100 UNIT/ML
500 SYRINGE INTRAVENOUS
Status: CANCELLED | OUTPATIENT
Start: 2018-11-08

## 2018-11-08 RX ADMIN — SODIUM CHLORIDE 1000 ML: 0.9 INJECTION, SOLUTION INTRAVENOUS at 10:11

## 2018-11-08 RX ADMIN — HEPARIN 500 UNITS: 100 SYRINGE at 12:11

## 2018-11-08 RX ADMIN — SODIUM CHLORIDE 200 MG: 9 INJECTION, SOLUTION INTRAVENOUS at 10:11

## 2018-11-08 NOTE — PROGRESS NOTES
Hematology/Oncology Office Note    Reason for referral:  Lung mass/mediastinal lymphadenopathy    CC:  Lung mass    Referred by:  No ref. 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  He underwent nondiagnostic bronchoscopy/biopsy on 09/02/2018.  Patient reports stable shortness of breath without hemoptysis, chest pain, dizziness, or palpitations.      I have reviewed and updated the HPI, ROS, PMHx, Social Hx, Family Hx and treatment history.    Today's visit:  Patient is with complaints of poor p.o. intake due to lack of appetite.  He presents today for 2nd dose of Keytruda.        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  Others are too small to accurately characterize.    Lung disease     Nephrolithiasis     ct urogram 12/15/2017-Bilateral nephrolithiasis.     Neuropathy, diabetic     Pancreatitis     Type 1 diabetes mellitus with diabetic neuropathy 4/27/2018         Social History:      Family History: family history includes Cancer in his maternal aunt; Cataracts in his sister; Diabetes in his mother, sister, sister, and sister; Heart attack in his brother; Heart failure in his brother; Hypertension in his father and mother; Stroke in his father, mother, paternal grandfather, and paternal  "grandmother.        HPI    Review of Systems   Constitutional: Positive for activity change. Negative for appetite change, chills, diaphoresis, fatigue, fever and unexpected weight change.   HENT: Negative for congestion, dental problem, drooling, ear discharge, ear pain, facial swelling and hearing loss.    Eyes: Negative for photophobia, pain, discharge, itching and visual disturbance.   Respiratory: Positive for shortness of breath (Chronic stable). Negative for apnea, cough, choking, chest tightness, wheezing and stridor.    Cardiovascular: Negative for chest pain, palpitations and leg swelling.   Gastrointestinal: Negative for abdominal distention, abdominal pain, anal bleeding, blood in stool and vomiting.   Endocrine: Negative for cold intolerance, heat intolerance, polydipsia, polyphagia and polyuria.   Genitourinary: Negative for difficulty urinating, dysuria, enuresis, flank pain, frequency, genital sores and hematuria.   Musculoskeletal: Positive for back pain and myalgias. Negative for arthralgias, gait problem and joint swelling.        Worsening/uncontrolled lumbar pain   Skin: Negative for color change and pallor.   Allergic/Immunologic: Negative for environmental allergies, food allergies and immunocompromised state.   Neurological: Positive for light-headedness. Negative for dizziness, tremors, seizures, syncope, facial asymmetry, speech difficulty, weakness, numbness and headaches.   Hematological: Negative for adenopathy. Does not bruise/bleed easily.   Psychiatric/Behavioral: Negative for agitation, confusion, decreased concentration, dysphoric mood and hallucinations. The patient is not nervous/anxious and is not hyperactive.        Objective:       Vitals:    11/08/18 0905   BP: (!) 90/55   Pulse: 69   Resp: 18   Temp: 97.8 °F (36.6 °C)   TempSrc: Oral   SpO2: (!) 91%   Weight: 81.8 kg (180 lb 5.4 oz)   Height: 5' 7" (1.702 m)     Physical Exam   Constitutional: He is oriented to person, place, " and time. He appears well-developed and well-nourished. No distress.   HENT:   Head: Normocephalic and atraumatic.   Nose: Nose normal.   Mouth/Throat: Oropharynx is clear and moist.   Eyes: Conjunctivae and EOM are normal. Pupils are equal, round, and reactive to light. Right eye exhibits no discharge. Left eye exhibits no discharge.   Neck: Normal range of motion. Neck supple. No tracheal deviation present. No thyromegaly present.   Cardiovascular: Normal rate and regular rhythm. Exam reveals no gallop and no friction rub.   No murmur heard.  Pulmonary/Chest: Effort normal. No stridor. No respiratory distress. He has decreased breath sounds. He has no wheezes. He has no rhonchi. He has rales. He exhibits no tenderness.   On supplemental oxygen   Abdominal: Soft. Bowel sounds are normal. He exhibits no distension and no mass. There is no tenderness. There is no rebound and no guarding. No hernia.   Musculoskeletal: Normal range of motion. He exhibits no edema, tenderness or deformity.   Lymphadenopathy:     He has no cervical adenopathy.   Neurological: He is alert and oriented to person, place, and time. He displays normal reflexes. No cranial nerve deficit. Coordination normal.   Skin: Skin is warm, dry and intact. Capillary refill takes less than 2 seconds. No rash noted. He is not diaphoretic. No cyanosis. Nails show no clubbing.   Psychiatric: He has a normal mood and affect. Thought content normal.   Vitals reviewed.        Lab Results   Component Value Date    WBC 6.95 11/08/2018    HGB 11.6 (L) 11/08/2018    HCT 37.1 (L) 11/08/2018    MCV 88 11/08/2018     11/08/2018     Lab Results   Component Value Date    CREATININE 0.8 11/08/2018    BUN 11 11/08/2018     11/08/2018    K 4.0 11/08/2018    CL 96 11/08/2018    CO2 29 11/08/2018     Lab Results   Component Value Date    ALT 63 (H) 11/08/2018    AST 91 (H) 11/08/2018    ALKPHOS 250 (H) 11/08/2018    BILITOT 0.5 11/08/2018         Assessment:        72-year-old gentleman status post recent hospitalization where he was noted to have left lower lung mass with extensive mediastinal lymphadenopathy, left adrenal nodule, an L1 lytic lesion consistent with metastatic lung cancer.  He underwent bronchoscopy/biopsy which was nondiagnostic on 09/02/2018.  PET scan performed on 09/17/2018 demonstrated extensive FDG avid mediastinal lymphadenopathy, single small subcentimeter pulmonary nodule within the right lung, metastatic lesion within the dome of the liver, and extensive osseous metastatic disease noted throughout the vertebral column.  The patient underwent CT-guided biopsy of lumbar mass which has demonstrated poorly differentiated squamous cell carcinoma.  PD L1 expression is high at 75%, therefore, we proceeded with palliative immunotherapy/Keytruda--the 1st dose given on 10/18/2018.  He is also receiving palliative radiation to the lumbar spine.  He presents today for cycle 2 of Keytruda.  Patient is noted to have mild fluid volume deficit with borderline low blood pressure.  We will hydrate with 1 L of normal saline over 2 hr and give 2nd dose of Keytruda.  I have strongly encouraged the patient to increase oral hydration and supplement diet with boost/ensure.  He will follow up in 3 weeks for next dose of Keytruda    Fluid volume deficit:  --1 L of normal saline over 2 hr  --increase oral hydration  --supplement diet with boosst/ensure    Mild transaminitis and elevated alkaline phosphatase  Unlikely that is related to Keytruda as he has only had 1 dose--alkaline phosphatase likely related to extensive bone disease  --continue to monitor closely    Pain related to malignancy:  --oxycodone 15 mg q.4 hours p.r.n.  -  Stage IV Non-small cell lung cancer/squamous cell carcinoma stage IV with metastasis involving mediastinum, liver, and extensive bone Mets--PDL1 status high  --status post cycle 1 of Keytruda on 10/18/2028  --palliative radiation to the lumbar  spine  --proceed with cycle 2 of Keytruda today 11/08/2018  --follow-up in 3 weeks for next dose

## 2018-11-08 NOTE — PLAN OF CARE
Problem: Patient Care Overview  Goal: Plan of Care Review  Outcome: Ongoing (interventions implemented as appropriate)  Pt stated feeling okay . Just tired and weak today
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
## 2018-11-09 ENCOUNTER — OFFICE VISIT (OUTPATIENT)
Dept: PULMONOLOGY | Facility: CLINIC | Age: 73
End: 2018-11-09
Payer: MEDICARE

## 2018-11-09 ENCOUNTER — TELEPHONE (OUTPATIENT)
Dept: RADIATION ONCOLOGY | Facility: CLINIC | Age: 73
End: 2018-11-09

## 2018-11-09 ENCOUNTER — CLINICAL SUPPORT (OUTPATIENT)
Dept: PULMONOLOGY | Facility: CLINIC | Age: 73
End: 2018-11-09
Payer: MEDICARE

## 2018-11-09 ENCOUNTER — OUTPATIENT CASE MANAGEMENT (OUTPATIENT)
Dept: ADMINISTRATIVE | Facility: OTHER | Age: 73
End: 2018-11-09

## 2018-11-09 VITALS
WEIGHT: 178 LBS | WEIGHT: 178 LBS | HEIGHT: 67 IN | BODY MASS INDEX: 27.94 KG/M2 | DIASTOLIC BLOOD PRESSURE: 59 MMHG | HEART RATE: 72 BPM | BODY MASS INDEX: 27.94 KG/M2 | SYSTOLIC BLOOD PRESSURE: 100 MMHG | OXYGEN SATURATION: 90 % | RESPIRATION RATE: 18 BRPM | HEIGHT: 67 IN

## 2018-11-09 DIAGNOSIS — J96.11 CHRONIC RESPIRATORY FAILURE WITH HYPOXIA: Chronic | ICD-10-CM

## 2018-11-09 DIAGNOSIS — C34.92 METASTATIC LUNG CANCER (METASTASIS FROM LUNG TO OTHER SITE), LEFT: Chronic | ICD-10-CM

## 2018-11-09 DIAGNOSIS — J44.9 COPD, GROUP D, BY GOLD 2017 CLASSIFICATION: Primary | Chronic | ICD-10-CM

## 2018-11-09 DIAGNOSIS — J43.2 CENTRILOBULAR EMPHYSEMA: Chronic | ICD-10-CM

## 2018-11-09 LAB
BRPFT: ABNORMAL
DLCO ADJ PRE: 5.92 ML/(MIN*MMHG) (ref 17.27–31.13)
DLCO SINGLE BREATH LLN: 17.27
DLCO SINGLE BREATH PRE REF: 22.1 %
DLCO SINGLE BREATH REF: 24.2
DLCOC SBVA LLN: 2.47
DLCOC SBVA PRE REF: 73.1 %
DLCOC SBVA REF: 3.72
DLCOC SINGLE BREATH LLN: 17.27
DLCOC SINGLE BREATH PRE REF: 24.5 %
DLCOC SINGLE BREATH REF: 24.2
DLCOVA LLN: 2.47
DLCOVA PRE REF: 66.1 %
DLCOVA PRE: 2.46 ML/(MIN*MMHG*L) (ref 2.47–4.98)
DLCOVA REF: 3.72
DLVAADJ PRE: 2.72 ML/(MIN*MMHG*L) (ref 2.47–4.98)
ERV LLN: 0.95
ERV PRE REF: 70.5 %
ERV REF: 0.95
ERVN2 LLN: 0.95
ERVN2 REF: 0.95
FEF 25 75 CHG: 12.1 %
FEF 25 75 LLN: 0.9
FEF 25 75 POST REF: 17.7 %
FEF 25 75 PRE REF: 15.8 %
FEF 25 75 REF: 2.14
FET100 CHG: -4.3 %
FEV1 CHG: 5.1 %
FEV1 FVC CHG: 1.1 %
FEV1 FVC LLN: 62
FEV1 FVC POST REF: 76.8 %
FEV1 FVC PRE REF: 75.9 %
FEV1 FVC REF: 76
FEV1 LLN: 2.01
FEV1 POST REF: 37.8 %
FEV1 PRE REF: 35.9 %
FEV1 REF: 2.82
FEV6 CHG: 5.5 %
FEV6 LLN: 2.78
FEV6 POST REF: 45.8 %
FEV6 POST: 1.65 L (ref 2.78–4.43)
FEV6 PRE REF: 43.4 %
FEV6 PRE: 1.57 L (ref 2.78–4.43)
FEV6 REF: 3.6
FRCN2 LLN: 2.55
FRCN2 REF: 3.54
FRCPLETH LLN: 2.55
FRCPLETH PREREF: 80.1 %
FRCPLETH REF: 3.54
FVC CHG: 4 %
FVC LLN: 2.74
FVC POST REF: 49 %
FVC PRE REF: 47.1 %
FVC REF: 3.72
IVC PRE: 1.29 L (ref 2.74–4.7)
IVC SINGLE BREATH LLN: 2.74
IVC SINGLE BREATH PRE REF: 34.6 %
IVC SINGLE BREATH REF: 3.72
MVV LLN: 93
MVV PRE REF: 43 %
MVV REF: 109
PEF CHG: -5.9 %
PEF LLN: 5.36
PEF POST REF: 48.6 %
PEF PRE REF: 51.7 %
PEF REF: 7.48
POST FEF 25 75: 0.38 L/S (ref 0.9–3.39)
POST FET 100: 11.27 SEC
POST FEV1 FVC: 58.43 % (ref 62.22–90)
POST FEV1: 1.06 L (ref 2.01–3.62)
POST FVC: 1.82 L (ref 2.74–4.7)
POST PEF: 3.64 L/S (ref 5.36–9.6)
PRE DLCO: 5.35 ML/(MIN*MMHG) (ref 17.27–31.13)
PRE ERV: 0.67 L (ref 0.95–0.95)
PRE FEF 25 75: 0.34 L/S (ref 0.9–3.39)
PRE FET 100: 11.78 SEC
PRE FEV1 FVC: 57.8 % (ref 62.22–90)
PRE FEV1: 1.01 L (ref 2.01–3.62)
PRE FRC PL: 2.83 L
PRE FVC: 1.75 L (ref 2.74–4.7)
PRE MVV: 47 L/MIN (ref 92.89–125.67)
PRE PEF: 3.86 L/S (ref 5.36–9.6)
PRE RV: 1.64 L (ref 1.91–3.26)
PRE TLC: 3.79 L (ref 5.35–7.65)
RAW LLN: 3.06
RAW PRE REF: 70.8 %
RAW PRE: 2.17 CMH2O*S/L (ref 3.06–3.06)
RAW REF: 3.06
RV LLN: 1.91
RV PRE REF: 63.7 %
RV REF: 2.58
RVN2 LLN: 1.91
RVN2 REF: 2.58
RVN2TLCN2 LLN: 33
RVN2TLCN2 REF: 42
RVTLC LLN: 33
RVTLC PRE REF: 103.1 %
RVTLC PRE: 43.34 % (ref 33.06–51.02)
RVTLC REF: 42
TLC LLN: 5.35
TLC PRE REF: 58.3 %
TLC REF: 6.5
TLCN2 LLN: 5.35
TLCN2 REF: 6.5
VA PRE: 2.18 L (ref 6.35–6.35)
VA SINGLE BREATH LLN: 6.35
VA SINGLE BREATH PRE REF: 34.3 %
VA SINGLE BREATH REF: 6.35
VC LLN: 2.74
VC PRE REF: 57.8 %
VC PRE: 2.15 L (ref 2.74–4.7)
VC REF: 3.72
VCMAXN2 LLN: 2.74
VCMAXN2 REF: 3.72
VTGRAWPRE: 3.27 L

## 2018-11-09 PROCEDURE — 94060 EVALUATION OF WHEEZING: CPT | Mod: PBBFAC

## 2018-11-09 PROCEDURE — 94726 PLETHYSMOGRAPHY LUNG VOLUMES: CPT | Mod: PBBFAC

## 2018-11-09 PROCEDURE — 94618 PULMONARY STRESS TESTING: CPT | Mod: 26,S$PBB,, | Performed by: INTERNAL MEDICINE

## 2018-11-09 PROCEDURE — 99213 OFFICE O/P EST LOW 20 MIN: CPT | Mod: PBBFAC,25 | Performed by: INTERNAL MEDICINE

## 2018-11-09 PROCEDURE — 94060 EVALUATION OF WHEEZING: CPT | Mod: 26,59,S$PBB, | Performed by: INTERNAL MEDICINE

## 2018-11-09 PROCEDURE — 94726 PLETHYSMOGRAPHY LUNG VOLUMES: CPT | Mod: 26,S$PBB,, | Performed by: INTERNAL MEDICINE

## 2018-11-09 PROCEDURE — 99999 PR PBB SHADOW E&M-EST. PATIENT-LVL III: CPT | Mod: PBBFAC,,, | Performed by: INTERNAL MEDICINE

## 2018-11-09 PROCEDURE — 94729 DIFFUSING CAPACITY: CPT | Mod: PBBFAC

## 2018-11-09 PROCEDURE — 94729 DIFFUSING CAPACITY: CPT | Mod: 26,S$PBB,, | Performed by: INTERNAL MEDICINE

## 2018-11-09 PROCEDURE — 94618 PULMONARY STRESS TESTING: CPT | Mod: PBBFAC

## 2018-11-09 PROCEDURE — 99215 OFFICE O/P EST HI 40 MIN: CPT | Mod: 25,S$PBB,, | Performed by: INTERNAL MEDICINE

## 2018-11-09 NOTE — ASSESSMENT & PLAN NOTE
EMPHYSEMA ROS: taking medications as instructed, no medication side effects noted, no significant ongoing wheezing or shortness of breath, using bronchodilator MDI less than twice a week.   New concerns: None.   Exam: appears well, vitals normal, no respiratory distress, acyanotic, normal RR, chest clear, no wheezing, crepitations, rhonchi, normal symmetric air entry.   Assessment:  EMPHYSEMA stable.   Plan: SPIRIVA, DUONEB. AMBULATORY REFERRAL TO PULMONARY DISEASE MANAGEMENT.

## 2018-11-09 NOTE — PROCEDURES
"[facility]  Six Minute Walk     SUMMARY     Ordering Provider: Patrice   Interpreting Provider: Patrice  Performing nurse/tech/RT: Geovanna Zamora  Diagnosis: (Centrilobular Emphysema)  Height: 5' 7" (170.2 cm)  Weight: 80.7 kg (178 lb)  BMI (Calculated): 27.9   Patient Race:             Phase Oxygen Assessment Supplemental O2 Heart   Rate Blood Pressure Sirisha Dyspnea Scale Rating   Resting 84 % Room Air 92 bpm 106/51 5-6   Exercise        Minute        1 88 % 2 L/M 94 bpm     2 90 % 3 L/M 97 bpm     3 88 % 3 L/M 98 bpm     4 95 % 4 L/M 96 bpm     5 96 % 4 L/M 96 bpm     6  96 % 4 L/M 96 bpm 113/57 7-8   Recovery        Minute        1 96 % 4 L/M 90 bpm     2 96 % 4 L/M 91 bpm     3 96 % 4 L/M 91 bpm     4 96 % 4 L/M 90 bpm 106/53 3     Six Minute Walk Summary  6MWT Status: not completed  Patient Reported: Chest pain(Back Pain)     Interpretation:  Did the patient stop or pause?: Yes  How many times did the patient stop or pause?: 1  Stop Time 1: 159  Restart Time 1: 159  Pause Time 1: 201 seconds                             Total Time Walked (Calculated): 159 seconds  Final Partial Lap Distance (feet): 0 feet  Total Distance Meters (Calculated): 60.96 meters  Predicted Distance Meters (Calculated): 475.87 meters  Percentage of Predicted (Calculated): 12.81  Peak VO2 (Calculated): 5.81  Mets: 1.66  Has The Patient Had a Previous Six Minute Walk Test?: No       Previous 6MWT Results  Has The Patient Had a Previous Six Minute Walk Test?: No      PHYSICIAN INTERPRETATION:  Six minute walk distance is 60.96 / 475.87 meters (12.81 % predicted) with very heavy dyspnea. There was significant desaturation while breathing room air at rest to 84%. There was performed with oxygen supplementation. Oxygen saturation improved to 96% with oxygen supplementation at 4  L /min. Both blood pressure and heart rate remained stable with walking. Hypotension was present prior to exercise. The patient reported " non-pulmonary symptoms during exercise - chest pain and back pain. Significant exercise impairment is likely due to desaturation, cardiovascular causes and subjective symptoms. The patient did not complete the study, walking 159 seconds of the 360 second test. The patient may benefit from using supplemental oxygen. No previous study performed. Based upon age and body mass index, exercise capacity is less than predicted.    Peak VO2 during walking was 5.81 ml/min/kg [1.66 METS]

## 2018-11-09 NOTE — TELEPHONE ENCOUNTER
Made call to see how patient was doing after completing xrt last week. Patient stated he was doing fine and voiced no complaints. Informed patient to call back if he had any questions or concerns and reminded patient of his follow up appt in Dec. Pt verbalized understanding.

## 2018-11-09 NOTE — PATIENT INSTRUCTIONS
Chronic Lung Disease: If Oxygen Is Prescribed   Supplemental oxygen is prescribed if tests show that the level of oxygen in your blood is too low. If the level stays too low for too long, serious problems can develop in many parts of the body. Supplemental oxygen helps to relieve your symptoms and prevent future problems by getting more oxygen to the blood. Depending on your test results, you may need oxygen all the time. Or it may only be needed during certain activities, such as exercise or sleep. When oxygen is prescribed, youll be referred to a medical equipment company. They will set up the oxygen unit and teach you how to use it.  Nasal cannula     A nasal cannula should be placed in the nose with the prongs arching toward you.     Oxygen is most often inhaled through a nasal cannula (lightweight tube with two hollow prongs that fit just inside the nose).  Types of supplemental oxygen    Compressed oxygen   Oxygen concentrator   Liquid oxygen   Prescribed oxygen comes in several forms. You may use more than one type, depending on when you need oxygen:  · Compressed oxygen is oxygen gas stored in a tank. Because the oxygen is stored under pressure, these tanks must be handled carefully. Gauges on the tank can be used to adjust the oxygen flow rate. Your healthcare provider will determine what this should be. Small tanks can be carried. Larger tanks are on wheels and can be pulled around the house.  · An oxygen concentrator is a machine about the size of a large suitcase. It plugs into an electrical outlet. (A back-up oxygen supply is recommended in case of a power outage.) The machine takes oxygen from the air and concentrates it. Its then delivered to you through plastic tubing. The tubing is long enough so that you can move around the house. When youre using the concentrator, it must be kept somewhere that has a good supply of fresh air. (Dont keep it in a confined space, like a closet.) You may be set  up on a concentrator if you need oxygen all the time or while youre sleeping.  · Liquid oxygen results when oxygen gas is cooled to a very low temperature. Its kept in special containers that maintain this low temperature. When you use liquid oxygen, its warmed and becomes gas before reaching the cannula. Most tanks come with a portable unit that you can carry or pull on a cart. Some of these weigh only a few pounds. Liquid oxygen units are easy to carry around. If you need oxygen all the time or during activity, this kind of unit can help you stay active.  Oxygen is prescribed just for you  Your healthcare provider will prescribe oxygen based on your needs. Here are a few things you should know:  · A therapist from the medical equipment company will explain when to use oxygen and what type to use. Youll be taught how to use and maintain your oxygen equipment.  · You must use the exact rate of oxygen prescribed for each activity. Dont increase or decrease the amount without asking your healthcare provider first.  · Supplemental oxygen is a medicine. Its not addictive and causes no side effects when used as directed.   Date Last Reviewed: 5/1/2016  © 1026-9844 [employer], "[employer]. [address] [place] [address], [place] [address]. All rights reserved. This information is not intended as a substitute for professional medical care. Always follow your healthcare professional's instructions.

## 2018-11-09 NOTE — PROGRESS NOTES
11/9/18 - Summary: 1st Attempt made today to complete Initial Screen for OPCM with no answer.   Interventions: Voice mail left requesting return call.   Plan: CM will attempt to contact again at a later time. Livier Noyola RN

## 2018-11-09 NOTE — PROGRESS NOTES
Subjective:      Established patient    Patient ID: Nico Garza Jr. is a 72 y.o. male.  Patient Active Problem List   Diagnosis    COPD, group D, by GOLD 2017 classification    Atrial flutter with rapid ventricular response    Hypertension goal BP (blood pressure) < 130/80    Hyperlipidemia LDL goal <70    Paroxysmal atrial fibrillation    Paroxysmal atrial flutter    Type 1 diabetes mellitus with diabetic neuropathy    Metastatic left lung cancer (metastasis from lung to other site) with mediastinal lymphadenopathy    Chronic respiratory failure with hypoxia    History of malnutrition    Constipation    Left diaphragmatic paralysis    Mass of lower lobe of left lung    Mass of left lung    Mediastinal lymphadenopathy    Cigarette nicotine dependence in remission    Lung cancer    Bone metastases    Situational mixed anxiety and depressive disorder    Atherosclerosis of aorta    Coronary artery calcification    Intravascular volume depletion    UTI (urinary tract infection)    Severe malnutrition    Primary cancer of left lung metastatic to other site    Secondary cancer of bone       Problem list has been reviewed.    Chief Complaint: COPD and mass of lower lobe of left lung      HPI      Group Spirometric Classification Exacerbations / year mMRC CAT   Group D: High risk; more sypmtoms  GOLD 3-4 > = 2 >= 2 >= 10     FEV1 = 1.01 L ( 35.9% predicted)    PFT& 6 MWT  reviewed with patient who expressed and voiced understanding.     All questions were answered to the patients satisfaction.      Patients reports NYHA II dyspnea     The patient does not have currently have symptoms / an exacerbation.       No recent change in breathing.      His current respiratory regimen: LEVI GUERREROONEB  : He does use medications compliantly    COPD QUESTIONNAIRE  How often do you cough?: A little of the time  How often do you have phlegm (mucus) in your chest?: A little of the time  How often does your  chest feel tight?: A little of the time  When you walk up a hill or one flight of stairs, how often are you breathless?: All of the time  How often are you limited doing any activities at home?: Some of the time  How often are you confident leaving the house despite your lung condition?: Some of the time  How often do you sleep soundly?: Almost never  How often do you have energy?: Almost never  Total score: 24     Metastatic poorly-differentiated squamous cell carcinoma. Started on KEYTRUDA. First dose was yesterday.    Immunization status reviewed and up to date.    A full  review of systems, past , family  and social histories was performed except as mentioned in the note above, these are non contributory to the main issues discussed today.       Previous Report Reviewed: lab reports, office notes and radiology reports     The following portions of the patient's history were reviewed and updated as appropriate: He  has a past medical history of Arthritis, Atrial fibrillation and flutter, Atrial flutter, Cancer, COPD (chronic obstructive pulmonary disease), COPD (chronic obstructive pulmonary disease) with emphysema (11/18/2013), DM (diabetes mellitus) (1996), Hyperlipidemia, Hypertension, Kidney cysts, Lung disease, Nephrolithiasis, Neuropathy, diabetic, Pancreatitis, and Type 1 diabetes mellitus with diabetic neuropathy (4/27/2018).  He  has a past surgical history that includes Cholecystectomy; Biceps tendon repair (Right); Patella surgery (Right); Rotator cuff repair (Left); Cardioversion; Radiofrequency ablation; [id number] (Right, 10/9/2018); ULTRASOUND GUIDANCE (Right, 10/9/2018); BRONCHOSCOPY- insert lighted tube into airway to obtain biopsy of lung (Bilateral, 9/2/2018); ABLATION (N/A, 12/4/2017); and TRANSESOPHAGEAL ECHOCARDIOGRAM (DILLAN) (N/A, 12/4/2017).  His family history includes Cancer in his maternal aunt; Cataracts in his sister; Diabetes in his mother, sister, sister, and sister; Heart  "attack in his brother; Heart failure in his brother; Hypertension in his father and mother; Stroke in his father, mother, paternal grandfather, and paternal grandmother.  He  reports that he quit smoking about 3 years ago. His smoking use included cigarettes. He started smoking about 58 years ago. He smoked 0.50 packs per day. He quit smokeless tobacco use about 23 years ago. His smokeless tobacco use included snuff. He reports that he does not drink alcohol or use drugs.  He has a current medication list which includes the following prescription(s): albuterol-ipratropium, b complex vitamins, blood sugar diagnostic, blood-glucose meter, clonazepam, diltiazem, doxycycline, eliquis, flecainide, gabapentin, insulin degludec, insulin lispro, pen needle, diabetic, multivitamin, needles, disposable, nystatin, omeprazole, ondansetron, oxycodone, pravastatin, prochlorperazine, sertraline, tiotropium bromide, and vitamin d.  He is allergic to anoro ellipta [umeclidinium-vilanterol] and flomax [tamsulosin]..    Review of Systems   Constitutional: Positive for fatigue. Negative for fever and chills.   HENT: Positive for sinus pressure and congestion. Negative for nosebleeds.    Eyes: Negative for itching.   Respiratory: Negative for cough, shortness of breath, wheezing and dyspnea on extertion.    Cardiovascular: Negative for chest pain and palpitations.   Genitourinary: Negative for difficulty urinating and hematuria.   Endocrine: Negative for cold intolerance and heat intolerance.    Musculoskeletal: Negative for arthralgias and back pain.   Skin: Positive for rash.   Gastrointestinal: Positive for acid reflux. Negative for nausea, vomiting and abdominal pain.   Neurological: Positive for light-headedness. Negative for dizziness and headaches.   Hematological: No excessive bruising.   Psychiatric/Behavioral: The patient is not nervous/anxious.         Objective:     BP (!) 100/59   Pulse 72   Resp 18   Ht 5' 7" (1.702 m)  "  Wt 80.7 kg (178 lb)   SpO2 (!) 90% Comment: 3 liters  BMI 27.88 kg/m²   Body mass index is 27.88 kg/m².     Physical Exam   Constitutional: He is oriented to person, place, and time. He appears well-developed and well-nourished.   HENT:   Head: Normocephalic and atraumatic.   Eyes: EOM are normal. Pupils are equal, round, and reactive to light.   Neck: Normal range of motion. No tracheal deviation present.   Cardiovascular: Normal rate, regular rhythm and intact distal pulses. Exam reveals no friction rub.   No murmur heard.  Pulmonary/Chest: Effort normal and breath sounds normal. He has no wheezes. He has no rales.   Abdominal: Soft. Bowel sounds are normal. He exhibits no distension. There is no tenderness.   Musculoskeletal: Normal range of motion. He exhibits no edema or deformity.   Neurological: He is alert and oriented to person, place, and time.   Skin: Skin is warm and dry. No rash noted.   Psychiatric: He has a normal mood and affect. His behavior is normal.       Personal Diagnostic Review      Pulmonary function tests: FEV1: 1.01  (35.9 % predicted), FVC:  1.75 (47.1 % predicted), FEV1/FVC:  58, TLC: 3.79 (58.3 % predicted), RV/TLVC: 43 (103.1 % predicted), DLCO: 5.35 (22.1 % predicted)  Severe mixed obstruction with restriction. Diffusion capacity is severely reduced.       Six Minute Walk Test: Six minute walk distance is 60.96 / 475.87 meters (12.81 % predicted) with very heavy dyspnea. There was significant desaturation while breathing room air at rest to 84%. There was performed with oxygen supplementation. Oxygen saturation improved to 96% with oxygen supplementation at 4  L /min. Both blood pressure and heart rate remained stable with walking. Hypotension was present prior to exercise. The patient reported non-pulmonary symptoms during exercise - chest pain and back pain. Significant exercise impairment is likely due to desaturation, cardiovascular causes and subjective symptoms. The patient  did not complete the study, walking 159 seconds of the 360 second test. The patient may benefit from using supplemental oxygen. No previous study performed. Based upon age and body mass index, exercise capacity is less than predicted.    Peak VO2 during walking was 5.81 ml/min/kg [1.66 METS]    CT of chest performed on 10/27/18 with contrast:    No acute chest findings.  Mild interval reduction in size of left mediastinal mass.  However, there has been interval enlargement of multiple metastatic pulmonary nodules as well as a lesion in the dome of the right lobe of the liver.  Interval development of a new right lower lobe 1 cm nodule.      RANDALL Index for COPD Survival Prediction   Select Criteria:   FEV1 % Predicted After Bronchodialator     [] >= 65% (0 points)    [] 50-64% (1 point)    [] 36-49% (2 points)    [x] <= 35% (3 points)   6 Minute Walk Distance     [] >= 350 Meters (0 points)    [] 250-349 Meters (1 point)    [] 150-249 Meters (2 points)    [x] <= 149 Meters (3 points)   MMRC Dyspnea Scale (4 is worst)     [] MMRC 0: Dyspneic on strenuous excercise (0 points)    [] MMRC 1: Dyspneic on walking a slight hill (0 points)    [] MMRC 2: Dyspneic on walking level ground; must stop occasionally due to breathlessness (1 point)    [x] MMRC 3: Must stop for breathlessness after walking 100 yards or after a few minutes (2 points)    [] MMRC 4: Cannot leave house; breathless on dressing/undressing (3 points)   Body Mass Index     [x] > 21 (0 points)    [] <= 21 (1 point)   Results: Total Criteria Point Count:     Approximate 4 Year Survival Interpretation   0-2 Points:  [] 80%   3-4 Points:  [] 67%   5-6 Points:  [] 57%   7-10 Points:[x] 18%         References  1. Zenon BR, Suman CG, Abhay JM, et. al. The body-mass index, airflow obstruction, dyspnea and exercise capacity index in chronic obstructive pulmonary disease. N Engl J Med. 2004 Mar 4;350(10):1005-12.  2. Man DA, Aj CK. Evaluation of clinical methods  for rating dyspnea. Chest. 1988 Mar;93(3):580-6      Assessment / Plan:       Discussed diagnosis, its evaluation, treatment and usual course. All questions answered.    Problem List Items Addressed This Visit        Pulmonary    COPD, group D, by GOLD 2017 classification - Primary    Overview     cta chest 9/21/2018- Severe emphysema.         Current Assessment & Plan     EMPHYSEMA ROS: taking medications as instructed, no medication side effects noted, no significant ongoing wheezing or shortness of breath, using bronchodilator MDI less than twice a week.   New concerns: None.   Exam: appears well, vitals normal, no respiratory distress, acyanotic, normal RR, chest clear, no wheezing, crepitations, rhonchi, normal symmetric air entry.   Assessment:  EMPHYSEMA stable.   Plan: SPIRIVA, DUONEB. AMBULATORY REFERRAL TO PULMONARY DISEASE MANAGEMENT.            Chronic respiratory failure with hypoxia    Current Assessment & Plan     START OXYGEN SUPPLEMENTATION AT 4 L/MIN CONTINUOUS ( INCLUDING SLEEP).          Relevant Orders    OXYGEN FOR HOME USE       Oncology    Metastatic left lung cancer (metastasis from lung to other site) with mediastinal lymphadenopathy    Current Assessment & Plan       Metastatic poorly-differentiated squamous cell carcinoma   Currently on  KEYTRUDA.    Follow up with ONCOLOGY as scheduled.                    TIME SPENT WITH PATIENT: Time spent: 45 minutes in face to face  discussion concerning diagnosis, prognosis, review of lab and test results, benefits of treatment as well as management of disease, counseling of patient and coordination of care between various health  care providers . Greater than half the time spent was used for coordination of care and counseling of patient.       Follow-up in about 3 months (around 2/9/2019) for COPD, Chronic Respiratory Failure, Lung Cancer.

## 2018-11-12 ENCOUNTER — OUTPATIENT CASE MANAGEMENT (OUTPATIENT)
Dept: ADMINISTRATIVE | Facility: OTHER | Age: 73
End: 2018-11-12

## 2018-11-12 NOTE — PROGRESS NOTES
11/12/18 - Summary: Phone contact made with pt today for completion of Initial Screen for OPCM.   Interventions: OPCM explained and pt declined, stating he has a home health nurse that he feels is all he needs currently.   Plan: D/C from OPCM today. Livier Noyola RN

## 2018-11-13 ENCOUNTER — HOSPITAL ENCOUNTER (INPATIENT)
Facility: HOSPITAL | Age: 73
LOS: 4 days | Discharge: HOME HEALTH | DRG: 947 | End: 2018-11-18
Attending: EMERGENCY MEDICINE | Admitting: INTERNAL MEDICINE
Payer: MEDICARE

## 2018-11-13 ENCOUNTER — TELEPHONE (OUTPATIENT)
Dept: INFUSION THERAPY | Facility: HOSPITAL | Age: 73
End: 2018-11-13

## 2018-11-13 DIAGNOSIS — G89.3 PAIN OF METASTATIC MALIGNANCY: ICD-10-CM

## 2018-11-13 DIAGNOSIS — C34.90 LUNG CANCER METASTATIC TO BONE: ICD-10-CM

## 2018-11-13 DIAGNOSIS — C79.9 MULTIPLE LESIONS OF METASTATIC MALIGNANCY: ICD-10-CM

## 2018-11-13 DIAGNOSIS — C79.51 BONE METASTASES: ICD-10-CM

## 2018-11-13 DIAGNOSIS — C79.51 LUNG CANCER METASTATIC TO BONE: ICD-10-CM

## 2018-11-13 DIAGNOSIS — C78.7 LIVER METASTASES: ICD-10-CM

## 2018-11-13 DIAGNOSIS — R91.8 MASS OF LOWER LOBE OF LEFT LUNG: ICD-10-CM

## 2018-11-13 DIAGNOSIS — R91.8 MASS OF LEFT LUNG: ICD-10-CM

## 2018-11-13 DIAGNOSIS — R93.89 ABNORMAL FINDINGS ON DIAGNOSTIC IMAGING OF OTHER SPECIFIED BODY STRUCTURES: ICD-10-CM

## 2018-11-13 DIAGNOSIS — C34.90 MALIGNANT NEOPLASM OF LUNG, UNSPECIFIED LATERALITY, UNSPECIFIED PART OF LUNG: ICD-10-CM

## 2018-11-13 DIAGNOSIS — J96.11 CHRONIC RESPIRATORY FAILURE WITH HYPOXIA: ICD-10-CM

## 2018-11-13 DIAGNOSIS — R07.9 CHEST PAIN: Primary | ICD-10-CM

## 2018-11-13 DIAGNOSIS — C34.92 METASTATIC LUNG CANCER (METASTASIS FROM LUNG TO OTHER SITE), LEFT: ICD-10-CM

## 2018-11-13 DIAGNOSIS — C34.31 MALIGNANT NEOPLASM OF LOWER LOBE OF RIGHT LUNG: ICD-10-CM

## 2018-11-13 DIAGNOSIS — R52 INTRACTABLE PAIN: ICD-10-CM

## 2018-11-13 DIAGNOSIS — E43 SEVERE MALNUTRITION: ICD-10-CM

## 2018-11-13 DIAGNOSIS — J44.9 COPD, GROUP D, BY GOLD 2017 CLASSIFICATION: ICD-10-CM

## 2018-11-13 LAB
ALBUMIN SERPL BCP-MCNC: 2.7 G/DL
ALP SERPL-CCNC: 253 U/L
ALT SERPL W/O P-5'-P-CCNC: 49 U/L
ANION GAP SERPL CALC-SCNC: 11 MMOL/L
AST SERPL-CCNC: 69 U/L
BASOPHILS # BLD AUTO: 0.01 K/UL
BASOPHILS NFR BLD: 0.1 %
BILIRUB SERPL-MCNC: 0.3 MG/DL
BNP SERPL-MCNC: 57 PG/ML
BUN SERPL-MCNC: 8 MG/DL
CALCIUM SERPL-MCNC: 9.3 MG/DL
CHLORIDE SERPL-SCNC: 95 MMOL/L
CO2 SERPL-SCNC: 32 MMOL/L
CREAT SERPL-MCNC: 0.7 MG/DL
DIFFERENTIAL METHOD: ABNORMAL
EOSINOPHIL # BLD AUTO: 0.2 K/UL
EOSINOPHIL NFR BLD: 2.8 %
ERYTHROCYTE [DISTWIDTH] IN BLOOD BY AUTOMATED COUNT: 16.3 %
EST. GFR  (AFRICAN AMERICAN): >60 ML/MIN/1.73 M^2
EST. GFR  (NON AFRICAN AMERICAN): >60 ML/MIN/1.73 M^2
GLUCOSE SERPL-MCNC: 110 MG/DL
HCT VFR BLD AUTO: 34.9 %
HGB BLD-MCNC: 10.7 G/DL
LYMPHOCYTES # BLD AUTO: 0.8 K/UL
LYMPHOCYTES NFR BLD: 9.3 %
MAGNESIUM SERPL-MCNC: 1.8 MG/DL
MCH RBC QN AUTO: 26.8 PG
MCHC RBC AUTO-ENTMCNC: 30.7 G/DL
MCV RBC AUTO: 87 FL
MONOCYTES # BLD AUTO: 0.9 K/UL
MONOCYTES NFR BLD: 10.4 %
NEUTROPHILS # BLD AUTO: 6.4 K/UL
NEUTROPHILS NFR BLD: 77.4 %
PHOSPHATE SERPL-MCNC: 3.9 MG/DL
PLATELET # BLD AUTO: 295 K/UL
PMV BLD AUTO: 9.1 FL
POCT GLUCOSE: 117 MG/DL (ref 70–110)
POTASSIUM SERPL-SCNC: 4.3 MMOL/L
PROT SERPL-MCNC: 7.2 G/DL
RBC # BLD AUTO: 4 M/UL
SODIUM SERPL-SCNC: 138 MMOL/L
TROPONIN I SERPL DL<=0.01 NG/ML-MCNC: <0.006 NG/ML
WBC # BLD AUTO: 8.3 K/UL

## 2018-11-13 PROCEDURE — 25000003 PHARM REV CODE 250: Performed by: NURSE PRACTITIONER

## 2018-11-13 PROCEDURE — 83880 ASSAY OF NATRIURETIC PEPTIDE: CPT

## 2018-11-13 PROCEDURE — 96375 TX/PRO/DX INJ NEW DRUG ADDON: CPT

## 2018-11-13 PROCEDURE — 36415 COLL VENOUS BLD VENIPUNCTURE: CPT

## 2018-11-13 PROCEDURE — G0378 HOSPITAL OBSERVATION PER HR: HCPCS

## 2018-11-13 PROCEDURE — 93010 ELECTROCARDIOGRAM REPORT: CPT | Mod: ,,, | Performed by: INTERNAL MEDICINE

## 2018-11-13 PROCEDURE — 80053 COMPREHEN METABOLIC PANEL: CPT

## 2018-11-13 PROCEDURE — 25000003 PHARM REV CODE 250: Performed by: EMERGENCY MEDICINE

## 2018-11-13 PROCEDURE — 25000242 PHARM REV CODE 250 ALT 637 W/ HCPCS: Performed by: EMERGENCY MEDICINE

## 2018-11-13 PROCEDURE — 82962 GLUCOSE BLOOD TEST: CPT

## 2018-11-13 PROCEDURE — 99285 EMERGENCY DEPT VISIT HI MDM: CPT | Mod: 25

## 2018-11-13 PROCEDURE — 84484 ASSAY OF TROPONIN QUANT: CPT

## 2018-11-13 PROCEDURE — 25500020 PHARM REV CODE 255: Performed by: EMERGENCY MEDICINE

## 2018-11-13 PROCEDURE — 87040 BLOOD CULTURE FOR BACTERIA: CPT

## 2018-11-13 PROCEDURE — 27000221 HC OXYGEN, UP TO 24 HOURS

## 2018-11-13 PROCEDURE — 63600175 PHARM REV CODE 636 W HCPCS: Performed by: EMERGENCY MEDICINE

## 2018-11-13 PROCEDURE — 85025 COMPLETE CBC W/AUTO DIFF WBC: CPT

## 2018-11-13 PROCEDURE — 83735 ASSAY OF MAGNESIUM: CPT

## 2018-11-13 PROCEDURE — 96374 THER/PROPH/DIAG INJ IV PUSH: CPT

## 2018-11-13 PROCEDURE — 94640 AIRWAY INHALATION TREATMENT: CPT

## 2018-11-13 PROCEDURE — 84100 ASSAY OF PHOSPHORUS: CPT

## 2018-11-13 PROCEDURE — 93005 ELECTROCARDIOGRAM TRACING: CPT

## 2018-11-13 RX ORDER — ASPIRIN 325 MG
325 TABLET ORAL
Status: COMPLETED | OUTPATIENT
Start: 2018-11-13 | End: 2018-11-13

## 2018-11-13 RX ORDER — SODIUM CHLORIDE 0.9 % (FLUSH) 0.9 %
5 SYRINGE (ML) INJECTION
Status: DISCONTINUED | OUTPATIENT
Start: 2018-11-13 | End: 2018-11-18 | Stop reason: HOSPADM

## 2018-11-13 RX ORDER — SODIUM CHLORIDE 9 MG/ML
INJECTION, SOLUTION INTRAVENOUS CONTINUOUS
Status: DISCONTINUED | OUTPATIENT
Start: 2018-11-13 | End: 2018-11-14

## 2018-11-13 RX ORDER — NYSTATIN 100000 [USP'U]/ML
500000 SUSPENSION ORAL 4 TIMES DAILY
Status: DISCONTINUED | OUTPATIENT
Start: 2018-11-14 | End: 2018-11-14

## 2018-11-13 RX ORDER — OXYCODONE HCL 10 MG/1
10 TABLET, FILM COATED, EXTENDED RELEASE ORAL EVERY 4 HOURS PRN
Status: DISCONTINUED | OUTPATIENT
Start: 2018-11-13 | End: 2018-11-13

## 2018-11-13 RX ORDER — INSULIN ASPART 100 [IU]/ML
0-5 INJECTION, SOLUTION INTRAVENOUS; SUBCUTANEOUS
Status: DISCONTINUED | OUTPATIENT
Start: 2018-11-13 | End: 2018-11-18 | Stop reason: HOSPADM

## 2018-11-13 RX ORDER — DILTIAZEM HYDROCHLORIDE 180 MG/1
360 CAPSULE, COATED, EXTENDED RELEASE ORAL DAILY
Status: DISCONTINUED | OUTPATIENT
Start: 2018-11-14 | End: 2018-11-18 | Stop reason: HOSPADM

## 2018-11-13 RX ORDER — OXYCODONE HYDROCHLORIDE 5 MG/1
10 TABLET ORAL EVERY 4 HOURS PRN
Status: DISCONTINUED | OUTPATIENT
Start: 2018-11-13 | End: 2018-11-14

## 2018-11-13 RX ORDER — HYDROMORPHONE HYDROCHLORIDE 2 MG/ML
1 INJECTION, SOLUTION INTRAMUSCULAR; INTRAVENOUS; SUBCUTANEOUS EVERY 6 HOURS PRN
Status: DISCONTINUED | OUTPATIENT
Start: 2018-11-13 | End: 2018-11-13

## 2018-11-13 RX ORDER — IBUPROFEN 200 MG
16 TABLET ORAL
Status: DISCONTINUED | OUTPATIENT
Start: 2018-11-13 | End: 2018-11-18 | Stop reason: HOSPADM

## 2018-11-13 RX ORDER — MORPHINE SULFATE 4 MG/ML
4 INJECTION, SOLUTION INTRAMUSCULAR; INTRAVENOUS
Status: COMPLETED | OUTPATIENT
Start: 2018-11-13 | End: 2018-11-13

## 2018-11-13 RX ORDER — ARFORMOTEROL TARTRATE 15 UG/2ML
15 SOLUTION RESPIRATORY (INHALATION) 2 TIMES DAILY
Status: DISCONTINUED | OUTPATIENT
Start: 2018-11-13 | End: 2018-11-18 | Stop reason: HOSPADM

## 2018-11-13 RX ORDER — IPRATROPIUM BROMIDE AND ALBUTEROL SULFATE 2.5; .5 MG/3ML; MG/3ML
3 SOLUTION RESPIRATORY (INHALATION) EVERY 6 HOURS PRN
Status: DISCONTINUED | OUTPATIENT
Start: 2018-11-13 | End: 2018-11-15

## 2018-11-13 RX ORDER — HYDROMORPHONE HYDROCHLORIDE 2 MG/ML
1 INJECTION, SOLUTION INTRAMUSCULAR; INTRAVENOUS; SUBCUTANEOUS
Status: COMPLETED | OUTPATIENT
Start: 2018-11-13 | End: 2018-11-13

## 2018-11-13 RX ORDER — IBUPROFEN 200 MG
24 TABLET ORAL
Status: DISCONTINUED | OUTPATIENT
Start: 2018-11-13 | End: 2018-11-18 | Stop reason: HOSPADM

## 2018-11-13 RX ORDER — ACETAMINOPHEN 325 MG/1
650 TABLET ORAL EVERY 8 HOURS PRN
Status: DISCONTINUED | OUTPATIENT
Start: 2018-11-13 | End: 2018-11-18 | Stop reason: HOSPADM

## 2018-11-13 RX ORDER — SERTRALINE HYDROCHLORIDE 50 MG/1
50 TABLET, FILM COATED ORAL DAILY
Status: DISCONTINUED | OUTPATIENT
Start: 2018-11-14 | End: 2018-11-18 | Stop reason: HOSPADM

## 2018-11-13 RX ORDER — HYDROMORPHONE HYDROCHLORIDE 1 MG/ML
1 INJECTION, SOLUTION INTRAMUSCULAR; INTRAVENOUS; SUBCUTANEOUS EVERY 8 HOURS PRN
Status: DISCONTINUED | OUTPATIENT
Start: 2018-11-13 | End: 2018-11-15

## 2018-11-13 RX ORDER — IPRATROPIUM BROMIDE AND ALBUTEROL SULFATE 2.5; .5 MG/3ML; MG/3ML
3 SOLUTION RESPIRATORY (INHALATION)
Status: COMPLETED | OUTPATIENT
Start: 2018-11-13 | End: 2018-11-13

## 2018-11-13 RX ORDER — GLUCAGON 1 MG
1 KIT INJECTION
Status: DISCONTINUED | OUTPATIENT
Start: 2018-11-13 | End: 2018-11-18 | Stop reason: HOSPADM

## 2018-11-13 RX ORDER — BUDESONIDE 0.5 MG/2ML
0.5 INHALANT ORAL EVERY 12 HOURS
Status: DISCONTINUED | OUTPATIENT
Start: 2018-11-14 | End: 2018-11-18 | Stop reason: HOSPADM

## 2018-11-13 RX ORDER — ONDANSETRON 2 MG/ML
4 INJECTION INTRAMUSCULAR; INTRAVENOUS EVERY 8 HOURS PRN
Status: DISCONTINUED | OUTPATIENT
Start: 2018-11-13 | End: 2018-11-18 | Stop reason: HOSPADM

## 2018-11-13 RX ADMIN — IPRATROPIUM BROMIDE AND ALBUTEROL SULFATE 3 ML: .5; 3 SOLUTION RESPIRATORY (INHALATION) at 03:11

## 2018-11-13 RX ADMIN — HYDROMORPHONE HYDROCHLORIDE 1 MG: 2 INJECTION INTRAMUSCULAR; INTRAVENOUS; SUBCUTANEOUS at 06:11

## 2018-11-13 RX ADMIN — ACETAMINOPHEN 650 MG: 325 TABLET ORAL at 09:11

## 2018-11-13 RX ADMIN — IOHEXOL 100 ML: 350 INJECTION, SOLUTION INTRAVENOUS at 04:11

## 2018-11-13 RX ADMIN — OXYCODONE HYDROCHLORIDE 10 MG: 5 TABLET ORAL at 11:11

## 2018-11-13 RX ADMIN — SODIUM CHLORIDE: 0.9 INJECTION, SOLUTION INTRAVENOUS at 09:11

## 2018-11-13 RX ADMIN — ASPIRIN 325 MG ORAL TABLET 325 MG: 325 PILL ORAL at 04:11

## 2018-11-13 RX ADMIN — MORPHINE SULFATE 4 MG: 4 INJECTION INTRAVENOUS at 04:11

## 2018-11-13 NOTE — ED PROVIDER NOTES
SCRIBE #1 NOTE: I, Chrystal Ford, am scribing for, and in the presence of, Deyvi Henriquez Jr., MD. I have scribed the HPI, ROS, and PEx.     SCRIBE #2 NOTE: I, Nimisha Guerrero, am scribing for, and in the presence of,  Cecil Russell MD. I have scribed the remaining portions of the note not scribed by Scribe #1.     History      Chief Complaint   Patient presents with    Pleurisy     sent from Delrie's office for workup- patient on home O2 and states that he is more short of breath as well       Review of patient's allergies indicates:   Allergen Reactions    Anoro ellipta [umeclidinium-vilanterol] Shortness Of Breath    Flomax [tamsulosin]      Dizzy          HPI   HPI    11/13/2018, 3:44 PM   History obtained from the patient      History of Present Illness: Nico Garza Jr. is a 72 y.o. male patient with a hx of DM, HTN, and lung cancer who presents to the Emergency Department for L-sided CP which onset gradually 1 night ago. Symptoms are constant and moderate in severity. Pt was evaluated by Dr. Moseley (Internal Medicine) and sent to the ED for further evaluation. No mitigating factors reported. Pain is exacerbated by deep inhalation and palpation. Associated sxs include cough and BLE swelling. Patient denies any fever, chills, n/v/d, abd pain, wheezing, stridor, weakness, numbness, SOB, and all other sxs at this time. No prior tx. Pt had Keytruda tx 5 days ago on Thursday. No further complaints or concerns at this time.     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  Others are too small to accurately characterize.    Lung disease     Nephrolithiasis     ct urogram 12/15/2017-Bilateral nephrolithiasis.     Neuropathy, diabetic     Pancreatitis     Type 1 diabetes mellitus with diabetic neuropathy 4/27/2018       Past Surgical History:  Past Surgical History:   Procedure Laterality Date    ABLATION N/A 12/4/2017    Performed by Jaret Freire MD at [facility] CATH LAB    BICEPS TENDON REPAIR Right     BRONCHOSCOPY Bilateral 9/2/2018    Procedure: BRONCHOSCOPY- insert lighted tube into airway to obtain biopsy of lung;  Surgeon: Aldair Deleon MD;  Location: [facility] ENDO;  Service: Endoscopy;  Laterality: Bilateral;    BRONCHOSCOPY- insert lighted tube into airway to obtain biopsy of lung Bilateral 9/2/2018    Performed by Aldair Deleon MD at [facility] ENDO    CARDIOVERSION      CHOLECYSTECTOMY      INSERTION OF TUNNELED CENTRAL VENOUS CATHETER (CVC) WITH SUBCUTANEOUS PORT Right 10/9/2018    Procedure: [id number];  Surgeon: Christopeh rBandt MD;  Location: [facility] OR;  Service: General;  Laterality: Right;    [id number]-I-[id number] Right 10/9/2018    Performed by Christophe Brandt MD at [facility] OR    PATELLA SURGERY Right     RADIOFREQUENCY ABLATION      ROTATOR CUFF REPAIR Left     TRANSESOPHAGEAL ECHOCARDIOGRAM (DILLAN) N/A 12/4/2017    Performed by J[facility] Freire MD at [facility] CATH LAB    ULTRASOUND GUIDANCE Right 10/9/2018    Procedure: ULTRASOUND GUIDANCE;  Surgeon: Christophe Brandt MD;  Location: [facility] OR;  Service: General;  Laterality: Right;    ULTRASOUND GUIDANCE Right 10/9/2018    Performed by Christophe Brandt MD at [facility] OR         Family History:  Family History   Problem Relation Age of Onset    Heart failure Brother     Heart attack Brother     Diabetes Mother     Hypertension Mother     Stroke Mother     Hypertension Father     Stroke Father     Diabetes Sister     Cataracts Sister     Cancer Maternal Aunt     Diabetes Sister     Diabetes Sister      Stroke Paternal Grandmother     Stroke Paternal Grandfather        Social History:  Social History     Tobacco Use    Smoking status: Former Smoker     Packs/day: 0.50     Types: Cigarettes     Start date: 1/1/1960     Last attempt to quit: 3/8/2015     Years since quitting: 3.6    Smokeless tobacco: Former User     Types: Snuff     Quit date: 10/7/1995   Substance and Sexual Activity    Alcohol use: No     Alcohol/week: 0.0 oz    Drug use: No    Sexual activity: Unknown       ROS   Review of Systems   Constitutional: Negative for chills, diaphoresis and fever.   HENT: Negative for congestion, rhinorrhea and sore throat.    Respiratory: Positive for cough. Negative for choking, shortness of breath, wheezing and stridor.    Cardiovascular: Positive for chest pain (Midsternal) and leg swelling (BLE).   Gastrointestinal: Negative for abdominal pain, diarrhea, nausea and vomiting.   Genitourinary: Negative for dysuria, frequency and hematuria.   Musculoskeletal: Negative for back pain and neck pain.   Skin: Negative for rash.   Neurological: Negative for dizziness, weakness, numbness and headaches.   Hematological: Does not bruise/bleed easily.   All other systems reviewed and are negative.      Physical Exam      Initial Vitals [11/13/18 1303]   BP Pulse Resp Temp SpO2   137/67 93 (!) 22 98.1 °F (36.7 °C) (!) 90 %      MAP       --          Physical Exam  Nursing Notes and Vital Signs Reviewed.  Constitutional: Patient is in no acute distress. Well-developed and well-nourished.  Head: Atraumatic. Normocephalic.  Eyes: PERRL. EOM intact. Conjunctivae are not pale. No scleral icterus.  ENT: Mucous membranes are moist. Oropharynx is clear and symmetric.    Neck: Supple. Full ROM. No lymphadenopathy.  Cardiovascular: Regular rate. Regular rhythm. No murmurs, rubs, or gallops. Distal pulses are 2+ and symmetric.  Pulmonary/Chest: No respiratory distress. Clear to auscultation bilaterally. No wheezing or rales.  "Reproducible chest wall tenderness. NO crepitous or step-offs.   Abdominal: Soft and non-distended.  There is no tenderness.  No rebound, guarding, or rigidity.   Musculoskeletal: Moves all extremities. 2+ symmetric BLE pitting edema. No calf tenderness.  Skin: Warm and dry.  Neurological:  Alert, awake, and appropriate.  Normal speech.  No acute focal neurological deficits are appreciated.  Psychiatric: Normal affect. Good eye contact. Appropriate in content.    ED Course    Procedures  ED Vital Signs:  Vitals:    11/13/18 1303 11/13/18 1545 11/13/18 1550 11/13/18 1555   BP: 137/67      Pulse: 93 82 82 82   Resp: (!) 22 (!) 29 (!) 28 (!) 27   Temp: 98.1 °F (36.7 °C)      SpO2: (!) 90% (!) 94% (!) 94% (!) 94%   Weight: 81 kg (178 lb 9.2 oz)      Height: 5' 7" (1.702 m)       11/13/18 1600 11/13/18 1650 11/13/18 1700 11/13/18 1730   BP: 134/67 124/63 (!) 143/72 126/60   Pulse: 81 88 90 92   Resp: (!) 28 20 (!) 22 (!) 22   Temp:       SpO2: (!) 94% (!) 84% (!) 90% (!) 88%   Weight:       Height:        11/13/18 1800 11/13/18 1830 11/13/18 1836   BP: (!) 119/58 123/60 (!) 131/55   Pulse: 85 84 84   Resp: (!) 23 20 20   Temp:      SpO2: (!) 92% (!) 91% (!) 89%   Weight:      Height:          Abnormal Lab Results:  Labs Reviewed   CBC W/ AUTO DIFFERENTIAL - Abnormal; Notable for the following components:       Result Value    RBC 4.00 (*)     Hemoglobin 10.7 (*)     Hematocrit 34.9 (*)     MCH 26.8 (*)     MCHC 30.7 (*)     RDW 16.3 (*)     MPV 9.1 (*)     Lymph # 0.8 (*)     Gran% 77.4 (*)     Lymph% 9.3 (*)     All other components within normal limits   COMPREHENSIVE METABOLIC PANEL - Abnormal; Notable for the following components:    CO2 32 (*)     Albumin 2.7 (*)     Alkaline Phosphatase 253 (*)     AST 69 (*)     ALT 49 (*)     All other components within normal limits   POCT GLUCOSE - Abnormal; Notable for the following components:    POCT Glucose 117 (*)     All other components within normal limits   CULTURE, " BLOOD   CULTURE, BLOOD   TROPONIN I   B-TYPE NATRIURETIC PEPTIDE   MAGNESIUM   PHOSPHORUS   MAGNESIUM   PHOSPHORUS   URINALYSIS   POCT GLUCOSE        All Lab Results:  Results for orders placed or performed during the hospital encounter of 11/13/18   CBC auto differential   Result Value Ref Range    WBC 8.30 3.90 - 12.70 K/uL    RBC 4.00 (L) 4.60 - 6.20 M/uL    Hemoglobin 10.7 (L) 14.0 - 18.0 g/dL    Hematocrit 34.9 (L) 40.0 - 54.0 %    MCV 87 82 - 98 fL    MCH 26.8 (L) 27.0 - 31.0 pg    MCHC 30.7 (L) 32.0 - 36.0 g/dL    RDW 16.3 (H) 11.5 - 14.5 %    Platelets 295 150 - 350 K/uL    MPV 9.1 (L) 9.2 - 12.9 fL    Gran # (ANC) 6.4 1.8 - 7.7 K/uL    Lymph # 0.8 (L) 1.0 - 4.8 K/uL    Mono # 0.9 0.3 - 1.0 K/uL    Eos # 0.2 0.0 - 0.5 K/uL    Baso # 0.01 0.00 - 0.20 K/uL    Gran% 77.4 (H) 38.0 - 73.0 %    Lymph% 9.3 (L) 18.0 - 48.0 %    Mono% 10.4 4.0 - 15.0 %    Eosinophil% 2.8 0.0 - 8.0 %    Basophil% 0.1 0.0 - 1.9 %    Differential Method Automated    Comprehensive metabolic panel   Result Value Ref Range    Sodium 138 136 - 145 mmol/L    Potassium 4.3 3.5 - 5.1 mmol/L    Chloride 95 95 - 110 mmol/L    CO2 32 (H) 23 - 29 mmol/L    Glucose 110 70 - 110 mg/dL    BUN, Bld 8 8 - 23 mg/dL    Creatinine 0.7 0.5 - 1.4 mg/dL    Calcium 9.3 8.7 - 10.5 mg/dL    Total Protein 7.2 6.0 - 8.4 g/dL    Albumin 2.7 (L) 3.5 - 5.2 g/dL    Total Bilirubin 0.3 0.1 - 1.0 mg/dL    Alkaline Phosphatase 253 (H) 55 - 135 U/L    AST 69 (H) 10 - 40 U/L    ALT 49 (H) 10 - 44 U/L    Anion Gap 11 8 - 16 mmol/L    eGFR if African American >60 >60 mL/min/1.73 m^2    eGFR if non African American >60 >60 mL/min/1.73 m^2   Troponin I   Result Value Ref Range    Troponin I <0.006 0.000 - 0.026 ng/mL   Brain natriuretic peptide   Result Value Ref Range    BNP 57 0 - 99 pg/mL   Magnesium   Result Value Ref Range    Magnesium 1.8 1.6 - 2.6 mg/dL   Phosphorus   Result Value Ref Range    Phosphorus 3.9 2.7 - 4.5 mg/dL   POCT glucose   Result Value Ref Range     POCT Glucose 117 (H) 70 - 110 mg/dL       Imaging Results:  Imaging Results          US Abdomen Limited (In process)                CTA Chest Non-Coronary - PE Study (Final result)  Result time 11/13/18 16:47:12    Final result by Jos Brito MD (11/13/18 16:47:12)                 Impression:      No evidence of pulmonary embolism.    See findings above.    All CT scans at this facility use dose modulation, iterative reconstruction and/or weight based dosing when appropriate to reduce radiation dose to as low as reasonably achievable.      Electronically signed by: Jos Brito MD  Date:    11/13/2018  Time:    16:47             Narrative:    EXAMINATION:  CTA CHEST NON CORONARY    CLINICAL HISTORY:  Shortness of breath    TECHNIQUE:  After the intravenous administration of 100 cc of Omni 35 nonionic contrast using CT pulmonary angio technique, 2.5 mm axial images were acquired using helical CT technique from the lung apices through costophrenic sulci.  Sagittal coronal and oblique MIPS were also submitted for interpretation.    COMPARISON:  10/27/2018    FINDINGS:  -Pulmonary arteries: Pulmonary arteries are well opacified.  No evidence of pulmonary embolism.  No evidence of pulmonary hypertension.  No right heart strain is identified.    -Lungs: Severe emphysematous change again noted.  Elevation left hemidiaphragm with consolidation of the left lower lobe likely compressive.  Left anterior mediastinal mass measures approximately 6.1 x 4.5 cm..  The airway is patent.    Multiple other enlarged mediastinal and paratracheal lymph nodes are stable.  1.1 cm nodule left lower lobe.  1.4 cm nodule right lower lobe.  Nodules have enlarged from prior exam.  Dependent atelectasis.  No effusion or pneumothorax..    -Pleura: Pleural thickening seen at the lung bases without evidence of pleural fluid.  No pneumothorax.  Apical pleural thickening is also noted.    -Mediastinum/Roz:Significant adenopathy throughout the  mediastinum and bilateral hilar regions largest right hilar lesion measures 1.5 cm in short axis.  Largest mediastinal mass measures 2.2 cm which is anterior to the su.  Large anterior mediastinal and left hilar mass is described above.    -Axilla: No adenopathy.    -Thyroid: Normal lower gland.    -Heart/Aorta: Heart size is mildly enlarged.  Moderate coronary artery disease.  Lipomatous hypertrophy of the interatrial septum is noted.  No pericardial effusion. Aorta normal caliber.    -Bones/Chest Wall: Diffuse osseous metastatic disease again noted with no evidence of a new compression fracture.    -Upper Abdomen: 4.1 cm metastatic lesion within the dome of the liver.  Mild intrahepatic ductal dilatation.  Partially visualized renal cysts.  Moderate constipation.                                        The EKG was ordered, reviewed, and independently interpreted by the ED provider.  Interpretation time: 1311  Rate: 92 BPM  Rhythm: Sinus rhythm with 1st degree AV block  Interpretation: Incomplete RBBB. Borderline abnormal ECG. No STEMI.    The Emergency Provider reviewed the vital signs and test results, which are outlined above.    ED Discussion     4:00 PM: Dr. Henriquez transfers care of pt to Dr. Russell, pending lab and imaging results.    6:08 PM: Dr. Russell evaluated pt. Pt is resting comfortably and is in no acute distress.  Pt states he continues to have CP. Patient states he does not feel comfortable going home. D/w pt all pertinent results. D/w pt any concerns expressed at this time. Answered all questions. Pt expresses understanding at this time. Will consult hospital medicine.    6:21 PM: Discussed case with Nicolasa Donis NP ([facility]). Dr. Hu agrees with current care and management of pt and accepts admission.   Admitting Service: Hospital medicine   Admitting Physician: Dr. Hu  Admit to: Obs-tele    6:45 PM: Re-evaluated pt. I have discussed test results, shared treatment plan,  and the need for admission with patient and family at bedside. Pt and family express understanding at this time and agree with all information. All questions answered. Pt and family have no further questions or concerns at this time. Pt is ready for admit.      ED Medication(s):  Medications   0.9%  NaCl infusion (not administered)   diltiaZEM 24 hr capsule 360 mg (not administered)   sertraline tablet 50 mg (not administered)   sodium chloride 0.9% flush 5 mL (not administered)   acetaminophen tablet 650 mg (not administered)   ondansetron injection 4 mg (not administered)   insulin aspart U-100 pen 0-5 Units (not administered)   glucose chewable tablet 16 g (not administered)   glucose chewable tablet 24 g (not administered)   dextrose 50% injection 12.5 g (not administered)   dextrose 50% injection 25 g (not administered)   glucagon (human recombinant) injection 1 mg (not administered)   albuterol-ipratropium 2.5 mg-0.5 mg/3 mL nebulizer solution 3 mL (not administered)   budesonide nebulizer solution 0.5 mg (not administered)   arformoterol nebulizer solution 15 mcg (not administered)   hydromorphone (PF) injection 1 mg (not administered)   albuterol-ipratropium 2.5 mg-0.5 mg/3 mL nebulizer solution 3 mL (3 mLs Nebulization Given 11/13/18 1555)   aspirin tablet 325 mg (325 mg Oral Given 11/13/18 1609)   morphine injection 4 mg (4 mg Intravenous Given 11/13/18 1610)   omnipaque 350 iohexol 100 mL (100 mLs Intravenous Given 11/13/18 1635)   hydromorphone (PF) injection 1 mg (1 mg Intravenous Given 11/13/18 1833)             Medical Decision Making    Medical Decision Making:   Clinical Tests:   Lab Tests: Ordered and Reviewed  Radiological Study: Ordered and Reviewed  Medical Tests: Ordered and Reviewed           Scribe Attestation:   Scribe #1: I performed the above scribed service and the documentation accurately describes the services I performed. I attest to the accuracy of the note.    Attending:   Physician  Attestation Statement for Scribe #1: I, Deyvi Henriquez Jr., MD, personally performed the services described in this documentation, as scribed by Chrystal Ford, in my presence, and it is both accurate and complete.       Scribe Attestation:   Scribe #2: I performed the above scribed service and the documentation accurately describes the services I performed. I attest to the accuracy of the note.    Attending Attestation:           Physician Attestation for Scribe:    Physician Attestation Statement for Scribe #2: I, Cecil Russell MD, reviewed documentation, as scribed by Nimisha Guerrero in my presence, and it is both accurate and complete. I also acknowledge and confirm the content of the note done by Scribe #1.          Clinical Impression       ICD-10-CM ICD-9-CM   1. Chest pain R07.9 786.50       Disposition:   Disposition: Placed in Observation (Obs-tele)  Condition: Fair         Cecil Russell MD  11/13/18 2195

## 2018-11-13 NOTE — TELEPHONE ENCOUNTER
Patient's wife called stating patient chest pain was worse than normal shortness of breath worse than normal and coughed up small amount of blood tinged mucus.  I advised Lacy Greg to take him to ER per Dr Lagos

## 2018-11-14 DIAGNOSIS — J44.9 COPD, GROUP D, BY GOLD 2017 CLASSIFICATION: Primary | ICD-10-CM

## 2018-11-14 PROBLEM — R52 INTRACTABLE PAIN: Status: ACTIVE | Noted: 2018-11-14

## 2018-11-14 PROBLEM — J96.01 ACUTE HYPOXEMIC RESPIRATORY FAILURE: Status: ACTIVE | Noted: 2018-11-14

## 2018-11-14 LAB
ALBUMIN SERPL BCP-MCNC: 2.7 G/DL
ALBUMIN SERPL BCP-MCNC: 2.7 G/DL
ALP SERPL-CCNC: 336 U/L
ALP SERPL-CCNC: 336 U/L
ALT SERPL W/O P-5'-P-CCNC: 45 U/L
ALT SERPL W/O P-5'-P-CCNC: 45 U/L
ANION GAP SERPL CALC-SCNC: 11 MMOL/L
APTT BLDCRRT: 27.3 SEC
AST SERPL-CCNC: 107 U/L
AST SERPL-CCNC: 107 U/L
BASOPHILS # BLD AUTO: 0.03 K/UL
BASOPHILS NFR BLD: 0.4 %
BILIRUB DIRECT SERPL-MCNC: 0.3 MG/DL
BILIRUB SERPL-MCNC: 0.4 MG/DL
BILIRUB SERPL-MCNC: 0.4 MG/DL
BILIRUB UR QL STRIP: NEGATIVE
BUN SERPL-MCNC: 7 MG/DL
CALCIUM SERPL-MCNC: 9.3 MG/DL
CHLORIDE SERPL-SCNC: 93 MMOL/L
CLARITY UR: CLEAR
CO2 SERPL-SCNC: 31 MMOL/L
COLOR UR: YELLOW
CREAT SERPL-MCNC: 0.7 MG/DL
DIFFERENTIAL METHOD: ABNORMAL
EOSINOPHIL # BLD AUTO: 0.1 K/UL
EOSINOPHIL NFR BLD: 1 %
ERYTHROCYTE [DISTWIDTH] IN BLOOD BY AUTOMATED COUNT: 16.4 %
EST. GFR  (AFRICAN AMERICAN): >60 ML/MIN/1.73 M^2
EST. GFR  (NON AFRICAN AMERICAN): >60 ML/MIN/1.73 M^2
ESTIMATED AVG GLUCOSE: 174 MG/DL
GLUCOSE SERPL-MCNC: 150 MG/DL
GLUCOSE UR QL STRIP: NEGATIVE
HBA1C MFR BLD HPLC: 7.7 %
HCT VFR BLD AUTO: 36.3 %
HGB BLD-MCNC: 11.3 G/DL
HGB UR QL STRIP: NEGATIVE
INR PPP: 1.1
KETONES UR QL STRIP: ABNORMAL
LEUKOCYTE ESTERASE UR QL STRIP: NEGATIVE
LYMPHOCYTES # BLD AUTO: 0.6 K/UL
LYMPHOCYTES NFR BLD: 8.4 %
MCH RBC QN AUTO: 26.7 PG
MCHC RBC AUTO-ENTMCNC: 31.1 G/DL
MCV RBC AUTO: 86 FL
MONOCYTES # BLD AUTO: 0.7 K/UL
MONOCYTES NFR BLD: 9.3 %
NEUTROPHILS # BLD AUTO: 5.9 K/UL
NEUTROPHILS NFR BLD: 80.9 %
NITRITE UR QL STRIP: NEGATIVE
PH UR STRIP: 8 [PH] (ref 5–8)
PLATELET # BLD AUTO: 253 K/UL
PMV BLD AUTO: 9 FL
POCT GLUCOSE: 149 MG/DL (ref 70–110)
POCT GLUCOSE: 153 MG/DL (ref 70–110)
POCT GLUCOSE: 155 MG/DL (ref 70–110)
POCT GLUCOSE: 184 MG/DL (ref 70–110)
POTASSIUM SERPL-SCNC: 4.1 MMOL/L
PROT SERPL-MCNC: 7.2 G/DL
PROT SERPL-MCNC: 7.2 G/DL
PROT UR QL STRIP: NEGATIVE
PROTHROMBIN TIME: 11.4 SEC
RBC # BLD AUTO: 4.23 M/UL
SODIUM SERPL-SCNC: 135 MMOL/L
SP GR UR STRIP: 1.01 (ref 1–1.03)
TROPONIN I SERPL DL<=0.01 NG/ML-MCNC: 0.01 NG/ML
TROPONIN I SERPL DL<=0.01 NG/ML-MCNC: <0.006 NG/ML
TSH SERPL DL<=0.005 MIU/L-ACNC: 1.23 UIU/ML
URN SPEC COLLECT METH UR: ABNORMAL
UROBILINOGEN UR STRIP-ACNC: NEGATIVE EU/DL
WBC # BLD AUTO: 7.28 K/UL

## 2018-11-14 PROCEDURE — 36415 COLL VENOUS BLD VENIPUNCTURE: CPT

## 2018-11-14 PROCEDURE — 25000003 PHARM REV CODE 250: Performed by: NURSE PRACTITIONER

## 2018-11-14 PROCEDURE — 85730 THROMBOPLASTIN TIME PARTIAL: CPT

## 2018-11-14 PROCEDURE — 27000221 HC OXYGEN, UP TO 24 HOURS

## 2018-11-14 PROCEDURE — 94640 AIRWAY INHALATION TREATMENT: CPT

## 2018-11-14 PROCEDURE — 25000003 PHARM REV CODE 250: Performed by: EMERGENCY MEDICINE

## 2018-11-14 PROCEDURE — S5571 INSULIN DISPOS PEN 3 ML: HCPCS | Performed by: NURSE PRACTITIONER

## 2018-11-14 PROCEDURE — 85025 COMPLETE CBC W/AUTO DIFF WBC: CPT

## 2018-11-14 PROCEDURE — 63600175 PHARM REV CODE 636 W HCPCS: Performed by: NURSE PRACTITIONER

## 2018-11-14 PROCEDURE — 83036 HEMOGLOBIN GLYCOSYLATED A1C: CPT

## 2018-11-14 PROCEDURE — 85610 PROTHROMBIN TIME: CPT

## 2018-11-14 PROCEDURE — 99223 1ST HOSP IP/OBS HIGH 75: CPT | Mod: ,,, | Performed by: INTERNAL MEDICINE

## 2018-11-14 PROCEDURE — 94761 N-INVAS EAR/PLS OXIMETRY MLT: CPT

## 2018-11-14 PROCEDURE — 80053 COMPREHEN METABOLIC PANEL: CPT

## 2018-11-14 PROCEDURE — 25000242 PHARM REV CODE 250 ALT 637 W/ HCPCS: Performed by: NURSE PRACTITIONER

## 2018-11-14 PROCEDURE — 99223 1ST HOSP IP/OBS HIGH 75: CPT | Mod: ,,, | Performed by: RADIOLOGY

## 2018-11-14 PROCEDURE — 84443 ASSAY THYROID STIM HORMONE: CPT

## 2018-11-14 PROCEDURE — 94760 N-INVAS EAR/PLS OXIMETRY 1: CPT

## 2018-11-14 PROCEDURE — 81003 URINALYSIS AUTO W/O SCOPE: CPT

## 2018-11-14 PROCEDURE — 84484 ASSAY OF TROPONIN QUANT: CPT

## 2018-11-14 PROCEDURE — 80076 HEPATIC FUNCTION PANEL: CPT

## 2018-11-14 PROCEDURE — 21400001 HC TELEMETRY ROOM

## 2018-11-14 PROCEDURE — 84484 ASSAY OF TROPONIN QUANT: CPT | Mod: 91

## 2018-11-14 RX ORDER — HYDROMORPHONE HYDROCHLORIDE 1 MG/ML
1 INJECTION, SOLUTION INTRAMUSCULAR; INTRAVENOUS; SUBCUTANEOUS
Status: COMPLETED | OUTPATIENT
Start: 2018-11-14 | End: 2018-11-14

## 2018-11-14 RX ORDER — AMOXICILLIN 250 MG
1 CAPSULE ORAL 2 TIMES DAILY
Status: DISCONTINUED | OUTPATIENT
Start: 2018-11-14 | End: 2018-11-18 | Stop reason: HOSPADM

## 2018-11-14 RX ORDER — MORPHINE SULFATE 4 MG/ML
4 INJECTION, SOLUTION INTRAMUSCULAR; INTRAVENOUS
Status: DISCONTINUED | OUTPATIENT
Start: 2018-11-14 | End: 2018-11-14

## 2018-11-14 RX ORDER — OXYCODONE HYDROCHLORIDE 5 MG/1
15 TABLET ORAL EVERY 4 HOURS PRN
Status: DISCONTINUED | OUTPATIENT
Start: 2018-11-14 | End: 2018-11-14

## 2018-11-14 RX ORDER — OXYCODONE HYDROCHLORIDE 5 MG/1
15 TABLET ORAL EVERY 4 HOURS PRN
Status: DISCONTINUED | OUTPATIENT
Start: 2018-11-14 | End: 2018-11-16

## 2018-11-14 RX ORDER — OXYCODONE HYDROCHLORIDE 5 MG/1
20 TABLET ORAL EVERY 4 HOURS PRN
Status: DISCONTINUED | OUTPATIENT
Start: 2018-11-14 | End: 2018-11-14

## 2018-11-14 RX ORDER — DOCUSATE SODIUM 100 MG/1
100 CAPSULE, LIQUID FILLED ORAL DAILY
Status: DISCONTINUED | OUTPATIENT
Start: 2018-11-14 | End: 2018-11-18 | Stop reason: HOSPADM

## 2018-11-14 RX ORDER — PANTOPRAZOLE SODIUM 40 MG/1
40 TABLET, DELAYED RELEASE ORAL DAILY
Status: DISCONTINUED | OUTPATIENT
Start: 2018-11-14 | End: 2018-11-18 | Stop reason: HOSPADM

## 2018-11-14 RX ORDER — LACTULOSE 10 G/15ML
10 SOLUTION ORAL DAILY PRN
Status: DISCONTINUED | OUTPATIENT
Start: 2018-11-14 | End: 2018-11-18 | Stop reason: HOSPADM

## 2018-11-14 RX ORDER — POLYETHYLENE GLYCOL 3350 17 G/17G
17 POWDER, FOR SOLUTION ORAL DAILY
Status: DISCONTINUED | OUTPATIENT
Start: 2018-11-14 | End: 2018-11-18 | Stop reason: HOSPADM

## 2018-11-14 RX ORDER — FENTANYL 25 UG/1
1 PATCH TRANSDERMAL
Status: DISCONTINUED | OUTPATIENT
Start: 2018-11-14 | End: 2018-11-16

## 2018-11-14 RX ADMIN — ARFORMOTEROL TARTRATE 15 MCG: 15 SOLUTION RESPIRATORY (INHALATION) at 07:11

## 2018-11-14 RX ADMIN — BUDESONIDE 0.5 MG: 0.5 SUSPENSION RESPIRATORY (INHALATION) at 07:11

## 2018-11-14 RX ADMIN — POLYETHYLENE GLYCOL 3350 17 G: 17 POWDER, FOR SOLUTION ORAL at 09:11

## 2018-11-14 RX ADMIN — OXYCODONE HYDROCHLORIDE 15 MG: 5 TABLET ORAL at 01:11

## 2018-11-14 RX ADMIN — APIXABAN 5 MG: 2.5 TABLET, FILM COATED ORAL at 09:11

## 2018-11-14 RX ADMIN — STANDARDIZED SENNA CONCENTRATE AND DOCUSATE SODIUM 1 TABLET: 8.6; 5 TABLET ORAL at 08:11

## 2018-11-14 RX ADMIN — OXYCODONE HYDROCHLORIDE 10 MG: 5 TABLET ORAL at 05:11

## 2018-11-14 RX ADMIN — LIDOCAINE HYDROCHLORIDE 15 ML: 20 SOLUTION ORAL; TOPICAL at 01:11

## 2018-11-14 RX ADMIN — SERTRALINE HYDROCHLORIDE 50 MG: 50 TABLET ORAL at 09:11

## 2018-11-14 RX ADMIN — HYDROMORPHONE HYDROCHLORIDE 1 MG: 1 INJECTION, SOLUTION INTRAMUSCULAR; INTRAVENOUS; SUBCUTANEOUS at 09:11

## 2018-11-14 RX ADMIN — INSULIN DETEMIR 6 UNITS: 100 INJECTION, SOLUTION SUBCUTANEOUS at 09:11

## 2018-11-14 RX ADMIN — INSULIN DETEMIR 6 UNITS: 100 INJECTION, SOLUTION SUBCUTANEOUS at 08:11

## 2018-11-14 RX ADMIN — HYDROMORPHONE HYDROCHLORIDE 1 MG: 1 INJECTION, SOLUTION INTRAMUSCULAR; INTRAVENOUS; SUBCUTANEOUS at 08:11

## 2018-11-14 RX ADMIN — PANTOPRAZOLE SODIUM 40 MG: 40 TABLET, DELAYED RELEASE ORAL at 09:11

## 2018-11-14 RX ADMIN — STANDARDIZED SENNA CONCENTRATE AND DOCUSATE SODIUM 1 TABLET: 8.6; 5 TABLET ORAL at 09:11

## 2018-11-14 RX ADMIN — HYDROMORPHONE HYDROCHLORIDE 1 MG: 1 INJECTION, SOLUTION INTRAMUSCULAR; INTRAVENOUS; SUBCUTANEOUS at 02:11

## 2018-11-14 RX ADMIN — ONDANSETRON 4 MG: 2 INJECTION, SOLUTION INTRAMUSCULAR; INTRAVENOUS at 10:11

## 2018-11-14 RX ADMIN — APIXABAN 5 MG: 2.5 TABLET, FILM COATED ORAL at 08:11

## 2018-11-14 RX ADMIN — LIDOCAINE HYDROCHLORIDE 15 ML: 20 SOLUTION ORAL; TOPICAL at 04:11

## 2018-11-14 RX ADMIN — DOCUSATE SODIUM 100 MG: 100 CAPSULE, LIQUID FILLED ORAL at 09:11

## 2018-11-14 RX ADMIN — SODIUM CHLORIDE: 0.9 INJECTION, SOLUTION INTRAVENOUS at 05:11

## 2018-11-14 RX ADMIN — OXYCODONE HYDROCHLORIDE 15 MG: 5 TABLET ORAL at 06:11

## 2018-11-14 RX ADMIN — DILTIAZEM HYDROCHLORIDE 360 MG: 180 CAPSULE, COATED, EXTENDED RELEASE ORAL at 09:11

## 2018-11-14 RX ADMIN — FENTANYL 1 PATCH: 25 PATCH, EXTENDED RELEASE TRANSDERMAL at 09:11

## 2018-11-14 NOTE — PLAN OF CARE
Sw met with pt and family at bedside to complete assessment. Sw explained role/purpose of visit. Transitional navigator was provided to pt. Sw placed contact information on white board. Pt reports he was admitted at [facility] in the past 30 days. Pt reports he came in due to chest pains. Pt is already established with [facility] and wants to continue with their services. Pt's daughter signed patient's choice form. Sw placed original copy in pt's chart. Yari will send clinicals to [facility] via [facility]turn. Pt's pcp is Tiffany Davis DO. Pt uses     [facility] [id number]  [facility] [address] [facility] [address] [employer] [place]  [address]  [facility] [address]  Phone: [phone] Fax: [phone]    [facility] - [place], [place] - [address]  [address] [address]  Phone: [phone] Fax: [phone]    [facility] - [place], [place] - [address]  [address] [address]  Phone: [phone] Fax: [phone]       11/14/18 1555   Discharge Assessment   Assessment Type Discharge Planning Assessment   Confirmed/corrected address and phone number on facesheet? Yes   Assessment information obtained from? Patient;Caregiver   Communicated expected length of stay with patient/caregiver yes   Prior to hospitilization cognitive status: Alert/Oriented   Prior to hospitalization functional status: Independent;Assistive Equipment   Current cognitive status: Alert/Oriented   Current Functional Status: Assistive Equipment   Facility Arrived From: home   Lives With alone   Able to Return to Prior Arrangements yes   Is patient able to care for self after discharge? Unable to determine at this time (comments)   Who are your caregiver(s) and their phone number(s)? Belia Garza, dtr, [phone]   Patient's perception of discharge disposition home or selfcare   Readmission Within  The Last 30 Days current reason for admission unrelated to previous admission   Patient currently being followed by outpatient case management? No   Patient currently receives any other outside agency services? Yes   Name and contact number of agency or person providing outside services [facility]   Is it the patient/care giver preference to resume care with the current outside agency? Yes   Equipment Currently Used at Home nebulizer;oxygen;walker, rolling   Do you have any problems affording any of your prescribed medications? TBD   Is the patient taking medications as prescribed? yes   Does the patient have transportation home? Yes   Transportation Available family or friend will provide   Discharge Plan A Home with family;Home Health   Discharge Plan B Home with family   Patient/Family In Agreement With Plan yes   Readmission Questionnaire   At the time of your discharge, did someone talk to you about what your health problems were? Yes   At the time of discharge, did someone talk to you about what to watch out for regarding worsening of your health problem? Yes   At the time of discharge, did someone talk to you about what to do if you experienced worsening of your health problem? Yes   At the time of discharge, did someone talk to you about which medication to take when you left the hospital and which ones to stop taking? Yes   At the time of discharge, did someone talk to you about when and where to follow up with a doctor after you left the hospital? Yes   What do you believe caused you to be sick enough to be re-admitted? chest pains   How often do you need to have someone help you when you read instructions, pamphlets, or other written material from your doctor or pharmacy? Rarely   Do you have problems taking your medications as prescribed? No   Do you have any problems affording any of  your prescribed medications? To be determined   Do you have problems obtaining/receiving your medications? No    Does the patient have transportation to healthcare appointments? Yes   Living Arrangements house   Does the patient have family/friends to help with healtcare needs after discharge? yes   Does your caregiver provide all the help you need? Yes   Are you currently feeling confused? No   Are you currently having problems thinking? No   Are you currently having memory problems? No   Have you felt down, depressed, or hopeless? Unable to Assess   Have you felt little interest or pleasure in doing things? Unable to assess   In the last 7 days, my sleep quality was: fair

## 2018-11-14 NOTE — ASSESSMENT & PLAN NOTE
Cancer related pain  IV Dilaudid x one  Fentanyl patch- may need to titrate   Cont Oxycodone 15mg every 4 hours prn breakthru pain   Monitor closely

## 2018-11-14 NOTE — PLAN OF CARE
Problem: Infection, Risk/Actual (Adult)  Intervention: Prevent Infection/Maximize Resistance  Patient awake and alert free from falls and injury family at bedside, normal sinus  rhythm on monitor, pain controlled with prn medication, voids spontaneously,  POC reviewed with patient and family, bed low locked, call light within reach, will continue to monitor

## 2018-11-14 NOTE — ASSESSMENT & PLAN NOTE
-Admit to obs  -Troponin Result in Er neg  -EKG in ED reviewed  -Serial enzymes  - Given   -ASA 81mg daily  -Consider Consult Cardiology pending course  -consider echo pending course  -monitor     Continue cardiac r/o if negative likely pain related to malignagncy.

## 2018-11-14 NOTE — SUBJECTIVE & OBJECTIVE
Oncology Treatment Plan:   OP PEMBROLIZUMAB 200MG Q3W    Medications:  Continuous Infusions:  Scheduled Meds:   apixaban  5 mg Oral BID    arformoterol  15 mcg Nebulization BID    budesonide  0.5 mg Nebulization Q12H    diltiaZEM  360 mg Oral Daily    docusate sodium  100 mg Oral Daily    fentaNYL  1 patch Transdermal Q72H    insulin detemir U-100  6 Units Subcutaneous BID    magic mouthwash (diphenhydrAMINE 12.5 mg/5 mL 20 mL, aluminum & magnesium hydroxide-simethicone (MYLANTA) 20 mL, lidocaine HCl 2% (XYLOCAINE) 20 mL) solution  15 mL Swish & Spit AC + HS + 0200    pantoprazole  40 mg Oral Daily    polyethylene glycol  17 g Oral Daily    senna-docusate 8.6-50 mg  1 tablet Oral BID    sertraline  50 mg Oral Daily     PRN Meds:acetaminophen, albuterol-ipratropium, dextrose 50%, dextrose 50%, glucagon (human recombinant), glucose, glucose, HYDROmorphone, insulin aspart U-100, lactulose, magic mouthwash (diphenhydrAMINE 12.5 mg/5 mL 20 mL, aluminum & magnesium hydroxide-simethicone (MYLANTA) 20 mL, lidocaine HCl 2% (XYLOCAINE) 20 mL) solution, ondansetron, oxyCODONE, sodium chloride 0.9%     Review of patient's allergies indicates:   Allergen Reactions    Anoro ellipta [umeclidinium-vilanterol] Shortness Of Breath    Flomax [tamsulosin]      Dizzy          Past Medical History:   Diagnosis Date    Arthritis     Atrial fibrillation and flutter     Atrial flutter     Cancer     LUNG    COPD (chronic obstructive pulmonary disease)     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  Others are too small to accurately characterize.    Lung disease     Nephrolithiasis     ct urogram 12/15/2017-Bilateral nephrolithiasis.     Neuropathy, diabetic     Pancreatitis     Type 1 diabetes mellitus with diabetic neuropathy 4/27/2018     Past  Surgical History:   Procedure Laterality Date    ABLATION N/A 12/4/2017    Performed by [facility]ire [facility] at [facility] CATH LAB    BICEPS TENDON REPAIR Right     BRONCHOSCOPY Bilateral 9/2/2018    Procedure: BRONCHOSCOPY- insert lighted tube into airway to obtain biopsy of lung;  Surgeon: Aldair Deleon MD;  Location: [facility] ENDO;  Service: Endoscopy;  Laterality: Bilateral;    BRONCHOSCOPY- insert lighted tube into airway to obtain biopsy of lung Bilateral 9/2/2018    Performed by Aldair Deleon MD at [facility] ENDO    CARDIOVERSION      CHOLECYSTECTOMY      INSERTION OF TUNNELED CENTRAL VENOUS CATHETER (CVC) WITH SUBCUTANEOUS PORT Right 10/9/2018    Procedure: AUSXACAXF-MTVD-E-CATH;  Surgeon: Christophe Brandt MD;  Location: [facility] OR;  Service: General;  Laterality: Right;    DIFNLNOPZ-EDAF-S-CATH Right 10/9/2018    Performed by Christophe Brandt MD at [facility] OR    PATELLA SURGERY Right     RADIOFREQUENCY ABLATION      ROTATOR CUFF REPAIR Left     TRANSESOPHAGEAL ECHOCARDIOGRAM (DILLAN) N/A 12/4/2017    Performed by [facility] MD at [facility] CATH LAB    ULTRASOUND GUIDANCE Right 10/9/2018    Procedure: ULTRASOUND GUIDANCE;  Surgeon: Christophe Brandt MD;  Location: [facility] OR;  Service: General;  Laterality: Right;    ULTRASOUND GUIDANCE Right 10/9/2018    Performed by Christophe Brandt MD at [facility] OR     Family History     Problem Relation (Age of Onset)    Cancer Maternal Aunt    Cataracts Sister    Diabetes Mother, Sister, Sister, Sister    Heart attack Brother    Heart failure Brother    Hypertension Mother, Father    Stroke Mother, Father, Paternal Grandmother, Paternal Grandfather        Tobacco Use    Smoking status: Former Smoker     Packs/day: 0.50     Types: Cigarettes     Start date: 1/1/1960     Last attempt to quit: 3/8/2015     Years since quitting: 3.6    Smokeless tobacco: Former User     Types: Snuff     Quit date: 10/7/1995   Substance and Sexual Activity    Alcohol use: No     Alcohol/week: 0.0 oz     Drug use: No    Sexual activity: Not on file       Review of Systems   Constitutional: Positive for activity change and fatigue. Negative for chills and fever.   HENT: Negative for congestion, mouth sores, nosebleeds, postnasal drip, rhinorrhea, sinus pain and sore throat.    Respiratory: Positive for cough and shortness of breath. Negative for chest tightness.    Cardiovascular: Positive for chest pain. Negative for palpitations.   Gastrointestinal: Negative for abdominal pain, anal bleeding, blood in stool, diarrhea, nausea and vomiting.   Endocrine: Negative for cold intolerance and heat intolerance.   Genitourinary: Negative for difficulty urinating, dysuria and hematuria.   Musculoskeletal: Positive for arthralgias, back pain and gait problem.   Skin: Negative for rash and wound.   Allergic/Immunologic: Positive for immunocompromised state.   Neurological: Positive for weakness. Negative for dizziness, syncope and light-headedness.   Hematological: Negative for adenopathy. Does not bruise/bleed easily.   Psychiatric/Behavioral: Negative for confusion and decreased concentration.     Objective:     Vital Signs (Most Recent):  Temp: 97.9 °F (36.6 °C) (11/14/18 1130)  Pulse: 107 (11/14/18 1130)  Resp: 17 (11/14/18 1130)  BP: 128/60 (11/14/18 1130)  SpO2: 96 % (11/14/18 1130) Vital Signs (24h Range):  Temp:  [97.9 °F (36.6 °C)-99.6 °F (37.6 °C)] 97.9 °F (36.6 °C)  Pulse:  [] 107  Resp:  [17-29] 17  SpO2:  [84 %-96 %] 96 %  BP: (119-153)/(55-81) 128/60     Weight: 80 kg (176 lb 5.9 oz)  Body mass index is 27.62 kg/m².  Body surface area is 1.94 meters squared.      Intake/Output Summary (Last 24 hours) at 11/14/2018 1141  Last data filed at 11/14/2018 0800  Gross per 24 hour   Intake 900 ml   Output 1200 ml   Net -300 ml       Physical Exam   Constitutional: He is oriented to person, place, and time. He appears well-developed and well-nourished.  Non-toxic appearance. He has a sickly appearance. He appears  ill. No distress.   HENT:   Head: Normocephalic and atraumatic.   Eyes: Conjunctivae, EOM and lids are normal. Right eye exhibits no discharge. Left eye exhibits no discharge. No scleral icterus.   Neck: Normal range of motion.   Cardiovascular: An irregular rhythm present. Tachycardia present. Exam reveals no gallop and no friction rub.   No murmur heard.  Pulmonary/Chest: Effort normal. No accessory muscle usage. No apnea, no tachypnea and no bradypnea. No respiratory distress. He has decreased breath sounds. He has rales.   Abdominal: Soft. Normal appearance. He exhibits no distension. There is no tenderness.   Musculoskeletal: Normal range of motion. He exhibits edema and tenderness.   Neurological: He is alert and oriented to person, place, and time.   Skin: Skin is warm, dry and intact. Capillary refill takes less than 2 seconds. Bruising noted. No rash noted. He is not diaphoretic. No erythema. No pallor.   Psychiatric: His speech is normal and behavior is normal. Judgment and thought content normal. His mood appears anxious. Cognition and memory are normal. He exhibits a depressed mood. He is attentive.   Vitals reviewed.      Significant Labs:   BMP:   Recent Labs   Lab 11/13/18  1622 11/14/18  0450    150*    135*   K 4.3 4.1   CL 95 93*   CO2 32* 31*   BUN 8 7*   CREATININE 0.7 0.7   CALCIUM 9.3 9.3   MG 1.8  --    , CBC:   Recent Labs   Lab 11/13/18  1622 11/14/18  0450   WBC 8.30 7.28   HGB 10.7* 11.3*   HCT 34.9* 36.3*    253   , CMP:   Recent Labs   Lab 11/13/18  1622 11/14/18  0450    135*   K 4.3 4.1   CL 95 93*   CO2 32* 31*    150*   BUN 8 7*   CREATININE 0.7 0.7   CALCIUM 9.3 9.3   PROT 7.2 7.2  7.2   ALBUMIN 2.7* 2.7*  2.7*   BILITOT 0.3 0.4  0.4   ALKPHOS 253* 336*  336*   AST 69* 107*  107*   ALT 49* 45*  45*   ANIONGAP 11 11   EGFRNONAA >60 >60   , Coagulation:   Recent Labs   Lab 11/14/18  0450   INR 1.1   APTT 27.3   , LDH: No results for input(s):  LDHCSF, BFSOURCE in the last 48 hours., LFTs:   Recent Labs   Lab 11/13/18  1622 11/14/18  0450   ALT 49* 45*  45*   AST 69* 107*  107*   ALKPHOS 253* 336*  336*   BILITOT 0.3 0.4  0.4   PROT 7.2 7.2  7.2   ALBUMIN 2.7* 2.7*  2.7*   , Urine Studies:   Recent Labs   Lab 11/14/18  0522   COLORU Yellow   APPEARANCEUA Clear   PHUR 8.0   SPECGRAV 1.015   PROTEINUA Negative   GLUCUA Negative   KETONESU Trace*   BILIRUBINUA Negative   OCCULTUA Negative   NITRITE Negative   UROBILINOGEN Negative   LEUKOCYTESUR Negative    and All pertinent labs from the last 24 hours have been reviewed.    Diagnostic Results:  I have reviewed all pertinent imaging results/findings within the past 24 hours.

## 2018-11-14 NOTE — ASSESSMENT & PLAN NOTE
11/14/18 patient currently receiving key treated for PDL1 + lung cancer.  Last dose received on November 8, 2018.

## 2018-11-14 NOTE — H&P
[facility]  History & Physical    Patient Name: Nico Garza Jr.  MRN: [id number]  Admission Date: 11/13/2018  Attending Physician: James Hu MD   Primary Care Provider: Tiffany Davis DO         Patient information was obtained from patient, past medical records and ER records.     Subjective:     Principal Problem:Chest pain    Chief Complaint:   Chief Complaint   Patient presents with    Pleurisy     sent from Delrie's office for workup- patient on home O2 and states that he is more short of breath as well        HPI: Nico Garza Jr. is a 72 y.o. male patient with a hx of DM, HTN, and lung cancer who presents  for L-sided CP which onset gradually 1 night ago. Symptoms are constant and moderate in severity. Pt was evaluated by Dr. Moseley (Internal Medicine) and sent to the ED for further evaluation. No mitigating factors reported. Pain is exacerbated by deep inhalation and palpation. Associated sxs include sob, non productive cough and BLE swelling. Patient reports had second dose of Keytruda tx 5 days ago. Patient was evaluated in the ER. #1 Troponin negative. CTA Chest negative for PE but of importance Upper Abdomen: 4.1 cm metastatic lesion within the dome of the liver.  Mild intrahepatic ductal dilatation. Lesion size increased over past 2 months and this ductal dilation is new compared to prior ABD CT 8/31/18.  HM consulted for admission. US ABD ordered given new CT findings which note the common duct is not dilated, measuring 7 mm.  No intrahepatic ductal dilatation. 2 solid masses consistent with hx of cancer. Patient placed in obs for further eval/treatment for chest pain. Heart score elevated.         Past Medical History:   Diagnosis Date    Arthritis     Atrial fibrillation and flutter     Atrial flutter     Cancer     LUNG    COPD (chronic obstructive pulmonary disease)     COPD (chronic obstructive pulmonary disease) with emphysema 11/18/2013    DM  (diabetes mellitus) 1996    Hyperlipidemia     Hypertension     Kidney cysts     ct urogram 12/15/2017-2 cysts within the left kidney.  Others are too small to accurately characterize.    Lung disease     Nephrolithiasis     ct urogram 12/15/2017-Bilateral nephrolithiasis.     Neuropathy, diabetic     Pancreatitis     Type 1 diabetes mellitus with diabetic neuropathy 4/27/2018       Past Surgical History:   Procedure Laterality Date    ABLATION N/A 12/4/2017    Performed by Jaret Freire MD at [facility] CATH LAB    BICEPS TENDON REPAIR Right     BRONCHOSCOPY Bilateral 9/2/2018    Procedure: BRONCHOSCOPY- insert lighted tube into airway to obtain biopsy of lung;  Surgeon: Aldair Deleon MD;  Location: [facility] ENDO;  Service: Endoscopy;  Laterality: Bilateral;    BRONCHOSCOPY- insert lighted tube into airway to obtain biopsy of lung Bilateral 9/2/2018    Performed by Aldair Deleon MD at [facility] ENDO    CARDIOVERSION      CHOLECYSTECTOMY      INSERTION OF TUNNELED CENTRAL VENOUS CATHETER (CVC) WITH SUBCUTANEOUS PORT Right 10/9/2018    Procedure: [id number]-K-CATH;  Surgeon: Christophe Brandt MD;  Location: [facility] OR;  Service: General;  Laterality: Right;    [id number]-Q-CATH Right 10/9/2018    Performed by Christophe Brandt MD at [facility] OR    PATELLA SURGERY Right     RADIOFREQUENCY ABLATION      ROTATOR CUFF REPAIR Left     TRANSESOPHAGEAL ECHOCARDIOGRAM (DILLAN) N/A 12/4/2017    Performed by Jaret Freire MD at [facility] CATH LAB    ULTRASOUND GUIDANCE Right 10/9/2018    Procedure: ULTRASOUND GUIDANCE;  Surgeon: Christophe Brandt MD;  Location: [facility] OR;  Service: General;  Laterality: Right;    ULTRASOUND GUIDANCE Right 10/9/2018    Performed by Christophe Brandt MD at [facility] OR       Review of patient's allergies indicates:   Allergen Reactions    Anoro ellipta [umeclidinium-vilanterol] Shortness Of Breath    Flomax [tamsulosin]      Dizzy         No current facility-administered medications on file prior to  "encounter.      Current Outpatient Medications on File Prior to Encounter   Medication Sig    albuterol-ipratropium (DUO-NEB) 2.5 mg-0.5 mg/3 mL nebulizer solution Take 3 mLs by nebulization every 6 (six) hours. Rescue    b complex vitamins tablet Take 1 tablet by mouth once daily.    blood sugar diagnostic Strp 1 each by Misc.(Non-Drug; Combo Route) route 3 (three) times daily. Dispense preferred on insurance    blood-glucose meter kit Use as instructed - preferred on insurance    clonazePAM (KLONOPIN) 0.5 MG tablet Take 1 tablet (0.5 mg total) by mouth 2 (two) times daily as needed for Anxiety.    diltiaZEM (CARDIZEM CD) 360 MG 24 hr capsule TAKE 1 CAPSULE(360 MG) BY MOUTH EVERY DAY    doxycycline (VIBRA-TABS) 100 MG tablet Take 1 tablet (100 mg total) by mouth every 12 (twelve) hours.    ELIQUIS 5 mg Tab TAKE 1 TABLET(5 MG) BY MOUTH TWICE DAILY    flecainide (TAMBOCOR) 100 MG Tab Take 1 tablet (100 mg total) by mouth every 12 (twelve) hours.    gabapentin (NEURONTIN) 300 MG capsule Take 2 capsules (600 mg total) by mouth 2 (two) times daily. (Patient taking differently: Take 600 mg by mouth every evening. Takes two tablets by mouth at night)    insulin degludec (TRESIBA FLEXTOUCH U-200) 200 unit/mL (3 mL) InPn Inject 50 Units into the skin once daily.    insulin lispro (HUMALOG KWIKPEN) 100 unit/mL InPn pen ADMINISTER 17 UNITS UNDER THE SKIN THREE TIMES DAILY BEFORE MEALS (Patient taking differently: ADMINISTER 17 UNITS UNDER THE SKIN IN MORNING AND 25 UNITS NOON AND NIGHT)    insulin needles, disposable, 32 x 1/4 " Ndle Insulin injections four times per day.    multivitamin capsule Take 1 capsule by mouth once daily.    NEEDLES, DISPOSABLE (DISPOSABLE NEEDLES MISC) Inject 1 each into the skin 3 (three) times daily.    nystatin (MYCOSTATIN) 100,000 unit/mL suspension Take 5 mLs (500,000 Units total) by mouth 4 (four) times daily.    omeprazole (PRILOSEC) 20 MG capsule Take 1 capsule (20 mg total) " by mouth once daily.    ondansetron (ZOFRAN-ODT) 8 MG TbDL Take 1 tablet (8 mg total) by mouth every 8 (eight) hours as needed.    oxyCODONE (ROXICODONE) 15 MG Tab Take 1 tablet (15 mg total) by mouth every 4 (four) hours as needed for Pain.    pravastatin (PRAVACHOL) 40 MG tablet TAKE 1 TABLET DAILY (Patient taking differently: TAKE 1 TABLET NIGHTLY)    prochlorperazine (COMPAZINE) 10 MG tablet TAKE 1 TABLET BY MOUTH EVERY 6 HOURS AS NEEDED.    sertraline (ZOLOFT) 50 MG tablet Take 1 tablet (50 mg total) by mouth once daily.    tiotropium bromide (SPIRIVA RESPIMAT) 2.5 mcg/actuation Mist Inhale 2 puffs into the lungs once daily. Controller    vitamin D 1000 units Tab Take 1,000 Units by mouth once daily.     Family History     Problem Relation (Age of Onset)    Cancer Maternal Aunt    Cataracts Sister    Diabetes Mother, Sister, Sister, Sister    Heart attack Brother    Heart failure Brother    Hypertension Mother, Father    Stroke Mother, Father, Paternal Grandmother, Paternal Grandfather        Tobacco Use    Smoking status: Former Smoker     Packs/day: 0.50     Types: Cigarettes     Start date: 1/1/1960     Last attempt to quit: 3/8/2015     Years since quitting: 3.6    Smokeless tobacco: Former User     Types: Snuff     Quit date: 10/7/1995   Substance and Sexual Activity    Alcohol use: No     Alcohol/week: 0.0 oz    Drug use: No    Sexual activity: Not on file     *I have personally reviewed the patients allergies, medical, surgical, family and social history as listed above.    Review of Systems   Constitutional: Positive for fatigue. Negative for chills, diaphoresis and fever.   HENT: Negative for congestion, sore throat and voice change.    Eyes: Negative for photophobia and visual disturbance.   Respiratory: Positive for cough and shortness of breath. Negative for wheezing and stridor.    Cardiovascular: Positive for chest pain. Negative for leg swelling.   Gastrointestinal: Negative for  abdominal distention, abdominal pain, constipation, diarrhea, nausea and vomiting.   Endocrine: Negative for polydipsia, polyphagia and polyuria.   Genitourinary: Negative for difficulty urinating, dysuria, flank pain, testicular pain and urgency.   Musculoskeletal: Negative for back pain, joint swelling, neck pain and neck stiffness.   Skin: Negative for color change and rash.   Allergic/Immunologic: Negative for immunocompromised state.   Neurological: Negative for dizziness, syncope, weakness, numbness and headaches.   Hematological: Does not bruise/bleed easily.   Psychiatric/Behavioral: Negative for agitation, behavioral problems and confusion.     Objective:     Vital Signs (Most Recent):  Temp: 98.1 °F (36.7 °C) (11/13/18 2342)  Pulse: 91 (11/13/18 2342)  Resp: 18 (11/13/18 2342)  BP: 125/60 (11/13/18 2342)  SpO2: (!) 92 % (11/13/18 2342) Vital Signs (24h Range):  Temp:  [98.1 °F (36.7 °C)-98.7 °F (37.1 °C)] 98.1 °F (36.7 °C)  Pulse:  [81-97] 91  Resp:  [18-29] 18  SpO2:  [84 %-94 %] 92 %  BP: (119-143)/(55-72) 125/60     Weight: 81 kg (178 lb 9.2 oz)  Body mass index is 27.97 kg/m².    Physical Exam   Constitutional: He is oriented to person, place, and time. He appears well-developed and well-nourished. He has a sickly appearance. He does not appear ill. No distress.   HENT:   Head: Normocephalic and atraumatic.   Nose: Nose normal.   Eyes: Conjunctivae and EOM are normal. Pupils are equal, round, and reactive to light. No scleral icterus.   Neck: Normal range of motion. Neck supple. No tracheal deviation present.   Cardiovascular: Normal rate, regular rhythm, normal heart sounds and intact distal pulses.   No murmur heard.  Bilateral lower edema +2+3   Pulmonary/Chest: Effort normal. No stridor. No respiratory distress. He has no wheezes. He has rales ( right lower).   Abdominal: Soft. He exhibits no distension. There is tenderness ( mild right upper ). There is no guarding.   Hypoactive     Genitourinary:    Genitourinary Comments: Check urine   Musculoskeletal: Normal range of motion. He exhibits no edema or deformity.   Neurological: He is alert and oriented to person, place, and time. No cranial nerve deficit.   Skin: Skin is warm and dry. Capillary refill takes 2 to 3 seconds. No rash noted. He is not diaphoretic.   Yellow hue   Psychiatric: He has a normal mood and affect. His behavior is normal. Judgment and thought content normal.   Nursing note and vitals reviewed.        CRANIAL NERVES     CN III, IV, VI   Pupils are equal, round, and reactive to light.  Extraocular motions are normal.        Significant Labs: All pertinent labs within the past 24 hours have been reviewed.  Results for orders placed or performed during the hospital encounter of 11/13/18   CBC auto differential   Result Value Ref Range    WBC 8.30 3.90 - 12.70 K/uL    RBC 4.00 (L) 4.60 - 6.20 M/uL    Hemoglobin 10.7 (L) 14.0 - 18.0 g/dL    Hematocrit 34.9 (L) 40.0 - 54.0 %    MCV 87 82 - 98 fL    MCH 26.8 (L) 27.0 - 31.0 pg    MCHC 30.7 (L) 32.0 - 36.0 g/dL    RDW 16.3 (H) 11.5 - 14.5 %    Platelets 295 150 - 350 K/uL    MPV 9.1 (L) 9.2 - 12.9 fL    Gran # (ANC) 6.4 1.8 - 7.7 K/uL    Lymph # 0.8 (L) 1.0 - 4.8 K/uL    Mono # 0.9 0.3 - 1.0 K/uL    Eos # 0.2 0.0 - 0.5 K/uL    Baso # 0.01 0.00 - 0.20 K/uL    Gran% 77.4 (H) 38.0 - 73.0 %    Lymph% 9.3 (L) 18.0 - 48.0 %    Mono% 10.4 4.0 - 15.0 %    Eosinophil% 2.8 0.0 - 8.0 %    Basophil% 0.1 0.0 - 1.9 %    Differential Method Automated    Comprehensive metabolic panel   Result Value Ref Range    Sodium 138 136 - 145 mmol/L    Potassium 4.3 3.5 - 5.1 mmol/L    Chloride 95 95 - 110 mmol/L    CO2 32 (H) 23 - 29 mmol/L    Glucose 110 70 - 110 mg/dL    BUN, Bld 8 8 - 23 mg/dL    Creatinine 0.7 0.5 - 1.4 mg/dL    Calcium 9.3 8.7 - 10.5 mg/dL    Total Protein 7.2 6.0 - 8.4 g/dL    Albumin 2.7 (L) 3.5 - 5.2 g/dL    Total Bilirubin 0.3 0.1 - 1.0 mg/dL    Alkaline Phosphatase 253 (H) 55 - 135 U/L    AST 69  (H) 10 - 40 U/L    ALT 49 (H) 10 - 44 U/L    Anion Gap 11 8 - 16 mmol/L    eGFR if African American >60 >60 mL/min/1.73 m^2    eGFR if non African American >60 >60 mL/min/1.73 m^2   Troponin I   Result Value Ref Range    Troponin I <0.006 0.000 - 0.026 ng/mL   Brain natriuretic peptide   Result Value Ref Range    BNP 57 0 - 99 pg/mL   Magnesium   Result Value Ref Range    Magnesium 1.8 1.6 - 2.6 mg/dL   Phosphorus   Result Value Ref Range    Phosphorus 3.9 2.7 - 4.5 mg/dL   Troponin I   Result Value Ref Range    Troponin I 0.008 0.000 - 0.026 ng/mL   POCT glucose   Result Value Ref Range    POCT Glucose 117 (H) 70 - 110 mg/dL       Significant Imaging: I have reviewed all pertinent imaging results/findings within the past 24 hours.   Imaging Results          US Abdomen Limited (Final result)  Result time 11/13/18 21:05:51    Final result by Fitz Davis MD (11/13/18 21:05:51)                 Impression:      There are 2 solid masses in the liver as described above concerning for metastatic disease.      Electronically signed by: Fitz Davis MD  Date:    11/13/2018  Time:    21:05             Narrative:    EXAMINATION:  US ABDOMEN LIMITED    CLINICAL HISTORY:  new common bile duct dilation worsening hepatic mass;    TECHNIQUE:  Limited ultrasound of the right upper quadrant of the abdomen (including pancreas, liver, gallbladder, common bile duct, and right kidney) was performed.    COMPARISON:  CT of the chest, 11/13/2018 and CT of the abdomen, 10/25/2018.    FINDINGS:  Liver: Normal in size, measuring 14.3 cm. Homogeneous echotexture. There is a hypoechoic solid mass in the posterior segment right hepatic lobe measuring 3.5 x 4.2 x 3.2 cm.  There is a smaller hypoechoic solid mass in the subcapsular right hepatic lobe anteriorly measuring 2.3 x 2.0 x 1.4 cm.    Gallbladder: The patient has had a cholecystectomy.    Biliary system: The common duct is not dilated, measuring 7 mm.  No intrahepatic ductal  dilatation.    Right kidney: There is a mild fullness of the right pelvocaliceal collecting system.    Miscellaneous: No upper abdominal ascites.                               CTA Chest Non-Coronary - PE Study (Final result)  Result time 11/13/18 16:47:12    Final result by Jos Brito MD (11/13/18 16:47:12)                 Impression:      No evidence of pulmonary embolism.    See findings above.    All CT scans at this facility use dose modulation, iterative reconstruction and/or weight based dosing when appropriate to reduce radiation dose to as low as reasonably achievable.      Electronically signed by: Jos Brito MD  Date:    11/13/2018  Time:    16:47             Narrative:    EXAMINATION:  CTA CHEST NON CORONARY    CLINICAL HISTORY:  Shortness of breath    TECHNIQUE:  After the intravenous administration of 100 cc of Omni 35 nonionic contrast using CT pulmonary angio technique, 2.5 mm axial images were acquired using helical CT technique from the lung apices through costophrenic sulci.  Sagittal coronal and oblique MIPS were also submitted for interpretation.    COMPARISON:  10/27/2018    FINDINGS:  -Pulmonary arteries: Pulmonary arteries are well opacified.  No evidence of pulmonary embolism.  No evidence of pulmonary hypertension.  No right heart strain is identified.    -Lungs: Severe emphysematous change again noted.  Elevation left hemidiaphragm with consolidation of the left lower lobe likely compressive.  Left anterior mediastinal mass measures approximately 6.1 x 4.5 cm..  The airway is patent.    Multiple other enlarged mediastinal and paratracheal lymph nodes are stable.  1.1 cm nodule left lower lobe.  1.4 cm nodule right lower lobe.  Nodules have enlarged from prior exam.  Dependent atelectasis.  No effusion or pneumothorax..    -Pleura: Pleural thickening seen at the lung bases without evidence of pleural fluid.  No pneumothorax.  Apical pleural thickening is also  noted.    -Mediastinum/Roz:Significant adenopathy throughout the mediastinum and bilateral hilar regions largest right hilar lesion measures 1.5 cm in short axis.  Largest mediastinal mass measures 2.2 cm which is anterior to the su.  Large anterior mediastinal and left hilar mass is described above.    -Axilla: No adenopathy.    -Thyroid: Normal lower gland.    -Heart/Aorta: Heart size is mildly enlarged.  Moderate coronary artery disease.  Lipomatous hypertrophy of the interatrial septum is noted.  No pericardial effusion. Aorta normal caliber.    -Bones/Chest Wall: Diffuse osseous metastatic disease again noted with no evidence of a new compression fracture.    -Upper Abdomen: 4.1 cm metastatic lesion within the dome of the liver.  Mild intrahepatic ductal dilatation.  Partially visualized renal cysts.  Moderate constipation.                                CT Abdomen Pelvis  Without Contrast  8/31/2018  Order: [id number]   Status:  Final result   Visible to patient:  Yes (Patient Portal) Next appt:  11/29/2018 at 08:50 AM in Lab ([facility])   Details     Reading Physician Reading Date Result Priority   Xu Frye MD 8/31/2018       Narrative     EXAMINATION:  CT ABDOMEN PELVIS WITHOUT CONTRAST    CLINICAL HISTORY:  Weight loss, unintended, non-localized abd pain;    TECHNIQUE:  Low dose axial images, sagittal and coronal reformations were obtained from the lung bases to the pubic symphysis without IV or oral contrast..    COMPARISON:  12/15/2017    FINDINGS:  ABDOMEN:    - Lung bases: Elevation of the left hemidiaphragm again noted.  Collimated from view is  the recently described left mediastinal mass.    - Liver: No focal mass.    - Gallbladder: Prior cholecystectomy.    - Bile Ducts: No evidence of intra or extra hepatic biliary ductal dilation.    - Spleen: Negative.    - Kidneys: Previously administered IV contrast is noted in the collecting system.  Multiple small renal cysts again  noted.    - Adrenals: 12 mm left adrenal nodule again noted.    - Pancreas: No mass or peripancreatic fat stranding.    - Retroperitoneum:  No significant adenopathy.    - Vascular: Aortoiliac atherosclerotic calcifications.    - Abdominal wall:  Unremarkable.    PELVIS:    Previously administered IV contrast is noted within the bladder.  Bladder is partially collapsed limiting evaluation.  There is suggestion of mild wall irregularity on the right side.  Consider nonemergent evaluation with CT urogram, urinalysis, and direct visualization of the bladder on a nonemergent basis.  Mild protrusion of the prostate into the bladder base.    BOWEL/MESENTERY:    Sigmoid diverticulosis without diverticulitis.  Moderate volume stool throughout the colon.    BONES:  Mixed lytic and sclerotic appearance of the L1 vertebra, with superior endplate collapse, concerning for possible metastatic neoplastic involvement.  Bones are otherwise osteopenic.  There is severe bilateral lower lumbar facet arthropathy.  No spinal canal stenosis.    All CT scans at this facility use dose modulation, iterative reconstruction and/or weight based dosing when appropriate to reduce radiation dose to as low as reasonably achievable.      Impression       No acute abnormality.    Left adrenal nodule 12 mm size is again noted.    Previously administered IV contrast is noted within the bladder. Bladder is partially collapsed limiting evaluation. There is suggestion of mild wall irregularity on the right side.  Consider nonemergent evaluation with CT urogram, urinalysis, and direct visualization of the bladder on a nonemergent basis. Mild protrusion of the prostate into the bladder base.    Mixed lytic and sclerotic appearance of L1, with superior endplate collapse, concerning for possible metastatic neoplastic involvement. Bones are otherwise osteopenic.  There is severe bilateral lower lumbar facet arthropathy.  No spinal canal stenosis.  Consider  PET-CT.  Also, consider MRI of the thoracic and lumbar spine with IV contrast on a nonemergent basis.    Sigmoid diverticulosis without diverticulitis.  Moderate volume stool throughout the colon.      Electronically signed by: Xu Frye  Date: 08/31/2018  Time: 12:22                I have personally reviewed the patients labs, imaging, ekg and discussed the patient case in detail with the Er provider      Assessment/Plan:     * Chest pain    -Admit to obs  -Troponin Result in Er neg  -EKG in ED reviewed  -Serial enzymes  - Given   -Continue Eliquis  -Consider Consult Cardiology pending course  -consider echo pending course  -monitor       Continue cardiac r/o if negative likely pain related to malignagncy.     Additional MDM:   Chest Pain: Initial EKG: no acute changes. Final Disposition: Placed in OBS. Consultants: Internal Medicine.   Heart Score:    History:          Moderately suspicious.  ECG:             Normal  Age:               >65 years  Risk factors: >= 3 risk factors or history of atherosclerotic disease  Troponin:       Less than or equal to normal limit  Final Score: 5      LULU Score:   Age over 65:                                    1.00   > or = to 3 CAD risk factors:           1.00  Established CAD:                            1.00  > or = to 2 anginal events in the past 24 hours: 0.00  Use of ASA in past 7 days:              0.00  Elevated Enzymes:                         0.00  ST Depression > or = to 0.05 mV:  0.00  LULU score: 3         Metastatic left lung cancer (metastasis from lung to other site) with mediastinal lymphadenopathy    Appears to be worsening and likley cause of patients symptoms vs oncological-medication related.   Consider consult to heme/oncology  Continue supportive/symptomatic care  (nystatin oral, magic mouth wash, pain control)  Consider further end-of-life discussion with patient like hospice pending course.      Constipation    Stool softner  Monitor         Type 1 diabetes mellitus with diabetic neuropathy    Check a1c  ssi  Long acting  montior       Paroxysmal atrial fibrillation    Continue eliquis and dilitiazem  Monitor   Rate controlled.      Hypertension goal BP (blood pressure) < 130/80    Monitor trends  Continue home meds         COPD, group D, by GOLD 2017 classification    Continue neb/ics  Consider pulmonary consult pending course          VTE Risk Mitigation (From admission, onward)    Nathaniel/SCD  Eliquis        ADVANCE DIRECTIVES: The topic of advance directives was discussed with the patient, wife and daughter (ry dubois) who she reports is POA ,at bedside. Total time spent with discussion and education of advance directives was 18 mins which includes discussion of current condition, terminal illness and likely poor prognosis. The Patient reports he has not developed all documentation regarding advanced directives but daughter reports she is going to help him. Ok per family for assistance from  for advance directive planning. We discussed his code status in detail, at this time patient and family wishes to remain full code and further discuss and plan to let us know of any decision to change.  consult for advance directive assistance and follow up.     Good Outcome Following Attempted Resuscitation (GO-FAR) Score:  The Good Outcome Following Attempted Resuscitation (GO-FAR) score provides validated risk stratification for a patients chance of neurologically-intact survival to discharge should he/she be successfully resuscitated following in-hospital cardiopulmonary arrest. The clinical significance of the GO-FAR score may be discussed with the patient and/or family while coming to a decision about code status.     Age: 70-74  Chronic neurologic deficits: No  Acute stroke during the current admission: No  Medical non-cardiac diagnosis: Yes  Major trauma: No  Metastatic or hematologic cancer: Yes  Septicemia: No  Pneumonia:  No  Hepatic insufficiency: No  Renal insufficiency or dialysis: No  Acute hypotension or hypoperfusion: No  Respiratory insufficiency: Yes  Admit from SNF: No  GO-FAR Score: 5    Interpretation Key:   GO-FAR Score       Likelihood of NISD       -15 to - 6       Above average > 15 %         - 5 to 13       Average     >3 to 15 %         14 to 23       Low                1 to 3 %               > 23       Very low             < 1 %       Regarding the details of the discussion about code status:   Patient's likelihood of neurologically-intact survival to discharge after successful resuscitation: Average  Has the patient/proxy been made aware of his/her overall prognosis? Yes  Has the patient/proxy been made aware of his/her predicted survival to discharge after successful resuscitation? Yes  Code status discussion included: Nurse Practitioner  Code status was discussed with: Patient, Spouse/Significant Other and Daughter  Code status preference was chosen by: Patient  Does the patient have an Advance Health Directive (AHD)? Has NO advance directive - not interested in additional information  Does the AHD indicate a preference for Full Code? Not applicable  If not Full Code, does the AHD specify which life-sustaining interventions are to be taken, and in what circumstances? Not applicable    Code Status: Full Code                 Jos Solis NP  Department of Hospital Medicine   [facility]

## 2018-11-14 NOTE — SUBJECTIVE & OBJECTIVE
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  Others are too small to accurately characterize.    Lung disease     Nephrolithiasis     ct urogram 12/15/2017-Bilateral nephrolithiasis.     Neuropathy, diabetic     Pancreatitis     Type 1 diabetes mellitus with diabetic neuropathy 4/27/2018       Past Surgical History:   Procedure Laterality Date    ABLATION N/A 12/4/2017    Performed by Jaret Freire MD at [facility] CATH LAB    BICEPS TENDON REPAIR Right     BRONCHOSCOPY Bilateral 9/2/2018    Procedure: BRONCHOSCOPY- insert lighted tube into airway to obtain biopsy of lung;  Surgeon: Aldair Deleon MD;  Location: [facility] ENDO;  Service: Endoscopy;  Laterality: Bilateral;    BRONCHOSCOPY- insert lighted tube into airway to obtain biopsy of lung Bilateral 9/2/2018    Performed by Aldair Deleon MD at [facility] ENDO    CARDIOVERSION      CHOLECYSTECTOMY      INSERTION OF TUNNELED CENTRAL VENOUS CATHETER (CVC) WITH SUBCUTANEOUS PORT Right 10/9/2018    Procedure: [id number]-CATH;  Surgeon: Christophe Brandt MD;  Location: [facility] OR;  Service: General;  Laterality: Right;    [id number]-G-CATH Right 10/9/2018    Performed by Christophe Brandt MD at [facility] OR    PATELLA SURGERY Right     RADIOFREQUENCY ABLATION      ROTATOR CUFF REPAIR Left     TRANSESOPHAGEAL ECHOCARDIOGRAM (DILLAN) N/A 12/4/2017    Performed by J[facility] Freire MD at [facility] CATH LAB    ULTRASOUND GUIDANCE Right 10/9/2018    Procedure: ULTRASOUND GUIDANCE;  Surgeon: Christophe Brandt MD;  Location: [facility] OR;  Service: General;  Laterality: Right;    ULTRASOUND GUIDANCE Right 10/9/2018    Performed by  "[facility]t MD at [facility] OR       Review of patient's allergies indicates:   Allergen Reactions    Anoro ellipta [umeclidinium-vilanterol] Shortness Of Breath    Flomax [tamsulosin]      Dizzy         No current facility-administered medications on file prior to encounter.      Current Outpatient Medications on File Prior to Encounter   Medication Sig    albuterol-ipratropium (DUO-NEB) 2.5 mg-0.5 mg/3 mL nebulizer solution Take 3 mLs by nebulization every 6 (six) hours. Rescue    b complex vitamins tablet Take 1 tablet by mouth once daily.    blood sugar diagnostic Strp 1 each by Misc.(Non-Drug; Combo Route) route 3 (three) times daily. Dispense preferred on insurance    blood-glucose meter kit Use as instructed - preferred on insurance    clonazePAM (KLONOPIN) 0.5 MG tablet Take 1 tablet (0.5 mg total) by mouth 2 (two) times daily as needed for Anxiety.    diltiaZEM (CARDIZEM CD) 360 MG 24 hr capsule TAKE 1 CAPSULE(360 MG) BY MOUTH EVERY DAY    doxycycline (VIBRA-TABS) 100 MG tablet Take 1 tablet (100 mg total) by mouth every 12 (twelve) hours.    ELIQUIS 5 mg Tab TAKE 1 TABLET(5 MG) BY MOUTH TWICE DAILY    flecainide (TAMBOCOR) 100 MG Tab Take 1 tablet (100 mg total) by mouth every 12 (twelve) hours.    gabapentin (NEURONTIN) 300 MG capsule Take 2 capsules (600 mg total) by mouth 2 (two) times daily. (Patient taking differently: Take 600 mg by mouth every evening. Takes two tablets by mouth at night)    insulin degludec (TRESIBA FLEXTOUCH U-200) 200 unit/mL (3 mL) InPn Inject 50 Units into the skin once daily.    insulin lispro (HUMALOG KWIKPEN) 100 unit/mL InPn pen ADMINISTER 17 UNITS UNDER THE SKIN THREE TIMES DAILY BEFORE MEALS (Patient taking differently: ADMINISTER 17 UNITS UNDER THE SKIN IN MORNING AND 25 UNITS NOON AND NIGHT)    insulin needles, disposable, 32 x 1/4 " Ndle Insulin injections four times per day.    multivitamin capsule Take 1 capsule by mouth once daily.    NEEDLES, " DISPOSABLE (DISPOSABLE NEEDLES MISC) Inject 1 each into the skin 3 (three) times daily.    nystatin (MYCOSTATIN) 100,000 unit/mL suspension Take 5 mLs (500,000 Units total) by mouth 4 (four) times daily.    omeprazole (PRILOSEC) 20 MG capsule Take 1 capsule (20 mg total) by mouth once daily.    ondansetron (ZOFRAN-ODT) 8 MG TbDL Take 1 tablet (8 mg total) by mouth every 8 (eight) hours as needed.    oxyCODONE (ROXICODONE) 15 MG Tab Take 1 tablet (15 mg total) by mouth every 4 (four) hours as needed for Pain.    pravastatin (PRAVACHOL) 40 MG tablet TAKE 1 TABLET DAILY (Patient taking differently: TAKE 1 TABLET NIGHTLY)    prochlorperazine (COMPAZINE) 10 MG tablet TAKE 1 TABLET BY MOUTH EVERY 6 HOURS AS NEEDED.    sertraline (ZOLOFT) 50 MG tablet Take 1 tablet (50 mg total) by mouth once daily.    tiotropium bromide (SPIRIVA RESPIMAT) 2.5 mcg/actuation Mist Inhale 2 puffs into the lungs once daily. Controller    vitamin D 1000 units Tab Take 1,000 Units by mouth once daily.     Family History     Problem Relation (Age of Onset)    Cancer Maternal Aunt    Cataracts Sister    Diabetes Mother, Sister, Sister, Sister    Heart attack Brother    Heart failure Brother    Hypertension Mother, Father    Stroke Mother, Father, Paternal Grandmother, Paternal Grandfather        Tobacco Use    Smoking status: Former Smoker     Packs/day: 0.50     Types: Cigarettes     Start date: 1/1/1960     Last attempt to quit: 3/8/2015     Years since quitting: 3.6    Smokeless tobacco: Former User     Types: Snuff     Quit date: 10/7/1995   Substance and Sexual Activity    Alcohol use: No     Alcohol/week: 0.0 oz    Drug use: No    Sexual activity: Not on file     Review of Systems   Constitutional: Positive for fatigue. Negative for chills, diaphoresis and fever.   HENT: Negative for congestion, sore throat and voice change.    Eyes: Negative for photophobia and visual disturbance.   Respiratory: Positive for cough and  shortness of breath. Negative for wheezing and stridor.    Cardiovascular: Positive for chest pain. Negative for leg swelling.   Gastrointestinal: Negative for abdominal distention, abdominal pain, constipation, diarrhea, nausea and vomiting.   Endocrine: Negative for polydipsia, polyphagia and polyuria.   Genitourinary: Negative for difficulty urinating, dysuria, flank pain, testicular pain and urgency.   Musculoskeletal: Negative for back pain, joint swelling, neck pain and neck stiffness.   Skin: Negative for color change and rash.   Allergic/Immunologic: Negative for immunocompromised state.   Neurological: Negative for dizziness, syncope, weakness, numbness and headaches.   Hematological: Does not bruise/bleed easily.   Psychiatric/Behavioral: Negative for agitation, behavioral problems and confusion.     Objective:     Vital Signs (Most Recent):  Temp: 98.1 °F (36.7 °C) (11/13/18 2342)  Pulse: 91 (11/13/18 2342)  Resp: 18 (11/13/18 2342)  BP: 125/60 (11/13/18 2342)  SpO2: (!) 92 % (11/13/18 2342) Vital Signs (24h Range):  Temp:  [98.1 °F (36.7 °C)-98.7 °F (37.1 °C)] 98.1 °F (36.7 °C)  Pulse:  [81-97] 91  Resp:  [18-29] 18  SpO2:  [84 %-94 %] 92 %  BP: (119-143)/(55-72) 125/60     Weight: 81 kg (178 lb 9.2 oz)  Body mass index is 27.97 kg/m².    Physical Exam   Constitutional: He is oriented to person, place, and time. He appears well-developed and well-nourished. He has a sickly appearance. He does not appear ill. No distress.   HENT:   Head: Normocephalic and atraumatic.   Nose: Nose normal.   Eyes: Conjunctivae and EOM are normal. Pupils are equal, round, and reactive to light. No scleral icterus.   Neck: Normal range of motion. Neck supple. No tracheal deviation present.   Cardiovascular: Normal rate, regular rhythm, normal heart sounds and intact distal pulses.   No murmur heard.  Bilateral lower edema +2+3   Pulmonary/Chest: Effort normal. No stridor. No respiratory distress. He has no wheezes. He has  rales ( right lower).   Abdominal: Soft. He exhibits no distension. There is tenderness ( mild right upper ). There is no guarding.   Hypoactive     Genitourinary:   Genitourinary Comments: Check urine   Musculoskeletal: Normal range of motion. He exhibits no edema or deformity.   Neurological: He is alert and oriented to person, place, and time. No cranial nerve deficit.   Skin: Skin is warm and dry. Capillary refill takes 2 to 3 seconds. No rash noted. He is not diaphoretic.   Yellow hue   Psychiatric: He has a normal mood and affect. His behavior is normal. Judgment and thought content normal.   Nursing note and vitals reviewed.        CRANIAL NERVES     CN III, IV, VI   Pupils are equal, round, and reactive to light.  Extraocular motions are normal.        Significant Labs: All pertinent labs within the past 24 hours have been reviewed.  Results for orders placed or performed during the hospital encounter of 11/13/18   CBC auto differential   Result Value Ref Range    WBC 8.30 3.90 - 12.70 K/uL    RBC 4.00 (L) 4.60 - 6.20 M/uL    Hemoglobin 10.7 (L) 14.0 - 18.0 g/dL    Hematocrit 34.9 (L) 40.0 - 54.0 %    MCV 87 82 - 98 fL    MCH 26.8 (L) 27.0 - 31.0 pg    MCHC 30.7 (L) 32.0 - 36.0 g/dL    RDW 16.3 (H) 11.5 - 14.5 %    Platelets 295 150 - 350 K/uL    MPV 9.1 (L) 9.2 - 12.9 fL    Gran # (ANC) 6.4 1.8 - 7.7 K/uL    Lymph # 0.8 (L) 1.0 - 4.8 K/uL    Mono # 0.9 0.3 - 1.0 K/uL    Eos # 0.2 0.0 - 0.5 K/uL    Baso # 0.01 0.00 - 0.20 K/uL    Gran% 77.4 (H) 38.0 - 73.0 %    Lymph% 9.3 (L) 18.0 - 48.0 %    Mono% 10.4 4.0 - 15.0 %    Eosinophil% 2.8 0.0 - 8.0 %    Basophil% 0.1 0.0 - 1.9 %    Differential Method Automated    Comprehensive metabolic panel   Result Value Ref Range    Sodium 138 136 - 145 mmol/L    Potassium 4.3 3.5 - 5.1 mmol/L    Chloride 95 95 - 110 mmol/L    CO2 32 (H) 23 - 29 mmol/L    Glucose 110 70 - 110 mg/dL    BUN, Bld 8 8 - 23 mg/dL    Creatinine 0.7 0.5 - 1.4 mg/dL    Calcium 9.3 8.7 - 10.5 mg/dL     Total Protein 7.2 6.0 - 8.4 g/dL    Albumin 2.7 (L) 3.5 - 5.2 g/dL    Total Bilirubin 0.3 0.1 - 1.0 mg/dL    Alkaline Phosphatase 253 (H) 55 - 135 U/L    AST 69 (H) 10 - 40 U/L    ALT 49 (H) 10 - 44 U/L    Anion Gap 11 8 - 16 mmol/L    eGFR if African American >60 >60 mL/min/1.73 m^2    eGFR if non African American >60 >60 mL/min/1.73 m^2   Troponin I   Result Value Ref Range    Troponin I <0.006 0.000 - 0.026 ng/mL   Brain natriuretic peptide   Result Value Ref Range    BNP 57 0 - 99 pg/mL   Magnesium   Result Value Ref Range    Magnesium 1.8 1.6 - 2.6 mg/dL   Phosphorus   Result Value Ref Range    Phosphorus 3.9 2.7 - 4.5 mg/dL   Troponin I   Result Value Ref Range    Troponin I 0.008 0.000 - 0.026 ng/mL   POCT glucose   Result Value Ref Range    POCT Glucose 117 (H) 70 - 110 mg/dL       Significant Imaging: I have reviewed all pertinent imaging results/findings within the past 24 hours.   Imaging Results          US Abdomen Limited (Final result)  Result time 11/13/18 21:05:51    Final result by Fitz Davis MD (11/13/18 21:05:51)                 Impression:      There are 2 solid masses in the liver as described above concerning for metastatic disease.      Electronically signed by: Fitz Davis MD  Date:    11/13/2018  Time:    21:05             Narrative:    EXAMINATION:  US ABDOMEN LIMITED    CLINICAL HISTORY:  new common bile duct dilation worsening hepatic mass;    TECHNIQUE:  Limited ultrasound of the right upper quadrant of the abdomen (including pancreas, liver, gallbladder, common bile duct, and right kidney) was performed.    COMPARISON:  CT of the chest, 11/13/2018 and CT of the abdomen, 10/25/2018.    FINDINGS:  Liver: Normal in size, measuring 14.3 cm. Homogeneous echotexture. There is a hypoechoic solid mass in the posterior segment right hepatic lobe measuring 3.5 x 4.2 x 3.2 cm.  There is a smaller hypoechoic solid mass in the subcapsular right hepatic lobe anteriorly measuring 2.3 x 2.0 x  1.4 cm.    Gallbladder: The patient has had a cholecystectomy.    Biliary system: The common duct is not dilated, measuring 7 mm.  No intrahepatic ductal dilatation.    Right kidney: There is a mild fullness of the right pelvocaliceal collecting system.    Miscellaneous: No upper abdominal ascites.                               CTA Chest Non-Coronary - PE Study (Final result)  Result time 11/13/18 16:47:12    Final result by Jos Brito MD (11/13/18 16:47:12)                 Impression:      No evidence of pulmonary embolism.    See findings above.    All CT scans at this facility use dose modulation, iterative reconstruction and/or weight based dosing when appropriate to reduce radiation dose to as low as reasonably achievable.      Electronically signed by: Jos Brito MD  Date:    11/13/2018  Time:    16:47             Narrative:    EXAMINATION:  CTA CHEST NON CORONARY    CLINICAL HISTORY:  Shortness of breath    TECHNIQUE:  After the intravenous administration of 100 cc of Omni 35 nonionic contrast using CT pulmonary angio technique, 2.5 mm axial images were acquired using helical CT technique from the lung apices through costophrenic sulci.  Sagittal coronal and oblique MIPS were also submitted for interpretation.    COMPARISON:  10/27/2018    FINDINGS:  -Pulmonary arteries: Pulmonary arteries are well opacified.  No evidence of pulmonary embolism.  No evidence of pulmonary hypertension.  No right heart strain is identified.    -Lungs: Severe emphysematous change again noted.  Elevation left hemidiaphragm with consolidation of the left lower lobe likely compressive.  Left anterior mediastinal mass measures approximately 6.1 x 4.5 cm..  The airway is patent.    Multiple other enlarged mediastinal and paratracheal lymph nodes are stable.  1.1 cm nodule left lower lobe.  1.4 cm nodule right lower lobe.  Nodules have enlarged from prior exam.  Dependent atelectasis.  No effusion or pneumothorax..    -Pleura:  Pleural thickening seen at the lung bases without evidence of pleural fluid.  No pneumothorax.  Apical pleural thickening is also noted.    -Mediastinum/Roz:Significant adenopathy throughout the mediastinum and bilateral hilar regions largest right hilar lesion measures 1.5 cm in short axis.  Largest mediastinal mass measures 2.2 cm which is anterior to the su.  Large anterior mediastinal and left hilar mass is described above.    -Axilla: No adenopathy.    -Thyroid: Normal lower gland.    -Heart/Aorta: Heart size is mildly enlarged.  Moderate coronary artery disease.  Lipomatous hypertrophy of the interatrial septum is noted.  No pericardial effusion. Aorta normal caliber.    -Bones/Chest Wall: Diffuse osseous metastatic disease again noted with no evidence of a new compression fracture.    -Upper Abdomen: 4.1 cm metastatic lesion within the dome of the liver.  Mild intrahepatic ductal dilatation.  Partially visualized renal cysts.  Moderate constipation.

## 2018-11-14 NOTE — HOSPITAL COURSE
The pt is a 71 yo male with hx NSCLC with mets to bone and liver s/p radiation currently receiving Keytruda admitted for chest pain. Chest pain is characterized by a 8/10 constant pain worse with deep inspiration and palpation. EKG showed no change from previous. Serial troponin normal.   CTA chest showed no PE- Elevation left hemidiaphragm with consolidation of the left lower lobe likely compressive.  Left anterior mediastinal mass measures approximately 6.1 x 4.5 cm, enlarged mediastinal and paratracheal lymph nodes,1.1 cm nodule left lower lobe.1.4 cm nodule right lower lobe.  Nodules have enlarged from prior exam.4.1 cm metastatic lesion within the dome of the liver., Moderate constipation.  Abd U/S showed 2 large, solid masses in the liver.   The pt's intractable chest pain is likely cancer related. He also reports intractable back pain unrelieved by home medication Oxycodone 15 mg every 4 hours. Will give Dilaudid 1 mg IV now and place a Fentanyl 25 mcg patch. Care discussed with Heme/Onc. Pt will need to be monitored for effectiveness and possible adverse effects on new pain regimen 11/15/18 The patient reports that his pain is not well controlled. Will adjust analgesia. Plan for radiation tomorrow per Dr. Culp.   11/16- patient still complains of generalized pain,he had radiation done today .  Had an extensive conversation with patient about end of life care ,  He has agreed to be DNR.     11/17 Pt still continue with pain . He is s/p radiation tx. The pain did not improved significantly.   11/18 Pt pain improved today . He states the pain is 4/10 . Pt was seen and examined at bedside he was determined to be suitable for d/c   Pt will be d/c on phentanyl patch  Short acting morphine

## 2018-11-14 NOTE — SUBJECTIVE & OBJECTIVE
"Interval History: Anterior chest pain and back pain, complain of mouth pain and mouth "ulcers"      Review of Systems   Constitutional: Positive for activity change, appetite change, fatigue and unexpected weight change (20 lbs in 2 months ). Negative for chills, diaphoresis and fever.   HENT: Negative for congestion, nosebleeds, sore throat and trouble swallowing.    Eyes: Negative for pain, discharge and visual disturbance.   Respiratory: Positive for shortness of breath (chronic ). Negative for apnea, cough, chest tightness, wheezing and stridor.    Cardiovascular: Positive for chest pain. Negative for palpitations and leg swelling.   Gastrointestinal: Negative for abdominal distention, abdominal pain, blood in stool, constipation, diarrhea, nausea and vomiting.   Endocrine: Negative for cold intolerance and heat intolerance.   Genitourinary: Negative for difficulty urinating, dysuria, flank pain, frequency and urgency.   Musculoskeletal: Positive for back pain. Negative for arthralgias, joint swelling, myalgias, neck pain and neck stiffness.   Skin: Negative for rash and wound.   Allergic/Immunologic: Positive for immunocompromised state. Negative for food allergies.   Neurological: Positive for weakness. Negative for dizziness, seizures, syncope, facial asymmetry, light-headedness and headaches.   Hematological: Negative for adenopathy.   Psychiatric/Behavioral: Negative for agitation, behavioral problems and confusion. The patient is not nervous/anxious.      Objective:     Vital Signs (Most Recent):  Temp: 97.9 °F (36.6 °C) (11/14/18 1130)  Pulse: 107 (11/14/18 1130)  Resp: 17 (11/14/18 1130)  BP: 128/60 (11/14/18 1130)  SpO2: 96 % (11/14/18 1130) Vital Signs (24h Range):  Temp:  [97.9 °F (36.6 °C)-99.6 °F (37.6 °C)] 97.9 °F (36.6 °C)  Pulse:  [] 107  Resp:  [17-29] 17  SpO2:  [84 %-96 %] 96 %  BP: (119-153)/(55-81) 128/60     Weight: 80 kg (176 lb 5.9 oz)  Body mass index is 27.62 " kg/m².    Intake/Output Summary (Last 24 hours) at 11/14/2018 1359  Last data filed at 11/14/2018 0800  Gross per 24 hour   Intake 900 ml   Output 1200 ml   Net -300 ml      Physical Exam   Constitutional: He is oriented to person, place, and time. He appears well-developed and well-nourished. No distress.   HENT:   Head: Normocephalic and atraumatic.   Nose: Nose normal.   Mouth/Throat: Oropharynx is clear and moist.   Small oral ulcer to lower inner lip    Eyes: Conjunctivae and EOM are normal. No scleral icterus.   Neck: Normal range of motion. Neck supple.   Cardiovascular: Normal rate, regular rhythm, normal heart sounds and intact distal pulses. Exam reveals no gallop and no friction rub.   No murmur heard.  Pulmonary/Chest: Effort normal. No stridor. No respiratory distress. He has no wheezes. He has no rales. He exhibits no tenderness.   Diminished BS  TTP to anterior chest wall    Abdominal: Soft. Bowel sounds are normal. He exhibits no distension. There is no tenderness. There is no rebound and no guarding.   Musculoskeletal: Normal range of motion. He exhibits no edema, tenderness or deformity.   Neurological: He is alert and oriented to person, place, and time. He has normal reflexes. No cranial nerve deficit. He exhibits normal muscle tone. Coordination normal.   Skin: Skin is warm and dry. No rash noted. He is not diaphoretic. No erythema. No pallor.   Psychiatric: He has a normal mood and affect. His behavior is normal. Thought content normal.   Nursing note and vitals reviewed.      Significant Labs:   BMP:   Recent Labs   Lab 11/13/18 1622 11/14/18  0450    150*    135*   K 4.3 4.1   CL 95 93*   CO2 32* 31*   BUN 8 7*   CREATININE 0.7 0.7   CALCIUM 9.3 9.3   MG 1.8  --      CBC:   Recent Labs   Lab 11/13/18 1622 11/14/18  0450   WBC 8.30 7.28   HGB 10.7* 11.3*   HCT 34.9* 36.3*    253     CMP:   Recent Labs   Lab 11/13/18 1622 11/14/18  0450    135*   K 4.3 4.1   CL 95  93*   CO2 32* 31*    150*   BUN 8 7*   CREATININE 0.7 0.7   CALCIUM 9.3 9.3   PROT 7.2 7.2  7.2   ALBUMIN 2.7* 2.7*  2.7*   BILITOT 0.3 0.4  0.4   ALKPHOS 253* 336*  336*   AST 69* 107*  107*   ALT 49* 45*  45*   ANIONGAP 11 11   EGFRNONAA >60 >60     All pertinent labs within the past 24 hours have been reviewed.    Significant Imaging:   Imaging Results          US Abdomen Limited (Final result)  Result time 11/13/18 21:05:51    Final result by Fitz Davis MD (11/13/18 21:05:51)                 Impression:      There are 2 solid masses in the liver as described above concerning for metastatic disease.      Electronically signed by: Fitz Davis MD  Date:    11/13/2018  Time:    21:05             Narrative:    EXAMINATION:  US ABDOMEN LIMITED    CLINICAL HISTORY:  new common bile duct dilation worsening hepatic mass;    TECHNIQUE:  Limited ultrasound of the right upper quadrant of the abdomen (including pancreas, liver, gallbladder, common bile duct, and right kidney) was performed.    COMPARISON:  CT of the chest, 11/13/2018 and CT of the abdomen, 10/25/2018.    FINDINGS:  Liver: Normal in size, measuring 14.3 cm. Homogeneous echotexture. There is a hypoechoic solid mass in the posterior segment right hepatic lobe measuring 3.5 x 4.2 x 3.2 cm.  There is a smaller hypoechoic solid mass in the subcapsular right hepatic lobe anteriorly measuring 2.3 x 2.0 x 1.4 cm.    Gallbladder: The patient has had a cholecystectomy.    Biliary system: The common duct is not dilated, measuring 7 mm.  No intrahepatic ductal dilatation.    Right kidney: There is a mild fullness of the right pelvocaliceal collecting system.    Miscellaneous: No upper abdominal ascites.                               CTA Chest Non-Coronary - PE Study (Final result)  Result time 11/13/18 16:47:12    Final result by Jos Brito MD (11/13/18 16:47:12)                 Impression:      No evidence of pulmonary embolism.    See findings  above.    All CT scans at this facility use dose modulation, iterative reconstruction and/or weight based dosing when appropriate to reduce radiation dose to as low as reasonably achievable.      Electronically signed by: Jos Brito MD  Date:    11/13/2018  Time:    16:47             Narrative:    EXAMINATION:  CTA CHEST NON CORONARY    CLINICAL HISTORY:  Shortness of breath    TECHNIQUE:  After the intravenous administration of 100 cc of Omni 35 nonionic contrast using CT pulmonary angio technique, 2.5 mm axial images were acquired using helical CT technique from the lung apices through costophrenic sulci.  Sagittal coronal and oblique MIPS were also submitted for interpretation.    COMPARISON:  10/27/2018    FINDINGS:  -Pulmonary arteries: Pulmonary arteries are well opacified.  No evidence of pulmonary embolism.  No evidence of pulmonary hypertension.  No right heart strain is identified.    -Lungs: Severe emphysematous change again noted.  Elevation left hemidiaphragm with consolidation of the left lower lobe likely compressive.  Left anterior mediastinal mass measures approximately 6.1 x 4.5 cm..  The airway is patent.    Multiple other enlarged mediastinal and paratracheal lymph nodes are stable.  1.1 cm nodule left lower lobe.  1.4 cm nodule right lower lobe.  Nodules have enlarged from prior exam.  Dependent atelectasis.  No effusion or pneumothorax..    -Pleura: Pleural thickening seen at the lung bases without evidence of pleural fluid.  No pneumothorax.  Apical pleural thickening is also noted.    -Mediastinum/Roz:Significant adenopathy throughout the mediastinum and bilateral hilar regions largest right hilar lesion measures 1.5 cm in short axis.  Largest mediastinal mass measures 2.2 cm which is anterior to the su.  Large anterior mediastinal and left hilar mass is described above.    -Axilla: No adenopathy.    -Thyroid: Normal lower gland.    -Heart/Aorta: Heart size is mildly enlarged.   Moderate coronary artery disease.  Lipomatous hypertrophy of the interatrial septum is noted.  No pericardial effusion. Aorta normal caliber.    -Bones/Chest Wall: Diffuse osseous metastatic disease again noted with no evidence of a new compression fracture.    -Upper Abdomen: 4.1 cm metastatic lesion within the dome of the liver.  Mild intrahepatic ductal dilatation.  Partially visualized renal cysts.  Moderate constipation.

## 2018-11-14 NOTE — PROGRESS NOTES
Pharmacy Brief Progress Note: Fentanyl Patch First Dose Education     Patient/caregivers (daughter and sister) educated on fentanyl patch indication, side effects, and drug interactions. Discussed administration and precautions to take while wearing the patch and proper disposal. Patient/caregiver given fentanyl patch educational handout with disposal instructions. Patient/caregiver expressed understanding and had no further questions.     Thank you for allowing us to participate in this patient's care.     Helen Vega 11/14/2018 3:19 PM

## 2018-11-14 NOTE — CONSULTS
[facility]    RADIATION ONCOLOGY CONSULTATION    Name: Nico Garza Jr.  : 1945      Patient Referred To Radiation Oncology By:  Dr. Issa Lagos Jr., MD  [address] [place] [address]    DIAGNOSIS:  Metastatic squamous cell carcinoma  1. 18 completed 30 Gy in 10 fractions T12-L2 and 20 Gy in 5 fractions L3-S1  2. 2018 started Keytruda    HISTORY OF PRESENT ILLNESS:  Nico Garza Jr. is a pleasant 72 y.o. male who completed radiation about 2 weeks ago to the lower thoracic and lumbosacral.  He was seen after discharged and continued on anxiety medication and given Mycostatin suspension for mouth sores.  He also saw Dr. Lagos presenting for cycle to [facility].  He had mild fluid deficit with borderline low blood pressure.  He also had transaminitis and elevated alkaline phosphatase.  He also followed with pulmonology for group D COPD.  He was on 4 L continuous oxygen including sleep.  Yesterday he presented to [facility] for left-sided chest pain, cough and bilateral lower extremity edema.  His oxygen was 90% with 22 respirations per minute.  18 he had a CT angiography of the chest.  There was no pulmonary embolism.  There was a 6.1 cm left anterior mediastinal mass with elevation of the left hemidiaphragm and consolidation of the left lower lobe.  There were enlarged mediastinal lymph nodes.  There was thickening of the pleura at the bases.  There was no pleural fluid.  There was a 4.1 cm metastatic lesion in the dome of the liver.  Abdominal ultrasound showed a 4.2 and also a 2.3 cm liver metastasis.  His cardiac enzymes were negative in the ER.  He is on a fentanyl patch.    The pain in his low back post radiation has improved but is still present.  He complains mainly of pain in the left lateral thorax which is also superior and medial all the way to the spine.  He also has pain in the sternum.    REVIEW OF SYSTEMS:  (Positive findings bold, otherwise negative)   Constitutional: fever, fatigue, weight change  Eyes: blurred vision in the past 3 months, double vision   ENT: ear pain, new mouth lesions, jaw pain, difficulty swallowing, sore throat  Cardiovascular: chest pain, reflux, extremity swelling  Respiratory: shortness of breath, dyspnea, cough, hemoptysis.   GI: abdominal pain, diarrhea, constipation, blood in stool, painful bowel movements  : painful or burning urination, denies blood in urine  Musculoskeletal: new bone or joint pains  Neurologic: headache, seizure, focal numbness or tingling, balance changes, speech changes  Lymph: new or enlarged lymph nodes  Psychiatric: depression, anxiety    PRIOR RADIATION HISTORY: See Diagnosis above    PAST MEDICAL HISTORY:  Past Medical History:   Diagnosis Date    Arthritis     Atrial fibrillation and flutter     Atrial flutter     Cancer     LUNG    COPD (chronic obstructive pulmonary disease)     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  Others are too small to accurately characterize.    Lung disease     Nephrolithiasis     ct urogram 12/15/2017-Bilateral nephrolithiasis.     Neuropathy, diabetic     Pancreatitis     Type 1 diabetes mellitus with diabetic neuropathy 4/27/2018       PAST SURGICAL HISTORY:  Past Surgical History:   Procedure Laterality Date    ABLATION N/A 12/4/2017    Performed by J[facility]ire [facility] at [facility] CATH LAB    BICEPS TENDON REPAIR Right     BRONCHOSCOPY Bilateral 9/2/2018    Procedure: BRONCHOSCOPY- insert lighted tube into airway to obtain biopsy of lung;  Surgeon: Aldair Deleon MD;  Location: [facility];  Service: Endoscopy;  Laterality: Bilateral;    BRONCHOSCOPY- insert lighted tube into airway to obtain biopsy of lung Bilateral 9/2/2018    Performed by  Aldair Deleon MD at [facility] ENDO    CARDIOVERSION      CHOLECYSTECTOMY      INSERTION OF TUNNELED CENTRAL VENOUS CATHETER (CVC) WITH SUBCUTANEOUS PORT Right 10/9/2018    Procedure: [id number];  Surgeon: Christophe Brandt MD;  Location: [facility] OR;  Service: General;  Laterality: Right;    [id number]-CATH Right 10/9/2018    Performed by Christophe Brandt MD at [facility] OR    PATELLA SURGERY Right     RADIOFREQUENCY ABLATION      ROTATOR CUFF REPAIR Left     TRANSESOPHAGEAL ECHOCARDIOGRAM (DILLAN) N/A 12/4/2017    Per[facility] CATH LAB    ULTRASOUND GUIDANCE Right 10/9/2018    Procedure: ULTRASOUND GUIDANCE;  Surgeon: Christophe Brandt MD;  Location: [facility] OR;  Service: General;  Laterality: Right;    ULTRASOUND GUIDANCE Right 10/9/2018    Performed by Christophe Brandt MD at [facility] OR       ALLERGIES:   Review of patient's allergies indicates:   Allergen Reactions    Anoro ellipta [umeclidinium-vilanterol] Shortness Of Breath    Flomax [tamsulosin]      Dizzy         MEDICATIONS:    Current Facility-Administered Medications:     acetaminophen tablet 650 mg, 650 mg, Oral, Q8H PRN, Jos Solis NP, 650 mg at 11/13/18 2108    albuterol-ipratropium 2.5 mg-0.5 mg/3 mL nebulizer solution 3 mL, 3 mL, Nebulization, Q6H PRN, Jos Solis NP    apixaban tablet 5 mg, 5 mg, Oral, BID, Jos Solis NP, 5 mg at 11/14/18 0944    arformoterol nebulizer solution 15 mcg, 15 mcg, Nebulization, BID, 15 mcg at 11/14/18 0753 **AND** Inhalation Treatment BID, , , BID, Jos Solis NP    budesonide nebulizer solution 0.5 mg, 0.5 mg, Nebulization, Q12H, Jos Solis NP, 0.5 mg at 11/14/18 0753    dextrose 50% injection 12.5 g, 12.5 g, Intravenous, PRN, Jos Solis NP    dextrose 50% injection 25 g, 25 g, Intravenous, PRN, Jos Solis NP    diltiaZEM 24 hr capsule 360 mg, 360 mg, Oral, Daily, Jos Solis, NP, 360 mg at 11/14/18 0943    docusate sodium capsule 100 mg, 100 mg,  Oral, Daily, Jos Solis NP, 100 mg at 11/14/18 0944    fentaNYL 25 mcg/hr 1 patch, 1 patch, Transdermal, Q72H, Sonia Lai NP, 1 patch at 11/14/18 0944    glucagon (human recombinant) injection 1 mg, 1 mg, Intramuscular, PRN, Jos Solis NP    glucose chewable tablet 16 g, 16 g, Oral, PRN, Jos Solis NP    glucose chewable tablet 24 g, 24 g, Oral, PRN, Jos Solis NP    HYDROmorphone injection 1 mg, 1 mg, Intravenous, Q8H PRN, Jos Solis NP, 1 mg at 11/14/18 0237    insulin aspart U-100 pen 0-5 Units, 0-5 Units, Subcutaneous, QID (AC + HS) PRN, Jos Solis NP    insulin detemir U-100 pen 6 Units, 6 Units, Subcutaneous, BID, Jos Solis NP, 6 Units at 11/14/18 0945    lactulose 20 gram/30 mL solution Soln 10 g, 10 g, Oral, Daily PRN, Jos Solis NP    magic mouthwash (diphenhydrAMINE 12.5 mg/5 mL 20 mL, aluminum & magnesium hydroxide-simethicone (MYLANTA) 20 mL, lidocaine HCl 2% (XYLOCAINE) 20 mL) solution, 15 mL, Swish & Spit, Q6H PRN, Jos Solis NP    magic mouthwash (diphenhydrAMINE 12.5 mg/5 mL 20 mL, aluminum & magnesium hydroxide-simethicone (MYLANTA) 20 mL, lidocaine HCl 2% (XYLOCAINE) 20 mL) solution, 15 mL, Swish & Spit, AC + HS + 0200, Sonia Lai NP, 15 mL at 11/14/18 1346    ondansetron injection 4 mg, 4 mg, Intravenous, Q8H PRN, Jos Solis NP, 4 mg at 11/14/18 1000    oxyCODONE immediate release tablet 15 mg, 15 mg, Oral, Q4H PRN, Sonia Lai NP, 15 mg at 11/14/18 1334    pantoprazole EC tablet 40 mg, 40 mg, Oral, Daily, Jos Solis NP, 40 mg at 11/14/18 0944    polyethylene glycol packet 17 g, 17 g, Oral, Daily, Sonia Lai NP, 17 g at 11/14/18 0943    senna-docusate 8.6-50 mg per tablet 1 tablet, 1 tablet, Oral, BID, Sonia Lai NP, 1 tablet at 11/14/18 0944    sertraline tablet 50 mg, 50 mg, Oral, Daily, Jos Solis NP, 50 mg at 11/14/18 0944    sodium chloride 0.9% flush 5 mL, 5 mL, Intravenous, PRNJos  "TANG Solis NP    SOCIAL HISTORY:  Social History     Socioeconomic History    Marital status:      Spouse name: Not on file    Number of children: 2    Years of education: Not on file    Highest education level: Not on file   Social Needs    Financial resource strain: Not on file    Food insecurity - worry: Not on file    Food insecurity - inability: Not on file    Transportation needs - medical: Not on file    Transportation needs - non-medical: Not on file   Occupational History    Not on file   Tobacco Use    Smoking status: Former Smoker     Packs/day: 0.50     Types: Cigarettes     Start date: 1/1/1960     Last attempt to quit: 3/8/2015     Years since quitting: 3.6    Smokeless tobacco: Former User     Types: Snuff     Quit date: 10/7/1995   Substance and Sexual Activity    Alcohol use: No     Alcohol/week: 0.0 oz    Drug use: No    Sexual activity: Not on file   Other Topics Concern    Not on file   Social History Narrative    Fire protection installation - retired       FAMILY HISTORY:  Family History   Problem Relation Age of Onset    Heart failure Brother     Heart attack Brother     Diabetes Mother     Hypertension Mother     Stroke Mother     Hypertension Father     Stroke Father     Diabetes Sister     Cataracts Sister     Cancer Maternal Aunt     Diabetes Sister     Diabetes Sister     Stroke Paternal Grandmother     Stroke Paternal Grandfather            PHYSICAL EXAMINATION:  Constitutional: well appearing, no acute distress, ECOG 2 - Ambulates, capable of self care only  Vitals:    BP (!) 107/56 (BP Location: Right arm, Patient Position: Lying)   Pulse 81   Temp 97.8 °F (36.6 °C) (Oral)   Resp 17   Ht 5' 7" (1.702 m)   Wt 80 kg (176 lb 5.9 oz)   SpO2 98%   BMI 27.62 kg/m²   Neck: trachea midline, neck supple  Lymphatic: no cervical, supraclavicular or axillary adenopathy  Cardiovascular: regular rate, no murmurs, no edema of the upper or lower extremities, " radial pulse 2+  Respiratory: unlabored effort, clear to auscultation, no wheezes, decreased breath sound  Abdomen: soft, non-tender, no rigidity, no masses, no hepatomegaly  Musculoskeletal:  Tender lower left thorax in a C shape superiorly to the low/midthoracic spine.  Tender palpation of the sternum.  Spine:  Mild tenderness to percussion in the mid/lower thoracic spine.    IMAGING AND LABORATORY FINDINGS: As per HPI; images reviewed personally.    ASSESSMENT:  Advanced lung cancer with    PLAN:  He has 2 separate areas that are most painful:  Left flank and the sternum.  The sternum has metastatic disease and can be targeted with radiation, a short course of 1 week 20 Gy in 5 fractions.  I am not certain why he has the left thoracic pain.  There are no rib lesions.  In the midthoracic spine there is a vertebral body lesion to the left side which could be causing neural foramen impingement.  This was present on the PET scan a month ago and has positive progressed.  He has some consolidation of the left lung base which could also possibly be fractions and irritating his pleura.  He is reasonable to offer radiation to this midthoracic lesion received his pain control.  I will see him tomorrow to see how he is doing after receiving the Fentanyl and we can consider short palliative course of radiation to these areas.    We discussed the techniques, toxicities and indications of radiation and I answered the patient's questions to their apparent satisfaction.    ANN MARIE Culp M.D.  Radiation Oncology  [facility] [address] [place] [address]  Ph: [phone]  [email or web address]

## 2018-11-14 NOTE — HPI
Nico Garza Jr. is a 72 y.o. male patient with a hx of DM, HTN, and lung cancer who presents  for L-sided CP which onset gradually 1 night ago. Symptoms are constant and moderate in severity. Pt was evaluated by Dr. Moseley (Internal Medicine) and sent to the ED for further evaluation. No mitigating factors reported. Pain is exacerbated by deep inhalation and palpation. Associated sxs include sob, non productive cough and BLE swelling. Patient reports had second dose of Keytruda tx 5 days ago. Patient was evaluated in the ER. #1 Troponin negative. CTA Chest negative for PE but of importance Upper Abdomen: 4.1 cm metastatic lesion within the dome of the liver.  Mild intrahepatic ductal dilatation. Lesion size increased over past 2 months and this ductal dilation is new compared to prior ABD CT 8/31/18.  HM consulted for admission. US ABD ordered given new CT findings which note the common duct is not dilated, measuring 7 mm.  No intrahepatic ductal dilatation. 2 solid masses consistent with hx of cancer. Patient placed in obs for further eval/treatment for chest pain. Heart score elevated.

## 2018-11-14 NOTE — HPI
Nico Garza Jr. is a 72 y.o. male patient with a hx of DM, HTN, and lung cancer who presents  for L-sided CP which onset gradually 1 night ago. Symptoms are constant and moderate in severity. Pt was evaluated by Dr. Moseley (Internal Medicine) and sent to the ED for further evaluation. No mitigating factors reported. Pain is exacerbated by deep inhalation and palpation. Associated sxs include sob, non productive cough and BLE swelling. Patient reports had second dose of Keytruda tx 5 days ago. Patient was evaluated in the ER. #1 Troponin negative. CTA Chest negative for PE but of importance Upper Abdomen: 4.1 cm metastatic lesion within the dome of the liver.  Mild intrahepatic ductal dilatation. Lesion size increased over past 2 months and this ductal dilation is new compared to prior ABD CT 8/31/18.  HM consulted for admission. US ABD ordered given new CT findings which note the common duct is not dilated, measuring 7 mm.  No intrahepatic ductal dilatation. 2 solid masses consistent with hx of cancer. Patient placed in obs for further eval/treatment for chest pain. Heart score elevated.     Hematology oncology consulted for further management of care.

## 2018-11-14 NOTE — CONSULTS
[facility] -   Hematology/Oncology  Consult Note    Patient Name: Nico Garza Jr.  MRN: [id number]  Admission Date: 11/13/2018  Hospital Length of Stay: 0 days  Code Status: Full Code   Attending Provider: Mohinder Yee MD  Consulting Provider: Callie Rodriguez NP  Primary Care Physician: Tiffany Davis DO  Principal Problem:Chest pain    Consults  Subjective:     HPI:  Nico Garza Jr. is a 72 y.o. male patient with a hx of DM, HTN, and lung cancer who presents  for L-sided CP which onset gradually 1 night ago. Symptoms are constant and moderate in severity. Pt was evaluated by Dr. Moseley (Internal Medicine) and sent to the ED for further evaluation. No mitigating factors reported. Pain is exacerbated by deep inhalation and palpation. Associated sxs include sob, non productive cough and BLE swelling. Patient reports had second dose of Keytruda tx 5 days ago. Patient was evaluated in the ER. #1 Troponin negative. CTA Chest negative for PE but of importance Upper Abdomen: 4.1 cm metastatic lesion within the dome of the liver.  Mild intrahepatic ductal dilatation. Lesion size increased over past 2 months and this ductal dilation is new compared to prior ABD CT 8/31/18.  HM consulted for admission. US ABD ordered given new CT findings which note the common duct is not dilated, measuring 7 mm.  No intrahepatic ductal dilatation. 2 solid masses consistent with hx of cancer. Patient placed in obs for further eval/treatment for chest pain. Heart score elevated.     Hematology oncology consulted for further management of care.    Oncology Treatment Plan:   OP PEMBROLIZUMAB 200MG Q3W    Medications:  Continuous Infusions:  Scheduled Meds:   apixaban  5 mg Oral BID    arformoterol  15 mcg Nebulization BID    budesonide  0.5 mg Nebulization Q12H    diltiaZEM  360 mg Oral Daily    docusate sodium  100 mg Oral Daily    fentaNYL  1 patch Transdermal Q72H    insulin detemir U-100  6 Units Subcutaneous  BID    magic mouthwash (diphenhydrAMINE 12.5 mg/5 mL 20 mL, aluminum & magnesium hydroxide-simethicone (MYLANTA) 20 mL, lidocaine HCl 2% (XYLOCAINE) 20 mL) solution  15 mL Swish & Spit AC + HS + 0200    pantoprazole  40 mg Oral Daily    polyethylene glycol  17 g Oral Daily    senna-docusate 8.6-50 mg  1 tablet Oral BID    sertraline  50 mg Oral Daily     PRN Meds:acetaminophen, albuterol-ipratropium, dextrose 50%, dextrose 50%, glucagon (human recombinant), glucose, glucose, HYDROmorphone, insulin aspart U-100, lactulose, magic mouthwash (diphenhydrAMINE 12.5 mg/5 mL 20 mL, aluminum & magnesium hydroxide-simethicone (MYLANTA) 20 mL, lidocaine HCl 2% (XYLOCAINE) 20 mL) solution, ondansetron, oxyCODONE, sodium chloride 0.9%     Review of patient's allergies indicates:   Allergen Reactions    Anoro ellipta [umeclidinium-vilanterol] Shortness Of Breath    Flomax [tamsulosin]      Dizzy          Past Medical History:   Diagnosis Date    Arthritis     Atrial fibrillation and flutter     Atrial flutter     Cancer     LUNG    COPD (chronic obstructive pulmonary disease)     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  Others are too small to accurately characterize.    Lung disease     Nephrolithiasis     ct urogram 12/15/2017-Bilateral nephrolithiasis.     Neuropathy, diabetic     Pancreatitis     Type 1 diabetes mellitus with diabetic neuropathy 4/27/2018     Past Surgical History:   Procedure Laterality Date    ABLATION N/A 12/4/2017    Performed by Jaret [facility]ire [facility] at [facility] CATH LAB    BICEPS TENDON REPAIR Right     BRONCHOSCOPY Bilateral 9/2/2018    Procedure: BRONCHOSCOPY- insert lighted tube into airway to obtain biopsy of lung;  Surgeon: Aldair Deleon MD;  Location: [facility];  Service: Endoscopy;  Laterality:  Bilateral;    BRONCHOSCOPY- insert lighted tube into airway to obtain biopsy of lung Bilateral 9/2/2018    Performed by Aldair Deleon MD at [facility] ENDO    CARDIOVERSION      CHOLECYSTECTOMY      INSERTION OF TUNNELED CENTRAL VENOUS CATHETER (CVC) WITH SUBCUTANEOUS PORT Right 10/9/2018    Procedure: [id number]-CATH;  Surgeon: Christophe Brandt MD;  Location: [facility] OR;  Service: General;  Laterality: Right;    EQNNFOVRQ-[id number]-H-CATH Right 10/9/2018    Performed by Christophe Brandt MD at [facility] OR    PATELLA SURGERY Right     RADIOFREQUENCY ABLATION      ROTATOR CUFF REPAIR Left     TRANSESOPHAGEAL ECHOCARDIOGRAM (DILLAN) N/A 12/4/2017    Performed by [facility] at [facility] CATH LAB    ULTRASOUND GUIDANCE Right 10/9/2018    Procedure: ULTRASOUND GUIDANCE;  Surgeon: Christophe Brandt MD;  Location: [facility] OR;  Service: General;  Laterality: Right;    ULTRASOUND GUIDANCE Right 10/9/2018    Performed by Christophe Brandt MD at [facility] OR     Family History     Problem Relation (Age of Onset)    Cancer Maternal Aunt    Cataracts Sister    Diabetes Mother, Sister, Sister, Sister    Heart attack Brother    Heart failure Brother    Hypertension Mother, Father    Stroke Mother, Father, Paternal Grandmother, Paternal Grandfather        Tobacco Use    Smoking status: Former Smoker     Packs/day: 0.50     Types: Cigarettes     Start date: 1/1/1960     Last attempt to quit: 3/8/2015     Years since quitting: 3.6    Smokeless tobacco: Former User     Types: Snuff     Quit date: 10/7/1995   Substance and Sexual Activity    Alcohol use: No     Alcohol/week: 0.0 oz    Drug use: No    Sexual activity: Not on file       Review of Systems   Constitutional: Positive for activity change and fatigue. Negative for chills and fever.   HENT: Negative for congestion, mouth sores, nosebleeds, postnasal drip, rhinorrhea, sinus pain and sore throat.    Respiratory: Positive for cough and shortness of breath. Negative for chest tightness.     Cardiovascular: Positive for chest pain. Negative for palpitations.   Gastrointestinal: Negative for abdominal pain, anal bleeding, blood in stool, diarrhea, nausea and vomiting.   Endocrine: Negative for cold intolerance and heat intolerance.   Genitourinary: Negative for difficulty urinating, dysuria and hematuria.   Musculoskeletal: Positive for arthralgias, back pain and gait problem.   Skin: Negative for rash and wound.   Allergic/Immunologic: Positive for immunocompromised state.   Neurological: Positive for weakness. Negative for dizziness, syncope and light-headedness.   Hematological: Negative for adenopathy. Does not bruise/bleed easily.   Psychiatric/Behavioral: Negative for confusion and decreased concentration.     Objective:     Vital Signs (Most Recent):  Temp: 97.9 °F (36.6 °C) (11/14/18 1130)  Pulse: 107 (11/14/18 1130)  Resp: 17 (11/14/18 1130)  BP: 128/60 (11/14/18 1130)  SpO2: 96 % (11/14/18 1130) Vital Signs (24h Range):  Temp:  [97.9 °F (36.6 °C)-99.6 °F (37.6 °C)] 97.9 °F (36.6 °C)  Pulse:  [] 107  Resp:  [17-29] 17  SpO2:  [84 %-96 %] 96 %  BP: (119-153)/(55-81) 128/60     Weight: 80 kg (176 lb 5.9 oz)  Body mass index is 27.62 kg/m².  Body surface area is 1.94 meters squared.      Intake/Output Summary (Last 24 hours) at 11/14/2018 1141  Last data filed at 11/14/2018 0800  Gross per 24 hour   Intake 900 ml   Output 1200 ml   Net -300 ml       Physical Exam   Constitutional: He is oriented to person, place, and time. He appears well-developed and well-nourished.  Non-toxic appearance. He has a sickly appearance. He appears ill. No distress.   HENT:   Head: Normocephalic and atraumatic.   Eyes: Conjunctivae, EOM and lids are normal. Right eye exhibits no discharge. Left eye exhibits no discharge. No scleral icterus.   Neck: Normal range of motion.   Cardiovascular: An irregular rhythm present. Tachycardia present. Exam reveals no gallop and no friction rub.   No murmur  heard.  Pulmonary/Chest: Effort normal. No accessory muscle usage. No apnea, no tachypnea and no bradypnea. No respiratory distress. He has decreased breath sounds. He has rales.   Abdominal: Soft. Normal appearance. He exhibits no distension. There is no tenderness.   Musculoskeletal: Normal range of motion. He exhibits edema and tenderness.   Neurological: He is alert and oriented to person, place, and time.   Skin: Skin is warm, dry and intact. Capillary refill takes less than 2 seconds. Bruising noted. No rash noted. He is not diaphoretic. No erythema. No pallor.   Psychiatric: His speech is normal and behavior is normal. Judgment and thought content normal. His mood appears anxious. Cognition and memory are normal. He exhibits a depressed mood. He is attentive.   Vitals reviewed.      Significant Labs:   BMP:   Recent Labs   Lab 11/13/18 1622 11/14/18 0450    150*    135*   K 4.3 4.1   CL 95 93*   CO2 32* 31*   BUN 8 7*   CREATININE 0.7 0.7   CALCIUM 9.3 9.3   MG 1.8  --    , CBC:   Recent Labs   Lab 11/13/18 1622 11/14/18 0450   WBC 8.30 7.28   HGB 10.7* 11.3*   HCT 34.9* 36.3*    253   , CMP:   Recent Labs   Lab 11/13/18 1622 11/14/18  0450    135*   K 4.3 4.1   CL 95 93*   CO2 32* 31*    150*   BUN 8 7*   CREATININE 0.7 0.7   CALCIUM 9.3 9.3   PROT 7.2 7.2  7.2   ALBUMIN 2.7* 2.7*  2.7*   BILITOT 0.3 0.4  0.4   ALKPHOS 253* 336*  336*   AST 69* 107*  107*   ALT 49* 45*  45*   ANIONGAP 11 11   EGFRNONAA >60 >60   , Coagulation:   Recent Labs   Lab 11/14/18 0450   INR 1.1   APTT 27.3   , LDH: No results for input(s): LDHCSF, BFSOURCE in the last 48 hours., LFTs:   Recent Labs   Lab 11/13/18 1622 11/14/18  0450   ALT 49* 45*  45*   AST 69* 107*  107*   ALKPHOS 253* 336*  336*   BILITOT 0.3 0.4  0.4   PROT 7.2 7.2  7.2   ALBUMIN 2.7* 2.7*  2.7*   , Urine Studies:   Recent Labs   Lab 11/14/18  0522   COLORU Yellow   APPEARANCEUA Clear   PHUR 8.0   SPECGRAV 1.015    PROTEINUA Negative   GLUCUA Negative   KETONESU Trace*   BILIRUBINUA Negative   OCCULTUA Negative   NITRITE Negative   UROBILINOGEN Negative   LEUKOCYTESUR Negative    and All pertinent labs from the last 24 hours have been reviewed.    Diagnostic Results:  I have reviewed all pertinent imaging results/findings within the past 24 hours.    Assessment/Plan:     * Chest pain    11/14/18 - patient presented with shortness of breath and chest pain not relieved by home pain medications.  So far cardiac workup has been negative.  Patient states that his O2 sats at home were greater than 92%.  Patient home pain medications may need to be adjusted upon discharge for better pain control.      Metastatic left lung cancer (metastasis from lung to other site) with mediastinal lymphadenopathy    11/14/18 patient currently receiving key treated for PDL1 + lung cancer.  Last dose received on November 8, 2018.         Thank you for your consult. I will follow-up with patient. Please contact us if you have any additional questions.    Callie Rodriguez NP  Hematology/Oncology  [facility]

## 2018-11-14 NOTE — ASSESSMENT & PLAN NOTE
Appears to be worsening and likley cause of patients symptoms vs oncological-medication related.   Consider consult to heme/oncology  Continue supportive/symptomatic care  (nystatin oral, magic mouth wash, pain control)  Consider further end-of-life discussion with patient like hospice pending course.

## 2018-11-14 NOTE — ASSESSMENT & PLAN NOTE
11/14/18 - patient presented with shortness of breath and chest pain not relieved by home pain medications.  So far cardiac workup has been negative.  Patient states that his O2 sats at home were greater than 92%.  Patient home pain medications may need to be adjusted upon discharge for better pain control.

## 2018-11-14 NOTE — PROGRESS NOTES
[facility] Hospital Medicine  Progress Note    Patient Name: Nico Garza Jr.  MRN: [id number]  Patient Class: IP- Inpatient   Admission Date: 11/13/2018  Length of Stay: 0 days  Attending Physician: Mohinder Yee MD  Primary Care Provider: Tiffany Davis DO        Subjective:     Principal Problem:Intractable pain    HPI:  Nico Garza Jr. is a 72 y.o. male patient with a hx of DM, HTN, and lung cancer who presents  for L-sided CP which onset gradually 1 night ago. Symptoms are constant and moderate in severity. Pt was evaluated by Dr. Moseley (Internal Medicine) and sent to the ED for further evaluation. No mitigating factors reported. Pain is exacerbated by deep inhalation and palpation. Associated sxs include sob, non productive cough and BLE swelling. Patient reports had second dose of Keytruda tx 5 days ago. Patient was evaluated in the ER. #1 Troponin negative. CTA Chest negative for PE but of importance Upper Abdomen: 4.1 cm metastatic lesion within the dome of the liver.  Mild intrahepatic ductal dilatation. Lesion size increased over past 2 months and this ductal dilation is new compared to prior ABD CT 8/31/18.  HM consulted for admission. US ABD ordered given new CT findings which note the common duct is not dilated, measuring 7 mm.  No intrahepatic ductal dilatation. 2 solid masses consistent with hx of cancer. Patient placed in obs for further eval/treatment for chest pain. Heart score elevated.         Hospital Course:  The pt is a 73 yo male with hx NSCLC with mets to bone and liver s/p radiation currently receiving Keytruda admitted for chest pain. Chest pain is characterized by a 8/10 constant pain worse with deep inspiration and palpation. EKG showed no change from previous. Serial troponin normal.   CTA chest showed no PE- Elevation left hemidiaphragm with consolidation of the left lower lobe likely compressive.  Left anterior mediastinal mass measures approximately 6.1 x  "4.5 cm, enlarged mediastinal and paratracheal lymph nodes,1.1 cm nodule left lower lobe.1.4 cm nodule right lower lobe.  Nodules have enlarged from prior exam.4.1 cm metastatic lesion within the dome of the liver., Moderate constipation.  Abd U/S showed 2 large, solid masses in the liver.   The pt's intractable chest pain is likely cancer related. He also reports intractable back pain unrelieved by home medication Oxycodone 15 mg every 4 hours. Will give Dilaudid 1 mg IV now and place a Fentanyl 25 mcg patch. Care discussed with Heme/Onc. Pt will need to be monitored for effectiveness and possible adverse effects on new pain regimen     Interval History: Anterior chest pain and back pain, complain of mouth pain and mouth "ulcers"      Review of Systems   Constitutional: Positive for activity change, appetite change, fatigue and unexpected weight change (20 lbs in 2 months ). Negative for chills, diaphoresis and fever.   HENT: Negative for congestion, nosebleeds, sore throat and trouble swallowing.    Eyes: Negative for pain, discharge and visual disturbance.   Respiratory: Positive for shortness of breath (chronic ). Negative for apnea, cough, chest tightness, wheezing and stridor.    Cardiovascular: Positive for chest pain. Negative for palpitations and leg swelling.   Gastrointestinal: Negative for abdominal distention, abdominal pain, blood in stool, constipation, diarrhea, nausea and vomiting.   Endocrine: Negative for cold intolerance and heat intolerance.   Genitourinary: Negative for difficulty urinating, dysuria, flank pain, frequency and urgency.   Musculoskeletal: Positive for back pain. Negative for arthralgias, joint swelling, myalgias, neck pain and neck stiffness.   Skin: Negative for rash and wound.   Allergic/Immunologic: Positive for immunocompromised state. Negative for food allergies.   Neurological: Positive for weakness. Negative for dizziness, seizures, syncope, facial asymmetry, " light-headedness and headaches.   Hematological: Negative for adenopathy.   Psychiatric/Behavioral: Negative for agitation, behavioral problems and confusion. The patient is not nervous/anxious.      Objective:     Vital Signs (Most Recent):  Temp: 97.9 °F (36.6 °C) (11/14/18 1130)  Pulse: 107 (11/14/18 1130)  Resp: 17 (11/14/18 1130)  BP: 128/60 (11/14/18 1130)  SpO2: 96 % (11/14/18 1130) Vital Signs (24h Range):  Temp:  [97.9 °F (36.6 °C)-99.6 °F (37.6 °C)] 97.9 °F (36.6 °C)  Pulse:  [] 107  Resp:  [17-29] 17  SpO2:  [84 %-96 %] 96 %  BP: (119-153)/(55-81) 128/60     Weight: 80 kg (176 lb 5.9 oz)  Body mass index is 27.62 kg/m².    Intake/Output Summary (Last 24 hours) at 11/14/2018 1359  Last data filed at 11/14/2018 0800  Gross per 24 hour   Intake 900 ml   Output 1200 ml   Net -300 ml      Physical Exam   Constitutional: He is oriented to person, place, and time. He appears well-developed and well-nourished. No distress.   HENT:   Head: Normocephalic and atraumatic.   Nose: Nose normal.   Mouth/Throat: Oropharynx is clear and moist.   Small oral ulcer to lower inner lip    Eyes: Conjunctivae and EOM are normal. No scleral icterus.   Neck: Normal range of motion. Neck supple.   Cardiovascular: Normal rate, regular rhythm, normal heart sounds and intact distal pulses. Exam reveals no gallop and no friction rub.   No murmur heard.  Pulmonary/Chest: Effort normal. No stridor. No respiratory distress. He has no wheezes. He has no rales. He exhibits no tenderness.   Diminished BS  TTP to anterior chest wall    Abdominal: Soft. Bowel sounds are normal. He exhibits no distension. There is no tenderness. There is no rebound and no guarding.   Musculoskeletal: Normal range of motion. He exhibits no edema, tenderness or deformity.   Neurological: He is alert and oriented to person, place, and time. He has normal reflexes. No cranial nerve deficit. He exhibits normal muscle tone. Coordination normal.   Skin: Skin is  warm and dry. No rash noted. He is not diaphoretic. No erythema. No pallor.   Psychiatric: He has a normal mood and affect. His behavior is normal. Thought content normal.   Nursing note and vitals reviewed.      Significant Labs:   BMP:   Recent Labs   Lab 11/13/18  1622 11/14/18  0450    150*    135*   K 4.3 4.1   CL 95 93*   CO2 32* 31*   BUN 8 7*   CREATININE 0.7 0.7   CALCIUM 9.3 9.3   MG 1.8  --      CBC:   Recent Labs   Lab 11/13/18  1622 11/14/18  0450   WBC 8.30 7.28   HGB 10.7* 11.3*   HCT 34.9* 36.3*    253     CMP:   Recent Labs   Lab 11/13/18  1622 11/14/18  0450    135*   K 4.3 4.1   CL 95 93*   CO2 32* 31*    150*   BUN 8 7*   CREATININE 0.7 0.7   CALCIUM 9.3 9.3   PROT 7.2 7.2  7.2   ALBUMIN 2.7* 2.7*  2.7*   BILITOT 0.3 0.4  0.4   ALKPHOS 253* 336*  336*   AST 69* 107*  107*   ALT 49* 45*  45*   ANIONGAP 11 11   EGFRNONAA >60 >60     All pertinent labs within the past 24 hours have been reviewed.    Significant Imaging:   Imaging Results          US Abdomen Limited (Final result)  Result time 11/13/18 21:05:51    Final result by Fitz Davis MD (11/13/18 21:05:51)                 Impression:      There are 2 solid masses in the liver as described above concerning for metastatic disease.      Electronically signed by: Fitz Davis MD  Date:    11/13/2018  Time:    21:05             Narrative:    EXAMINATION:  US ABDOMEN LIMITED    CLINICAL HISTORY:  new common bile duct dilation worsening hepatic mass;    TECHNIQUE:  Limited ultrasound of the right upper quadrant of the abdomen (including pancreas, liver, gallbladder, common bile duct, and right kidney) was performed.    COMPARISON:  CT of the chest, 11/13/2018 and CT of the abdomen, 10/25/2018.    FINDINGS:  Liver: Normal in size, measuring 14.3 cm. Homogeneous echotexture. There is a hypoechoic solid mass in the posterior segment right hepatic lobe measuring 3.5 x 4.2 x 3.2 cm.  There is a smaller hypoechoic  solid mass in the subcapsular right hepatic lobe anteriorly measuring 2.3 x 2.0 x 1.4 cm.    Gallbladder: The patient has had a cholecystectomy.    Biliary system: The common duct is not dilated, measuring 7 mm.  No intrahepatic ductal dilatation.    Right kidney: There is a mild fullness of the right pelvocaliceal collecting system.    Miscellaneous: No upper abdominal ascites.                               CTA Chest Non-Coronary - PE Study (Final result)  Result time 11/13/18 16:47:12    Final result by Jos Brito MD (11/13/18 16:47:12)                 Impression:      No evidence of pulmonary embolism.    See findings above.    All CT scans at this facility use dose modulation, iterative reconstruction and/or weight based dosing when appropriate to reduce radiation dose to as low as reasonably achievable.      Electronically signed by: Jos Brito MD  Date:    11/13/2018  Time:    16:47             Narrative:    EXAMINATION:  CTA CHEST NON CORONARY    CLINICAL HISTORY:  Shortness of breath    TECHNIQUE:  After the intravenous administration of 100 cc of Omni 35 nonionic contrast using CT pulmonary angio technique, 2.5 mm axial images were acquired using helical CT technique from the lung apices through costophrenic sulci.  Sagittal coronal and oblique MIPS were also submitted for interpretation.    COMPARISON:  10/27/2018    FINDINGS:  -Pulmonary arteries: Pulmonary arteries are well opacified.  No evidence of pulmonary embolism.  No evidence of pulmonary hypertension.  No right heart strain is identified.    -Lungs: Severe emphysematous change again noted.  Elevation left hemidiaphragm with consolidation of the left lower lobe likely compressive.  Left anterior mediastinal mass measures approximately 6.1 x 4.5 cm..  The airway is patent.    Multiple other enlarged mediastinal and paratracheal lymph nodes are stable.  1.1 cm nodule left lower lobe.  1.4 cm nodule right lower lobe.  Nodules have enlarged  from prior exam.  Dependent atelectasis.  No effusion or pneumothorax..    -Pleura: Pleural thickening seen at the lung bases without evidence of pleural fluid.  No pneumothorax.  Apical pleural thickening is also noted.    -Mediastinum/Roz:Significant adenopathy throughout the mediastinum and bilateral hilar regions largest right hilar lesion measures 1.5 cm in short axis.  Largest mediastinal mass measures 2.2 cm which is anterior to the su.  Large anterior mediastinal and left hilar mass is described above.    -Axilla: No adenopathy.    -Thyroid: Normal lower gland.    -Heart/Aorta: Heart size is mildly enlarged.  Moderate coronary artery disease.  Lipomatous hypertrophy of the interatrial septum is noted.  No pericardial effusion. Aorta normal caliber.    -Bones/Chest Wall: Diffuse osseous metastatic disease again noted with no evidence of a new compression fracture.    -Upper Abdomen: 4.1 cm metastatic lesion within the dome of the liver.  Mild intrahepatic ductal dilatation.  Partially visualized renal cysts.  Moderate constipation.                              Assessment/Plan:      * Intractable pain    Cancer related pain  IV Dilaudid x one  Fentanyl patch- may need to titrate   Cont Oxycodone 15mg every 4 hours prn breakthru pain   Monitor closely          Constipation    Stool softner  Miralax   Monitor        Metastatic left lung cancer (metastasis from lung to other site) with mediastinal lymphadenopathy    Heme/Onc following      Chest pain    Cancer related pain  See above      Severe malnutrition    Poor appetite due to liver mets   Boost glucose control TID   Encourage regular meals        Chronic respiratory failure with hypoxia    Continue home oxygen        Type 1 diabetes mellitus with diabetic neuropathy    Cont NISS  Levemir   montior       Paroxysmal atrial fibrillation    Continue eliquis and dilitiazem  Monitor   Rate controlled.      Hypertension goal BP (blood pressure) < 130/80     Monitor trends  Continue home meds         COPD, group D, by GOLD 2017 classification    Continue neb txs            VTE Risk Mitigation (From admission, onward)        Ordered     apixaban tablet 5 mg  2 times daily      11/14/18 0323     Place ENDY hose  Until discontinued      11/14/18 0323     Place sequential compression device  Until discontinued      11/14/18 0323              Sonia Lai NP  Department of Hospital Medicine   [facility]

## 2018-11-15 LAB
ALBUMIN SERPL BCP-MCNC: 2.3 G/DL
ALBUMIN SERPL BCP-MCNC: 2.3 G/DL
ALP SERPL-CCNC: 307 U/L
ALP SERPL-CCNC: 307 U/L
ALT SERPL W/O P-5'-P-CCNC: 30 U/L
ALT SERPL W/O P-5'-P-CCNC: 30 U/L
ANION GAP SERPL CALC-SCNC: 11 MMOL/L
AST SERPL-CCNC: 46 U/L
AST SERPL-CCNC: 46 U/L
BASOPHILS # BLD AUTO: 0.01 K/UL
BASOPHILS NFR BLD: 0.1 %
BILIRUB DIRECT SERPL-MCNC: 0.3 MG/DL
BILIRUB SERPL-MCNC: 0.4 MG/DL
BILIRUB SERPL-MCNC: 0.4 MG/DL
BUN SERPL-MCNC: 10 MG/DL
CALCIUM SERPL-MCNC: 8.9 MG/DL
CHLORIDE SERPL-SCNC: 91 MMOL/L
CO2 SERPL-SCNC: 32 MMOL/L
CREAT SERPL-MCNC: 0.7 MG/DL
DIFFERENTIAL METHOD: ABNORMAL
EOSINOPHIL # BLD AUTO: 0 K/UL
EOSINOPHIL NFR BLD: 0.1 %
ERYTHROCYTE [DISTWIDTH] IN BLOOD BY AUTOMATED COUNT: 16.3 %
EST. GFR  (AFRICAN AMERICAN): >60 ML/MIN/1.73 M^2
EST. GFR  (NON AFRICAN AMERICAN): >60 ML/MIN/1.73 M^2
GLUCOSE SERPL-MCNC: 143 MG/DL
HCT VFR BLD AUTO: 33.4 %
HGB BLD-MCNC: 10.4 G/DL
LYMPHOCYTES # BLD AUTO: 0.6 K/UL
LYMPHOCYTES NFR BLD: 7.4 %
MCH RBC QN AUTO: 26.5 PG
MCHC RBC AUTO-ENTMCNC: 31.1 G/DL
MCV RBC AUTO: 85 FL
MONOCYTES # BLD AUTO: 0.8 K/UL
MONOCYTES NFR BLD: 10.6 %
NEUTROPHILS # BLD AUTO: 6.5 K/UL
NEUTROPHILS NFR BLD: 81.8 %
PLATELET # BLD AUTO: 271 K/UL
PMV BLD AUTO: 8.6 FL
POCT GLUCOSE: 132 MG/DL (ref 70–110)
POCT GLUCOSE: 167 MG/DL (ref 70–110)
POCT GLUCOSE: 197 MG/DL (ref 70–110)
POCT GLUCOSE: 201 MG/DL (ref 70–110)
POCT GLUCOSE: 224 MG/DL (ref 70–110)
POTASSIUM SERPL-SCNC: 4 MMOL/L
PROT SERPL-MCNC: 6.3 G/DL
PROT SERPL-MCNC: 6.3 G/DL
RBC # BLD AUTO: 3.93 M/UL
SODIUM SERPL-SCNC: 134 MMOL/L
WBC # BLD AUTO: 7.94 K/UL

## 2018-11-15 PROCEDURE — 80076 HEPATIC FUNCTION PANEL: CPT

## 2018-11-15 PROCEDURE — 77321 SPECIAL TELETX PORT PLAN: CPT | Mod: TC | Performed by: RADIOLOGY

## 2018-11-15 PROCEDURE — 77334 RADIATION TREATMENT AID(S): CPT | Mod: 26,,, | Performed by: RADIOLOGY

## 2018-11-15 PROCEDURE — 77334 RADIATION TREATMENT AID(S): CPT | Mod: TC | Performed by: RADIOLOGY

## 2018-11-15 PROCEDURE — 21400001 HC TELEMETRY ROOM

## 2018-11-15 PROCEDURE — 63600175 PHARM REV CODE 636 W HCPCS: Performed by: INTERNAL MEDICINE

## 2018-11-15 PROCEDURE — 94760 N-INVAS EAR/PLS OXIMETRY 1: CPT

## 2018-11-15 PROCEDURE — S5571 INSULIN DISPOS PEN 3 ML: HCPCS | Performed by: NURSE PRACTITIONER

## 2018-11-15 PROCEDURE — 63600175 PHARM REV CODE 636 W HCPCS: Performed by: NURSE PRACTITIONER

## 2018-11-15 PROCEDURE — 27000221 HC OXYGEN, UP TO 24 HOURS

## 2018-11-15 PROCEDURE — 85025 COMPLETE CBC W/AUTO DIFF WBC: CPT

## 2018-11-15 PROCEDURE — 94761 N-INVAS EAR/PLS OXIMETRY MLT: CPT

## 2018-11-15 PROCEDURE — 25000242 PHARM REV CODE 250 ALT 637 W/ HCPCS: Performed by: INTERNAL MEDICINE

## 2018-11-15 PROCEDURE — 25000242 PHARM REV CODE 250 ALT 637 W/ HCPCS: Performed by: NURSE PRACTITIONER

## 2018-11-15 PROCEDURE — 25000003 PHARM REV CODE 250: Performed by: NURSE PRACTITIONER

## 2018-11-15 PROCEDURE — 77295 3-D RADIOTHERAPY PLAN: CPT | Mod: 26,,, | Performed by: RADIOLOGY

## 2018-11-15 PROCEDURE — 77321 SPECIAL TELETX PORT PLAN: CPT | Mod: 26,,, | Performed by: RADIOLOGY

## 2018-11-15 PROCEDURE — 99233 SBSQ HOSP IP/OBS HIGH 50: CPT | Mod: ,,, | Performed by: INTERNAL MEDICINE

## 2018-11-15 PROCEDURE — 94799 UNLISTED PULMONARY SVC/PX: CPT

## 2018-11-15 PROCEDURE — 94640 AIRWAY INHALATION TREATMENT: CPT

## 2018-11-15 PROCEDURE — 77300 RADIATION THERAPY DOSE PLAN: CPT | Mod: TC | Performed by: RADIOLOGY

## 2018-11-15 PROCEDURE — 77295 3-D RADIOTHERAPY PLAN: CPT | Mod: TC | Performed by: RADIOLOGY

## 2018-11-15 PROCEDURE — 99233 SBSQ HOSP IP/OBS HIGH 50: CPT | Mod: ,,, | Performed by: RADIOLOGY

## 2018-11-15 PROCEDURE — 36415 COLL VENOUS BLD VENIPUNCTURE: CPT

## 2018-11-15 PROCEDURE — 80053 COMPREHEN METABOLIC PANEL: CPT

## 2018-11-15 RX ORDER — KETOROLAC TROMETHAMINE 30 MG/ML
15 INJECTION, SOLUTION INTRAMUSCULAR; INTRAVENOUS EVERY 6 HOURS PRN
Status: DISCONTINUED | OUTPATIENT
Start: 2018-11-15 | End: 2018-11-18 | Stop reason: HOSPADM

## 2018-11-15 RX ORDER — IPRATROPIUM BROMIDE AND ALBUTEROL SULFATE 2.5; .5 MG/3ML; MG/3ML
3 SOLUTION RESPIRATORY (INHALATION) EVERY 6 HOURS
Status: DISCONTINUED | OUTPATIENT
Start: 2018-11-15 | End: 2018-11-15

## 2018-11-15 RX ORDER — IPRATROPIUM BROMIDE AND ALBUTEROL SULFATE 2.5; .5 MG/3ML; MG/3ML
3 SOLUTION RESPIRATORY (INHALATION) EVERY 4 HOURS PRN
Status: DISCONTINUED | OUTPATIENT
Start: 2018-11-15 | End: 2018-11-18 | Stop reason: HOSPADM

## 2018-11-15 RX ORDER — HYDROMORPHONE HYDROCHLORIDE 1 MG/ML
1 INJECTION, SOLUTION INTRAMUSCULAR; INTRAVENOUS; SUBCUTANEOUS EVERY 4 HOURS PRN
Status: DISCONTINUED | OUTPATIENT
Start: 2018-11-15 | End: 2018-11-16

## 2018-11-15 RX ADMIN — ARFORMOTEROL TARTRATE 15 MCG: 15 SOLUTION RESPIRATORY (INHALATION) at 07:11

## 2018-11-15 RX ADMIN — APIXABAN 5 MG: 2.5 TABLET, FILM COATED ORAL at 09:11

## 2018-11-15 RX ADMIN — PANTOPRAZOLE SODIUM 40 MG: 40 TABLET, DELAYED RELEASE ORAL at 09:11

## 2018-11-15 RX ADMIN — DOCUSATE SODIUM 100 MG: 100 CAPSULE, LIQUID FILLED ORAL at 09:11

## 2018-11-15 RX ADMIN — LIDOCAINE HYDROCHLORIDE 15 ML: 20 SOLUTION ORAL; TOPICAL at 08:11

## 2018-11-15 RX ADMIN — IPRATROPIUM BROMIDE AND ALBUTEROL SULFATE 3 ML: .5; 3 SOLUTION RESPIRATORY (INHALATION) at 05:11

## 2018-11-15 RX ADMIN — STANDARDIZED SENNA CONCENTRATE AND DOCUSATE SODIUM 1 TABLET: 8.6; 5 TABLET ORAL at 09:11

## 2018-11-15 RX ADMIN — IPRATROPIUM BROMIDE AND ALBUTEROL SULFATE 3 ML: .5; 3 SOLUTION RESPIRATORY (INHALATION) at 08:11

## 2018-11-15 RX ADMIN — KETOROLAC TROMETHAMINE 15 MG: 30 INJECTION, SOLUTION INTRAMUSCULAR at 06:11

## 2018-11-15 RX ADMIN — DILTIAZEM HYDROCHLORIDE 360 MG: 180 CAPSULE, COATED, EXTENDED RELEASE ORAL at 09:11

## 2018-11-15 RX ADMIN — INSULIN ASPART 1 UNITS: 100 INJECTION, SOLUTION INTRAVENOUS; SUBCUTANEOUS at 08:11

## 2018-11-15 RX ADMIN — ONDANSETRON 4 MG: 2 INJECTION, SOLUTION INTRAMUSCULAR; INTRAVENOUS at 11:11

## 2018-11-15 RX ADMIN — OXYCODONE HYDROCHLORIDE 15 MG: 5 TABLET ORAL at 08:11

## 2018-11-15 RX ADMIN — LIDOCAINE HYDROCHLORIDE 15 ML: 20 SOLUTION ORAL; TOPICAL at 11:11

## 2018-11-15 RX ADMIN — IPRATROPIUM BROMIDE AND ALBUTEROL SULFATE 3 ML: .5; 3 SOLUTION RESPIRATORY (INHALATION) at 12:11

## 2018-11-15 RX ADMIN — BUDESONIDE 0.5 MG: 0.5 SUSPENSION RESPIRATORY (INHALATION) at 07:11

## 2018-11-15 RX ADMIN — SERTRALINE HYDROCHLORIDE 50 MG: 50 TABLET ORAL at 09:11

## 2018-11-15 RX ADMIN — IPRATROPIUM BROMIDE AND ALBUTEROL SULFATE 3 ML: .5; 3 SOLUTION RESPIRATORY (INHALATION) at 07:11

## 2018-11-15 RX ADMIN — OXYCODONE HYDROCHLORIDE 15 MG: 5 TABLET ORAL at 01:11

## 2018-11-15 RX ADMIN — INSULIN DETEMIR 6 UNITS: 100 INJECTION, SOLUTION SUBCUTANEOUS at 08:11

## 2018-11-15 RX ADMIN — HYDROMORPHONE HYDROCHLORIDE 1 MG: 1 INJECTION, SOLUTION INTRAMUSCULAR; INTRAVENOUS; SUBCUTANEOUS at 06:11

## 2018-11-15 RX ADMIN — INSULIN DETEMIR 6 UNITS: 100 INJECTION, SOLUTION SUBCUTANEOUS at 09:11

## 2018-11-15 RX ADMIN — STANDARDIZED SENNA CONCENTRATE AND DOCUSATE SODIUM 1 TABLET: 8.6; 5 TABLET ORAL at 08:11

## 2018-11-15 RX ADMIN — POLYETHYLENE GLYCOL 3350 17 G: 17 POWDER, FOR SOLUTION ORAL at 09:11

## 2018-11-15 RX ADMIN — APIXABAN 5 MG: 2.5 TABLET, FILM COATED ORAL at 08:11

## 2018-11-15 RX ADMIN — OXYCODONE HYDROCHLORIDE 15 MG: 5 TABLET ORAL at 02:11

## 2018-11-15 RX ADMIN — HYDROMORPHONE HYDROCHLORIDE 1 MG: 1 INJECTION, SOLUTION INTRAMUSCULAR; INTRAVENOUS; SUBCUTANEOUS at 11:11

## 2018-11-15 NOTE — PLAN OF CARE
Problem: Patient Care Overview  Goal: Plan of Care Review  Outcome: Ongoing (interventions implemented as appropriate)  POC reviewed, verbalized understanding. Pt remained free from falls, fall precautions in place. Pt is NSR-STon monitor, 80-100s. VSS. No other c/o at this time. PRN medication given for pain. Respiratory called per breathing treatments. PIV intact, saline locked. Blood glucose monitored. Call bell and personal belongings within reach. Hourly rounding complete. Reminded to call for assistance. Will continue to monitor.

## 2018-11-15 NOTE — PROGRESS NOTES
SPOKE WITH RAZIA MICHAUD NP REGARDING PT STATUS. PRN MEDICATION GIVEN THIS AM. SCHEDULES NEBS TO BE STARTED

## 2018-11-15 NOTE — PROGRESS NOTES
[facility]  RADIATION ONCOLOGY INPATIENT SUBSEQUENT CARE NOTE    Name: Nico Garza Jr.  : 1945      DIAGNOSIS:  Metastatic squamous cell carcinoma  1. 18 completed 30 Gy in 10 fractions T12-L2 and 20 Gy in 5 fractions L3-S1  2. 2018 started Keytruda    CURRENT STATUS: Nico Garza Jr. is a pleasant 72 y.o. male who is an inpatient.  This morning the patient was short of breath 83-88% on 5 L.  The patient was given a breathing treatment this morning and his saturation improved to 95%.  His pain is slightly improved on the increased pain medicine but he is still uncomfortable and not sleeping well.    On further review of his CT angiography from 18 there is an area of cortical destruction and possibly fracture on my review of the left 7th rib posteriorly not far from the costovertebral junction.    PHYSICAL EXAM:   Constitutional: well appearing, no acute distress  Vitals:      Vitals:    11/15/18 0805   BP:    Pulse: 81   Resp: (!) 22   Temp:      Respiratory: unlabored effort, clear to auscultation, no wheezes  Musculoskeletal:  Stable site of pain compared with exam yesterday.  His entire sternum is tender.    Laboratory & X-Ray Findings: Per above.      IMPRESSION:  Left posterior 7th rib fracture/cortical destruction on my review of CT angiography from 18, sternal pain    PLAN:  He has a pathologic fracture, which appears new, of the left posterior 7th rib.  This is probably the reason for his left thoracic pain radiating inferior and laterally.  He also has sternal pain.  These are his 2 main sites of pain based on questioning the patient.  He has been getting much relief with increased narcotics.  I offered him a single fraction of 8 Gy to both sites to treat pain.  He agrees to proceed and we will treat him tomorrow morning between 7 and 8:00 a.m..  Radiation planning will be performed today.  We reviewed the anticipated and possible acute and  late toxicities of radiation.    ANN MARIE Culp M.D.  Radiation Oncology  [facility] [address] [place] [address]  Ph: [phone]  [email or web address]

## 2018-11-15 NOTE — SUBJECTIVE & OBJECTIVE
Interval History: The patient reports that his pain is not well controlled. Will adjust analgesia. Plan for radiation tomorrow per Dr. Culp.     Review of Systems   Constitutional: Positive for activity change, appetite change, fatigue and unexpected weight change (20 lbs in 2 months ). Negative for chills, diaphoresis and fever.   HENT: Negative for congestion, facial swelling, nosebleeds, rhinorrhea, sinus pressure, sneezing, sore throat and trouble swallowing.    Eyes: Negative for pain, discharge, redness and visual disturbance.   Respiratory: Positive for shortness of breath (chronic ). Negative for apnea, cough, chest tightness, wheezing and stridor.    Cardiovascular: Positive for chest pain. Negative for palpitations and leg swelling.   Gastrointestinal: Negative for abdominal distention, abdominal pain, anal bleeding, blood in stool, constipation, diarrhea, nausea and vomiting.   Endocrine: Negative for cold intolerance and heat intolerance.   Genitourinary: Negative for difficulty urinating, discharge, dysuria, flank pain, frequency, hematuria and urgency.   Musculoskeletal: Positive for back pain. Negative for arthralgias, gait problem, joint swelling, myalgias, neck pain and neck stiffness.   Skin: Negative for color change, pallor, rash and wound.   Allergic/Immunologic: Positive for immunocompromised state. Negative for food allergies.   Neurological: Positive for weakness. Negative for dizziness, tremors, seizures, syncope, facial asymmetry, speech difficulty, light-headedness, numbness and headaches.   Hematological: Negative for adenopathy.   Psychiatric/Behavioral: Negative for agitation, behavioral problems, confusion, hallucinations and suicidal ideas. The patient is not nervous/anxious.    All other systems reviewed and are negative.    Objective:     Vital Signs (Most Recent):  Temp: 98.5 °F (36.9 °C) (11/15/18 1342)  Pulse: 93 (11/15/18 1502)  Resp: (!) 22 (11/15/18 0805)  BP: (!) 124/57  (11/15/18 1342)  SpO2: (!) 91 % (11/15/18 1342) Vital Signs (24h Range):  Temp:  [97.8 °F (36.6 °C)-99.1 °F (37.3 °C)] 98.5 °F (36.9 °C)  Pulse:  [] 93  Resp:  [18-22] 22  SpO2:  [81 %-95 %] 91 %  BP: (101-128)/(52-60) 124/57     Weight: 80 kg (176 lb 5.9 oz)  Body mass index is 27.62 kg/m².    Intake/Output Summary (Last 24 hours) at 11/15/2018 1632  Last data filed at 11/15/2018 1339  Gross per 24 hour   Intake 1260 ml   Output 1450 ml   Net -190 ml      Physical Exam   Constitutional: He is oriented to person, place, and time. He appears well-developed and well-nourished. No distress.   HENT:   Head: Normocephalic and atraumatic.   Nose: Nose normal.   Mouth/Throat: Oropharynx is clear and moist.   Small oral ulcer to lower inner lip    Eyes: Conjunctivae and EOM are normal. Pupils are equal, round, and reactive to light. No scleral icterus.   Neck: Normal range of motion. Neck supple.   Cardiovascular: Normal rate, regular rhythm, normal heart sounds and intact distal pulses. Exam reveals no gallop and no friction rub.   No murmur heard.  Pulmonary/Chest: Effort normal and breath sounds normal. No stridor. No respiratory distress. He has no wheezes. He has no rales. He exhibits no tenderness.   Diminished BS  TTP to anterior chest wall    Abdominal: Soft. Bowel sounds are normal. He exhibits no distension. There is no tenderness. There is no rebound and no guarding.   Musculoskeletal: Normal range of motion. He exhibits no edema, tenderness or deformity.   Neurological: He is alert and oriented to person, place, and time. He has normal reflexes. No cranial nerve deficit. He exhibits normal muscle tone. Coordination normal.   Skin: Skin is warm and dry. No rash noted. He is not diaphoretic. No erythema. No pallor.   Psychiatric: He has a normal mood and affect. His behavior is normal. Thought content normal.   Nursing note and vitals reviewed.      Significant Labs: All pertinent labs within the past 24  hours have been reviewed.    Significant Imaging:   Imaging Results          US Abdomen Limited (Final result)  Result time 11/13/18 21:05:51    Final result by Fitz Davis MD (11/13/18 21:05:51)                 Impression:      There are 2 solid masses in the liver as described above concerning for metastatic disease.      Electronically signed by: Fitz Davis MD  Date:    11/13/2018  Time:    21:05             Narrative:    EXAMINATION:  US ABDOMEN LIMITED    CLINICAL HISTORY:  new common bile duct dilation worsening hepatic mass;    TECHNIQUE:  Limited ultrasound of the right upper quadrant of the abdomen (including pancreas, liver, gallbladder, common bile duct, and right kidney) was performed.    COMPARISON:  CT of the chest, 11/13/2018 and CT of the abdomen, 10/25/2018.    FINDINGS:  Liver: Normal in size, measuring 14.3 cm. Homogeneous echotexture. There is a hypoechoic solid mass in the posterior segment right hepatic lobe measuring 3.5 x 4.2 x 3.2 cm.  There is a smaller hypoechoic solid mass in the subcapsular right hepatic lobe anteriorly measuring 2.3 x 2.0 x 1.4 cm.    Gallbladder: The patient has had a cholecystectomy.    Biliary system: The common duct is not dilated, measuring 7 mm.  No intrahepatic ductal dilatation.    Right kidney: There is a mild fullness of the right pelvocaliceal collecting system.    Miscellaneous: No upper abdominal ascites.                               CTA Chest Non-Coronary - PE Study (Final result)  Result time 11/13/18 16:47:12    Final result by Jos Brito MD (11/13/18 16:47:12)                 Impression:      No evidence of pulmonary embolism.    See findings above.    All CT scans at this facility use dose modulation, iterative reconstruction and/or weight based dosing when appropriate to reduce radiation dose to as low as reasonably achievable.      Electronically signed by: Jos Brito MD  Date:    11/13/2018  Time:    16:47             Narrative:     EXAMINATION:  CTA CHEST NON CORONARY    CLINICAL HISTORY:  Shortness of breath    TECHNIQUE:  After the intravenous administration of 100 cc of Omni 35 nonionic contrast using CT pulmonary angio technique, 2.5 mm axial images were acquired using helical CT technique from the lung apices through costophrenic sulci.  Sagittal coronal and oblique MIPS were also submitted for interpretation.    COMPARISON:  10/27/2018    FINDINGS:  -Pulmonary arteries: Pulmonary arteries are well opacified.  No evidence of pulmonary embolism.  No evidence of pulmonary hypertension.  No right heart strain is identified.    -Lungs: Severe emphysematous change again noted.  Elevation left hemidiaphragm with consolidation of the left lower lobe likely compressive.  Left anterior mediastinal mass measures approximately 6.1 x 4.5 cm..  The airway is patent.    Multiple other enlarged mediastinal and paratracheal lymph nodes are stable.  1.1 cm nodule left lower lobe.  1.4 cm nodule right lower lobe.  Nodules have enlarged from prior exam.  Dependent atelectasis.  No effusion or pneumothorax..    -Pleura: Pleural thickening seen at the lung bases without evidence of pleural fluid.  No pneumothorax.  Apical pleural thickening is also noted.    -Mediastinum/Roz:Significant adenopathy throughout the mediastinum and bilateral hilar regions largest right hilar lesion measures 1.5 cm in short axis.  Largest mediastinal mass measures 2.2 cm which is anterior to the su.  Large anterior mediastinal and left hilar mass is described above.    -Axilla: No adenopathy.    -Thyroid: Normal lower gland.    -Heart/Aorta: Heart size is mildly enlarged.  Moderate coronary artery disease.  Lipomatous hypertrophy of the interatrial septum is noted.  No pericardial effusion. Aorta normal caliber.    -Bones/Chest Wall: Diffuse osseous metastatic disease again noted with no evidence of a new compression fracture.    -Upper Abdomen: 4.1 cm metastatic lesion  within the dome of the liver.  Mild intrahepatic ductal dilatation.  Partially visualized renal cysts.  Moderate constipation.

## 2018-11-15 NOTE — PROGRESS NOTES
RT called to room. Upon arrival pt sitting in bed feet dangling. o2 sat 90% on 5 L NC. Pt denies sob at this time. RN reports when pt lays back in bed o2 sats decrease. Pt reports he is tired because he has not been able to sleep. Pt also states it is difficult for him to take big breaths due his rib pain. BS heard no crackles / rhonchi noted. diminished throughout. Pt  Was given neb at this time in hopes for better aeration to.the lungs and increase o2 sats. Pt tolerated neb well. Pt alert and oriented at this time. Pt was spot checked middle and end of neb with 92% o2 sats. Pt was educated about incentive spirometer and its uses. IS was placed in room with pt. Pt will start IS with next round due pt in bathroom. Nakia Cook contacted for prn neb treatments and IS orders. Venti mask was also placed at bedside ready for use if RN should feel uncomfortable with pox readings at any other time tonight.

## 2018-11-15 NOTE — PROGRESS NOTES
[facility] -   Hematology/Oncology  Progress Note    Patient Name: Nico Garza Jr.  Admission Date: 11/13/2018  Hospital Length of Stay: 1 days  Code Status: Full Code     Subjective:     HPI:  Nico Garza Jr. is a 72 y.o. male patient with a hx of DM, HTN, and lung cancer who presents  for L-sided CP which onset gradually 1 night ago. Symptoms are constant and moderate in severity. Pt was evaluated by Dr. Moseley (Internal Medicine) and sent to the ED for further evaluation. No mitigating factors reported. Pain is exacerbated by deep inhalation and palpation. Associated sxs include sob, non productive cough and BLE swelling. Patient reports had second dose of Keytruda tx 5 days ago. Patient was evaluated in the ER. #1 Troponin negative. CTA Chest negative for PE but of importance Upper Abdomen: 4.1 cm metastatic lesion within the dome of the liver.  Mild intrahepatic ductal dilatation. Lesion size increased over past 2 months and this ductal dilation is new compared to prior ABD CT 8/31/18.  HM consulted for admission. US ABD ordered given new CT findings which note the common duct is not dilated, measuring 7 mm.  No intrahepatic ductal dilatation. 2 solid masses consistent with hx of cancer. Patient placed in obs for further eval/treatment for chest pain. Heart score elevated.     Hematology oncology consulted for further management of care.    Interval history:  Patient seen this morning at the bedside.  Wife present.  Patient states difficulty breathing and is short of breath - sats 83-88% on 5 liters.  Respiratory was called and patient was given a breathing treatment.  After breathing treatment sats improved to 95%.  Code status again discussed with patient and wife.  They will discuss, but wife states they are praying for a miracle and will let God handle it.      Oncology Treatment Plan:   OP PEMBROLIZUMAB 200MG Q3W    Medications:  Continuous Infusions:  Scheduled Meds:   apixaban  5  mg Oral BID    arformoterol  15 mcg Nebulization BID    budesonide  0.5 mg Nebulization Q12H    diltiaZEM  360 mg Oral Daily    docusate sodium  100 mg Oral Daily    fentaNYL  1 patch Transdermal Q72H    insulin detemir U-100  6 Units Subcutaneous BID    magic mouthwash (diphenhydrAMINE 12.5 mg/5 mL 20 mL, aluminum & magnesium hydroxide-simethicone (MYLANTA) 20 mL, lidocaine HCl 2% (XYLOCAINE) 20 mL) solution  15 mL Swish & Spit AC + HS + 0200    pantoprazole  40 mg Oral Daily    polyethylene glycol  17 g Oral Daily    senna-docusate 8.6-50 mg  1 tablet Oral BID    sertraline  50 mg Oral Daily     PRN Meds:acetaminophen, albuterol-ipratropium, dextrose 50%, dextrose 50%, glucagon (human recombinant), glucose, glucose, HYDROmorphone, insulin aspart U-100, lactulose, magic mouthwash (diphenhydrAMINE 12.5 mg/5 mL 20 mL, aluminum & magnesium hydroxide-simethicone (MYLANTA) 20 mL, lidocaine HCl 2% (XYLOCAINE) 20 mL) solution, ondansetron, oxyCODONE, sodium chloride 0.9%     Review of patient's allergies indicates:   Allergen Reactions    Anoro ellipta [umeclidinium-vilanterol] Shortness Of Breath    Flomax [tamsulosin]      Dizzy          Past Medical History:   Diagnosis Date    Arthritis     Atrial fibrillation and flutter     Atrial flutter     Cancer     LUNG    COPD (chronic obstructive pulmonary disease)     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  Others are too small to accurately characterize.    Lung disease     Nephrolithiasis     ct urogram 12/15/2017-Bilateral nephrolithiasis.     Neuropathy, diabetic     Pancreatitis     Type 1 diabetes mellitus with diabetic neuropathy 4/27/2018     Past Surgical History:   Procedure Laterality Date    ABLATION N/A 12/4/2017    Performed by Jaret Freire MD at [facility] CATH  LAB    BICEPS TENDON REPAIR Right     BRONCHOSCOPY Bilateral 9/2/2018    Procedure: BRONCHOSCOPY- insert lighted tube into airway to obtain biopsy of lung;  Surgeon: Aldair Deleon MD;  Location: [facility] ENDO;  Service: Endoscopy;  Laterality: Bilateral;    BRONCHOSCOPY- insert lighted tube into airway to obtain biopsy of lung Bilateral 9/2/2018    Performed by Aldair Deleon MD at [facility] ENDO    CARDIOVERSION      CHOLECYSTECTOMY      INSERTION OF TUNNELED CENTRAL VENOUS CATHETER (CVC) WITH SUBCUTANEOUS PORT Right 10/9/2018    Procedure: [id number]-CATH;  Surgeon: Christophe Brandt MD;  Location: [facility] OR;  Service: General;  Laterality: Right;    [id number]-X-CATH Right 10/9/2018    Performed by Christophe Brandt MD at [facility] OR    PATELLA SURGERY Right     RADIOFREQUENCY ABLATION      ROTATOR CUFF REPAIR Left     TRANSESOPHAGEAL ECHOCARDIOGRAM (DILLAN) N/A 12/4/2017    Performed by [facility] at [facility] CATH LAB    ULTRASOUND GUIDANCE Right 10/9/2018    Procedure: ULTRASOUND GUIDANCE;  Surgeon: Chritsophe Brandt MD;  Location: [facility] OR;  Service: General;  Laterality: Right;    ULTRASOUND GUIDANCE Right 10/9/2018    Performed by Christophe Brandt MD at [facility] OR     Family History     Problem Relation (Age of Onset)    Cancer Maternal Aunt    Cataracts Sister    Diabetes Mother, Sister, Sister, Sister    Heart attack Brother    Heart failure Brother    Hypertension Mother, Father    Stroke Mother, Father, Paternal Grandmother, Paternal Grandfather        Tobacco Use    Smoking status: Former Smoker     Packs/day: 0.50     Types: Cigarettes     Start date: 1/1/1960     Last attempt to quit: 3/8/2015     Years since quitting: 3.6    Smokeless tobacco: Former User     Types: Snuff     Quit date: 10/7/1995   Substance and Sexual Activity    Alcohol use: No     Alcohol/week: 0.0 oz    Drug use: No    Sexual activity: Not on file       Review of Systems   Constitutional: Positive for activity change and  fatigue. Negative for chills and fever.   HENT: Negative for congestion, mouth sores, nosebleeds, postnasal drip, rhinorrhea, sinus pain and sore throat.    Respiratory: Positive for cough and shortness of breath. Negative for chest tightness.    Cardiovascular: Positive for chest pain. Negative for palpitations.   Gastrointestinal: Negative for abdominal pain, anal bleeding, blood in stool, diarrhea, nausea and vomiting.   Endocrine: Negative for cold intolerance and heat intolerance.   Genitourinary: Negative for difficulty urinating, dysuria and hematuria.   Musculoskeletal: Positive for arthralgias, back pain and gait problem.   Skin: Negative for rash and wound.   Allergic/Immunologic: Positive for immunocompromised state.   Neurological: Positive for weakness. Negative for dizziness, syncope and light-headedness.   Hematological: Negative for adenopathy. Does not bruise/bleed easily.   Psychiatric/Behavioral: Negative for confusion and decreased concentration.     Objective:     Vital Signs (Most Recent):  Temp: 97.8 °F (36.6 °C) (11/15/18 0752)  Pulse: 81 (11/15/18 0805)  Resp: (!) 22 (11/15/18 0805)  BP: (!) 112/52 (11/15/18 0743)  SpO2: 95 % (11/15/18 0805) Vital Signs (24h Range):  Temp:  [97.8 °F (36.6 °C)-99.1 °F (37.3 °C)] 97.8 °F (36.6 °C)  Pulse:  [] 81  Resp:  [17-22] 22  SpO2:  [81 %-98 %] 95 %  BP: (101-128)/(52-60) 112/52     Weight: 80 kg (176 lb 5.9 oz)  Body mass index is 27.62 kg/m².  Body surface area is 1.94 meters squared.      Intake/Output Summary (Last 24 hours) at 11/15/2018 0819  Last data filed at 11/15/2018 0606  Gross per 24 hour   Intake 1080 ml   Output 1450 ml   Net -370 ml       Physical Exam   Constitutional: He is oriented to person, place, and time. He appears well-developed and well-nourished.  Non-toxic appearance. He has a sickly appearance. He appears ill. No distress.   HENT:   Head: Normocephalic and atraumatic.   Eyes: Conjunctivae, EOM and lids are normal. Right  eye exhibits no discharge. Left eye exhibits no discharge. No scleral icterus.   Neck: Normal range of motion.   Cardiovascular: An irregular rhythm present. Tachycardia present. Exam reveals no gallop and no friction rub.   No murmur heard.  Pulmonary/Chest: Effort normal. No accessory muscle usage. No apnea, no tachypnea and no bradypnea. No respiratory distress. He has decreased breath sounds. He has rales.   Abdominal: Soft. Normal appearance. He exhibits no distension. There is no tenderness.   Musculoskeletal: Normal range of motion. He exhibits edema and tenderness.   Neurological: He is alert and oriented to person, place, and time.   Skin: Skin is warm, dry and intact. Capillary refill takes less than 2 seconds. Bruising noted. No rash noted. He is not diaphoretic. No erythema. No pallor.   Psychiatric: His speech is normal and behavior is normal. Judgment and thought content normal. His mood appears anxious. Cognition and memory are normal. He exhibits a depressed mood. He is attentive.   Vitals reviewed.      Significant Labs:   BMP:   Recent Labs   Lab 11/13/18  1622 11/14/18 0450 11/15/18  0430    150* 143*    135* 134*   K 4.3 4.1 4.0   CL 95 93* 91*   CO2 32* 31* 32*   BUN 8 7* 10   CREATININE 0.7 0.7 0.7   CALCIUM 9.3 9.3 8.9   MG 1.8  --   --    , CBC:   Recent Labs   Lab 11/13/18 1622 11/14/18 0450 11/15/18  0430   WBC 8.30 7.28 7.94   HGB 10.7* 11.3* 10.4*   HCT 34.9* 36.3* 33.4*    253 271   , CMP:   Recent Labs   Lab 11/13/18  1622 11/14/18 0450 11/15/18  0430    135* 134*   K 4.3 4.1 4.0   CL 95 93* 91*   CO2 32* 31* 32*    150* 143*   BUN 8 7* 10   CREATININE 0.7 0.7 0.7   CALCIUM 9.3 9.3 8.9   PROT 7.2 7.2  7.2 6.3  6.3   ALBUMIN 2.7* 2.7*  2.7* 2.3*  2.3*   BILITOT 0.3 0.4  0.4 0.4  0.4   ALKPHOS 253* 336*  336* 307*  307*   AST 69* 107*  107* 46*  46*   ALT 49* 45*  45* 30  30   ANIONGAP 11 11 11   EGFRNONAA >60 >60 >60   , Coagulation:    Recent Labs   Lab 11/14/18  0450   INR 1.1   APTT 27.3   , LDH: No results for input(s): LDHCSF, BFSOURCE in the last 48 hours., LFTs:   Recent Labs   Lab 11/13/18  1622 11/14/18  0450 11/15/18  0430   ALT 49* 45*  45* 30  30   AST 69* 107*  107* 46*  46*   ALKPHOS 253* 336*  336* 307*  307*   BILITOT 0.3 0.4  0.4 0.4  0.4   PROT 7.2 7.2  7.2 6.3  6.3   ALBUMIN 2.7* 2.7*  2.7* 2.3*  2.3*   , Urine Studies:   Recent Labs   Lab 11/14/18  0522   COLORU Yellow   APPEARANCEUA Clear   PHUR 8.0   SPECGRAV 1.015   PROTEINUA Negative   GLUCUA Negative   KETONESU Trace*   BILIRUBINUA Negative   OCCULTUA Negative   NITRITE Negative   UROBILINOGEN Negative   LEUKOCYTESUR Negative    and All pertinent labs from the last 24 hours have been reviewed.    Diagnostic Results:  I have reviewed all pertinent imaging results/findings within the past 24 hours.    Assessment/Plan:     Chest pain    11/14/18 - patient presented with shortness of breath and chest pain not relieved by home pain medications.  So far cardiac workup has been negative.  Patient states that his O2 sats at home were greater than 92%.  Patient home pain medications may need to be adjusted upon discharge for better pain control.      Metastatic left lung cancer (metastasis from lung to other site) with mediastinal lymphadenopathy    11/14/18 patient currently receiving key treated for PDL1 + lung cancer.  Last dose received on November 8, 2018.    11/15/18 - code status discussed with patient.  Patient and family did not give an answer or state wishes - stating they were praying for a miracle and would let God handle it.  Patient is to begin radiation, per Dr. Culp (radiation oncologist), to multiple targetable areas in the chest to see if some of the patient's pain can be relieved.  Pain slightly improved with increased pain medication, but patient still remains uncomfortable and had a hard time sleeping last night.           Thank you for your  consult. I will follow-up with patient. Please contact us if you have any additional questions.     Callie Rodriguez NP  Hematology/Oncology  [facility]

## 2018-11-15 NOTE — ASSESSMENT & PLAN NOTE
11/14/18 patient currently receiving key treated for PDL1 + lung cancer.  Last dose received on November 8, 2018.    11/15/18 - code status discussed with patient.  Patient and family did not give an answer or state wishes - stating they were praying for a miracle and would let God handle it.  Patient is to begin radiation, per Dr. Culp (radiation oncologist), to multiple targetable areas in the chest to see if some of the patient's pain can be relieved.  Pain slightly improved with increased pain medication, but patient still remains uncomfortable and had a hard time sleeping last night.

## 2018-11-15 NOTE — SUBJECTIVE & OBJECTIVE
Interval history:  Patient seen this morning at the bedside.  Wife present.  Patient states difficulty breathing and is short of breath - sats 83-88% on 5 liters.  Respiratory was called and patient was given a breathing treatment.  After breathing treatment sats improved to 95%.  Code status again discussed with patient and wife.  They will discuss, but wife states they are praying for a miracle and will let God handle it.      Oncology Treatment Plan:   OP PEMBROLIZUMAB 200MG Q3W    Medications:  Continuous Infusions:  Scheduled Meds:   apixaban  5 mg Oral BID    arformoterol  15 mcg Nebulization BID    budesonide  0.5 mg Nebulization Q12H    diltiaZEM  360 mg Oral Daily    docusate sodium  100 mg Oral Daily    fentaNYL  1 patch Transdermal Q72H    insulin detemir U-100  6 Units Subcutaneous BID    magic mouthwash (diphenhydrAMINE 12.5 mg/5 mL 20 mL, aluminum & magnesium hydroxide-simethicone (MYLANTA) 20 mL, lidocaine HCl 2% (XYLOCAINE) 20 mL) solution  15 mL Swish & Spit AC + HS + 0200    pantoprazole  40 mg Oral Daily    polyethylene glycol  17 g Oral Daily    senna-docusate 8.6-50 mg  1 tablet Oral BID    sertraline  50 mg Oral Daily     PRN Meds:acetaminophen, albuterol-ipratropium, dextrose 50%, dextrose 50%, glucagon (human recombinant), glucose, glucose, HYDROmorphone, insulin aspart U-100, lactulose, magic mouthwash (diphenhydrAMINE 12.5 mg/5 mL 20 mL, aluminum & magnesium hydroxide-simethicone (MYLANTA) 20 mL, lidocaine HCl 2% (XYLOCAINE) 20 mL) solution, ondansetron, oxyCODONE, sodium chloride 0.9%     Review of patient's allergies indicates:   Allergen Reactions    Anoro ellipta [umeclidinium-vilanterol] Shortness Of Breath    Flomax [tamsulosin]      Dizzy          Past Medical History:   Diagnosis Date    Arthritis     Atrial fibrillation and flutter     Atrial flutter     Cancer     LUNG    COPD (chronic obstructive pulmonary disease)     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  Others are too small to accurately characterize.    Lung disease     Nephrolithiasis     ct urogram 12/15/2017-Bilateral nephrolithiasis.     Neuropathy, diabetic     Pancreatitis     Type 1 diabetes mellitus with diabetic neuropathy 4/27/2018     Past Surgical History:   Procedure Laterality Date    ABLATION N/A 12/4/2017    Performed by Jaret Freire [facility] at [facility] CATH LAB    BICEPS TENDON REPAIR Right     BRONCHOSCOPY Bilateral 9/2/2018    Procedure: BRONCHOSCOPY- insert lighted tube into airway to obtain biopsy of lung;  Surgeon: Aldair Deleon MD;  Location: [facility] ENDO;  Service: Endoscopy;  Laterality: Bilateral;    BRONCHOSCOPY- insert lighted tube into airway to obtain biopsy of lung Bilateral 9/2/2018    Performed by Aldair Deleon MD at [facility] ENDO    CARDIOVERSION      CHOLECYSTECTOMY      INSERTION OF TUNNELED CENTRAL VENOUS CATHETER (CVC) WITH SUBCUTANEOUS PORT Right 10/9/2018    Procedure: AIDUFILDK-ERAF-U-CATH;  Surgeon: Christophe Brandt MD;  Location: [facility] OR;  Service: General;  Laterality: Right;    FTOEMNSHL-UITM-J-CATH Right 10/9/2018    Performed by Christophe Brandt MD at [facility] OR    PATELLA SURGERY Right     RADIOFREQUENCY ABLATION      ROTATOR CUFF REPAIR Left     TRANSESOPHAGEAL ECHOCARDIOGRAM (DILLAN) N/A 12/4/2017    Performed by J[facility] Freire MD at [facility] CATH LAB    ULTRASOUND GUIDANCE Right 10/9/2018    Procedure: ULTRASOUND GUIDANCE;  Surgeon: Christophe Brandt MD;  Location: [facility] OR;  Service: General;  Laterality: Right;    ULTRASOUND GUIDANCE Right 10/9/2018    Performed by Christophe Brandt MD at [facility] OR     Family History     Problem Relation (Age of Onset)    Cancer Maternal Aunt    Cataracts Sister    Diabetes Mother, Sister, Sister, Sister    Heart attack Brother    Heart failure Brother     Hypertension Mother, Father    Stroke Mother, Father, Paternal Grandmother, Paternal Grandfather        Tobacco Use    Smoking status: Former Smoker     Packs/day: 0.50     Types: Cigarettes     Start date: 1/1/1960     Last attempt to quit: 3/8/2015     Years since quitting: 3.6    Smokeless tobacco: Former User     Types: Snuff     Quit date: 10/7/1995   Substance and Sexual Activity    Alcohol use: No     Alcohol/week: 0.0 oz    Drug use: No    Sexual activity: Not on file       Review of Systems   Constitutional: Positive for activity change and fatigue. Negative for chills and fever.   HENT: Negative for congestion, mouth sores, nosebleeds, postnasal drip, rhinorrhea, sinus pain and sore throat.    Respiratory: Positive for cough and shortness of breath. Negative for chest tightness.    Cardiovascular: Positive for chest pain. Negative for palpitations.   Gastrointestinal: Negative for abdominal pain, anal bleeding, blood in stool, diarrhea, nausea and vomiting.   Endocrine: Negative for cold intolerance and heat intolerance.   Genitourinary: Negative for difficulty urinating, dysuria and hematuria.   Musculoskeletal: Positive for arthralgias, back pain and gait problem.   Skin: Negative for rash and wound.   Allergic/Immunologic: Positive for immunocompromised state.   Neurological: Positive for weakness. Negative for dizziness, syncope and light-headedness.   Hematological: Negative for adenopathy. Does not bruise/bleed easily.   Psychiatric/Behavioral: Negative for confusion and decreased concentration.     Objective:     Vital Signs (Most Recent):  Temp: 97.8 °F (36.6 °C) (11/15/18 0752)  Pulse: 81 (11/15/18 0805)  Resp: (!) 22 (11/15/18 0805)  BP: (!) 112/52 (11/15/18 0743)  SpO2: 95 % (11/15/18 0805) Vital Signs (24h Range):  Temp:  [97.8 °F (36.6 °C)-99.1 °F (37.3 °C)] 97.8 °F (36.6 °C)  Pulse:  [] 81  Resp:  [17-22] 22  SpO2:  [81 %-98 %] 95 %  BP: (101-128)/(52-60) 112/52     Weight: 80 kg  (176 lb 5.9 oz)  Body mass index is 27.62 kg/m².  Body surface area is 1.94 meters squared.      Intake/Output Summary (Last 24 hours) at 11/15/2018 0819  Last data filed at 11/15/2018 0606  Gross per 24 hour   Intake 1080 ml   Output 1450 ml   Net -370 ml       Physical Exam   Constitutional: He is oriented to person, place, and time. He appears well-developed and well-nourished.  Non-toxic appearance. He has a sickly appearance. He appears ill. No distress.   HENT:   Head: Normocephalic and atraumatic.   Eyes: Conjunctivae, EOM and lids are normal. Right eye exhibits no discharge. Left eye exhibits no discharge. No scleral icterus.   Neck: Normal range of motion.   Cardiovascular: An irregular rhythm present. Tachycardia present. Exam reveals no gallop and no friction rub.   No murmur heard.  Pulmonary/Chest: Effort normal. No accessory muscle usage. No apnea, no tachypnea and no bradypnea. No respiratory distress. He has decreased breath sounds. He has rales.   Abdominal: Soft. Normal appearance. He exhibits no distension. There is no tenderness.   Musculoskeletal: Normal range of motion. He exhibits edema and tenderness.   Neurological: He is alert and oriented to person, place, and time.   Skin: Skin is warm, dry and intact. Capillary refill takes less than 2 seconds. Bruising noted. No rash noted. He is not diaphoretic. No erythema. No pallor.   Psychiatric: His speech is normal and behavior is normal. Judgment and thought content normal. His mood appears anxious. Cognition and memory are normal. He exhibits a depressed mood. He is attentive.   Vitals reviewed.      Significant Labs:   BMP:   Recent Labs   Lab 11/13/18  1622 11/14/18  0450 11/15/18  0430    150* 143*    135* 134*   K 4.3 4.1 4.0   CL 95 93* 91*   CO2 32* 31* 32*   BUN 8 7* 10   CREATININE 0.7 0.7 0.7   CALCIUM 9.3 9.3 8.9   MG 1.8  --   --    , CBC:   Recent Labs   Lab 11/13/18  1622 11/14/18  0450 11/15/18  0430   WBC 8.30 7.28  7.94   HGB 10.7* 11.3* 10.4*   HCT 34.9* 36.3* 33.4*    253 271   , CMP:   Recent Labs   Lab 11/13/18  1622 11/14/18  0450 11/15/18  0430    135* 134*   K 4.3 4.1 4.0   CL 95 93* 91*   CO2 32* 31* 32*    150* 143*   BUN 8 7* 10   CREATININE 0.7 0.7 0.7   CALCIUM 9.3 9.3 8.9   PROT 7.2 7.2  7.2 6.3  6.3   ALBUMIN 2.7* 2.7*  2.7* 2.3*  2.3*   BILITOT 0.3 0.4  0.4 0.4  0.4   ALKPHOS 253* 336*  336* 307*  307*   AST 69* 107*  107* 46*  46*   ALT 49* 45*  45* 30  30   ANIONGAP 11 11 11   EGFRNONAA >60 >60 >60   , Coagulation:   Recent Labs   Lab 11/14/18  0450   INR 1.1   APTT 27.3   , LDH: No results for input(s): LDHCSF, BFSOURCE in the last 48 hours., LFTs:   Recent Labs   Lab 11/13/18  1622 11/14/18 0450 11/15/18  0430   ALT 49* 45*  45* 30  30   AST 69* 107*  107* 46*  46*   ALKPHOS 253* 336*  336* 307*  307*   BILITOT 0.3 0.4  0.4 0.4  0.4   PROT 7.2 7.2  7.2 6.3  6.3   ALBUMIN 2.7* 2.7*  2.7* 2.3*  2.3*   , Urine Studies:   Recent Labs   Lab 11/14/18  0522   COLORU Yellow   APPEARANCEUA Clear   PHUR 8.0   SPECGRAV 1.015   PROTEINUA Negative   GLUCUA Negative   KETONESU Trace*   BILIRUBINUA Negative   OCCULTUA Negative   NITRITE Negative   UROBILINOGEN Negative   LEUKOCYTESUR Negative    and All pertinent labs from the last 24 hours have been reviewed.    Diagnostic Results:  I have reviewed all pertinent imaging results/findings within the past 24 hours.

## 2018-11-15 NOTE — PLAN OF CARE
Problem: Patient Care Overview  Goal: Plan of Care Review  Outcome: Ongoing (interventions implemented as appropriate)  POC reviewed with pt, verbalized understanding. Pt remained free from falls, fall precautions in place. Pt is nsr-tachy on monitor, 90-100s. VS monitored. Pt independent and able to reposition self.  Pt IV intact, saline locked. PRN pain medication given. No other c/o at this time. Call bell and personal belongings within reach. Hourly rounding complete. Reminded to call for assistance. Will continue to monitor.

## 2018-11-15 NOTE — ASSESSMENT & PLAN NOTE
Cancer related pain  IV Dilaudid x one  Fentanyl patch- may need to titrate   Cont Oxycodone 15mg every 4 hours prn breakthru pain   Monitor closely   11/15/18 The patient reports that his pain is not well controlled. Will adjust analgesia. Plan for radiation tomorrow per Dr. Culp.

## 2018-11-15 NOTE — PLAN OF CARE
Problem: Pressure Ulcer Risk (Stephon Scale) (Adult,Obstetrics,Pediatric)  Goal: Identify Related Risk Factors and Signs and Symptoms  Related risk factors and signs and symptoms are identified upon initiation of Human Response Clinical Practice Guideline (CPG)  Outcome: Ongoing (interventions implemented as appropriate)  Pt free of falls during shift. VSS. Peripheral IV intact. Fluids infusing. Tele ordered pt on the monitor. Pt on RA no shortness of breath noted. Pain controlled as much as possible with ordered meds. Call light in reach. Hourly rounds done. Family present at bedside. Will continue to monitor

## 2018-11-15 NOTE — PROGRESS NOTES
[facility] Hospital Medicine  Progress Note    Patient Name: Nico Garza Jr.  MRN: [id number]  Patient Class: IP- Inpatient   Admission Date: 11/13/2018  Length of Stay: 1 days  Attending Physician: Mohinder Yee MD  Primary Care Provider: Tiffany Davis DO        Subjective:     Principal Problem:Intractable pain    HPI:  Nico Garza Jr. is a 72 y.o. male patient with a hx of DM, HTN, and lung cancer who presents  for L-sided CP which onset gradually 1 night ago. Symptoms are constant and moderate in severity. Pt was evaluated by Dr. Moseley (Internal Medicine) and sent to the ED for further evaluation. No mitigating factors reported. Pain is exacerbated by deep inhalation and palpation. Associated sxs include sob, non productive cough and BLE swelling. Patient reports had second dose of Keytruda tx 5 days ago. Patient was evaluated in the ER. #1 Troponin negative. CTA Chest negative for PE but of importance Upper Abdomen: 4.1 cm metastatic lesion within the dome of the liver.  Mild intrahepatic ductal dilatation. Lesion size increased over past 2 months and this ductal dilation is new compared to prior ABD CT 8/31/18.  HM consulted for admission. US ABD ordered given new CT findings which note the common duct is not dilated, measuring 7 mm.  No intrahepatic ductal dilatation. 2 solid masses consistent with hx of cancer. Patient placed in obs for further eval/treatment for chest pain. Heart score elevated.         Hospital Course:  The pt is a 73 yo male with hx NSCLC with mets to bone and liver s/p radiation currently receiving Keytruda admitted for chest pain. Chest pain is characterized by a 8/10 constant pain worse with deep inspiration and palpation. EKG showed no change from previous. Serial troponin normal.   CTA chest showed no PE- Elevation left hemidiaphragm with consolidation of the left lower lobe likely compressive.  Left anterior mediastinal mass measures approximately 6.1 x  4.5 cm, enlarged mediastinal and paratracheal lymph nodes,1.1 cm nodule left lower lobe.1.4 cm nodule right lower lobe.  Nodules have enlarged from prior exam.4.1 cm metastatic lesion within the dome of the liver., Moderate constipation.  Abd U/S showed 2 large, solid masses in the liver.   The pt's intractable chest pain is likely cancer related. He also reports intractable back pain unrelieved by home medication Oxycodone 15 mg every 4 hours. Will give Dilaudid 1 mg IV now and place a Fentanyl 25 mcg patch. Care discussed with Heme/Onc. Pt will need to be monitored for effectiveness and possible adverse effects on new pain regimen 11/15/18 The patient reports that his pain is not well controlled. Will adjust analgesia. Plan for radiation tomorrow per Dr. Culp.     Interval History: The patient reports that his pain is not well controlled. Will adjust analgesia. Plan for radiation tomorrow per Dr. Culp.     Review of Systems   Constitutional: Positive for activity change, appetite change, fatigue and unexpected weight change (20 lbs in 2 months ). Negative for chills, diaphoresis and fever.   HENT: Negative for congestion, facial swelling, nosebleeds, rhinorrhea, sinus pressure, sneezing, sore throat and trouble swallowing.    Eyes: Negative for pain, discharge, redness and visual disturbance.   Respiratory: Positive for shortness of breath (chronic ). Negative for apnea, cough, chest tightness, wheezing and stridor.    Cardiovascular: Positive for chest pain. Negative for palpitations and leg swelling.   Gastrointestinal: Negative for abdominal distention, abdominal pain, anal bleeding, blood in stool, constipation, diarrhea, nausea and vomiting.   Endocrine: Negative for cold intolerance and heat intolerance.   Genitourinary: Negative for difficulty urinating, discharge, dysuria, flank pain, frequency, hematuria and urgency.   Musculoskeletal: Positive for back pain. Negative for arthralgias, gait problem,  joint swelling, myalgias, neck pain and neck stiffness.   Skin: Negative for color change, pallor, rash and wound.   Allergic/Immunologic: Positive for immunocompromised state. Negative for food allergies.   Neurological: Positive for weakness. Negative for dizziness, tremors, seizures, syncope, facial asymmetry, speech difficulty, light-headedness, numbness and headaches.   Hematological: Negative for adenopathy.   Psychiatric/Behavioral: Negative for agitation, behavioral problems, confusion, hallucinations and suicidal ideas. The patient is not nervous/anxious.    All other systems reviewed and are negative.    Objective:     Vital Signs (Most Recent):  Temp: 98.5 °F (36.9 °C) (11/15/18 1342)  Pulse: 93 (11/15/18 1502)  Resp: (!) 22 (11/15/18 0805)  BP: (!) 124/57 (11/15/18 1342)  SpO2: (!) 91 % (11/15/18 1342) Vital Signs (24h Range):  Temp:  [97.8 °F (36.6 °C)-99.1 °F (37.3 °C)] 98.5 °F (36.9 °C)  Pulse:  [] 93  Resp:  [18-22] 22  SpO2:  [81 %-95 %] 91 %  BP: (101-128)/(52-60) 124/57     Weight: 80 kg (176 lb 5.9 oz)  Body mass index is 27.62 kg/m².    Intake/Output Summary (Last 24 hours) at 11/15/2018 1632  Last data filed at 11/15/2018 1339  Gross per 24 hour   Intake 1260 ml   Output 1450 ml   Net -190 ml      Physical Exam   Constitutional: He is oriented to person, place, and time. He appears well-developed and well-nourished. No distress.   HENT:   Head: Normocephalic and atraumatic.   Nose: Nose normal.   Mouth/Throat: Oropharynx is clear and moist.   Small oral ulcer to lower inner lip    Eyes: Conjunctivae and EOM are normal. Pupils are equal, round, and reactive to light. No scleral icterus.   Neck: Normal range of motion. Neck supple.   Cardiovascular: Normal rate, regular rhythm, normal heart sounds and intact distal pulses. Exam reveals no gallop and no friction rub.   No murmur heard.  Pulmonary/Chest: Effort normal and breath sounds normal. No stridor. No respiratory distress. He has no  wheezes. He has no rales. He exhibits no tenderness.   Diminished BS  TTP to anterior chest wall    Abdominal: Soft. Bowel sounds are normal. He exhibits no distension. There is no tenderness. There is no rebound and no guarding.   Musculoskeletal: Normal range of motion. He exhibits no edema, tenderness or deformity.   Neurological: He is alert and oriented to person, place, and time. He has normal reflexes. No cranial nerve deficit. He exhibits normal muscle tone. Coordination normal.   Skin: Skin is warm and dry. No rash noted. He is not diaphoretic. No erythema. No pallor.   Psychiatric: He has a normal mood and affect. His behavior is normal. Thought content normal.   Nursing note and vitals reviewed.      Significant Labs: All pertinent labs within the past 24 hours have been reviewed.    Significant Imaging:   Imaging Results          US Abdomen Limited (Final result)  Result time 11/13/18 21:05:51    Final result by Fitz Davis MD (11/13/18 21:05:51)                 Impression:      There are 2 solid masses in the liver as described above concerning for metastatic disease.      Electronically signed by: Fitz Davis MD  Date:    11/13/2018  Time:    21:05             Narrative:    EXAMINATION:  US ABDOMEN LIMITED    CLINICAL HISTORY:  new common bile duct dilation worsening hepatic mass;    TECHNIQUE:  Limited ultrasound of the right upper quadrant of the abdomen (including pancreas, liver, gallbladder, common bile duct, and right kidney) was performed.    COMPARISON:  CT of the chest, 11/13/2018 and CT of the abdomen, 10/25/2018.    FINDINGS:  Liver: Normal in size, measuring 14.3 cm. Homogeneous echotexture. There is a hypoechoic solid mass in the posterior segment right hepatic lobe measuring 3.5 x 4.2 x 3.2 cm.  There is a smaller hypoechoic solid mass in the subcapsular right hepatic lobe anteriorly measuring 2.3 x 2.0 x 1.4 cm.    Gallbladder: The patient has had a cholecystectomy.    Biliary  system: The common duct is not dilated, measuring 7 mm.  No intrahepatic ductal dilatation.    Right kidney: There is a mild fullness of the right pelvocaliceal collecting system.    Miscellaneous: No upper abdominal ascites.                               CTA Chest Non-Coronary - PE Study (Final result)  Result time 11/13/18 16:47:12    Final result by Jos Brito MD (11/13/18 16:47:12)                 Impression:      No evidence of pulmonary embolism.    See findings above.    All CT scans at this facility use dose modulation, iterative reconstruction and/or weight based dosing when appropriate to reduce radiation dose to as low as reasonably achievable.      Electronically signed by: Jos Brito MD  Date:    11/13/2018  Time:    16:47             Narrative:    EXAMINATION:  CTA CHEST NON CORONARY    CLINICAL HISTORY:  Shortness of breath    TECHNIQUE:  After the intravenous administration of 100 cc of Omni 35 nonionic contrast using CT pulmonary angio technique, 2.5 mm axial images were acquired using helical CT technique from the lung apices through costophrenic sulci.  Sagittal coronal and oblique MIPS were also submitted for interpretation.    COMPARISON:  10/27/2018    FINDINGS:  -Pulmonary arteries: Pulmonary arteries are well opacified.  No evidence of pulmonary embolism.  No evidence of pulmonary hypertension.  No right heart strain is identified.    -Lungs: Severe emphysematous change again noted.  Elevation left hemidiaphragm with consolidation of the left lower lobe likely compressive.  Left anterior mediastinal mass measures approximately 6.1 x 4.5 cm..  The airway is patent.    Multiple other enlarged mediastinal and paratracheal lymph nodes are stable.  1.1 cm nodule left lower lobe.  1.4 cm nodule right lower lobe.  Nodules have enlarged from prior exam.  Dependent atelectasis.  No effusion or pneumothorax..    -Pleura: Pleural thickening seen at the lung bases without evidence of pleural  fluid.  No pneumothorax.  Apical pleural thickening is also noted.    -Mediastinum/Roz:Significant adenopathy throughout the mediastinum and bilateral hilar regions largest right hilar lesion measures 1.5 cm in short axis.  Largest mediastinal mass measures 2.2 cm which is anterior to the su.  Large anterior mediastinal and left hilar mass is described above.    -Axilla: No adenopathy.    -Thyroid: Normal lower gland.    -Heart/Aorta: Heart size is mildly enlarged.  Moderate coronary artery disease.  Lipomatous hypertrophy of the interatrial septum is noted.  No pericardial effusion. Aorta normal caliber.    -Bones/Chest Wall: Diffuse osseous metastatic disease again noted with no evidence of a new compression fracture.    -Upper Abdomen: 4.1 cm metastatic lesion within the dome of the liver.  Mild intrahepatic ductal dilatation.  Partially visualized renal cysts.  Moderate constipation.                                   Assessment/Plan:      * Intractable pain    Cancer related pain  IV Dilaudid x one  Fentanyl patch- may need to titrate   Cont Oxycodone 15mg every 4 hours prn breakthru pain   Monitor closely   11/15/18 The patient reports that his pain is not well controlled. Will adjust analgesia. Plan for radiation tomorrow per Dr. Culp.        Chest pain    Cancer related pain  See above      Severe malnutrition    Poor appetite due to liver mets   Boost glucose control TID   Encourage regular meals        Constipation    Stool softner  Miralax   Monitor        Chronic respiratory failure with hypoxia    Continue home oxygen        Metastatic left lung cancer (metastasis from lung to other site) with mediastinal lymphadenopathy    Heme/Onc following      Type 1 diabetes mellitus with diabetic neuropathy    Cont NISS  Levemir   montior       Paroxysmal atrial fibrillation    Continue eliquis and dilitiazem  Monitor   Rate controlled.      Hypertension goal BP (blood pressure) < 130/80    Monitor  trends  Continue home meds         COPD, group D, by GOLD 2017 classification    Continue neb txs            VTE Risk Mitigation (From admission, onward)        Ordered     apixaban tablet 5 mg  2 times daily      11/14/18 0323     Place ENDY hose  Until discontinued      11/14/18 0323     Place sequential compression device  Until discontinued      11/14/18 0323              Jaret Claudio NP  Department of Hospital Medicine   [facility]

## 2018-11-16 ENCOUNTER — DOCUMENTATION ONLY (OUTPATIENT)
Dept: RADIATION ONCOLOGY | Facility: CLINIC | Age: 73
End: 2018-11-16

## 2018-11-16 LAB
ALBUMIN SERPL BCP-MCNC: 2.3 G/DL
ALBUMIN SERPL BCP-MCNC: 2.3 G/DL
ALP SERPL-CCNC: 276 U/L
ALP SERPL-CCNC: 276 U/L
ALT SERPL W/O P-5'-P-CCNC: 23 U/L
ALT SERPL W/O P-5'-P-CCNC: 23 U/L
ANION GAP SERPL CALC-SCNC: 10 MMOL/L
ANION GAP SERPL CALC-SCNC: 9 MMOL/L
AST SERPL-CCNC: 30 U/L
AST SERPL-CCNC: 30 U/L
BASOPHILS # BLD AUTO: 0.01 K/UL
BASOPHILS NFR BLD: 0.1 %
BILIRUB DIRECT SERPL-MCNC: 0.4 MG/DL
BILIRUB SERPL-MCNC: 0.5 MG/DL
BILIRUB SERPL-MCNC: 0.5 MG/DL
BUN SERPL-MCNC: 15 MG/DL
BUN SERPL-MCNC: 18 MG/DL
CALCIUM SERPL-MCNC: 8.7 MG/DL
CALCIUM SERPL-MCNC: 9.1 MG/DL
CHLORIDE SERPL-SCNC: 90 MMOL/L
CHLORIDE SERPL-SCNC: 92 MMOL/L
CO2 SERPL-SCNC: 32 MMOL/L
CO2 SERPL-SCNC: 34 MMOL/L
CREAT SERPL-MCNC: 0.7 MG/DL
CREAT SERPL-MCNC: 0.8 MG/DL
DIFFERENTIAL METHOD: ABNORMAL
EOSINOPHIL # BLD AUTO: 0 K/UL
EOSINOPHIL NFR BLD: 0.5 %
ERYTHROCYTE [DISTWIDTH] IN BLOOD BY AUTOMATED COUNT: 16.7 %
EST. GFR  (AFRICAN AMERICAN): >60 ML/MIN/1.73 M^2
EST. GFR  (AFRICAN AMERICAN): >60 ML/MIN/1.73 M^2
EST. GFR  (NON AFRICAN AMERICAN): >60 ML/MIN/1.73 M^2
EST. GFR  (NON AFRICAN AMERICAN): >60 ML/MIN/1.73 M^2
GLUCOSE SERPL-MCNC: 126 MG/DL
GLUCOSE SERPL-MCNC: 234 MG/DL
HCT VFR BLD AUTO: 34.8 %
HGB BLD-MCNC: 10.8 G/DL
LYMPHOCYTES # BLD AUTO: 0.8 K/UL
LYMPHOCYTES NFR BLD: 9.6 %
MAGNESIUM SERPL-MCNC: 1.7 MG/DL
MCH RBC QN AUTO: 26.7 PG
MCHC RBC AUTO-ENTMCNC: 31 G/DL
MCV RBC AUTO: 86 FL
MONOCYTES # BLD AUTO: 0.9 K/UL
MONOCYTES NFR BLD: 10.6 %
NEUTROPHILS # BLD AUTO: 6.7 K/UL
NEUTROPHILS NFR BLD: 79.2 %
PHOSPHATE SERPL-MCNC: 3.8 MG/DL
PLATELET # BLD AUTO: 318 K/UL
PMV BLD AUTO: 9.2 FL
POCT GLUCOSE: 127 MG/DL (ref 70–110)
POCT GLUCOSE: 171 MG/DL (ref 70–110)
POCT GLUCOSE: 205 MG/DL (ref 70–110)
POCT GLUCOSE: 265 MG/DL (ref 70–110)
POTASSIUM SERPL-SCNC: 4.1 MMOL/L
POTASSIUM SERPL-SCNC: 4.4 MMOL/L
PROT SERPL-MCNC: 6.9 G/DL
PROT SERPL-MCNC: 6.9 G/DL
RBC # BLD AUTO: 4.05 M/UL
SODIUM SERPL-SCNC: 133 MMOL/L
SODIUM SERPL-SCNC: 134 MMOL/L
WBC # BLD AUTO: 8.48 K/UL

## 2018-11-16 PROCEDURE — G6002 STEREOSCOPIC X-RAY GUIDANCE: HCPCS | Mod: 26,,, | Performed by: RADIOLOGY

## 2018-11-16 PROCEDURE — 21400001 HC TELEMETRY ROOM

## 2018-11-16 PROCEDURE — 25000003 PHARM REV CODE 250: Performed by: NURSE PRACTITIONER

## 2018-11-16 PROCEDURE — 77387 GUIDANCE FOR RADJ TX DLVR: CPT | Mod: TC | Performed by: RADIOLOGY

## 2018-11-16 PROCEDURE — 77412 RADIATION TX DELIVERY LVL 3: CPT | Performed by: RADIOLOGY

## 2018-11-16 PROCEDURE — 83735 ASSAY OF MAGNESIUM: CPT

## 2018-11-16 PROCEDURE — 80053 COMPREHEN METABOLIC PANEL: CPT

## 2018-11-16 PROCEDURE — 94761 N-INVAS EAR/PLS OXIMETRY MLT: CPT

## 2018-11-16 PROCEDURE — 84100 ASSAY OF PHOSPHORUS: CPT

## 2018-11-16 PROCEDURE — 63600175 PHARM REV CODE 636 W HCPCS: Performed by: NURSE PRACTITIONER

## 2018-11-16 PROCEDURE — 80048 BASIC METABOLIC PNL TOTAL CA: CPT

## 2018-11-16 PROCEDURE — 63600175 PHARM REV CODE 636 W HCPCS: Performed by: INTERNAL MEDICINE

## 2018-11-16 PROCEDURE — 94799 UNLISTED PULMONARY SVC/PX: CPT

## 2018-11-16 PROCEDURE — 25000242 PHARM REV CODE 250 ALT 637 W/ HCPCS: Performed by: INTERNAL MEDICINE

## 2018-11-16 PROCEDURE — 36415 COLL VENOUS BLD VENIPUNCTURE: CPT

## 2018-11-16 PROCEDURE — 94640 AIRWAY INHALATION TREATMENT: CPT

## 2018-11-16 PROCEDURE — 27000221 HC OXYGEN, UP TO 24 HOURS

## 2018-11-16 PROCEDURE — 25000003 PHARM REV CODE 250: Performed by: INTERNAL MEDICINE

## 2018-11-16 PROCEDURE — 77417 THER RADIOLOGY PORT IMAGE(S): CPT | Performed by: RADIOLOGY

## 2018-11-16 PROCEDURE — 80076 HEPATIC FUNCTION PANEL: CPT

## 2018-11-16 PROCEDURE — 25000242 PHARM REV CODE 250 ALT 637 W/ HCPCS: Performed by: NURSE PRACTITIONER

## 2018-11-16 PROCEDURE — 99233 SBSQ HOSP IP/OBS HIGH 50: CPT | Mod: ,,, | Performed by: INTERNAL MEDICINE

## 2018-11-16 PROCEDURE — 85025 COMPLETE CBC W/AUTO DIFF WBC: CPT

## 2018-11-16 RX ORDER — BISACODYL 10 MG
10 SUPPOSITORY, RECTAL RECTAL DAILY PRN
Status: DISCONTINUED | OUTPATIENT
Start: 2018-11-16 | End: 2018-11-16

## 2018-11-16 RX ORDER — MAGNESIUM SULFATE HEPTAHYDRATE 40 MG/ML
2 INJECTION, SOLUTION INTRAVENOUS ONCE
Status: COMPLETED | OUTPATIENT
Start: 2018-11-16 | End: 2018-11-17

## 2018-11-16 RX ORDER — OXYCODONE HYDROCHLORIDE 5 MG/1
20 TABLET ORAL EVERY 4 HOURS PRN
Status: DISCONTINUED | OUTPATIENT
Start: 2018-11-16 | End: 2018-11-18 | Stop reason: HOSPADM

## 2018-11-16 RX ORDER — BISACODYL 10 MG
10 SUPPOSITORY, RECTAL RECTAL DAILY PRN
Status: COMPLETED | OUTPATIENT
Start: 2018-11-16 | End: 2018-11-16

## 2018-11-16 RX ORDER — FENTANYL 50 UG/1
1 PATCH TRANSDERMAL
Status: DISCONTINUED | OUTPATIENT
Start: 2018-11-16 | End: 2018-11-18 | Stop reason: HOSPADM

## 2018-11-16 RX ADMIN — STANDARDIZED SENNA CONCENTRATE AND DOCUSATE SODIUM 1 TABLET: 8.6; 5 TABLET ORAL at 09:11

## 2018-11-16 RX ADMIN — LIDOCAINE HYDROCHLORIDE 15 ML: 20 SOLUTION ORAL; TOPICAL at 02:11

## 2018-11-16 RX ADMIN — PANTOPRAZOLE SODIUM 40 MG: 40 TABLET, DELAYED RELEASE ORAL at 10:11

## 2018-11-16 RX ADMIN — LIDOCAINE HYDROCHLORIDE 15 ML: 20 SOLUTION ORAL; TOPICAL at 09:11

## 2018-11-16 RX ADMIN — ARFORMOTEROL TARTRATE 15 MCG: 15 SOLUTION RESPIRATORY (INHALATION) at 07:11

## 2018-11-16 RX ADMIN — KETOROLAC TROMETHAMINE 15 MG: 30 INJECTION, SOLUTION INTRAMUSCULAR at 02:11

## 2018-11-16 RX ADMIN — OXYCODONE HYDROCHLORIDE 20 MG: 5 TABLET ORAL at 09:11

## 2018-11-16 RX ADMIN — BUDESONIDE 0.5 MG: 0.5 SUSPENSION RESPIRATORY (INHALATION) at 08:11

## 2018-11-16 RX ADMIN — INSULIN DETEMIR 6 UNITS: 100 INJECTION, SOLUTION SUBCUTANEOUS at 09:11

## 2018-11-16 RX ADMIN — POLYETHYLENE GLYCOL 3350 17 G: 17 POWDER, FOR SOLUTION ORAL at 10:11

## 2018-11-16 RX ADMIN — DILTIAZEM HYDROCHLORIDE 360 MG: 180 CAPSULE, COATED, EXTENDED RELEASE ORAL at 10:11

## 2018-11-16 RX ADMIN — STANDARDIZED SENNA CONCENTRATE AND DOCUSATE SODIUM 1 TABLET: 8.6; 5 TABLET ORAL at 10:11

## 2018-11-16 RX ADMIN — BISACODYL 10 MG: 10 SUPPOSITORY RECTAL at 03:11

## 2018-11-16 RX ADMIN — IPRATROPIUM BROMIDE AND ALBUTEROL SULFATE 3 ML: .5; 3 SOLUTION RESPIRATORY (INHALATION) at 03:11

## 2018-11-16 RX ADMIN — FENTANYL TRANSDERMAL 1 PATCH: 50 PATCH, EXTENDED RELEASE TRANSDERMAL at 10:11

## 2018-11-16 RX ADMIN — APIXABAN 5 MG: 2.5 TABLET, FILM COATED ORAL at 09:11

## 2018-11-16 RX ADMIN — INSULIN DETEMIR 6 UNITS: 100 INJECTION, SOLUTION SUBCUTANEOUS at 10:11

## 2018-11-16 RX ADMIN — LIDOCAINE HYDROCHLORIDE 15 ML: 20 SOLUTION ORAL; TOPICAL at 10:11

## 2018-11-16 RX ADMIN — SERTRALINE HYDROCHLORIDE 50 MG: 50 TABLET ORAL at 10:11

## 2018-11-16 RX ADMIN — DOCUSATE SODIUM 100 MG: 100 CAPSULE, LIQUID FILLED ORAL at 10:11

## 2018-11-16 RX ADMIN — LIDOCAINE HYDROCHLORIDE 15 ML: 20 SOLUTION ORAL; TOPICAL at 05:11

## 2018-11-16 RX ADMIN — HYDROMORPHONE HYDROCHLORIDE 1 MG: 1 INJECTION, SOLUTION INTRAMUSCULAR; INTRAVENOUS; SUBCUTANEOUS at 01:11

## 2018-11-16 RX ADMIN — INSULIN ASPART 3 UNITS: 100 INJECTION, SOLUTION INTRAVENOUS; SUBCUTANEOUS at 11:11

## 2018-11-16 RX ADMIN — APIXABAN 5 MG: 2.5 TABLET, FILM COATED ORAL at 10:11

## 2018-11-16 RX ADMIN — LIDOCAINE HYDROCHLORIDE 15 ML: 20 SOLUTION ORAL; TOPICAL at 03:11

## 2018-11-16 RX ADMIN — KETOROLAC TROMETHAMINE 15 MG: 30 INJECTION, SOLUTION INTRAMUSCULAR at 05:11

## 2018-11-16 RX ADMIN — BUDESONIDE 0.5 MG: 0.5 SUSPENSION RESPIRATORY (INHALATION) at 07:11

## 2018-11-16 RX ADMIN — ARFORMOTEROL TARTRATE 15 MCG: 15 SOLUTION RESPIRATORY (INHALATION) at 08:11

## 2018-11-16 RX ADMIN — OXYCODONE HYDROCHLORIDE 20 MG: 5 TABLET ORAL at 10:11

## 2018-11-16 RX ADMIN — INSULIN ASPART 1 UNITS: 100 INJECTION, SOLUTION INTRAVENOUS; SUBCUTANEOUS at 09:11

## 2018-11-16 RX ADMIN — MAGNESIUM SULFATE HEPTAHYDRATE 2 G: 40 INJECTION, SOLUTION INTRAVENOUS at 11:11

## 2018-11-16 NOTE — PLAN OF CARE
Pt in bed AAOx4.   Plan of Care reviewed, Verbalized understanding.  Patient remained free from falls, fall precautions in place.   Pt is NSR-ST on monitor. VSS.   PIV CDI.   Pt on 6L O2 per NC.   Call bell and personal belongings within reach.   Hourly rounding complete. Reminded to call for assistance.   No complaints at this time.   Will continue to monitor.

## 2018-11-16 NOTE — ASSESSMENT & PLAN NOTE
Cont NISS  Levemir   montior    11/16- will continue close monitoring , insulin sliding scale ,will continue Levemir

## 2018-11-16 NOTE — ASSESSMENT & PLAN NOTE
Monitor trends  Continue home meds    11/16- BP is controlled, will continue to monitor closely ,continue diltiazem

## 2018-11-16 NOTE — PLAN OF CARE
Problem: Patient Care Overview  Goal: Plan of Care Review  Outcome: Ongoing (interventions implemented as appropriate)  Patient 's spo2 and breathing are stable .

## 2018-11-16 NOTE — PROGRESS NOTES
[facility] Hospital Medicine  Progress Note    Patient Name: Nico Garza Jr.  MRN: [id number]  Patient Class: IP- Inpatient   Admission Date: 11/13/2018  Length of Stay: 2 days  Attending Physician: Mohinder Yee MD  Primary Care Provider: Tiffany Davis DO        Subjective:     Principal Problem:Intractable pain    HPI:  Nico Garza Jr. is a 72 y.o. male patient with a hx of DM, HTN, and lung cancer who presents  for L-sided CP which onset gradually 1 night ago. Symptoms are constant and moderate in severity. Pt was evaluated by Dr. Moseley (Internal Medicine) and sent to the ED for further evaluation. No mitigating factors reported. Pain is exacerbated by deep inhalation and palpation. Associated sxs include sob, non productive cough and BLE swelling. Patient reports had second dose of Keytruda tx 5 days ago. Patient was evaluated in the ER. #1 Troponin negative. CTA Chest negative for PE but of importance Upper Abdomen: 4.1 cm metastatic lesion within the dome of the liver.  Mild intrahepatic ductal dilatation. Lesion size increased over past 2 months and this ductal dilation is new compared to prior ABD CT 8/31/18.  HM consulted for admission. US ABD ordered given new CT findings which note the common duct is not dilated, measuring 7 mm.  No intrahepatic ductal dilatation. 2 solid masses consistent with hx of cancer. Patient placed in obs for further eval/treatment for chest pain. Heart score elevated.         Hospital Course:  The pt is a 71 yo male with hx NSCLC with mets to bone and liver s/p radiation currently receiving Keytruda admitted for chest pain. Chest pain is characterized by a 8/10 constant pain worse with deep inspiration and palpation. EKG showed no change from previous. Serial troponin normal.   CTA chest showed no PE- Elevation left hemidiaphragm with consolidation of the left lower lobe likely compressive.  Left anterior mediastinal mass measures approximately 6.1 x  4.5 cm, enlarged mediastinal and paratracheal lymph nodes,1.1 cm nodule left lower lobe.1.4 cm nodule right lower lobe.  Nodules have enlarged from prior exam.4.1 cm metastatic lesion within the dome of the liver., Moderate constipation.  Abd U/S showed 2 large, solid masses in the liver.   The pt's intractable chest pain is likely cancer related. He also reports intractable back pain unrelieved by home medication Oxycodone 15 mg every 4 hours. Will give Dilaudid 1 mg IV now and place a Fentanyl 25 mcg patch. Care discussed with Heme/Onc. Pt will need to be monitored for effectiveness and possible adverse effects on new pain regimen 11/15/18 The patient reports that his pain is not well controlled. Will adjust analgesia. Plan for radiation tomorrow per Dr. Culp.   11/16- patient still complains of generalized pain,he had radiation done today .  Had an extensive conversation with patient about end of life care ,  He has agreed to be DNR.       Interval History:   He had radiation today, he has agreed to be DNR     Review of Systems   Constitutional: Positive for activity change, appetite change, fatigue and unexpected weight change (20 lbs in 2 months ). Negative for chills, diaphoresis and fever.   HENT: Negative for congestion, facial swelling, nosebleeds, rhinorrhea, sinus pressure, sneezing, sore throat and trouble swallowing.    Eyes: Negative for pain, discharge, redness and visual disturbance.   Respiratory: Positive for shortness of breath (chronic ). Negative for apnea, cough, chest tightness, wheezing and stridor.    Cardiovascular: Positive for chest pain. Negative for palpitations and leg swelling.   Gastrointestinal: Negative for abdominal distention, abdominal pain, anal bleeding, blood in stool, constipation, diarrhea, nausea and vomiting.   Endocrine: Negative for cold intolerance and heat intolerance.   Genitourinary: Negative for difficulty urinating, discharge, dysuria, flank pain, frequency,  hematuria and urgency.   Musculoskeletal: Positive for back pain. Negative for arthralgias, gait problem, joint swelling, myalgias, neck pain and neck stiffness.   Skin: Negative for color change, pallor, rash and wound.   Allergic/Immunologic: Positive for immunocompromised state. Negative for food allergies.   Neurological: Positive for weakness. Negative for dizziness, tremors, seizures, syncope, facial asymmetry, speech difficulty, light-headedness, numbness and headaches.   Hematological: Negative for adenopathy.   Psychiatric/Behavioral: Negative for agitation, behavioral problems, confusion, hallucinations and suicidal ideas. The patient is not nervous/anxious.    All other systems reviewed and are negative.    Objective:     Vital Signs (Most Recent):  Temp: 98.8 °F (37.1 °C) (11/16/18 1210)  Pulse: 97 (11/16/18 1341)  Resp: 18 (11/16/18 1210)  BP: 115/60 (11/16/18 1210)  SpO2: (!) 92 % (11/16/18 1210) Vital Signs (24h Range):  Temp:  [98.2 °F (36.8 °C)-99 °F (37.2 °C)] 98.8 °F (37.1 °C)  Pulse:  [76-97] 97  Resp:  [16-22] 18  SpO2:  [81 %-95 %] 92 %  BP: (104-118)/(50-60) 115/60     Weight: 80 kg (176 lb 5.9 oz)  Body mass index is 27.62 kg/m².    Intake/Output Summary (Last 24 hours) at 11/16/2018 1507  Last data filed at 11/16/2018 1300  Gross per 24 hour   Intake 610 ml   Output 750 ml   Net -140 ml      Physical Exam   Constitutional: He is oriented to person, place, and time. He appears well-developed and well-nourished. No distress.   HENT:   Head: Normocephalic and atraumatic.   Nose: Nose normal.   Mouth/Throat: Oropharynx is clear and moist.   Small oral ulcer to lower inner lip    Eyes: Conjunctivae and EOM are normal. Pupils are equal, round, and reactive to light. No scleral icterus.   Neck: Normal range of motion. Neck supple.   Cardiovascular: Normal rate, regular rhythm, normal heart sounds and intact distal pulses. Exam reveals no gallop and no friction rub.   No murmur  heard.  Pulmonary/Chest: Effort normal and breath sounds normal. No stridor. No respiratory distress. He has no wheezes. He has no rales. He exhibits no tenderness.   Diminished BS  TTP to anterior chest wall    Abdominal: Soft. Bowel sounds are normal. He exhibits no distension. There is no tenderness. There is no rebound and no guarding.   Musculoskeletal: Normal range of motion. He exhibits no edema, tenderness or deformity.   Neurological: He is alert and oriented to person, place, and time. He has normal reflexes. No cranial nerve deficit. He exhibits normal muscle tone. Coordination normal.   Skin: Skin is warm and dry. No rash noted. He is not diaphoretic. No erythema. No pallor.   Psychiatric: He has a normal mood and affect. His behavior is normal. Thought content normal.   Nursing note and vitals reviewed.      Significant Labs: All pertinent labs within the past 24 hours have been reviewed.    Significant Imaging:   Imaging Results          US Abdomen Limited (Final result)  Result time 11/13/18 21:05:51    Final result by Fitz Davis MD (11/13/18 21:05:51)                 Impression:      There are 2 solid masses in the liver as described above concerning for metastatic disease.      Electronically signed by: Fitz Davis MD  Date:    11/13/2018  Time:    21:05             Narrative:    EXAMINATION:  US ABDOMEN LIMITED    CLINICAL HISTORY:  new common bile duct dilation worsening hepatic mass;    TECHNIQUE:  Limited ultrasound of the right upper quadrant of the abdomen (including pancreas, liver, gallbladder, common bile duct, and right kidney) was performed.    COMPARISON:  CT of the chest, 11/13/2018 and CT of the abdomen, 10/25/2018.    FINDINGS:  Liver: Normal in size, measuring 14.3 cm. Homogeneous echotexture. There is a hypoechoic solid mass in the posterior segment right hepatic lobe measuring 3.5 x 4.2 x 3.2 cm.  There is a smaller hypoechoic solid mass in the subcapsular right hepatic lobe  anteriorly measuring 2.3 x 2.0 x 1.4 cm.    Gallbladder: The patient has had a cholecystectomy.    Biliary system: The common duct is not dilated, measuring 7 mm.  No intrahepatic ductal dilatation.    Right kidney: There is a mild fullness of the right pelvocaliceal collecting system.    Miscellaneous: No upper abdominal ascites.                               CTA Chest Non-Coronary - PE Study (Final result)  Result time 11/13/18 16:47:12    Final result by Jos Brito MD (11/13/18 16:47:12)                 Impression:      No evidence of pulmonary embolism.    See findings above.    All CT scans at this facility use dose modulation, iterative reconstruction and/or weight based dosing when appropriate to reduce radiation dose to as low as reasonably achievable.      Electronically signed by: Jos Brito MD  Date:    11/13/2018  Time:    16:47             Narrative:    EXAMINATION:  CTA CHEST NON CORONARY    CLINICAL HISTORY:  Shortness of breath    TECHNIQUE:  After the intravenous administration of 100 cc of Omni 35 nonionic contrast using CT pulmonary angio technique, 2.5 mm axial images were acquired using helical CT technique from the lung apices through costophrenic sulci.  Sagittal coronal and oblique MIPS were also submitted for interpretation.    COMPARISON:  10/27/2018    FINDINGS:  -Pulmonary arteries: Pulmonary arteries are well opacified.  No evidence of pulmonary embolism.  No evidence of pulmonary hypertension.  No right heart strain is identified.    -Lungs: Severe emphysematous change again noted.  Elevation left hemidiaphragm with consolidation of the left lower lobe likely compressive.  Left anterior mediastinal mass measures approximately 6.1 x 4.5 cm..  The airway is patent.    Multiple other enlarged mediastinal and paratracheal lymph nodes are stable.  1.1 cm nodule left lower lobe.  1.4 cm nodule right lower lobe.  Nodules have enlarged from prior exam.  Dependent atelectasis.  No  effusion or pneumothorax..    -Pleura: Pleural thickening seen at the lung bases without evidence of pleural fluid.  No pneumothorax.  Apical pleural thickening is also noted.    -Mediastinum/Roz:Significant adenopathy throughout the mediastinum and bilateral hilar regions largest right hilar lesion measures 1.5 cm in short axis.  Largest mediastinal mass measures 2.2 cm which is anterior to the su.  Large anterior mediastinal and left hilar mass is described above.    -Axilla: No adenopathy.    -Thyroid: Normal lower gland.    -Heart/Aorta: Heart size is mildly enlarged.  Moderate coronary artery disease.  Lipomatous hypertrophy of the interatrial septum is noted.  No pericardial effusion. Aorta normal caliber.    -Bones/Chest Wall: Diffuse osseous metastatic disease again noted with no evidence of a new compression fracture.    -Upper Abdomen: 4.1 cm metastatic lesion within the dome of the liver.  Mild intrahepatic ductal dilatation.  Partially visualized renal cysts.  Moderate constipation.                                   Assessment/Plan:      * Intractable pain    Cancer related pain  IV Dilaudid x one  Fentanyl patch- may need to titrate   Cont Oxycodone 15mg every 4 hours prn breakthru pain   Monitor closely   11/15/18 The patient reports that his pain is not well controlled. Will adjust analgesia. Plan for radiation tomorrow per Dr. Culp.     11/16- will continue fentanyl patch , will need pain management review on discharge , will need close monitoring of oxygen saturation while on therapy .  Will continue oxycodone 20 mg every 4 hours prn ,fentanyl patch and will monitor closely .  Will add dulcolax supp     Chest pain    Cancer related pain  See above      Severe malnutrition    Poor appetite due to liver mets   Boost glucose control TID   Encourage regular meals        Constipation    Stool softner  Miralax   Monitor       11/16- will add dulcolax supp     Chronic respiratory failure with hypoxia     Continue home oxygen        Metastatic left lung cancer (metastasis from lung to other site) with mediastinal lymphadenopathy    Heme/Onc following       11/16- will continue close oncology follow up , discussed end of life care with him, he has agreed to be DNR      Type 1 diabetes mellitus with diabetic neuropathy    Cont NISS  Levemir   montior    11/16- will continue close monitoring , insulin sliding scale ,will continue Levemir     Paroxysmal atrial fibrillation    Continue eliquis and dilitiazem  Monitor   Rate controlled.   11/16-  Rate controlled, will continue eliquis,diltiazem     Hypertension goal BP (blood pressure) < 130/80    Monitor trends  Continue home meds    11/16- BP is controlled, will continue to monitor closely ,continue diltiazem     COPD, group D, by GOLD 2017 classification    Continue neb txs       11/16- will continue duonebs as tolerated         VTE Risk Mitigation (From admission, onward)        Ordered     apixaban tablet 5 mg  2 times daily      11/14/18 0323     Place ENDY hose  Until discontinued      11/14/18 0323     Place sequential compression device  Until discontinued      11/14/18 0323              Mohinder Yee MD  Department of Hospital Medicine   [facility]

## 2018-11-16 NOTE — SUBJECTIVE & OBJECTIVE
Interval History:   He had radiation today, he has agreed to be DNR     Review of Systems   Constitutional: Positive for activity change, appetite change, fatigue and unexpected weight change (20 lbs in 2 months ). Negative for chills, diaphoresis and fever.   HENT: Negative for congestion, facial swelling, nosebleeds, rhinorrhea, sinus pressure, sneezing, sore throat and trouble swallowing.    Eyes: Negative for pain, discharge, redness and visual disturbance.   Respiratory: Positive for shortness of breath (chronic ). Negative for apnea, cough, chest tightness, wheezing and stridor.    Cardiovascular: Positive for chest pain. Negative for palpitations and leg swelling.   Gastrointestinal: Negative for abdominal distention, abdominal pain, anal bleeding, blood in stool, constipation, diarrhea, nausea and vomiting.   Endocrine: Negative for cold intolerance and heat intolerance.   Genitourinary: Negative for difficulty urinating, discharge, dysuria, flank pain, frequency, hematuria and urgency.   Musculoskeletal: Positive for back pain. Negative for arthralgias, gait problem, joint swelling, myalgias, neck pain and neck stiffness.   Skin: Negative for color change, pallor, rash and wound.   Allergic/Immunologic: Positive for immunocompromised state. Negative for food allergies.   Neurological: Positive for weakness. Negative for dizziness, tremors, seizures, syncope, facial asymmetry, speech difficulty, light-headedness, numbness and headaches.   Hematological: Negative for adenopathy.   Psychiatric/Behavioral: Negative for agitation, behavioral problems, confusion, hallucinations and suicidal ideas. The patient is not nervous/anxious.    All other systems reviewed and are negative.    Objective:     Vital Signs (Most Recent):  Temp: 98.8 °F (37.1 °C) (11/16/18 1210)  Pulse: 97 (11/16/18 1341)  Resp: 18 (11/16/18 1210)  BP: 115/60 (11/16/18 1210)  SpO2: (!) 92 % (11/16/18 1210) Vital Signs (24h Range):  Temp:  [98.2  °F (36.8 °C)-99 °F (37.2 °C)] 98.8 °F (37.1 °C)  Pulse:  [76-97] 97  Resp:  [16-22] 18  SpO2:  [81 %-95 %] 92 %  BP: (104-118)/(50-60) 115/60     Weight: 80 kg (176 lb 5.9 oz)  Body mass index is 27.62 kg/m².    Intake/Output Summary (Last 24 hours) at 11/16/2018 1507  Last data filed at 11/16/2018 1300  Gross per 24 hour   Intake 610 ml   Output 750 ml   Net -140 ml      Physical Exam   Constitutional: He is oriented to person, place, and time. He appears well-developed and well-nourished. No distress.   HENT:   Head: Normocephalic and atraumatic.   Nose: Nose normal.   Mouth/Throat: Oropharynx is clear and moist.   Small oral ulcer to lower inner lip    Eyes: Conjunctivae and EOM are normal. Pupils are equal, round, and reactive to light. No scleral icterus.   Neck: Normal range of motion. Neck supple.   Cardiovascular: Normal rate, regular rhythm, normal heart sounds and intact distal pulses. Exam reveals no gallop and no friction rub.   No murmur heard.  Pulmonary/Chest: Effort normal and breath sounds normal. No stridor. No respiratory distress. He has no wheezes. He has no rales. He exhibits no tenderness.   Diminished BS  TTP to anterior chest wall    Abdominal: Soft. Bowel sounds are normal. He exhibits no distension. There is no tenderness. There is no rebound and no guarding.   Musculoskeletal: Normal range of motion. He exhibits no edema, tenderness or deformity.   Neurological: He is alert and oriented to person, place, and time. He has normal reflexes. No cranial nerve deficit. He exhibits normal muscle tone. Coordination normal.   Skin: Skin is warm and dry. No rash noted. He is not diaphoretic. No erythema. No pallor.   Psychiatric: He has a normal mood and affect. His behavior is normal. Thought content normal.   Nursing note and vitals reviewed.      Significant Labs: All pertinent labs within the past 24 hours have been reviewed.    Significant Imaging:   Imaging Results          US Abdomen Limited  (Final result)  Result time 11/13/18 21:05:51    Final result by Fitz Davis MD (11/13/18 21:05:51)                 Impression:      There are 2 solid masses in the liver as described above concerning for metastatic disease.      Electronically signed by: Fitz Davis MD  Date:    11/13/2018  Time:    21:05             Narrative:    EXAMINATION:  US ABDOMEN LIMITED    CLINICAL HISTORY:  new common bile duct dilation worsening hepatic mass;    TECHNIQUE:  Limited ultrasound of the right upper quadrant of the abdomen (including pancreas, liver, gallbladder, common bile duct, and right kidney) was performed.    COMPARISON:  CT of the chest, 11/13/2018 and CT of the abdomen, 10/25/2018.    FINDINGS:  Liver: Normal in size, measuring 14.3 cm. Homogeneous echotexture. There is a hypoechoic solid mass in the posterior segment right hepatic lobe measuring 3.5 x 4.2 x 3.2 cm.  There is a smaller hypoechoic solid mass in the subcapsular right hepatic lobe anteriorly measuring 2.3 x 2.0 x 1.4 cm.    Gallbladder: The patient has had a cholecystectomy.    Biliary system: The common duct is not dilated, measuring 7 mm.  No intrahepatic ductal dilatation.    Right kidney: There is a mild fullness of the right pelvocaliceal collecting system.    Miscellaneous: No upper abdominal ascites.                               CTA Chest Non-Coronary - PE Study (Final result)  Result time 11/13/18 16:47:12    Final result by Jos Brito MD (11/13/18 16:47:12)                 Impression:      No evidence of pulmonary embolism.    See findings above.    All CT scans at this facility use dose modulation, iterative reconstruction and/or weight based dosing when appropriate to reduce radiation dose to as low as reasonably achievable.      Electronically signed by: Jos Brito MD  Date:    11/13/2018  Time:    16:47             Narrative:    EXAMINATION:  CTA CHEST NON CORONARY    CLINICAL HISTORY:  Shortness of  breath    TECHNIQUE:  After the intravenous administration of 100 cc of Omni 35 nonionic contrast using CT pulmonary angio technique, 2.5 mm axial images were acquired using helical CT technique from the lung apices through costophrenic sulci.  Sagittal coronal and oblique MIPS were also submitted for interpretation.    COMPARISON:  10/27/2018    FINDINGS:  -Pulmonary arteries: Pulmonary arteries are well opacified.  No evidence of pulmonary embolism.  No evidence of pulmonary hypertension.  No right heart strain is identified.    -Lungs: Severe emphysematous change again noted.  Elevation left hemidiaphragm with consolidation of the left lower lobe likely compressive.  Left anterior mediastinal mass measures approximately 6.1 x 4.5 cm..  The airway is patent.    Multiple other enlarged mediastinal and paratracheal lymph nodes are stable.  1.1 cm nodule left lower lobe.  1.4 cm nodule right lower lobe.  Nodules have enlarged from prior exam.  Dependent atelectasis.  No effusion or pneumothorax..    -Pleura: Pleural thickening seen at the lung bases without evidence of pleural fluid.  No pneumothorax.  Apical pleural thickening is also noted.    -Mediastinum/Roz:Significant adenopathy throughout the mediastinum and bilateral hilar regions largest right hilar lesion measures 1.5 cm in short axis.  Largest mediastinal mass measures 2.2 cm which is anterior to the su.  Large anterior mediastinal and left hilar mass is described above.    -Axilla: No adenopathy.    -Thyroid: Normal lower gland.    -Heart/Aorta: Heart size is mildly enlarged.  Moderate coronary artery disease.  Lipomatous hypertrophy of the interatrial septum is noted.  No pericardial effusion. Aorta normal caliber.    -Bones/Chest Wall: Diffuse osseous metastatic disease again noted with no evidence of a new compression fracture.    -Upper Abdomen: 4.1 cm metastatic lesion within the dome of the liver.  Mild intrahepatic ductal dilatation.   Partially visualized renal cysts.  Moderate constipation.

## 2018-11-16 NOTE — ASSESSMENT & PLAN NOTE
Continue eliquis and dilitiazem  Monitor   Rate controlled.   11/16-  Rate controlled, will continue eliquis,diltiazem

## 2018-11-16 NOTE — ASSESSMENT & PLAN NOTE
Cancer related pain  IV Dilaudid x one  Fentanyl patch- may need to titrate   Cont Oxycodone 15mg every 4 hours prn breakthru pain   Monitor closely   11/15/18 The patient reports that his pain is not well controlled. Will adjust analgesia. Plan for radiation tomorrow per Dr. Culp.     11/16- will continue fentanyl patch , will need pain management review on discharge , will need close monitoring of oxygen saturation while on therapy .  Will continue oxycodone 20 mg every 4 hours prn ,fentanyl patch and will monitor closely .  Will add dulcolax supp

## 2018-11-16 NOTE — PLAN OF CARE
Problem: Radiation, External Beam (Adult)  Goal: Signs and Symptoms of Listed Potential Problems Will be Absent, Minimized or Managed (Radiation, External Beam)  Signs and symptoms of listed potential problems will be absent, minimized or managed by discharge/transition of care (reference Radiation, External Beam (Adult) CPG).  Outcome: Ongoing (interventions implemented as appropriate)  One time xrt treatment to 7th rib and sternum today 11-16-18. Patient currently inpatient and tolerated therapy well. Verbal instructions given and side effects reviewed. Pt verbalized understanding.

## 2018-11-16 NOTE — PLAN OF CARE
11/16/18 0930   Medicare Message   Important Message from Medicare regarding Discharge Appeal Rights Given to patient/caregiver;Explained to patient/caregiver;Signed/date by patient/caregiver   Date IMM was signed 11/16/18   Time IMM was signed 0911

## 2018-11-16 NOTE — PROGRESS NOTES
[facility] -   Hematology/Oncology  Progress Note    Patient Name: Nico Garza Jr.  Admission Date: 11/13/2018  Hospital Length of Stay: 2 days  Code Status: DNR     Subjective:     HPI:  Nico Garza Jr. is a 72 y.o. male patient with a hx of DM, HTN, and lung cancer who presents  for L-sided CP which onset gradually 1 night ago. Symptoms are constant and moderate in severity. Pt was evaluated by Dr. Moseley (Internal Medicine) and sent to the ED for further evaluation. No mitigating factors reported. Pain is exacerbated by deep inhalation and palpation. Associated sxs include sob, non productive cough and BLE swelling. Patient reports had second dose of Keytruda tx 5 days ago. Patient was evaluated in the ER. #1 Troponin negative. CTA Chest negative for PE but of importance Upper Abdomen: 4.1 cm metastatic lesion within the dome of the liver.  Mild intrahepatic ductal dilatation. Lesion size increased over past 2 months and this ductal dilation is new compared to prior ABD CT 8/31/18.  HM consulted for admission. US ABD ordered given new CT findings which note the common duct is not dilated, measuring 7 mm.  No intrahepatic ductal dilatation. 2 solid masses consistent with hx of cancer. Patient placed in obs for further eval/treatment for chest pain. Heart score elevated.     Hematology oncology consulted for further management of care.    Interval history:  Patient seen this morning at the bedside.  Code status again discussed with patient and  sister.  No decisions have been made. Patient has remained afebrile over the past 24 hours.      Oncology Treatment Plan:   OP PEMBROLIZUMAB 200MG Q3W    Medications:  Continuous Infusions:  Scheduled Meds:   apixaban  5 mg Oral BID    arformoterol  15 mcg Nebulization BID    budesonide  0.5 mg Nebulization Q12H    diltiaZEM  360 mg Oral Daily    docusate sodium  100 mg Oral Daily    fentaNYL  1 patch Transdermal Q72H    insulin detemir U-100  6  Units Subcutaneous BID    magic mouthwash (diphenhydrAMINE 12.5 mg/5 mL 20 mL, aluminum & magnesium hydroxide-simethicone (MYLANTA) 20 mL, lidocaine HCl 2% (XYLOCAINE) 20 mL) solution  15 mL Swish & Spit AC + HS + 0200    pantoprazole  40 mg Oral Daily    polyethylene glycol  17 g Oral Daily    senna-docusate 8.6-50 mg  1 tablet Oral BID    sertraline  50 mg Oral Daily     PRN Meds:acetaminophen, albuterol-ipratropium, dextrose 50%, dextrose 50%, glucagon (human recombinant), glucose, glucose, insulin aspart U-100, ketorolac, lactulose, magic mouthwash (diphenhydrAMINE 12.5 mg/5 mL 20 mL, aluminum & magnesium hydroxide-simethicone (MYLANTA) 20 mL, lidocaine HCl 2% (XYLOCAINE) 20 mL) solution, ondansetron, oxyCODONE, sodium chloride 0.9%     Review of patient's allergies indicates:   Allergen Reactions    Anoro ellipta [umeclidinium-vilanterol] Shortness Of Breath    Flomax [tamsulosin]      Dizzy          Past Medical History:   Diagnosis Date    Arthritis     Atrial fibrillation and flutter     Atrial flutter     Cancer     LUNG    COPD (chronic obstructive pulmonary disease)     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  Others are too small to accurately characterize.    Lung disease     Nephrolithiasis     ct urogram 12/15/2017-Bilateral nephrolithiasis.     Neuropathy, diabetic     Pancreatitis     Type 1 diabetes mellitus with diabetic neuropathy 4/27/2018     Past Surgical History:   Procedure Laterality Date    ABLATION N/A 12/4/2017    Performed by [facility] CATH LAB    BICEPS TENDON REPAIR Right     BRONCHOSCOPY Bilateral 9/2/2018    Procedure: BRONCHOSCOPY- insert lighted tube into airway to obtain biopsy of lung;  Surgeon: Aldair Deleon MD;  Location: [facility];  Service: Endoscopy;   Laterality: Bilateral;    BRONCHOSCOPY- insert lighted tube into airway to obtain biopsy of lung Bilateral 9/2/2018    Performed by [facility]air [facility]on MD at [facility] ENDO    CARDIOVERSION      CHOLECYSTECTOMY      INSERTION OF TUNNELED CENTRAL VENOUS CATHETER (CVC) WITH SUBCUTANEOUS PORT Right 10/9/2018    Procedure: [id number];  Surgeon: Christophe Brandt MD;  Location: [facility] OR;  Service: General;  Laterality: Right;    [id number]-A-CATH Right 10/9/2018    Performed by [facility]t MD at [facility] OR    PATELLA SURGERY Right     RADIOFREQUENCY ABLATION      ROTATOR CUFF REPAIR Left     TRANSESOPHAGEAL ECHOCARDIOGRAM (DILLAN) N/A 12/4/2017    Performed by [facility] at [facility] CATH LAB    ULTRASOUND GUIDANCE Right 10/9/2018    Procedure: ULTRASOUND GUIDANCE;  Surgeon: Christophe Brandt MD;  Location: [facility] OR;  Service: General;  Laterality: Right;    ULTRASOUND GUIDANCE Right 10/9/2018    Performed by Christophe Brandt MD at [facility] OR     Family History     Problem Relation (Age of Onset)    Cancer Maternal Aunt    Cataracts Sister    Diabetes Mother, Sister, Sister, Sister    Heart attack Brother    Heart failure Brother    Hypertension Mother, Father    Stroke Mother, Father, Paternal Grandmother, Paternal Grandfather        Tobacco Use    Smoking status: Former Smoker     Packs/day: 0.50     Types: Cigarettes     Start date: 1/1/1960     Last attempt to quit: 3/8/2015     Years since quitting: 3.6    Smokeless tobacco: Former User     Types: Snuff     Quit date: 10/7/1995   Substance and Sexual Activity    Alcohol use: No     Alcohol/week: 0.0 oz    Drug use: No    Sexual activity: Not on file       Review of Systems   Constitutional: Positive for activity change and fatigue. Negative for chills and fever.   HENT: Negative for congestion, mouth sores, nosebleeds, postnasal drip, rhinorrhea, sinus pain and sore throat.    Respiratory: Positive for cough and shortness of breath. Negative for chest  tightness.    Cardiovascular: Positive for chest pain. Negative for palpitations.   Gastrointestinal: Negative for abdominal pain, anal bleeding, blood in stool, diarrhea, nausea and vomiting.   Endocrine: Negative for cold intolerance and heat intolerance.   Genitourinary: Negative for difficulty urinating, dysuria and hematuria.   Musculoskeletal: Positive for arthralgias, back pain and gait problem.   Skin: Negative for rash and wound.   Allergic/Immunologic: Positive for immunocompromised state.   Neurological: Positive for weakness. Negative for dizziness, syncope and light-headedness.   Hematological: Negative for adenopathy. Does not bruise/bleed easily.   Psychiatric/Behavioral: Negative for confusion and decreased concentration.     Objective:     Vital Signs (Most Recent):  Temp: 98.2 °F (36.8 °C) (11/16/18 1026)  Pulse: 85 (11/16/18 1026)  Resp: 18 (11/16/18 1026)  BP: (!) 118/59 (11/16/18 1026)  SpO2: (!) 93 % (11/16/18 1026) Vital Signs (24h Range):  Temp:  [98.2 °F (36.8 °C)-99 °F (37.2 °C)] 98.2 °F (36.8 °C)  Pulse:  [] 85  Resp:  [16-22] 18  SpO2:  [81 %-95 %] 93 %  BP: (104-124)/(50-59) 118/59     Weight: 80 kg (176 lb 5.9 oz)  Body mass index is 27.62 kg/m².  Body surface area is 1.94 meters squared.      Intake/Output Summary (Last 24 hours) at 11/16/2018 1048  Last data filed at 11/16/2018 0600  Gross per 24 hour   Intake 310 ml   Output 700 ml   Net -390 ml       Physical Exam   Constitutional: He is oriented to person, place, and time. He appears well-developed and well-nourished.  Non-toxic appearance. He has a sickly appearance. He appears ill. No distress.   HENT:   Head: Normocephalic and atraumatic.   Eyes: Conjunctivae, EOM and lids are normal. Right eye exhibits no discharge. Left eye exhibits no discharge. No scleral icterus.   Neck: Normal range of motion.   Cardiovascular: An irregular rhythm present. Tachycardia present. Exam reveals no gallop and no friction rub.   No murmur  heard.  Pulmonary/Chest: Effort normal. No accessory muscle usage. No apnea, no tachypnea and no bradypnea. No respiratory distress. He has decreased breath sounds. He has rales.   Abdominal: Soft. Normal appearance. He exhibits no distension. There is no tenderness.   Musculoskeletal: Normal range of motion. He exhibits edema and tenderness.   Neurological: He is alert and oriented to person, place, and time.   Skin: Skin is warm, dry and intact. Capillary refill takes less than 2 seconds. Bruising noted. No rash noted. He is not diaphoretic. No erythema. No pallor.   Psychiatric: His speech is normal and behavior is normal. Judgment and thought content normal. His mood appears anxious. Cognition and memory are normal. He exhibits a depressed mood. He is attentive.   Vitals reviewed.      Significant Labs:   BMP:   Recent Labs   Lab 11/15/18  0430 11/16/18  0407   * 126*   * 134*   K 4.0 4.1   CL 91* 90*   CO2 32* 34*   BUN 10 15   CREATININE 0.7 0.8   CALCIUM 8.9 9.1   , CBC:   Recent Labs   Lab 11/15/18  0430 11/16/18  0407   WBC 7.94 8.48   HGB 10.4* 10.8*   HCT 33.4* 34.8*    318   , CMP:   Recent Labs   Lab 11/15/18  0430 11/16/18  0407   * 134*   K 4.0 4.1   CL 91* 90*   CO2 32* 34*   * 126*   BUN 10 15   CREATININE 0.7 0.8   CALCIUM 8.9 9.1   PROT 6.3  6.3 6.9  6.9   ALBUMIN 2.3*  2.3* 2.3*  2.3*   BILITOT 0.4  0.4 0.5  0.5   ALKPHOS 307*  307* 276*  276*   AST 46*  46* 30  30   ALT 30  30 23  23   ANIONGAP 11 10   EGFRNONAA >60 >60   , Coagulation:   No results for input(s): PT, INR, APTT in the last 48 hours., LDH: No results for input(s): LDHCSF, BFSOURCE in the last 48 hours., LFTs:   Recent Labs   Lab 11/15/18  0430 11/16/18  0407   ALT 30  30 23  23   AST 46*  46* 30  30   ALKPHOS 307*  307* 276*  276*   BILITOT 0.4  0.4 0.5  0.5   PROT 6.3  6.3 6.9  6.9   ALBUMIN 2.3*  2.3* 2.3*  2.3*   , Urine Studies:   No results for input(s): COLORU,  APPEARANCEUA, PHUR, SPECGRAV, PROTEINUA, GLUCUA, KETONESU, BILIRUBINUA, OCCULTUA, NITRITE, UROBILINOGEN, LEUKOCYTESUR, RBCUA, WBCUA, BACTERIA, SQUAMEPITHEL, HYALINECASTS in the last 48 hours.    Invalid input(s): WRIGHTSUR and All pertinent labs from the last 24 hours have been reviewed.    Diagnostic Results:  I have reviewed all pertinent imaging results/findings within the past 24 hours.    Assessment/Plan:     Chest pain    11/14/18 - patient presented with shortness of breath and chest pain not relieved by home pain medications.  So far cardiac workup has been negative.  Patient states that his O2 sats at home were greater than 92%.  Patient home pain medications may need to be adjusted upon discharge for better pain control.     11/16/18 - Patient states still having pain and discomfort to chest.  Pain medication adjusted today for better pain control.         Metastatic left lung cancer (metastasis from lung to other site) with mediastinal lymphadenopathy    11/14/18 patient currently receiving key treated for PDL1 + lung cancer.  Last dose received on November 8, 2018.    11/15/18 - code status discussed with patient.  Patient and family did not give an answer or state wishes - stating they were praying for a miracle and would let God handle it.  Patient is to begin radiation, per Dr. Culp (radiation oncologist), to multiple targetable areas in the chest to see if some of the patient's pain can be relieved.  Pain slightly improved with increased pain medication, but patient still remains uncomfortable and had a hard time sleeping last night.      11/16/18 - Code status not decided upon by patient at this time.  Pathologic fracture to left posterior 7th rib and has sternal pain per Dr. Culp.  He will receive on dose of radiation for this per Dr. Culp.  He should follow up outpatient with Dr. Lagos upon resolution of acute illness to discuss continuation of systemic therapy.               Thank you for  your consult. I will follow-up with patient. Please contact us if you have any additional questions.     Callie Rodriguez NP  Hematology/Oncology  [facility]

## 2018-11-16 NOTE — ASSESSMENT & PLAN NOTE
Heme/Onc following       11/16- will continue close oncology follow up , discussed end of life care with him, he has agreed to be DNR

## 2018-11-16 NOTE — SUBJECTIVE & OBJECTIVE
Interval history:  Patient seen this morning at the bedside.  Code status again discussed with patient and  sister.  No decisions have been made. Patient has remained afebrile over the past 24 hours.      Oncology Treatment Plan:   OP PEMBROLIZUMAB 200MG Q3W    Medications:  Continuous Infusions:  Scheduled Meds:   apixaban  5 mg Oral BID    arformoterol  15 mcg Nebulization BID    budesonide  0.5 mg Nebulization Q12H    diltiaZEM  360 mg Oral Daily    docusate sodium  100 mg Oral Daily    fentaNYL  1 patch Transdermal Q72H    insulin detemir U-100  6 Units Subcutaneous BID    magic mouthwash (diphenhydrAMINE 12.5 mg/5 mL 20 mL, aluminum & magnesium hydroxide-simethicone (MYLANTA) 20 mL, lidocaine HCl 2% (XYLOCAINE) 20 mL) solution  15 mL Swish & Spit AC + HS + 0200    pantoprazole  40 mg Oral Daily    polyethylene glycol  17 g Oral Daily    senna-docusate 8.6-50 mg  1 tablet Oral BID    sertraline  50 mg Oral Daily     PRN Meds:acetaminophen, albuterol-ipratropium, dextrose 50%, dextrose 50%, glucagon (human recombinant), glucose, glucose, insulin aspart U-100, ketorolac, lactulose, magic mouthwash (diphenhydrAMINE 12.5 mg/5 mL 20 mL, aluminum & magnesium hydroxide-simethicone (MYLANTA) 20 mL, lidocaine HCl 2% (XYLOCAINE) 20 mL) solution, ondansetron, oxyCODONE, sodium chloride 0.9%     Review of patient's allergies indicates:   Allergen Reactions    Anoro ellipta [umeclidinium-vilanterol] Shortness Of Breath    Flomax [tamsulosin]      Dizzy          Past Medical History:   Diagnosis Date    Arthritis     Atrial fibrillation and flutter     Atrial flutter     Cancer     LUNG    COPD (chronic obstructive pulmonary disease)     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  Others are too small to accurately characterize.     Lung disease     Nephrolithiasis     ct urogram 12/15/2017-Bilateral nephrolithiasis.     Neuropathy, diabetic     Pancreatitis     Type 1 diabetes mellitus with diabetic neuropathy 4/27/2018     Past Surgical History:   Procedure Laterality Date    ABLATION N/A 12/4/2017    Performed by [facility] at [facility] CATH LAB    BICEPS TENDON REPAIR Right     BRONCHOSCOPY Bilateral 9/2/2018    Procedure: BRONCHOSCOPY- insert lighted tube into airway to obtain biopsy of lung;  Surgeon: Aldair Deleon MD;  Location: [facility] ENDO;  Service: Endoscopy;  Laterality: Bilateral;    BRONCHOSCOPY- insert lighted tube into airway to obtain biopsy of lung Bilateral 9/2/2018    Performed by [facility]air [facility]on MD at [facility] ENDO    CARDIOVERSION      CHOLECYSTECTOMY      INSERTION OF TUNNELED CENTRAL VENOUS CATHETER (CVC) WITH SUBCUTANEOUS PORT Right 10/9/2018    Procedure: [id number];  Surgeon: Christophe Brandt MD;  Location: [facility] OR;  Service: General;  Laterality: Right;    [id number]-CATH Right 10/9/2018    Performed by [facility] MD at [facility] OR    PATELLA SURGERY Right     RADIOFREQUENCY ABLATION      ROTATOR CUFF REPAIR Left     TRANSESOPHAGEAL ECHOCARDIOGRAM (DILLAN) N/A 12/4/2017    Performed by [facility] MD at [facility] CATH LAB    ULTRASOUND GUIDANCE Right 10/9/2018    Procedure: ULTRASOUND GUIDANCE;  Surgeon: Christophe Brandt MD;  Location: [facility] OR;  Service: General;  Laterality: Right;    ULTRASOUND GUIDANCE Right 10/9/2018    Performed by Christophe Brandt MD at [facility] OR     Family History     Problem Relation (Age of Onset)    Cancer Maternal Aunt    Cataracts Sister    Diabetes Mother, Sister, Sister, Sister    Heart attack Brother    Heart failure Brother    Hypertension Mother, Father    Stroke Mother, Father, Paternal Grandmother, Paternal Grandfather        Tobacco Use    Smoking status: Former Smoker     Packs/day: 0.50     Types: Cigarettes     Start date: 1/1/1960     Last attempt to  quit: 3/8/2015     Years since quitting: 3.6    Smokeless tobacco: Former User     Types: Snuff     Quit date: 10/7/1995   Substance and Sexual Activity    Alcohol use: No     Alcohol/week: 0.0 oz    Drug use: No    Sexual activity: Not on file       Review of Systems   Constitutional: Positive for activity change and fatigue. Negative for chills and fever.   HENT: Negative for congestion, mouth sores, nosebleeds, postnasal drip, rhinorrhea, sinus pain and sore throat.    Respiratory: Positive for cough and shortness of breath. Negative for chest tightness.    Cardiovascular: Positive for chest pain. Negative for palpitations.   Gastrointestinal: Negative for abdominal pain, anal bleeding, blood in stool, diarrhea, nausea and vomiting.   Endocrine: Negative for cold intolerance and heat intolerance.   Genitourinary: Negative for difficulty urinating, dysuria and hematuria.   Musculoskeletal: Positive for arthralgias, back pain and gait problem.   Skin: Negative for rash and wound.   Allergic/Immunologic: Positive for immunocompromised state.   Neurological: Positive for weakness. Negative for dizziness, syncope and light-headedness.   Hematological: Negative for adenopathy. Does not bruise/bleed easily.   Psychiatric/Behavioral: Negative for confusion and decreased concentration.     Objective:     Vital Signs (Most Recent):  Temp: 98.2 °F (36.8 °C) (11/16/18 1026)  Pulse: 85 (11/16/18 1026)  Resp: 18 (11/16/18 1026)  BP: (!) 118/59 (11/16/18 1026)  SpO2: (!) 93 % (11/16/18 1026) Vital Signs (24h Range):  Temp:  [98.2 °F (36.8 °C)-99 °F (37.2 °C)] 98.2 °F (36.8 °C)  Pulse:  [] 85  Resp:  [16-22] 18  SpO2:  [81 %-95 %] 93 %  BP: (104-124)/(50-59) 118/59     Weight: 80 kg (176 lb 5.9 oz)  Body mass index is 27.62 kg/m².  Body surface area is 1.94 meters squared.      Intake/Output Summary (Last 24 hours) at 11/16/2018 1048  Last data filed at 11/16/2018 0600  Gross per 24 hour   Intake 310 ml   Output 700 ml    Net -390 ml       Physical Exam   Constitutional: He is oriented to person, place, and time. He appears well-developed and well-nourished.  Non-toxic appearance. He has a sickly appearance. He appears ill. No distress.   HENT:   Head: Normocephalic and atraumatic.   Eyes: Conjunctivae, EOM and lids are normal. Right eye exhibits no discharge. Left eye exhibits no discharge. No scleral icterus.   Neck: Normal range of motion.   Cardiovascular: An irregular rhythm present. Tachycardia present. Exam reveals no gallop and no friction rub.   No murmur heard.  Pulmonary/Chest: Effort normal. No accessory muscle usage. No apnea, no tachypnea and no bradypnea. No respiratory distress. He has decreased breath sounds. He has rales.   Abdominal: Soft. Normal appearance. He exhibits no distension. There is no tenderness.   Musculoskeletal: Normal range of motion. He exhibits edema and tenderness.   Neurological: He is alert and oriented to person, place, and time.   Skin: Skin is warm, dry and intact. Capillary refill takes less than 2 seconds. Bruising noted. No rash noted. He is not diaphoretic. No erythema. No pallor.   Psychiatric: His speech is normal and behavior is normal. Judgment and thought content normal. His mood appears anxious. Cognition and memory are normal. He exhibits a depressed mood. He is attentive.   Vitals reviewed.      Significant Labs:   BMP:   Recent Labs   Lab 11/15/18  0430 11/16/18  0407   * 126*   * 134*   K 4.0 4.1   CL 91* 90*   CO2 32* 34*   BUN 10 15   CREATININE 0.7 0.8   CALCIUM 8.9 9.1   , CBC:   Recent Labs   Lab 11/15/18  0430 11/16/18  0407   WBC 7.94 8.48   HGB 10.4* 10.8*   HCT 33.4* 34.8*    318   , CMP:   Recent Labs   Lab 11/15/18  0430 11/16/18  0407   * 134*   K 4.0 4.1   CL 91* 90*   CO2 32* 34*   * 126*   BUN 10 15   CREATININE 0.7 0.8   CALCIUM 8.9 9.1   PROT 6.3  6.3 6.9  6.9   ALBUMIN 2.3*  2.3* 2.3*  2.3*   BILITOT 0.4  0.4 0.5  0.5    ALKPHOS 307*  307* 276*  276*   AST 46*  46* 30  30   ALT 30  30 23  23   ANIONGAP 11 10   EGFRNONAA >60 >60   , Coagulation:   No results for input(s): PT, INR, APTT in the last 48 hours., LDH: No results for input(s): LDHCSF, BFSOURCE in the last 48 hours., LFTs:   Recent Labs   Lab 11/15/18  0430 11/16/18  0407   ALT 30  30 23  23   AST 46*  46* 30  30   ALKPHOS 307*  307* 276*  276*   BILITOT 0.4  0.4 0.5  0.5   PROT 6.3  6.3 6.9  6.9   ALBUMIN 2.3*  2.3* 2.3*  2.3*   , Urine Studies:   No results for input(s): COLORU, APPEARANCEUA, PHUR, SPECGRAV, PROTEINUA, GLUCUA, KETONESU, BILIRUBINUA, OCCULTUA, NITRITE, UROBILINOGEN, LEUKOCYTESUR, RBCUA, WBCUA, BACTERIA, SQUAMEPITHEL, HYALINECASTS in the last 48 hours.    Invalid input(s): WRIGHTSUR and All pertinent labs from the last 24 hours have been reviewed.    Diagnostic Results:  I have reviewed all pertinent imaging results/findings within the past 24 hours.

## 2018-11-16 NOTE — PLAN OF CARE
Problem: Patient Care Overview  Goal: Plan of Care Review  Outcome: Ongoing (interventions implemented as appropriate)  POC reviewed with pt, verbalized understanding. Pt remained free from falls, fall precautions in place. Pt is a fib on monitor, 80-90s. VS monitored. Blood glucose monitored. Pt independent and able to reposition self. Pt IV intact, saline locked. PRN pain medication given. No other c/o at this time. Call bell and personal belongings within reach. Hourly rounding complete. Reminded to call for assistance. Will continue to monitor.

## 2018-11-16 NOTE — ASSESSMENT & PLAN NOTE
11/14/18 - patient presented with shortness of breath and chest pain not relieved by home pain medications.  So far cardiac workup has been negative.  Patient states that his O2 sats at home were greater than 92%.  Patient home pain medications may need to be adjusted upon discharge for better pain control.     11/16/18 - Patient states still having pain and discomfort to chest.  Pain medication adjusted today for better pain control.

## 2018-11-17 LAB
ALBUMIN SERPL BCP-MCNC: 2.2 G/DL
ALBUMIN SERPL BCP-MCNC: 2.2 G/DL
ALP SERPL-CCNC: 236 U/L
ALP SERPL-CCNC: 236 U/L
ALT SERPL W/O P-5'-P-CCNC: 18 U/L
ALT SERPL W/O P-5'-P-CCNC: 18 U/L
ANION GAP SERPL CALC-SCNC: 10 MMOL/L
AST SERPL-CCNC: 26 U/L
AST SERPL-CCNC: 26 U/L
BASOPHILS # BLD AUTO: 0.01 K/UL
BASOPHILS NFR BLD: 0.1 %
BILIRUB DIRECT SERPL-MCNC: 0.3 MG/DL
BILIRUB SERPL-MCNC: 0.4 MG/DL
BILIRUB SERPL-MCNC: 0.4 MG/DL
BUN SERPL-MCNC: 17 MG/DL
CALCIUM SERPL-MCNC: 8.8 MG/DL
CHLORIDE SERPL-SCNC: 92 MMOL/L
CO2 SERPL-SCNC: 32 MMOL/L
CREAT SERPL-MCNC: 0.7 MG/DL
DIFFERENTIAL METHOD: ABNORMAL
EOSINOPHIL # BLD AUTO: 0 K/UL
EOSINOPHIL NFR BLD: 0.5 %
ERYTHROCYTE [DISTWIDTH] IN BLOOD BY AUTOMATED COUNT: 16.6 %
EST. GFR  (AFRICAN AMERICAN): >60 ML/MIN/1.73 M^2
EST. GFR  (NON AFRICAN AMERICAN): >60 ML/MIN/1.73 M^2
GLUCOSE SERPL-MCNC: 182 MG/DL
HCT VFR BLD AUTO: 31.8 %
HGB BLD-MCNC: 10 G/DL
LYMPHOCYTES # BLD AUTO: 0.6 K/UL
LYMPHOCYTES NFR BLD: 7.6 %
MCH RBC QN AUTO: 26.8 PG
MCHC RBC AUTO-ENTMCNC: 31.4 G/DL
MCV RBC AUTO: 85 FL
MONOCYTES # BLD AUTO: 0.7 K/UL
MONOCYTES NFR BLD: 8.8 %
NEUTROPHILS # BLD AUTO: 6.1 K/UL
NEUTROPHILS NFR BLD: 83 %
PLATELET # BLD AUTO: 317 K/UL
PMV BLD AUTO: 8.7 FL
POCT GLUCOSE: 155 MG/DL (ref 70–110)
POCT GLUCOSE: 179 MG/DL (ref 70–110)
POCT GLUCOSE: 209 MG/DL (ref 70–110)
POCT GLUCOSE: 265 MG/DL (ref 70–110)
POTASSIUM SERPL-SCNC: 4.4 MMOL/L
PROT SERPL-MCNC: 6.6 G/DL
PROT SERPL-MCNC: 6.6 G/DL
RBC # BLD AUTO: 3.73 M/UL
SODIUM SERPL-SCNC: 134 MMOL/L
WBC # BLD AUTO: 7.37 K/UL

## 2018-11-17 PROCEDURE — 21400001 HC TELEMETRY ROOM

## 2018-11-17 PROCEDURE — 25000003 PHARM REV CODE 250: Performed by: NURSE PRACTITIONER

## 2018-11-17 PROCEDURE — 63600175 PHARM REV CODE 636 W HCPCS: Performed by: INTERNAL MEDICINE

## 2018-11-17 PROCEDURE — 94761 N-INVAS EAR/PLS OXIMETRY MLT: CPT

## 2018-11-17 PROCEDURE — 63600175 PHARM REV CODE 636 W HCPCS: Performed by: NURSE PRACTITIONER

## 2018-11-17 PROCEDURE — 85025 COMPLETE CBC W/AUTO DIFF WBC: CPT

## 2018-11-17 PROCEDURE — 27000221 HC OXYGEN, UP TO 24 HOURS

## 2018-11-17 PROCEDURE — 80076 HEPATIC FUNCTION PANEL: CPT

## 2018-11-17 PROCEDURE — 94640 AIRWAY INHALATION TREATMENT: CPT

## 2018-11-17 PROCEDURE — 99233 SBSQ HOSP IP/OBS HIGH 50: CPT | Mod: ,,, | Performed by: INTERNAL MEDICINE

## 2018-11-17 PROCEDURE — 94799 UNLISTED PULMONARY SVC/PX: CPT

## 2018-11-17 PROCEDURE — 25000242 PHARM REV CODE 250 ALT 637 W/ HCPCS: Performed by: NURSE PRACTITIONER

## 2018-11-17 PROCEDURE — 99900035 HC TECH TIME PER 15 MIN (STAT)

## 2018-11-17 PROCEDURE — 36415 COLL VENOUS BLD VENIPUNCTURE: CPT

## 2018-11-17 PROCEDURE — 96372 THER/PROPH/DIAG INJ SC/IM: CPT

## 2018-11-17 PROCEDURE — 80053 COMPREHEN METABOLIC PANEL: CPT

## 2018-11-17 RX ADMIN — INSULIN ASPART 2 UNITS: 100 INJECTION, SOLUTION INTRAVENOUS; SUBCUTANEOUS at 05:11

## 2018-11-17 RX ADMIN — DOCUSATE SODIUM 100 MG: 100 CAPSULE, LIQUID FILLED ORAL at 09:11

## 2018-11-17 RX ADMIN — STANDARDIZED SENNA CONCENTRATE AND DOCUSATE SODIUM 1 TABLET: 8.6; 5 TABLET ORAL at 09:11

## 2018-11-17 RX ADMIN — SERTRALINE HYDROCHLORIDE 50 MG: 50 TABLET ORAL at 09:11

## 2018-11-17 RX ADMIN — POLYETHYLENE GLYCOL 3350 17 G: 17 POWDER, FOR SOLUTION ORAL at 09:11

## 2018-11-17 RX ADMIN — BUDESONIDE 0.5 MG: 0.5 SUSPENSION RESPIRATORY (INHALATION) at 07:11

## 2018-11-17 RX ADMIN — BUDESONIDE 0.5 MG: 0.5 SUSPENSION RESPIRATORY (INHALATION) at 08:11

## 2018-11-17 RX ADMIN — KETOROLAC TROMETHAMINE 15 MG: 30 INJECTION, SOLUTION INTRAMUSCULAR at 02:11

## 2018-11-17 RX ADMIN — PANTOPRAZOLE SODIUM 40 MG: 40 TABLET, DELAYED RELEASE ORAL at 09:11

## 2018-11-17 RX ADMIN — LIDOCAINE HYDROCHLORIDE 15 ML: 20 SOLUTION ORAL; TOPICAL at 05:11

## 2018-11-17 RX ADMIN — APIXABAN 5 MG: 2.5 TABLET, FILM COATED ORAL at 08:11

## 2018-11-17 RX ADMIN — KETOROLAC TROMETHAMINE 15 MG: 30 INJECTION, SOLUTION INTRAMUSCULAR at 11:11

## 2018-11-17 RX ADMIN — OXYCODONE HYDROCHLORIDE 20 MG: 5 TABLET ORAL at 02:11

## 2018-11-17 RX ADMIN — OXYCODONE HYDROCHLORIDE 20 MG: 5 TABLET ORAL at 07:11

## 2018-11-17 RX ADMIN — INSULIN DETEMIR 6 UNITS: 100 INJECTION, SOLUTION SUBCUTANEOUS at 09:11

## 2018-11-17 RX ADMIN — LIDOCAINE HYDROCHLORIDE 15 ML: 20 SOLUTION ORAL; TOPICAL at 02:11

## 2018-11-17 RX ADMIN — KETOROLAC TROMETHAMINE 15 MG: 30 INJECTION, SOLUTION INTRAMUSCULAR at 07:11

## 2018-11-17 RX ADMIN — APIXABAN 5 MG: 2.5 TABLET, FILM COATED ORAL at 09:11

## 2018-11-17 RX ADMIN — ARFORMOTEROL TARTRATE 15 MCG: 15 SOLUTION RESPIRATORY (INHALATION) at 08:11

## 2018-11-17 RX ADMIN — INSULIN DETEMIR 6 UNITS: 100 INJECTION, SOLUTION SUBCUTANEOUS at 08:11

## 2018-11-17 RX ADMIN — ARFORMOTEROL TARTRATE 15 MCG: 15 SOLUTION RESPIRATORY (INHALATION) at 07:11

## 2018-11-17 RX ADMIN — LIDOCAINE HYDROCHLORIDE 15 ML: 20 SOLUTION ORAL; TOPICAL at 08:11

## 2018-11-17 RX ADMIN — STANDARDIZED SENNA CONCENTRATE AND DOCUSATE SODIUM 1 TABLET: 8.6; 5 TABLET ORAL at 08:11

## 2018-11-17 RX ADMIN — INSULIN ASPART 3 UNITS: 100 INJECTION, SOLUTION INTRAVENOUS; SUBCUTANEOUS at 11:11

## 2018-11-17 RX ADMIN — DILTIAZEM HYDROCHLORIDE 360 MG: 180 CAPSULE, COATED, EXTENDED RELEASE ORAL at 09:11

## 2018-11-17 NOTE — PROGRESS NOTES
[facility] Hospital Medicine  Progress Note    Patient Name: Nico Garza Jr.  MRN: [id number]  Patient Class: IP- Inpatient   Admission Date: 11/13/2018  Length of Stay: 3 days  Attending Physician: Vick Maldonado, *  Primary Care Provider: Tiffany Davis DO        Subjective:     Principal Problem:Intractable pain    HPI:  Nico Garza Jr. is a 72 y.o. male patient with a hx of DM, HTN, and lung cancer who presents  for L-sided CP which onset gradually 1 night ago. Symptoms are constant and moderate in severity. Pt was evaluated by Dr. Moseley (Internal Medicine) and sent to the ED for further evaluation. No mitigating factors reported. Pain is exacerbated by deep inhalation and palpation. Associated sxs include sob, non productive cough and BLE swelling. Patient reports had second dose of Keytruda tx 5 days ago. Patient was evaluated in the ER. #1 Troponin negative. CTA Chest negative for PE but of importance Upper Abdomen: 4.1 cm metastatic lesion within the dome of the liver.  Mild intrahepatic ductal dilatation. Lesion size increased over past 2 months and this ductal dilation is new compared to prior ABD CT 8/31/18.  HM consulted for admission. US ABD ordered given new CT findings which note the common duct is not dilated, measuring 7 mm.  No intrahepatic ductal dilatation. 2 solid masses consistent with hx of cancer. Patient placed in obs for further eval/treatment for chest pain. Heart score elevated.         Hospital Course:  The pt is a 71 yo male with hx NSCLC with mets to bone and liver s/p radiation currently receiving Keytruda admitted for chest pain. Chest pain is characterized by a 8/10 constant pain worse with deep inspiration and palpation. EKG showed no change from previous. Serial troponin normal.   CTA chest showed no PE- Elevation left hemidiaphragm with consolidation of the left lower lobe likely compressive.  Left anterior mediastinal mass measures approximately  6.1 x 4.5 cm, enlarged mediastinal and paratracheal lymph nodes,1.1 cm nodule left lower lobe.1.4 cm nodule right lower lobe.  Nodules have enlarged from prior exam.4.1 cm metastatic lesion within the dome of the liver., Moderate constipation.  Abd U/S showed 2 large, solid masses in the liver.   The pt's intractable chest pain is likely cancer related. He also reports intractable back pain unrelieved by home medication Oxycodone 15 mg every 4 hours. Will give Dilaudid 1 mg IV now and place a Fentanyl 25 mcg patch. Care discussed with Heme/Onc. Pt will need to be monitored for effectiveness and possible adverse effects on new pain regimen 11/15/18 The patient reports that his pain is not well controlled. Will adjust analgesia. Plan for radiation tomorrow per Dr. Culp.   11/16- patient still complains of generalized pain,he had radiation done today .  Had an extensive conversation with patient about end of life care ,  He has agreed to be DNR.     11/17 Pt still continue with pain . He is s/p radiation tx. The pain did not improved significantly.     Interval History:     Review of Systems   Constitutional: Positive for activity change, appetite change, fatigue and unexpected weight change (20 lbs in 2 months ). Negative for chills, diaphoresis and fever.   HENT: Negative for congestion, facial swelling, nosebleeds, rhinorrhea, sinus pressure, sneezing, sore throat and trouble swallowing.    Eyes: Negative for pain, discharge, redness and visual disturbance.   Respiratory: Positive for shortness of breath (chronic ). Negative for apnea, cough, chest tightness, wheezing and stridor.    Cardiovascular: Positive for chest pain. Negative for palpitations and leg swelling.   Gastrointestinal: Negative for abdominal distention, abdominal pain, anal bleeding, blood in stool, constipation, diarrhea, nausea and vomiting.   Endocrine: Negative for cold intolerance and heat intolerance.   Genitourinary: Negative for difficulty  urinating, discharge, dysuria, flank pain, frequency, hematuria and urgency.   Musculoskeletal: Positive for back pain. Negative for arthralgias, gait problem, joint swelling, myalgias, neck pain and neck stiffness.   Skin: Negative for color change, pallor, rash and wound.   Allergic/Immunologic: Positive for immunocompromised state. Negative for food allergies.   Neurological: Positive for weakness. Negative for dizziness, tremors, seizures, syncope, facial asymmetry, speech difficulty, light-headedness, numbness and headaches.   Hematological: Negative for adenopathy.   Psychiatric/Behavioral: Negative for agitation, behavioral problems, confusion, hallucinations and suicidal ideas. The patient is not nervous/anxious.    All other systems reviewed and are negative.    Objective:     Vital Signs (Most Recent):  Temp: 98.6 °F (37 °C) (11/17/18 0746)  Pulse: 86 (11/17/18 1100)  Resp: 20 (11/17/18 0746)  BP: (!) 116/57 (11/17/18 0746)  SpO2: 96 % (11/17/18 0746) Vital Signs (24h Range):  Temp:  [97.9 °F (36.6 °C)-98.6 °F (37 °C)] 98.6 °F (37 °C)  Pulse:  [79-97] 86  Resp:  [18-20] 20  SpO2:  [94 %-96 %] 96 %  BP: (108-133)/(55-69) 116/57     Weight: 83.8 kg (184 lb 11.9 oz)  Body mass index is 28.94 kg/m².    Intake/Output Summary (Last 24 hours) at 11/17/2018 1236  Last data filed at 11/16/2018 2117  Gross per 24 hour   Intake 600 ml   Output 300 ml   Net 300 ml      Physical Exam   Constitutional: He is oriented to person, place, and time. He appears well-developed and well-nourished. No distress.   HENT:   Head: Normocephalic and atraumatic.   Nose: Nose normal.   Mouth/Throat: Oropharynx is clear and moist.   Small oral ulcer to lower inner lip    Eyes: Conjunctivae and EOM are normal. Pupils are equal, round, and reactive to light. No scleral icterus.   Neck: Normal range of motion. Neck supple.   Cardiovascular: Normal rate, regular rhythm, normal heart sounds and intact distal pulses. Exam reveals no gallop  and no friction rub.   No murmur heard.  Pulmonary/Chest: Effort normal and breath sounds normal. No stridor. No respiratory distress. He has no wheezes. He has no rales. He exhibits no tenderness.   Diminished BS  TTP to anterior chest wall    Abdominal: Soft. Bowel sounds are normal. He exhibits no distension. There is no tenderness. There is no rebound and no guarding.   Musculoskeletal: Normal range of motion. He exhibits no edema, tenderness or deformity.   Neurological: He is alert and oriented to person, place, and time. He has normal reflexes. No cranial nerve deficit. He exhibits normal muscle tone. Coordination normal.   Skin: Skin is warm and dry. No rash noted. He is not diaphoretic. No erythema. No pallor.   Psychiatric: He has a normal mood and affect. His behavior is normal. Thought content normal.   Nursing note and vitals reviewed.      Significant Labs: All pertinent labs within the past 24 hours have been reviewed.    Significant Imaging: I have reviewed all pertinent imaging results/findings within the past 24 hours.    Assessment/Plan:      * Intractable pain    Cancer related pain  IV Dilaudid x one  Fentanyl patch- may need to titrate   Cont Oxycodone 15mg every 4 hours prn breakthru pain   Monitor closely   11/15/18 The patient reports that his pain is not well controlled. Will adjust analgesia. Plan for radiation tomorrow per Dr. Culp.     11/16- will continue fentanyl patch , will need pain management review on discharge , will need close monitoring of oxygen saturation while on therapy .  S/P radiation therapy   continue oxycodone 20 mg every 4 hours prn   Cont fentanyl patch  50 mcg q72 hrs   Toradol 15 mg iv qid prn      Chest pain    Cancer related pain  See above      Severe malnutrition    Poor appetite due to liver mets   Boost glucose control TID   Encourage regular meals        Constipation    Stool softner  Miralax   Monitor          Chronic respiratory failure with hypoxia     Continue home oxygen        Metastatic left lung cancer (metastasis from lung to other site) with mediastinal lymphadenopathy    Heme/Onc following   - will continue close oncology follow up , discussed end of life care with him, he has agreed to be DNR      Type 1 diabetes mellitus with diabetic neuropathy    Cont NISS  Levemir   montior  - will continue close monitoring , insulin sliding scale ,will continue Levemir     Paroxysmal atrial fibrillation    Continue eliquis and dilitiazem  Monitor   Rate controlled.          Hypertension goal BP (blood pressure) < 130/80    Monitor trends  Continue home meds  - BP is controlled, will continue to monitor closely ,continue diltiazem     COPD, group D, by GOLD 2017 classification    Continue neb txs   - will continue duonebs as tolerated         VTE Risk Mitigation (From admission, onward)        Ordered     apixaban tablet 5 mg  2 times daily      11/14/18 0323     Place ENDY hose  Until discontinued      11/14/18 0323     Place sequential compression device  Until discontinued      11/14/18 0323              Vick Maldonado MD  Department of Hospital Medicine   [facility]

## 2018-11-17 NOTE — PLAN OF CARE
Problem: Patient Care Overview  Goal: Plan of Care Review  Outcome: Ongoing (interventions implemented as appropriate)  Plan of care reviewed with patient and no concerns expressed. No injury throughout shift seen. Patient VSS. Pain controlled to extent per patient tolerance. Will continue to monitor for any changes.

## 2018-11-17 NOTE — PROGRESS NOTES
[facility] -   Hematology/Oncology  Progress Note    Patient Name: Nico Garza Jr.  Admission Date: 11/13/2018  Hospital Length of Stay: 3 days  Code Status: DNR     Subjective:     HPI:  Nico Garza Jr. is a 72 y.o. male patient with a hx of DM, HTN, and lung cancer who presents  for L-sided CP which onset gradually 1 night ago. Symptoms are constant and moderate in severity. Pt was evaluated by Dr. Moseley (Internal Medicine) and sent to the ED for further evaluation. No mitigating factors reported. Pain is exacerbated by deep inhalation and palpation. Associated sxs include sob, non productive cough and BLE swelling. Patient reports had second dose of Keytruda tx 5 days ago. Patient was evaluated in the ER. #1 Troponin negative. CTA Chest negative for PE but of importance Upper Abdomen: 4.1 cm metastatic lesion within the dome of the liver.  Mild intrahepatic ductal dilatation. Lesion size increased over past 2 months and this ductal dilation is new compared to prior ABD CT 8/31/18.  HM consulted for admission. US ABD ordered given new CT findings which note the common duct is not dilated, measuring 7 mm.  No intrahepatic ductal dilatation. 2 solid masses consistent with hx of cancer. Patient placed in obs for further eval/treatment for chest pain. Heart score elevated.     Hematology oncology consulted for further management of care.    Interval history:  The patient reports that his neoplasm related pain continues to be moderately controlled at this time.  His pain medication doses were readjusted yesterday.      Oncology Treatment Plan:   OP PEMBROLIZUMAB 200MG Q3W    Medications:  Continuous Infusions:  Scheduled Meds:   apixaban  5 mg Oral BID    arformoterol  15 mcg Nebulization BID    budesonide  0.5 mg Nebulization Q12H    diltiaZEM  360 mg Oral Daily    docusate sodium  100 mg Oral Daily    fentaNYL  1 patch Transdermal Q72H    insulin detemir U-100  6 Units Subcutaneous BID     magic mouthwash (diphenhydrAMINE 12.5 mg/5 mL 20 mL, aluminum & magnesium hydroxide-simethicone (MYLANTA) 20 mL, lidocaine HCl 2% (XYLOCAINE) 20 mL) solution  15 mL Swish & Spit AC + HS + 0200    pantoprazole  40 mg Oral Daily    polyethylene glycol  17 g Oral Daily    senna-docusate 8.6-50 mg  1 tablet Oral BID    sertraline  50 mg Oral Daily     PRN Meds:acetaminophen, albuterol-ipratropium, dextrose 50%, dextrose 50%, glucagon (human recombinant), glucose, glucose, insulin aspart U-100, ketorolac, lactulose, magic mouthwash (diphenhydrAMINE 12.5 mg/5 mL 20 mL, aluminum & magnesium hydroxide-simethicone (MYLANTA) 20 mL, lidocaine HCl 2% (XYLOCAINE) 20 mL) solution, ondansetron, oxyCODONE, sodium chloride 0.9%     Review of patient's allergies indicates:   Allergen Reactions    Anoro ellipta [umeclidinium-vilanterol] Shortness Of Breath    Flomax [tamsulosin]      Dizzy          Past Medical History:   Diagnosis Date    Arthritis     Atrial fibrillation and flutter     Atrial flutter     Cancer     LUNG    COPD (chronic obstructive pulmonary disease)     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  Others are too small to accurately characterize.    Lung disease     Nephrolithiasis     ct urogram 12/15/2017-Bilateral nephrolithiasis.     Neuropathy, diabetic     Pancreatitis     Type 1 diabetes mellitus with diabetic neuropathy 4/27/2018     Past Surgical History:   Procedure Laterality Date    ABLATION N/A 12/4/2017    Performed [facility] CATH LAB    BICEPS TENDON REPAIR Right     BRONCHOSCOPY Bilateral 9/2/2018    Procedure: BRONCHOSCOPY- insert lighted tube into airway to obtain biopsy of lung;  Surgeon: Aldair Deleon MD;  Location: [facility];  Service: Endoscopy;  Laterality: Bilateral;     BRONCHOSCOPY- insert lighted tube into airway to obtain biopsy of lung Bilateral 9/2/2018    Performed by Aldair Deleon MD at [facility] ENDO    CARDIOVERSION      CHOLECYSTECTOMY      INSERTION OF TUNNELED CENTRAL VENOUS CATHETER (CVC) WITH SUBCUTANEOUS PORT Right 10/9/2018    Procedure: [id number]-CATH;  Surgeon: Christophe Brandt MD;  Location: [facility] OR;  Service: General;  Laterality: Right;    [id number]-F-CATH Right 10/9/2018    Performed by Christophe Brandt MD at [facility] OR    PATELLA SURGERY Right     RADIOFREQUENCY ABLATION      ROTATOR CUFF REPAIR Left     TRANSESOPHAGEAL ECHOCARDIOGRAM (DILLAN) N/A 12/4/2017    Performed by [facility] at [facility] CATH LAB    ULTRASOUND GUIDANCE Right 10/9/2018    Procedure: ULTRASOUND GUIDANCE;  Surgeon: Christophe Brandt MD;  Location: [facility] OR;  Service: General;  Laterality: Right;    ULTRASOUND GUIDANCE Right 10/9/2018    Performed by Christophe Brandt MD at [facility] OR     Family History     Problem Relation (Age of Onset)    Cancer Maternal Aunt    Cataracts Sister    Diabetes Mother, Sister, Sister, Sister    Heart attack Brother    Heart failure Brother    Hypertension Mother, Father    Stroke Mother, Father, Paternal Grandmother, Paternal Grandfather        Tobacco Use    Smoking status: Former Smoker     Packs/day: 0.50     Types: Cigarettes     Start date: 1/1/1960     Last attempt to quit: 3/8/2015     Years since quitting: 3.6    Smokeless tobacco: Former User     Types: Snuff     Quit date: 10/7/1995   Substance and Sexual Activity    Alcohol use: No     Alcohol/week: 0.0 oz    Drug use: No    Sexual activity: Not on file       Review of Systems   Constitutional: Positive for activity change and fatigue. Negative for chills and fever.   HENT: Negative for congestion, mouth sores, nosebleeds, postnasal drip, rhinorrhea, sinus pain and sore throat.    Respiratory: Positive for cough and shortness of breath. Negative for chest tightness.    Cardiovascular:  Positive for chest pain. Negative for palpitations.   Gastrointestinal: Negative for abdominal pain, anal bleeding, blood in stool, diarrhea, nausea and vomiting.   Endocrine: Negative for cold intolerance and heat intolerance.   Genitourinary: Negative for difficulty urinating, dysuria and hematuria.   Musculoskeletal: Positive for arthralgias, back pain and gait problem.   Skin: Negative for rash and wound.   Allergic/Immunologic: Positive for immunocompromised state.   Neurological: Positive for weakness. Negative for dizziness, syncope and light-headedness.   Hematological: Negative for adenopathy. Does not bruise/bleed easily.   Psychiatric/Behavioral: Negative for confusion and decreased concentration.     Objective:     Vital Signs (Most Recent):  Temp: 98.6 °F (37 °C) (11/17/18 0746)  Pulse: 75 (11/17/18 1522)  Resp: 18 (11/17/18 1522)  BP: (!) 110/59 (11/17/18 1522)  SpO2: (!) 92 % (11/17/18 1522) Vital Signs (24h Range):  Temp:  [97.9 °F (36.6 °C)-98.6 °F (37 °C)] 98.6 °F (37 °C)  Pulse:  [74-94] 75  Resp:  [18-20] 18  SpO2:  [92 %-96 %] 92 %  BP: (100-133)/(53-69) 110/59     Weight: 83.8 kg (184 lb 11.9 oz)  Body mass index is 28.94 kg/m².  Body surface area is 1.99 meters squared.      Intake/Output Summary (Last 24 hours) at 11/17/2018 1541  Last data filed at 11/16/2018 2117  Gross per 24 hour   Intake 360 ml   Output 100 ml   Net 260 ml       Physical Exam   Constitutional: He is oriented to person, place, and time. He appears well-developed and well-nourished.  Non-toxic appearance. He has a sickly appearance. He appears ill. No distress.   HENT:   Head: Normocephalic and atraumatic.   Eyes: Conjunctivae, EOM and lids are normal. Right eye exhibits no discharge. Left eye exhibits no discharge. No scleral icterus.   Neck: Normal range of motion.   Cardiovascular: An irregular rhythm present. Tachycardia present. Exam reveals no gallop and no friction rub.   No murmur heard.  Pulmonary/Chest: Effort  normal. No accessory muscle usage. No apnea, no tachypnea and no bradypnea. No respiratory distress. He has decreased breath sounds. He has rales.   Abdominal: Soft. Normal appearance. He exhibits no distension. There is no tenderness.   Musculoskeletal: Normal range of motion. He exhibits edema and tenderness.   Neurological: He is alert and oriented to person, place, and time.   Skin: Skin is warm, dry and intact. Capillary refill takes less than 2 seconds. Bruising noted. No rash noted. He is not diaphoretic. No erythema. No pallor.   Psychiatric: His speech is normal and behavior is normal. Judgment and thought content normal. His mood appears anxious. Cognition and memory are normal. He exhibits a depressed mood. He is attentive.   Vitals reviewed.      Significant Labs:   BMP:   Recent Labs   Lab 11/16/18 0407 11/16/18 2130 11/17/18 0421   * 234* 182*   * 133* 134*   K 4.1 4.4 4.4   CL 90* 92* 92*   CO2 34* 32* 32*   BUN 15 18 17   CREATININE 0.8 0.7 0.7   CALCIUM 9.1 8.7 8.8   MG  --  1.7  --    , CBC:   Recent Labs   Lab 11/16/18 0407 11/17/18 0421   WBC 8.48 7.37   HGB 10.8* 10.0*   HCT 34.8* 31.8*    317   , CMP:   Recent Labs   Lab 11/16/18 0407 11/16/18 2130 11/17/18 0421   * 133* 134*   K 4.1 4.4 4.4   CL 90* 92* 92*   CO2 34* 32* 32*   * 234* 182*   BUN 15 18 17   CREATININE 0.8 0.7 0.7   CALCIUM 9.1 8.7 8.8   PROT 6.9  6.9  --  6.6  6.6   ALBUMIN 2.3*  2.3*  --  2.2*  2.2*   BILITOT 0.5  0.5  --  0.4  0.4   ALKPHOS 276*  276*  --  236*  236*   AST 30  30  --  26  26   ALT 23  23  --  18  18   ANIONGAP 10 9 10   EGFRNONAA >60 >60 >60   , Coagulation:   No results for input(s): PT, INR, APTT in the last 48 hours., LDH: No results for input(s): LDHCSF, BFSOURCE in the last 48 hours., LFTs:   Recent Labs   Lab 11/16/18  0407 11/17/18  0421   ALT 23  23 18  18   AST 30  30 26  26   ALKPHOS 276*  276* 236*  236*   BILITOT 0.5  0.5 0.4  0.4   PROT  6.9  6.9 6.6  6.6   ALBUMIN 2.3*  2.3* 2.2*  2.2*   , Urine Studies:   No results for input(s): COLORU, APPEARANCEUA, PHUR, SPECGRAV, PROTEINUA, GLUCUA, KETONESU, BILIRUBINUA, OCCULTUA, NITRITE, UROBILINOGEN, LEUKOCYTESUR, RBCUA, WBCUA, BACTERIA, SQUAMEPITHEL, HYALINECASTS in the last 48 hours.    Invalid input(s): WRIGHTSUR and All pertinent labs from the last 24 hours have been reviewed.    Diagnostic Results:  I have reviewed all pertinent imaging results/findings within the past 24 hours.    Assessment/Plan:     Chest pain    11/14/18 - patient presented with shortness of breath and chest pain not relieved by home pain medications.  So far cardiac workup has been negative.  Patient states that his O2 sats at home were greater than 92%.  Patient home pain medications may need to be adjusted upon discharge for better pain control.     11/16/18 - Patient states still having pain and discomfort to chest.  Pain medication adjusted today for better pain control.         Metastatic left lung cancer (metastasis from lung to other site) with mediastinal lymphadenopathy    11/14/18 patient currently receiving key treated for PDL1 + lung cancer.  Last dose received on November 8, 2018.    11/15/18 - code status discussed with patient.  Patient and family did not give an answer or state wishes - stating they were praying for a miracle and would let God handle it.  Patient is to begin radiation, per Dr. Culp (radiation oncologist), to multiple targetable areas in the chest to see if some of the patient's pain can be relieved.  Pain slightly improved with increased pain medication, but patient still remains uncomfortable and had a hard time sleeping last night.      11/16/18 - Code status not decided upon by patient at this time.  Pathologic fracture to left posterior 7th rib and has sternal pain per Dr. Culp.  He will receive on dose of radiation for this per Dr. Culp.  He should follow up outpatient with Dr. Lagos  upon resolution of acute illness to discuss continuation of systemic therapy.      November 17, 2018-I had a detailed discussion with the patient today with regard to his current clinical situation.  Review of the patient's imaging studies shows that he appears to be having a positive response to ongoing immunotherapy with Keytruda with interval shrinkage of left anterior mediastinal mass. Continue current pain medications as prescribed for treatment of neoplasm related pain. He is aware of sedation/constipation precautions.  We will re-evaluate his pain control again tomorrow.  I have discussed his case in detail with Dr. Joya with Hospital Medicine.  Anticipate discharge home in the next 1-2 days.  Please call with any questions.             Thank you for your consult.      Deric Sow MD  Hematology/Oncology  [facility]

## 2018-11-17 NOTE — ASSESSMENT & PLAN NOTE
Heme/Onc following   - will continue close oncology follow up , discussed end of life care with him, he has agreed to be DNR

## 2018-11-17 NOTE — ASSESSMENT & PLAN NOTE
11/14/18 patient currently receiving key treated for PDL1 + lung cancer.  Last dose received on November 8, 2018.    11/15/18 - code status discussed with patient.  Patient and family did not give an answer or state wishes - stating they were praying for a miracle and would let God handle it.  Patient is to begin radiation, per Dr. Culp (radiation oncologist), to multiple targetable areas in the chest to see if some of the patient's pain can be relieved.  Pain slightly improved with increased pain medication, but patient still remains uncomfortable and had a hard time sleeping last night.      11/16/18 - Code status not decided upon by patient at this time.  Pathologic fracture to left posterior 7th rib and has sternal pain per Dr. Culp.  He will receive on dose of radiation for this per Dr. Culp.  He should follow up outpatient with Dr. Lagos upon resolution of acute illness to discuss continuation of systemic therapy.      November 17, 2018-I had a detailed discussion with the patient today with regard to his current clinical situation.  Review of the patient's imaging studies shows that he appears to be having a positive response to ongoing immunotherapy with Keytruda with interval shrinkage of left anterior mediastinal mass. Continue current pain medications as prescribed for treatment of neoplasm related pain. He is aware of sedation/constipation precautions.  We will re-evaluate his pain control again tomorrow.  I have discussed his case in detail with Dr. Joya with Hospital Medicine.  Anticipate discharge home in the next 1-2 days.  Please call with any questions.

## 2018-11-17 NOTE — ASSESSMENT & PLAN NOTE
Cancer related pain  IV Dilaudid x one  Fentanyl patch- may need to titrate   Cont Oxycodone 15mg every 4 hours prn breakthru pain   Monitor closely   11/15/18 The patient reports that his pain is not well controlled. Will adjust analgesia. Plan for radiation tomorrow per Dr. Culp.     11/16- will continue fentanyl patch , will need pain management review on discharge , will need close monitoring of oxygen saturation while on therapy .  S/P radiation therapy   continue oxycodone 20 mg every 4 hours prn   Cont fentanyl patch  50 mcg q72 hrs   Toradol 15 mg iv qid prn

## 2018-11-17 NOTE — SUBJECTIVE & OBJECTIVE
Interval History:     Review of Systems   Constitutional: Positive for activity change, appetite change, fatigue and unexpected weight change (20 lbs in 2 months ). Negative for chills, diaphoresis and fever.   HENT: Negative for congestion, facial swelling, nosebleeds, rhinorrhea, sinus pressure, sneezing, sore throat and trouble swallowing.    Eyes: Negative for pain, discharge, redness and visual disturbance.   Respiratory: Positive for shortness of breath (chronic ). Negative for apnea, cough, chest tightness, wheezing and stridor.    Cardiovascular: Positive for chest pain. Negative for palpitations and leg swelling.   Gastrointestinal: Negative for abdominal distention, abdominal pain, anal bleeding, blood in stool, constipation, diarrhea, nausea and vomiting.   Endocrine: Negative for cold intolerance and heat intolerance.   Genitourinary: Negative for difficulty urinating, discharge, dysuria, flank pain, frequency, hematuria and urgency.   Musculoskeletal: Positive for back pain. Negative for arthralgias, gait problem, joint swelling, myalgias, neck pain and neck stiffness.   Skin: Negative for color change, pallor, rash and wound.   Allergic/Immunologic: Positive for immunocompromised state. Negative for food allergies.   Neurological: Positive for weakness. Negative for dizziness, tremors, seizures, syncope, facial asymmetry, speech difficulty, light-headedness, numbness and headaches.   Hematological: Negative for adenopathy.   Psychiatric/Behavioral: Negative for agitation, behavioral problems, confusion, hallucinations and suicidal ideas. The patient is not nervous/anxious.    All other systems reviewed and are negative.    Objective:     Vital Signs (Most Recent):  Temp: 98.6 °F (37 °C) (11/17/18 0746)  Pulse: 86 (11/17/18 1100)  Resp: 20 (11/17/18 0746)  BP: (!) 116/57 (11/17/18 0746)  SpO2: 96 % (11/17/18 0746) Vital Signs (24h Range):  Temp:  [97.9 °F (36.6 °C)-98.6 °F (37 °C)] 98.6 °F (37  °C)  Pulse:  [79-97] 86  Resp:  [18-20] 20  SpO2:  [94 %-96 %] 96 %  BP: (108-133)/(55-69) 116/57     Weight: 83.8 kg (184 lb 11.9 oz)  Body mass index is 28.94 kg/m².    Intake/Output Summary (Last 24 hours) at 11/17/2018 1236  Last data filed at 11/16/2018 2117  Gross per 24 hour   Intake 600 ml   Output 300 ml   Net 300 ml      Physical Exam   Constitutional: He is oriented to person, place, and time. He appears well-developed and well-nourished. No distress.   HENT:   Head: Normocephalic and atraumatic.   Nose: Nose normal.   Mouth/Throat: Oropharynx is clear and moist.   Small oral ulcer to lower inner lip    Eyes: Conjunctivae and EOM are normal. Pupils are equal, round, and reactive to light. No scleral icterus.   Neck: Normal range of motion. Neck supple.   Cardiovascular: Normal rate, regular rhythm, normal heart sounds and intact distal pulses. Exam reveals no gallop and no friction rub.   No murmur heard.  Pulmonary/Chest: Effort normal and breath sounds normal. No stridor. No respiratory distress. He has no wheezes. He has no rales. He exhibits no tenderness.   Diminished BS  TTP to anterior chest wall    Abdominal: Soft. Bowel sounds are normal. He exhibits no distension. There is no tenderness. There is no rebound and no guarding.   Musculoskeletal: Normal range of motion. He exhibits no edema, tenderness or deformity.   Neurological: He is alert and oriented to person, place, and time. He has normal reflexes. No cranial nerve deficit. He exhibits normal muscle tone. Coordination normal.   Skin: Skin is warm and dry. No rash noted. He is not diaphoretic. No erythema. No pallor.   Psychiatric: He has a normal mood and affect. His behavior is normal. Thought content normal.   Nursing note and vitals reviewed.      Significant Labs: All pertinent labs within the past 24 hours have been reviewed.    Significant Imaging: I have reviewed all pertinent imaging results/findings within the past 24 hours.

## 2018-11-17 NOTE — SUBJECTIVE & OBJECTIVE
Interval history:  The patient reports that his neoplasm related pain continues to be moderately controlled at this time.  His pain medication doses were readjusted yesterday.      Oncology Treatment Plan:   OP PEMBROLIZUMAB 200MG Q3W    Medications:  Continuous Infusions:  Scheduled Meds:   apixaban  5 mg Oral BID    arformoterol  15 mcg Nebulization BID    budesonide  0.5 mg Nebulization Q12H    diltiaZEM  360 mg Oral Daily    docusate sodium  100 mg Oral Daily    fentaNYL  1 patch Transdermal Q72H    insulin detemir U-100  6 Units Subcutaneous BID    magic mouthwash (diphenhydrAMINE 12.5 mg/5 mL 20 mL, aluminum & magnesium hydroxide-simethicone (MYLANTA) 20 mL, lidocaine HCl 2% (XYLOCAINE) 20 mL) solution  15 mL Swish & Spit AC + HS + 0200    pantoprazole  40 mg Oral Daily    polyethylene glycol  17 g Oral Daily    senna-docusate 8.6-50 mg  1 tablet Oral BID    sertraline  50 mg Oral Daily     PRN Meds:acetaminophen, albuterol-ipratropium, dextrose 50%, dextrose 50%, glucagon (human recombinant), glucose, glucose, insulin aspart U-100, ketorolac, lactulose, magic mouthwash (diphenhydrAMINE 12.5 mg/5 mL 20 mL, aluminum & magnesium hydroxide-simethicone (MYLANTA) 20 mL, lidocaine HCl 2% (XYLOCAINE) 20 mL) solution, ondansetron, oxyCODONE, sodium chloride 0.9%     Review of patient's allergies indicates:   Allergen Reactions    Anoro ellipta [umeclidinium-vilanterol] Shortness Of Breath    Flomax [tamsulosin]      Dizzy          Past Medical History:   Diagnosis Date    Arthritis     Atrial fibrillation and flutter     Atrial flutter     Cancer     LUNG    COPD (chronic obstructive pulmonary disease)     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  Others are too small to accurately characterize.    Lung disease      Nephrolithiasis     ct urogram 12/15/2017-Bilateral nephrolithiasis.     Neuropathy, diabetic     Pancreatitis     Type 1 diabetes mellitus with diabetic neuropathy 4/27/2018     Past Surgical History:   Procedure Laterality Date    ABLATION N/A 12/4/2017    Performed by [facility] at [facility] LAB    BICEPS TENDON REPAIR Right     BRONCHOSCOPY Bilateral 9/2/2018    Procedure: BRONCHOSCOPY- insert lighted tube into airway to obtain biopsy of lung;  Surgeon: Aldair Deleon MD;  Location: [facility] ENDO;  Service: Endoscopy;  Laterality: Bilateral;    BRONCHOSCOPY- insert lighted tube into airway to obtain biopsy of lung Bilateral 9/2/2018    Performed by Aldair Deleon MD at [facility] ENDO    CARDIOVERSION      CHOLECYSTECTOMY      INSERTION OF TUNNELED CENTRAL VENOUS CATHETER (CVC) WITH SUBCUTANEOUS PORT Right 10/9/2018    Procedure: [id number];  Surgeon: Christophe Brandt MD;  Location: [facility] OR;  Service: General;  Laterality: Right;    [id number]-V-CATH Right 10/9/2018    Performed by Christophe Brandt MD at [facility] OR    PATELLA SURGERY Right     RADIOFREQUENCY ABLATION      ROTATOR CUFF REPAIR Left     TRANSESOPHAGEAL ECHOCARDIOGRAM (DILLAN) N/A 12/4/2017    Performed by [facility] at [facility] CATH LAB    ULTRASOUND GUIDANCE Right 10/9/2018    Procedure: ULTRASOUND GUIDANCE;  Surgeon: Christophe Brandt MD;  Location: [facility] OR;  Service: General;  Laterality: Right;    ULTRASOUND GUIDANCE Right 10/9/2018    Performed by Christophe Brandt MD at [facility] OR     Family History     Problem Relation (Age of Onset)    Cancer Maternal Aunt    Cataracts Sister    Diabetes Mother, Sister, Sister, Sister    Heart attack Brother    Heart failure Brother    Hypertension Mother, Father    Stroke Mother, Father, Paternal Grandmother, Paternal Grandfather        Tobacco Use    Smoking status: Former Smoker     Packs/day: 0.50     Types: Cigarettes     Start date: 1/1/1960     Last attempt to quit: 3/8/2015      Years since quitting: 3.6    Smokeless tobacco: Former User     Types: Snuff     Quit date: 10/7/1995   Substance and Sexual Activity    Alcohol use: No     Alcohol/week: 0.0 oz    Drug use: No    Sexual activity: Not on file       Review of Systems   Constitutional: Positive for activity change and fatigue. Negative for chills and fever.   HENT: Negative for congestion, mouth sores, nosebleeds, postnasal drip, rhinorrhea, sinus pain and sore throat.    Respiratory: Positive for cough and shortness of breath. Negative for chest tightness.    Cardiovascular: Positive for chest pain. Negative for palpitations.   Gastrointestinal: Negative for abdominal pain, anal bleeding, blood in stool, diarrhea, nausea and vomiting.   Endocrine: Negative for cold intolerance and heat intolerance.   Genitourinary: Negative for difficulty urinating, dysuria and hematuria.   Musculoskeletal: Positive for arthralgias, back pain and gait problem.   Skin: Negative for rash and wound.   Allergic/Immunologic: Positive for immunocompromised state.   Neurological: Positive for weakness. Negative for dizziness, syncope and light-headedness.   Hematological: Negative for adenopathy. Does not bruise/bleed easily.   Psychiatric/Behavioral: Negative for confusion and decreased concentration.     Objective:     Vital Signs (Most Recent):  Temp: 98.6 °F (37 °C) (11/17/18 0746)  Pulse: 75 (11/17/18 1522)  Resp: 18 (11/17/18 1522)  BP: (!) 110/59 (11/17/18 1522)  SpO2: (!) 92 % (11/17/18 1522) Vital Signs (24h Range):  Temp:  [97.9 °F (36.6 °C)-98.6 °F (37 °C)] 98.6 °F (37 °C)  Pulse:  [74-94] 75  Resp:  [18-20] 18  SpO2:  [92 %-96 %] 92 %  BP: (100-133)/(53-69) 110/59     Weight: 83.8 kg (184 lb 11.9 oz)  Body mass index is 28.94 kg/m².  Body surface area is 1.99 meters squared.      Intake/Output Summary (Last 24 hours) at 11/17/2018 1541  Last data filed at 11/16/2018 2117  Gross per 24 hour   Intake 360 ml   Output 100 ml   Net 260 ml        Physical Exam   Constitutional: He is oriented to person, place, and time. He appears well-developed and well-nourished.  Non-toxic appearance. He has a sickly appearance. He appears ill. No distress.   HENT:   Head: Normocephalic and atraumatic.   Eyes: Conjunctivae, EOM and lids are normal. Right eye exhibits no discharge. Left eye exhibits no discharge. No scleral icterus.   Neck: Normal range of motion.   Cardiovascular: An irregular rhythm present. Tachycardia present. Exam reveals no gallop and no friction rub.   No murmur heard.  Pulmonary/Chest: Effort normal. No accessory muscle usage. No apnea, no tachypnea and no bradypnea. No respiratory distress. He has decreased breath sounds. He has rales.   Abdominal: Soft. Normal appearance. He exhibits no distension. There is no tenderness.   Musculoskeletal: Normal range of motion. He exhibits edema and tenderness.   Neurological: He is alert and oriented to person, place, and time.   Skin: Skin is warm, dry and intact. Capillary refill takes less than 2 seconds. Bruising noted. No rash noted. He is not diaphoretic. No erythema. No pallor.   Psychiatric: His speech is normal and behavior is normal. Judgment and thought content normal. His mood appears anxious. Cognition and memory are normal. He exhibits a depressed mood. He is attentive.   Vitals reviewed.      Significant Labs:   BMP:   Recent Labs   Lab 11/16/18 0407 11/16/18 2130 11/17/18 0421   * 234* 182*   * 133* 134*   K 4.1 4.4 4.4   CL 90* 92* 92*   CO2 34* 32* 32*   BUN 15 18 17   CREATININE 0.8 0.7 0.7   CALCIUM 9.1 8.7 8.8   MG  --  1.7  --    , CBC:   Recent Labs   Lab 11/16/18 0407 11/17/18 0421   WBC 8.48 7.37   HGB 10.8* 10.0*   HCT 34.8* 31.8*    317   , CMP:   Recent Labs   Lab 11/16/18 0407 11/16/18 2130 11/17/18 0421   * 133* 134*   K 4.1 4.4 4.4   CL 90* 92* 92*   CO2 34* 32* 32*   * 234* 182*   BUN 15 18 17   CREATININE 0.8 0.7 0.7   CALCIUM 9.1  8.7 8.8   PROT 6.9  6.9  --  6.6  6.6   ALBUMIN 2.3*  2.3*  --  2.2*  2.2*   BILITOT 0.5  0.5  --  0.4  0.4   ALKPHOS 276*  276*  --  236*  236*   AST 30  30  --  26  26   ALT 23  23  --  18  18   ANIONGAP 10 9 10   EGFRNONAA >60 >60 >60   , Coagulation:   No results for input(s): PT, INR, APTT in the last 48 hours., LDH: No results for input(s): LDHCSF, BFSOURCE in the last 48 hours., LFTs:   Recent Labs   Lab 11/16/18  0407 11/17/18  0421   ALT 23  23 18  18   AST 30  30 26  26   ALKPHOS 276*  276* 236*  236*   BILITOT 0.5  0.5 0.4  0.4   PROT 6.9  6.9 6.6  6.6   ALBUMIN 2.3*  2.3* 2.2*  2.2*   , Urine Studies:   No results for input(s): COLORU, APPEARANCEUA, PHUR, SPECGRAV, PROTEINUA, GLUCUA, KETONESU, BILIRUBINUA, OCCULTUA, NITRITE, UROBILINOGEN, LEUKOCYTESUR, RBCUA, WBCUA, BACTERIA, SQUAMEPITHEL, HYALINECASTS in the last 48 hours.    Invalid input(s): WRIGHTSUR and All pertinent labs from the last 24 hours have been reviewed.    Diagnostic Results:  I have reviewed all pertinent imaging results/findings within the past 24 hours.

## 2018-11-17 NOTE — PLAN OF CARE
Problem: Pressure Ulcer Risk (Stephon Scale) (Adult,Obstetrics,Pediatric)  Intervention: Turn/Reposition Often  Patient awake and alert free from falls and injury family at bedside, normal sinus rhythm on monitor, pain controlled with prn medication, weight shift assistance provided, voids spontaneously, POC reviewed with patient and family,  bed low locked, call light within reach, will continue to monitor

## 2018-11-17 NOTE — ASSESSMENT & PLAN NOTE
Cont NISS  Levemir   montior  - will continue close monitoring , insulin sliding scale ,will continue Levemir

## 2018-11-17 NOTE — ASSESSMENT & PLAN NOTE
Monitor trends  Continue home meds  - BP is controlled, will continue to monitor closely ,continue diltiazem

## 2018-11-18 VITALS
DIASTOLIC BLOOD PRESSURE: 60 MMHG | HEIGHT: 67 IN | WEIGHT: 184.94 LBS | SYSTOLIC BLOOD PRESSURE: 121 MMHG | TEMPERATURE: 98 F | HEART RATE: 91 BPM | RESPIRATION RATE: 18 BRPM | BODY MASS INDEX: 29.03 KG/M2 | OXYGEN SATURATION: 91 %

## 2018-11-18 PROBLEM — R07.9 CHEST PAIN: Status: RESOLVED | Noted: 2018-11-13 | Resolved: 2018-11-18

## 2018-11-18 LAB
ALBUMIN SERPL BCP-MCNC: 2.2 G/DL
ALBUMIN SERPL BCP-MCNC: 2.2 G/DL
ALP SERPL-CCNC: 209 U/L
ALP SERPL-CCNC: 209 U/L
ALT SERPL W/O P-5'-P-CCNC: 19 U/L
ALT SERPL W/O P-5'-P-CCNC: 19 U/L
ANION GAP SERPL CALC-SCNC: 10 MMOL/L
AST SERPL-CCNC: 25 U/L
AST SERPL-CCNC: 25 U/L
BACTERIA BLD CULT: NORMAL
BACTERIA BLD CULT: NORMAL
BASOPHILS # BLD AUTO: 0.01 K/UL
BASOPHILS NFR BLD: 0.1 %
BILIRUB DIRECT SERPL-MCNC: 0.2 MG/DL
BILIRUB SERPL-MCNC: 0.4 MG/DL
BILIRUB SERPL-MCNC: 0.4 MG/DL
BUN SERPL-MCNC: 18 MG/DL
CALCIUM SERPL-MCNC: 9.1 MG/DL
CHLORIDE SERPL-SCNC: 92 MMOL/L
CO2 SERPL-SCNC: 33 MMOL/L
CREAT SERPL-MCNC: 0.7 MG/DL
DIFFERENTIAL METHOD: ABNORMAL
EOSINOPHIL # BLD AUTO: 0.1 K/UL
EOSINOPHIL NFR BLD: 1.5 %
ERYTHROCYTE [DISTWIDTH] IN BLOOD BY AUTOMATED COUNT: 16.5 %
EST. GFR  (AFRICAN AMERICAN): >60 ML/MIN/1.73 M^2
EST. GFR  (NON AFRICAN AMERICAN): >60 ML/MIN/1.73 M^2
GLUCOSE SERPL-MCNC: 230 MG/DL
HCT VFR BLD AUTO: 31.4 %
HGB BLD-MCNC: 9.7 G/DL
LYMPHOCYTES # BLD AUTO: 0.6 K/UL
LYMPHOCYTES NFR BLD: 9.1 %
MCH RBC QN AUTO: 26.7 PG
MCHC RBC AUTO-ENTMCNC: 30.9 G/DL
MCV RBC AUTO: 87 FL
MONOCYTES # BLD AUTO: 0.6 K/UL
MONOCYTES NFR BLD: 8.7 %
NEUTROPHILS # BLD AUTO: 5.5 K/UL
NEUTROPHILS NFR BLD: 80.6 %
PLATELET # BLD AUTO: 332 K/UL
PMV BLD AUTO: 8.9 FL
POCT GLUCOSE: 199 MG/DL (ref 70–110)
POCT GLUCOSE: 203 MG/DL (ref 70–110)
POTASSIUM SERPL-SCNC: 4.5 MMOL/L
PROT SERPL-MCNC: 6.7 G/DL
PROT SERPL-MCNC: 6.7 G/DL
RBC # BLD AUTO: 3.63 M/UL
SODIUM SERPL-SCNC: 135 MMOL/L
WBC # BLD AUTO: 6.8 K/UL

## 2018-11-18 PROCEDURE — 36415 COLL VENOUS BLD VENIPUNCTURE: CPT

## 2018-11-18 PROCEDURE — 63600175 PHARM REV CODE 636 W HCPCS: Performed by: INTERNAL MEDICINE

## 2018-11-18 PROCEDURE — 27000221 HC OXYGEN, UP TO 24 HOURS

## 2018-11-18 PROCEDURE — 25000003 PHARM REV CODE 250: Performed by: NURSE PRACTITIONER

## 2018-11-18 PROCEDURE — 80053 COMPREHEN METABOLIC PANEL: CPT

## 2018-11-18 PROCEDURE — 85025 COMPLETE CBC W/AUTO DIFF WBC: CPT

## 2018-11-18 PROCEDURE — 94640 AIRWAY INHALATION TREATMENT: CPT

## 2018-11-18 PROCEDURE — 63600175 PHARM REV CODE 636 W HCPCS: Performed by: NURSE PRACTITIONER

## 2018-11-18 PROCEDURE — 94799 UNLISTED PULMONARY SVC/PX: CPT

## 2018-11-18 PROCEDURE — 25000242 PHARM REV CODE 250 ALT 637 W/ HCPCS: Performed by: NURSE PRACTITIONER

## 2018-11-18 PROCEDURE — 80076 HEPATIC FUNCTION PANEL: CPT

## 2018-11-18 PROCEDURE — 94761 N-INVAS EAR/PLS OXIMETRY MLT: CPT

## 2018-11-18 RX ORDER — OXYCODONE HYDROCHLORIDE 20 MG/1
20 TABLET ORAL EVERY 4 HOURS PRN
Qty: 15 TABLET | Refills: 0 | Status: SHIPPED | OUTPATIENT
Start: 2018-11-18 | End: 2018-11-19 | Stop reason: SDUPTHER

## 2018-11-18 RX ORDER — SYRING-NEEDL,DISP,INSUL,0.3 ML 29 G X1/2"
296 SYRINGE, EMPTY DISPOSABLE MISCELLANEOUS DAILY PRN
Qty: 3 BOTTLE | Refills: 1 | Status: SHIPPED | OUTPATIENT
Start: 2018-11-18

## 2018-11-18 RX ORDER — POLYETHYLENE GLYCOL 3350 17 G/17G
17 POWDER, FOR SOLUTION ORAL DAILY
Qty: 850 G | Refills: 1 | Status: SHIPPED | OUTPATIENT
Start: 2018-11-18

## 2018-11-18 RX ORDER — DOCUSATE SODIUM 100 MG/1
100 CAPSULE, LIQUID FILLED ORAL 2 TIMES DAILY
Qty: 30 CAPSULE | Refills: 1 | Status: SHIPPED | OUTPATIENT
Start: 2018-11-18

## 2018-11-18 RX ORDER — FENTANYL 50 UG/1
1 PATCH TRANSDERMAL
Qty: 3 PATCH | Refills: 0 | Status: SHIPPED | OUTPATIENT
Start: 2018-11-19

## 2018-11-18 RX ADMIN — BUDESONIDE 0.5 MG: 0.5 SUSPENSION RESPIRATORY (INHALATION) at 07:11

## 2018-11-18 RX ADMIN — KETOROLAC TROMETHAMINE 15 MG: 30 INJECTION, SOLUTION INTRAMUSCULAR at 06:11

## 2018-11-18 RX ADMIN — INSULIN DETEMIR 6 UNITS: 100 INJECTION, SOLUTION SUBCUTANEOUS at 09:11

## 2018-11-18 RX ADMIN — APIXABAN 5 MG: 2.5 TABLET, FILM COATED ORAL at 09:11

## 2018-11-18 RX ADMIN — DILTIAZEM HYDROCHLORIDE 360 MG: 180 CAPSULE, COATED, EXTENDED RELEASE ORAL at 09:11

## 2018-11-18 RX ADMIN — INSULIN ASPART 2 UNITS: 100 INJECTION, SOLUTION INTRAVENOUS; SUBCUTANEOUS at 06:11

## 2018-11-18 RX ADMIN — PANTOPRAZOLE SODIUM 40 MG: 40 TABLET, DELAYED RELEASE ORAL at 09:11

## 2018-11-18 RX ADMIN — DOCUSATE SODIUM 100 MG: 100 CAPSULE, LIQUID FILLED ORAL at 09:11

## 2018-11-18 RX ADMIN — OXYCODONE HYDROCHLORIDE 20 MG: 5 TABLET ORAL at 01:11

## 2018-11-18 RX ADMIN — POLYETHYLENE GLYCOL 3350 17 G: 17 POWDER, FOR SOLUTION ORAL at 09:11

## 2018-11-18 RX ADMIN — LIDOCAINE HYDROCHLORIDE 15 ML: 20 SOLUTION ORAL; TOPICAL at 01:11

## 2018-11-18 RX ADMIN — OXYCODONE HYDROCHLORIDE 20 MG: 5 TABLET ORAL at 09:11

## 2018-11-18 RX ADMIN — STANDARDIZED SENNA CONCENTRATE AND DOCUSATE SODIUM 1 TABLET: 8.6; 5 TABLET ORAL at 09:11

## 2018-11-18 RX ADMIN — SERTRALINE HYDROCHLORIDE 50 MG: 50 TABLET ORAL at 09:11

## 2018-11-18 RX ADMIN — ARFORMOTEROL TARTRATE 15 MCG: 15 SOLUTION RESPIRATORY (INHALATION) at 07:11

## 2018-11-18 RX ADMIN — LIDOCAINE HYDROCHLORIDE 15 ML: 20 SOLUTION ORAL; TOPICAL at 06:11

## 2018-11-18 NOTE — PLAN OF CARE
KAL met with patient/family at the bedside.  Home health orders sent to [facility]  via [facility].  Patient requested a wheelchair for home use.  They would like wheelchair delivered to the home on Monday.  [place] [facility] [phone]

## 2018-11-18 NOTE — DISCHARGE SUMMARY
[facility] Hospital Medicine  Discharge Summary      Patient Name: Nico Garza Jr.  MRN: [id number]  Admission Date: 11/13/2018  Hospital Length of Stay: 4 days  Discharge Date and Time:  11/18/2018 4:11 PM  Attending Physician: No att. providers found   Discharging Provider: Vick Maldonado MD  Primary Care Provider: Tiffany Davis DO      HPI:   Nico Garza Jr. is a 72 y.o. male patient with a hx of DM, HTN, and lung cancer who presents  for L-sided CP which onset gradually 1 night ago. Symptoms are constant and moderate in severity. Pt was evaluated by Dr. Moseley (Internal Medicine) and sent to the ED for further evaluation. No mitigating factors reported. Pain is exacerbated by deep inhalation and palpation. Associated sxs include sob, non productive cough and BLE swelling. Patient reports had second dose of Keytruda tx 5 days ago. Patient was evaluated in the ER. #1 Troponin negative. CTA Chest negative for PE but of importance Upper Abdomen: 4.1 cm metastatic lesion within the dome of the liver.  Mild intrahepatic ductal dilatation. Lesion size increased over past 2 months and this ductal dilation is new compared to prior ABD CT 8/31/18.  HM consulted for admission. US ABD ordered given new CT findings which note the common duct is not dilated, measuring 7 mm.  No intrahepatic ductal dilatation. 2 solid masses consistent with hx of cancer. Patient placed in obs for further eval/treatment for chest pain. Heart score elevated.         * No surgery found *      Hospital Course:   The pt is a 71 yo male with hx NSCLC with mets to bone and liver s/p radiation currently receiving Keytruda admitted for chest pain. Chest pain is characterized by a 8/10 constant pain worse with deep inspiration and palpation. EKG showed no change from previous. Serial troponin normal.   CTA chest showed no PE- Elevation left hemidiaphragm with consolidation of the left lower lobe likely compressive.   Left anterior mediastinal mass measures approximately 6.1 x 4.5 cm, enlarged mediastinal and paratracheal lymph nodes,1.1 cm nodule left lower lobe.1.4 cm nodule right lower lobe.  Nodules have enlarged from prior exam.4.1 cm metastatic lesion within the dome of the liver., Moderate constipation.  Abd U/S showed 2 large, solid masses in the liver.   The pt's intractable chest pain is likely cancer related. He also reports intractable back pain unrelieved by home medication Oxycodone 15 mg every 4 hours. Will give Dilaudid 1 mg IV now and place a Fentanyl 25 mcg patch. Care discussed with Heme/Onc. Pt will need to be monitored for effectiveness and possible adverse effects on new pain regimen 11/15/18 The patient reports that his pain is not well controlled. Will adjust analgesia. Plan for radiation tomorrow per Dr. Culp.   11/16- patient still complains of generalized pain,he had radiation done today .  Had an extensive conversation with patient about end of life care ,  He has agreed to be DNR.     11/17 Pt still continue with pain . He is s/p radiation tx. The pain did not improved significantly.   11/18 Pt pain improved today . He states the pain is 4/10 . Pt was seen and examined at bedside he was determined to be suitable for d/c   Pt will be d/c on phentanyl patch  Short acting morphine        Consults:   Consults (From admission, onward)        Status Ordering Provider     Inpatient consult to Hematology/Oncology  Once     Provider:  Bria Mcmanus MD    Acknowledged RAZIA MICHAUD     Inpatient consult to Internal Medicine  Once     Provider:  James Hu MD    Acknowledged MARIA LUZ LEVY     Inpatient consult to Radiation Oncology  Once     Provider:  (Not yet assigned)    Completed BRIA MCMANUS     Inpatient consult to Social Work  Once     Provider:  (Not yet assigned)    Completed JOSE ANTONIO COLON          * Intractable pain    Cancer related pain  IV Dilaudid x one  Fentanyl patch-  may need to titrate   Cont Oxycodone 15mg every 4 hours prn breakthru pain   Monitor closely   11/15/18 The patient reports that his pain is not well controlled. Will adjust analgesia. Plan for radiation tomorrow per Dr. Culp.     11/16- will continue fentanyl patch , will need pain management review on discharge , will need close monitoring of oxygen saturation while on therapy .  S/P radiation therapy   continue oxycodone 20 mg every 4 hours prn   Cont fentanyl patch  50 mcg q72 hrs   Toradol 15 mg iv qid prn      Severe malnutrition    Poor appetite due to liver mets   Boost glucose control TID   Encourage regular meals        Constipation    Stool softner  Miralax   Monitor          Chronic respiratory failure with hypoxia    Continue home oxygen        Type 1 diabetes mellitus with diabetic neuropathy    Cont NISS  Levemir   montior  - will continue close monitoring , insulin sliding scale ,will continue Levemir     Paroxysmal atrial fibrillation    Continue eliquis and dilitiazem  Monitor   Rate controlled.          Hypertension goal BP (blood pressure) < 130/80    Monitor trends  Continue home meds  - BP is controlled, will continue to monitor closely ,continue diltiazem     COPD, group D, by GOLD 2017 classification    Continue neb txs   - will continue duonebs as tolerated         Final Active Diagnoses:    Diagnosis Date Noted POA    PRINCIPAL PROBLEM:  Intractable pain [R52] 11/14/2018 Yes    Severe malnutrition [E43] 10/26/2018 Yes    Constipation [K59.00] 08/31/2018 Yes    Metastatic left lung cancer (metastasis from lung to other site) with mediastinal lymphadenopathy [C34.92] 08/31/2018 Yes    Chronic respiratory failure with hypoxia [J96.11] 08/31/2018 Yes    Type 1 diabetes mellitus with diabetic neuropathy [E10.40] 04/27/2018 Yes     Chronic    Paroxysmal atrial fibrillation [I48.0] 10/05/2017 Yes    Hypertension goal BP (blood pressure) < 130/80 [I10] 06/30/2015 Yes     Chronic    COPD,  "group D, by GOLD 2017 classification [J44.9] 11/18/2013 Yes      Problems Resolved During this Admission:    Diagnosis Date Noted Date Resolved POA    Chest pain [R07.9] 11/13/2018 11/18/2018 Yes       Discharged Condition: stable    Disposition: Home or Self Care    Follow Up:  Follow-up Information     Tiffany Davis DO.    Specialty:  Internal Medicine  Contact information:  [address] [facility]   [place] [address]  [phone]             Issa Lagos Jr, MD In 3 days.    Specialty:  Hematology and Oncology  Contact information:  [address]  [place] [address]  [phone]             [facility]    Specialty:  DME Provider  Why:  DME- wheelchair  Contact information:  [address]  [facility] [address]  [phone]             [facility].    Specialty:  Home Health Services  Why:  Home Health  Contact information:  [address]  [place] [address]  [phone]                 Patient Instructions:      WHEELCHAIR FOR HOME USE     Order Specific Question Answer Comments   Hours in W/C per day: 6    Type of Wheelchair: Standard    Size(Width): 18"(STD adult)    Leg Support: STD footrests    Lap Belt: Velcro    Cushion: Basic    Height: 5' 7" (1.702 m)    Weight: 83.9 kg (184 lb 15.5 oz)    Does patient have medical equipment at home? nebulizer    Does patient have medical equipment at home? oxygen    Does patient have medical equipment at home? walker, rolling    Length of need (1-99 months): 99    Please check all that apply: Caregiver is capable and willing to operate wheelchair safely.    Please check all that apply: Patient's upper body strength is sufficient for propulsion.    Please check all that apply: The patient requires the use of a w/c for activities of daily living within the Home.      Ambulatory referral to Home Health   Referral Priority: Routine Referral Type: Home Health   Referral Reason: Specialty Services " Required   Requested Specialty: Home Health Services   Number of Visits Requested: 1     Diet Cardiac     Diet diabetic     Notify your health care provider if you experience any of the following:  temperature >100.4     Notify your health care provider if you experience any of the following:  severe uncontrolled pain     Notify your health care provider if you experience any of the following:  persistent nausea and vomiting or diarrhea     Notify your health care provider if you experience any of the following:  redness, tenderness, or signs of infection (pain, swelling, redness, odor or green/yellow discharge around incision site)     Notify your health care provider if you experience any of the following:  difficulty breathing or increased cough     Notify your health care provider if you experience any of the following:  severe persistent headache     Notify your health care provider if you experience any of the following:  worsening rash     Notify your health care provider if you experience any of the following:  persistent dizziness, light-headedness, or visual disturbances     Notify your health care provider if you experience any of the following:  increased confusion or weakness     Activity as tolerated       Significant Diagnostic Studies: Labs:   BMP:   Recent Labs   Lab 11/16/18 2130 11/17/18  0421 11/18/18  0420   * 182* 230*   * 134* 135*   K 4.4 4.4 4.5   CL 92* 92* 92*   CO2 32* 32* 33*   BUN 18 17 18   CREATININE 0.7 0.7 0.7   CALCIUM 8.7 8.8 9.1   MG 1.7  --   --     and CBC   Recent Labs   Lab 11/17/18 0421 11/18/18  0420   WBC 7.37 6.80   HGB 10.0* 9.7*   HCT 31.8* 31.4*    332       Pending Diagnostic Studies:     None         Medications:  Reconciled Home Medications:      Medication List      START taking these medications    docusate sodium 100 MG capsule  Commonly known as:  COLACE  Take 1 capsule (100 mg total) by mouth 2 (two) times daily.     fentaNYL 50  mcg/hr  Commonly known as:  DURAGESIC  Place 1 patch onto the skin every 72 hours.  Start taking on:  11/19/2018     magnesium citrate solution  Take 296 mLs by mouth daily as needed.     polyethylene glycol 17 gram/dose powder  Commonly known as:  GLYCOLAX  Take 17 g by mouth once daily.        CHANGE how you take these medications    gabapentin 300 MG capsule  Commonly known as:  NEURONTIN  Take 2 capsules (600 mg total) by mouth 2 (two) times daily.  What changed:    · when to take this  · additional instructions     insulin lispro 100 unit/mL Inpn pen  Commonly known as:  HumaLOG KwikPen Insulin  ADMINISTER 17 UNITS UNDER THE SKIN THREE TIMES DAILY BEFORE MEALS  What changed:  additional instructions     oxyCODONE 20 mg Tab immediate release tablet  Commonly known as:  ROXICODONE  Take 1 tablet (20 mg total) by mouth every 4 (four) hours as needed (moderate to severe pain).  What changed:    · medication strength  · how much to take  · reasons to take this     pravastatin 40 MG tablet  Commonly known as:  PRAVACHOL  TAKE 1 TABLET DAILY  What changed:    · how much to take  · how to take this  · when to take this  · additional instructions        CONTINUE taking these medications    albuterol-ipratropium 2.5 mg-0.5 mg/3 mL nebulizer solution  Commonly known as:  DUO-NEB  Take 3 mLs by nebulization every 6 (six) hours. Rescue     b complex vitamins tablet  Take 1 tablet by mouth once daily.     blood sugar diagnostic Strp  1 each by Misc.(Non-Drug; Combo Route) route 3 (three) times daily. Dispense preferred on insurance     blood-glucose meter kit  Use as instructed - preferred on insurance     clonazePAM 0.5 MG tablet  Commonly known as:  KLONOPIN  Take 1 tablet (0.5 mg total) by mouth 2 (two) times daily as needed for Anxiety.     diltiaZEM 360 MG 24 hr capsule  Commonly known as:  CARDIZEM CD  TAKE 1 CAPSULE(360 MG) BY MOUTH EVERY DAY     DISPOSABLE NEEDLES MISC  Inject 1 each into the skin 3 (three) times  "daily.     ELIQUIS 5 mg Tab  Generic drug:  apixaban  TAKE 1 TABLET(5 MG) BY MOUTH TWICE DAILY     flecainide 100 MG Tab  Commonly known as:  TAMBOCOR  Take 1 tablet (100 mg total) by mouth every 12 (twelve) hours.     insulin degludec 200 unit/mL (3 mL) Inpn  Commonly known as:  TRESIBA FLEXTOUCH U-200  Inject 50 Units into the skin once daily.     multivitamin capsule  Take 1 capsule by mouth once daily.     nystatin 100,000 unit/mL suspension  Commonly known as:  MYCOSTATIN  Take 5 mLs (500,000 Units total) by mouth 4 (four) times daily.     omeprazole 20 MG capsule  Commonly known as:  PRILOSEC  Take 1 capsule (20 mg total) by mouth once daily.     ondansetron 8 MG Tbdl  Commonly known as:  ZOFRAN-ODT  Take 1 tablet (8 mg total) by mouth every 8 (eight) hours as needed.     pen needle, diabetic 32 gauge x 1/4" Ndle  Insulin injections four times per day.     prochlorperazine 10 MG tablet  Commonly known as:  COMPAZINE  TAKE 1 TABLET BY MOUTH EVERY 6 HOURS AS NEEDED.     sertraline 50 MG tablet  Commonly known as:  ZOLOFT  Take 1 tablet (50 mg total) by mouth once daily.     tiotropium bromide 2.5 mcg/actuation Mist  Commonly known as:  SPIRIVA RESPIMAT  Inhale 2 puffs into the lungs once daily. Controller     vitamin D 1000 units Tab  Commonly known as:  VITAMIN D3  Take 1,000 Units by mouth once daily.        STOP taking these medications    doxycycline 100 MG tablet  Commonly known as:  VIBRA-TABS            Indwelling Lines/Drains at time of discharge:   Lines/Drains/Airways     Central Venous Catheter Line                 Port A Cath Single Lumen right subclavian -- days                Time spent on the discharge of patient: 35 minutes  Patient was seen and examined on the date of discharge and determined to be suitable for discharge.         Vick Maldonado MD  Department of Hospital Medicine  [facility]  "

## 2018-11-18 NOTE — PLAN OF CARE
11/18/18 1120   Medicare Message   Important Message from Medicare regarding Discharge Appeal Rights Given to patient/caregiver;Explained to patient/caregiver;Signed/date by patient/caregiver   Date IMM was signed 11/18/18   Time IMM was signed 1118

## 2018-11-18 NOTE — PLAN OF CARE
Problem: Patient Care Overview  Goal: Plan of Care Review  Outcome: Ongoing (interventions implemented as appropriate)  Patient 's spo2 and breathing are stable on oxygen therapy.

## 2018-11-18 NOTE — PLAN OF CARE
Problem: Patient Care Overview  Goal: Plan of Care Review  Outcome: Ongoing (interventions implemented as appropriate)  Pt complains of generalized pain. Pain medication is effective. No respiratory distress noted. Pt has diminished lung sounds. No injuries. Wife is at bedside. Will continue to monitor. 12 hour chart check is completed.

## 2018-11-18 NOTE — NURSING
Discharge information provided and reviewed with patient and family. No questions asked. Written scripts given to patient daughter. All lines removed with dressings applied to removed IV catheter sites. Patient wheeled per wheelchair to personal vehicle.

## 2018-11-19 ENCOUNTER — TELEPHONE (OUTPATIENT)
Dept: HEMATOLOGY/ONCOLOGY | Facility: CLINIC | Age: 73
End: 2018-11-19

## 2018-11-19 DIAGNOSIS — C34.92 METASTATIC LUNG CANCER (METASTASIS FROM LUNG TO OTHER SITE), LEFT: Primary | ICD-10-CM

## 2018-11-19 RX ORDER — OXYCODONE HYDROCHLORIDE 20 MG/1
20 TABLET ORAL EVERY 4 HOURS PRN
Qty: 90 TABLET | Refills: 0 | Status: SHIPPED | OUTPATIENT
Start: 2018-11-19

## 2018-11-19 NOTE — TELEPHONE ENCOUNTER
----- Message from Hanh Johansen sent at 11/19/2018 10:31 AM CST -----  Contact: VIET  1. What is the name of the medication you are requesting? PAIN MEDICATION  2. What is the dose? 20 MG  3. How do you take the medication? Orally, topically, etc? ORALLY  4. How often do you take this medication? EVERY 4 HOURS  5. Do you need a 30 day or 90 day supply? N/A  6. How many refills are you requesting? N/A  7. What is your preferred pharmacy and location of the pharmacy?   [facility] [address] [place] [address] [address] [facility]  [address]  [facility] [address]  Phone: [phone] Fax: [phone]    8. Who can we contact with further questions?[phone]       Thanks,  Hanh Johansen

## 2018-11-20 ENCOUNTER — HOSPITAL ENCOUNTER (INPATIENT)
Facility: HOSPITAL | Age: 73
LOS: 3 days | Discharge: HOSPICE | DRG: 871 | End: 2018-11-23
Attending: FAMILY MEDICINE | Admitting: EMERGENCY MEDICINE
Payer: MEDICARE

## 2018-11-20 ENCOUNTER — TELEPHONE (OUTPATIENT)
Dept: INTERNAL MEDICINE | Facility: CLINIC | Age: 73
End: 2018-11-20

## 2018-11-20 DIAGNOSIS — R06.02 SHORTNESS OF BREATH: Primary | ICD-10-CM

## 2018-11-20 DIAGNOSIS — C34.92 METASTATIC LUNG CANCER (METASTASIS FROM LUNG TO OTHER SITE), LEFT: ICD-10-CM

## 2018-11-20 PROBLEM — A41.9 SEPSIS: Status: ACTIVE | Noted: 2018-11-20

## 2018-11-20 LAB
ALBUMIN SERPL BCP-MCNC: 2.4 G/DL
ALLENS TEST: ABNORMAL
ALP SERPL-CCNC: 181 U/L
ALT SERPL W/O P-5'-P-CCNC: 28 U/L
ANION GAP SERPL CALC-SCNC: 13 MMOL/L
AST SERPL-CCNC: 47 U/L
BASOPHILS # BLD AUTO: 0.01 K/UL
BASOPHILS NFR BLD: 0.1 %
BILIRUB SERPL-MCNC: 0.5 MG/DL
BILIRUB UR QL STRIP: NEGATIVE
BNP SERPL-MCNC: 103 PG/ML
BUN SERPL-MCNC: 21 MG/DL
CALCIUM SERPL-MCNC: 9.5 MG/DL
CHLORIDE SERPL-SCNC: 95 MMOL/L
CLARITY UR: CLEAR
CO2 SERPL-SCNC: 29 MMOL/L
COLOR UR: YELLOW
CREAT SERPL-MCNC: 0.8 MG/DL
DELSYS: ABNORMAL
DIFFERENTIAL METHOD: ABNORMAL
EOSINOPHIL # BLD AUTO: 0 K/UL
EOSINOPHIL NFR BLD: 0.3 %
ERYTHROCYTE [DISTWIDTH] IN BLOOD BY AUTOMATED COUNT: 16.6 %
EST. GFR  (AFRICAN AMERICAN): >60 ML/MIN/1.73 M^2
EST. GFR  (NON AFRICAN AMERICAN): >60 ML/MIN/1.73 M^2
FIO2: 36
FLOW: 4
GLUCOSE SERPL-MCNC: 122 MG/DL
GLUCOSE UR QL STRIP: NEGATIVE
HCO3 UR-SCNC: 33.3 MMOL/L (ref 24–28)
HCT VFR BLD AUTO: 31.4 %
HGB BLD-MCNC: 9.8 G/DL
HGB UR QL STRIP: NEGATIVE
KETONES UR QL STRIP: NEGATIVE
LACTATE SERPL-SCNC: 1.4 MMOL/L
LEUKOCYTE ESTERASE UR QL STRIP: NEGATIVE
LYMPHOCYTES # BLD AUTO: 0.4 K/UL
LYMPHOCYTES NFR BLD: 2.8 %
MCH RBC QN AUTO: 26.7 PG
MCHC RBC AUTO-ENTMCNC: 31.2 G/DL
MCV RBC AUTO: 86 FL
MODE: ABNORMAL
MONOCYTES # BLD AUTO: 0.6 K/UL
MONOCYTES NFR BLD: 4.2 %
NEUTROPHILS # BLD AUTO: 12.4 K/UL
NEUTROPHILS NFR BLD: 92.6 %
NITRITE UR QL STRIP: NEGATIVE
PCO2 BLDA: 45.3 MMHG (ref 35–45)
PH SMN: 7.47 [PH] (ref 7.35–7.45)
PH UR STRIP: 7 [PH] (ref 5–8)
PLATELET # BLD AUTO: 380 K/UL
PMV BLD AUTO: 8.9 FL
PO2 BLDA: 60 MMHG (ref 80–100)
POC BE: 10 MMOL/L
POC SATURATED O2: 92 % (ref 95–100)
POCT GLUCOSE: 250 MG/DL (ref 70–110)
POCT GLUCOSE: 354 MG/DL (ref 70–110)
POTASSIUM SERPL-SCNC: 4.9 MMOL/L
PROT SERPL-MCNC: 7 G/DL
PROT UR QL STRIP: NEGATIVE
RBC # BLD AUTO: 3.67 M/UL
SAMPLE: ABNORMAL
SITE: ABNORMAL
SODIUM SERPL-SCNC: 137 MMOL/L
SP GR UR STRIP: 1.02 (ref 1–1.03)
TROPONIN I SERPL DL<=0.01 NG/ML-MCNC: <0.006 NG/ML
URN SPEC COLLECT METH UR: ABNORMAL
UROBILINOGEN UR STRIP-ACNC: ABNORMAL EU/DL
WBC # BLD AUTO: 13.39 K/UL

## 2018-11-20 PROCEDURE — 25000003 PHARM REV CODE 250: Performed by: FAMILY MEDICINE

## 2018-11-20 PROCEDURE — 93010 ELECTROCARDIOGRAM REPORT: CPT | Mod: ,,, | Performed by: INTERNAL MEDICINE

## 2018-11-20 PROCEDURE — 99900035 HC TECH TIME PER 15 MIN (STAT)

## 2018-11-20 PROCEDURE — 25000242 PHARM REV CODE 250 ALT 637 W/ HCPCS: Performed by: FAMILY MEDICINE

## 2018-11-20 PROCEDURE — 93005 ELECTROCARDIOGRAM TRACING: CPT

## 2018-11-20 PROCEDURE — 63600175 PHARM REV CODE 636 W HCPCS: Performed by: FAMILY MEDICINE

## 2018-11-20 PROCEDURE — 87040 BLOOD CULTURE FOR BACTERIA: CPT | Mod: 59

## 2018-11-20 PROCEDURE — 85025 COMPLETE CBC W/AUTO DIFF WBC: CPT

## 2018-11-20 PROCEDURE — 84484 ASSAY OF TROPONIN QUANT: CPT

## 2018-11-20 PROCEDURE — 25000242 PHARM REV CODE 250 ALT 637 W/ HCPCS: Performed by: NURSE PRACTITIONER

## 2018-11-20 PROCEDURE — 96367 TX/PROPH/DG ADDL SEQ IV INF: CPT

## 2018-11-20 PROCEDURE — 25000003 PHARM REV CODE 250: Performed by: NURSE PRACTITIONER

## 2018-11-20 PROCEDURE — 96366 THER/PROPH/DIAG IV INF ADDON: CPT

## 2018-11-20 PROCEDURE — 27000221 HC OXYGEN, UP TO 24 HOURS

## 2018-11-20 PROCEDURE — 94640 AIRWAY INHALATION TREATMENT: CPT

## 2018-11-20 PROCEDURE — 96368 THER/DIAG CONCURRENT INF: CPT

## 2018-11-20 PROCEDURE — 25500020 PHARM REV CODE 255: Performed by: FAMILY MEDICINE

## 2018-11-20 PROCEDURE — 81003 URINALYSIS AUTO W/O SCOPE: CPT

## 2018-11-20 PROCEDURE — 83880 ASSAY OF NATRIURETIC PEPTIDE: CPT

## 2018-11-20 PROCEDURE — 63600175 PHARM REV CODE 636 W HCPCS: Performed by: NURSE PRACTITIONER

## 2018-11-20 PROCEDURE — 82962 GLUCOSE BLOOD TEST: CPT

## 2018-11-20 PROCEDURE — 82803 BLOOD GASES ANY COMBINATION: CPT

## 2018-11-20 PROCEDURE — 94799 UNLISTED PULMONARY SVC/PX: CPT

## 2018-11-20 PROCEDURE — 99285 EMERGENCY DEPT VISIT HI MDM: CPT | Mod: 25

## 2018-11-20 PROCEDURE — 80053 COMPREHEN METABOLIC PANEL: CPT

## 2018-11-20 PROCEDURE — 36600 WITHDRAWAL OF ARTERIAL BLOOD: CPT

## 2018-11-20 PROCEDURE — 96365 THER/PROPH/DIAG IV INF INIT: CPT

## 2018-11-20 PROCEDURE — 83605 ASSAY OF LACTIC ACID: CPT

## 2018-11-20 PROCEDURE — 21400001 HC TELEMETRY ROOM

## 2018-11-20 PROCEDURE — 25000003 PHARM REV CODE 250: Performed by: EMERGENCY MEDICINE

## 2018-11-20 RX ORDER — FENTANYL 50 UG/1
1 PATCH TRANSDERMAL
Status: DISCONTINUED | OUTPATIENT
Start: 2018-11-20 | End: 2018-11-23 | Stop reason: HOSPADM

## 2018-11-20 RX ORDER — GLUCAGON 1 MG
1 KIT INJECTION
Status: DISCONTINUED | OUTPATIENT
Start: 2018-11-20 | End: 2018-11-23 | Stop reason: HOSPADM

## 2018-11-20 RX ORDER — VANCOMYCIN HCL IN 5 % DEXTROSE 1G/250ML
1000 PLASTIC BAG, INJECTION (ML) INTRAVENOUS
Status: DISCONTINUED | OUTPATIENT
Start: 2018-11-21 | End: 2018-11-22

## 2018-11-20 RX ORDER — IPRATROPIUM BROMIDE AND ALBUTEROL SULFATE 2.5; .5 MG/3ML; MG/3ML
3 SOLUTION RESPIRATORY (INHALATION) EVERY 6 HOURS
Status: DISCONTINUED | OUTPATIENT
Start: 2018-11-20 | End: 2018-11-23 | Stop reason: HOSPADM

## 2018-11-20 RX ORDER — CHOLECALCIFEROL (VITAMIN D3) 25 MCG
1000 TABLET ORAL DAILY
Status: DISCONTINUED | OUTPATIENT
Start: 2018-11-21 | End: 2018-11-23 | Stop reason: HOSPADM

## 2018-11-20 RX ORDER — SODIUM CHLORIDE 9 MG/ML
INJECTION, SOLUTION INTRAVENOUS CONTINUOUS
Status: DISCONTINUED | OUTPATIENT
Start: 2018-11-20 | End: 2018-11-23 | Stop reason: HOSPADM

## 2018-11-20 RX ORDER — SERTRALINE HYDROCHLORIDE 50 MG/1
50 TABLET, FILM COATED ORAL DAILY
Status: DISCONTINUED | OUTPATIENT
Start: 2018-11-21 | End: 2018-11-23 | Stop reason: HOSPADM

## 2018-11-20 RX ORDER — POLYETHYLENE GLYCOL 3350 17 G/17G
17 POWDER, FOR SOLUTION ORAL DAILY
Status: DISCONTINUED | OUTPATIENT
Start: 2018-11-21 | End: 2018-11-20

## 2018-11-20 RX ORDER — IBUPROFEN 100 MG/5ML
1000 SUSPENSION, ORAL (FINAL DOSE FORM) ORAL DAILY
COMMUNITY

## 2018-11-20 RX ORDER — PANTOPRAZOLE SODIUM 40 MG/1
40 TABLET, DELAYED RELEASE ORAL DAILY
Status: DISCONTINUED | OUTPATIENT
Start: 2018-11-21 | End: 2018-11-23 | Stop reason: HOSPADM

## 2018-11-20 RX ORDER — IPRATROPIUM BROMIDE 0.5 MG/2.5ML
0.5 SOLUTION RESPIRATORY (INHALATION) EVERY 6 HOURS
Status: DISCONTINUED | OUTPATIENT
Start: 2018-11-20 | End: 2018-11-20

## 2018-11-20 RX ORDER — IBUPROFEN 200 MG
16 TABLET ORAL
Status: DISCONTINUED | OUTPATIENT
Start: 2018-11-20 | End: 2018-11-23 | Stop reason: HOSPADM

## 2018-11-20 RX ORDER — IPRATROPIUM BROMIDE AND ALBUTEROL SULFATE 2.5; .5 MG/3ML; MG/3ML
3 SOLUTION RESPIRATORY (INHALATION)
Status: COMPLETED | OUTPATIENT
Start: 2018-11-20 | End: 2018-11-20

## 2018-11-20 RX ORDER — IBUPROFEN 200 MG
24 TABLET ORAL
Status: DISCONTINUED | OUTPATIENT
Start: 2018-11-20 | End: 2018-11-23 | Stop reason: HOSPADM

## 2018-11-20 RX ORDER — FLECAINIDE ACETATE 50 MG/1
100 TABLET ORAL EVERY 12 HOURS
Status: DISCONTINUED | OUTPATIENT
Start: 2018-11-20 | End: 2018-11-23 | Stop reason: HOSPADM

## 2018-11-20 RX ORDER — OXYCODONE HYDROCHLORIDE 5 MG/1
20 TABLET ORAL EVERY 4 HOURS PRN
Status: DISCONTINUED | OUTPATIENT
Start: 2018-11-20 | End: 2018-11-21

## 2018-11-20 RX ORDER — PRAVASTATIN SODIUM 20 MG/1
40 TABLET ORAL NIGHTLY
Status: DISCONTINUED | OUTPATIENT
Start: 2018-11-20 | End: 2018-11-23 | Stop reason: HOSPADM

## 2018-11-20 RX ORDER — INSULIN ASPART 100 [IU]/ML
0-5 INJECTION, SOLUTION INTRAVENOUS; SUBCUTANEOUS
Status: DISCONTINUED | OUTPATIENT
Start: 2018-11-20 | End: 2018-11-23 | Stop reason: HOSPADM

## 2018-11-20 RX ORDER — POLYETHYLENE GLYCOL 3350 17 G/17G
17 POWDER, FOR SOLUTION ORAL DAILY
Status: DISCONTINUED | OUTPATIENT
Start: 2018-11-21 | End: 2018-11-23 | Stop reason: HOSPADM

## 2018-11-20 RX ORDER — ASCORBIC ACID 500 MG
1000 TABLET ORAL DAILY
Status: DISCONTINUED | OUTPATIENT
Start: 2018-11-21 | End: 2018-11-23 | Stop reason: HOSPADM

## 2018-11-20 RX ORDER — DILTIAZEM HYDROCHLORIDE 180 MG/1
360 CAPSULE, COATED, EXTENDED RELEASE ORAL DAILY
Status: DISCONTINUED | OUTPATIENT
Start: 2018-11-20 | End: 2018-11-23 | Stop reason: HOSPADM

## 2018-11-20 RX ADMIN — SODIUM CHLORIDE: 0.9 INJECTION, SOLUTION INTRAVENOUS at 07:11

## 2018-11-20 RX ADMIN — IPRATROPIUM BROMIDE AND ALBUTEROL SULFATE 3 ML: .5; 3 SOLUTION RESPIRATORY (INHALATION) at 03:11

## 2018-11-20 RX ADMIN — CEFTRIAXONE 2 G: 2 INJECTION, SOLUTION INTRAVENOUS at 04:11

## 2018-11-20 RX ADMIN — AZITHROMYCIN MONOHYDRATE 500 MG: 500 INJECTION, POWDER, LYOPHILIZED, FOR SOLUTION INTRAVENOUS at 04:11

## 2018-11-20 RX ADMIN — OXYCODONE HYDROCHLORIDE 20 MG: 5 TABLET ORAL at 06:11

## 2018-11-20 RX ADMIN — INSULIN ASPART 3 UNITS: 100 INJECTION, SOLUTION INTRAVENOUS; SUBCUTANEOUS at 09:11

## 2018-11-20 RX ADMIN — DILTIAZEM HYDROCHLORIDE 360 MG: 180 CAPSULE, COATED, EXTENDED RELEASE ORAL at 06:11

## 2018-11-20 RX ADMIN — IOHEXOL 100 ML: 350 INJECTION, SOLUTION INTRAVENOUS at 02:11

## 2018-11-20 RX ADMIN — FENTANYL TRANSDERMAL 1 PATCH: 50 PATCH, EXTENDED RELEASE TRANSDERMAL at 04:11

## 2018-11-20 RX ADMIN — IPRATROPIUM BROMIDE AND ALBUTEROL SULFATE 3 ML: .5; 3 SOLUTION RESPIRATORY (INHALATION) at 07:11

## 2018-11-20 RX ADMIN — PIPERACILLIN AND TAZOBACTAM 4.5 G: 4; .5 INJECTION, POWDER, LYOPHILIZED, FOR SOLUTION INTRAVENOUS; PARENTERAL at 06:11

## 2018-11-20 RX ADMIN — PRAVASTATIN SODIUM 40 MG: 20 TABLET ORAL at 08:11

## 2018-11-20 RX ADMIN — VANCOMYCIN HYDROCHLORIDE 1250 MG: 10 INJECTION, POWDER, LYOPHILIZED, FOR SOLUTION INTRAVENOUS at 06:11

## 2018-11-20 RX ADMIN — FLECAINIDE ACETATE 100 MG: 100 TABLET ORAL at 08:11

## 2018-11-20 RX ADMIN — APIXABAN 5 MG: 2.5 TABLET, FILM COATED ORAL at 08:11

## 2018-11-20 NOTE — ED NOTES
Pt given urinal for specimen collection at this time. Instructed to call for assistance with ambulation. Family at bedside.

## 2018-11-20 NOTE — ASSESSMENT & PLAN NOTE
BC x 2   Sputum culture   Azithromycin and Rocephin given in ED  Will continue with Vancomycin and Zosyn   Incentive spirometer   O 2 4L, keep sats > 92 %

## 2018-11-20 NOTE — ASSESSMENT & PLAN NOTE
NSCLC with mets to bone and liver s/p radiation currently receiving Keytruda  Restart home pain medications   Consult Hematology/Oncology

## 2018-11-20 NOTE — TELEPHONE ENCOUNTER
----- Message from Anne Perez sent at 11/20/2018 10:17 AM CST -----  Contact: Home Health Nurse - Ms Martinez  Stated she's calling about  health, she can be reached at [phone] Thanks

## 2018-11-20 NOTE — ED NOTES
"Upon entry into the room, pt found with NC in mouth with family at bedside. States "I want to get my oxygen level higher". Probe repositioned and pt educated on proper placement of NC probe. Will continue to monitor.  "

## 2018-11-20 NOTE — PLAN OF CARE
11/20/18 1018   Final Note   Assessment Type Final Discharge Note   Anticipated Discharge Disposition Home-Health   Right Care Referral Info   Post Acute Recommendation Home-care   Facility Name [facility], State [place], [place]

## 2018-11-20 NOTE — ED PROVIDER NOTES
SCRIBE #1 NOTE: I, Chrystal Ford, am scribing for, and in the presence of, Homa Albert MD. I have scribed the entire note.      History      Chief Complaint   Patient presents with    Shortness of Breath       Review of patient's allergies indicates:   Allergen Reactions    Anoro ellipta [umeclidinium-vilanterol] Shortness Of Breath    Flomax [tamsulosin]      Dizzy          HPI   HPI    11/20/2018, 11:53 AM   History obtained from the patient      History of Present Illness: Nico Garza Jr. is a 72 y.o. male patient with a hx of DM, HTN, and metastatic left lung cancer who presents to the Emergency Department for SOB which onset gradually. Pt was seen in ED for similar sxs 1 week ago. He was admitted and d/c 2 days ago. Symptoms are constant and moderate in severity. No mitigating or exacerbating factors reported. Associated sxs include cough, BLE swelling, CP, and back pain, which pt was rx Fentanyl for. Patient denies any fever, chills, n/v/d, abd pain, weakness, numbness, palpitations, and all other sxs at this time. Pt's wife states pt's O2 sats dropped to the 50s today. No further complaints or concerns at this time.       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  Others are too small to accurately characterize.    Lung disease     Nephrolithiasis     ct urogram 12/15/2017-Bilateral nephrolithiasis.     Neuropathy, diabetic     Pancreatitis     Respiratory failure     Stage 4 lung cancer     Type 1 diabetes mellitus with diabetic neuropathy 4/27/2018        Past Surgical History:  Past Surgical History:   Procedure Laterality Date    ABLATION N/A 12/4/2017    Performed by Jaret Freire MD at [facility] CATH LAB    BICEPS TENDON REPAIR Right     BRONCHOSCOPY Bilateral 9/2/2018    Procedure: BRONCHOSCOPY- insert lighted tube into airway to obtain biopsy of lung;  Surgeon: Aldair Deleon MD;  Location: [facility] ENDO;  Service: Endoscopy;  Laterality: Bilateral;    BRONCHOSCOPY- insert lighted tube into airway to obtain biopsy of lung Bilateral 9/2/2018    Performed by Aldair Deleon MD at [facility] ENDO    CARDIOVERSION      CHOLECYSTECTOMY      INSERTION OF TUNNELED CENTRAL VENOUS CATHETER (CVC) WITH SUBCUTANEOUS PORT Right 10/9/2018    Procedure: [id number];  Surgeon: Christophe Brandt MD;  Location: [facility] OR;  Service: General;  Laterality: Right;    IERXNVJMX-LZFL-X-CATH Right 10/9/2018    Performed by Christophe Brandt MD at [facility] OR    PATELLA SURGERY Right     RADIOFREQUENCY ABLATION      ROTATOR CUFF REPAIR Left     TRANSESOPHAGEAL ECHOCARDIOGRAM (DILLAN) N/A 12/4/2017    Performed by Jaret Freire MD at [facility] CATH LAB    ULTRASOUND GUIDANCE Right 10/9/2018    Procedure: ULTRASOUND GUIDANCE;  Surgeon: Christophe Brandt MD;  Location: [facility] OR;  Service: General;  Laterality: Right;    ULTRASOUND GUIDANCE Right 10/9/2018    Performed by Christophe Brandt MD at [facility] OR         Family History:  Family History   Problem Relation Age of Onset    Heart failure Brother     Heart attack Brother     Diabetes Mother     Hypertension Mother     Stroke Mother     Hypertension Father     Stroke Father     Diabetes Sister     Cataracts Sister     Cancer Maternal Aunt     Diabetes Sister     Diabetes Sister     Stroke Paternal Grandmother     Stroke Paternal Grandfather        Social History:  Social History     Tobacco Use    Smoking status: Former Smoker     Packs/day: 0.50     Types: Cigarettes     Start date: 1/1/1960     Last attempt to quit: 3/8/2015      Years since quitting: 3.7    Smokeless tobacco: Former User     Types: Snuff     Quit date: 10/7/1995   Substance and Sexual Activity    Alcohol use: No     Alcohol/week: 0.0 oz    Drug use: No    Sexual activity: Unknown       ROS   Review of Systems   Constitutional: Negative for chills, diaphoresis and fever.   HENT: Negative for congestion, rhinorrhea and sore throat.    Respiratory: Positive for cough and shortness of breath. Negative for choking and stridor.    Cardiovascular: Positive for chest pain and leg swelling (BLE). Negative for palpitations.   Gastrointestinal: Negative for abdominal pain, diarrhea, nausea and vomiting.   Genitourinary: Negative for dysuria, frequency and hematuria.   Musculoskeletal: Positive for back pain. Negative for myalgias and neck pain.   Skin: Negative for rash.   Neurological: Negative for dizziness, weakness, numbness and headaches.   Hematological: Does not bruise/bleed easily.   All other systems reviewed and are negative.      Physical Exam      Initial Vitals [11/20/18 1137]   BP Pulse Resp Temp SpO2   (!) 148/52 108 (!) 28 98.4 °F (36.9 °C) 98 %      MAP       --          Physical Exam  Nursing Notes and Vital Signs Reviewed.  Constitutional: Patient is in no acute distress. Well-developed and well-nourished.  Head: Atraumatic. Normocephalic.  Eyes: PERRL. EOM intact. Conjunctivae are not pale. No scleral icterus.  ENT: Mucous membranes are moist. Oropharynx is clear and symmetric.    Neck: Supple. Full ROM. No lymphadenopathy.  Cardiovascular: Irregularly irregular rate. Normal rhythm. No murmurs, rubs, or gallops. Distal pulses are 2+ and symmetric.  Pulmonary/Chest: Decreased breath sounds bilaterally. Tenderness to L lateral chest wall. Port to R anterior chest wall.  Abdominal: Soft and non-distended.  There is no tenderness.  No rebound, guarding, or rigidity.  Musculoskeletal: Moves all extremities. 2+ pitting edema to BLE.  Skin: Warm and  "dry.  Neurological:  Alert, awake, and appropriate.  Normal speech.  No acute focal neurological deficits are appreciated.  Psychiatric: Normal affect. Good eye contact. Appropriate in content.    ED Course    Critical Care  Date/Time: 1/4/2019 12:58 AM  Performed by: Homa Albert MD  Authorized by: Uzair Topete MD   Direct patient critical care time: 20 minutes  Additional history critical care time: 10 minutes  Ordering / reviewing critical care time: 9 minutes  Documentation critical care time: 5 minutes  Consulting other physicians critical care time: 6 minutes  Consult with family critical care time: 5 minutes  Other critical care time: 5 minutes  Total critical care time (exclusive of procedural time) : 60 minutes        ED Vital Signs:  Vitals:    11/20/18 1137 11/20/18 1147 11/20/18 1205 11/20/18 1232   BP: (!) 148/52 (!) 115/55  (!) 109/58   Pulse: 108 100 104 99   Resp: (!) 28 (!) 31  (!) 28   Temp: 98.4 °F (36.9 °C)      TempSrc: Oral      SpO2: 98% (!) 85%  (!) 91%   Weight: 81.2 kg (179 lb)      Height: 5' 8" (1.727 m)       11/20/18 1333 11/20/18 1409 11/20/18 1447 11/20/18 1527   BP: (!) 113/58      Pulse: 96   102   Resp: (!) 28   20   Temp:  99.6 °F (37.6 °C)     TempSrc:  Oral     SpO2: (!) 90%  (!) 90% (!) 91%   Weight:       Height:           Abnormal Lab Results:  Labs Reviewed   CBC W/ AUTO DIFFERENTIAL - Abnormal; Notable for the following components:       Result Value    WBC 13.39 (*)     RBC 3.67 (*)     Hemoglobin 9.8 (*)     Hematocrit 31.4 (*)     MCH 26.7 (*)     MCHC 31.2 (*)     RDW 16.6 (*)     Platelets 380 (*)     MPV 8.9 (*)     Gran # (ANC) 12.4 (*)     Lymph # 0.4 (*)     Gran% 92.6 (*)     Lymph% 2.8 (*)     All other components within normal limits   COMPREHENSIVE METABOLIC PANEL - Abnormal; Notable for the following components:    Glucose 122 (*)     Albumin 2.4 (*)     Alkaline Phosphatase 181 (*)     AST 47 (*)     All other components within normal limits "   URINALYSIS - Abnormal; Notable for the following components:    Urobilinogen, UA 4.0-6.0 (*)     All other components within normal limits   B-TYPE NATRIURETIC PEPTIDE - Abnormal; Notable for the following components:     (*)     All other components within normal limits   CULTURE, BLOOD   CULTURE, BLOOD   LACTIC ACID, PLASMA   TROPONIN I        All Lab Results:  Results for orders placed or performed during the hospital encounter of 11/20/18   CBC auto differential   Result Value Ref Range    WBC 13.39 (H) 3.90 - 12.70 K/uL    RBC 3.67 (L) 4.60 - 6.20 M/uL    Hemoglobin 9.8 (L) 14.0 - 18.0 g/dL    Hematocrit 31.4 (L) 40.0 - 54.0 %    MCV 86 82 - 98 fL    MCH 26.7 (L) 27.0 - 31.0 pg    MCHC 31.2 (L) 32.0 - 36.0 g/dL    RDW 16.6 (H) 11.5 - 14.5 %    Platelets 380 (H) 150 - 350 K/uL    MPV 8.9 (L) 9.2 - 12.9 fL    Gran # (ANC) 12.4 (H) 1.8 - 7.7 K/uL    Lymph # 0.4 (L) 1.0 - 4.8 K/uL    Mono # 0.6 0.3 - 1.0 K/uL    Eos # 0.0 0.0 - 0.5 K/uL    Baso # 0.01 0.00 - 0.20 K/uL    Gran% 92.6 (H) 38.0 - 73.0 %    Lymph% 2.8 (L) 18.0 - 48.0 %    Mono% 4.2 4.0 - 15.0 %    Eosinophil% 0.3 0.0 - 8.0 %    Basophil% 0.1 0.0 - 1.9 %    Differential Method Automated    Comprehensive metabolic panel   Result Value Ref Range    Sodium 137 136 - 145 mmol/L    Potassium 4.9 3.5 - 5.1 mmol/L    Chloride 95 95 - 110 mmol/L    CO2 29 23 - 29 mmol/L    Glucose 122 (H) 70 - 110 mg/dL    BUN, Bld 21 8 - 23 mg/dL    Creatinine 0.8 0.5 - 1.4 mg/dL    Calcium 9.5 8.7 - 10.5 mg/dL    Total Protein 7.0 6.0 - 8.4 g/dL    Albumin 2.4 (L) 3.5 - 5.2 g/dL    Total Bilirubin 0.5 0.1 - 1.0 mg/dL    Alkaline Phosphatase 181 (H) 55 - 135 U/L    AST 47 (H) 10 - 40 U/L    ALT 28 10 - 44 U/L    Anion Gap 13 8 - 16 mmol/L    eGFR if African American >60 >60 mL/min/1.73 m^2    eGFR if non African American >60 >60 mL/min/1.73 m^2   Urinalysis - Clean Catch   Result Value Ref Range    Specimen UA Urine, Clean Catch     Color, UA Yellow Yellow, Straw,  Norma    Appearance, UA Clear Clear    pH, UA 7.0 5.0 - 8.0    Specific Gravity, UA 1.020 1.005 - 1.030    Protein, UA Negative Negative    Glucose, UA Negative Negative    Ketones, UA Negative Negative    Bilirubin (UA) Negative Negative    Occult Blood UA Negative Negative    Nitrite, UA Negative Negative    Urobilinogen, UA 4.0-6.0 (A) <2.0 EU/dL    Leukocytes, UA Negative Negative   Lactic acid, plasma   Result Value Ref Range    Lactate (Lactic Acid) 1.4 0.5 - 2.2 mmol/L   Troponin I   Result Value Ref Range    Troponin I <0.006 0.000 - 0.026 ng/mL   B-Type natriuretic peptide (BNP)   Result Value Ref Range     (H) 0 - 99 pg/mL       Imaging Results:  Imaging Results          CTA Chest Non-Coronary (PE Study) (Final result)  Result time 11/20/18 15:02:47    Final result by Jos Brito MD (11/20/18 15:02:47)                 Impression:      No evidence of pulmonary embolism.    Consolidation throughout the left lung and to a lesser degree within the right middle lobe thought to reflect airspace disease or aspiration.    Large mass within the mediastinum and left hilum as well as several pulmonary nodules concerning for metastatic disease.    Liver lesions concerning for metastatic disease.    Multiple lytic lesions throughout the thoracic skeleton largest within the L1 vertebral body consistent with metastatic disease    Diffuse thickening of the stomach could reflect line and dysplastic of and diffuse infiltrative malignancy.  Recommend endoscopy.    Moderate constipation.    See above for additional findings    All CT scans at this facility use dose modulation, iterative reconstruction and/or weight based dosing when appropriate to reduce radiation dose to as low as reasonably achievable.      Electronically signed by: Jos Brito MD  Date:    11/20/2018  Time:    15:02             Narrative:    EXAMINATION:  CTA CHEST NON CORONARY    CLINICAL HISTORY:  Chest pain, acute, PE suspected, high  pretest prob;    TECHNIQUE:  The chest was surveyed from the apices through the posterior costophrenic angles after administration of 100 cc of Omni 350 contrast using CT angiography technique.  Data was reconstructed for multiplanar images in axial, sagittal and coronal planes in for maximal intensity projection images in the axial plane.    COMPARISON:  None    FINDINGS:  Base of Neck: No significant abnormality.    Airways: Patent.    Lungs: Alveolar and interstitial opacities are seen throughout the left lower lobe and lingula and to a lesser degree within the left upper lobe consistent with airspace disease or aspiration.  Multiple pulmonary nodules are seen within the left lower lobe measuring up to 1.2 cm.  Evaluation is limited due to patchy consolidation throughout the left lung.  1.3 cm nodule within the right upper lobe likely reflects metastatic disease..  Patchy infiltrate seen within the right middle lobe.  Mild bronchiectasis and infiltration within the right middle lobe also noted.  Severe emphysematous changes are seen throughout the superior segments of the lower lobes and bilateral upper lobes.    Pleura: No pleural fluid.No pleural calcification.    Roz/Mediastinum: Extensive mediastinal and left hilar adenopathy.  Mass is seen within the AP window extending along the lateral hilum into the left hilar region measuring at least 5.9 x 4.7 cm concerning for metastatic disease.  Enlarged lymph nodes are seen throughout the mediastinum largest is in the precarinal region measuring 2 cm concerning for metastatic disease.  Elevation left hemidiaphragm noted.    Esophagus: Normal.    Heart/pericardium: Normal size.  Lipomatosis hypertrophy of the interatrial septum is noted.  No pericardial effusion or calcification.    Pulmonary vasculature: No evidence of pulmonary embolism.  Pulmonary arteries distribute normally.  There are four pulmonary veins.    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  The aorta maintains normal caliber, contour and course. There is mild-to-moderate calcification of the thoracic aorta.  There is  severe coronary artery calcification.    Thoracic soft tissues: Normal. Both breasts are present.    Bones: Diffuse osteopenia and multiple mixed sclerotic and lytic lesions throughout the thoracic and lumbar skele spine ton largest within the L1 vertebral body measuring 2.5 x 2.9 cm consistent with metastatic disease.  Bones are diffusely osteopenic which limits evaluation for metastatic disease    Upper Abdomen: Punctate nonobstructing stones within the right kidney.  Indeterminate hypodensities within the kidneys likely reflect small cysts.  Moderate constipation.  Gallbladder is surgically absent.  Indeterminate 5 x 4.3 cm mass within the right hepatic lobe likely reflects metastatic disease.  Nodular thickening of the adrenal glands noted.                               X-Ray Chest AP Portable (Final result)  Result time 11/20/18 13:35:12    Final result by LITZY Skinner Sr., MD (11/20/18 13:35:12)                 Impression:      1. There has been interval worsening of the appearance of the lungs.  There is a moderate amount of haziness in the base of the left lung. There is a mild amount of haziness in the lower half of the right lung.  2. There is persistent marked elevation of the left hemidiaphragm.  This is consistent with a phrenic nerve injury.  3. There is blunting of the left costophrenic angle.  This is characteristic of a pleural adhesion or a tiny pleural effusion.  4. A right subclavian venous line remains in place.  This is consistent with the patient's history of metastatic disease.  .      Electronically signed by: Tee Skinner MD  Date:    11/20/2018  Time:    13:35             Narrative:    EXAMINATION:  XR CHEST AP PORTABLE    CLINICAL HISTORY:  Chest Pain; metastatic disease    COMPARISON:  A right subclavian venous line remains in  place.    FINDINGS:  The borders of the heart are not well seen.  There has been interval worsening of the appearance of the lungs.  There is a moderate amount of haziness in the base of the left lung.  There is a mild amount of haziness in the lower half of the right lung.  There is persistent marked elevation of the left hemidiaphragm. There is blunting of the left costophrenic angle.  The right costophrenic angle is sharp.  There is no pneumothorax.  A right subclavian venous line remains in place.                                        The EKG was ordered, reviewed, and independently interpreted by the ED provider.  Interpretation time: 12:02  Rate: 90 BPM  Rhythm: atrial flutter with variable AV block  Interpretation: Incomplete RBBB. Prolonged QT. No STEMI.      The Emergency Provider reviewed the vital signs and test results, which are outlined above.    ED Discussion     1:53 PM: Dr. Albert discussed the pt's case with Dr. Sow (Hem/Onc) who recommends admitting pt to hospital medicine and receiving a CT thorax with PE protocol.    3:01 PM: Discussed case with Aliya Turner ([facility]). Dr. Topete agrees with current care and management of pt and accepts admission.   Admitting Service: [facility] medicine   Admitting Physician: Dr. Topete  Admit to: Tele    3:31 PM: Re-evaluated pt. I have discussed test results, shared treatment plan, and the need for admission with patient and family at bedside. Pt and family express understanding at this time and agree with all information. All questions answered. Pt and family have no further questions or concerns at this time. Pt is ready for admit.    ED Medication(s):  Medications   albuterol-ipratropium 2.5 mg-0.5 mg/3 mL nebulizer solution 3 mL (3 mLs Nebulization Given 11/20/18 1527)   omnipaque 350 iohexol 100 mL (100 mLs Intravenous Given 11/20/18 1432)             Medical Decision Making    Medical Decision Making:   Clinical Tests:   Lab Tests:  Reviewed and Ordered  Radiological Study: Reviewed and Ordered  Medical Tests: Reviewed and Ordered           Scribe Attestation:   Scribe #1: I performed the above scribed service and the documentation accurately describes the services I performed. I attest to the accuracy of the note.    Attending:   Physician Attestation Statement for Scribe #1: I, Homa Albert MD, personally performed the services described in this documentation, as scribed by Chrystal Ford, in my presence, and it is both accurate and complete.          Clinical Impression     No diagnosis found.    Disposition:   Disposition: Admitted  Condition: Fair         Homa Albert MD  11/21/18 1021       Homa Albert MD  01/04/19 0059

## 2018-11-20 NOTE — ED NOTES
Patient still complaining of pain at this time. Patient was questioning about fentanyl patch and understand it took 72 hrs to work. Educated patient on medication at this time. Patient requesting to have oxycodone as well. Due to patients current vitals and fentanyl patch just being applied discussed if we could give the fentanyl patch a little longer to start working prior to administering the oxycodone. Patient and family agree to plan at this time, will updated Stacia BAUMANN on plan of care.

## 2018-11-20 NOTE — HPI
Mr Garza is a 72 year old male with PMHx of PAF, HTN, HLD, DM, NSCLC with mets to bone and liver s/p radiation currently receiving Keytruda and COPD on home O2 at 4-5 L. He  presented to [facility] Emergency Room with complaints of increase shortness of  Breath. Associated symptoms in include sudden onset in increase weakness and fatigue. Denies associated symptoms of chest pain, palpitations, fever, chills, nausea, vomiting or diaphoresis. CTA of the chest shows alveolar and interstitial opacities are seen throughout the left lower lobe and lingula and to a lesser degree within the left upper lobe consistent with airspace disease or aspiration. Multiple pulmonary nodules are seen within the left lower lobe measuring up to 1.2 cm.  Evaluation is limited due to patchy consolidation throughout the left lung.  1.3 cm nodule within the right upper lobe likely reflects metastatic disease..  Patchy infiltrate seen within the right middle lobe.  Mild bronchiectasis and infiltration within the right middle lobe also noted.  Severe emphysematous changes are seen throughout the superior segments of the lower lobes and bilateral upper lobes.   Troponin negative. Lactic acid normal. Code status reviewed with patient and family, he wishes code status of DNR.

## 2018-11-20 NOTE — SUBJECTIVE & OBJECTIVE
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  Others are too small to accurately characterize.    Lung disease     Nephrolithiasis     ct urogram 12/15/2017-Bilateral nephrolithiasis.     Neuropathy, diabetic     Pancreatitis     Respiratory failure     Stage 4 lung cancer     Type 1 diabetes mellitus with diabetic neuropathy 4/27/2018       Past Surgical History:   Procedure Laterality Date    ABLATION N/A 12/4/2017    Performed by Jaret Freire MD at [facility] CATH LAB    BICEPS TENDON REPAIR Right     BRONCHOSCOPY Bilateral 9/2/2018    Procedure: BRONCHOSCOPY- insert lighted tube into airway to obtain biopsy of lung;  Surgeon: Aldair Deleon MD;  Location: [facility];  Service: Endoscopy;  Laterality: Bilateral;    BRONCHOSCOPY- insert lighted tube into airway to obtain biopsy of lung Bilateral 9/2/2018    Performed by [facility]on MD at [facility]    CARDIOVERSION      CHOLECYSTECTOMY      INSERTION OF TUNNELED CENTRAL VENOUS CATHETER (CVC) WITH SUBCUTANEOUS PORT Right 10/9/2018    Procedure: [id number]-ZNUU-C-CATH;  Surgeon: Christophe Brandt MD;  Location: [facility];  Service: General;  Laterality: Right;    GTRURCIYT-SCSE-H-CATH Right 10/9/2018    [facility]formed [facility] at [facility]    PATELLA SURGERY Right     RADIOFREQUENCY ABLATION      ROTATOR CUFF REPAIR Left     TRANSESOPHAGEAL ECHOCARDIOGRAM (DILLAN) N/A 12/4/2017    Performed by [facility]ire MD at [facility] CATH LAB    ULTRASOUND GUIDANCE Right 10/9/2018    Procedure: ULTRASOUND GUIDANCE;  Surgeon: Christophe Brandt MD;  Location: [facility];  Service: General;  Laterality: Right;     "ULTRASOUND GUIDANCE Right 10/9/2018    Performed by [facility] at [facility] OR       Review of patient's allergies indicates:   Allergen Reactions    Anoro ellipta [umeclidinium-vilanterol] Shortness Of Breath    Flomax [tamsulosin]      Dizzy         No current facility-administered medications on file prior to encounter.      Current Outpatient Medications on File Prior to Encounter   Medication Sig    ascorbic acid, vitamin C, (VITAMIN C) 1000 MG tablet Take 1,000 mg by mouth once daily.    b complex vitamins tablet Take 1 tablet by mouth once daily.    blood sugar diagnostic Strp 1 each by Misc.(Non-Drug; Combo Route) route 3 (three) times daily. Dispense preferred on insurance    blood-glucose meter kit Use as instructed - preferred on insurance    clonazePAM (KLONOPIN) 0.5 MG tablet Take 1 tablet (0.5 mg total) by mouth 2 (two) times daily as needed for Anxiety.    diltiaZEM (CARDIZEM CD) 360 MG 24 hr capsule TAKE 1 CAPSULE(360 MG) BY MOUTH EVERY DAY    docusate sodium (COLACE) 100 MG capsule Take 1 capsule (100 mg total) by mouth 2 (two) times daily.    ELIQUIS 5 mg Tab TAKE 1 TABLET(5 MG) BY MOUTH TWICE DAILY    fentaNYL (DURAGESIC) 50 mcg/hr Place 1 patch onto the skin every 72 hours.    flecainide (TAMBOCOR) 100 MG Tab Take 1 tablet (100 mg total) by mouth every 12 (twelve) hours.    gabapentin (NEURONTIN) 300 MG capsule Take 2 capsules (600 mg total) by mouth 2 (two) times daily. (Patient taking differently: Take 600 mg by mouth every evening. Takes two tablets by mouth at night)    insulin degludec (TRESIBA FLEXTOUCH U-200) 200 unit/mL (3 mL) InPn Inject 50 Units into the skin once daily.    insulin lispro (HUMALOG KWIKPEN) 100 unit/mL InPn pen ADMINISTER 17 UNITS UNDER THE SKIN THREE TIMES DAILY BEFORE MEALS (Patient taking differently: ADMINISTER 17 UNITS UNDER THE SKIN IN MORNING AND 25 UNITS NOON AND NIGHT)    insulin needles, disposable, 32 x 1/4 " Ndle Insulin injections four times " per day.    magnesium citrate solution Take 296 mLs by mouth daily as needed.    multivitamin capsule Take 1 capsule by mouth once daily.    nystatin (MYCOSTATIN) 100,000 unit/mL suspension Take 5 mLs (500,000 Units total) by mouth 4 (four) times daily.    omeprazole (PRILOSEC) 20 MG capsule Take 1 capsule (20 mg total) by mouth once daily.    ondansetron (ZOFRAN-ODT) 8 MG TbDL Take 1 tablet (8 mg total) by mouth every 8 (eight) hours as needed.    oxyCODONE (ROXICODONE) 20 mg Tab immediate release tablet Take 1 tablet (20 mg total) by mouth every 4 (four) hours as needed (moderate to severe pain).    polyethylene glycol (GLYCOLAX) 17 gram/dose powder Take 17 g by mouth once daily.    pravastatin (PRAVACHOL) 40 MG tablet TAKE 1 TABLET DAILY (Patient taking differently: TAKE 1 TABLET NIGHTLY)    prochlorperazine (COMPAZINE) 10 MG tablet TAKE 1 TABLET BY MOUTH EVERY 6 HOURS AS NEEDED.    sertraline (ZOLOFT) 50 MG tablet Take 1 tablet (50 mg total) by mouth once daily.    tiotropium bromide (SPIRIVA RESPIMAT) 2.5 mcg/actuation Mist Inhale 2 puffs into the lungs once daily. Controller    vitamin D 1000 units Tab Take 1,000 Units by mouth once daily.    albuterol-ipratropium (DUO-NEB) 2.5 mg-0.5 mg/3 mL nebulizer solution Take 3 mLs by nebulization every 6 (six) hours. Rescue    NEEDLES, DISPOSABLE (DISPOSABLE NEEDLES MISC) Inject 1 each into the skin 3 (three) times daily.     Family History     Problem Relation (Age of Onset)    Cancer Maternal Aunt    Cataracts Sister    Diabetes Mother, Sister, Sister, Sister    Heart attack Brother    Heart failure Brother    Hypertension Mother, Father    Stroke Mother, Father, Paternal Grandmother, Paternal Grandfather        Tobacco Use    Smoking status: Former Smoker     Packs/day: 0.50     Types: Cigarettes     Start date: 1/1/1960     Last attempt to quit: 3/8/2015     Years since quitting: 3.7    Smokeless tobacco: Former User     Types: Snuff     Quit date:  10/7/1995   Substance and Sexual Activity    Alcohol use: No     Alcohol/week: 0.0 oz    Drug use: No    Sexual activity: Not on file     Review of Systems   Constitutional: Positive for fatigue. Negative for chills, diaphoresis and fever.   HENT: Negative for congestion, ear discharge, rhinorrhea, sinus pressure, sore throat and trouble swallowing.    Eyes: Negative for discharge and visual disturbance.   Respiratory: Positive for cough and shortness of breath. Negative for apnea, choking, chest tightness, wheezing and stridor.    Cardiovascular: Negative for chest pain, palpitations and leg swelling.   Gastrointestinal: Negative for abdominal distention, abdominal pain, diarrhea, nausea and vomiting.   Endocrine: Negative for cold intolerance and heat intolerance.   Genitourinary: Negative for dysuria, frequency and hematuria.   Musculoskeletal: Negative for arthralgias, back pain, myalgias and neck pain.   Skin: Negative for pallor and rash.   Neurological: Positive for weakness. Negative for dizziness, seizures, syncope and headaches.   Psychiatric/Behavioral: Negative for agitation, confusion and sleep disturbance.     Objective:     Vital Signs (Most Recent):  Temp: 98.4 °F (36.9 °C) (11/20/18 1713)  Pulse: 98 (11/20/18 1659)  Resp: (!) 29 (11/20/18 1659)  BP: (!) 110/54 (11/20/18 1659)  SpO2: (!) 93 % (11/20/18 1659) Vital Signs (24h Range):  Temp:  [98.4 °F (36.9 °C)-99.6 °F (37.6 °C)] 98.4 °F (36.9 °C)  Pulse:  [] 98  Resp:  [20-31] 29  SpO2:  [85 %-98 %] 93 %  BP: (109-148)/(52-67) 110/54     Weight: 81.2 kg (179 lb)  Body mass index is 27.22 kg/m².    Physical Exam   Constitutional: He is oriented to person, place, and time. He has a sickly appearance. He appears ill. No distress.   Eyes: Right eye exhibits no discharge. Left eye exhibits no discharge.   Neck: No JVD present.   Cardiovascular: An irregular rhythm present. Exam reveals no gallop and no friction rub.   No murmur  heard.  Pulmonary/Chest: No stridor. No respiratory distress. He has decreased breath sounds in the right lower field, the left middle field and the left lower field. He has no wheezes. He has no rales. He exhibits no tenderness.   Abdominal: Soft. Bowel sounds are normal. He exhibits no distension and no mass. There is no tenderness. There is no rebound and no guarding. No hernia.   Musculoskeletal: He exhibits no edema or deformity.   Neurological: He is alert and oriented to person, place, and time.   Skin: Skin is warm and dry. Capillary refill takes 2 to 3 seconds. He is not diaphoretic.   Psychiatric: He has a normal mood and affect.   Nursing note and vitals reviewed.          Significant Labs:   CBC:   Recent Labs   Lab 11/20/18  1205   WBC 13.39*   HGB 9.8*   HCT 31.4*   *     CMP:   Recent Labs   Lab 11/20/18  1205      K 4.9   CL 95   CO2 29   *   BUN 21   CREATININE 0.8   CALCIUM 9.5   PROT 7.0   ALBUMIN 2.4*   BILITOT 0.5   ALKPHOS 181*   AST 47*   ALT 28   ANIONGAP 13   EGFRNONAA >60     Cardiac Markers:   Recent Labs   Lab 11/20/18  1205   *     Lactic Acid:   Recent Labs   Lab 11/20/18  1205   LACTATE 1.4     Troponin:   Recent Labs   Lab 11/20/18  1205   TROPONINI <0.006       Significant Imaging:     Imaging Results          CTA Chest Non-Coronary (PE Study) (Final result)  Result time 11/20/18 15:02:47    Final result by Jos Brito MD (11/20/18 15:02:47)                 Impression:      No evidence of pulmonary embolism.    Consolidation throughout the left lung and to a lesser degree within the right middle lobe thought to reflect airspace disease or aspiration.    Large mass within the mediastinum and left hilum as well as several pulmonary nodules concerning for metastatic disease.    Liver lesions concerning for metastatic disease.    Multiple lytic lesions throughout the thoracic skeleton largest within the L1 vertebral body consistent with metastatic  disease    Diffuse thickening of the stomach could reflect line and dysplastic of and diffuse infiltrative malignancy.  Recommend endoscopy.    Moderate constipation.    See above for additional findings    All CT scans at this facility use dose modulation, iterative reconstruction and/or weight based dosing when appropriate to reduce radiation dose to as low as reasonably achievable.      Electronically signed by: Jos Brito MD  Date:    11/20/2018  Time:    15:02             Narrative:    EXAMINATION:  CTA CHEST NON CORONARY    CLINICAL HISTORY:  Chest pain, acute, PE suspected, high pretest prob;    TECHNIQUE:  The chest was surveyed from the apices through the posterior costophrenic angles after administration of 100 cc of Omni 350 contrast using CT angiography technique.  Data was reconstructed for multiplanar images in axial, sagittal and coronal planes in for maximal intensity projection images in the axial plane.    COMPARISON:  None    FINDINGS:  Base of Neck: No significant abnormality.    Airways: Patent.    Lungs: Alveolar and interstitial opacities are seen throughout the left lower lobe and lingula and to a lesser degree within the left upper lobe consistent with airspace disease or aspiration.  Multiple pulmonary nodules are seen within the left lower lobe measuring up to 1.2 cm.  Evaluation is limited due to patchy consolidation throughout the left lung.  1.3 cm nodule within the right upper lobe likely reflects metastatic disease..  Patchy infiltrate seen within the right middle lobe.  Mild bronchiectasis and infiltration within the right middle lobe also noted.  Severe emphysematous changes are seen throughout the superior segments of the lower lobes and bilateral upper lobes.    Pleura: No pleural fluid.No pleural calcification.    Roz/Mediastinum: Extensive mediastinal and left hilar adenopathy.  Mass is seen within the AP window extending along the lateral hilum into the left hilar region  measuring at least 5.9 x 4.7 cm concerning for metastatic disease.  Enlarged lymph nodes are seen throughout the mediastinum largest is in the precarinal region measuring 2 cm concerning for metastatic disease.  Elevation left hemidiaphragm noted.    Esophagus: Normal.    Heart/pericardium: Normal size.  Lipomatosis hypertrophy of the interatrial septum is noted.  No pericardial effusion or calcification.    Pulmonary vasculature: No evidence of pulmonary embolism.  Pulmonary arteries distribute normally.  There are four pulmonary veins.    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  The aorta maintains normal caliber, contour and course. There is mild-to-moderate calcification of the thoracic aorta.  There is  severe coronary artery calcification.    Thoracic soft tissues: Normal. Both breasts are present.    Bones: Diffuse osteopenia and multiple mixed sclerotic and lytic lesions throughout the thoracic and lumbar skele spine ton largest within the L1 vertebral body measuring 2.5 x 2.9 cm consistent with metastatic disease.  Bones are diffusely osteopenic which limits evaluation for metastatic disease    Upper Abdomen: Punctate nonobstructing stones within the right kidney.  Indeterminate hypodensities within the kidneys likely reflect small cysts.  Moderate constipation.  Gallbladder is surgically absent.  Indeterminate 5 x 4.3 cm mass within the right hepatic lobe likely reflects metastatic disease.  Nodular thickening of the adrenal glands noted.                               X-Ray Chest AP Portable (Final result)  Result time 11/20/18 13:35:12    Final result by LITZY Skinner Sr., MD (11/20/18 13:35:12)                 Impression:      1. There has been interval worsening of the appearance of the lungs.  There is a moderate amount of haziness in the base of the left lung. There is a mild amount of haziness in the lower half of the right lung.  2. There is persistent marked elevation of the left  hemidiaphragm.  This is consistent with a phrenic nerve injury.  3. There is blunting of the left costophrenic angle.  This is characteristic of a pleural adhesion or a tiny pleural effusion.  4. A right subclavian venous line remains in place.  This is consistent with the patient's history of metastatic disease.  .      Electronically signed by: Tee Skinner MD  Date:    11/20/2018  Time:    13:35             Narrative:    EXAMINATION:  XR CHEST AP PORTABLE    CLINICAL HISTORY:  Chest Pain; metastatic disease    COMPARISON:  A right subclavian venous line remains in place.    FINDINGS:  The borders of the heart are not well seen.  There has been interval worsening of the appearance of the lungs.  There is a moderate amount of haziness in the base of the left lung.  There is a mild amount of haziness in the lower half of the right lung.  There is persistent marked elevation of the left hemidiaphragm. There is blunting of the left costophrenic angle.  The right costophrenic angle is sharp.  There is no pneumothorax.  A right subclavian venous line remains in place.

## 2018-11-21 LAB
ALBUMIN SERPL BCP-MCNC: 2.2 G/DL
ALP SERPL-CCNC: 167 U/L
ALT SERPL W/O P-5'-P-CCNC: 25 U/L
ANION GAP SERPL CALC-SCNC: 8 MMOL/L
AST SERPL-CCNC: 49 U/L
BASOPHILS # BLD AUTO: 0.02 K/UL
BASOPHILS NFR BLD: 0.2 %
BILIRUB SERPL-MCNC: 0.4 MG/DL
BUN SERPL-MCNC: 16 MG/DL
CALCIUM SERPL-MCNC: 8.9 MG/DL
CHLORIDE SERPL-SCNC: 95 MMOL/L
CO2 SERPL-SCNC: 31 MMOL/L
CREAT SERPL-MCNC: 0.8 MG/DL
DIFFERENTIAL METHOD: ABNORMAL
EOSINOPHIL # BLD AUTO: 0.1 K/UL
EOSINOPHIL NFR BLD: 0.8 %
ERYTHROCYTE [DISTWIDTH] IN BLOOD BY AUTOMATED COUNT: 16.9 %
EST. GFR  (AFRICAN AMERICAN): >60 ML/MIN/1.73 M^2
EST. GFR  (NON AFRICAN AMERICAN): >60 ML/MIN/1.73 M^2
GLUCOSE SERPL-MCNC: 285 MG/DL
HCT VFR BLD AUTO: 29.6 %
HGB BLD-MCNC: 9 G/DL
LYMPHOCYTES # BLD AUTO: 0.5 K/UL
LYMPHOCYTES NFR BLD: 5.8 %
MCH RBC QN AUTO: 26.1 PG
MCHC RBC AUTO-ENTMCNC: 30.4 G/DL
MCV RBC AUTO: 86 FL
MONOCYTES # BLD AUTO: 0.5 K/UL
MONOCYTES NFR BLD: 5.1 %
NEUTROPHILS # BLD AUTO: 8.1 K/UL
NEUTROPHILS NFR BLD: 88.1 %
PLATELET # BLD AUTO: 394 K/UL
PMV BLD AUTO: 9.1 FL
POCT GLUCOSE: 193 MG/DL (ref 70–110)
POCT GLUCOSE: 284 MG/DL (ref 70–110)
POCT GLUCOSE: 360 MG/DL (ref 70–110)
POCT GLUCOSE: 378 MG/DL (ref 70–110)
POTASSIUM SERPL-SCNC: 4.1 MMOL/L
PROT SERPL-MCNC: 6.5 G/DL
RBC # BLD AUTO: 3.45 M/UL
SODIUM SERPL-SCNC: 134 MMOL/L
WBC # BLD AUTO: 9.19 K/UL

## 2018-11-21 PROCEDURE — 63600175 PHARM REV CODE 636 W HCPCS: Performed by: NURSE PRACTITIONER

## 2018-11-21 PROCEDURE — 25000003 PHARM REV CODE 250: Performed by: EMERGENCY MEDICINE

## 2018-11-21 PROCEDURE — 94761 N-INVAS EAR/PLS OXIMETRY MLT: CPT

## 2018-11-21 PROCEDURE — 27000221 HC OXYGEN, UP TO 24 HOURS

## 2018-11-21 PROCEDURE — 25000242 PHARM REV CODE 250 ALT 637 W/ HCPCS: Performed by: NURSE PRACTITIONER

## 2018-11-21 PROCEDURE — 94799 UNLISTED PULMONARY SVC/PX: CPT

## 2018-11-21 PROCEDURE — 36415 COLL VENOUS BLD VENIPUNCTURE: CPT

## 2018-11-21 PROCEDURE — 21400001 HC TELEMETRY ROOM

## 2018-11-21 PROCEDURE — 99223 1ST HOSP IP/OBS HIGH 75: CPT | Mod: ,,, | Performed by: INTERNAL MEDICINE

## 2018-11-21 PROCEDURE — 85025 COMPLETE CBC W/AUTO DIFF WBC: CPT

## 2018-11-21 PROCEDURE — 80053 COMPREHEN METABOLIC PANEL: CPT

## 2018-11-21 PROCEDURE — 25000003 PHARM REV CODE 250: Performed by: NURSE PRACTITIONER

## 2018-11-21 PROCEDURE — 63600175 PHARM REV CODE 636 W HCPCS: Performed by: EMERGENCY MEDICINE

## 2018-11-21 PROCEDURE — 94640 AIRWAY INHALATION TREATMENT: CPT

## 2018-11-21 RX ORDER — OXYCODONE HYDROCHLORIDE 5 MG/1
20 TABLET ORAL EVERY 6 HOURS PRN
Status: DISCONTINUED | OUTPATIENT
Start: 2018-11-21 | End: 2018-11-23 | Stop reason: HOSPADM

## 2018-11-21 RX ORDER — CIPROFLOXACIN 2 MG/ML
400 INJECTION, SOLUTION INTRAVENOUS
Status: DISCONTINUED | OUTPATIENT
Start: 2018-11-21 | End: 2018-11-22

## 2018-11-21 RX ORDER — IPRATROPIUM BROMIDE AND ALBUTEROL SULFATE 2.5; .5 MG/3ML; MG/3ML
3 SOLUTION RESPIRATORY (INHALATION) EVERY 6 HOURS PRN
Status: DISCONTINUED | OUTPATIENT
Start: 2018-11-21 | End: 2018-11-23 | Stop reason: HOSPADM

## 2018-11-21 RX ORDER — CLONAZEPAM 0.5 MG/1
0.5 TABLET ORAL 2 TIMES DAILY PRN
Status: DISCONTINUED | OUTPATIENT
Start: 2018-11-21 | End: 2018-11-23 | Stop reason: HOSPADM

## 2018-11-21 RX ADMIN — CIPROFLOXACIN 400 MG: 2 INJECTION, SOLUTION INTRAVENOUS at 03:11

## 2018-11-21 RX ADMIN — PIPERACILLIN AND TAZOBACTAM 4.5 G: 4; .5 INJECTION, POWDER, LYOPHILIZED, FOR SOLUTION INTRAVENOUS; PARENTERAL at 04:11

## 2018-11-21 RX ADMIN — VITAMIN D, TAB 1000IU (100/BT) 1000 UNITS: 25 TAB at 08:11

## 2018-11-21 RX ADMIN — IPRATROPIUM BROMIDE AND ALBUTEROL SULFATE 3 ML: .5; 3 SOLUTION RESPIRATORY (INHALATION) at 01:11

## 2018-11-21 RX ADMIN — INSULIN ASPART 5 UNITS: 100 INJECTION, SOLUTION INTRAVENOUS; SUBCUTANEOUS at 05:11

## 2018-11-21 RX ADMIN — DILTIAZEM HYDROCHLORIDE 360 MG: 180 CAPSULE, COATED, EXTENDED RELEASE ORAL at 08:11

## 2018-11-21 RX ADMIN — INSULIN ASPART 3 UNITS: 100 INJECTION, SOLUTION INTRAVENOUS; SUBCUTANEOUS at 06:11

## 2018-11-21 RX ADMIN — POLYETHYLENE GLYCOL 3350 17 G: 17 POWDER, FOR SOLUTION ORAL at 08:11

## 2018-11-21 RX ADMIN — VANCOMYCIN HYDROCHLORIDE 1000 MG: 1 INJECTION, POWDER, LYOPHILIZED, FOR SOLUTION INTRAVENOUS at 06:11

## 2018-11-21 RX ADMIN — OXYCODONE HYDROCHLORIDE 20 MG: 5 TABLET ORAL at 08:11

## 2018-11-21 RX ADMIN — FLECAINIDE ACETATE 100 MG: 100 TABLET ORAL at 08:11

## 2018-11-21 RX ADMIN — IPRATROPIUM BROMIDE AND ALBUTEROL SULFATE 3 ML: .5; 3 SOLUTION RESPIRATORY (INHALATION) at 07:11

## 2018-11-21 RX ADMIN — IPRATROPIUM BROMIDE AND ALBUTEROL SULFATE 3 ML: .5; 3 SOLUTION RESPIRATORY (INHALATION) at 04:11

## 2018-11-21 RX ADMIN — PIPERACILLIN AND TAZOBACTAM 4.5 G: 4; .5 INJECTION, POWDER, LYOPHILIZED, FOR SOLUTION INTRAVENOUS; PARENTERAL at 11:11

## 2018-11-21 RX ADMIN — OXYCODONE HYDROCHLORIDE 20 MG: 5 TABLET ORAL at 06:11

## 2018-11-21 RX ADMIN — PANTOPRAZOLE SODIUM 40 MG: 40 TABLET, DELAYED RELEASE ORAL at 08:11

## 2018-11-21 RX ADMIN — APIXABAN 5 MG: 2.5 TABLET, FILM COATED ORAL at 08:11

## 2018-11-21 RX ADMIN — OXYCODONE HYDROCHLORIDE 20 MG: 5 TABLET ORAL at 01:11

## 2018-11-21 RX ADMIN — OXYCODONE HYDROCHLORIDE 20 MG: 5 TABLET ORAL at 11:11

## 2018-11-21 RX ADMIN — IPRATROPIUM BROMIDE AND ALBUTEROL SULFATE 3 ML: .5; 3 SOLUTION RESPIRATORY (INHALATION) at 08:11

## 2018-11-21 RX ADMIN — OXYCODONE HYDROCHLORIDE AND ACETAMINOPHEN 1000 MG: 500 TABLET ORAL at 08:11

## 2018-11-21 RX ADMIN — PIPERACILLIN AND TAZOBACTAM 4.5 G: 4; .5 INJECTION, POWDER, LYOPHILIZED, FOR SOLUTION INTRAVENOUS; PARENTERAL at 08:11

## 2018-11-21 RX ADMIN — INSULIN ASPART 3 UNITS: 100 INJECTION, SOLUTION INTRAVENOUS; SUBCUTANEOUS at 08:11

## 2018-11-21 RX ADMIN — NEPHROCAP 1 CAPSULE: 1 CAP ORAL at 08:11

## 2018-11-21 RX ADMIN — SERTRALINE HYDROCHLORIDE 50 MG: 50 TABLET ORAL at 08:11

## 2018-11-21 RX ADMIN — PRAVASTATIN SODIUM 40 MG: 20 TABLET ORAL at 08:11

## 2018-11-21 NOTE — HOSPITAL COURSE
Pt presently taking Keytruda for NSCLC with mets and was discharged from hospital approx 1 week ago after being evaluated for intractable chest pain deemed to be related to pt's malignancy. He returned on 11/20/18 with weakness, worsened SOB and cough. He was diagnosed with Sepsis, Acute on Chronic Respiratory Failure and Healthcare Associated Pneumonia. He was prescribed IV hydration, bronchodilators, triple ABX & supplemental oxygen. Lactic acid was normal. Eliquis, flecainide and diltiazem resumed for PAF. Blood cultures were NGTD at 24 hours. Pt reported SOB when lying down and oxygen sat 60 %. BiPAP ordered and Pulmonology consulted and recommended corticosteroids and inpatient hospice due to patient's higher oxygen requirements.  Dr. Sow, Oncology, discussed palliative care with patient and initially there was no decision. Pt had reached the maximum benefits of acute care. The discussion regarding inpatient hospice was approached with patient and daughter/sister. Disposition options were explained to patient and family. Pt elected inpatient hospice at [facility] due to their services provided. He was prescribed Levaquin and Medrol dose pack. The patient was seen and examined and determined to be safe and stable for discharge. He was discharged under the care of [facility] to [facility].

## 2018-11-21 NOTE — SUBJECTIVE & OBJECTIVE
Interval History: No acute events overnight. Tolerating IV antibiotics.  Reports minimal improvement in SOB.  Reports generalized pain 3/10, control with p.o. pain medication.  Discussed with patient whether or not he would like to proceed with active immunotherapy treatment versus comfort care.  He continues to think on this and will let us know his decision.  Oncology Treatment Plan:   OP PEMBROLIZUMAB 200MG Q3W    Medications:  Continuous Infusions:   sodium chloride 0.9% 75 mL/hr at 11/20/18 1911     Scheduled Meds:   albuterol-ipratropium  3 mL Nebulization Q6H    apixaban  5 mg Oral BID    ascorbic acid (vitamin C)  1,000 mg Oral Daily    ciprofloxacin (CIPRO)400mg/200ml D5W IVPB  400 mg Intravenous Q12H    diltiaZEM  360 mg Oral Daily    fentaNYL  1 patch Transdermal Q72H    flecainide  100 mg Oral Q12H    pantoprazole  40 mg Oral Daily    piperacillin-tazobactam (ZOSYN) IVPB  4.5 g Intravenous Q8H    polyethylene glycol  17 g Oral Daily    pravastatin  40 mg Oral Nightly    sertraline  50 mg Oral Daily    vancomycin (VANCOCIN) IVPB  1,000 mg Intravenous Q12H    vitamin D  1,000 Units Oral Daily    vitamin renal formula (B-complex-vitamin c-folic acid)  1 capsule Oral Daily     PRN Meds:dextrose 50%, dextrose 50%, glucagon (human recombinant), glucose, glucose, insulin aspart U-100, oxyCODONE, sodium chloride     Review of patient's allergies indicates:   Allergen Reactions    Anoro ellipta [umeclidinium-vilanterol] Shortness Of Breath    Flomax [tamsulosin]      Dizzy          Past Medical History:   Diagnosis Date    Arthritis     Atrial fibrillation and flutter     Atrial flutter     Cancer     LUNG    COPD (chronic obstructive pulmonary disease)     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  Others are too  small to accurately characterize.    Lung disease     Nephrolithiasis     ct urogram 12/15/2017-Bilateral nephrolithiasis.     Neuropathy, diabetic     Pancreatitis     Respiratory failure     Stage 4 lung cancer     Type 1 diabetes mellitus with diabetic neuropathy 4/27/2018     Past Surgical History:   Procedure Laterality Date    ABLATION N/A 12/4/2017    Performed by Jaret Freire MD at [facility] CATH LAB    BICEPS TENDON REPAIR Right     BRONCHOSCOPY Bilateral 9/2/2018    Procedure: BRONCHOSCOPY- insert lighted tube into airway to obtain biopsy of lung;  Surgeon: Aldair Deleon MD;  Location: [facility] ENDO;  Service: Endoscopy;  Laterality: Bilateral;    BRONCHOSCOPY- insert lighted tube into airway to obtain biopsy of lung Bilateral 9/2/2018    Performed by Aldair Deleon MD at [facility] ENDO    CARDIOVERSION      CHOLECYSTECTOMY      INSERTION OF TUNNELED CENTRAL VENOUS CATHETER (CVC) WITH SUBCUTANEOUS PORT Right 10/9/2018    Procedure: [id number]-P-CATH;  Surgeon: Christophe Brandt MD;  Location: [facility] OR;  Service: General;  Laterality: Right;    UARPZZETI-HHZN-X-CATH Right 10/9/2018    Performed by Christophe Brandt MD at [facility] OR    PATELLA SURGERY Right     RADIOFREQUENCY ABLATION      ROTATOR CUFF REPAIR Left     TRANSESOPHAGEAL ECHOCARDIOGRAM (DILLAN) N/A 12/4/2017    Performed by Jaret Freire MD at [facility] CATH LAB    ULTRASOUND GUIDANCE Right 10/9/2018    Procedure: ULTRASOUND GUIDANCE;  Surgeon: Christophe Brandt MD;  Location: [facility] OR;  Service: General;  Laterality: Right;    ULTRASOUND GUIDANCE Right 10/9/2018    Performed by Christophe Brandt MD at [facility] OR     Family History     Problem Relation (Age of Onset)    Cancer Maternal Aunt    Cataracts Sister    Diabetes Mother, Sister, Sister, Sister    Heart attack Brother    Heart failure Brother    Hypertension Mother, Father    Stroke Mother, Father, Paternal Grandmother, Paternal Grandfather        Tobacco Use    Smoking status: Former Smoker      Packs/day: 0.50     Types: Cigarettes     Start date: 1/1/1960     Last attempt to quit: 3/8/2015     Years since quitting: 3.7    Smokeless tobacco: Former User     Types: Snuff     Quit date: 10/7/1995   Substance and Sexual Activity    Alcohol use: No     Alcohol/week: 0.0 oz    Drug use: No    Sexual activity: Not on file       Review of Systems   Constitutional: Positive for activity change and fatigue. Negative for appetite change, chills, diaphoresis, fever and unexpected weight change.   HENT: Negative for congestion, mouth sores, nosebleeds, sore throat, trouble swallowing and voice change.    Eyes: Negative for visual disturbance.   Respiratory: Positive for cough and shortness of breath. Negative for apnea, choking, chest tightness, wheezing and stridor.    Cardiovascular: Positive for chest pain and leg swelling. Negative for palpitations.   Gastrointestinal: Negative for abdominal distention, abdominal pain, anal bleeding, blood in stool, constipation, diarrhea, nausea and vomiting.   Genitourinary: Negative for difficulty urinating, dysuria and hematuria.   Musculoskeletal: Positive for arthralgias, back pain and myalgias.   Skin: Negative for pallor and rash.   Allergic/Immunologic: Positive for immunocompromised state.   Neurological: Positive for weakness. Negative for dizziness, syncope, light-headedness, numbness and headaches.   Hematological: Negative for adenopathy. Bruises/bleeds easily.   Psychiatric/Behavioral: The patient is nervous/anxious.      Objective:     Vital Signs (Most Recent):  Temp: 98.3 °F (36.8 °C) (11/21/18 1000)  Pulse: 86 (11/21/18 1128)  Resp: 18 (11/21/18 1128)  BP: (!) 114/57 (11/21/18 1128)  SpO2: (!) 94 % (11/21/18 1128) Vital Signs (24h Range):  Temp:  [98 °F (36.7 °C)-99.6 °F (37.6 °C)] 98.3 °F (36.8 °C)  Pulse:  [] 86  Resp:  [17-31] 18  SpO2:  [85 %-98 %] 94 %  BP: ()/(52-67) 114/57     Weight: 79.6 kg (175 lb 7.8 oz)  Body mass index is 26.68  kg/m².  Body surface area is 1.95 meters squared.      Intake/Output Summary (Last 24 hours) at 11/21/2018 1134  Last data filed at 11/21/2018 0856  Gross per 24 hour   Intake 2086 ml   Output 525 ml   Net 1561 ml       Physical Exam   Constitutional: He is oriented to person, place, and time. He appears well-developed and well-nourished. He is cooperative. He has a sickly appearance. He appears ill. Nasal cannula in place.   HENT:   Head: Normocephalic.   Right Ear: Hearing normal. No drainage.   Left Ear: Hearing normal. No drainage.   Nose: Nose normal.   Mouth/Throat: Oropharynx is clear and moist.   Eyes: Conjunctivae, EOM and lids are normal. Right eye exhibits no discharge. Left eye exhibits no discharge. No scleral icterus.   Neck: Normal range of motion. No thyroid mass present.   Cardiovascular: Normal rate and normal heart sounds. An irregular rhythm present.   No murmur heard.  Pulmonary/Chest: Effort normal. No respiratory distress. He has decreased breath sounds. He has no wheezes. He has no rhonchi. He has no rales.   Abdominal: Soft. He exhibits no distension. Bowel sounds are decreased. There is no tenderness.   Genitourinary:   Genitourinary Comments: deferred   Musculoskeletal: Normal range of motion.        Right ankle: He exhibits swelling.        Left ankle: He exhibits swelling.        Right lower leg: He exhibits edema.        Left lower leg: He exhibits edema.   Mild BLE edema   Lymphadenopathy:        Head (right side): No submandibular, no preauricular and no posterior auricular adenopathy present.        Head (left side): No submandibular, no preauricular and no posterior auricular adenopathy present.        Right cervical: No superficial cervical adenopathy present.       Left cervical: No superficial cervical adenopathy present.   Neurological: He is alert and oriented to person, place, and time.   Skin: Skin is warm, dry and intact.   Psychiatric: His speech is normal and behavior is  normal. Thought content normal. His mood appears anxious.   Vitals reviewed.      Significant Labs:   BMP:   Recent Labs   Lab 11/20/18  1205 11/21/18  0411   * 285*    134*   K 4.9 4.1   CL 95 95   CO2 29 31*   BUN 21 16   CREATININE 0.8 0.8   CALCIUM 9.5 8.9   , CBC:   Recent Labs   Lab 11/20/18  1205 11/21/18  0411   WBC 13.39* 9.19   HGB 9.8* 9.0*   HCT 31.4* 29.6*   * 394*   , CMP:   Recent Labs   Lab 11/20/18  1205 11/21/18  0411    134*   K 4.9 4.1   CL 95 95   CO2 29 31*   * 285*   BUN 21 16   CREATININE 0.8 0.8   CALCIUM 9.5 8.9   PROT 7.0 6.5   ALBUMIN 2.4* 2.2*   BILITOT 0.5 0.4   ALKPHOS 181* 167*   AST 47* 49*   ALT 28 25   ANIONGAP 13 8   EGFRNONAA >60 >60   , LFTs:   Recent Labs   Lab 11/20/18  1205 11/21/18  0411   ALT 28 25   AST 47* 49*   ALKPHOS 181* 167*   BILITOT 0.5 0.4   PROT 7.0 6.5   ALBUMIN 2.4* 2.2*   , Tumor Markers: No results for input(s): PSA, CEA, , AFPTM, VJ5776,  in the last 48 hours.    Invalid input(s): ALGTM and All pertinent labs from the last 24 hours have been reviewed.    Diagnostic Results:  I have reviewed all pertinent imaging results/findings within the past 24 hours.   CTA Chest Non-Coronary (PE Study)   Order: [id number]   Status:  Final result   Visible to patient:  No (Not Released)   Next appt:  11/29/2018 at 08:50 AM in Lab ([facility] LAB)   Details     Reading Physician Reading Date Result Priority   Jos Brito MD 11/20/2018       Narrative     EXAMINATION:  CTA CHEST NON CORONARY    CLINICAL HISTORY:  Chest pain, acute, PE suspected, high pretest prob;    TECHNIQUE:  The chest was surveyed from the apices through the posterior costophrenic angles after administration of 100 cc of Omni 350 contrast using CT angiography technique.  Data was reconstructed for multiplanar images in axial, sagittal and coronal planes in for maximal intensity projection images in the axial  plane.    COMPARISON:  None    FINDINGS:  Base of Neck: No significant abnormality.    Airways: Patent.    Lungs: Alveolar and interstitial opacities are seen throughout the left lower lobe and lingula and to a lesser degree within the left upper lobe consistent with airspace disease or aspiration.  Multiple pulmonary nodules are seen within the left lower lobe measuring up to 1.2 cm.  Evaluation is limited due to patchy consolidation throughout the left lung.  1.3 cm nodule within the right upper lobe likely reflects metastatic disease..  Patchy infiltrate seen within the right middle lobe.  Mild bronchiectasis and infiltration within the right middle lobe also noted.  Severe emphysematous changes are seen throughout the superior segments of the lower lobes and bilateral upper lobes.    Pleura: No pleural fluid.No pleural calcification.    Roz/Mediastinum: Extensive mediastinal and left hilar adenopathy.  Mass is seen within the AP window extending along the lateral hilum into the left hilar region measuring at least 5.9 x 4.7 cm concerning for metastatic disease.  Enlarged lymph nodes are seen throughout the mediastinum largest is in the precarinal region measuring 2 cm concerning for metastatic disease.  Elevation left hemidiaphragm noted.    Esophagus: Normal.    Heart/pericardium: Normal size.  Lipomatosis hypertrophy of the interatrial septum is noted.  No pericardial effusion or calcification.    Pulmonary vasculature: No evidence of pulmonary embolism.  Pulmonary arteries distribute normally.  There are four pulmonary veins.    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  The aorta maintains normal caliber, contour and course. There is mild-to-moderate calcification of the thoracic aorta.  There is  severe coronary artery calcification.    Thoracic soft tissues: Normal. Both breasts are present.    Bones: Diffuse osteopenia and multiple mixed sclerotic and lytic lesions throughout the thoracic  and lumbar skele spine ton largest within the L1 vertebral body measuring 2.5 x 2.9 cm consistent with metastatic disease.  Bones are diffusely osteopenic which limits evaluation for metastatic disease    Upper Abdomen: Punctate nonobstructing stones within the right kidney.  Indeterminate hypodensities within the kidneys likely reflect small cysts.  Moderate constipation.  Gallbladder is surgically absent.  Indeterminate 5 x 4.3 cm mass within the right hepatic lobe likely reflects metastatic disease.  Nodular thickening of the adrenal glands noted.      Impression       No evidence of pulmonary embolism.    Consolidation throughout the left lung and to a lesser degree within the right middle lobe thought to reflect airspace disease or aspiration.    Large mass within the mediastinum and left hilum as well as several pulmonary nodules concerning for metastatic disease.    Liver lesions concerning for metastatic disease.    Multiple lytic lesions throughout the thoracic skeleton largest within the L1 vertebral body consistent with metastatic disease    Diffuse thickening of the stomach could reflect line and dysplastic of and diffuse infiltrative malignancy.  Recommend endoscopy.    Moderate constipation.    See above for additional findings    All CT scans at this facility use dose modulation, iterative reconstruction and/or weight based dosing when appropriate to reduce radiation dose to as low as reasonably achievable.

## 2018-11-21 NOTE — PROGRESS NOTES
[facility] Hospital Medicine  Progress Note    Patient Name: Nico Garza Jr.  MRN: [id number]  Patient Class: IP- Inpatient   Admission Date: 11/20/2018  Length of Stay: 1 days  Attending Physician: Uzair Topete MD  Primary Care Provider: Tiffany Davis DO        Subjective:     Principal Problem:Sepsis    HPI:  Mr Garza is a 72 year old male with PMHx of PAF, HTN, HLD, DM, NSCLC with mets to bone and liver s/p radiation currently receiving Keytruda and COPD on home O2 at 4-5 L. He  presented to [facility] Emergency Room with complaints of increase shortness of  Breath. Associated symptoms in include sudden onset in increase weakness and fatigue. Denies associated symptoms of chest pain, palpitations, fever, chills, nausea, vomiting or diaphoresis. CTA of the chest shows alveolar and interstitial opacities are seen throughout the left lower lobe and lingula and to a lesser degree within the left upper lobe consistent with airspace disease or aspiration. Multiple pulmonary nodules are seen within the left lower lobe measuring up to 1.2 cm.  Evaluation is limited due to patchy consolidation throughout the left lung.  1.3 cm nodule within the right upper lobe likely reflects metastatic disease..  Patchy infiltrate seen within the right middle lobe.  Mild bronchiectasis and infiltration within the right middle lobe also noted.  Severe emphysematous changes are seen throughout the superior segments of the lower lobes and bilateral upper lobes.   Troponin negative. Lactic acid normal. Code status reviewed with patient and family, he wishes code status of DNR.     Hospital Course:  Pt presently taking Keytruda for NSCLC with mets and was discharged from hospital approx 1 week ago after being evaluated for intractable chest pain deemed to be related to pt's malignancy. He returned on 11/20/18 with weakness, worsened SOB and cough. He was diagnosed with Sepsis and Healthcare Associated Pneumonia. Pt was  immunocompromised. He was prescribed IV hydration, bronchodilators, triple ABX & supplemental oxygen. Lactic acid was normal. Eliquis, flecainide and diltiazem resumed for PAF.     Interval History:  Pt reports pain pleuritic right lower rib cage pain with inspiration. Home pain medications continued. Encouraged use of IS.     Review of Systems   Constitutional: Positive for activity change and fatigue. Negative for appetite change, chills, diaphoresis, fever and unexpected weight change.   HENT: Negative for congestion, mouth sores, nosebleeds, sore throat, trouble swallowing and voice change.    Eyes: Negative for visual disturbance.   Respiratory: Positive for cough and shortness of breath. Negative for apnea, choking, chest tightness, wheezing and stridor.    Cardiovascular: Positive for chest pain and leg swelling. Negative for palpitations.   Gastrointestinal: Negative for abdominal distention, abdominal pain, anal bleeding, blood in stool, constipation, diarrhea, nausea and vomiting.   Genitourinary: Negative for difficulty urinating, dysuria and hematuria.   Musculoskeletal: Positive for arthralgias, back pain and myalgias.   Skin: Negative for pallor and rash.   Allergic/Immunologic: Positive for immunocompromised state.   Neurological: Positive for weakness. Negative for dizziness, syncope, light-headedness, numbness and headaches.   Hematological: Negative for adenopathy. Bruises/bleeds easily.   Psychiatric/Behavioral: The patient is nervous/anxious.      Objective:     Vital Signs (Most Recent):  Temp: 98.3 °F (36.8 °C) (11/21/18 1000)  Pulse: 90 (11/21/18 1314)  Resp: 18 (11/21/18 1314)  BP: (!) 114/57 (11/21/18 1128)  SpO2: (!) 94 % (11/21/18 1314) Vital Signs (24h Range):  Temp:  [98 °F (36.7 °C)-98.9 °F (37.2 °C)] 98.3 °F (36.8 °C)  Pulse:  [] 90  Resp:  [17-29] 18  SpO2:  [90 %-95 %] 94 %  BP: ()/(53-58) 114/57     Weight: 79.6 kg (175 lb 7.8 oz)  Body mass index is 26.68  kg/m².    Intake/Output Summary (Last 24 hours) at 11/21/2018 1504  Last data filed at 11/21/2018 1400  Gross per 24 hour   Intake 2831 ml   Output 525 ml   Net 2306 ml      Physical Exam   Constitutional: He is oriented to person, place, and time. He appears well-developed and well-nourished. He is cooperative. He has a sickly appearance. He appears ill. Nasal cannula in place.   Chronically ill appearing   HENT:   Head: Normocephalic.   Right Ear: Hearing normal. No drainage.   Left Ear: Hearing normal. No drainage.   Eyes: Conjunctivae, EOM and lids are normal. Right eye exhibits no discharge. Left eye exhibits no discharge. No scleral icterus.   Neck: Normal range of motion. No thyroid mass present.   Cardiovascular: Normal rate and normal heart sounds. An irregular rhythm present.   No murmur heard.  Pulmonary/Chest: Effort normal. No respiratory distress. He has decreased breath sounds. He has no wheezes. He has no rhonchi. He has no rales.   With scattered rales   Abdominal: Soft. He exhibits no distension. Bowel sounds are decreased. There is no tenderness.   Genitourinary:   Genitourinary Comments: deferred   Musculoskeletal: Normal range of motion.        Right ankle: He exhibits swelling.        Left ankle: He exhibits swelling.        Right lower leg: He exhibits edema.        Left lower leg: He exhibits edema.   Mild BLE edema   Lymphadenopathy:        Head (right side): No submandibular, no preauricular and no posterior auricular adenopathy present.        Head (left side): No submandibular, no preauricular and no posterior auricular adenopathy present.        Right cervical: No superficial cervical adenopathy present.       Left cervical: No superficial cervical adenopathy present.   Neurological: He is alert and oriented to person, place, and time.   Skin: Skin is warm, dry and intact.   Psychiatric: His speech is normal and behavior is normal. Thought content normal. His mood appears anxious.    Nursing note and vitals reviewed.      Significant Labs:   CBC:   Recent Labs   Lab 11/20/18  1205 11/21/18  0411   WBC 13.39* 9.19   HGB 9.8* 9.0*   HCT 31.4* 29.6*   * 394*     CMP:   Recent Labs   Lab 11/20/18  1205 11/21/18  0411    134*   K 4.9 4.1   CL 95 95   CO2 29 31*   * 285*   BUN 21 16   CREATININE 0.8 0.8   CALCIUM 9.5 8.9   PROT 7.0 6.5   ALBUMIN 2.4* 2.2*   BILITOT 0.5 0.4   ALKPHOS 181* 167*   AST 47* 49*   ALT 28 25   ANIONGAP 13 8   EGFRNONAA >60 >60     All pertinent labs within the past 24 hours have been reviewed.    Significant Imaging: I have reviewed all pertinent imaging results/findings within the past 24 hours.    Assessment/Plan:      * Sepsis      BC X 2 - no growth to date  Sputum culture - pending  Azithromycin and Rocephin given in ED >>>> ABX changed to Vanco, Zosyn and Cipro  Lactic acid was normal  B/P was appropriate           Severe malnutrition    Continue home vitamin supplements      Chronic respiratory failure with hypoxia    Continue supplemental oxygen to maintain sat > 92 %       Metastatic left lung cancer (metastasis from lung to other site) with mediastinal lymphadenopathy     NSCLC with mets to bone and liver s/p radiation currently receiving Keytruda  Restart home pain medications   Consult Hematology/Oncology        Type 1 diabetes mellitus with diabetic neuropathy    Accu checks ACHS   Low dose SS   Diabetic diet   Hold patient's lispro and long acting insulin for now - resume long acting insulin as warranted     Paroxysmal atrial fibrillation    Telemetry monitor   Eliquis 5 mg BID  Flecainide   Diltiazem 360 mg PO, hold for SBP < 90        Hyperlipidemia LDL goal <70    Continue pravastatin      Healthcare-associated pneumonia    BC x 2  - NGTD  Sputum culture   Azithromycin and Rocephin given in ED  Will continue with Vancomycin, Cipro and Zosyn   Incentive spirometer   O 2 4L, keep sats > 92 %  [place]     COPD, group D, by GOLD 2017  classification    Pt on home O 2 at 4 L   Keep sats > 90 %   ABG   Duo neb            VTE Risk Mitigation (From admission, onward)        Ordered     apixaban tablet 5 mg  2 times daily      11/20/18 2046              Genie Akbar NP  Department of Hospital Medicine   [facility]

## 2018-11-21 NOTE — PLAN OF CARE
Problem: Patient Care Overview  Goal: Plan of Care Review  Outcome: Ongoing (interventions implemented as appropriate)  Pt AAO x4.  VSS.  Pt remained afebrile throughout this shift.   IV antobiotics administered per order.   Pt remained free of falls this shift.   Pt complains of pain this shift.  Pain meds administered as ordered.   Blood glucose monitored, corrected via SSI.  Plan of care reviewed. Patient verbalizes understanding.   Pt moving/turing independenlty. Frequent weight shifting encouraged.  Patient afib/aflutter* on monitor.   PIV intact.  Bed low, side rails up x 2, wheels locked, call light in reach.   Patient instructed to call for assistance.   Hourly rounding completed.   24 hour chart check completed.  Will continue to monitor.

## 2018-11-21 NOTE — PLAN OF CARE
Problem: Infection, Risk/Actual (Adult)  Intervention: Manage Suspected/Actual Infection  POC reviewed with pt and daughter, both verbalized understanding. Afib/flutter on cardiac monitor, rate 80s-90s this shift. O2 at 4.5 L NC with sats maintaining >92%. Unable to produce sputum for culture. PIV intact with fluids infusing antibiotics given per order. Blood glucose monitored/managed. Pain controlled with oxy IR q4h. Turns/repositions independently, ambulates with 1 person assist. Intake and output monitored. Fall precautions maintained, remains free from injury/falls this shift. Instructed to call for assistance.

## 2018-11-21 NOTE — ASSESSMENT & PLAN NOTE
Accu checks ACHS   Low dose SS   Diabetic diet   Hold patient's lispro and long acting insulin for now - resume long acting insulin as warranted

## 2018-11-21 NOTE — PLAN OF CARE
CM met with patient at the bedside to assess for discharge needs.   Patient normally lives alone, but states that he has been staying with his daughter.  He states that he had to return to the hospital because of weakness and wheezing.  He is established with [facility] and would like services resumed at discharge.  Choice form completed and placed in patient blue folder.   CM discussed SNF, however patient is currently receiving chemo and is unsure if he would want to put his therapy on hold.  CM provided a transitional care folder, information on advanced directives, information on pharmacy bedside delivery, and discharge planning begins on admission with contact information for any needs/questions.     D/C Plan:  Home with [facility] [id number] [place] [address] [address] AT [facility]  [address]  [facility] [address]  Phone: [phone] Fax: [phone]    [facility] - [place], [place] - [address] [facility]  [address] [facility] [address]  Phone: [phone] Fax: [phone]    [facility] - [place], [place] - [address]  [address] [facility] [address]  Phone: [phone] Fax: [phone]    Tiffany Davis DO  Payor: MEDICARE / Plan: MEDICARE PART A & B / Product Type: [facility] /       11/21/18 0946   Discharge Assessment   Assessment Type Discharge Planning Assessment   Confirmed/corrected address and phone number on facesheet? Yes   Assessment information obtained from? Patient;Medical Record;Caregiver   Expected Length of Stay (days) (TBD)   Communicated expected length of stay with patient/caregiver yes   Prior to hospitilization cognitive status: Alert/Oriented   Prior to hospitalization functional status: Independent;Assistive Equipment   Current cognitive status: Alert/Oriented   Current Functional Status: Independent;Assistive Equipment   Facility  Arrived From: home   Lives With alone   Able to Return to Prior Arrangements yes   Is patient able to care for self after discharge? Unable to determine at this time (comments)   Who are your caregiver(s) and their phone number(s)? Belia Garza, daughter [phone]   Patient's perception of discharge disposition home health   Readmission Within The Last 30 Days previous discharge plan unsuccessful   If yes, most recent facility name: [facility]   Patient currently being followed by outpatient case management? No   Patient currently receives any other outside agency services? No   Equipment Currently Used at Home nebulizer;oxygen;walker, rolling;shower chair   Do you have any problems affording any of your prescribed medications? No   Is the patient taking medications as prescribed? yes   Does the patient have transportation home? Yes   Transportation Available family or friend will provide   Dialysis Name and Scheduled days NA   Does the patient receive services at the [facility]? No   Discharge Plan A Home Health   Discharge Plan B Skilled Nursing Facility   Patient/Family In Agreement With Plan yes   Readmission Questionnaire   At the time of your discharge, did someone talk to you about what your health problems were? Yes   At the time of discharge, did someone talk to you about what to watch out for regarding worsening of your health problem? Yes   At the time of discharge, did someone talk to you about what to do if you experienced worsening of your health problem? Yes   At the time of discharge, did someone talk to you about which medication to take when you left the hospital and which ones to stop taking? Yes   At the time of discharge, did someone talk to you about when and where to follow up with a doctor after you left the hospital? Yes   What do you believe caused you to be sick enough to be re-admitted? Weakness, wheezing   How often do you need to have someone help you when you read  instructions, pamphlets, or other written material from your doctor or pharmacy? Rarely   Do you have problems taking your medications as prescribed? No   Do you have any problems affording any of  your prescribed medications? No   Do you have problems obtaining/receiving your medications? No   Does the patient have transportation to healthcare appointments? Yes   Living Arrangements house   Does the patient have family/friends to help with healtcare needs after discharge? yes   Does your caregiver provide all the help you need? Yes   Are you currently feeling confused? No   Are you currently having problems thinking? No   Are you currently having memory problems? No

## 2018-11-21 NOTE — CONSULTS
[facility] -   Hematology/Oncology  Consult Note    Patient Name: Nico Garza Jr.  MRN: [id number]  Admission Date: 11/20/2018  Hospital Length of Stay: 1 days  Code Status: DNR   Attending Provider: Uzair Topete MD  Consulting Provider: Cindy Stevenson NP  Primary Care Physician: Tiffany Davis DO  Principal Problem:Sepsis    Consults  Subjective:     HPI:  72 y.o. male patient with a hx of DM, HTN, and NSCLC with mets to bone and liver who presented to the ED with c/o worsening SOB, unresponsive to home O2 at 4L per nasal cannula. Of note, patient was seen in ED for similar sxs 1 week ago. He was admitted and discharged 11/18/18. Symptoms are constant and moderate in severity. Worsened with exertion, relieved by nothing.  Associated sxs include cough,  weakness, BLE swelling, left sided CP, and back pain. Patient denies any fever, chills, n/v/d, abd pain, weakness, numbness, palpitations, and all other sxs at this time.  CTA of the chest shows alveolar and interstitial opacities are seen throughout the left lower lobe and lingula and to a lesser degree within the left upper lobe consistent with airspace disease or aspiration. Multiple pulmonary nodules are seen within the left lower lobe measuring up to 1.2 cm.  Evaluation is limited due to patchy consolidation throughout the left lung.  1.3 cm nodule within the right upper lobe likely reflects metastatic disease..  Patchy infiltrate seen within the right middle lobe.  Mild bronchiectasis and infiltration within the right middle lobe also noted.  Severe emphysematous changes are seen throughout the superior segments of the lower lobes and bilateral upper lobes.   Troponin negative. Lactic acid normal. UA neg for infection. . BC:NGTD    Patient is s/p cycle 2 Keytruda which was received on 11/08/2018.  Patient is also receiving palliative radiation to lumbar spine          Interval History: No acute events overnight. Tolerating IV  antibiotics.  Reports minimal improvement in SOB.  Reports generalized pain 3/10, control with p.o. pain medication.  Discussed with patient whether or not he would like to proceed with active immunotherapy treatment versus comfort care.  He continues to think on this and will let us know his decision.  Oncology Treatment Plan:   OP PEMBROLIZUMAB 200MG Q3W    Medications:  Continuous Infusions:   sodium chloride 0.9% 75 mL/hr at 11/20/18 1911     Scheduled Meds:   albuterol-ipratropium  3 mL Nebulization Q6H    apixaban  5 mg Oral BID    ascorbic acid (vitamin C)  1,000 mg Oral Daily    ciprofloxacin (CIPRO)400mg/200ml D5W IVPB  400 mg Intravenous Q12H    diltiaZEM  360 mg Oral Daily    fentaNYL  1 patch Transdermal Q72H    flecainide  100 mg Oral Q12H    pantoprazole  40 mg Oral Daily    piperacillin-tazobactam (ZOSYN) IVPB  4.5 g Intravenous Q8H    polyethylene glycol  17 g Oral Daily    pravastatin  40 mg Oral Nightly    sertraline  50 mg Oral Daily    vancomycin (VANCOCIN) IVPB  1,000 mg Intravenous Q12H    vitamin D  1,000 Units Oral Daily    vitamin renal formula (B-complex-vitamin c-folic acid)  1 capsule Oral Daily     PRN Meds:dextrose 50%, dextrose 50%, glucagon (human recombinant), glucose, glucose, insulin aspart U-100, oxyCODONE, sodium chloride     Review of patient's allergies indicates:   Allergen Reactions    Anoro ellipta [umeclidinium-vilanterol] Shortness Of Breath    Flomax [tamsulosin]      Dizzy          Past Medical History:   Diagnosis Date    Arthritis     Atrial fibrillation and flutter     Atrial flutter     Cancer     LUNG    COPD (chronic obstructive pulmonary disease)     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  Others are too small to accurately characterize.    Lung disease      Nephrolithiasis     ct urogram 12/15/2017-Bilateral nephrolithiasis.     Neuropathy, diabetic     Pancreatitis     Respiratory failure     Stage 4 lung cancer     Type 1 diabetes mellitus with diabetic neuropathy 4/27/2018     Past Surgical History:   Procedure Laterality Date    ABLATION N/A 12/4/2017    Performed by [facility] at [facility] CATH LAB    BICEPS TENDON REPAIR Right     BRONCHOSCOPY Bilateral 9/2/2018    Procedure: BRONCHOSCOPY- insert lighted tube into airway to obtain biopsy of lung;  Surgeon: Aldair Deleon MD;  Location: [facility] ENDO;  Service: Endoscopy;  Laterality: Bilateral;    BRONCHOSCOPY- insert lighted tube into airway to obtain biopsy of lung Bilateral 9/2/2018    Performed by Aldair Deleon MD at [facility] ENDO    CARDIOVERSION      CHOLECYSTECTOMY      INSERTION OF TUNNELED CENTRAL VENOUS CATHETER (CVC) WITH SUBCUTANEOUS PORT Right 10/9/2018    Procedure: [id number]-CATH;  Surgeon: Christophe Brandt MD;  Location: [facility] OR;  Service: General;  Laterality: Right;    [id number]-XIVN-V-CATH Right 10/9/2018    Performed by [facility]t [facility] at [facility] OR    PATELLA SURGERY Right     RADIOFREQUENCY ABLATION      ROTATOR CUFF REPAIR Left     TRANSESOPHAGEAL ECHOCARDIOGRAM (DILLAN) N/A 12/4/2017    Performed by [facility] at [facility] CATH LAB    ULTRASOUND GUIDANCE Right 10/9/2018    Procedure: ULTRASOUND GUIDANCE;  Surgeon: Christophe Brandt MD;  Location: [facility] OR;  Service: General;  Laterality: Right;    ULTRASOUND GUIDANCE Right 10/9/2018    Performed by Christophe Brandt MD at [facility] OR     Family History     Problem Relation (Age of Onset)    Cancer Maternal Aunt    Cataracts Sister    Diabetes Mother, Sister, Sister, Sister    Heart attack Brother    Heart failure Brother    Hypertension Mother, Father    Stroke Mother, Father, Paternal Grandmother, Paternal Grandfather        Tobacco Use    Smoking status: Former Smoker     Packs/day: 0.50     Types: Cigarettes     Start  date: 1/1/1960     Last attempt to quit: 3/8/2015     Years since quitting: 3.7    Smokeless tobacco: Former User     Types: Snuff     Quit date: 10/7/1995   Substance and Sexual Activity    Alcohol use: No     Alcohol/week: 0.0 oz    Drug use: No    Sexual activity: Not on file       Review of Systems   Constitutional: Positive for activity change and fatigue. Negative for appetite change, chills, diaphoresis, fever and unexpected weight change.   HENT: Negative for congestion, mouth sores, nosebleeds, sore throat, trouble swallowing and voice change.    Eyes: Negative for visual disturbance.   Respiratory: Positive for cough and shortness of breath. Negative for apnea, choking, chest tightness, wheezing and stridor.    Cardiovascular: Positive for chest pain and leg swelling. Negative for palpitations.   Gastrointestinal: Negative for abdominal distention, abdominal pain, anal bleeding, blood in stool, constipation, diarrhea, nausea and vomiting.   Genitourinary: Negative for difficulty urinating, dysuria and hematuria.   Musculoskeletal: Positive for arthralgias, back pain and myalgias.   Skin: Negative for pallor and rash.   Allergic/Immunologic: Positive for immunocompromised state.   Neurological: Positive for weakness. Negative for dizziness, syncope, light-headedness, numbness and headaches.   Hematological: Negative for adenopathy. Bruises/bleeds easily.   Psychiatric/Behavioral: The patient is nervous/anxious.      Objective:     Vital Signs (Most Recent):  Temp: 98.3 °F (36.8 °C) (11/21/18 1000)  Pulse: 86 (11/21/18 1128)  Resp: 18 (11/21/18 1128)  BP: (!) 114/57 (11/21/18 1128)  SpO2: (!) 94 % (11/21/18 1128) Vital Signs (24h Range):  Temp:  [98 °F (36.7 °C)-99.6 °F (37.6 °C)] 98.3 °F (36.8 °C)  Pulse:  [] 86  Resp:  [17-31] 18  SpO2:  [85 %-98 %] 94 %  BP: ()/(52-67) 114/57     Weight: 79.6 kg (175 lb 7.8 oz)  Body mass index is 26.68 kg/m².  Body surface area is 1.95 meters  squared.      Intake/Output Summary (Last 24 hours) at 11/21/2018 1134  Last data filed at 11/21/2018 0856  Gross per 24 hour   Intake 2086 ml   Output 525 ml   Net 1561 ml       Physical Exam   Constitutional: He is oriented to person, place, and time. He appears well-developed and well-nourished. He is cooperative. He has a sickly appearance. He appears ill. Nasal cannula in place.   HENT:   Head: Normocephalic.   Right Ear: Hearing normal. No drainage.   Left Ear: Hearing normal. No drainage.   Nose: Nose normal.   Mouth/Throat: Oropharynx is clear and moist.   Eyes: Conjunctivae, EOM and lids are normal. Right eye exhibits no discharge. Left eye exhibits no discharge. No scleral icterus.   Neck: Normal range of motion. No thyroid mass present.   Cardiovascular: Normal rate and normal heart sounds. An irregular rhythm present.   No murmur heard.  Pulmonary/Chest: Effort normal. No respiratory distress. He has decreased breath sounds. He has no wheezes. He has no rhonchi. He has no rales.   Abdominal: Soft. He exhibits no distension. Bowel sounds are decreased. There is no tenderness.   Genitourinary:   Genitourinary Comments: deferred   Musculoskeletal: Normal range of motion.        Right ankle: He exhibits swelling.        Left ankle: He exhibits swelling.        Right lower leg: He exhibits edema.        Left lower leg: He exhibits edema.   Mild BLE edema   Lymphadenopathy:        Head (right side): No submandibular, no preauricular and no posterior auricular adenopathy present.        Head (left side): No submandibular, no preauricular and no posterior auricular adenopathy present.        Right cervical: No superficial cervical adenopathy present.       Left cervical: No superficial cervical adenopathy present.   Neurological: He is alert and oriented to person, place, and time.   Skin: Skin is warm, dry and intact.   Psychiatric: His speech is normal and behavior is normal. Thought content normal. His mood  appears anxious.   Vitals reviewed.      Significant Labs:   BMP:   Recent Labs   Lab 11/20/18  1205 11/21/18  0411   * 285*    134*   K 4.9 4.1   CL 95 95   CO2 29 31*   BUN 21 16   CREATININE 0.8 0.8   CALCIUM 9.5 8.9   , CBC:   Recent Labs   Lab 11/20/18  1205 11/21/18  0411   WBC 13.39* 9.19   HGB 9.8* 9.0*   HCT 31.4* 29.6*   * 394*   , CMP:   Recent Labs   Lab 11/20/18  1205 11/21/18  0411    134*   K 4.9 4.1   CL 95 95   CO2 29 31*   * 285*   BUN 21 16   CREATININE 0.8 0.8   CALCIUM 9.5 8.9   PROT 7.0 6.5   ALBUMIN 2.4* 2.2*   BILITOT 0.5 0.4   ALKPHOS 181* 167*   AST 47* 49*   ALT 28 25   ANIONGAP 13 8   EGFRNONAA >60 >60   , LFTs:   Recent Labs   Lab 11/20/18  1205 11/21/18  0411   ALT 28 25   AST 47* 49*   ALKPHOS 181* 167*   BILITOT 0.5 0.4   PROT 7.0 6.5   ALBUMIN 2.4* 2.2*   , Tumor Markers: No results for input(s): PSA, CEA, , AFPTM, PD3226,  in the last 48 hours.    Invalid input(s): ALGTM and All pertinent labs from the last 24 hours have been reviewed.    Diagnostic Results:  I have reviewed all pertinent imaging results/findings within the past 24 hours.   CTA Chest Non-Coronary (PE Study)   Order: [id number]   Status:  Final result   Visible to patient:  No (Not Released)   Next appt:  11/29/2018 at 08:50 AM in Lab ([facility])   Details     Reading Physician Reading Date Result Priority   Jos Brito MD 11/20/2018       Narrative     EXAMINATION:  CTA CHEST NON CORONARY    CLINICAL HISTORY:  Chest pain, acute, PE suspected, high pretest prob;    TECHNIQUE:  The chest was surveyed from the apices through the posterior costophrenic angles after administration of 100 cc of Omni 350 contrast using CT angiography technique.  Data was reconstructed for multiplanar images in axial, sagittal and coronal planes in for maximal intensity projection images in the axial plane.    COMPARISON:  None    FINDINGS:  Base of Neck: No significant  abnormality.    Airways: Patent.    Lungs: Alveolar and interstitial opacities are seen throughout the left lower lobe and lingula and to a lesser degree within the left upper lobe consistent with airspace disease or aspiration.  Multiple pulmonary nodules are seen within the left lower lobe measuring up to 1.2 cm.  Evaluation is limited due to patchy consolidation throughout the left lung.  1.3 cm nodule within the right upper lobe likely reflects metastatic disease..  Patchy infiltrate seen within the right middle lobe.  Mild bronchiectasis and infiltration within the right middle lobe also noted.  Severe emphysematous changes are seen throughout the superior segments of the lower lobes and bilateral upper lobes.    Pleura: No pleural fluid.No pleural calcification.    Roz/Mediastinum: Extensive mediastinal and left hilar adenopathy.  Mass is seen within the AP window extending along the lateral hilum into the left hilar region measuring at least 5.9 x 4.7 cm concerning for metastatic disease.  Enlarged lymph nodes are seen throughout the mediastinum largest is in the precarinal region measuring 2 cm concerning for metastatic disease.  Elevation left hemidiaphragm noted.    Esophagus: Normal.    Heart/pericardium: Normal size.  Lipomatosis hypertrophy of the interatrial septum is noted.  No pericardial effusion or calcification.    Pulmonary vasculature: No evidence of pulmonary embolism.  Pulmonary arteries distribute normally.  There are four pulmonary veins.    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  The aorta maintains normal caliber, contour and course. There is mild-to-moderate calcification of the thoracic aorta.  There is  severe coronary artery calcification.    Thoracic soft tissues: Normal. Both breasts are present.    Bones: Diffuse osteopenia and multiple mixed sclerotic and lytic lesions throughout the thoracic and lumbar skele spine ton largest within the L1 vertebral body  measuring 2.5 x 2.9 cm consistent with metastatic disease.  Bones are diffusely osteopenic which limits evaluation for metastatic disease    Upper Abdomen: Punctate nonobstructing stones within the right kidney.  Indeterminate hypodensities within the kidneys likely reflect small cysts.  Moderate constipation.  Gallbladder is surgically absent.  Indeterminate 5 x 4.3 cm mass within the right hepatic lobe likely reflects metastatic disease.  Nodular thickening of the adrenal glands noted.      Impression       No evidence of pulmonary embolism.    Consolidation throughout the left lung and to a lesser degree within the right middle lobe thought to reflect airspace disease or aspiration.    Large mass within the mediastinum and left hilum as well as several pulmonary nodules concerning for metastatic disease.    Liver lesions concerning for metastatic disease.    Multiple lytic lesions throughout the thoracic skeleton largest within the L1 vertebral body consistent with metastatic disease    Diffuse thickening of the stomach could reflect line and dysplastic of and diffuse infiltrative malignancy.  Recommend endoscopy.    Moderate constipation.    See above for additional findings    All CT scans at this facility use dose modulation, iterative reconstruction and/or weight based dosing when appropriate to reduce radiation dose to as low as reasonably achievable.               Assessment/Plan:     Metastatic left lung cancer (metastasis from lung to other site) with mediastinal lymphadenopathy    11/21/18  --s/p cycle 2 Keytruda.  Keytruda currently on hold.  Patient will follow up outpatient in Heme-Onc clinic with Dr. Mcmanus to discuss further treatment recommendations pending current clinical situation  --patient also receiving palliative radiation to lumbar spine  --continue supportive care     Paroxysmal atrial fibrillation    --continue Eliquis 5 mg p.o. B.i.d.  --continue Cardizem daily  --continue telemetry  monitoring     Pneumonia    11/21/18  --patient afebrile  --continue broad-spectrum IV antibiotics  --continue IV hydration  --encouraged IS  --encouraged ambulation  --awaiting sputum culture  --continue supportive care         Thank you for your consult. I will follow-up with patient. Please contact us if you have any additional questions.    Cindy Stevenson NP  Hematology/Oncology  [facility]

## 2018-11-21 NOTE — NURSING
Chart reviewed for diabetes  patient has been seen by this nurse on previous admit  See note on 10-26-18

## 2018-11-21 NOTE — NURSING
Pt arrived to room 231 from ED via stretcher accompanied by Stacia BAUMANN. Report given at bedside. Ambulated from stretcher to bed with 1 person assist. Cardiac monitor placed. VSS. PIV intact w fluids infusing. NAD noted. Oriented to room/equipment. Fall precautions initiated. Family at bedside

## 2018-11-21 NOTE — PROGRESS NOTES
Genie Akbar NP made aware of pt having pain near lower right lung with pop sound with deep inhalation.

## 2018-11-21 NOTE — HPI
72 y.o. male patient with a hx of DM, HTN, and NSCLC with mets to bone and liver who presented to the ED with c/o worsening SOB, unresponsive to home O2 at 4L per nasal cannula. Of note, patient was seen in ED for similar sxs 1 week ago. He was admitted and discharged 11/18/18. Symptoms are constant and moderate in severity. Worsened with exertion, relieved by nothing.  Associated sxs include cough, weakness, BLE swelling, left sided CP, and back pain. Patient denies any fever, chills, n/v/d, abd pain, weakness, numbness, palpitations, and all other sxs at this time.  CTA of the chest shows alveolar and interstitial opacities are seen throughout the left lower lobe and lingula and to a lesser degree within the left upper lobe consistent with airspace disease or aspiration. Multiple pulmonary nodules are seen within the left lower lobe measuring up to 1.2 cm.  Evaluation is limited due to patchy consolidation throughout the left lung.  1.3 cm nodule within the right upper lobe likely reflects metastatic disease..  Patchy infiltrate seen within the right middle lobe.  Mild bronchiectasis and infiltration within the right middle lobe also noted.  Severe emphysematous changes are seen throughout the superior segments of the lower lobes and bilateral upper lobes.   Troponin negative. Lactic acid normal. UA neg for infection. . BC:NGTD    Patient is s/p cycle 2 Keytruda which was received on 11/08/2018.  Patient is also receiving palliative radiation to lumbar spine

## 2018-11-21 NOTE — SUBJECTIVE & OBJECTIVE
Interval History:  Pt reports pain pleuritic right lower rib cage pain with inspiration. Home pain medications continued. Encouraged use of IS.     Review of Systems   Constitutional: Positive for activity change and fatigue. Negative for appetite change, chills, diaphoresis, fever and unexpected weight change.   HENT: Negative for congestion, mouth sores, nosebleeds, sore throat, trouble swallowing and voice change.    Eyes: Negative for visual disturbance.   Respiratory: Positive for cough and shortness of breath. Negative for apnea, choking, chest tightness, wheezing and stridor.    Cardiovascular: Positive for chest pain and leg swelling. Negative for palpitations.   Gastrointestinal: Negative for abdominal distention, abdominal pain, anal bleeding, blood in stool, constipation, diarrhea, nausea and vomiting.   Genitourinary: Negative for difficulty urinating, dysuria and hematuria.   Musculoskeletal: Positive for arthralgias, back pain and myalgias.   Skin: Negative for pallor and rash.   Allergic/Immunologic: Positive for immunocompromised state.   Neurological: Positive for weakness. Negative for dizziness, syncope, light-headedness, numbness and headaches.   Hematological: Negative for adenopathy. Bruises/bleeds easily.   Psychiatric/Behavioral: The patient is nervous/anxious.      Objective:     Vital Signs (Most Recent):  Temp: 98.3 °F (36.8 °C) (11/21/18 1000)  Pulse: 90 (11/21/18 1314)  Resp: 18 (11/21/18 1314)  BP: (!) 114/57 (11/21/18 1128)  SpO2: (!) 94 % (11/21/18 1314) Vital Signs (24h Range):  Temp:  [98 °F (36.7 °C)-98.9 °F (37.2 °C)] 98.3 °F (36.8 °C)  Pulse:  [] 90  Resp:  [17-29] 18  SpO2:  [90 %-95 %] 94 %  BP: ()/(53-58) 114/57     Weight: 79.6 kg (175 lb 7.8 oz)  Body mass index is 26.68 kg/m².    Intake/Output Summary (Last 24 hours) at 11/21/2018 1504  Last data filed at 11/21/2018 1400  Gross per 24 hour   Intake 2831 ml   Output 525 ml   Net 2306 ml      Physical Exam    Constitutional: He is oriented to person, place, and time. He appears well-developed and well-nourished. He is cooperative. He has a sickly appearance. He appears ill. Nasal cannula in place.   Chronically ill appearing   HENT:   Head: Normocephalic.   Right Ear: Hearing normal. No drainage.   Left Ear: Hearing normal. No drainage.   Eyes: Conjunctivae, EOM and lids are normal. Right eye exhibits no discharge. Left eye exhibits no discharge. No scleral icterus.   Neck: Normal range of motion. No thyroid mass present.   Cardiovascular: Normal rate and normal heart sounds. An irregular rhythm present.   No murmur heard.  Pulmonary/Chest: Effort normal. No respiratory distress. He has decreased breath sounds. He has no wheezes. He has no rhonchi. He has no rales.   With scattered rales   Abdominal: Soft. He exhibits no distension. Bowel sounds are decreased. There is no tenderness.   Genitourinary:   Genitourinary Comments: deferred   Musculoskeletal: Normal range of motion.        Right ankle: He exhibits swelling.        Left ankle: He exhibits swelling.        Right lower leg: He exhibits edema.        Left lower leg: He exhibits edema.   Mild BLE edema   Lymphadenopathy:        Head (right side): No submandibular, no preauricular and no posterior auricular adenopathy present.        Head (left side): No submandibular, no preauricular and no posterior auricular adenopathy present.        Right cervical: No superficial cervical adenopathy present.       Left cervical: No superficial cervical adenopathy present.   Neurological: He is alert and oriented to person, place, and time.   Skin: Skin is warm, dry and intact.   Psychiatric: His speech is normal and behavior is normal. Thought content normal. His mood appears anxious.   Nursing note and vitals reviewed.      Significant Labs:   CBC:   Recent Labs   Lab 11/20/18  1205 11/21/18  0411   WBC 13.39* 9.19   HGB 9.8* 9.0*   HCT 31.4* 29.6*   * 394*     CMP:    Recent Labs   Lab 11/20/18  1205 11/21/18  0411    134*   K 4.9 4.1   CL 95 95   CO2 29 31*   * 285*   BUN 21 16   CREATININE 0.8 0.8   CALCIUM 9.5 8.9   PROT 7.0 6.5   ALBUMIN 2.4* 2.2*   BILITOT 0.5 0.4   ALKPHOS 181* 167*   AST 47* 49*   ALT 28 25   ANIONGAP 13 8   EGFRNONAA >60 >60     All pertinent labs within the past 24 hours have been reviewed.    Significant Imaging: I have reviewed all pertinent imaging results/findings within the past 24 hours.

## 2018-11-21 NOTE — CONSULTS
Clinical Pharmacy Consult Note: Vancomycin Dosing   Date: 11/20/2018      Pharmacy consulted by Derrick Ornelas NP, to dose vancomycin for treatment of pneumonia.   Goal trough: 15-20 mcg/mL    Tmax/24h: 99.6  Estimated Creatinine Clearance: 80.8 mL/min (based on SCr of 0.8 mg/dL).   Lab Results   Component Value Date    BUN 21 11/20/2018      Lab Results   Component Value Date    WBC 13.39 (H) 11/20/2018      Microbiology Results (last 7 days)       Procedure Component Value Units Date/Time    Culture, Respiratory with Gram Stain [[id number]]     Order Status:  No result Specimen:  Respiratory from Sputum, Expectorated     Blood culture #2 [[id number]] Collected:  11/20/18 1225    Order Status:  Sent Specimen:  Blood from Peripheral, Forearm, Left Updated:  11/20/18 1228    Blood culture #1 [[id number]] Collected:  11/20/18 1205    Order Status:  Sent Specimen:  Blood from Peripheral, Hand, Right Updated:  11/20/18 1208           Quinton BW: 68.4 Kg  Actual BW: 81.2 Kg <---Will use for dosing weight.     Antibiotics (From admission, onward)      Start     Stop Route Frequency Ordered    11/21/18 0345  piperacillin-tazobactam 4.5 g in dextrose 5 % 100 mL IVPB (ready to mix system)      -- IV Every 8 hours (non-standard times) 11/20/18 1736    11/20/18 1742  vancomycin (VANCOCIN) 1,250 mg in dextrose 5 % 250 mL IVPB      -- IV Once 11/20/18 1742    11/20/18 1737  piperacillin-tazobactam 4.5 g in dextrose 5 % 100 mL IVPB (ready to mix system)      -- IV Once 11/20/18 1737          Plan: Based on Estimated Creatinine Clearance: 80.8 mL/min (based on SCr of 0.8 mg/dL). and weight of 81.2 Kg, pharmacy will initiate vancomycin 1250 mg x 1 dose followed by 1000 mg IV every 12 hours and draw a level prior to the 4th total dose.  Because of patient's age and septic presentation along with coadmin of Zosyn, will dose more conservative dose amount with a more frequent interval.    Vancomycin trough due 11/22/18 at 0500.      Pharmacy will continue to follow patients clinical status, renal function, C&S, and adjust dose as necessary to maintain trough levels between 15 and 20 mcg/mL.     Thank you for allowing us to participate in this patient's care.     Helen Vega   11/20/2018 6:14 PM

## 2018-11-21 NOTE — PLAN OF CARE
Problem: Patient Care Overview  Goal: Plan of Care Review  Outcome: Ongoing (interventions implemented as appropriate)  Recommendation/Intervention: 1. Continue current cardiac diabetic diet. 2. Add Boost Glucose Control- chocolate TID. 3. Will continue to monitor.   Goals: Meet >85% nutrition needs while admitted  Nutrition Goal Status: new  Communication of RD Recs: (POCs, sticky note)

## 2018-11-21 NOTE — ASSESSMENT & PLAN NOTE
11/21/18  --s/p cycle 2 Keytruda.  Keytruda currently on hold.  Patient will follow up outpatient in Heme-Onc clinic with Dr. Mcmanus to discuss further treatment recommendations pending current clinical situation  --patient also receiving palliative radiation to lumbar spine  --continue supportive care

## 2018-11-21 NOTE — PROGRESS NOTES
"  [facility] -   Adult Nutrition  Consult Note    SUMMARY     Recommendations    Recommendation/Intervention: 1. Continue current cardiac diabetic diet. 2. Add Boot Glucose Control TID. 3. Provided spouse with cardiac diabetic restrictions. 4. Will continue to monitor.   Goals: Meet >85% needs while admitted  Nutrition Goal Status: new  Communication of RD Recs: (POCs, sticky note)    Reason for Assessment    Reason for Assessment: identified at risk by screening criteria   Dx:  1. 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  Others are too small to accurately characterize.    Lung disease     Nephrolithiasis     ct urogram 12/15/2017-Bilateral nephrolithiasis.     Neuropathy, diabetic     Pancreatitis     Respiratory failure     Stage 4 lung cancer     Type 1 diabetes mellitus with diabetic neuropathy 4/27/2018       Nutrition Discharge Planning: Cardiac Diabetic     Nutrition Risk Screen    Nutrition Risk Screen: no indicators present    Nutrition/Diet History    Do you have any cultural, spiritual, Advent conflicts, given your current situation?: none    Anthropometrics    Temp: 98.3 °F (36.8 °C)  Height Method: Stated  Height: 5' 8" (172.7 cm)  Height (inches): 68 in  Weight Method: Bed Scale  Weight: 79.6 kg (175 lb 7.8 oz)  Weight (lb): 175.49 lb  Ideal Body Weight (IBW), Male: 154 lb  % Ideal Body Weight, Male (lb): 113.95 lb  BMI (Calculated): 26.7  BMI Grade: 25 - 29.9 - overweight  Weight Loss: unintentional       Lab/Procedures/Meds    Pertinent Labs Reviewed: reviewed  BMP  Lab Results   Component Value " Date     (L) 11/21/2018    K 4.1 11/21/2018    CL 95 11/21/2018    CO2 31 (H) 11/21/2018    BUN 16 11/21/2018    CREATININE 0.8 11/21/2018    CALCIUM 8.9 11/21/2018    ANIONGAP 8 11/21/2018    ESTGFRAFRICA >60 11/21/2018    EGFRNONAA >60 11/21/2018     Lab Results   Component Value Date    ALBUMIN 2.2 (L) 11/21/2018     Lab Results   Component Value Date    CALCIUM 8.9 11/21/2018    PHOS 3.8 11/16/2018     Lab Results   Component Value Date    HGBA1C 7.7 (H) 11/14/2018     Recent Labs   Lab 11/21/18  1055   POCTGLUCOSE 193*       Pertinent Medications Reviewed: reviewed    Physical Findings/Assessment    Overall Physical Appearance: overweight  Oral/Mouth Cavity: WDL  Skin: intact   Skin: Stephon score=17    Estimated/Assessed Needs    Weight Used For Calorie Calculations: 79.6 kg (175 lb 7.8 oz)  Energy Calorie Requirements (kcal): 1824  Energy Need Method: Gibson-St Jeor (x1.2)  Protein Requirements:  g  Weight Used For Protein Calculations: 79.6 kg (175 lb 7.8 oz)     Fluid Need Method: RDA Method  RDA Method (mL): 1824  CHO Requirement: 50%EEN      Nutrition Prescription Ordered    Current Diet Order: cardiac diabetic     Evaluation of Received Nutrient/Fluid Intake       % Intake of Estimated Energy Needs: 25 - 50 %  % Meal Intake: 25 - 50 %    Nutrition Risk    Level of Risk/Frequency of Follow-up: (2xweekly)     Assessment and Plan    Nutrition Problem  In[place]qaute energy intake    Related to (etiology):   Decreased appetite     Signs and Symptoms (as evidenced by):   PO~50%    Interventions/Recommendations (treatment strategy):  See Above    Nutrition Diagnosis Status:   New         Monitor and Evaluation    Food and Nutrient Intake: energy intake, food and beverage intake  Food and Nutrient Adminstration: diet order  Knowledge/Beliefs/Attitudes: food and nutrition knowledge/skill  Anthropometric Measurements: weight  Biochemical Data, Medical Tests and Procedures: glucose/endocrine  profile  Nutrition-Focused Physical Findings: overall appearance     Nutrition Follow-Up    RD Follow-up?: Yes

## 2018-11-21 NOTE — ASSESSMENT & PLAN NOTE
BC x 2  - NGTD  Sputum culture   Azithromycin and Rocephin given in ED  Will continue with Vancomycin, Cipro and Zosyn   Incentive spirometer   O 2 4L, keep sats > 92 %  DuoNebs

## 2018-11-21 NOTE — ASSESSMENT & PLAN NOTE
11/21/18  --patient afebrile  --continue broad-spectrum IV antibiotics  --continue IV hydration  --encouraged IS  --encouraged ambulation  --awaiting sputum culture  --continue supportive care

## 2018-11-21 NOTE — ASSESSMENT & PLAN NOTE
BC X 2 - no growth to date  Sputum culture - pending  Azithromycin and Rocephin given in ED >>>> ABX changed to Vanco, Zosyn and Cipro  Lactic acid was normal  B/P was appropriate

## 2018-11-21 NOTE — PROGRESS NOTES
[facility] -   Adult Nutrition  Consult Note    SUMMARY     I certify that I directed the dietetic intern in service delivery and guided them using my skilled judgment. As the cosigning dietitian, I have reviewed the dietetic interns documentation and am responsible for the treatment, assessment, and plan.  -Maureen Curry, MS, RD, LDN    Recommendations    Recommendation/Intervention: 1. Continue current cardiac diabetic diet. 2. Add Boost Glucose Control- chocolate TID. 3. Will continue to monitor.   Goals: Meet >85% nutrition needs while admitted  Nutrition Goal Status: new  Communication of RD Recs: (POCs, sticky note)    Reason for Assessment    Reason for Assessment: identified at risk by screening criteria.   Dx:  1. 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  Others are too small to accurately characterize.    Lung disease     Nephrolithiasis     ct urogram 12/15/2017-Bilateral nephrolithiasis.     Neuropathy, diabetic     Pancreatitis     Respiratory failure     Stage 4 lung cancer     Type 1 diabetes mellitus with diabetic neuropathy 4/27/2018       General Information Comments: Pt seen by intern for MST Score=3. Daughter reported wt loss and decreased appetite of pt. Per [facility] records pt lost 10 lbs within 3 months =4% of wt loss. PO intake this A.M. (50%). Performed NFPE (11.21.18): no significant muscle wasting/fat depletion noted. Daughter mentioned that pt has followed a diabetic cardiac diet before and is compliant with restrictions. Daughter requested Boost Glucose Control, chocolate.   Nutrition  "Discharge Planning: Cardiac Diabetic     Nutrition Risk Screen    Nutrition Risk Screen: no indicators present    Nutrition/Diet History    Do you have any cultural, spiritual, Ep[employer]op[employer] conflicts, given your current situation?: none    Anthropometrics    Temp: 98.3 °F (36.8 °C)  Height Method: Stated  Height: 5' 8" (172.7 cm)  Height (inches): 68 in  Weight Method: Bed Scale  Weight: 79.6 kg (175 lb 7.8 oz)  Weight (lb): 175.49 lb  Ideal Body Weight (IBW), Male: 154 lb  % Ideal Body Weight, Male (lb): 113.95 lb  BMI (Calculated): 26.7  BMI Grade: 25 - 29.9 - overweight  Weight Loss: unintentional       Lab/Procedures/Meds    Pertinent Labs Reviewed: reviewed  BMP  Lab Results   Component Value Date     (L) 11/21/2018    K 4.1 11/21/2018    CL 95 11/21/2018    CO2 31 (H) 11/21/2018    BUN 16 11/21/2018    CREATININE 0.8 11/21/2018    CALCIUM 8.9 11/21/2018    ANIONGAP 8 11/21/2018    ESTGFRAFRICA >60 11/21/2018    EGFRNONAA >60 11/21/2018     Lab Results   Component Value Date    CALCIUM 8.9 11/21/2018    PHOS 3.8 11/16/2018     Lab Results   Component Value Date    ALBUMIN 2.2 (L) 11/21/2018     Lab Results   Component Value Date    HGBA1C 7.7 (H) 11/14/2018     Recent Labs   Lab 11/21/18  1055   POCTGLUCOSE 193*       Pertinent Medications Reviewed: reviewed    Physical Findings/Assessment    Overall Physical Appearance: overweight  Oral/Mouth Cavity: WDL  Skin: intact    Estimated/Assessed Needs    Weight Used For Calorie Calculations: 79.6 kg (175 lb 7.8 oz)  Energy Calorie Requirements (kcal): 2371 (x1.3)  Energy Need Method: [place]- Tu  Protein Requirements:  g  Weight Used For Protein Calculations: 79.6 kg (175 lb 7.8 oz)     Fluid Need Method: RDA Method  RDA Method (mL): 2371  CHO Requirement: 50%EEN      Nutrition Prescription Ordered    Current Diet Order: cardiac diabetic     Evaluation of Received Nutrient/Fluid Intake          Intake/Output Summary (Last 24 hours) at 11/21/2018 " 1213  Last data filed at 11/21/2018 0856  Gross per 24 hour   Intake 2086 ml   Output 525 ml   Net 1561 ml       % Intake of Estimated Energy Needs: 25 - 50 %  % Meal Intake: 25 - 50 %    Nutrition Risk    Level of Risk/Frequency of Follow-up: (2xweekly)     Assessment and Plan    Nutrition Problem  Inadequate energy intake     Related to (etiology):   Decreased appetite     Signs and Symptoms (as evidenced by):   Energy intake <50% of EEN     Interventions/Recommendations (treatment strategy):  See Above     Nutrition Diagnosis Status:   New       Monitor and Evaluation    Food and Nutrient Intake: energy intake, food and beverage intake  Food and Nutrient Adminstration: diet order  Knowledge/Beliefs/Attitudes: food and nutrition knowledge/skill  Anthropometric Measurements: weight  Biochemical Data, Medical Tests and Procedures: glucose/endocrine profile  Nutrition-Focused Physical Findings: overall appearance     Nutrition Follow-Up    RD Follow-up?: Yes

## 2018-11-21 NOTE — H&P
[facility] Hospital Medicine  History & Physical    Patient Name: Nico Garza Jr.  MRN: [id number]  Admission Date: 11/20/2018  Attending Physician: Homa Albert MD   Primary Care Provider: Tiffany Davis DO         Patient information was obtained from patient, spouse/SO, relative(s) and ER records.     Subjective:     Principal Problem:Sepsis    Chief Complaint:   Chief Complaint   Patient presents with    Shortness of Breath        HPI: Mr Garza is a 72 year old male with PMHx of PAF, HTN, HLD, DM, NSCLC with mets to bone and liver s/p radiation currently receiving Keytruda and COPD on home O2 at 4-5 L. He  presented to [facility] Emergency Room with complaints of increase shortness of  Breath. Associated symptoms in include sudden onset in increase weakness and fatigue. Denies associated symptoms of chest pain, palpitations, fever, chills, nausea, vomiting or diaphoresis. CTA of the chest shows alveolar and interstitial opacities are seen throughout the left lower lobe and lingula and to a lesser degree within the left upper lobe consistent with airspace disease or aspiration. Multiple pulmonary nodules are seen within the left lower lobe measuring up to 1.2 cm.  Evaluation is limited due to patchy consolidation throughout the left lung.  1.3 cm nodule within the right upper lobe likely reflects metastatic disease..  Patchy infiltrate seen within the right middle lobe.  Mild bronchiectasis and infiltration within the right middle lobe also noted.  Severe emphysematous changes are seen throughout the superior segments of the lower lobes and bilateral upper lobes.   Troponin negative. Lactic acid normal. Code status reviewed with patient and family, he wishes code status of DNR.     Past Medical History:   Diagnosis Date    Arthritis     Atrial fibrillation and flutter     Atrial flutter     Cancer     LUNG    COPD (chronic obstructive pulmonary disease)     COPD (chronic obstructive  pulmonary disease) with emphysema 11/18/2013    DM (diabetes mellitus) 1996    Hyperlipidemia     Hypertension     Kidney cysts     ct urogram 12/15/2017-2 cysts within the left kidney.  Others are too small to accurately characterize.    Lung disease     Nephrolithiasis     ct urogram 12/15/2017-Bilateral nephrolithiasis.     Neuropathy, diabetic     Pancreatitis     Respiratory failure     Stage 4 lung cancer     Type 1 diabetes mellitus with diabetic neuropathy 4/27/2018       Past Surgical History:   Procedure Laterality Date    ABLATION N/A 12/4/2017    Performed by Jaret Freire MD at [facility] CATH LAB    BICEPS TENDON REPAIR Right     BRONCHOSCOPY Bilateral 9/2/2018    Procedure: BRONCHOSCOPY- insert lighted tube into airway to obtain biopsy of lung;  Surgeon: Aldair Deleon MD;  Location: [facility] ENDO;  Service: Endoscopy;  Laterality: Bilateral;    BRONCHOSCOPY- insert lighted tube into airway to obtain biopsy of lung Bilateral 9/2/2018    Performed by Aldair Deleon [facility] at [facility]    CARDIOVERSION      CHOLECYSTECTOMY      INSERTION OF TUNNELED CENTRAL VENOUS CATHETER (CVC) WITH SUBCUTANEOUS PORT Right 10/9/2018    Procedure: [id number];  Surgeon: Christophe Brandt MD;  Location: [facility] OR;  Service: General;  Laterality: Right;    MIYRIOTYE-VGCZ-V-CATH Right 10/9/2018    Performed by Christ[facility]t [facility] at [facility]    PATELLA SURGERY Right     RADIOFREQUENCY ABLATION      ROTATOR CUFF REPAIR Left     TRANSESOPHAGEAL ECHOCARDIOGRAM (DILLAN) N/A 12/4/2017    Performed by J[facility] Freire MD at [facility] CATH LAB    ULTRASOUND GUIDANCE Right 10/9/2018    Procedure: ULTRASOUND GUIDANCE;  Surgeon: Christophe Brandt MD;  Location: [facility] OR;  Service: General;  Laterality: Right;    ULTRASOUND GUIDANCE Right 10/9/2018    Performed by Christophe Brandt MD at [facility]       Review of patient's allergies indicates:   Allergen Reactions    Anoro ellipta [umeclidinium-vilanterol] Shortness Of Breath     "Flomax [tamsulosin]      Dizzy         No current facility-administered medications on file prior to encounter.      Current Outpatient Medications on File Prior to Encounter   Medication Sig    ascorbic acid, vitamin C, (VITAMIN C) 1000 MG tablet Take 1,000 mg by mouth once daily.    b complex vitamins tablet Take 1 tablet by mouth once daily.    blood sugar diagnostic Strp 1 each by Misc.(Non-Drug; Combo Route) route 3 (three) times daily. Dispense preferred on insurance    blood-glucose meter kit Use as instructed - preferred on insurance    clonazePAM (KLONOPIN) 0.5 MG tablet Take 1 tablet (0.5 mg total) by mouth 2 (two) times daily as needed for Anxiety.    diltiaZEM (CARDIZEM CD) 360 MG 24 hr capsule TAKE 1 CAPSULE(360 MG) BY MOUTH EVERY DAY    docusate sodium (COLACE) 100 MG capsule Take 1 capsule (100 mg total) by mouth 2 (two) times daily.    ELIQUIS 5 mg Tab TAKE 1 TABLET(5 MG) BY MOUTH TWICE DAILY    fentaNYL (DURAGESIC) 50 mcg/hr Place 1 patch onto the skin every 72 hours.    flecainide (TAMBOCOR) 100 MG Tab Take 1 tablet (100 mg total) by mouth every 12 (twelve) hours.    gabapentin (NEURONTIN) 300 MG capsule Take 2 capsules (600 mg total) by mouth 2 (two) times daily. (Patient taking differently: Take 600 mg by mouth every evening. Takes two tablets by mouth at night)    insulin degludec (TRESIBA FLEXTOUCH U-200) 200 unit/mL (3 mL) InPn Inject 50 Units into the skin once daily.    insulin lispro (HUMALOG KWIKPEN) 100 unit/mL InPn pen ADMINISTER 17 UNITS UNDER THE SKIN THREE TIMES DAILY BEFORE MEALS (Patient taking differently: ADMINISTER 17 UNITS UNDER THE SKIN IN MORNING AND 25 UNITS NOON AND NIGHT)    insulin needles, disposable, 32 x 1/4 " Ndle Insulin injections four times per day.    magnesium citrate solution Take 296 mLs by mouth daily as needed.    multivitamin capsule Take 1 capsule by mouth once daily.    nystatin (MYCOSTATIN) 100,000 unit/mL suspension Take 5 mLs (500,000 " Units total) by mouth 4 (four) times daily.    omeprazole (PRILOSEC) 20 MG capsule Take 1 capsule (20 mg total) by mouth once daily.    ondansetron (ZOFRAN-ODT) 8 MG TbDL Take 1 tablet (8 mg total) by mouth every 8 (eight) hours as needed.    oxyCODONE (ROXICODONE) 20 mg Tab immediate release tablet Take 1 tablet (20 mg total) by mouth every 4 (four) hours as needed (moderate to severe pain).    polyethylene glycol (GLYCOLAX) 17 gram/dose powder Take 17 g by mouth once daily.    pravastatin (PRAVACHOL) 40 MG tablet TAKE 1 TABLET DAILY (Patient taking differently: TAKE 1 TABLET NIGHTLY)    prochlorperazine (COMPAZINE) 10 MG tablet TAKE 1 TABLET BY MOUTH EVERY 6 HOURS AS NEEDED.    sertraline (ZOLOFT) 50 MG tablet Take 1 tablet (50 mg total) by mouth once daily.    tiotropium bromide (SPIRIVA RESPIMAT) 2.5 mcg/actuation Mist Inhale 2 puffs into the lungs once daily. Controller    vitamin D 1000 units Tab Take 1,000 Units by mouth once daily.    albuterol-ipratropium (DUO-NEB) 2.5 mg-0.5 mg/3 mL nebulizer solution Take 3 mLs by nebulization every 6 (six) hours. Rescue    NEEDLES, DISPOSABLE (DISPOSABLE NEEDLES MISC) Inject 1 each into the skin 3 (three) times daily.     Family History     Problem Relation (Age of Onset)    Cancer Maternal Aunt    Cataracts Sister    Diabetes Mother, Sister, Sister, Sister    Heart attack Brother    Heart failure Brother    Hypertension Mother, Father    Stroke Mother, Father, Paternal Grandmother, Paternal Grandfather        Tobacco Use    Smoking status: Former Smoker     Packs/day: 0.50     Types: Cigarettes     Start date: 1/1/1960     Last attempt to quit: 3/8/2015     Years since quitting: 3.7    Smokeless tobacco: Former User     Types: Snuff     Quit date: 10/7/1995   Substance and Sexual Activity    Alcohol use: No     Alcohol/week: 0.0 oz    Drug use: No    Sexual activity: Not on file     Review of Systems   Constitutional: Positive for fatigue. Negative for  chills, diaphoresis and fever.   HENT: Negative for congestion, ear discharge, rhinorrhea, sinus pressure, sore throat and trouble swallowing.    Eyes: Negative for discharge and visual disturbance.   Respiratory: Positive for cough and shortness of breath. Negative for apnea, choking, chest tightness, wheezing and stridor.    Cardiovascular: Negative for chest pain, palpitations and leg swelling.   Gastrointestinal: Negative for abdominal distention, abdominal pain, diarrhea, nausea and vomiting.   Endocrine: Negative for cold intolerance and heat intolerance.   Genitourinary: Negative for dysuria, frequency and hematuria.   Musculoskeletal: Negative for arthralgias, back pain, myalgias and neck pain.   Skin: Negative for pallor and rash.   Neurological: Positive for weakness. Negative for dizziness, seizures, syncope and headaches.   Psychiatric/Behavioral: Negative for agitation, confusion and sleep disturbance.     Objective:     Vital Signs (Most Recent):  Temp: 98.4 °F (36.9 °C) (11/20/18 1713)  Pulse: 98 (11/20/18 1659)  Resp: (!) 29 (11/20/18 1659)  BP: (!) 110/54 (11/20/18 1659)  SpO2: (!) 93 % (11/20/18 1659) Vital Signs (24h Range):  Temp:  [98.4 °F (36.9 °C)-99.6 °F (37.6 °C)] 98.4 °F (36.9 °C)  Pulse:  [] 98  Resp:  [20-31] 29  SpO2:  [85 %-98 %] 93 %  BP: (109-148)/(52-67) 110/54     Weight: 81.2 kg (179 lb)  Body mass index is 27.22 kg/m².    Physical Exam   Constitutional: He is oriented to person, place, and time. He has a sickly appearance. He appears ill. No distress.   Eyes: Right eye exhibits no discharge. Left eye exhibits no discharge.   Neck: No JVD present.   Cardiovascular: An irregular rhythm present. Exam reveals no gallop and no friction rub.   No murmur heard.  Pulmonary/Chest: No stridor. No respiratory distress. He has decreased breath sounds in the right lower field, the left middle field and the left lower field. He has no wheezes. He has no rales. He exhibits no tenderness.    Abdominal: Soft. Bowel sounds are normal. He exhibits no distension and no mass. There is no tenderness. There is no rebound and no guarding. No hernia.   Musculoskeletal: He exhibits no edema or deformity.   Neurological: He is alert and oriented to person, place, and time.   Skin: Skin is warm and dry. Capillary refill takes 2 to 3 seconds. He is not diaphoretic.   Psychiatric: He has a normal mood and affect.   Nursing note and vitals reviewed.          Significant Labs:   CBC:   Recent Labs   Lab 11/20/18  1205   WBC 13.39*   HGB 9.8*   HCT 31.4*   *     CMP:   Recent Labs   Lab 11/20/18  1205      K 4.9   CL 95   CO2 29   *   BUN 21   CREATININE 0.8   CALCIUM 9.5   PROT 7.0   ALBUMIN 2.4*   BILITOT 0.5   ALKPHOS 181*   AST 47*   ALT 28   ANIONGAP 13   EGFRNONAA >60     Cardiac Markers:   Recent Labs   Lab 11/20/18  1205   *     Lactic Acid:   Recent Labs   Lab 11/20/18  1205   LACTATE 1.4     Troponin:   Recent Labs   Lab 11/20/18  1205   TROPONINI <0.006       Significant Imaging:     Imaging Results          CTA Chest Non-Coronary (PE Study) (Final result)  Result time 11/20/18 15:02:47    Final result by Jos Brito MD (11/20/18 15:02:47)                 Impression:      No evidence of pulmonary embolism.    Consolidation throughout the left lung and to a lesser degree within the right middle lobe thought to reflect airspace disease or aspiration.    Large mass within the mediastinum and left hilum as well as several pulmonary nodules concerning for metastatic disease.    Liver lesions concerning for metastatic disease.    Multiple lytic lesions throughout the thoracic skeleton largest within the L1 vertebral body consistent with metastatic disease    Diffuse thickening of the stomach could reflect line and dysplastic of and diffuse infiltrative malignancy.  Recommend endoscopy.    Moderate constipation.    See above for additional findings    All CT scans at this facility  use dose modulation, iterative reconstruction and/or weight based dosing when appropriate to reduce radiation dose to as low as reasonably achievable.      Electronically signed by: Jos Brito MD  Date:    11/20/2018  Time:    15:02             Narrative:    EXAMINATION:  CTA CHEST NON CORONARY    CLINICAL HISTORY:  Chest pain, acute, PE suspected, high pretest prob;    TECHNIQUE:  The chest was surveyed from the apices through the posterior costophrenic angles after administration of 100 cc of Omni 350 contrast using CT angiography technique.  Data was reconstructed for multiplanar images in axial, sagittal and coronal planes in for maximal intensity projection images in the axial plane.    COMPARISON:  None    FINDINGS:  Base of Neck: No significant abnormality.    Airways: Patent.    Lungs: Alveolar and interstitial opacities are seen throughout the left lower lobe and lingula and to a lesser degree within the left upper lobe consistent with airspace disease or aspiration.  Multiple pulmonary nodules are seen within the left lower lobe measuring up to 1.2 cm.  Evaluation is limited due to patchy consolidation throughout the left lung.  1.3 cm nodule within the right upper lobe likely reflects metastatic disease..  Patchy infiltrate seen within the right middle lobe.  Mild bronchiectasis and infiltration within the right middle lobe also noted.  Severe emphysematous changes are seen throughout the superior segments of the lower lobes and bilateral upper lobes.    Pleura: No pleural fluid.No pleural calcification.    Roz/Mediastinum: Extensive mediastinal and left hilar adenopathy.  Mass is seen within the AP window extending along the lateral hilum into the left hilar region measuring at least 5.9 x 4.7 cm concerning for metastatic disease.  Enlarged lymph nodes are seen throughout the mediastinum largest is in the precarinal region measuring 2 cm concerning for metastatic disease.  Elevation left  hemidiaphragm noted.    Esophagus: Normal.    Heart/pericardium: Normal size.  Lipomatosis hypertrophy of the interatrial septum is noted.  No pericardial effusion or calcification.    Pulmonary vasculature: No evidence of pulmonary embolism.  Pulmonary arteries distribute normally.  There are four pulmonary veins.    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  The aorta maintains normal caliber, contour and course. There is mild-to-moderate calcification of the thoracic aorta.  There is  severe coronary artery calcification.    Thoracic soft tissues: Normal. Both breasts are present.    Bones: Diffuse osteopenia and multiple mixed sclerotic and lytic lesions throughout the thoracic and lumbar skele spine ton largest within the L1 vertebral body measuring 2.5 x 2.9 cm consistent with metastatic disease.  Bones are diffusely osteopenic which limits evaluation for metastatic disease    Upper Abdomen: Punctate nonobstructing stones within the right kidney.  Indeterminate hypodensities within the kidneys likely reflect small cysts.  Moderate constipation.  Gallbladder is surgically absent.  Indeterminate 5 x 4.3 cm mass within the right hepatic lobe likely reflects metastatic disease.  Nodular thickening of the adrenal glands noted.                               X-Ray Chest AP Portable (Final result)  Result time 11/20/18 13:35:12    Final result by LITZY Skinner Sr., MD (11/20/18 13:35:12)                 Impression:      1. There has been interval worsening of the appearance of the lungs.  There is a moderate amount of haziness in the base of the left lung. There is a mild amount of haziness in the lower half of the right lung.  2. There is persistent marked elevation of the left hemidiaphragm.  This is consistent with a phrenic nerve injury.  3. There is blunting of the left costophrenic angle.  This is characteristic of a pleural adhesion or a tiny pleural effusion.  4. A right subclavian venous line  remains in place.  This is consistent with the patient's history of metastatic disease.  .      Electronically signed by: eTe Skinner MD  Date:    11/20/2018  Time:    13:35             Narrative:    EXAMINATION:  XR CHEST AP PORTABLE    CLINICAL HISTORY:  Chest Pain; metastatic disease    COMPARISON:  A right subclavian venous line remains in place.    FINDINGS:  The borders of the heart are not well seen.  There has been interval worsening of the appearance of the lungs.  There is a moderate amount of haziness in the base of the left lung.  There is a mild amount of haziness in the lower half of the right lung.  There is persistent marked elevation of the left hemidiaphragm. There is blunting of the left costophrenic angle.  The right costophrenic angle is sharp.  There is no pneumothorax.  A right subclavian venous line remains in place.                              Assessment/Plan:     * Sepsis    Admit to Inpatient   BC X 2   Sputum culpture  Azithromycin and Rocephin given in ED  ABG            Pneumonia    BC x 2   Sputum culture   Azithromycin and Rocephin given in ED  Will continue with Vancomycin and Zosyn   Incentive spirometer   O 2 4L, keep sats > 92 %     Paroxysmal atrial fibrillation    Telemetry monitor   Eliquis 5 mg BID  Flecainide   Diltiazem 360 mg PO, hold for SBP < 90        COPD, group D, by GOLD 2017 classification    Pt on home O 2 at 4 L   Keep sats > 90 %   ABG   Dub neb          Metastatic left lung cancer (metastasis from lung to other site) with mediastinal lymphadenopathy     NSCLC with mets to bone and liver s/p radiation currently receiving Keytruda  Restart home pain medications   Consult Hematology/Oncology        Severe malnutrition    Continue home vitamin supplements      Type 1 diabetes mellitus with diabetic neuropathy    Accu checks ACHS   Low dose SS   Diabetic diet      Hyperlipidemia LDL goal <70    Continue pravastatin        VTE Risk Mitigation (From admission,  onward)        Ordered     apixaban tablet 5 mg  2 times daily      11/20/18 0541             Derrick Ornelas NP  Department of Hospital Medicine   [facility]

## 2018-11-22 PROBLEM — J96.21 ACUTE ON CHRONIC RESPIRATORY FAILURE WITH HYPOXIA: Status: ACTIVE | Noted: 2018-08-31

## 2018-11-22 LAB
ALBUMIN SERPL BCP-MCNC: 2.4 G/DL
ALP SERPL-CCNC: 168 U/L
ALT SERPL W/O P-5'-P-CCNC: 26 U/L
ANION GAP SERPL CALC-SCNC: 11 MMOL/L
AST SERPL-CCNC: 31 U/L
BASOPHILS # BLD AUTO: 0.02 K/UL
BASOPHILS NFR BLD: 0.2 %
BILIRUB SERPL-MCNC: 0.4 MG/DL
BUN SERPL-MCNC: 12 MG/DL
CALCIUM SERPL-MCNC: 9 MG/DL
CHLORIDE SERPL-SCNC: 92 MMOL/L
CO2 SERPL-SCNC: 28 MMOL/L
CREAT SERPL-MCNC: 0.8 MG/DL
DIFFERENTIAL METHOD: ABNORMAL
EOSINOPHIL # BLD AUTO: 0.1 K/UL
EOSINOPHIL NFR BLD: 1.2 %
ERYTHROCYTE [DISTWIDTH] IN BLOOD BY AUTOMATED COUNT: 16.6 %
EST. GFR  (AFRICAN AMERICAN): >60 ML/MIN/1.73 M^2
EST. GFR  (NON AFRICAN AMERICAN): >60 ML/MIN/1.73 M^2
GLUCOSE SERPL-MCNC: 288 MG/DL
HCT VFR BLD AUTO: 29.4 %
HGB BLD-MCNC: 9.1 G/DL
LYMPHOCYTES # BLD AUTO: 0.4 K/UL
LYMPHOCYTES NFR BLD: 5.1 %
MCH RBC QN AUTO: 26.4 PG
MCHC RBC AUTO-ENTMCNC: 31 G/DL
MCV RBC AUTO: 85 FL
MONOCYTES # BLD AUTO: 0.6 K/UL
MONOCYTES NFR BLD: 7.3 %
NEUTROPHILS # BLD AUTO: 7.5 K/UL
NEUTROPHILS NFR BLD: 86.2 %
PLATELET # BLD AUTO: 375 K/UL
PMV BLD AUTO: 8.9 FL
POCT GLUCOSE: 177 MG/DL (ref 70–110)
POCT GLUCOSE: 225 MG/DL (ref 70–110)
POCT GLUCOSE: 237 MG/DL (ref 70–110)
POCT GLUCOSE: 253 MG/DL (ref 70–110)
POTASSIUM SERPL-SCNC: 4.1 MMOL/L
PROT SERPL-MCNC: 7 G/DL
RBC # BLD AUTO: 3.45 M/UL
SODIUM SERPL-SCNC: 131 MMOL/L
VANCOMYCIN TROUGH SERPL-MCNC: 11.8 UG/ML
WBC # BLD AUTO: 8.67 K/UL

## 2018-11-22 PROCEDURE — 85025 COMPLETE CBC W/AUTO DIFF WBC: CPT

## 2018-11-22 PROCEDURE — 25000242 PHARM REV CODE 250 ALT 637 W/ HCPCS: Performed by: NURSE PRACTITIONER

## 2018-11-22 PROCEDURE — 27000221 HC OXYGEN, UP TO 24 HOURS

## 2018-11-22 PROCEDURE — 87070 CULTURE OTHR SPECIMN AEROBIC: CPT

## 2018-11-22 PROCEDURE — 63600175 PHARM REV CODE 636 W HCPCS: Performed by: NURSE PRACTITIONER

## 2018-11-22 PROCEDURE — 25000003 PHARM REV CODE 250: Performed by: EMERGENCY MEDICINE

## 2018-11-22 PROCEDURE — 63600175 PHARM REV CODE 636 W HCPCS: Performed by: EMERGENCY MEDICINE

## 2018-11-22 PROCEDURE — 99223 1ST HOSP IP/OBS HIGH 75: CPT | Mod: ,,, | Performed by: INTERNAL MEDICINE

## 2018-11-22 PROCEDURE — 94799 UNLISTED PULMONARY SVC/PX: CPT

## 2018-11-22 PROCEDURE — 21400001 HC TELEMETRY ROOM

## 2018-11-22 PROCEDURE — 80202 ASSAY OF VANCOMYCIN: CPT

## 2018-11-22 PROCEDURE — 94640 AIRWAY INHALATION TREATMENT: CPT

## 2018-11-22 PROCEDURE — 63600175 PHARM REV CODE 636 W HCPCS: Performed by: INTERNAL MEDICINE

## 2018-11-22 PROCEDURE — 25000003 PHARM REV CODE 250: Performed by: NURSE PRACTITIONER

## 2018-11-22 PROCEDURE — 99233 SBSQ HOSP IP/OBS HIGH 50: CPT | Mod: ,,, | Performed by: INTERNAL MEDICINE

## 2018-11-22 PROCEDURE — 94761 N-INVAS EAR/PLS OXIMETRY MLT: CPT

## 2018-11-22 PROCEDURE — 36415 COLL VENOUS BLD VENIPUNCTURE: CPT

## 2018-11-22 PROCEDURE — 80053 COMPREHEN METABOLIC PANEL: CPT

## 2018-11-22 PROCEDURE — 87205 SMEAR GRAM STAIN: CPT

## 2018-11-22 RX ORDER — METHYLPREDNISOLONE SOD SUCC 125 MG
80 VIAL (EA) INJECTION EVERY 6 HOURS
Status: DISCONTINUED | OUTPATIENT
Start: 2018-11-22 | End: 2018-11-23 | Stop reason: HOSPADM

## 2018-11-22 RX ADMIN — SERTRALINE HYDROCHLORIDE 50 MG: 50 TABLET ORAL at 08:11

## 2018-11-22 RX ADMIN — NEPHROCAP 1 CAPSULE: 1 CAP ORAL at 08:11

## 2018-11-22 RX ADMIN — PIPERACILLIN AND TAZOBACTAM 4.5 G: 4; .5 INJECTION, POWDER, LYOPHILIZED, FOR SOLUTION INTRAVENOUS; PARENTERAL at 11:11

## 2018-11-22 RX ADMIN — IPRATROPIUM BROMIDE AND ALBUTEROL SULFATE 3 ML: .5; 3 SOLUTION RESPIRATORY (INHALATION) at 07:11

## 2018-11-22 RX ADMIN — IPRATROPIUM BROMIDE AND ALBUTEROL SULFATE 3 ML: .5; 3 SOLUTION RESPIRATORY (INHALATION) at 01:11

## 2018-11-22 RX ADMIN — VITAMIN D, TAB 1000IU (100/BT) 1000 UNITS: 25 TAB at 08:11

## 2018-11-22 RX ADMIN — OXYCODONE HYDROCHLORIDE 20 MG: 5 TABLET ORAL at 04:11

## 2018-11-22 RX ADMIN — POLYETHYLENE GLYCOL 3350 17 G: 17 POWDER, FOR SOLUTION ORAL at 08:11

## 2018-11-22 RX ADMIN — FLECAINIDE ACETATE 100 MG: 100 TABLET ORAL at 08:11

## 2018-11-22 RX ADMIN — OXYCODONE HYDROCHLORIDE 20 MG: 5 TABLET ORAL at 10:11

## 2018-11-22 RX ADMIN — PANTOPRAZOLE SODIUM 40 MG: 40 TABLET, DELAYED RELEASE ORAL at 08:11

## 2018-11-22 RX ADMIN — APIXABAN 5 MG: 2.5 TABLET, FILM COATED ORAL at 08:11

## 2018-11-22 RX ADMIN — METHYLPREDNISOLONE SODIUM SUCCINATE 80 MG: 125 INJECTION, POWDER, FOR SOLUTION INTRAMUSCULAR; INTRAVENOUS at 11:11

## 2018-11-22 RX ADMIN — IPRATROPIUM BROMIDE AND ALBUTEROL SULFATE 3 ML: .5; 3 SOLUTION RESPIRATORY (INHALATION) at 12:11

## 2018-11-22 RX ADMIN — INSULIN ASPART 3 UNITS: 100 INJECTION, SOLUTION INTRAVENOUS; SUBCUTANEOUS at 11:11

## 2018-11-22 RX ADMIN — OXYCODONE HYDROCHLORIDE 20 MG: 5 TABLET ORAL at 09:11

## 2018-11-22 RX ADMIN — PRAVASTATIN SODIUM 40 MG: 20 TABLET ORAL at 08:11

## 2018-11-22 RX ADMIN — CIPROFLOXACIN 400 MG: 2 INJECTION, SOLUTION INTRAVENOUS at 03:11

## 2018-11-22 RX ADMIN — INSULIN ASPART 2 UNITS: 100 INJECTION, SOLUTION INTRAVENOUS; SUBCUTANEOUS at 05:11

## 2018-11-22 RX ADMIN — CLONAZEPAM 0.5 MG: 0.5 TABLET ORAL at 10:11

## 2018-11-22 RX ADMIN — OXYCODONE HYDROCHLORIDE 20 MG: 5 TABLET ORAL at 03:11

## 2018-11-22 RX ADMIN — INSULIN ASPART 3 UNITS: 100 INJECTION, SOLUTION INTRAVENOUS; SUBCUTANEOUS at 06:11

## 2018-11-22 RX ADMIN — OXYCODONE HYDROCHLORIDE AND ACETAMINOPHEN 1000 MG: 500 TABLET ORAL at 08:11

## 2018-11-22 RX ADMIN — PIPERACILLIN AND TAZOBACTAM 4.5 G: 4; .5 INJECTION, POWDER, LYOPHILIZED, FOR SOLUTION INTRAVENOUS; PARENTERAL at 03:11

## 2018-11-22 RX ADMIN — METHYLPREDNISOLONE SODIUM SUCCINATE 80 MG: 125 INJECTION, POWDER, FOR SOLUTION INTRAMUSCULAR; INTRAVENOUS at 05:11

## 2018-11-22 RX ADMIN — VANCOMYCIN HYDROCHLORIDE 1250 MG: 10 INJECTION, POWDER, LYOPHILIZED, FOR SOLUTION INTRAVENOUS at 08:11

## 2018-11-22 RX ADMIN — DILTIAZEM HYDROCHLORIDE 360 MG: 180 CAPSULE, COATED, EXTENDED RELEASE ORAL at 08:11

## 2018-11-22 RX ADMIN — PIPERACILLIN AND TAZOBACTAM 4.5 G: 4; .5 INJECTION, POWDER, LYOPHILIZED, FOR SOLUTION INTRAVENOUS; PARENTERAL at 08:11

## 2018-11-22 NOTE — PLAN OF CARE
Problem: Patient Care Overview  Goal: Plan of Care Review  Outcome: Ongoing (interventions implemented as appropriate)  POC reviewed, verbalized understanding. Pt remained free from falls, fall precautions in place. Pt is Afib on monitor, 80-90s. VSS. PRN medication given for pain. No other c/o at this time. PIV intact, fluids infusing. Abx given per orders. Blood glucose monitored, corrected via SSI. Call bell and personal belongings within reach. Hourly rounding complete. Reminded to call for assistance. Will continue to monitor.

## 2018-11-22 NOTE — ASSESSMENT & PLAN NOTE
11/21/18  --s/p cycle 2 Keytruda.  Keytruda currently on hold.  Patient will follow up outpatient in Heme-Onc clinic with Dr. Mcmanus to discuss further treatment recommendations pending current clinical situation  --patient also receiving palliative radiation to lumbar spine  --continue supportive care    November 22, 2018-I had a detailed discussion with the patient today with regard to his current clinical situation.  Continue with all current supportive care/broad-spectrum antimicrobial therapy.  The patient will follow up in the outpatient clinic with his primary oncologist Dr. Lagos to discuss further management with regard to his metastatic malignancy.

## 2018-11-22 NOTE — SUBJECTIVE & OBJECTIVE
Interval History: No acute events overnight. Tolerating IV antibiotics.  Reports minimal improvement in SOB.  Reports generalized pain 3/10, control with p.o. pain medication.  Discussed with patient whether or not he would like to proceed with active immunotherapy treatment versus comfort care.  He continues to think on this and will let us know his decision.    Oncology Treatment Plan:   OP PEMBROLIZUMAB 200MG Q3W    Medications:  Continuous Infusions:   sodium chloride 0.9% 75 mL/hr at 11/20/18 1911     Scheduled Meds:   albuterol-ipratropium  3 mL Nebulization Q6H    apixaban  5 mg Oral BID    ascorbic acid (vitamin C)  1,000 mg Oral Daily    ciprofloxacin (CIPRO)400mg/200ml D5W IVPB  400 mg Intravenous Q12H    diltiaZEM  360 mg Oral Daily    fentaNYL  1 patch Transdermal Q72H    flecainide  100 mg Oral Q12H    pantoprazole  40 mg Oral Daily    piperacillin-tazobactam (ZOSYN) IVPB  4.5 g Intravenous Q8H    polyethylene glycol  17 g Oral Daily    pravastatin  40 mg Oral Nightly    sertraline  50 mg Oral Daily    vancomycin (VANCOCIN) IVPB  1,250 mg Intravenous Q12H    vitamin D  1,000 Units Oral Daily    vitamin renal formula (B-complex-vitamin c-folic acid)  1 capsule Oral Daily     PRN Meds:albuterol-ipratropium, clonazePAM, dextrose 50%, dextrose 50%, glucagon (human recombinant), glucose, glucose, insulin aspart U-100, oxyCODONE, sodium chloride     Review of patient's allergies indicates:   Allergen Reactions    Anoro ellipta [umeclidinium-vilanterol] Shortness Of Breath    Flomax [tamsulosin]      Dizzy          Past Medical History:   Diagnosis Date    Arthritis     Atrial fibrillation and flutter     Atrial flutter     Cancer     LUNG    COPD (chronic obstructive pulmonary disease)     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  Others are too small to accurately characterize.    Lung disease     Nephrolithiasis     ct urogram 12/15/2017-Bilateral nephrolithiasis.     Neuropathy, diabetic     Pancreatitis     Respiratory failure     Stage 4 lung cancer     Type 1 diabetes mellitus with diabetic neuropathy 4/27/2018     Past Surgical History:   Procedure Laterality Date    ABLATION N/A 12/4/2017    Performed by [facility]ire [facility] at [facility] CATH LAB    BICEPS TENDON REPAIR Right     BRONCHOSCOPY Bilateral 9/2/2018    Procedure: BRONCHOSCOPY- insert lighted tube into airway to obtain biopsy of lung;  Surgeon: Aldair Deleon MD;  Location: [facility] ENDO;  Service: Endoscopy;  Laterality: Bilateral;    BRONCHOSCOPY- insert lighted tube into airway to obtain biopsy of lung Bilateral 9/2/2018    Performed by Aldair Deleon MD at [facility] ENDO    CARDIOVERSION      CHOLECYSTECTOMY      INSERTION OF TUNNELED CENTRAL VENOUS CATHETER (CVC) WITH SUBCUTANEOUS PORT Right 10/9/2018    Procedure: [id number]-K-CATH;  Surgeon: Christophe Brandt MD;  Location: [facility] OR;  Service: General;  Laterality: Right;    PSAYDOYJA-SXSG-V-CATH Right 10/9/2018    Performed by [facility]ophe Brandt MD at [facility] OR    PATELLA SURGERY Right     RADIOFREQUENCY ABLATION      ROTATOR CUFF REPAIR Left     TRANSESOPHAGEAL ECHOCARDIOGRAM (DILLAN) N/A 12/4/2017    Performed by J[facility]ire MD at [facility] CATH LAB    ULTRASOUND GUIDANCE Right 10/9/2018    Procedure: ULTRASOUND GUIDANCE;  Surgeon: Christophe Brandt MD;  Location: [facility] OR;  Service: General;  Laterality: Right;    ULTRASOUND GUIDANCE Right 10/9/2018    Performed by Christophe Brandt MD at [facility] OR     Family History     Problem Relation (Age of Onset)    Cancer Maternal Aunt    Cataracts Sister    Diabetes Mother, Sister, Sister, Sister    Heart attack Brother    Heart failure Brother    Hypertension Mother, Father    Stroke Mother, Father, Paternal Grandmother, Paternal Grandfather        Tobacco Use     Smoking status: Former Smoker     Packs/day: 0.50     Types: Cigarettes     Start date: 1/1/1960     Last attempt to quit: 3/8/2015     Years since quitting: 3.7    Smokeless tobacco: Former User     Types: Snuff     Quit date: 10/7/1995   Substance and Sexual Activity    Alcohol use: No     Alcohol/week: 0.0 oz    Drug use: No    Sexual activity: Not on file       Review of Systems   Constitutional: Positive for activity change and fatigue. Negative for appetite change, chills, diaphoresis, fever and unexpected weight change.   HENT: Negative for congestion, mouth sores, nosebleeds, sore throat, trouble swallowing and voice change.    Eyes: Negative for visual disturbance.   Respiratory: Positive for cough and shortness of breath. Negative for apnea, choking, chest tightness, wheezing and stridor.    Cardiovascular: Positive for chest pain and leg swelling. Negative for palpitations.   Gastrointestinal: Negative for abdominal distention, abdominal pain, anal bleeding, blood in stool, constipation, diarrhea, nausea and vomiting.   Genitourinary: Negative for difficulty urinating, dysuria and hematuria.   Musculoskeletal: Positive for arthralgias, back pain and myalgias.   Skin: Negative for pallor and rash.   Allergic/Immunologic: Positive for immunocompromised state.   Neurological: Positive for weakness. Negative for dizziness, syncope, light-headedness, numbness and headaches.   Hematological: Negative for adenopathy. Bruises/bleeds easily.   Psychiatric/Behavioral: The patient is nervous/anxious.      Objective:     Vital Signs (Most Recent):  Temp: 98 °F (36.7 °C) (11/22/18 0716)  Pulse: 85 (11/22/18 0905)  Resp: 18 (11/22/18 0736)  BP: 117/62 (11/22/18 0716)  SpO2: 96 % (11/22/18 0736) Vital Signs (24h Range):  Temp:  [98 °F (36.7 °C)-98.7 °F (37.1 °C)] 98 °F (36.7 °C)  Pulse:  [70-91] 85  Resp:  [16-20] 18  SpO2:  [88 %-96 %] 96 %  BP: (113-139)/(54-63) 117/62     Weight: 79.6 kg (175 lb 7.8 oz)  Body  mass index is 26.68 kg/m².  Body surface area is 1.95 meters squared.      Intake/Output Summary (Last 24 hours) at 11/22/2018 1017  Last data filed at 11/22/2018 0649  Gross per 24 hour   Intake 1835 ml   Output 550 ml   Net 1285 ml       Physical Exam   Constitutional: He is oriented to person, place, and time. He appears well-developed and well-nourished. He is cooperative. He has a sickly appearance. He appears ill. Nasal cannula in place.   HENT:   Head: Normocephalic.   Right Ear: Hearing normal. No drainage.   Left Ear: Hearing normal. No drainage.   Nose: Nose normal.   Mouth/Throat: Oropharynx is clear and moist.   Eyes: Conjunctivae, EOM and lids are normal. Right eye exhibits no discharge. Left eye exhibits no discharge. No scleral icterus.   Neck: Normal range of motion. No thyroid mass present.   Cardiovascular: Normal rate and normal heart sounds. An irregular rhythm present.   No murmur heard.  Pulmonary/Chest: Effort normal. No respiratory distress. He has decreased breath sounds. He has no wheezes. He has no rhonchi. He has no rales.   Abdominal: Soft. He exhibits no distension. Bowel sounds are decreased. There is no tenderness.   Genitourinary:   Genitourinary Comments: deferred   Musculoskeletal: Normal range of motion.        Right ankle: He exhibits swelling.        Left ankle: He exhibits swelling.        Right lower leg: He exhibits edema.        Left lower leg: He exhibits edema.   Mild BLE edema   Lymphadenopathy:        Head (right side): No submandibular, no preauricular and no posterior auricular adenopathy present.        Head (left side): No submandibular, no preauricular and no posterior auricular adenopathy present.        Right cervical: No superficial cervical adenopathy present.       Left cervical: No superficial cervical adenopathy present.   Neurological: He is alert and oriented to person, place, and time.   Skin: Skin is warm, dry and intact.   Psychiatric: His speech is  normal and behavior is normal. Thought content normal. His mood appears anxious.   Vitals reviewed.      Significant Labs:   BMP:   Recent Labs   Lab 11/20/18  1205 11/21/18  0411 11/22/18  0422   * 285* 288*    134* 131*   K 4.9 4.1 4.1   CL 95 95 92*   CO2 29 31* 28   BUN 21 16 12   CREATININE 0.8 0.8 0.8   CALCIUM 9.5 8.9 9.0   , CBC:   Recent Labs   Lab 11/20/18  1205 11/21/18  0411 11/22/18 0422   WBC 13.39* 9.19 8.67   HGB 9.8* 9.0* 9.1*   HCT 31.4* 29.6* 29.4*   * 394* 375*   , CMP:   Recent Labs   Lab 11/20/18  1205 11/21/18  0411 11/22/18  0422    134* 131*   K 4.9 4.1 4.1   CL 95 95 92*   CO2 29 31* 28   * 285* 288*   BUN 21 16 12   CREATININE 0.8 0.8 0.8   CALCIUM 9.5 8.9 9.0   PROT 7.0 6.5 7.0   ALBUMIN 2.4* 2.2* 2.4*   BILITOT 0.5 0.4 0.4   ALKPHOS 181* 167* 168*   AST 47* 49* 31   ALT 28 25 26   ANIONGAP 13 8 11   EGFRNONAA >60 >60 >60   , LFTs:   Recent Labs   Lab 11/20/18  1205 11/21/18 0411 11/22/18 0422   ALT 28 25 26   AST 47* 49* 31   ALKPHOS 181* 167* 168*   BILITOT 0.5 0.4 0.4   PROT 7.0 6.5 7.0   ALBUMIN 2.4* 2.2* 2.4*   , Tumor Markers: No results for input(s): PSA, CEA, , AFPTM, DB6375,  in the last 48 hours.    Invalid input(s): ALGTM and All pertinent labs from the last 24 hours have been reviewed.    Diagnostic Results:  I have reviewed all pertinent imaging results/findings within the past 24 hours.   CTA Chest Non-Coronary (PE Study)   Order: [id number]   Status:  Final result   Visible to patient:  No (Not Released)   Next appt:  11/29/2018 at 08:50 AM in Lab ([facility] LAB)   Details     Reading Physician Reading Date Result Priority   Jos Brito MD 11/20/2018       Narrative     EXAMINATION:  CTA CHEST NON CORONARY    CLINICAL HISTORY:  Chest pain, acute, PE suspected, high pretest prob;    TECHNIQUE:  The chest was surveyed from the apices through the posterior costophrenic angles after administration of 100 cc of Omni 350  contrast using CT angiography technique.  Data was reconstructed for multiplanar images in axial, sagittal and coronal planes in for maximal intensity projection images in the axial plane.    COMPARISON:  None    FINDINGS:  Base of Neck: No significant abnormality.    Airways: Patent.    Lungs: Alveolar and interstitial opacities are seen throughout the left lower lobe and lingula and to a lesser degree within the left upper lobe consistent with airspace disease or aspiration.  Multiple pulmonary nodules are seen within the left lower lobe measuring up to 1.2 cm.  Evaluation is limited due to patchy consolidation throughout the left lung.  1.3 cm nodule within the right upper lobe likely reflects metastatic disease..  Patchy infiltrate seen within the right middle lobe.  Mild bronchiectasis and infiltration within the right middle lobe also noted.  Severe emphysematous changes are seen throughout the superior segments of the lower lobes and bilateral upper lobes.    Pleura: No pleural fluid.No pleural calcification.    Roz/Mediastinum: Extensive mediastinal and left hilar adenopathy.  Mass is seen within the AP window extending along the lateral hilum into the left hilar region measuring at least 5.9 x 4.7 cm concerning for metastatic disease.  Enlarged lymph nodes are seen throughout the mediastinum largest is in the precarinal region measuring 2 cm concerning for metastatic disease.  Elevation left hemidiaphragm noted.    Esophagus: Normal.    Heart/pericardium: Normal size.  Lipomatosis hypertrophy of the interatrial septum is noted.  No pericardial effusion or calcification.    Pulmonary vasculature: No evidence of pulmonary embolism.  Pulmonary arteries distribute normally.  There are four pulmonary veins.    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  The aorta maintains normal caliber, contour and course. There is mild-to-moderate calcification of the thoracic aorta.  There is  severe coronary  artery calcification.    Thoracic soft tissues: Normal. Both breasts are present.    Bones: Diffuse osteopenia and multiple mixed sclerotic and lytic lesions throughout the thoracic and lumbar skele spine ton largest within the L1 vertebral body measuring 2.5 x 2.9 cm consistent with metastatic disease.  Bones are diffusely osteopenic which limits evaluation for metastatic disease    Upper Abdomen: Punctate nonobstructing stones within the right kidney.  Indeterminate hypodensities within the kidneys likely reflect small cysts.  Moderate constipation.  Gallbladder is surgically absent.  Indeterminate 5 x 4.3 cm mass within the right hepatic lobe likely reflects metastatic disease.  Nodular thickening of the adrenal glands noted.      Impression       No evidence of pulmonary embolism.    Consolidation throughout the left lung and to a lesser degree within the right middle lobe thought to reflect airspace disease or aspiration.    Large mass within the mediastinum and left hilum as well as several pulmonary nodules concerning for metastatic disease.    Liver lesions concerning for metastatic disease.    Multiple lytic lesions throughout the thoracic skeleton largest within the L1 vertebral body consistent with metastatic disease    Diffuse thickening of the stomach could reflect line and dysplastic of and diffuse infiltrative malignancy.  Recommend endoscopy.    Moderate constipation.    See above for additional findings    All CT scans at this facility use dose modulation, iterative reconstruction and/or weight based dosing when appropriate to reduce radiation dose to as low as reasonably achievable.

## 2018-11-22 NOTE — PROGRESS NOTES
Vancomycin Progress Note    Indication: HCAP  WBC = 8.67 (trending down/normalized)  Tmax = 98.7 (afebrile)  Blood cxs negative; sputum cx not yet collected  SCr = 0.8 (stable)    Trough @ 0422 = 11.8 mcg/ml (subtherapeutic) -- collected before 4th dose  Will increase dose to 1250 mg every 12 hours  Next trough due tomorrow 11/23 @ 1730 before 4th new dose  Goal trough: 15-20 mcg/ml    **would consider de-escalation since WBC & temp normal & cultures negative so far --> consider PO antibiotics**    Thank you for allowing us to participate in this patient's care.   Katherine McArdle, Pharm.D. 11/22/2018 5:27 AM

## 2018-11-22 NOTE — SUBJECTIVE & OBJECTIVE
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  Others are too small to accurately characterize.    Lung disease     Nephrolithiasis     ct urogram 12/15/2017-Bilateral nephrolithiasis.     Neuropathy, diabetic     Pancreatitis     Respiratory failure     Stage 4 lung cancer     Type 1 diabetes mellitus with diabetic neuropathy 4/27/2018       Past Surgical History:   Procedure Laterality Date    ABLATION N/A 12/4/2017    Performed by Jaret Freire MD at [facility] CATH LAB    BICEPS TENDON REPAIR Right     BRONCHOSCOPY Bilateral 9/2/2018    Procedure: BRONCHOSCOPY- insert lighted tube into airway to obtain biopsy of lung;  Surgeon: Aldair Deleon MD;  Location: [facility] ENDO;  Service: Endoscopy;  Laterality: Bilateral;    BRONCHOSCOPY- insert lighted tube into airway to obtain biopsy of lung Bilateral 9/2/2018    Performed by Aldair Deleon MD at [facility] ENDO    CARDIOVERSION      CHOLECYSTECTOMY      INSERTION OF TUNNELED CENTRAL VENOUS CATHETER (CVC) WITH SUBCUTANEOUS PORT Right 10/9/2018    Procedure: [id number]-CATH;  Surgeon: Christophe Brandt MD;  Location: [facility] OR;  Service: General;  Laterality: Right;    PCNSCAHDK-BRJJ-N-CATH Right 10/9/2018    Performed by Christophe Brandt MD at [facility] OR    PATELLA SURGERY Right     RADIOFREQUENCY ABLATION      ROTATOR CUFF REPAIR Left     TRANSESOPHAGEAL ECHOCARDIOGRAM (DILLAN) N/A 12/4/2017    Performed by Jaret Freire MD at [facility] CATH LAB    ULTRASOUND GUIDANCE Right 10/9/2018    Procedure: ULTRASOUND GUIDANCE;  Surgeon: Christophe Brandt MD;  Location: [facility] OR;  Service: General;  Laterality: Right;     ULTRASOUND GUIDANCE Right 10/9/2018    Performed by [facility]t [facility] at [facility] OR       Review of patient's allergies indicates:   Allergen Reactions    Anoro ellipta [umeclidinium-vilanterol] Shortness Of Breath    Flomax [tamsulosin]      Dizzy         Family History     Problem Relation (Age of Onset)    Cancer Maternal Aunt    Cataracts Sister    Diabetes Mother, Sister, Sister, Sister    Heart attack Brother    Heart failure Brother    Hypertension Mother, Father    Stroke Mother, Father, Paternal Grandmother, Paternal Grandfather        Tobacco Use    Smoking status: Former Smoker     Packs/day: 0.50     Types: Cigarettes     Start date: 1/1/1960     Last attempt to quit: 3/8/2015     Years since quitting: 3.7    Smokeless tobacco: Former User     Types: Snuff     Quit date: 10/7/1995   Substance and Sexual Activity    Alcohol use: No     Alcohol/week: 0.0 oz    Drug use: No    Sexual activity: Not on file         Review of Systems   Constitutional: Positive for chills and diaphoresis.   HENT: Positive for postnasal drip and rhinorrhea.    Respiratory: Positive for cough, shortness of breath and wheezing.    Gastrointestinal: Negative.    Endocrine: Negative.    Genitourinary: Negative.    Musculoskeletal: Positive for arthralgias and back pain.   Skin: Negative.    Allergic/Immunologic: Negative.    Neurological: Positive for weakness.   Hematological: Negative.      Objective:     Vital Signs (Most Recent):  Temp: 98 °F (36.7 °C) (11/22/18 0716)  Pulse: 73 (11/22/18 1310)  Resp: 18 (11/22/18 1310)  BP: 121/63 (11/22/18 1134)  SpO2: (!) 94 % (11/22/18 1310) Vital Signs (24h Range):  Temp:  [98 °F (36.7 °C)-98.7 °F (37.1 °C)] 98 °F (36.7 °C)  Pulse:  [70-91] 73  Resp:  [16-20] 18  SpO2:  [88 %-96 %] 94 %  BP: (113-139)/(54-63) 121/63     Weight: 79.6 kg (175 lb 7.8 oz)  Body mass index is 26.68 kg/m².      Intake/Output Summary (Last 24 hours) at 11/22/2018 1512  Last data filed at 11/22/2018 1110  Gross  per 24 hour   Intake 1440 ml   Output 800 ml   Net 640 ml       Physical Exam   Constitutional: He is oriented to person, place, and time. He appears well-developed and well-nourished.   HENT:   Head: Normocephalic and atraumatic.   Eyes: Conjunctivae are normal. Pupils are equal, round, and reactive to light.   Neck: Neck supple. No JVD present. No tracheal deviation present. No thyromegaly present.   Cardiovascular: Normal rate and regular rhythm.   No murmur heard.  Pulmonary/Chest: He has decreased breath sounds. He has wheezes in the right lower field and the left lower field. He has no rhonchi. He has no rales.   Abdominal: Soft. Bowel sounds are normal.   Musculoskeletal: Normal range of motion. He exhibits no edema or tenderness.   Lymphadenopathy:     He has no cervical adenopathy.   Neurological: He is alert and oriented to person, place, and time.   Skin: Skin is warm and dry.   Nursing note and vitals reviewed.      Vents:  Oxygen Concentration (%): 40 (11/22/18 1310)    Lines/Drains/Airways     Central Venous Catheter Line                 Port A Cath Single Lumen right subclavian -- days          Peripheral Intravenous Line                 Peripheral IV - Single Lumen 11/20/18 1225 Left Forearm 2 days                Significant Labs:    CBC/Anemia Profile:  Recent Labs   Lab 11/21/18 0411 11/22/18 0422   WBC 9.19 8.67   HGB 9.0* 9.1*   HCT 29.6* 29.4*   * 375*   MCV 86 85   RDW 16.9* 16.6*        Chemistries:  Recent Labs   Lab 11/21/18 0411 11/22/18 0422   * 131*   K 4.1 4.1   CL 95 92*   CO2 31* 28   BUN 16 12   CREATININE 0.8 0.8   CALCIUM 8.9 9.0   ALBUMIN 2.2* 2.4*   PROT 6.5 7.0   BILITOT 0.4 0.4   ALKPHOS 167* 168*   ALT 25 26   AST 49* 31       BMP:   Recent Labs   Lab 11/22/18 0422   *   *   K 4.1   CL 92*   CO2 28   BUN 12   CREATININE 0.8   CALCIUM 9.0     CMP:   Recent Labs   Lab 11/21/18 0411 11/22/18 0422   * 131*   K 4.1 4.1   CL 95 92*   CO2 31* 28    * 288*   BUN 16 12   CREATININE 0.8 0.8   CALCIUM 8.9 9.0   PROT 6.5 7.0   ALBUMIN 2.2* 2.4*   BILITOT 0.4 0.4   ALKPHOS 167* 168*   AST 49* 31   ALT 25 26   ANIONGAP 8 11   EGFRNONAA >60 >60     All pertinent labs within the past 24 hours have been reviewed.    Significant Imaging:   I have reviewed all pertinent imaging results/findings within the past 24 hours.  I have reviewed and interpreted all pertinent imaging results/findings within the past 24 hours.

## 2018-11-22 NOTE — ASSESSMENT & PLAN NOTE
BC X 2 - no growth to date  Sputum culture - pending  Azithromycin and Rocephin given in ED >>>> ABX changed to Vanco, Zosyn and Cipro  11/22/18 - Cipro discontinued  Lactic acid was normal  B/P was appropriate

## 2018-11-22 NOTE — SUBJECTIVE & OBJECTIVE
Interval History:  Reports SOB when lying down. Pulmonology consult, BiPAP prn ordered.     Review of Systems   Constitutional: Positive for activity change and fatigue. Negative for appetite change, chills, diaphoresis, fever and unexpected weight change.   HENT: Negative for congestion, mouth sores, nosebleeds, sore throat, trouble swallowing and voice change.    Eyes: Negative for visual disturbance.   Respiratory: Positive for cough and shortness of breath. Negative for apnea, choking, chest tightness, wheezing and stridor.    Cardiovascular: Positive for chest pain and leg swelling. Negative for palpitations.   Gastrointestinal: Negative for abdominal distention, abdominal pain, anal bleeding, blood in stool, constipation, diarrhea, nausea and vomiting.   Genitourinary: Negative for difficulty urinating, dysuria and hematuria.   Musculoskeletal: Positive for arthralgias, back pain and myalgias.   Skin: Negative for pallor and rash.   Allergic/Immunologic: Positive for immunocompromised state.   Neurological: Positive for weakness. Negative for dizziness, syncope, light-headedness, numbness and headaches.   Hematological: Negative for adenopathy. Bruises/bleeds easily.   Psychiatric/Behavioral: The patient is nervous/anxious.      Objective:     Vital Signs (Most Recent):  Temp: 98 °F (36.7 °C) (11/22/18 0716)  Pulse: 73 (11/22/18 1310)  Resp: 18 (11/22/18 1310)  BP: 121/63 (11/22/18 1134)  SpO2: (!) 94 % (11/22/18 1310) Vital Signs (24h Range):  Temp:  [98 °F (36.7 °C)-98.7 °F (37.1 °C)] 98 °F (36.7 °C)  Pulse:  [70-91] 73  Resp:  [16-20] 18  SpO2:  [88 %-96 %] 94 %  BP: (113-139)/(54-63) 121/63     Weight: 79.6 kg (175 lb 7.8 oz)  Body mass index is 26.68 kg/m².    Intake/Output Summary (Last 24 hours) at 11/22/2018 1511  Last data filed at 11/22/2018 1110  Gross per 24 hour   Intake 1440 ml   Output 800 ml   Net 640 ml      Physical Exam   Constitutional: He is oriented to person, place, and time. He appears  well-developed and well-nourished. He is cooperative. He has a sickly appearance. He appears ill. Nasal cannula in place.   Chronically ill appearing   HENT:   Head: Normocephalic.   Right Ear: Hearing normal. No drainage.   Left Ear: Hearing normal. No drainage.   Eyes: Conjunctivae, EOM and lids are normal. Right eye exhibits no discharge. Left eye exhibits no discharge. No scleral icterus.   Neck: Normal range of motion. No thyroid mass present.   Cardiovascular: Normal rate and normal heart sounds. An irregular rhythm present.   No murmur heard.  Pulmonary/Chest: Effort normal. No respiratory distress. He has decreased breath sounds. He has no wheezes. He has no rhonchi. He has no rales.   With scattered rales   Abdominal: Soft. He exhibits no distension. Bowel sounds are decreased. There is no tenderness.   Genitourinary:   Genitourinary Comments: deferred   Musculoskeletal: Normal range of motion.        Right ankle: He exhibits swelling.        Left ankle: He exhibits swelling.        Right lower leg: He exhibits edema.        Left lower leg: He exhibits edema.   Mild BLE edema   Lymphadenopathy:        Head (right side): No submandibular, no preauricular and no posterior auricular adenopathy present.        Head (left side): No submandibular, no preauricular and no posterior auricular adenopathy present.        Right cervical: No superficial cervical adenopathy present.       Left cervical: No superficial cervical adenopathy present.   Neurological: He is alert and oriented to person, place, and time.   Skin: Skin is warm, dry and intact.   Psychiatric: His speech is normal and behavior is normal. Thought content normal. His mood appears anxious.   Nursing note and vitals reviewed.      Significant Labs:   CBC:   Recent Labs   Lab 11/21/18 0411 11/22/18 0422   WBC 9.19 8.67   HGB 9.0* 9.1*   HCT 29.6* 29.4*   * 375*     CMP:   Recent Labs   Lab 11/21/18 0411 11/22/18 0422   * 131*   K 4.1  4.1   CL 95 92*   CO2 31* 28   * 288*   BUN 16 12   CREATININE 0.8 0.8   CALCIUM 8.9 9.0   PROT 6.5 7.0   ALBUMIN 2.2* 2.4*   BILITOT 0.4 0.4   ALKPHOS 167* 168*   AST 49* 31   ALT 25 26   ANIONGAP 8 11   EGFRNONAA >60 >60     All pertinent labs within the past 24 hours have been reviewed.    Significant Imaging: I have reviewed all pertinent imaging results/findings within the past 24 hours.

## 2018-11-22 NOTE — PLAN OF CARE
Problem: Patient Care Overview  Goal: Plan of Care Review  Outcome: Ongoing (interventions implemented as appropriate)  Pt AAO x4.  VSS.  Pt remained afebrile throughout this shift.   IV fluids and antibiotics administered per order.   Pt remained free of falls this shift.   Pt complains of pain this shift.  Pain meds administered as ordered.   Blood glucose monitored, corrected via SSI.  Plan of care reviewed. Patient verbalizes understanding.   Pt moving/turing indepednetly. Frequent weight shifting encouraged.  Patient afib on monitor.   PIV intact.  BiPap ordered.  Bed low, side rails up x 2, wheels locked, call light in reach.   Patient instructed to call for assistance.   Hourly rounding completed.   24 hour chart check completed.  Will continue to monitor.

## 2018-11-22 NOTE — ASSESSMENT & PLAN NOTE
Continue supplemental oxygen to maintain sat > 92 %  On 5 L NC during the day  When lying down, oxygen saturation plummets  BiPAP prn order along with Pulmonology consult

## 2018-11-22 NOTE — PROGRESS NOTES
[facility] Hospital Medicine  Progress Note    Patient Name: Nico Garza Jr.  MRN: [id number]  Patient Class: IP- Inpatient   Admission Date: 11/20/2018  Length of Stay: 2 days  Attending Physician: Uzair Topete MD  Primary Care Provider: Tiffany Davis DO        Subjective:     Principal Problem:Sepsis    HPI:  Mr Garza is a 72 year old male with PMHx of PAF, HTN, HLD, DM, NSCLC with mets to bone and liver s/p radiation currently receiving Keytruda and COPD on home O2 at 4-5 L. He  presented to [facility] with complaints of increase shortness of  Breath. Associated symptoms in include sudden onset in increase weakness and fatigue. Denies associated symptoms of chest pain, palpitations, fever, chills, nausea, vomiting or diaphoresis. CTA of the chest shows alveolar and interstitial opacities are seen throughout the left lower lobe and lingula and to a lesser degree within the left upper lobe consistent with airspace disease or aspiration. Multiple pulmonary nodules are seen within the left lower lobe measuring up to 1.2 cm.  Evaluation is limited due to patchy consolidation throughout the left lung.  1.3 cm nodule within the right upper lobe likely reflects metastatic disease..  Patchy infiltrate seen within the right middle lobe.  Mild bronchiectasis and infiltration within the right middle lobe also noted.  Severe emphysematous changes are seen throughout the superior segments of the lower lobes and bilateral upper lobes.   Troponin negative. Lactic acid normal. Code status reviewed with patient and family, he wishes code status of DNR.     Hospital Course:  Pt presently taking Keytruda for NSCLC with mets and was discharged from hospital approx 1 week ago after being evaluated for intractable chest pain deemed to be related to pt's malignancy. He returned on 11/20/18 with weakness, worsened SOB and cough. He was diagnosed with Sepsis, Acute on Chronic Respiratory Failure and  Healthcare Associated Pneumonia. Pt was immunocompromised. He was prescribed IV hydration, bronchodilators, triple ABX & supplemental oxygen. Lactic acid was normal. Eliquis, flecainide and diltiazem resumed for PAF. Blood cultures were NGTD at 24 hours. Pt reported SOB when lying down and oxygen sat 60 %. BiPAP ordered and Pulmonology consulted. Dr. Sow, Oncology, discussed palliative care with patient with no decision presently.     Interval History:  Reports SOB when lying down. Pulmonology consult, BiPAP prn ordered.     Review of Systems   Constitutional: Positive for activity change and fatigue. Negative for appetite change, chills, diaphoresis, fever and unexpected weight change.   HENT: Negative for congestion, mouth sores, nosebleeds, sore throat, trouble swallowing and voice change.    Eyes: Negative for visual disturbance.   Respiratory: Positive for cough and shortness of breath. Negative for apnea, choking, chest tightness, wheezing and stridor.    Cardiovascular: Positive for chest pain and leg swelling. Negative for palpitations.   Gastrointestinal: Negative for abdominal distention, abdominal pain, anal bleeding, blood in stool, constipation, diarrhea, nausea and vomiting.   Genitourinary: Negative for difficulty urinating, dysuria and hematuria.   Musculoskeletal: Positive for arthralgias, back pain and myalgias.   Skin: Negative for pallor and rash.   Allergic/Immunologic: Positive for immunocompromised state.   Neurological: Positive for weakness. Negative for dizziness, syncope, light-headedness, numbness and headaches.   Hematological: Negative for adenopathy. Bruises/bleeds easily.   Psychiatric/Behavioral: The patient is nervous/anxious.      Objective:     Vital Signs (Most Recent):  Temp: 98 °F (36.7 °C) (11/22/18 0716)  Pulse: 73 (11/22/18 1310)  Resp: 18 (11/22/18 1310)  BP: 121/63 (11/22/18 1134)  SpO2: (!) 94 % (11/22/18 1310) Vital Signs (24h Range):  Temp:  [98 °F (36.7 °C)-98.7  °F (37.1 °C)] 98 °F (36.7 °C)  Pulse:  [70-91] 73  Resp:  [16-20] 18  SpO2:  [88 %-96 %] 94 %  BP: (113-139)/(54-63) 121/63     Weight: 79.6 kg (175 lb 7.8 oz)  Body mass index is 26.68 kg/m².    Intake/Output Summary (Last 24 hours) at 11/22/2018 1511  Last data filed at 11/22/2018 1110  Gross per 24 hour   Intake 1440 ml   Output 800 ml   Net 640 ml      Physical Exam   Constitutional: He is oriented to person, place, and time. He appears well-developed and well-nourished. He is cooperative. He has a sickly appearance. He appears ill. Nasal cannula in place.   Chronically ill appearing   HENT:   Head: Normocephalic.   Right Ear: Hearing normal. No drainage.   Left Ear: Hearing normal. No drainage.   Eyes: Conjunctivae, EOM and lids are normal. Right eye exhibits no discharge. Left eye exhibits no discharge. No scleral icterus.   Neck: Normal range of motion. No thyroid mass present.   Cardiovascular: Normal rate and normal heart sounds. An irregular rhythm present.   No murmur heard.  Pulmonary/Chest: Effort normal. No respiratory distress. He has decreased breath sounds. He has no wheezes. He has no rhonchi. He has no rales.   With scattered rales   Abdominal: Soft. He exhibits no distension. Bowel sounds are decreased. There is no tenderness.   Genitourinary:   Genitourinary Comments: deferred   Musculoskeletal: Normal range of motion.        Right ankle: He exhibits swelling.        Left ankle: He exhibits swelling.        Right lower leg: He exhibits edema.        Left lower leg: He exhibits edema.   Mild BLE edema   Lymphadenopathy:        Head (right side): No submandibular, no preauricular and no posterior auricular adenopathy present.        Head (left side): No submandibular, no preauricular and no posterior auricular adenopathy present.        Right cervical: No superficial cervical adenopathy present.       Left cervical: No superficial cervical adenopathy present.   Neurological: He is alert and  oriented to person, place, and time.   Skin: Skin is warm, dry and intact.   Psychiatric: His speech is normal and behavior is normal. Thought content normal. His mood appears anxious.   Nursing note and vitals reviewed.      Significant Labs:   CBC:   Recent Labs   Lab 11/21/18 0411 11/22/18 0422   WBC 9.19 8.67   HGB 9.0* 9.1*   HCT 29.6* 29.4*   * 375*     CMP:   Recent Labs   Lab 11/21/18 0411 11/22/18 0422   * 131*   K 4.1 4.1   CL 95 92*   CO2 31* 28   * 288*   BUN 16 12   CREATININE 0.8 0.8   CALCIUM 8.9 9.0   PROT 6.5 7.0   ALBUMIN 2.2* 2.4*   BILITOT 0.4 0.4   ALKPHOS 167* 168*   AST 49* 31   ALT 25 26   ANIONGAP 8 11   EGFRNONAA >60 >60     All pertinent labs within the past 24 hours have been reviewed.    Significant Imaging: I have reviewed all pertinent imaging results/findings within the past 24 hours.    Assessment/Plan:      * Sepsis      BC X 2 - no growth to date  Sputum culture - pending  Azithromycin and Rocephin given in ED >>>> ABX changed to Vanco, Zosyn and Cipro  11/22/18 - Cipro discontinued  Lactic acid was normal  B/P was appropriate           Severe malnutrition    Continue home vitamin supplements      Acute on chronic respiratory failure with hypoxia    Continue supplemental oxygen to maintain sat > 92 %  On 5 L NC during the day  When lying down, oxygen saturation plummets  BiPAP prn order along with Pulmonology consult       Metastatic left lung cancer (metastasis from lung to other site) with mediastinal lymphadenopathy     NSCLC with mets to bone and liver s/p radiation currently receiving Keytruda  Restart home pain medications   Consult Hematology/Oncology        Type 1 diabetes mellitus with diabetic neuropathy    Accu checks ACHS   Low dose SS   Diabetic diet   Hold patient's lispro and long acting insulin for now - resume long acting insulin as warranted     Paroxysmal atrial fibrillation    Telemetry monitor   Eliquis 5 mg BID  Flecainide   Diltiazem  360 mg PO, hold for SBP < 90        Hyperlipidemia LDL goal <70    Continue pravastatin      Healthcare-associated pneumonia    BC x 2  - NGTD  Sputum culture   Azithromycin and Rocephin given in ED  Will continue with Vancomycin, Cipro and Zosyn  11/22/18 - cipro discontinued  Incentive spirometer   O 2 4L, keep sats > 92 %  DuoNebs     COPD, group D, by GOLD 2017 classification    Pt on home O 2 at 4 L   Keep sats > 90 %   ABG   Duo neb            VTE Risk Mitigation (From admission, onward)        Ordered     apixaban tablet 5 mg  2 times daily      11/20/18 2046              Genie Akbar NP  Department of Hospital Medicine   [facility]

## 2018-11-22 NOTE — ASSESSMENT & PLAN NOTE
BC x 2  - NGTD  Sputum culture   Azithromycin and Rocephin given in ED  Will continue with Vancomycin, Cipro and Zosyn  11/22/18 - cipro discontinued  Incentive spirometer   O 2 4L, keep sats > 92 %  DuoNebs

## 2018-11-22 NOTE — HPI
73 y/o with Metastatic poorly-differentiated squamous cell carcinoma recently hospitalized for respiratory failure readmitted for similar symptoms. C/o low oxygen level and shortness of breath. Presently on 5 liters nc. His left lower lobe is collapsed due to lung cancer. He  has a past medical history of Arthritis, Atrial fibrillation and flutter, Atrial flutter, Cancer, COPD (chronic obstructive pulmonary disease), COPD (chronic obstructive pulmonary disease) with emphysema (11/18/2013), DM (diabetes mellitus) (1996), Hyperlipidemia, Hypertension, Kidney cysts, Lung disease, Nephrolithiasis, Neuropathy, diabetic, Pancreatitis, and Type 1 diabetes mellitus with diabetic neuropathy (4/27/2018).

## 2018-11-22 NOTE — CONSULTS
[facility]   Pulmonology  Consult Note    Patient Name: Nico Garza Jr.  MRN: [id number]  Admission Date: 11/20/2018  Hospital Length of Stay: 2 days  Code Status: DNR  Attending Physician: Uzair Topete MD  Primary Care Provider: Tiffany Davis DO   Principal Problem: Sepsis      Subjective:     HPI:  71 y/o with Metastatic poorly-differentiated squamous cell carcinoma recently hospitalized for respiratory failure readmitted for similar symptoms. C/o low oxygen level and shortness of breath. Presently on 5 liters nc. His left lower lobe is collapsed due to lung cancer. He  has a past medical history of Arthritis, Atrial fibrillation and flutter, Atrial flutter, Cancer, COPD (chronic obstructive pulmonary disease), COPD (chronic obstructive pulmonary disease) with emphysema (11/18/2013), DM (diabetes mellitus) (1996), Hyperlipidemia, Hypertension, Kidney cysts, Lung disease, Nephrolithiasis, Neuropathy, diabetic, Pancreatitis, and Type 1 diabetes mellitus with diabetic neuropathy (4/27/2018).      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  Others are too small to accurately characterize.    Lung disease     Nephrolithiasis     ct urogram 12/15/2017-Bilateral nephrolithiasis.     Neuropathy, diabetic     Pancreatitis     Respiratory failure     Stage 4 lung cancer     Type 1 diabetes mellitus with diabetic neuropathy 4/27/2018       Past Surgical History:   Procedure Laterality Date    ABLATION N/A 12/4/2017    Performed by [facility] C[facility] LAB    BICEPS TENDON REPAIR Right     BRONCHOSCOPY Bilateral 9/2/2018     Procedure: BRONCHOSCOPY- insert lighted tube into airway to obtain biopsy of lung;  Surgeon: Aldair Deleon MD;  Location: [facility] ENDO;  Service: Endoscopy;  Laterality: Bilateral;    BRONCHOSCOPY- insert lighted tube into airway to obtain biopsy of lung Bilateral 9/2/2018    Performed by Aldair Deleon MD at [facility] ENDO    CARDIOVERSION      CHOLECYSTECTOMY      INSERTION OF TUNNELED CENTRAL VENOUS CATHETER (CVC) WITH SUBCUTANEOUS PORT Right 10/9/2018    Procedure: [id number];  Surgeon: Christophe Brandt MD;  Location: [facility] OR;  Service: General;  Laterality: Right;    [id number]-CATH Right 10/9/2018    Performed by [facility] Brandt MD at [facility] OR    PATELLA SURGERY Right     RADIOFREQUENCY ABLATION      ROTATOR CUFF REPAIR Left     TRANSESOPHAGEAL ECHOCARDIOGRAM (DILLAN) N/A 12/4/2017    Performed by [facility] at [facility] CATH LAB    ULTRASOUND GUIDANCE Right 10/9/2018    Procedure: ULTRASOUND GUIDANCE;  Surgeon: Christophe Brandt MD;  Location: [facility] OR;  Service: General;  Laterality: Right;    ULTRASOUND GUIDANCE Right 10/9/2018    Performed by Christophe Brandt MD at [facility] OR       Review of patient's allergies indicates:   Allergen Reactions    Anoro ellipta [umeclidinium-vilanterol] Shortness Of Breath    Flomax [tamsulosin]      Dizzy         Family History     Problem Relation (Age of Onset)    Cancer Maternal Aunt    Cataracts Sister    Diabetes Mother, Sister, Sister, Sister    Heart attack Brother    Heart failure Brother    Hypertension Mother, Father    Stroke Mother, Father, Paternal Grandmother, Paternal Grandfather        Tobacco Use    Smoking status: Former Smoker     Packs/day: 0.50     Types: Cigarettes     Start date: 1/1/1960     Last attempt to quit: 3/8/2015     Years since quitting: 3.7    Smokeless tobacco: Former User     Types: Snuff     Quit date: 10/7/1995   Substance and Sexual Activity    Alcohol use: No     Alcohol/week: 0.0 oz    Drug use: No    Sexual  activity: Not on file         Review of Systems   Constitutional: Positive for chills and diaphoresis.   HENT: Positive for postnasal drip and rhinorrhea.    Respiratory: Positive for cough, shortness of breath and wheezing.    Gastrointestinal: Negative.    Endocrine: Negative.    Genitourinary: Negative.    Musculoskeletal: Positive for arthralgias and back pain.   Skin: Negative.    Allergic/Immunologic: Negative.    Neurological: Positive for weakness.   Hematological: Negative.      Objective:     Vital Signs (Most Recent):  Temp: 98 °F (36.7 °C) (11/22/18 0716)  Pulse: 73 (11/22/18 1310)  Resp: 18 (11/22/18 1310)  BP: 121/63 (11/22/18 1134)  SpO2: (!) 94 % (11/22/18 1310) Vital Signs (24h Range):  Temp:  [98 °F (36.7 °C)-98.7 °F (37.1 °C)] 98 °F (36.7 °C)  Pulse:  [70-91] 73  Resp:  [16-20] 18  SpO2:  [88 %-96 %] 94 %  BP: (113-139)/(54-63) 121/63     Weight: 79.6 kg (175 lb 7.8 oz)  Body mass index is 26.68 kg/m².      Intake/Output Summary (Last 24 hours) at 11/22/2018 1512  Last data filed at 11/22/2018 1110  Gross per 24 hour   Intake 1440 ml   Output 800 ml   Net 640 ml       Physical Exam   Constitutional: He is oriented to person, place, and time. He appears well-developed and well-nourished.   HENT:   Head: Normocephalic and atraumatic.   Eyes: Conjunctivae are normal. Pupils are equal, round, and reactive to light.   Neck: Neck supple. No JVD present. No tracheal deviation present. No thyromegaly present.   Cardiovascular: Normal rate and regular rhythm.   No murmur heard.  Pulmonary/Chest: He has decreased breath sounds. He has wheezes in the right lower field and the left lower field. He has no rhonchi. He has no rales.   Abdominal: Soft. Bowel sounds are normal.   Musculoskeletal: Normal range of motion. He exhibits no edema or tenderness.   Lymphadenopathy:     He has no cervical adenopathy.   Neurological: He is alert and oriented to person, place, and time.   Skin: Skin is warm and dry.    Nursing note and vitals reviewed.      Vents:  Oxygen Concentration (%): 40 (11/22/18 1310)    Lines/Drains/Airways     Central Venous Catheter Line                 Port A Cath Single Lumen right subclavian -- days          Peripheral Intravenous Line                 Peripheral IV - Single Lumen 11/20/18 1225 Left Forearm 2 days                Significant Labs:    CBC/Anemia Profile:  Recent Labs   Lab 11/21/18 0411 11/22/18 0422   WBC 9.19 8.67   HGB 9.0* 9.1*   HCT 29.6* 29.4*   * 375*   MCV 86 85   RDW 16.9* 16.6*        Chemistries:  Recent Labs   Lab 11/21/18 0411 11/22/18  0422   * 131*   K 4.1 4.1   CL 95 92*   CO2 31* 28   BUN 16 12   CREATININE 0.8 0.8   CALCIUM 8.9 9.0   ALBUMIN 2.2* 2.4*   PROT 6.5 7.0   BILITOT 0.4 0.4   ALKPHOS 167* 168*   ALT 25 26   AST 49* 31       BMP:   Recent Labs   Lab 11/22/18  0422   *   *   K 4.1   CL 92*   CO2 28   BUN 12   CREATININE 0.8   CALCIUM 9.0     CMP:   Recent Labs   Lab 11/21/18 0411 11/22/18  0422   * 131*   K 4.1 4.1   CL 95 92*   CO2 31* 28   * 288*   BUN 16 12   CREATININE 0.8 0.8   CALCIUM 8.9 9.0   PROT 6.5 7.0   ALBUMIN 2.2* 2.4*   BILITOT 0.4 0.4   ALKPHOS 167* 168*   AST 49* 31   ALT 25 26   ANIONGAP 8 11   EGFRNONAA >60 >60     All pertinent labs within the past 24 hours have been reviewed.    Significant Imaging:   I have reviewed all pertinent imaging results/findings within the past 24 hours.  I have reviewed and interpreted all pertinent imaging results/findings within the past 24 hours.    Assessment/Plan:     Acute on chronic respiratory failure with hypoxia    11/22 Start IV steroids, patient will probably need inpatient hospice       Will talk to daughter who will help with decision making for hospice      Thank you for your consult. I will follow-up with patient. Please contact us if you have any additional questions.     Aldair Deleon MD  Pulmonology  [facility]

## 2018-11-22 NOTE — ASSESSMENT & PLAN NOTE
11/21/18  --patient afebrile  --continue broad-spectrum IV antibiotics  --continue IV hydration  --encouraged IS  --encouraged ambulation  --awaiting sputum culture  --continue supportive care    November 22, 2018-I have recommended proceeding with swallow study to assess for aspiration.  Discussed with Hospital Medicine.

## 2018-11-23 VITALS
TEMPERATURE: 98 F | OXYGEN SATURATION: 95 % | DIASTOLIC BLOOD PRESSURE: 60 MMHG | SYSTOLIC BLOOD PRESSURE: 124 MMHG | BODY MASS INDEX: 24.52 KG/M2 | RESPIRATION RATE: 20 BRPM | WEIGHT: 161.81 LBS | HEIGHT: 68 IN | HEART RATE: 90 BPM

## 2018-11-23 PROBLEM — A41.9 SEPSIS: Status: RESOLVED | Noted: 2018-11-20 | Resolved: 2018-11-23

## 2018-11-23 LAB
ALBUMIN SERPL BCP-MCNC: 2.5 G/DL
ALP SERPL-CCNC: 179 U/L
ALT SERPL W/O P-5'-P-CCNC: 25 U/L
ANION GAP SERPL CALC-SCNC: 14 MMOL/L
AST SERPL-CCNC: 32 U/L
BASOPHILS # BLD AUTO: 0.01 K/UL
BASOPHILS NFR BLD: 0.1 %
BILIRUB SERPL-MCNC: 0.5 MG/DL
BUN SERPL-MCNC: 15 MG/DL
CALCIUM SERPL-MCNC: 9.2 MG/DL
CHLORIDE SERPL-SCNC: 90 MMOL/L
CO2 SERPL-SCNC: 27 MMOL/L
CREAT SERPL-MCNC: 0.9 MG/DL
DIFFERENTIAL METHOD: ABNORMAL
EOSINOPHIL # BLD AUTO: 0 K/UL
EOSINOPHIL NFR BLD: 0 %
ERYTHROCYTE [DISTWIDTH] IN BLOOD BY AUTOMATED COUNT: 16.8 %
EST. GFR  (AFRICAN AMERICAN): >60 ML/MIN/1.73 M^2
EST. GFR  (NON AFRICAN AMERICAN): >60 ML/MIN/1.73 M^2
GLUCOSE SERPL-MCNC: 353 MG/DL
HCT VFR BLD AUTO: 31.8 %
HGB BLD-MCNC: 9.8 G/DL
LYMPHOCYTES # BLD AUTO: 0.2 K/UL
LYMPHOCYTES NFR BLD: 3.2 %
MCH RBC QN AUTO: 26.3 PG
MCHC RBC AUTO-ENTMCNC: 30.8 G/DL
MCV RBC AUTO: 86 FL
MONOCYTES # BLD AUTO: 0.1 K/UL
MONOCYTES NFR BLD: 1.5 %
NEUTROPHILS # BLD AUTO: 6.8 K/UL
NEUTROPHILS NFR BLD: 95.2 %
PLATELET # BLD AUTO: 405 K/UL
PMV BLD AUTO: 9.2 FL
POCT GLUCOSE: 305 MG/DL (ref 70–110)
POCT GLUCOSE: 320 MG/DL (ref 70–110)
POCT GLUCOSE: 323 MG/DL (ref 70–110)
POTASSIUM SERPL-SCNC: 4.6 MMOL/L
PROT SERPL-MCNC: 7.2 G/DL
RBC # BLD AUTO: 3.72 M/UL
SODIUM SERPL-SCNC: 131 MMOL/L
WBC # BLD AUTO: 7.18 K/UL

## 2018-11-23 PROCEDURE — 94799 UNLISTED PULMONARY SVC/PX: CPT

## 2018-11-23 PROCEDURE — 63600175 PHARM REV CODE 636 W HCPCS: Performed by: INTERNAL MEDICINE

## 2018-11-23 PROCEDURE — 36415 COLL VENOUS BLD VENIPUNCTURE: CPT

## 2018-11-23 PROCEDURE — 25000003 PHARM REV CODE 250: Performed by: EMERGENCY MEDICINE

## 2018-11-23 PROCEDURE — 63600175 PHARM REV CODE 636 W HCPCS: Performed by: NURSE PRACTITIONER

## 2018-11-23 PROCEDURE — 99233 SBSQ HOSP IP/OBS HIGH 50: CPT | Mod: ,,, | Performed by: INTERNAL MEDICINE

## 2018-11-23 PROCEDURE — 63600175 PHARM REV CODE 636 W HCPCS: Performed by: EMERGENCY MEDICINE

## 2018-11-23 PROCEDURE — 25000003 PHARM REV CODE 250: Performed by: NURSE PRACTITIONER

## 2018-11-23 PROCEDURE — 99232 SBSQ HOSP IP/OBS MODERATE 35: CPT | Mod: ,,, | Performed by: INTERNAL MEDICINE

## 2018-11-23 PROCEDURE — 94761 N-INVAS EAR/PLS OXIMETRY MLT: CPT

## 2018-11-23 PROCEDURE — 92610 EVALUATE SWALLOWING FUNCTION: CPT

## 2018-11-23 PROCEDURE — 80053 COMPREHEN METABOLIC PANEL: CPT

## 2018-11-23 PROCEDURE — 94640 AIRWAY INHALATION TREATMENT: CPT

## 2018-11-23 PROCEDURE — 85025 COMPLETE CBC W/AUTO DIFF WBC: CPT

## 2018-11-23 PROCEDURE — 27000221 HC OXYGEN, UP TO 24 HOURS

## 2018-11-23 PROCEDURE — 25000242 PHARM REV CODE 250 ALT 637 W/ HCPCS: Performed by: NURSE PRACTITIONER

## 2018-11-23 RX ORDER — METHYLPREDNISOLONE 4 MG/1
TABLET ORAL
Qty: 1 PACKAGE | Refills: 0 | Status: SHIPPED | OUTPATIENT
Start: 2018-11-23 | End: 2018-11-23 | Stop reason: HOSPADM

## 2018-11-23 RX ORDER — LEVOFLOXACIN 750 MG/1
750 TABLET ORAL DAILY
Qty: 10 TABLET | Refills: 0 | Status: SHIPPED | OUTPATIENT
Start: 2018-11-23

## 2018-11-23 RX ORDER — FUROSEMIDE 20 MG/1
20 TABLET ORAL DAILY
Qty: 30 TABLET | Refills: 1 | Status: SHIPPED | OUTPATIENT
Start: 2018-11-23 | End: 2019-11-23

## 2018-11-23 RX ORDER — PREDNISONE 5 MG/1
TABLET ORAL
Qty: 91 TABLET | Refills: 0 | Status: SHIPPED | OUTPATIENT
Start: 2018-11-23

## 2018-11-23 RX ADMIN — PANTOPRAZOLE SODIUM 40 MG: 40 TABLET, DELAYED RELEASE ORAL at 10:11

## 2018-11-23 RX ADMIN — FLECAINIDE ACETATE 100 MG: 100 TABLET ORAL at 10:11

## 2018-11-23 RX ADMIN — NEPHROCAP 1 CAPSULE: 1 CAP ORAL at 10:11

## 2018-11-23 RX ADMIN — POLYETHYLENE GLYCOL 3350 17 G: 17 POWDER, FOR SOLUTION ORAL at 10:11

## 2018-11-23 RX ADMIN — METHYLPREDNISOLONE SODIUM SUCCINATE 80 MG: 125 INJECTION, POWDER, FOR SOLUTION INTRAMUSCULAR; INTRAVENOUS at 05:11

## 2018-11-23 RX ADMIN — INSULIN ASPART 4 UNITS: 100 INJECTION, SOLUTION INTRAVENOUS; SUBCUTANEOUS at 12:11

## 2018-11-23 RX ADMIN — INSULIN ASPART 4 UNITS: 100 INJECTION, SOLUTION INTRAVENOUS; SUBCUTANEOUS at 05:11

## 2018-11-23 RX ADMIN — APIXABAN 5 MG: 2.5 TABLET, FILM COATED ORAL at 10:11

## 2018-11-23 RX ADMIN — PIPERACILLIN AND TAZOBACTAM 4.5 G: 4; .5 INJECTION, POWDER, LYOPHILIZED, FOR SOLUTION INTRAVENOUS; PARENTERAL at 11:11

## 2018-11-23 RX ADMIN — IPRATROPIUM BROMIDE AND ALBUTEROL SULFATE 3 ML: .5; 3 SOLUTION RESPIRATORY (INHALATION) at 08:11

## 2018-11-23 RX ADMIN — PIPERACILLIN AND TAZOBACTAM 4.5 G: 4; .5 INJECTION, POWDER, LYOPHILIZED, FOR SOLUTION INTRAVENOUS; PARENTERAL at 03:11

## 2018-11-23 RX ADMIN — METHYLPREDNISOLONE SODIUM SUCCINATE 80 MG: 125 INJECTION, POWDER, FOR SOLUTION INTRAMUSCULAR; INTRAVENOUS at 12:11

## 2018-11-23 RX ADMIN — DILTIAZEM HYDROCHLORIDE 360 MG: 180 CAPSULE, COATED, EXTENDED RELEASE ORAL at 10:11

## 2018-11-23 RX ADMIN — VITAMIN D, TAB 1000IU (100/BT) 1000 UNITS: 25 TAB at 10:11

## 2018-11-23 RX ADMIN — SERTRALINE HYDROCHLORIDE 50 MG: 50 TABLET ORAL at 10:11

## 2018-11-23 RX ADMIN — OXYCODONE HYDROCHLORIDE 20 MG: 5 TABLET ORAL at 12:11

## 2018-11-23 RX ADMIN — OXYCODONE HYDROCHLORIDE 20 MG: 5 TABLET ORAL at 05:11

## 2018-11-23 RX ADMIN — OXYCODONE HYDROCHLORIDE 20 MG: 5 TABLET ORAL at 06:11

## 2018-11-23 RX ADMIN — FENTANYL TRANSDERMAL 1 PATCH: 50 PATCH, EXTENDED RELEASE TRANSDERMAL at 04:11

## 2018-11-23 RX ADMIN — IPRATROPIUM BROMIDE AND ALBUTEROL SULFATE 3 ML: .5; 3 SOLUTION RESPIRATORY (INHALATION) at 01:11

## 2018-11-23 RX ADMIN — OXYCODONE HYDROCHLORIDE AND ACETAMINOPHEN 1000 MG: 500 TABLET ORAL at 10:11

## 2018-11-23 RX ADMIN — IPRATROPIUM BROMIDE AND ALBUTEROL SULFATE 3 ML: .5; 3 SOLUTION RESPIRATORY (INHALATION) at 12:11

## 2018-11-23 RX ADMIN — INSULIN ASPART 4 UNITS: 100 INJECTION, SOLUTION INTRAVENOUS; SUBCUTANEOUS at 04:11

## 2018-11-23 RX ADMIN — VANCOMYCIN HYDROCHLORIDE 1250 MG: 10 INJECTION, POWDER, LYOPHILIZED, FOR SOLUTION INTRAVENOUS at 10:11

## 2018-11-23 NOTE — DISCHARGE SUMMARY
[facility]  Hospital Medicine  Discharge Summary      Patient Name: Nico Garza Jr.  MRN: [id number]  Admission Date: 11/20/2018  Hospital Length of Stay: 3 days  Discharge Date and Time:  11/23/2018 2:33 PM  Attending Physician: Uzair Topete MD   Discharging Provider: Genie Akbar NP  Primary Care Provider: Tiffany Davis DO      HPI:   Mr Garza is a 72 year old male with PMHx of PAF, HTN, HLD, DM, NSCLC with mets to bone and liver s/p radiation currently receiving Keytruda and COPD on home O2 at 4-5 L. He  presented to [facility] with complaints of increase shortness of  Breath. Associated symptoms in include sudden onset in increase weakness and fatigue. Denies associated symptoms of chest pain, palpitations, fever, chills, nausea, vomiting or diaphoresis. CTA of the chest shows alveolar and interstitial opacities are seen throughout the left lower lobe and lingula and to a lesser degree within the left upper lobe consistent with airspace disease or aspiration. Multiple pulmonary nodules are seen within the left lower lobe measuring up to 1.2 cm.  Evaluation is limited due to patchy consolidation throughout the left lung.  1.3 cm nodule within the right upper lobe likely reflects metastatic disease..  Patchy infiltrate seen within the right middle lobe.  Mild bronchiectasis and infiltration within the right middle lobe also noted.  Severe emphysematous changes are seen throughout the superior segments of the lower lobes and bilateral upper lobes.   Troponin negative. Lactic acid normal. Code status reviewed with patient and family, he wishes code status of DNR.     * No surgery found *      Hospital Course:   Pt presently taking Keytruda for NSCLC with mets and was discharged from hospital approx 1 week ago after being evaluated for intractable chest pain deemed to be related to pt's malignancy. He returned on 11/20/18 with weakness, worsened SOB and cough. He was diagnosed  with Sepsis, Acute on Chronic Respiratory Failure and Healthcare Associated Pneumonia. He was prescribed IV hydration, bronchodilators, triple ABX & supplemental oxygen. Lactic acid was normal. Eliquis, flecainide and diltiazem resumed for PAF. Blood cultures were NGTD at 24 hours. Pt reported SOB when lying down and oxygen sat 60 %. BiPAP ordered and Pulmonology consulted and recommended corticosteroids and inpatient hospice due to patient's higher oxygen requirements.  Dr. Sow, Oncology, discussed palliative care with patient and initially there was no decision. Pt had reached the maximum benefits of acute care. The discussion regarding inpatient hospice was approached with patient and daughter/sister. Disposition options were explained to patient and family. Pt elected inpatient hospice at [facility] due to their services provided. He was prescribed Levaquin and Medrol dose pack ans low dose Lasix. The patient was seen and examined and determined to be safe and stable for discharge. He was discharged under the care of [facility] to [facility].      Consults:   Consults (From admission, onward)        Status Ordering Provider     Inpatient consult to Hematology/Oncology  Once     Provider:  Deric Sow MD    Acknowledged KEITH GAYTAN     Inpatient consult to Pulmonology  Once     Provider:  Raleigh Deleon MD    Completed RANJANA CAO     Inpatient consult to Social Work  Once     Provider:  (Not yet assigned)    Completed RALEIGH DELEON     Inpatient consult to Social Work  Once     Provider:  (Not yet assigned)    Completed RANJANA CAO     Pharmacy to dose Vancomycin consult  Once     Provider:  (Not yet assigned)    Acknowledged KEITH GAYTAN          No new Assessment & Plan notes have been filed under this hospital service since the last note was generated.  Service: Hospital Medicine    Final Active Diagnoses:    Diagnosis Date Noted POA    Severe  malnutrition [E43] 10/26/2018 Yes    Metastatic left lung cancer (metastasis from lung to other site) with mediastinal lymphadenopathy [C34.92] 08/31/2018 Yes    Acute on chronic respiratory failure with hypoxia [J96.21] 08/31/2018 Yes    Type 1 diabetes mellitus with diabetic neuropathy [E10.40] 04/27/2018 Yes     Chronic    Paroxysmal atrial fibrillation [I48.0] 10/05/2017 Yes    Hyperlipidemia LDL goal <70 [E78.5] 01/15/2016 Yes     Chronic    Healthcare-associated pneumonia [J18.9] 03/09/2015 Yes    COPD, group D, by GOLD 2017 classification [J44.9] 11/18/2013 Yes      Problems Resolved During this Admission:    Diagnosis Date Noted Date Resolved POA    PRINCIPAL PROBLEM:  Sepsis [A41.9] 11/20/2018 11/23/2018 Yes       Discharged Condition: stable    Disposition: Hospice/Medical Facility    Follow Up:  Follow-up Information     [facility].    Specialty:  Hospice and Palliative Medicine  Why:  Follow up with provider at  [facility]  Contact information:  [id number] [place] [id number] [phone]                 Patient Instructions:      Diet Adult Regular     Activity as tolerated       Significant Diagnostic Studies: Labs:   CMP   Recent Labs   Lab 11/22/18  0422 11/23/18  0416   * 131*   K 4.1 4.6   CL 92* 90*   CO2 28 27   * 353*   BUN 12 15   CREATININE 0.8 0.9   CALCIUM 9.0 9.2   PROT 7.0 7.2   ALBUMIN 2.4* 2.5*   BILITOT 0.4 0.5   ALKPHOS 168* 179*   AST 31 32   ALT 26 25   ANIONGAP 11 14   ESTGFRAFRICA >60 >60   EGFRNONAA >60 >60    and CBC   Recent Labs   Lab 11/22/18  0422 11/23/18  0416   WBC 8.67 7.18   HGB 9.1* 9.8*   HCT 29.4* 31.8*   * 405*       Pending Diagnostic Studies:     None         Medications:  Reconciled Home Medications:      Medication List      START taking these medications    levoFLOXacin 750 MG tablet  Commonly known as:  LEVAQUIN  Take 1 tablet (750 mg total) by mouth once daily.     methylPREDNISolone 4 mg  tablet  Commonly known as:  MEDROL DOSEPACK  use as directed        CHANGE how you take these medications    gabapentin 300 MG capsule  Commonly known as:  NEURONTIN  Take 2 capsules (600 mg total) by mouth 2 (two) times daily.  What changed:    · when to take this  · additional instructions     insulin lispro 100 unit/mL Inpn pen  Commonly known as:  HumaLOG KwikPen Insulin  ADMINISTER 17 UNITS UNDER THE SKIN THREE TIMES DAILY BEFORE MEALS  What changed:  additional instructions     pravastatin 40 MG tablet  Commonly known as:  PRAVACHOL  TAKE 1 TABLET DAILY  What changed:    · how much to take  · how to take this  · when to take this  · additional instructions        CONTINUE taking these medications    albuterol-ipratropium 2.5 mg-0.5 mg/3 mL nebulizer solution  Commonly known as:  DUO-NEB  Take 3 mLs by nebulization every 6 (six) hours. Rescue     b complex vitamins tablet  Take 1 tablet by mouth once daily.     blood sugar diagnostic Strp  1 each by Misc.(Non-Drug; Combo Route) route 3 (three) times daily. Dispense preferred on insurance     blood-glucose meter kit  Use as instructed - preferred on insurance     clonazePAM 0.5 MG tablet  Commonly known as:  KLONOPIN  Take 1 tablet (0.5 mg total) by mouth 2 (two) times daily as needed for Anxiety.     diltiaZEM 360 MG 24 hr capsule  Commonly known as:  CARDIZEM CD  TAKE 1 CAPSULE(360 MG) BY MOUTH EVERY DAY     DISPOSABLE NEEDLES MISC  Inject 1 each into the skin 3 (three) times daily.     docusate sodium 100 MG capsule  Commonly known as:  COLACE  Take 1 capsule (100 mg total) by mouth 2 (two) times daily.     ELIQUIS 5 mg Tab  Generic drug:  apixaban  TAKE 1 TABLET(5 MG) BY MOUTH TWICE DAILY     fentaNYL 50 mcg/hr  Commonly known as:  DURAGESIC  Place 1 patch onto the skin every 72 hours.     flecainide 100 MG Tab  Commonly known as:  TAMBOCOR  Take 1 tablet (100 mg total) by mouth every 12 (twelve) hours.     insulin degludec 200 unit/mL (3 mL) Inpn  Commonly  "known as:  TRESIBA FLEXTOUCH U-200  Inject 50 Units into the skin once daily.     magnesium citrate solution  Take 296 mLs by mouth daily as needed.     multivitamin capsule  Take 1 capsule by mouth once daily.     nystatin 100,000 unit/mL suspension  Commonly known as:  MYCOSTATIN  Take 5 mLs (500,000 Units total) by mouth 4 (four) times daily.     omeprazole 20 MG capsule  Commonly known as:  PRILOSEC  Take 1 capsule (20 mg total) by mouth once daily.     ondansetron 8 MG Tbdl  Commonly known as:  ZOFRAN-ODT  Take 1 tablet (8 mg total) by mouth every 8 (eight) hours as needed.     oxyCODONE 20 mg Tab immediate release tablet  Commonly known as:  ROXICODONE  Take 1 tablet (20 mg total) by mouth every 4 (four) hours as needed (moderate to severe pain).     pen needle, diabetic 32 gauge x 1/4" Ndle  Insulin injections four times per day.     polyethylene glycol 17 gram/dose powder  Commonly known as:  GLYCOLAX  Take 17 g by mouth once daily.     prochlorperazine 10 MG tablet  Commonly known as:  COMPAZINE  TAKE 1 TABLET BY MOUTH EVERY 6 HOURS AS NEEDED.     sertraline 50 MG tablet  Commonly known as:  ZOLOFT  Take 1 tablet (50 mg total) by mouth once daily.     tiotropium bromide 2.5 mcg/actuation Mist  Commonly known as:  SPIRIVA RESPIMAT  Inhale 2 puffs into the lungs once daily. Controller     VITAMIN C 1000 MG tablet  Generic drug:  ascorbic acid (vitamin C)  Take 1,000 mg by mouth once daily.     vitamin D 1000 units Tab  Commonly known as:  VITAMIN D3  Take 1,000 Units by mouth once daily.        Lasix 20 mg daily        Indwelling Lines/Drains at time of discharge:   Lines/Drains/Airways     Central Venous Catheter Line                 Port A Cath Single Lumen right subclavian -- days                Time spent on the discharge of patient: > 30 minutes  Patient was seen and examined on the date of discharge and determined to be suitable for discharge.         Gneie Akbar NP  Department of Hospital " Medicine  [facility]

## 2018-11-23 NOTE — PT/OT/SLP EVAL
Speech Language Pathology Evaluation  Bedside Swallow    Patient Name:  Nico Garza Jr.   MRN:  [id number]  Admitting Diagnosis: Sepsis    Recommendations:                 General Recommendations:  Dysphagia therapy  Diet recommendations:  Mechanical soft, Chopped meat(meats in gravy), Thin   Aspiration Precautions: 1 bite/sip at a time, Alternating bites/sips, Avoid talking while eating, Check for pocketing/oral residue, Feed only when awake/alert, HOB to 90 degrees, Meds whole buried in puree, Remain upright 30 minutes post meal and Small bites/sips Recommend changing to nectar thick liquids if pt begins showing s/s of aspiration with thin.  General Precautions: Standard, aspiration  Communication strategies:  none    History:     Past Medical History:   Diagnosis Date    Arthritis     Atrial fibrillation and flutter     Atrial flutter     Cancer     LUNG    COPD (chronic obstructive pulmonary disease)     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  Others are too small to accurately characterize.    Lung disease     Nephrolithiasis     ct urogram 12/15/2017-Bilateral nephrolithiasis.     Neuropathy, diabetic     Pancreatitis     Respiratory failure     Stage 4 lung cancer     Type 1 diabetes mellitus with diabetic neuropathy 4/27/2018       Past Surgical History:   Procedure Laterality Date    ABLATION N/A 12/4/2017    Performed by [facility] CATH LAB    BICEPS TENDON REPAIR Right     BRONCHOSCOPY Bilateral 9/2/2018    Procedure: BRONCHOSCOPY- insert lighted tube into airway to obtain biopsy of lung;  Surgeon: Aldair Deleon MD;  Location: [facility];  Service: Endoscopy;  Laterality: Bilateral;    BRONCHOSCOPY- insert lighted tube into airway to obtain biopsy of lung Bilateral 9/2/2018    Performed by  Aldair Deleon MD at [facility] ENDO    CARDIOVERSION      CHOLECYSTECTOMY      INSERTION OF TUNNELED CENTRAL VENOUS CATHETER (CVC) WITH SUBCUTANEOUS PORT Right 10/9/2018    Procedure: [id number]-D-CATH;  Surgeon: Christophe Brandt MD;  Location: [facility] OR;  Service: General;  Laterality: Right;    UTOXBLAED-DGMD-Y-CATH Right 10/9/2018    Performed by hCristophe Brandt MD at [facility] OR    PATELLA SURGERY Right     RADIOFREQUENCY ABLATION      ROTATOR CUFF REPAIR Left     TRANSESOPHAGEAL ECHOCARDIOGRAM (DILLAN) N/A 12/4/2017    Performed by [facility] at [facility] CATH LAB    ULTRASOUND GUIDANCE Right 10/9/2018    Procedure: ULTRASOUND GUIDANCE;  Surgeon: Christophe Brandt MD;  Location: [facility] OR;  Service: General;  Laterality: Right;    ULTRASOUND GUIDANCE Right 10/9/2018    Performed by Christophe Brandt MD at [facility] OR       Social History: Patient lives with his spouse.    Prior Intubation HX:  unknown    Modified Barium Swallow: none    Chest X-Rays: CTA of the chest shows alveolar and interstitial opacities are seen throughout the left lower lobe and lingula and to a lesser degree within the left upper lobe consistent with airspace disease or aspiration. Multiple pulmonary nodules are seen within the left lower lobe measuring up to 1.2 cm.  Evaluation is limited due to patchy consolidation throughout the left lung.  1.3 cm nodule within the right upper lobe likely reflects metastatic disease..  Patchy infiltrate seen within the right middle lobe.  Mild bronchiectasis and infiltration within the right middle lobe also noted.  Severe emphysematous changes are seen throughout the superior segments of the lower lobes and bilateral upper lobes.         Prior diet: regular    Occupation/hobbies/homemaking:     Subjective     Pt cooperative. He reports dry foods getting stuck in his cheeks.  Patient goals: none reported    Pain/Comfort:  · Pain Rating 1: 0/10  · Pain Rating Post-Intervention 1: 0/10    Objective:     Oral  Musculature Evaluation  · Oral Musculature: general weakness  · Dentition: scattered dentition  · Mucosal Quality: good    Bedside Swallow Eval:   Consistencies Assessed:  · Thin, pills, cracker     Oral Phase:   · Delayed onset of swallow  · Decreased A-P transport    Pharyngeal Phase:   · decreased hyolaryngeal excursion to palpation  · delayed swallow initation  · multiple spontaneous swallows   · No overt s/s of aspiration during assessment    Compensatory Strategies  · None    Treatment:     Assessment:     Nico Garza Jr. is a 72 y.o. male with an SLP diagnosis of Dysphagia.  He presents with impaired swallow placing him at risk of aspiration. Pt has weak oral motor musculature and delayed swallow initiation. No overt s/s of aspiration were observed during assessment.     Goals:   Multidisciplinary Problems     SLP Goals        Problem: SLP Goal    Goal Priority Disciplines Outcome   SLP Goal     SLP    Description:  1. Pt will complete oral motor, tongue base retraction and laryngeal elevation ex x 20 each with min cues to improve swallow function.  2. Pt will tolerate least restrictive po diet without overt s/s of aspiration.                      Plan:     · Patient to be seen:  3 x/week   · Plan of Care expires:  11/30/18  · Plan of Care reviewed with:  patient   · SLP Follow-Up:  Yes       Discharge recommendations:      Barriers to Discharge:  None    Time Tracking:     SLP Treatment Date:   11/23/18  Speech Start Time:  1008  Speech Stop Time:  1023     Speech Total Time (min):  15 min    Billable Minutes: Eval Swallow and Oral Function 15    Rika Lux CCC-SLP  11/23/2018

## 2018-11-23 NOTE — ASSESSMENT & PLAN NOTE
11/22 Start IV steroids, patient will probably need inpatient hospice  11/23 discharge plans discussed with Dr. Obando  Slow presnisone taper  Prednisone 60 mg/day for 7 days,40 mg/day for 7 days,20 mg/day for 7 days,10 mg/ day.  Add low dose lasix  Oral antibiotics

## 2018-11-23 NOTE — ASSESSMENT & PLAN NOTE
11/21/18  --patient afebrile  --continue broad-spectrum IV antibiotics  --continue IV hydration  --encouraged IS  --encouraged ambulation  --awaiting sputum culture  --continue supportive care    November 22, 2018-I have recommended proceeding with swallow study to assess for aspiration.  Discussed with Hospital Medicine.    11/23/18  --Pulmonology has evaluated patient and recommend BiPap QHS  --patient remains afebrile  --continue IV hydration  --encouraged IS  --encouraged ambulation  --sputum culture pending  --continue supportive care

## 2018-11-23 NOTE — PROGRESS NOTES
[facility]   Pulmonology  Progress Note    Patient Name: Nico Garza Jr.  MRN: [id number]  Admission Date: 11/20/2018  Hospital Length of Stay: 3 days  Code Status: DNR  Attending Provider: Uzair Topete MD  Primary Care Provider: Tiffany Davis DO   Principal Problem: Sepsis    Subjective: shortness of breath getting out of bed     Past Medical History:   Diagnosis Date    Arthritis     Atrial fibrillation and flutter     Atrial flutter     Cancer     LUNG    COPD (chronic obstructive pulmonary disease)     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  Others are too small to accurately characterize.    Lung disease     Nephrolithiasis     ct urogram 12/15/2017-Bilateral nephrolithiasis.     Neuropathy, diabetic     Pancreatitis     Respiratory failure     Stage 4 lung cancer     Type 1 diabetes mellitus with diabetic neuropathy 4/27/2018       Past Surgical History:   Procedure Laterality Date    ABLATION N/A 12/4/2017    [facility] CATH LAB    BICEPS TENDON REPAIR Right     BRONCHOSCOPY Bilateral 9/2/2018    Procedure: BRONCHOSCOPY- insert lighted tube into airway to obtain biopsy of lung;  Surgeon: Aldair Deleon MD;  Location: [facility] ENDO;  Service: Endoscopy;  Laterality: Bilateral;    BRONCHOSCOPY- insert lighted tube into airway to obtain biopsy of lung Bilateral 9/2/2018    Performed by Aldair Deleon MD at [facility] ENDO    CARDIOVERSION      CHOLECYSTECTOMY      INSERTION OF TUNNELED CENTRAL VENOUS CATHETER (CVC) WITH SUBCUTANEOUS PORT Right 10/9/2018    Procedure: [id number]-F-CATH;  Surgeon: Christophe Brandt MD;  Location: [facility] OR;  Service: General;  Laterality: Right;    NMYIBDQSM-ZLPM-I-CATH Right 10/9/2018    Performed by [facility]ophe Brandt MD at [facility] OR    PATELLA  SURGERY Right     RADIOFREQUENCY ABLATION      ROTATOR CUFF REPAIR Left     TRANSESOPHAGEAL ECHOCARDIOGRAM (DILLAN) N/A 12/4/2017    Performed by [facility] CATH LAB    ULTRASOUND GUIDANCE Right 10/9/2018    Procedure: ULTRASOUND GUIDANCE;  Surgeon: Christophe Brandt MD;  Location: [facility] OR;  Service: General;  Laterality: Right;    ULTRASOUND GUIDANCE Right 10/9/2018    Performed by Christophe Brandt MD at [facility] OR       Review of patient's allergies indicates:   Allergen Reactions    Anoro ellipta [umeclidinium-vilanterol] Shortness Of Breath    Flomax [tamsulosin]      Dizzy         Family History     Problem Relation (Age of Onset)    Cancer Maternal Aunt    Cataracts Sister    Diabetes Mother, Sister, Sister, Sister    Heart attack Brother    Heart failure Brother    Hypertension Mother, Father    Stroke Mother, Father, Paternal Grandmother, Paternal Grandfather        Tobacco Use    Smoking status: Former Smoker     Packs/day: 0.50     Types: Cigarettes     Start date: 1/1/1960     Last attempt to quit: 3/8/2015     Years since quitting: 3.7    Smokeless tobacco: Former User     Types: Snuff     Quit date: 10/7/1995   Substance and Sexual Activity    Alcohol use: No     Alcohol/week: 0.0 oz    Drug use: No    Sexual activity: Not on file         Review of Systems   Constitutional: Positive for chills and diaphoresis.   HENT: Positive for postnasal drip and rhinorrhea.    Respiratory: Positive for cough, shortness of breath and wheezing.    Gastrointestinal: Negative.    Endocrine: Negative.    Genitourinary: Negative.    Musculoskeletal: Positive for arthralgias and back pain.   Skin: Negative.    Allergic/Immunologic: Negative.    Neurological: Positive for weakness.   Hematological: Negative.      Objective:     Vital Signs (Most Recent):  Temp: 97.8 °F (36.6 °C) (11/23/18 1216)  Pulse: 80 (11/23/18 1251)  Resp: 20 (11/23/18 1251)  BP: 124/60 (11/23/18 1216)  SpO2: 95 % (11/23/18 1251)  Vital Signs (24h Range):  Temp:  [97.4 °F (36.3 °C)-98.3 °F (36.8 °C)] 97.8 °F (36.6 °C)  Pulse:  [] 80  Resp:  [16-22] 20  SpO2:  [92 %-96 %] 95 %  BP: (110-135)/(59-68) 124/60     Weight: 73.4 kg (161 lb 13.1 oz)  Body mass index is 24.6 kg/m².      Intake/Output Summary (Last 24 hours) at 11/23/2018 1507  Last data filed at 11/23/2018 1400  Gross per 24 hour   Intake 1361.25 ml   Output 500 ml   Net 861.25 ml       Physical Exam   Constitutional: He is oriented to person, place, and time. He appears well-developed and well-nourished.   HENT:   Head: Normocephalic and atraumatic.   Eyes: Conjunctivae are normal. Pupils are equal, round, and reactive to light.   Neck: Neck supple. No JVD present. No tracheal deviation present. No thyromegaly present.   Cardiovascular: Normal rate and regular rhythm.   No murmur heard.  Pulmonary/Chest: He has decreased breath sounds. He has wheezes in the right lower field and the left lower field. He has no rhonchi. He has no rales.   Abdominal: Soft. Bowel sounds are normal.   Musculoskeletal: Normal range of motion. He exhibits no edema or tenderness.   Lymphadenopathy:     He has no cervical adenopathy.   Neurological: He is alert and oriented to person, place, and time.   Skin: Skin is warm and dry.   Nursing note and vitals reviewed.      Vents:  Oxygen Concentration (%): 40 (11/23/18 0837)    Lines/Drains/Airways     Central Venous Catheter Line                 Port A Cath Single Lumen right subclavian -- days          Peripheral Intravenous Line                 Peripheral IV - Single Lumen 11/20/18 1225 Left Forearm 3 days                Significant Labs:    CBC/Anemia Profile:  Recent Labs   Lab 11/22/18 0422 11/23/18 0416   WBC 8.67 7.18   HGB 9.1* 9.8*   HCT 29.4* 31.8*   * 405*   MCV 85 86   RDW 16.6* 16.8*        Chemistries:  Recent Labs   Lab 11/22/18 0422 11/23/18  0416   * 131*   K 4.1 4.6   CL 92* 90*   CO2 28 27   BUN 12 15   CREATININE 0.8  0.9   CALCIUM 9.0 9.2   ALBUMIN 2.4* 2.5*   PROT 7.0 7.2   BILITOT 0.4 0.5   ALKPHOS 168* 179*   ALT 26 25   AST 31 32       BMP:   Recent Labs   Lab 11/23/18  0416   *   *   K 4.6   CL 90*   CO2 27   BUN 15   CREATININE 0.9   CALCIUM 9.2     CMP:   Recent Labs   Lab 11/22/18  0422 11/23/18  0416   * 131*   K 4.1 4.6   CL 92* 90*   CO2 28 27   * 353*   BUN 12 15   CREATININE 0.8 0.9   CALCIUM 9.0 9.2   PROT 7.0 7.2   ALBUMIN 2.4* 2.5*   BILITOT 0.4 0.5   ALKPHOS 168* 179*   AST 31 32   ALT 26 25   ANIONGAP 11 14   EGFRNONAA >60 >60     All pertinent labs within the past 24 hours have been reviewed.    Significant Imaging:   I have reviewed all pertinent imaging results/findings within the past 24 hours.  I have reviewed and interpreted all pertinent imaging results/findings within the past 24 hours.    Assessment/Plan:     Acute on chronic respiratory failure with hypoxia    11/22 Start IV steroids, patient will probably need inpatient hospice  11/23 discharge plans discussed with Dr. Obando  Slow presnisone taper  Prednisone 60 mg/day for 7 days,40 mg/day for 7 days,20 mg/day for 7 days,10 mg/ day.  Add low dose lasix  Oral antibiotics              Aldair Deleon MD  Pulmonology  [facility]

## 2018-11-23 NOTE — SUBJECTIVE & OBJECTIVE
Interval History: No acute events overnight. Tolerating IV antibiotics.  Reports minimal improvement in SOB.  Reports generalized pain 3/10, control with p.o. pain medication.  Discussed with patient whether or not he would like to proceed with active immunotherapy treatment versus comfort care.  He continues to think on this and will let us know his decision.  11/23/18: No acute events overnight. Patient has not yet decided active treatment vs comfort care. States he would like to speak with his son and daughter regarding this. BC:NGTD. Resp Cx pending.    Oncology Treatment Plan:   OP PEMBROLIZUMAB 200MG Q3W    Medications:  Continuous Infusions:   sodium chloride 0.9% 75 mL/hr at 11/20/18 1911     Scheduled Meds:   albuterol-ipratropium  3 mL Nebulization Q6H    apixaban  5 mg Oral BID    ascorbic acid (vitamin C)  1,000 mg Oral Daily    diltiaZEM  360 mg Oral Daily    fentaNYL  1 patch Transdermal Q72H    flecainide  100 mg Oral Q12H    methylPREDNISolone sodium succinate  80 mg Intravenous Q6H    pantoprazole  40 mg Oral Daily    piperacillin-tazobactam (ZOSYN) IVPB  4.5 g Intravenous Q8H    polyethylene glycol  17 g Oral Daily    pravastatin  40 mg Oral Nightly    sertraline  50 mg Oral Daily    vancomycin (VANCOCIN) IVPB  1,250 mg Intravenous Q12H    vitamin D  1,000 Units Oral Daily    vitamin renal formula (B-complex-vitamin c-folic acid)  1 capsule Oral Daily     PRN Meds:albuterol-ipratropium, clonazePAM, dextrose 50%, dextrose 50%, glucagon (human recombinant), glucose, glucose, insulin aspart U-100, oxyCODONE, sodium chloride     Review of patient's allergies indicates:   Allergen Reactions    Anoro ellipta [umeclidinium-vilanterol] Shortness Of Breath    Flomax [tamsulosin]      Dizzy          Past Medical History:   Diagnosis Date    Arthritis     Atrial fibrillation and flutter     Atrial flutter     Cancer     LUNG    COPD (chronic obstructive pulmonary disease)     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  Others are too small to accurately characterize.    Lung disease     Nephrolithiasis     ct urogram 12/15/2017-Bilateral nephrolithiasis.     Neuropathy, diabetic     Pancreatitis     Respiratory failure     Stage 4 lung cancer     Type 1 diabetes mellitus with diabetic neuropathy 4/27/2018     Past Surgical History:   Procedure Laterality Date    ABLATION N/A 12/4/2017    Performed by [facility]ire [facility] at [facility] CATH LAB    BICEPS TENDON REPAIR Right     BRONCHOSCOPY Bilateral 9/2/2018    Procedure: BRONCHOSCOPY- insert lighted tube into airway to obtain biopsy of lung;  Surgeon: Aldair Deleon MD;  Location: [facility] ENDO;  Service: Endoscopy;  Laterality: Bilateral;    BRONCHOSCOPY- insert lighted tube into airway to obtain biopsy of lung Bilateral 9/2/2018    Performed by Aldair Deleon MD at [facility] ENDO    CARDIOVERSION      CHOLECYSTECTOMY      INSERTION OF TUNNELED CENTRAL VENOUS CATHETER (CVC) WITH SUBCUTANEOUS PORT Right 10/9/2018    Procedure: [id number];  Surgeon: Christophe Brandt MD;  Location: [facility] OR;  Service: General;  Laterality: Right;    [id number]-P-CATH Right 10/9/2018    Performed by [facility]t MD at [facility] OR    PATELLA SURGERY Right     RADIOFREQUENCY ABLATION      ROTATOR CUFF REPAIR Left     TRANSESOPHAGEAL ECHOCARDIOGRAM (DILLAN) N/A 12/4/2017    [facility] by [facility] at [facility] CATH LAB    ULTRASOUND GUIDANCE Right 10/9/2018    Procedure: ULTRASOUND GUIDANCE;  Surgeon: Christophe Brandt MD;  Location: [facility] OR;  Service: General;  Laterality: Right;    ULTRASOUND GUIDANCE Right 10/9/2018    Performed by Christophe Brandt MD at [facility] OR     Family History     Problem Relation (Age of Onset)    Cancer Maternal Aunt    Cataracts Sister    Diabetes  Mother, Sister, Sister, Sister    Heart attack Brother    Heart failure Brother    Hypertension Mother, Father    Stroke Mother, Father, Paternal Grandmother, Paternal Grandfather        Tobacco Use    Smoking status: Former Smoker     Packs/day: 0.50     Types: Cigarettes     Start date: 1/1/1960     Last attempt to quit: 3/8/2015     Years since quitting: 3.7    Smokeless tobacco: Former User     Types: Snuff     Quit date: 10/7/1995   Substance and Sexual Activity    Alcohol use: No     Alcohol/week: 0.0 oz    Drug use: No    Sexual activity: Not on file       Review of Systems   Constitutional: Positive for activity change and fatigue. Negative for appetite change, chills, diaphoresis, fever and unexpected weight change.   HENT: Negative for congestion, mouth sores, nosebleeds, sore throat, trouble swallowing and voice change.    Eyes: Negative for visual disturbance.   Respiratory: Positive for cough and shortness of breath. Negative for apnea, choking, chest tightness, wheezing and stridor.    Cardiovascular: Positive for chest pain and leg swelling. Negative for palpitations.   Gastrointestinal: Negative for abdominal distention, abdominal pain, anal bleeding, blood in stool, constipation, diarrhea, nausea and vomiting.   Genitourinary: Negative for difficulty urinating, dysuria and hematuria.   Musculoskeletal: Positive for arthralgias, back pain and myalgias.   Skin: Negative for pallor and rash.   Allergic/Immunologic: Positive for immunocompromised state.   Neurological: Positive for weakness. Negative for dizziness, syncope, light-headedness, numbness and headaches.   Hematological: Negative for adenopathy. Bruises/bleeds easily.   Psychiatric/Behavioral: The patient is nervous/anxious.      Objective:     Vital Signs (Most Recent):  Temp: 97.4 °F (36.3 °C) (11/23/18 0747)  Pulse: 91 (11/23/18 0837)  Resp: 17 (11/23/18 0837)  BP: (!) 110/59 (11/23/18 0747)  SpO2: (!) 92 % (11/23/18 0837) Vital Signs  (24h Range):  Temp:  [97.4 °F (36.3 °C)-98.3 °F (36.8 °C)] 97.4 °F (36.3 °C)  Pulse:  [] 91  Resp:  [16-22] 17  SpO2:  [92 %-96 %] 92 %  BP: (110-135)/(59-68) 110/59     Weight: 73.4 kg (161 lb 13.1 oz)  Body mass index is 24.6 kg/m².  Body surface area is 1.88 meters squared.      Intake/Output Summary (Last 24 hours) at 11/23/2018 0959  Last data filed at 11/23/2018 0438  Gross per 24 hour   Intake 1101.25 ml   Output 750 ml   Net 351.25 ml       Physical Exam   Constitutional: He is oriented to person, place, and time. He appears well-developed and well-nourished. He is cooperative. He has a sickly appearance. He appears ill. Nasal cannula in place.   HENT:   Head: Normocephalic.   Right Ear: Hearing normal. No drainage.   Left Ear: Hearing normal. No drainage.   Nose: Nose normal.   Mouth/Throat: Oropharynx is clear and moist.   Eyes: Conjunctivae, EOM and lids are normal. Right eye exhibits no discharge. Left eye exhibits no discharge. No scleral icterus.   Neck: Normal range of motion. No thyroid mass present.   Cardiovascular: Normal rate and normal heart sounds. An irregular rhythm present.   No murmur heard.  Pulmonary/Chest: Effort normal. No respiratory distress. He has decreased breath sounds. He has wheezes. He has no rhonchi. He has no rales.   Patient has just received breathing treatment. O2 sat 93% at time of visit   Abdominal: Soft. He exhibits no distension. Bowel sounds are decreased. There is no tenderness.   Genitourinary:   Genitourinary Comments: deferred   Musculoskeletal: Normal range of motion.        Right ankle: He exhibits swelling.        Left ankle: He exhibits swelling.        Right lower leg: He exhibits edema.        Left lower leg: He exhibits edema.   Mild BLE edema   Lymphadenopathy:        Head (right side): No submandibular, no preauricular and no posterior auricular adenopathy present.        Head (left side): No submandibular, no preauricular and no posterior auricular  adenopathy present.        Right cervical: No superficial cervical adenopathy present.       Left cervical: No superficial cervical adenopathy present.   Neurological: He is alert and oriented to person, place, and time.   Skin: Skin is warm, dry and intact.   Psychiatric: His speech is normal and behavior is normal. Thought content normal. His mood appears anxious.   Vitals reviewed.      Significant Labs:   BMP:   Recent Labs   Lab 11/22/18 0422 11/23/18 0416   * 353*   * 131*   K 4.1 4.6   CL 92* 90*   CO2 28 27   BUN 12 15   CREATININE 0.8 0.9   CALCIUM 9.0 9.2   , CBC:   Recent Labs   Lab 11/22/18 0422 11/23/18 0416   WBC 8.67 7.18   HGB 9.1* 9.8*   HCT 29.4* 31.8*   * 405*   , CMP:   Recent Labs   Lab 11/22/18 0422 11/23/18 0416   * 131*   K 4.1 4.6   CL 92* 90*   CO2 28 27   * 353*   BUN 12 15   CREATININE 0.8 0.9   CALCIUM 9.0 9.2   PROT 7.0 7.2   ALBUMIN 2.4* 2.5*   BILITOT 0.4 0.5   ALKPHOS 168* 179*   AST 31 32   ALT 26 25   ANIONGAP 11 14   EGFRNONAA >60 >60   , LFTs:   Recent Labs   Lab 11/22/18 0422 11/23/18 0416   ALT 26 25   AST 31 32   ALKPHOS 168* 179*   BILITOT 0.4 0.5   PROT 7.0 7.2   ALBUMIN 2.4* 2.5*   , Tumor Markers: No results for input(s): PSA, CEA, , AFPTM, WF0973,  in the last 48 hours.    Invalid input(s): ALGTM and All pertinent labs from the last 24 hours have been reviewed.    Diagnostic Results:  I have reviewed all pertinent imaging results/findings within the past 24 hours.   CTA Chest Non-Coronary (PE Study)   Order: [id number]   Status:  Final result   Visible to patient:  No (Not Released)   Next appt:  11/29/2018 at 08:50 AM in Lab ([facility])   Details     Reading Physician Reading Date Result Priority   Jos Brito MD 11/20/2018       Narrative     EXAMINATION:  CTA CHEST NON CORONARY    CLINICAL HISTORY:  Chest pain, acute, PE suspected, high pretest prob;    TECHNIQUE:  The chest was surveyed from the apices  through the posterior costophrenic angles after administration of 100 cc of Omni 350 contrast using CT angiography technique.  Data was reconstructed for multiplanar images in axial, sagittal and coronal planes in for maximal intensity projection images in the axial plane.    COMPARISON:  None    FINDINGS:  Base of Neck: No significant abnormality.    Airways: Patent.    Lungs: Alveolar and interstitial opacities are seen throughout the left lower lobe and lingula and to a lesser degree within the left upper lobe consistent with airspace disease or aspiration.  Multiple pulmonary nodules are seen within the left lower lobe measuring up to 1.2 cm.  Evaluation is limited due to patchy consolidation throughout the left lung.  1.3 cm nodule within the right upper lobe likely reflects metastatic disease..  Patchy infiltrate seen within the right middle lobe.  Mild bronchiectasis and infiltration within the right middle lobe also noted.  Severe emphysematous changes are seen throughout the superior segments of the lower lobes and bilateral upper lobes.    Pleura: No pleural fluid.No pleural calcification.    Roz/Mediastinum: Extensive mediastinal and left hilar adenopathy.  Mass is seen within the AP window extending along the lateral hilum into the left hilar region measuring at least 5.9 x 4.7 cm concerning for metastatic disease.  Enlarged lymph nodes are seen throughout the mediastinum largest is in the precarinal region measuring 2 cm concerning for metastatic disease.  Elevation left hemidiaphragm noted.    Esophagus: Normal.    Heart/pericardium: Normal size.  Lipomatosis hypertrophy of the interatrial septum is noted.  No pericardial effusion or calcification.    Pulmonary vasculature: No evidence of pulmonary embolism.  Pulmonary arteries distribute normally.  There are four pulmonary veins.    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  The aorta maintains normal caliber, contour and course. There  is mild-to-moderate calcification of the thoracic aorta.  There is  severe coronary artery calcification.    Thoracic soft tissues: Normal. Both breasts are present.    Bones: Diffuse osteopenia and multiple mixed sclerotic and lytic lesions throughout the thoracic and lumbar skele spine ton largest within the L1 vertebral body measuring 2.5 x 2.9 cm consistent with metastatic disease.  Bones are diffusely osteopenic which limits evaluation for metastatic disease    Upper Abdomen: Punctate nonobstructing stones within the right kidney.  Indeterminate hypodensities within the kidneys likely reflect small cysts.  Moderate constipation.  Gallbladder is surgically absent.  Indeterminate 5 x 4.3 cm mass within the right hepatic lobe likely reflects metastatic disease.  Nodular thickening of the adrenal glands noted.      Impression       No evidence of pulmonary embolism.    Consolidation throughout the left lung and to a lesser degree within the right middle lobe thought to reflect airspace disease or aspiration.    Large mass within the mediastinum and left hilum as well as several pulmonary nodules concerning for metastatic disease.    Liver lesions concerning for metastatic disease.    Multiple lytic lesions throughout the thoracic skeleton largest within the L1 vertebral body consistent with metastatic disease    Diffuse thickening of the stomach could reflect line and dysplastic of and diffuse infiltrative malignancy.  Recommend endoscopy.    Moderate constipation.    See above for additional findings    All CT scans at this facility use dose modulation, iterative reconstruction and/or weight based dosing when appropriate to reduce radiation dose to as low as reasonably achievable.

## 2018-11-23 NOTE — SUBJECTIVE & OBJECTIVE
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  Others are too small to accurately characterize.    Lung disease     Nephrolithiasis     ct urogram 12/15/2017-Bilateral nephrolithiasis.     Neuropathy, diabetic     Pancreatitis     Respiratory failure     Stage 4 lung cancer     Type 1 diabetes mellitus with diabetic neuropathy 4/27/2018       Past Surgical History:   Procedure Laterality Date    ABLATION N/A 12/4/2017    [facility]formed [facility] at [facility] CATH LAB    BICEPS TENDON REPAIR Right     BRONCHOSCOPY Bilateral 9/2/2018    Procedure: BRONCHOSCOPY- insert lighted tube into airway to obtain biopsy of lung;  Surgeon: Aldair Deleon MD;  Location: [facility] ENDO;  Service: Endoscopy;  Laterality: Bilateral;    BRONCHOSCOPY- insert lighted tube into airway to obtain biopsy of lung Bilateral 9/2/2018    Performed by Aldair Deleon MD at [facility] ENDO    CARDIOVERSION      CHOLECYSTECTOMY      INSERTION OF TUNNELED CENTRAL VENOUS CATHETER (CVC) WITH SUBCUTANEOUS PORT Right 10/9/2018    Procedure: [id number];  Surgeon: Christophe Brandt MD;  Location: [facility] OR;  Service: General;  Laterality: Right;    [id number]-CATH Right 10/9/2018    Performed by [facility]t MD at [facility] OR    PATELLA SURGERY Right     RADIOFREQUENCY ABLATION      ROTATOR CUFF REPAIR Left     TRANSESOPHAGEAL ECHOCARDIOGRAM (DILLAN) N/A 12/4/2017    Performed by [facility] at [facility] CATH LAB    ULTRASOUND GUIDANCE Right 10/9/2018    Procedure: ULTRASOUND GUIDANCE;  Surgeon: Christophe Brandt MD;  Location: [facility] OR;  Service: General;  Laterality: Right;     ULTRASOUND GUIDANCE Right 10/9/2018    Per[facility] by [facility] at [facility] OR       Review of patient's allergies indicates:   Allergen Reactions    Anoro ellipta [umeclidinium-vilanterol] Shortness Of Breath    Flomax [tamsulosin]      Dizzy         Family History     Problem Relation (Age of Onset)    Cancer Maternal Aunt    Cataracts Sister    Diabetes Mother, Sister, Sister, Sister    Heart attack Brother    Heart failure Brother    Hypertension Mother, Father    Stroke Mother, Father, Paternal Grandmother, Paternal Grandfather        Tobacco Use    Smoking status: Former Smoker     Packs/day: 0.50     Types: Cigarettes     Start date: 1/1/1960     Last attempt to quit: 3/8/2015     Years since quitting: 3.7    Smokeless tobacco: Former User     Types: Snuff     Quit date: 10/7/1995   Substance and Sexual Activity    Alcohol use: No     Alcohol/week: 0.0 oz    Drug use: No    Sexual activity: Not on file         Review of Systems   Constitutional: Positive for chills and diaphoresis.   HENT: Positive for postnasal drip and rhinorrhea.    Respiratory: Positive for cough, shortness of breath and wheezing.    Gastrointestinal: Negative.    Endocrine: Negative.    Genitourinary: Negative.    Musculoskeletal: Positive for arthralgias and back pain.   Skin: Negative.    Allergic/Immunologic: Negative.    Neurological: Positive for weakness.   Hematological: Negative.      Objective:     Vital Signs (Most Recent):  Temp: 97.8 °F (36.6 °C) (11/23/18 1216)  Pulse: 80 (11/23/18 1251)  Resp: 20 (11/23/18 1251)  BP: 124/60 (11/23/18 1216)  SpO2: 95 % (11/23/18 1251) Vital Signs (24h Range):  Temp:  [97.4 °F (36.3 °C)-98.3 °F (36.8 °C)] 97.8 °F (36.6 °C)  Pulse:  [] 80  Resp:  [16-22] 20  SpO2:  [92 %-96 %] 95 %  BP: (110-135)/(59-68) 124/60     Weight: 73.4 kg (161 lb 13.1 oz)  Body mass index is 24.6 kg/m².      Intake/Output Summary (Last 24 hours) at 11/23/2018 1507  Last data filed at 11/23/2018  1400  Gross per 24 hour   Intake 1361.25 ml   Output 500 ml   Net 861.25 ml       Physical Exam   Constitutional: He is oriented to person, place, and time. He appears well-developed and well-nourished.   HENT:   Head: Normocephalic and atraumatic.   Eyes: Conjunctivae are normal. Pupils are equal, round, and reactive to light.   Neck: Neck supple. No JVD present. No tracheal deviation present. No thyromegaly present.   Cardiovascular: Normal rate and regular rhythm.   No murmur heard.  Pulmonary/Chest: He has decreased breath sounds. He has wheezes in the right lower field and the left lower field. He has no rhonchi. He has no rales.   Abdominal: Soft. Bowel sounds are normal.   Musculoskeletal: Normal range of motion. He exhibits no edema or tenderness.   Lymphadenopathy:     He has no cervical adenopathy.   Neurological: He is alert and oriented to person, place, and time.   Skin: Skin is warm and dry.   Nursing note and vitals reviewed.      Vents:  Oxygen Concentration (%): 40 (11/23/18 0837)    Lines/Drains/Airways     Central Venous Catheter Line                 Port A Cath Single Lumen right subclavian -- days          Peripheral Intravenous Line                 Peripheral IV - Single Lumen 11/20/18 1225 Left Forearm 3 days                Significant Labs:    CBC/Anemia Profile:  Recent Labs   Lab 11/22/18 0422 11/23/18 0416   WBC 8.67 7.18   HGB 9.1* 9.8*   HCT 29.4* 31.8*   * 405*   MCV 85 86   RDW 16.6* 16.8*        Chemistries:  Recent Labs   Lab 11/22/18 0422 11/23/18 0416   * 131*   K 4.1 4.6   CL 92* 90*   CO2 28 27   BUN 12 15   CREATININE 0.8 0.9   CALCIUM 9.0 9.2   ALBUMIN 2.4* 2.5*   PROT 7.0 7.2   BILITOT 0.4 0.5   ALKPHOS 168* 179*   ALT 26 25   AST 31 32       BMP:   Recent Labs   Lab 11/23/18 0416   *   *   K 4.6   CL 90*   CO2 27   BUN 15   CREATININE 0.9   CALCIUM 9.2     CMP:   Recent Labs   Lab 11/22/18 0422 11/23/18 0416   * 131*   K 4.1 4.6   CL 92*  90*   CO2 28 27   * 353*   BUN 12 15   CREATININE 0.8 0.9   CALCIUM 9.0 9.2   PROT 7.0 7.2   ALBUMIN 2.4* 2.5*   BILITOT 0.4 0.5   ALKPHOS 168* 179*   AST 31 32   ALT 26 25   ANIONGAP 11 14   EGFRNONAA >60 >60     All pertinent labs within the past 24 hours have been reviewed.    Significant Imaging:   I have reviewed all pertinent imaging results/findings within the past 24 hours.  I have reviewed and interpreted all pertinent imaging results/findings within the past 24 hours.

## 2018-11-23 NOTE — PLAN OF CARE
Problem: Patient Care Overview  Goal: Plan of Care Review  Outcome: Ongoing (interventions implemented as appropriate)  .       Statement Selected

## 2018-11-23 NOTE — PLAN OF CARE
Pt in bed AAOx4.   Plan of Care reviewed, Verbalized understanding.  Patient remained free from falls, fall precautions in place.   Pt is A-Fib on monitor. VSS.   PIV CDI with NS running at 75ml/hr.  Antibiotics given per MD order.   See MAR for PRN meds given.  Call bell and personal belongings within reach.   Hourly rounding complete. Reminded to call for assistance.   No complaints at this time.   Will continue to monitor.

## 2018-11-23 NOTE — PLAN OF CARE
Patient signed consents for [facility].  Per LIZZY Kelly with [facility], patient can transition to [facility] today.  Orders faxed to [facility] @[phone].  Leticia provided primary nurse with number for report.     11/23/18 1523   Final Note   Assessment Type Final Discharge Note   Anticipated Discharge Disposition [facility]   Right Care Referral Info   Post Acute Recommendation Other   Facility Name [facility]

## 2018-11-23 NOTE — CONSULTS
KAL, Dr Topete, and JULIETTE Prescott met with patient, daughter, and sister at the bedside to discuss discharge plan.  CM discussed hospice recommendations.  Patient is agreeable and would like inpatient hospice.  CM discussed the inpatient facilities and would like to use [facility].  Choice form completed and placed in patient blue folder.  Referral made via [facility] care.

## 2018-11-23 NOTE — ASSESSMENT & PLAN NOTE
--s/p cycle 2 Keytruda.  Keytruda currently on hold.  Patient will follow up outpatient in Heme-Onc clinic with Dr. Mcmanus to discuss further treatment recommendations pending current clinical situation  --patient also receiving palliative radiation to lumbar spine  --continue supportive care    November 22, 2018-I had a detailed discussion with the patient today with regard to his current clinical situation.  Continue with all current supportive care/broad-spectrum antimicrobial therapy.  The patient will follow up in the outpatient clinic with his primary oncologist Dr. Lagos to discuss further management with regard to his metastatic malignancy.    11/23/18  --patient is still undecided on active versus palliative treatment.  He will follow up in the outpatient clinic with his primary oncologist Dr. Lagos to discuss further management and treatment options

## 2018-11-24 LAB
BACTERIA SPEC AEROBE CULT: NORMAL
GRAM STN SPEC: NORMAL

## 2018-11-24 NOTE — NURSING
Pt discharged to [facility] via [facility] private van. Discharged on oxygen at 5L NC provided by [facility]. Discharge instructions, facesheet, copy of DNR, and copy of prescription sent with [facility] employee. IV left in left wrist per orders.

## 2018-11-24 NOTE — NURSING
Copy of discharge paperwork given to family. Copy of discharge paper work and prescription given to night nurse IBIS Bansal to be given to [facility] [facility] employee. Pt and family waiting for [facility] transportation to arrive.

## 2018-11-25 LAB
BACTERIA BLD CULT: NORMAL
BACTERIA BLD CULT: NORMAL

## 2018-12-08 DIAGNOSIS — F43.23 SITUATIONAL MIXED ANXIETY AND DEPRESSIVE DISORDER: ICD-10-CM

## 2018-12-08 RX ORDER — SERTRALINE HYDROCHLORIDE 50 MG/1
TABLET, FILM COATED ORAL
Qty: 30 TABLET | Refills: 0 | Status: CANCELLED | OUTPATIENT
Start: 2018-12-08

## 2018-12-10 DIAGNOSIS — F43.23 SITUATIONAL MIXED ANXIETY AND DEPRESSIVE DISORDER: ICD-10-CM

## 2018-12-10 RX ORDER — SERTRALINE HYDROCHLORIDE 50 MG/1
50 TABLET, FILM COATED ORAL DAILY
Qty: 30 TABLET | Refills: 3 | Status: SHIPPED | OUTPATIENT
Start: 2018-12-10

## 2018-12-18 ENCOUNTER — TELEPHONE (OUTPATIENT)
Dept: INTERNAL MEDICINE | Facility: CLINIC | Age: 73
End: 2018-12-18

## 2018-12-18 NOTE — TELEPHONE ENCOUNTER
----- Message from Aida Morris sent at 12/18/2018  1:15 PM CST -----  Contact: Gregory- [employer]  Gregory is calling in regards to prior authorization for pt's Gabapentin, 300 mg. Please call back at [phone].Case number is [id number].      Thanks,   Aida Morris

## 2018-12-19 ENCOUNTER — TELEPHONE (OUTPATIENT)
Dept: INTERNAL MEDICINE | Facility: CLINIC | Age: 73
End: 2018-12-19

## 2018-12-19 NOTE — TELEPHONE ENCOUNTER
----- Message from Sofia Monsivais sent at 12/19/2018  3:40 PM CST -----  Janusz ( [facility] [employer] ) is requesting a call from nurse to f/u on a PA .      Please call Janusz iloho ) [phone]

## 2019-01-04 DIAGNOSIS — I10 HYPERTENSION GOAL BP (BLOOD PRESSURE) < 130/80: Chronic | ICD-10-CM

## 2019-01-04 RX ORDER — QUINAPRIL 40 MG/1
TABLET ORAL
Qty: 90 TABLET | Refills: 1 | Status: SHIPPED | OUTPATIENT
Start: 2019-01-04

## 2019-01-07 ENCOUNTER — TELEPHONE (OUTPATIENT)
Dept: HEMATOLOGY/ONCOLOGY | Facility: CLINIC | Age: 74
End: 2019-01-07

## 2019-01-07 NOTE — TELEPHONE ENCOUNTER
Called to get prior auth for compazine. Medication denied on Medicare part D. Case ID [id number]. Medication needs to be filled on Medicare Part A. Faxed information to [facility].

## 2019-01-09 DIAGNOSIS — C34.90 LUNG CANCER METASTATIC TO BONE: ICD-10-CM

## 2019-01-09 DIAGNOSIS — C79.51 LUNG CANCER METASTATIC TO BONE: ICD-10-CM

## 2019-01-09 RX ORDER — PROCHLORPERAZINE MALEATE 10 MG
10 TABLET ORAL EVERY 6 HOURS PRN
Qty: 385 TABLET | Refills: 5 | Status: CANCELLED | OUTPATIENT
Start: 2019-01-09

## 2019-01-25 DIAGNOSIS — I10 ESSENTIAL HYPERTENSION: ICD-10-CM

## 2019-01-25 RX ORDER — DILTIAZEM HYDROCHLORIDE 360 MG/1
CAPSULE, EXTENDED RELEASE ORAL
Qty: 90 CAPSULE | Refills: 0 | Status: SHIPPED | OUTPATIENT
Start: 2019-01-25

## 2019-02-06 ENCOUNTER — TELEPHONE (OUTPATIENT)
Dept: CARDIOLOGY | Facility: CLINIC | Age: 74
End: 2019-02-06

## 2019-03-15 DIAGNOSIS — K21.9 CHRONIC GERD: ICD-10-CM

## 2019-03-15 RX ORDER — OMEPRAZOLE 20 MG/1
CAPSULE, DELAYED RELEASE ORAL
Qty: 90 CAPSULE | Refills: 2 | Status: SHIPPED | OUTPATIENT
Start: 2019-03-15

## 2019-07-17 NOTE — ASSESSMENT & PLAN NOTE
Accu checks ACHS   Low dose SS   Diabetic diet   Hold patient's lispro and long acting insulin for now - resume long acting insulin as warranted   yes

## 2019-11-16 NOTE — ADDENDUM NOTE
Addended by: CRISELDA CORTEZ on: 9/10/2018 02:18 PM     Modules accepted: Nga Stinson     Surgery Progress Note    S:   Pt has been up and walking around most of the morning. Eating food, trying to eat breakfast with some pain. Having loose stools. No N/V. Complaining of dry mouth.     O:   BP 99/61 (BP Location: Right arm)   Pulse 96   Temp 98.1  F (36.7  C) (Oral)   Resp 16   Wt 53.3 kg (117 lb 9.6 oz)   SpO2 98%   BMI 17.37 kg/m         Intake/Output Summary (Last 24 hours) at 11/6/2019 0538  Last data filed at 11/6/2019 0126  Gross per 24 hour   Intake 3705.68 ml   Output 3950 ml   Net -244.32 ml       General: Thin appearing: Sitting up in bed, NAD.   HEENT: No obvious swelling noted to face  Cardio: Extremities are warm and well perfused   Resp: No increased work of breathing  Abdomen: Soft, non-distended, incision c/d/i, mild tender to palpation throughout  Neuro: Alert and oriented. Moves all extremities symmetrically.              Assessment and Plan:     ASSESSMENT:Rachel A Gerhardt is a 44 year old female with a PMH of pelvic radiation, cervical and ovarian cancer and SBR in 2017 who was admitted for recurrent SBO and worsening symptoms, she is s/p small bowel resection and primary anastomosis on 10/31. Pain team consulted and working with patient, recs below in plan. Dentistry was consulted and worked with patient.     PLAN:   - Appreciate recs from pain service  - GI consulted, appreciate recommendations  - Dentistry says she can follow up outpatient  - C-diff ordered   - Dispo today or tomorrow      Seen and discussed with chief resident and staff    Yocasta Rodrigues MD  [facility], PGY1 Family Medicine

## 2019-12-14 NOTE — PLAN OF CARE
09/24/18 1607   Medicare Message   Important Message from Medicare regarding Discharge Appeal Rights Given to patient/caregiver;Explained to patient/caregiver;Signed/date by patient/caregiver   Date IMM was signed 09/24/18   Time IMM was signed 1600      diminished

## 2020-01-24 NOTE — ASSESSMENT & PLAN NOTE
HPI:  Patient comes intoday for   Chief Complaint   Patient presents with    Diabetes     A1C, Microalbumin and med refills today.  Hypertension     BP check and med refills. .    Patient is compliant with medications, diet but is not exercising regularly but has increased outside activity. He is monitoring his BS at home and fastings have been 200s     Complains of fatigue. States he is wearing his CPAP nightly (with 6L bleed in O2) but recently has become more tired. He using oxygen at 6 L per NC pulse when out of house and 3L continuous at home. He did see Dr. Nii Emanuel and that is who adjusted his settings. Complains of anxiety and depression. State symptoms are getting worse. He is taking lexapro daily with no relief. Denies SI/HI. Has been out of lexapro for more than a month      Prior to Visit Medications    Medication Sig Taking? Authorizing Provider   Icosapent Ethyl (VASCEPA) 1 g CAPS capsule Take 2 capsules by mouth 2 times daily Yes Saintclair Macadam, APRN - CNP   omeprazole (PRILOSEC) 40 MG delayed release capsule Take 1 capsule by mouth daily Yes Saintclair Macadam, APRN - CNP   metFORMIN (GLUCOPHAGE-XR) 500 MG extended release tablet Take 1 tablet by mouth 2 times daily TAKE ONE TABLET BY MOUTH TWO TIMES A DAY Yes Saintclair Macadam, APRN - CNP   metoprolol succinate (TOPROL XL) 25 MG extended release tablet TAKE ONE TABLET BY MOUTH DAILY Yes Saintclair Macadam, APRN - CNP   Ertugliflozin L-PyroglutamicAc (STEGLATRO) 5 MG TABS Take 5 mg by mouth daily Yes Saintclair Macadam, APRN - CNP   ketoconazole (NIZORAL) 2 % cream Apply topically daily. Yes Saintclair Macadam, APRN - CNP   vitamin D-3 (CHOLECALCIFEROL) 125 MCG (5000 UT) TABS Take 1 tablet by mouth daily PATIENT NEEDS APPOINTMENT Yes Saintclair Macadam, APRN - CNP   triamcinolone (KENALOG) 0.1 % cream Apply topically 2 times daily.  Yes Saintclair Macadam, APRN - CNP   buPROPion (WELLBUTRIN XL) 150 MG extended release tablet Take 1 tablet by mouth every Quit smoking approximately 1 month ago: Patient commended on quitting smoking.    Neck:      Thyroid: No thyromegaly. Trachea: No tracheal deviation. Cardiovascular:      Rate and Rhythm: Normal rate and regular rhythm. Heart sounds: Normal heart sounds. No murmur. Pulmonary:      Effort: Pulmonary effort is normal. No respiratory distress. Breath sounds: No wheezing or rales. Comments: Diminished breath sounds  Chest:      Chest wall: No tenderness. Abdominal:      General: Bowel sounds are normal.      Palpations: Abdomen is soft. Tenderness: There is no tenderness. Musculoskeletal:         General: No tenderness or deformity. Lymphadenopathy:      Cervical: No cervical adenopathy. Skin:     General: Skin is warm and dry. Findings: Rash present. Comments: Dry erythematous macular rash to bilateral legs   Neurological:      Mental Status: He is alert and oriented to person, place, and time. Psychiatric:         Behavior: Behavior normal.           Assessment/Plan:    Anali Ray was seen today for diabetes and hypertension. Diagnoses and all orders for this visit:    Type 2 diabetes mellitus with complication, without long-term current use of insulin (HCC)  -     POCT glycosylated hemoglobin (Hb A1C)  -     POCT Microalbumin  -     metFORMIN (GLUCOPHAGE-XR) 500 MG extended release tablet; Take 1 tablet by mouth 2 times daily TAKE ONE TABLET BY MOUTH TWO TIMES A DAY  -     Ertugliflozin L-PyroglutamicAc (STEGLATRO) 5 MG TABS; Take 5 mg by mouth daily  -     Semaglutide, 1 MG/DOSE, (OZEMPIC, 1 MG/DOSE,) 2 MG/1.5ML SOPN; Inject 1 mg into the skin once a week  -A1C 6.2%  -ozempic dose increased due to high to FBS    Gastroesophageal reflux disease, esophagitis presence not specified  -     omeprazole (PRILOSEC) 40 MG delayed release capsule; Take 1 capsule by mouth daily  -The current medical regimen is effective;  continue present plan and medications.     Mild concentric left ventricular hypertrophy (LVH)  -     metoprolol succinate (TOPROL XL) 25 MG

## 2020-03-03 NOTE — ASSESSMENT & PLAN NOTE
- Currently, O2 sat stable on 3L NC.    - Continue oxygen supplementation.    - Duonebs Q6 hours.    -IV steroids given in ED.  - Pulmonology consult   -Bipap qhs and prn   - Slight Improvement    [employer] form faxed 03/03/2020 Fax confirmation received

## 2022-09-27 NOTE — TELEPHONE ENCOUNTER
Dr. Freire states he will proceed with patient's heart ablation. Patient must be on a minimum of 2 weeks of anticoagulation post ablation. Patient's office cystoscopy is scheduled on 12/20/17.     Patient notified of Dr. Freire's decision to continue with heart ablation. Patient states understanding.   
Good morning, Dr. Freire.  Mr. Garza is scheduled for an ablation with you on 12/4/17. He saw Dr. Chavez yesterday for hematuria and possible UTI. His urine has been sent for culture and sensitivity, and Dr. Chavez scheduled him for CT urogram and an office cystoscopy. Dr. Chavez wasn't sure if his plans or the patient's hematuria would impact your decision to perform the heart ablation. Just wanted to inform you.   Thanks,  DALE Jimenes  
regular

## 2023-01-02 NOTE — ANESTHESIA PREPROCEDURE EVALUATION
09/02/2018  Nico Garza Jr. is a 72 y.o., male.    Anesthesia Evaluation    I have reviewed the Patient Summary Reports.    I have reviewed the Nursing Notes.   I have reviewed the Medications.     Review of Systems  Anesthesia Hx:  No problems with previous Anesthesia Denies Hx of Anesthetic complications  History of prior surgery of interest to airway management or planning: Previous anesthesia: General Denies Family Hx of Anesthesia complications.   Denies Personal Hx of Anesthesia complications.   Social:  Former Smoker    Cardiovascular:   Exercise tolerance: poor Hypertension Dysrhythmias atrial fibrillation  Hypertension, Essential Hypertension  Disorder of Cardiac Rhythm, Atrial Fibrillation, Atrial Flutter, S/P electrical cardioversion, S/P surgical ablation    Pulmonary:   COPD, moderate  Pulmonary Symptoms:  are currently symptomatic, shortness of breath with activity and pleuritic chest pain.  Chronic Obstructive Pulmonary Disease (COPD):  is secondary to smoking.  Chest Tumor/Mass:  malignant.    Neurological:   Neuromuscular Disease,    Endocrine:   Diabetes, type 2, using insulin        Physical Exam  General:  Well nourished, Obesity    Airway/Jaw/Neck:  Airway Findings: Mouth Opening: Normal Tongue: Normal  General Airway Assessment: Adult  Mallampati: II  TM Distance: Normal, at least 6 cm  Jaw/Neck Findings:     Neck ROM: Normal ROM      Dental:  Dental Findings:    Chest/Lungs:  Chest/Lungs Findings: Clear to auscultation, Normal Respiratory Rate     Heart/Vascular:  Heart Findings: Rate: Normal  Rhythm: Regular Rhythm  Sounds: Normal        Mental Status:  Mental Status Findings:  Cooperative, Alert and Oriented         Anesthesia Plan  Type of Anesthesia, risks & benefits discussed:  Anesthesia Type:  MAC  Patient's Preference:   Intra-op Monitoring Plan: standard ASA  Other Mgr from [facility] ( Edwin) called questioning why patient was given RX for Oxycodone if patient allergic to Opiods. Msg sent to Garry COATS awaiting response. monitors  Intra-op Monitoring Plan Comments:   Post Op Pain Control Plan: multimodal analgesia  Post Op Pain Control Plan Comments:   Induction:   IV  Beta Blocker:  Patient is not currently on a Beta-Blocker (No further documentation required).       Informed Consent:    ASA Score: 4     Day of Surgery Review of History & Physical: I have interviewed and examined the patient. I have reviewed the patient's H&P dated:  There are no significant changes.  H&P update referred to the surgeon.         Ready For Surgery From Anesthesia Perspective.

## 2023-01-29 NOTE — TELEPHONE ENCOUNTER
Notified pt of results. Pt verbalized understanding.    CV:RRR  Pulm: CTA bl  VE: deferred  FHT:  145, mod shantel, + accels, - decels RIGO  TOCO: q5m

## 2023-02-16 NOTE — ASSESSMENT & PLAN NOTE
10/26/18 - urinalysis positive for UTI  -continue broad-spectrum antibiotics; switch to sensitive antibiotic once culture resulted  -monitor for fever  -CBC daily   SUBJECTIVE:  Here for a couple spots of concern  Prior patient of Dr Manzo  1st visit with me    REVIEW OF SYSTEMS:     Sunscreens--occasional.    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  Pleasant affect.   Well-groomed.  Exam of abdomen      ASSESSMENT AND PLAN:    Inflamed seborrheic keratoses, beneath left breast.  Brownish, rough-surfaced, stuck-on papules at site of concern  Meaning of diagnosis discussed.   Itchy, symptomatic.   Would like treated/destroyed.   We discussed expected blistering scabbing reaction, recurrence, infection, permanent scar/discoloration possible.   Destroyed with liquid nitrogen after verbal consent given.   Routine care instructions given.   Call if persist, recur.      history of basal cell carcinoma, left neck   no evidence of recurrence   Due to the history of skin cancer, a full skin exam was performed to monitor for recurrence and due to the increased risk for new cutaneous malignancies.       - Recommended follow-up for any concerning changes, rapid growth, pain, bleeding or if lesion becomes a nonhealing sore as these could be indications of skin cancer. ?        She prefers to call for a return appointment as needed for above. appt made for LAURENCE 2/2024. Call sooner prn problems.      She stated understanding of above and is in agreement with plans.  Encouraged to call with questions/concerns.          On 2/16/2023 Joanne BUCK scribed the services personally performed by Roseline Aleman MD      I, Dr. Aleman attest that I saw and examined the patient and agree with the above documentation as scribed.

## 2023-03-17 NOTE — PLAN OF CARE
Problem: Patient Care Overview  Goal: Plan of Care Review  Outcome: Ongoing (interventions implemented as appropriate)   08/31/18 0157   Coping/Psychosocial   Plan Of Care Reviewed With patient;daughter     Cardiac, vital signs, and lab monitoring. IV hydration;  MD consult in am. Increase activity as tolerated. Watch for falls. Watch for signs and symptoms of bleeding. NPO after midnight. Procedure in am; oxygen as needed.          No

## 2024-01-18 NOTE — TELEPHONE ENCOUNTER
[facility]  Hospitalist Progress Note  Hong Lee MD  03/02/2017    Assessment & Plan   Nel Farmer is a 60 year old female who was admitted on 2/11/2017 with frequent falls.      1. Multiple falls  -Patient presented with multiple falls  -Was living with her mother prior to this admission  - will need LTC facility  - etiology of fall unclear, likely multi-factorial      2. Right distal fibular fracture  - CAM boot while ambulating, increase ambulation x4 daily.  - Unable to get a formal physical therapy evaluation because of observation status, mobilize with the help of nursing staff as much as possible.  - Tylenol for pain      3. DM2  - Continue glipizide and insulin 70/30 and ISS  - Reduced 70/30 to 50 units bid on 2/23  -Good glycemic control at the moment      4. HTN  - Adequate control for the most part  - Continue clonidine, hydralazine, diltiazem, metoprolol, Imdur      5. Schizoaffective disorder with baseline cognitive deficits  -Patient does have baseline cognitive deficits  - Difficulty in finding accepting facility due to loud outbursts  - Continue depakote, risperdone and zyprexa  - Psychiatry consult appreciated      6. Vitamin D deficiency  - Continue cholecalciferol      7. 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 Now signed off  -encourage access and to increase free water intake.     DVT Prophylaxis: Pneumatic Compression Devices, she needs to ambulate    Code Status: Full Code     Disposition:  > 2 days  - MA application completed.  - also having significant difficulty finding places to accept her  PT MR DUNCAN WAS CONTACTED LAST WEEK, HE WAS STILL IN HOSPITAL AND HAD ANNUAL WELLNESS CONFUSED WITH HOME HEALTH, PT WAS SCHEDULED 12/4/18 ON 10/25/18, FOR HIS RESCHEDULED APPT...   "due to her mental condition/outburst.    Interval History   - medical assistance application completed with mother  - patient offers no complaints other than wanting to walk a little more    -Data reviewed today: I reviewed all new labs and imaging over the last 24 hours. I personally reviewed no images or EKG's today.    Physical Exam   Heart Rate: 52, Blood pressure 111/53, pulse 52, temperature 97.9  F (36.6  C), temperature source Axillary, resp. rate 18, height 1.6 m (5' 3\"), weight 99 kg (218 lb 4.1 oz), last menstrual period 04/27/2012, SpO2 95 %, not currently breastfeeding.  Vitals:    02/21/17 0538 02/26/17 0543 02/28/17 0623   Weight: 97.9 kg (215 lb 13.3 oz) 101.2 kg (223 lb 1.7 oz) 99 kg (218 lb 4.1 oz)     Vital Signs with Ranges  Temp:  [97.2  F (36.2  C)-97.9  F (36.6  C)] 97.9  F (36.6  C)  Pulse:  [52] 52  Heart Rate:  [49-57] 52  Resp:  [18] 18  BP: (111-147)/(50-68) 111/53  SpO2:  [94 %-95 %] 95 %  I/O's Last 24 hours  I/O last 3 completed shifts:  In: 2000 [P.O.:2000]  Out: 4075 [Urine:4075]    Constitutional: Awake, alert, cooperative, no apparent distress, unable to control her voice volume  Respiratory: Clear to auscultation bilaterally, no crackles or wheezing  Cardiovascular: Regular rate and rhythm, normal S1 and S2, and no murmur noted  GI: Normal bowel sounds, soft, non-distended, non-tender  Skin/Integumen: No rashes, no cyanosis   Other:      Medications   All medications were reviewed.       insulin aspart prot & aspart  50 Units Subcutaneous BID AC     hydrALAZINE  25 mg Oral 4x Daily     risperiDONE  1 mg Sublingual BID     polyethylene glycol  17 g Oral BID     cholecalciferol  1,000 Units Oral Daily     metoprolol  100 mg Oral BID     diltiazem  240 mg Oral Daily     docusate sodium  100 mg Oral BID     insulin aspart  1-10 Units Subcutaneous TID AC     insulin aspart  1-7 Units Subcutaneous At Bedtime     acetaminophen  1,000 mg Oral TID     cloNIDine  0.3 mg Oral BID     " divalproex  500 mg Oral QAM     divalproex  1,500 mg Oral At Bedtime     glipiZIDE (GLUCOTROL XL) 24 hr tablet 10 mg  10 mg Oral BID AC     isosorbide mononitrate  30 mg Oral Daily     OLANZapine (zyPREXA) tablet 5 mg  5 mg Oral At Bedtime     omeprazole (priLOSEC) CR capsule 20 mg  20 mg Oral BID AC        Data     Recent Labs  Lab 03/02/17  0705 03/01/17  0605 02/28/17  0650  02/24/17  0625   WBC  --   --   --   --  9.9   HGB  --   --   --   --  11.8   MCV  --   --   --   --  96   PLT  --   --   --   --  270    139 141  < > 140   POTASSIUM 5.5* 5.8* 5.8*  < > 5.3   CHLORIDE 108 110* 111*  < > 111*   CO2 23 24 22  < > 24   BUN 48* 51* 48*  < > 43*   CR 3.47* 3.53* 3.25*  < > 3.11*   ANIONGAP 8 5 8  < > 5   BENTLEY 10.3* 10.1 10.0  < > 10.0   * 106* 66*  < > 93   ALBUMIN  --   --   --   --  2.6*   < > = values in this interval not displayed.    No results found for this or any previous visit (from the past 24 hour(s)).    Hong Lee MD  Pager [phone]        no gross abnormalities negative

## 2024-05-03 NOTE — PROGRESS NOTES
[facility] -   Hematology/Oncology  Progress Note    Patient Name: Nico Garza Jr.  Admission Date: 11/20/2018  Hospital Length of Stay: 3 days  Code Status: DNR     Subjective:     HPI:  72 y.o. male patient with a hx of DM, HTN, and NSCLC with mets to bone and liver who presented to the ED with c/o worsening SOB, unresponsive to home O2 at 4L per nasal cannula. Of note, patient was seen in ED for similar sxs 1 week ago. He was admitted and discharged 11/18/18. Symptoms are constant and moderate in severity. Worsened with exertion, relieved by nothing.  Associated sxs include cough,  weakness, BLE swelling, left sided CP, and back pain. Patient denies any fever, chills, n/v/d, abd pain, weakness, numbness, palpitations, and all other sxs at this time.  CTA of the chest shows alveolar and interstitial opacities are seen throughout the left lower lobe and lingula and to a lesser degree within the left upper lobe consistent with airspace disease or aspiration. Multiple pulmonary nodules are seen within the left lower lobe measuring up to 1.2 cm.  Evaluation is limited due to patchy consolidation throughout the left lung.  1.3 cm nodule within the right upper lobe likely reflects metastatic disease..  Patchy infiltrate seen within the right middle lobe.  Mild bronchiectasis and infiltration within the right middle lobe also noted.  Severe emphysematous changes are seen throughout the superior segments of the lower lobes and bilateral upper lobes.   Troponin negative. Lactic acid normal. UA neg for infection. . BC:NGTD    Patient is s/p cycle 2 Keytruda which was received on 11/08/2018.  Patient is also receiving palliative radiation to lumbar spine          Interval History: No acute events overnight. Tolerating IV antibiotics.  Reports minimal improvement in SOB.  Reports generalized pain 3/10, control with p.o. pain medication.  Discussed with patient whether or not he would like to proceed  VV reminder    with active immunotherapy treatment versus comfort care.  He continues to think on this and will let us know his decision.  11/23/18: No acute events overnight. Patient has not yet decided active treatment vs comfort care. States he would like to speak with his son and daughter regarding this. BC:NGTD. Resp Cx pending.    Oncology Treatment Plan:   OP PEMBROLIZUMAB 200MG Q3W    Medications:  Continuous Infusions:   sodium chloride 0.9% 75 mL/hr at 11/20/18 1911     Scheduled Meds:   albuterol-ipratropium  3 mL Nebulization Q6H    apixaban  5 mg Oral BID    ascorbic acid (vitamin C)  1,000 mg Oral Daily    diltiaZEM  360 mg Oral Daily    fentaNYL  1 patch Transdermal Q72H    flecainide  100 mg Oral Q12H    methylPREDNISolone sodium succinate  80 mg Intravenous Q6H    pantoprazole  40 mg Oral Daily    piperacillin-tazobactam (ZOSYN) IVPB  4.5 g Intravenous Q8H    polyethylene glycol  17 g Oral Daily    pravastatin  40 mg Oral Nightly    sertraline  50 mg Oral Daily    vancomycin (VANCOCIN) IVPB  1,250 mg Intravenous Q12H    vitamin D  1,000 Units Oral Daily    vitamin renal formula (B-complex-vitamin c-folic acid)  1 capsule Oral Daily     PRN Meds:albuterol-ipratropium, clonazePAM, dextrose 50%, dextrose 50%, glucagon (human recombinant), glucose, glucose, insulin aspart U-100, oxyCODONE, sodium chloride     Review of patient's allergies indicates:   Allergen Reactions    Anoro ellipta [umeclidinium-vilanterol] Shortness Of Breath    Flomax [tamsulosin]      Dizzy          Past Medical History:   Diagnosis Date    Arthritis     Atrial fibrillation and flutter     Atrial flutter     Cancer     LUNG    COPD (chronic obstructive pulmonary disease)     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  Others are too small to  accurately characterize.    Lung disease     Nephrolithiasis     ct urogram 12/15/2017-Bilateral nephrolithiasis.     Neuropathy, diabetic     Pancreatitis     Respiratory failure     Stage 4 lung cancer     Type 1 diabetes mellitus with diabetic neuropathy 4/27/2018     Past Surgical History:   Procedure Laterality Date    ABLATION N/A 12/4/2017    Performed by J[facility]ire [facility] at [facility] CATH LAB    BICEPS TENDON REPAIR Right     BRONCHOSCOPY Bilateral 9/2/2018    Procedure: BRONCHOSCOPY- insert lighted tube into airway to obtain biopsy of lung;  Surgeon: Aldair Deleon MD;  Location: [facility] ENDO;  Service: Endoscopy;  Laterality: Bilateral;    BRONCHOSCOPY- insert lighted tube into airway to obtain biopsy of lung Bilateral 9/2/2018    Performed by Aldair Deleon MD at [facility] ENDO    CARDIOVERSION      CHOLECYSTECTOMY      INSERTION OF TUNNELED CENTRAL VENOUS CATHETER (CVC) WITH SUBCUTANEOUS PORT Right 10/9/2018    Procedure: [id number]-LVLG-J-CATH;  Surgeon: Christophe Brandt MD;  Location: [facility] OR;  Service: General;  Laterality: Right;    ZYJWEACOP-DHDX-Z-CATH Right 10/9/2018    Performed by Christophe Brandt MD at [facility] OR    PATELLA SURGERY Right     RADIOFREQUENCY ABLATION      ROTATOR CUFF REPAIR Left     TRANSESOPHAGEAL ECHOCARDIOGRAM (DILLAN) N/A 12/4/2017    Performed by Jaret [facility]ire [facility] at [facility] CATH LAB    ULTRASOUND GUIDANCE Right 10/9/2018    Procedure: ULTRASOUND GUIDANCE;  Surgeon: Christophe Brandt MD;  Location: [facility] OR;  Service: General;  Laterality: Right;    ULTRASOUND GUIDANCE Right 10/9/2018    Performed by Christophe Brandt MD at [facility] OR     Family History     Problem Relation (Age of Onset)    Cancer Maternal Aunt    Cataracts Sister    Diabetes Mother, Sister, Sister, Sister    Heart attack Brother    Heart failure Brother    Hypertension Mother, Father    Stroke Mother, Father, Paternal Grandmother, Paternal Grandfather        Tobacco Use    Smoking status: Former Smoker      Packs/day: 0.50     Types: Cigarettes     Start date: 1/1/1960     Last attempt to quit: 3/8/2015     Years since quitting: 3.7    Smokeless tobacco: Former User     Types: Snuff     Quit date: 10/7/1995   Substance and Sexual Activity    Alcohol use: No     Alcohol/week: 0.0 oz    Drug use: No    Sexual activity: Not on file       Review of Systems   Constitutional: Positive for activity change and fatigue. Negative for appetite change, chills, diaphoresis, fever and unexpected weight change.   HENT: Negative for congestion, mouth sores, nosebleeds, sore throat, trouble swallowing and voice change.    Eyes: Negative for visual disturbance.   Respiratory: Positive for cough and shortness of breath. Negative for apnea, choking, chest tightness, wheezing and stridor.    Cardiovascular: Positive for chest pain and leg swelling. Negative for palpitations.   Gastrointestinal: Negative for abdominal distention, abdominal pain, anal bleeding, blood in stool, constipation, diarrhea, nausea and vomiting.   Genitourinary: Negative for difficulty urinating, dysuria and hematuria.   Musculoskeletal: Positive for arthralgias, back pain and myalgias.   Skin: Negative for pallor and rash.   Allergic/Immunologic: Positive for immunocompromised state.   Neurological: Positive for weakness. Negative for dizziness, syncope, light-headedness, numbness and headaches.   Hematological: Negative for adenopathy. Bruises/bleeds easily.   Psychiatric/Behavioral: The patient is nervous/anxious.      Objective:     Vital Signs (Most Recent):  Temp: 97.4 °F (36.3 °C) (11/23/18 0747)  Pulse: 91 (11/23/18 0837)  Resp: 17 (11/23/18 0837)  BP: (!) 110/59 (11/23/18 0747)  SpO2: (!) 92 % (11/23/18 0837) Vital Signs (24h Range):  Temp:  [97.4 °F (36.3 °C)-98.3 °F (36.8 °C)] 97.4 °F (36.3 °C)  Pulse:  [] 91  Resp:  [16-22] 17  SpO2:  [92 %-96 %] 92 %  BP: (110-135)/(59-68) 110/59     Weight: 73.4 kg (161 lb 13.1 oz)  Body mass index is 24.6  kg/m².  Body surface area is 1.88 meters squared.      Intake/Output Summary (Last 24 hours) at 11/23/2018 0959  Last data filed at 11/23/2018 0438  Gross per 24 hour   Intake 1101.25 ml   Output 750 ml   Net 351.25 ml       Physical Exam   Constitutional: He is oriented to person, place, and time. He appears well-developed and well-nourished. He is cooperative. He has a sickly appearance. He appears ill. Nasal cannula in place.   HENT:   Head: Normocephalic.   Right Ear: Hearing normal. No drainage.   Left Ear: Hearing normal. No drainage.   Nose: Nose normal.   Mouth/Throat: Oropharynx is clear and moist.   Eyes: Conjunctivae, EOM and lids are normal. Right eye exhibits no discharge. Left eye exhibits no discharge. No scleral icterus.   Neck: Normal range of motion. No thyroid mass present.   Cardiovascular: Normal rate and normal heart sounds. An irregular rhythm present.   No murmur heard.  Pulmonary/Chest: Effort normal. No respiratory distress. He has decreased breath sounds. He has wheezes. He has no rhonchi. He has no rales.   Patient has just received breathing treatment. O2 sat 93% at time of visit   Abdominal: Soft. He exhibits no distension. Bowel sounds are decreased. There is no tenderness.   Genitourinary:   Genitourinary Comments: deferred   Musculoskeletal: Normal range of motion.        Right ankle: He exhibits swelling.        Left ankle: He exhibits swelling.        Right lower leg: He exhibits edema.        Left lower leg: He exhibits edema.   Mild BLE edema   Lymphadenopathy:        Head (right side): No submandibular, no preauricular and no posterior auricular adenopathy present.        Head (left side): No submandibular, no preauricular and no posterior auricular adenopathy present.        Right cervical: No superficial cervical adenopathy present.       Left cervical: No superficial cervical adenopathy present.   Neurological: He is alert and oriented to person, place, and time.   Skin: Skin  is warm, dry and intact.   Psychiatric: His speech is normal and behavior is normal. Thought content normal. His mood appears anxious.   Vitals reviewed.      Significant Labs:   BMP:   Recent Labs   Lab 11/22/18 0422 11/23/18  0416   * 353*   * 131*   K 4.1 4.6   CL 92* 90*   CO2 28 27   BUN 12 15   CREATININE 0.8 0.9   CALCIUM 9.0 9.2   , CBC:   Recent Labs   Lab 11/22/18 0422 11/23/18 0416   WBC 8.67 7.18   HGB 9.1* 9.8*   HCT 29.4* 31.8*   * 405*   , CMP:   Recent Labs   Lab 11/22/18 0422 11/23/18 0416   * 131*   K 4.1 4.6   CL 92* 90*   CO2 28 27   * 353*   BUN 12 15   CREATININE 0.8 0.9   CALCIUM 9.0 9.2   PROT 7.0 7.2   ALBUMIN 2.4* 2.5*   BILITOT 0.4 0.5   ALKPHOS 168* 179*   AST 31 32   ALT 26 25   ANIONGAP 11 14   EGFRNONAA >60 >60   , LFTs:   Recent Labs   Lab 11/22/18 0422 11/23/18 0416   ALT 26 25   AST 31 32   ALKPHOS 168* 179*   BILITOT 0.4 0.5   PROT 7.0 7.2   ALBUMIN 2.4* 2.5*   , Tumor Markers: No results for input(s): PSA, CEA, , AFPTM, VO1507,  in the last 48 hours.    Invalid input(s): ALGTM and All pertinent labs from the last 24 hours have been reviewed.    Diagnostic Results:  I have reviewed all pertinent imaging results/findings within the past 24 hours.   CTA Chest Non-Coronary (PE Study)   Order: [id number]   Status:  Final result   Visible to patient:  No (Not Released)   Next appt:  11/29/2018 at 08:50 AM in Lab ([facility])   Details     Reading Physician Reading Date Result Priority   Jos Brito MD 11/20/2018       Narrative     EXAMINATION:  CTA CHEST NON CORONARY    CLINICAL HISTORY:  Chest pain, acute, PE suspected, high pretest prob;    TECHNIQUE:  The chest was surveyed from the apices through the posterior costophrenic angles after administration of 100 cc of Omni 350 contrast using CT angiography technique.  Data was reconstructed for multiplanar images in axial, sagittal and coronal planes in for maximal intensity  projection images in the axial plane.    COMPARISON:  None    FINDINGS:  Base of Neck: No significant abnormality.    Airways: Patent.    Lungs: Alveolar and interstitial opacities are seen throughout the left lower lobe and lingula and to a lesser degree within the left upper lobe consistent with airspace disease or aspiration.  Multiple pulmonary nodules are seen within the left lower lobe measuring up to 1.2 cm.  Evaluation is limited due to patchy consolidation throughout the left lung.  1.3 cm nodule within the right upper lobe likely reflects metastatic disease..  Patchy infiltrate seen within the right middle lobe.  Mild bronchiectasis and infiltration within the right middle lobe also noted.  Severe emphysematous changes are seen throughout the superior segments of the lower lobes and bilateral upper lobes.    Pleura: No pleural fluid.No pleural calcification.    Roz/Mediastinum: Extensive mediastinal and left hilar adenopathy.  Mass is seen within the AP window extending along the lateral hilum into the left hilar region measuring at least 5.9 x 4.7 cm concerning for metastatic disease.  Enlarged lymph nodes are seen throughout the mediastinum largest is in the precarinal region measuring 2 cm concerning for metastatic disease.  Elevation left hemidiaphragm noted.    Esophagus: Normal.    Heart/pericardium: Normal size.  Lipomatosis hypertrophy of the interatrial septum is noted.  No pericardial effusion or calcification.    Pulmonary vasculature: No evidence of pulmonary embolism.  Pulmonary arteries distribute normally.  There are four pulmonary veins.    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  The aorta maintains normal caliber, contour and course. There is mild-to-moderate calcification of the thoracic aorta.  There is  severe coronary artery calcification.    Thoracic soft tissues: Normal. Both breasts are present.    Bones: Diffuse osteopenia and multiple mixed sclerotic and lytic  lesions throughout the thoracic and lumbar skele spine ton largest within the L1 vertebral body measuring 2.5 x 2.9 cm consistent with metastatic disease.  Bones are diffusely osteopenic which limits evaluation for metastatic disease    Upper Abdomen: Punctate nonobstructing stones within the right kidney.  Indeterminate hypodensities within the kidneys likely reflect small cysts.  Moderate constipation.  Gallbladder is surgically absent.  Indeterminate 5 x 4.3 cm mass within the right hepatic lobe likely reflects metastatic disease.  Nodular thickening of the adrenal glands noted.      Impression       No evidence of pulmonary embolism.    Consolidation throughout the left lung and to a lesser degree within the right middle lobe thought to reflect airspace disease or aspiration.    Large mass within the mediastinum and left hilum as well as several pulmonary nodules concerning for metastatic disease.    Liver lesions concerning for metastatic disease.    Multiple lytic lesions throughout the thoracic skeleton largest within the L1 vertebral body consistent with metastatic disease    Diffuse thickening of the stomach could reflect line and dysplastic of and diffuse infiltrative malignancy.  Recommend endoscopy.    Moderate constipation.    See above for additional findings    All CT scans at this facility use dose modulation, iterative reconstruction and/or weight based dosing when appropriate to reduce radiation dose to as low as reasonably achievable.               Assessment/Plan:     Metastatic left lung cancer (metastasis from lung to other site) with mediastinal lymphadenopathy    --s/p cycle 2 Keytruda.  Keytruda currently on hold.  Patient will follow up outpatient in Heme-Onc clinic with Dr. Mcmanus to discuss further treatment recommendations pending current clinical situation  --patient also receiving palliative radiation to lumbar spine  --continue supportive care    November 22, 2018-I had a detailed  discussion with the patient today with regard to his current clinical situation.  Continue with all current supportive care/broad-spectrum antimicrobial therapy.  The patient will follow up in the outpatient clinic with his primary oncologist Dr. Lagos to discuss further management with regard to his metastatic malignancy.    11/23/18  --patient is still undecided on active versus palliative treatment.  He will follow up in the outpatient clinic with his primary oncologist Dr. Lagos to discuss further management and treatment options     Paroxysmal atrial fibrillation    --continue Eliquis 5 mg p.o. B.i.d.  --continue Cardizem daily  --continue telemetry monitoring     Healthcare-associated pneumonia    11/21/18  --patient afebrile  --continue broad-spectrum IV antibiotics  --continue IV hydration  --encouraged IS  --encouraged ambulation  --awaiting sputum culture  --continue supportive care    November 22, 2018-I have recommended proceeding with swallow study to assess for aspiration.  Discussed with Hospital Medicine.    11/23/18  --Pulmonology has evaluated patient and recommend BiPap QHS  --patient remains afebrile  --continue IV hydration  --encouraged IS  --encouraged ambulation  --sputum culture pending  --continue supportive care         Thank you for your consult. I will follow-up with patient. Please contact us if you have any additional questions.     Cindy Stevenson NP  Hematology/Oncology  [facility]
